# Patient Record
Sex: MALE | Race: WHITE | NOT HISPANIC OR LATINO | Employment: OTHER | URBAN - METROPOLITAN AREA
[De-identification: names, ages, dates, MRNs, and addresses within clinical notes are randomized per-mention and may not be internally consistent; named-entity substitution may affect disease eponyms.]

---

## 2017-02-27 ENCOUNTER — LAB CONVERSION - ENCOUNTER (OUTPATIENT)
Dept: OTHER | Facility: OTHER | Age: 77
End: 2017-02-27

## 2017-03-14 LAB
A/G RATIO (HISTORICAL): 1.3 (CALC) (ref 1–2.5)
ALBUMIN SERPL BCP-MCNC: 3.9 G/DL (ref 3.6–5.1)
ALP SERPL-CCNC: 71 U/L (ref 40–115)
ALT SERPL W P-5'-P-CCNC: 9 U/L (ref 9–46)
AST SERPL W P-5'-P-CCNC: 16 U/L (ref 10–35)
BILIRUB SERPL-MCNC: 0.6 MG/DL (ref 0.2–1.2)
BUN SERPL-MCNC: 22 MG/DL (ref 7–25)
BUN/CREA RATIO (HISTORICAL): NORMAL (CALC) (ref 6–22)
CALCIUM SERPL-MCNC: 9.1 MG/DL (ref 8.6–10.3)
CHLORIDE SERPL-SCNC: 104 MMOL/L (ref 98–110)
CHOLEST SERPL-MCNC: 189 MG/DL (ref 125–200)
CHOLEST/HDLC SERPL: 3.7 (CALC)
CO2 SERPL-SCNC: 29 MMOL/L (ref 20–31)
CREAT SERPL-MCNC: 1.15 MG/DL (ref 0.7–1.18)
EGFR AFRICAN AMERICAN (HISTORICAL): 71 ML/MIN/1.73M2
EGFR-AMERICAN CALC (HISTORICAL): 61 ML/MIN/1.73M2
GAMMA GLOBULIN (HISTORICAL): 2.9 G/DL (CALC) (ref 1.9–3.7)
GLUCOSE (HISTORICAL): 94 MG/DL (ref 65–99)
HDLC SERPL-MCNC: 51 MG/DL
LDL CHOLESTEROL DIRECT (HISTORICAL): 137 MG/DL
NON-HDL-CHOL (CHOL-HDL) (HISTORICAL): 138 MG/DL (CALC)
POTASSIUM SERPL-SCNC: 4.4 MMOL/L (ref 3.5–5.3)
SODIUM SERPL-SCNC: 141 MMOL/L (ref 135–146)
TOTAL PROTEIN (HISTORICAL): 6.8 G/DL (ref 6.1–8.1)
TRIGL SERPL-MCNC: 94 MG/DL

## 2017-03-15 ENCOUNTER — GENERIC CONVERSION - ENCOUNTER (OUTPATIENT)
Dept: OTHER | Facility: OTHER | Age: 77
End: 2017-03-15

## 2017-03-28 ENCOUNTER — GENERIC CONVERSION - ENCOUNTER (OUTPATIENT)
Dept: OTHER | Facility: OTHER | Age: 77
End: 2017-03-28

## 2017-03-29 ENCOUNTER — ALLSCRIPTS OFFICE VISIT (OUTPATIENT)
Dept: OTHER | Facility: OTHER | Age: 77
End: 2017-03-29

## 2017-04-19 ENCOUNTER — ALLSCRIPTS OFFICE VISIT (OUTPATIENT)
Dept: OTHER | Facility: OTHER | Age: 77
End: 2017-04-19

## 2017-08-15 ENCOUNTER — APPOINTMENT (EMERGENCY)
Dept: CT IMAGING | Facility: HOSPITAL | Age: 77
DRG: 189 | End: 2017-08-15
Payer: MEDICARE

## 2017-08-15 ENCOUNTER — HOSPITAL ENCOUNTER (INPATIENT)
Facility: HOSPITAL | Age: 77
LOS: 4 days | Discharge: HOME HEALTH | DRG: 189 | End: 2017-08-19
Attending: EMERGENCY MEDICINE | Admitting: INTERNAL MEDICINE
Payer: MEDICARE

## 2017-08-15 DIAGNOSIS — J18.9 PNEUMONIA: Primary | ICD-10-CM

## 2017-08-15 DIAGNOSIS — R93.89 ABNORMAL CT SCAN: ICD-10-CM

## 2017-08-15 DIAGNOSIS — N39.0 URINARY TRACT INFECTION: ICD-10-CM

## 2017-08-15 DIAGNOSIS — J44.9 COPD (CHRONIC OBSTRUCTIVE PULMONARY DISEASE) (HCC): ICD-10-CM

## 2017-08-15 PROBLEM — J96.02 ACUTE RESPIRATORY FAILURE WITH HYPOXIA AND HYPERCAPNIA (HCC): Status: ACTIVE | Noted: 2017-08-15

## 2017-08-15 PROBLEM — F17.200 TOBACCO USE DISORDER: Status: ACTIVE | Noted: 2017-08-15
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
## 2017-08-15 PROBLEM — J96.01 ACUTE RESPIRATORY FAILURE WITH HYPOXIA AND HYPERCAPNIA (HCC): Status: ACTIVE | Noted: 2017-08-15

## 2017-08-15 PROBLEM — R07.81 RIB PAIN: Status: ACTIVE | Noted: 2017-08-15

## 2017-08-15 LAB
ALBUMIN SERPL BCP-MCNC: 2.6 G/DL (ref 3.5–5)
ALP SERPL-CCNC: 74 U/L (ref 46–116)
ALT SERPL W P-5'-P-CCNC: 20 U/L (ref 12–78)
AMORPH PHOS CRY URNS QL MICRO: ABNORMAL /HPF
ANION GAP SERPL CALCULATED.3IONS-SCNC: 5 MMOL/L (ref 4–13)
AST SERPL W P-5'-P-CCNC: 17 U/L (ref 5–45)
BACTERIA UR QL AUTO: ABNORMAL /HPF
BASE EXCESS BLDA CALC-SCNC: 6 MMOL/L (ref -2–3)
BASOPHILS # BLD AUTO: 0.02 THOUSANDS/ΜL (ref 0–0.1)
BASOPHILS NFR BLD AUTO: 0 % (ref 0–1)
BILIRUB SERPL-MCNC: 0.4 MG/DL (ref 0.2–1)
BILIRUB UR QL STRIP: NEGATIVE
BUN SERPL-MCNC: 17 MG/DL (ref 5–25)
CALCIUM SERPL-MCNC: 8.8 MG/DL (ref 8.3–10.1)
CHLORIDE SERPL-SCNC: 103 MMOL/L (ref 100–108)
CLARITY UR: ABNORMAL
CLARITY, POC: CLEAR
CO2 SERPL-SCNC: 34 MMOL/L (ref 21–32)
COLOR UR: YELLOW
COLOR, POC: NORMAL
CREAT SERPL-MCNC: 1.14 MG/DL (ref 0.6–1.3)
EOSINOPHIL # BLD AUTO: 0.35 THOUSAND/ΜL (ref 0–0.61)
EOSINOPHIL NFR BLD AUTO: 3 % (ref 0–6)
ERYTHROCYTE [DISTWIDTH] IN BLOOD BY AUTOMATED COUNT: 13.5 % (ref 11.6–15.1)
EXT BILIRUBIN, UA: NORMAL
EXT BLOOD URINE: NORMAL
EXT GLUCOSE, UA: NORMAL
EXT KETONES: NORMAL
EXT NITRITE, UA: NORMAL
EXT PH, UA: 5
EXT PROTEIN, UA: NORMAL
EXT SPECIFIC GRAVITY, UA: 1.01
EXT UROBILINOGEN: 0.2
GFR SERPL CREATININE-BSD FRML MDRD: 62 ML/MIN/1.73SQ M
GLUCOSE SERPL-MCNC: 112 MG/DL (ref 65–140)
GLUCOSE UR STRIP-MCNC: NEGATIVE MG/DL
HCO3 BLDA-SCNC: 31.9 MMOL/L (ref 24–30)
HCT VFR BLD AUTO: 38.8 % (ref 36.5–49.3)
HGB BLD-MCNC: 12.8 G/DL (ref 12–17)
HGB UR QL STRIP.AUTO: ABNORMAL
KETONES UR STRIP-MCNC: NEGATIVE MG/DL
LEUKOCYTE ESTERASE UR QL STRIP: ABNORMAL
LYMPHOCYTES # BLD AUTO: 1.15 THOUSANDS/ΜL (ref 0.6–4.47)
LYMPHOCYTES NFR BLD AUTO: 11 % (ref 14–44)
MCH RBC QN AUTO: 32.9 PG (ref 26.8–34.3)
MCHC RBC AUTO-ENTMCNC: 33 G/DL (ref 31.4–37.4)
MCV RBC AUTO: 100 FL (ref 82–98)
MONOCYTES # BLD AUTO: 0.63 THOUSAND/ΜL (ref 0.17–1.22)
MONOCYTES NFR BLD AUTO: 6 % (ref 4–12)
NEUTROPHILS # BLD AUTO: 8.36 THOUSANDS/ΜL (ref 1.85–7.62)
NEUTS SEG NFR BLD AUTO: 80 % (ref 43–75)
NITRITE UR QL STRIP: NEGATIVE
NON-SQ EPI CELLS URNS QL MICRO: ABNORMAL /HPF
PCO2 BLD: 34 MMOL/L (ref 21–32)
PCO2 BLD: 52.4 MM HG (ref 42–50)
PH BLD: 7.39 [PH] (ref 7.3–7.4)
PH UR STRIP.AUTO: 5.5 [PH] (ref 4.5–8)
PLATELET # BLD AUTO: 216 THOUSANDS/UL (ref 149–390)
PMV BLD AUTO: 9.7 FL (ref 8.9–12.7)
PO2 BLD: 27 MM HG (ref 35–45)
POTASSIUM SERPL-SCNC: 3.6 MMOL/L (ref 3.5–5.3)
PROT SERPL-MCNC: 7.1 G/DL (ref 6.4–8.2)
PROT UR STRIP-MCNC: NEGATIVE MG/DL
RBC # BLD AUTO: 3.89 MILLION/UL (ref 3.88–5.62)
RBC #/AREA URNS AUTO: ABNORMAL /HPF
SAO2 % BLD FROM PO2: 48 % (ref 95–98)
SODIUM SERPL-SCNC: 142 MMOL/L (ref 136–145)
SP GR UR STRIP.AUTO: 1.01 (ref 1–1.03)
SPECIMEN SOURCE: ABNORMAL
TROPONIN I SERPL-MCNC: <0.02 NG/ML
TSH SERPL DL<=0.05 MIU/L-ACNC: 1.21 UIU/ML (ref 0.36–3.74)
UROBILINOGEN UR QL STRIP.AUTO: 1 E.U./DL
WBC # BLD AUTO: 10.51 THOUSAND/UL (ref 4.31–10.16)
WBC # BLD EST: NORMAL 10*3/UL
WBC #/AREA URNS AUTO: ABNORMAL /HPF

## 2017-08-15 PROCEDURE — 71260 CT THORAX DX C+: CPT

## 2017-08-15 PROCEDURE — 87086 URINE CULTURE/COLONY COUNT: CPT | Performed by: EMERGENCY MEDICINE

## 2017-08-15 PROCEDURE — 84443 ASSAY THYROID STIM HORMONE: CPT | Performed by: EMERGENCY MEDICINE

## 2017-08-15 PROCEDURE — 93005 ELECTROCARDIOGRAM TRACING: CPT | Performed by: EMERGENCY MEDICINE

## 2017-08-15 PROCEDURE — 96360 HYDRATION IV INFUSION INIT: CPT

## 2017-08-15 PROCEDURE — 36415 COLL VENOUS BLD VENIPUNCTURE: CPT | Performed by: EMERGENCY MEDICINE

## 2017-08-15 PROCEDURE — 99285 EMERGENCY DEPT VISIT HI MDM: CPT

## 2017-08-15 PROCEDURE — 82803 BLOOD GASES ANY COMBINATION: CPT

## 2017-08-15 PROCEDURE — 70450 CT HEAD/BRAIN W/O DYE: CPT

## 2017-08-15 PROCEDURE — 81001 URINALYSIS AUTO W/SCOPE: CPT | Performed by: EMERGENCY MEDICINE

## 2017-08-15 PROCEDURE — 74177 CT ABD & PELVIS W/CONTRAST: CPT

## 2017-08-15 PROCEDURE — 72125 CT NECK SPINE W/O DYE: CPT

## 2017-08-15 PROCEDURE — 87040 BLOOD CULTURE FOR BACTERIA: CPT | Performed by: EMERGENCY MEDICINE

## 2017-08-15 PROCEDURE — 81002 URINALYSIS NONAUTO W/O SCOPE: CPT | Performed by: EMERGENCY MEDICINE

## 2017-08-15 PROCEDURE — 87077 CULTURE AEROBIC IDENTIFY: CPT | Performed by: EMERGENCY MEDICINE

## 2017-08-15 PROCEDURE — 80053 COMPREHEN METABOLIC PANEL: CPT | Performed by: EMERGENCY MEDICINE

## 2017-08-15 PROCEDURE — 84484 ASSAY OF TROPONIN QUANT: CPT | Performed by: EMERGENCY MEDICINE

## 2017-08-15 PROCEDURE — 85025 COMPLETE CBC W/AUTO DIFF WBC: CPT | Performed by: EMERGENCY MEDICINE

## 2017-08-15 RX ORDER — SODIUM CHLORIDE 9 MG/ML
75 INJECTION, SOLUTION INTRAVENOUS CONTINUOUS
Status: DISCONTINUED | OUTPATIENT
Start: 2017-08-16 | End: 2017-08-17

## 2017-08-15 RX ORDER — BUDESONIDE AND FORMOTEROL FUMARATE DIHYDRATE 160; 4.5 UG/1; UG/1
2 AEROSOL RESPIRATORY (INHALATION)
Status: DISCONTINUED | OUTPATIENT
Start: 2017-08-16 | End: 2017-08-19 | Stop reason: HOSPADM

## 2017-08-15 RX ORDER — ACETAMINOPHEN 325 MG/1
650 TABLET ORAL EVERY 6 HOURS PRN
Status: DISCONTINUED | OUTPATIENT
Start: 2017-08-15 | End: 2017-08-19 | Stop reason: HOSPADM

## 2017-08-15 RX ORDER — ESCITALOPRAM OXALATE 10 MG/1
10 TABLET ORAL DAILY
Status: DISCONTINUED | OUTPATIENT
Start: 2017-08-16 | End: 2017-08-19 | Stop reason: HOSPADM

## 2017-08-15 RX ORDER — TRAMADOL HYDROCHLORIDE 50 MG/1
TABLET ORAL
COMMUNITY
Start: 2017-08-10 | End: 2017-08-19 | Stop reason: HOSPADM

## 2017-08-15 RX ORDER — NICOTINE 21 MG/24HR
14 PATCH, TRANSDERMAL 24 HOURS TRANSDERMAL ONCE
Status: COMPLETED | OUTPATIENT
Start: 2017-08-15 | End: 2017-08-17

## 2017-08-15 RX ORDER — FAMOTIDINE 40 MG/1
TABLET, FILM COATED ORAL
COMMUNITY
Start: 2017-03-29 | End: 2018-02-21 | Stop reason: SDUPTHER

## 2017-08-15 RX ORDER — FAMOTIDINE 20 MG/1
40 TABLET, FILM COATED ORAL DAILY
Status: DISCONTINUED | OUTPATIENT
Start: 2017-08-16 | End: 2017-08-19 | Stop reason: HOSPADM

## 2017-08-15 RX ORDER — TAMSULOSIN HYDROCHLORIDE 0.4 MG/1
0.4 CAPSULE ORAL DAILY
Status: DISCONTINUED | OUTPATIENT
Start: 2017-08-16 | End: 2017-08-19 | Stop reason: HOSPADM

## 2017-08-15 RX ORDER — BUDESONIDE AND FORMOTEROL FUMARATE DIHYDRATE 160; 4.5 UG/1; UG/1
AEROSOL RESPIRATORY (INHALATION)
COMMUNITY
End: 2018-06-05 | Stop reason: CLARIF

## 2017-08-15 RX ADMIN — SODIUM CHLORIDE 500 ML: 0.9 INJECTION, SOLUTION INTRAVENOUS at 21:29

## 2017-08-15 RX ADMIN — AZITHROMYCIN MONOHYDRATE 500 MG: 500 INJECTION, POWDER, LYOPHILIZED, FOR SOLUTION INTRAVENOUS at 22:37

## 2017-08-15 RX ADMIN — IOHEXOL 100 ML: 350 INJECTION, SOLUTION INTRAVENOUS at 21:21

## 2017-08-16 PROBLEM — R93.89 ABNORMAL CT SCAN: Status: ACTIVE | Noted: 2017-08-16

## 2017-08-16 PROBLEM — J96.02 ACUTE RESPIRATORY FAILURE WITH HYPOXIA AND HYPERCAPNIA (HCC): Status: RESOLVED | Noted: 2017-08-15 | Resolved: 2017-08-16

## 2017-08-16 PROBLEM — R53.1 WEAKNESS GENERALIZED: Status: ACTIVE | Noted: 2017-08-16

## 2017-08-16 PROBLEM — J96.01 ACUTE RESPIRATORY FAILURE WITH HYPOXIA AND HYPERCAPNIA (HCC): Status: RESOLVED | Noted: 2017-08-15 | Resolved: 2017-08-16

## 2017-08-16 LAB
ANION GAP SERPL CALCULATED.3IONS-SCNC: 8 MMOL/L (ref 4–13)
ATRIAL RATE: 77 BPM
BASE EXCESS BLDA CALC-SCNC: 1 MMOL/L (ref -2–3)
BUN SERPL-MCNC: 14 MG/DL (ref 5–25)
CALCIUM SERPL-MCNC: 8.7 MG/DL (ref 8.3–10.1)
CHLORIDE SERPL-SCNC: 104 MMOL/L (ref 100–108)
CO2 SERPL-SCNC: 28 MMOL/L (ref 21–32)
CREAT SERPL-MCNC: 1.15 MG/DL (ref 0.6–1.3)
ERYTHROCYTE [DISTWIDTH] IN BLOOD BY AUTOMATED COUNT: 13.8 % (ref 11.6–15.1)
GFR SERPL CREATININE-BSD FRML MDRD: 61 ML/MIN/1.73SQ M
GLUCOSE SERPL-MCNC: 95 MG/DL (ref 65–140)
HCO3 BLDA-SCNC: 25.9 MMOL/L (ref 24–30)
HCT VFR BLD AUTO: 37.3 % (ref 36.5–49.3)
HGB BLD-MCNC: 12.3 G/DL (ref 12–17)
L PNEUMO1 AG UR QL IA.RAPID: NEGATIVE
MCH RBC QN AUTO: 32.5 PG (ref 26.8–34.3)
MCHC RBC AUTO-ENTMCNC: 33 G/DL (ref 31.4–37.4)
MCV RBC AUTO: 99 FL (ref 82–98)
P AXIS: 74 DEGREES
PCO2 BLD: 27 MMOL/L (ref 21–32)
PCO2 BLD: 40.9 MM HG (ref 42–50)
PH BLD: 7.41 [PH] (ref 7.3–7.4)
PLATELET # BLD AUTO: 213 THOUSANDS/UL (ref 149–390)
PMV BLD AUTO: 9.6 FL (ref 8.9–12.7)
PO2 BLD: 57 MM HG (ref 35–45)
POTASSIUM SERPL-SCNC: 3.5 MMOL/L (ref 3.5–5.3)
PR INTERVAL: 154 MS
QRS AXIS: 80 DEGREES
QRSD INTERVAL: 110 MS
QT INTERVAL: 400 MS
QTC INTERVAL: 452 MS
RBC # BLD AUTO: 3.78 MILLION/UL (ref 3.88–5.62)
S PNEUM AG UR QL: NEGATIVE
SAO2 % BLD FROM PO2: 89 % (ref 95–98)
SODIUM SERPL-SCNC: 140 MMOL/L (ref 136–145)
SPECIMEN SOURCE: ABNORMAL
T WAVE AXIS: 64 DEGREES
VENTRICULAR RATE: 77 BPM
WBC # BLD AUTO: 16.31 THOUSAND/UL (ref 4.31–10.16)

## 2017-08-16 PROCEDURE — G8979 MOBILITY GOAL STATUS: HCPCS

## 2017-08-16 PROCEDURE — 36600 WITHDRAWAL OF ARTERIAL BLOOD: CPT

## 2017-08-16 PROCEDURE — 94760 N-INVAS EAR/PLS OXIMETRY 1: CPT

## 2017-08-16 PROCEDURE — 94668 MNPJ CHEST WALL SBSQ: CPT

## 2017-08-16 PROCEDURE — 87449 NOS EACH ORGANISM AG IA: CPT | Performed by: INTERNAL MEDICINE

## 2017-08-16 PROCEDURE — 82803 BLOOD GASES ANY COMBINATION: CPT

## 2017-08-16 PROCEDURE — 80048 BASIC METABOLIC PNL TOTAL CA: CPT | Performed by: NURSE PRACTITIONER

## 2017-08-16 PROCEDURE — 85027 COMPLETE CBC AUTOMATED: CPT | Performed by: NURSE PRACTITIONER

## 2017-08-16 PROCEDURE — G8978 MOBILITY CURRENT STATUS: HCPCS

## 2017-08-16 PROCEDURE — 94640 AIRWAY INHALATION TREATMENT: CPT

## 2017-08-16 PROCEDURE — 97163 PT EVAL HIGH COMPLEX 45 MIN: CPT

## 2017-08-16 RX ORDER — LORAZEPAM 0.5 MG/1
0.5 TABLET ORAL ONCE
Status: COMPLETED | OUTPATIENT
Start: 2017-08-16 | End: 2017-08-16

## 2017-08-16 RX ORDER — ESCITALOPRAM OXALATE 10 MG/1
TABLET ORAL
Status: DISPENSED
Start: 2017-08-16 | End: 2017-08-16

## 2017-08-16 RX ORDER — GUAIFENESIN/DEXTROMETHORPHAN 100-10MG/5
10 SYRUP ORAL EVERY 4 HOURS PRN
Status: DISCONTINUED | OUTPATIENT
Start: 2017-08-16 | End: 2017-08-19 | Stop reason: HOSPADM

## 2017-08-16 RX ORDER — LORAZEPAM 0.5 MG/1
TABLET ORAL
Status: COMPLETED
Start: 2017-08-16 | End: 2017-08-16

## 2017-08-16 RX ORDER — IPRATROPIUM BROMIDE AND ALBUTEROL SULFATE 2.5; .5 MG/3ML; MG/3ML
3 SOLUTION RESPIRATORY (INHALATION)
Status: DISCONTINUED | OUTPATIENT
Start: 2017-08-16 | End: 2017-08-19 | Stop reason: HOSPADM

## 2017-08-16 RX ORDER — METHYLPREDNISOLONE SODIUM SUCCINATE 40 MG/ML
40 INJECTION, POWDER, LYOPHILIZED, FOR SOLUTION INTRAMUSCULAR; INTRAVENOUS EVERY 12 HOURS SCHEDULED
Status: DISCONTINUED | OUTPATIENT
Start: 2017-08-16 | End: 2017-08-17

## 2017-08-16 RX ORDER — ACETAMINOPHEN 325 MG/1
TABLET ORAL
Status: COMPLETED
Start: 2017-08-16 | End: 2017-08-16

## 2017-08-16 RX ORDER — MAGNESIUM HYDROXIDE/ALUMINUM HYDROXICE/SIMETHICONE 120; 1200; 1200 MG/30ML; MG/30ML; MG/30ML
30 SUSPENSION ORAL EVERY 4 HOURS PRN
Status: DISCONTINUED | OUTPATIENT
Start: 2017-08-16 | End: 2017-08-19 | Stop reason: HOSPADM

## 2017-08-16 RX ORDER — TAMSULOSIN HYDROCHLORIDE 0.4 MG/1
CAPSULE ORAL
Status: COMPLETED
Start: 2017-08-16 | End: 2017-08-16

## 2017-08-16 RX ORDER — ALBUTEROL SULFATE 90 UG/1
2 AEROSOL, METERED RESPIRATORY (INHALATION) EVERY 4 HOURS PRN
Status: DISCONTINUED | OUTPATIENT
Start: 2017-08-16 | End: 2017-08-19 | Stop reason: HOSPADM

## 2017-08-16 RX ADMIN — IPRATROPIUM BROMIDE AND ALBUTEROL SULFATE 3 ML: 2.5; .5 SOLUTION RESPIRATORY (INHALATION) at 20:14

## 2017-08-16 RX ADMIN — TAMSULOSIN HYDROCHLORIDE 0.4 MG: 0.4 CAPSULE ORAL at 00:57

## 2017-08-16 RX ADMIN — ESCITALOPRAM OXALATE 10 MG: 10 TABLET ORAL at 22:58

## 2017-08-16 RX ADMIN — CEFTRIAXONE SODIUM 1000 MG: 1 INJECTION, POWDER, FOR SOLUTION INTRAMUSCULAR; INTRAVENOUS at 00:58

## 2017-08-16 RX ADMIN — BUDESONIDE AND FORMOTEROL FUMARATE DIHYDRATE 2 PUFF: 160; 4.5 AEROSOL RESPIRATORY (INHALATION) at 20:17

## 2017-08-16 RX ADMIN — ESCITALOPRAM OXALATE 10 MG: 10 TABLET ORAL at 01:00

## 2017-08-16 RX ADMIN — FAMOTIDINE 40 MG: 20 TABLET ORAL at 08:19

## 2017-08-16 RX ADMIN — ALUMINUM HYDROXIDE, MAGNESIUM HYDROXIDE, AND SIMETHICONE 30 ML: 200; 200; 20 SUSPENSION ORAL at 16:26

## 2017-08-16 RX ADMIN — METHYLPREDNISOLONE SODIUM SUCCINATE 40 MG: 40 INJECTION, POWDER, FOR SOLUTION INTRAMUSCULAR; INTRAVENOUS at 12:40

## 2017-08-16 RX ADMIN — ACETAMINOPHEN 650 MG: 325 TABLET ORAL at 02:00

## 2017-08-16 RX ADMIN — LORAZEPAM 0.5 MG: 0.5 TABLET ORAL at 01:30

## 2017-08-16 RX ADMIN — METHYLPREDNISOLONE SODIUM SUCCINATE 40 MG: 40 INJECTION, POWDER, FOR SOLUTION INTRAMUSCULAR; INTRAVENOUS at 22:58

## 2017-08-16 RX ADMIN — SODIUM CHLORIDE 75 ML/HR: 0.9 INJECTION, SOLUTION INTRAVENOUS at 15:22

## 2017-08-16 RX ADMIN — NICOTINE 14 MG: 14 PATCH, EXTENDED RELEASE TRANSDERMAL at 00:20

## 2017-08-16 RX ADMIN — ENOXAPARIN SODIUM 40 MG: 40 INJECTION SUBCUTANEOUS at 08:19

## 2017-08-16 RX ADMIN — SODIUM CHLORIDE 75 ML/HR: 0.9 INJECTION, SOLUTION INTRAVENOUS at 00:21

## 2017-08-16 RX ADMIN — BUDESONIDE AND FORMOTEROL FUMARATE DIHYDRATE 2 PUFF: 160; 4.5 AEROSOL RESPIRATORY (INHALATION) at 09:52

## 2017-08-16 RX ADMIN — CEFTRIAXONE SODIUM 1000 MG: 1 INJECTION, POWDER, FOR SOLUTION INTRAMUSCULAR; INTRAVENOUS at 23:02

## 2017-08-16 RX ADMIN — TAMSULOSIN HYDROCHLORIDE 0.4 MG: 0.4 CAPSULE ORAL at 17:26

## 2017-08-16 RX ADMIN — IPRATROPIUM BROMIDE AND ALBUTEROL SULFATE 3 ML: 2.5; .5 SOLUTION RESPIRATORY (INHALATION) at 14:31

## 2017-08-16 RX ADMIN — IPRATROPIUM BROMIDE AND ALBUTEROL SULFATE 3 ML: 2.5; .5 SOLUTION RESPIRATORY (INHALATION) at 09:53

## 2017-08-17 PROCEDURE — 94640 AIRWAY INHALATION TREATMENT: CPT

## 2017-08-17 PROCEDURE — 94668 MNPJ CHEST WALL SBSQ: CPT

## 2017-08-17 PROCEDURE — 94760 N-INVAS EAR/PLS OXIMETRY 1: CPT

## 2017-08-17 RX ORDER — NICOTINE 21 MG/24HR
14 PATCH, TRANSDERMAL 24 HOURS TRANSDERMAL DAILY
Status: DISCONTINUED | OUTPATIENT
Start: 2017-08-17 | End: 2017-08-17

## 2017-08-17 RX ORDER — METHYLPREDNISOLONE SODIUM SUCCINATE 40 MG/ML
40 INJECTION, POWDER, LYOPHILIZED, FOR SOLUTION INTRAMUSCULAR; INTRAVENOUS ONCE
Status: COMPLETED | OUTPATIENT
Start: 2017-08-17 | End: 2017-08-17

## 2017-08-17 RX ORDER — PREDNISONE 20 MG/1
40 TABLET ORAL
Status: DISCONTINUED | OUTPATIENT
Start: 2017-08-18 | End: 2017-08-17

## 2017-08-17 RX ORDER — PREDNISONE 20 MG/1
40 TABLET ORAL
Status: DISCONTINUED | OUTPATIENT
Start: 2017-08-17 | End: 2017-08-18

## 2017-08-17 RX ORDER — NICOTINE 21 MG/24HR
14 PATCH, TRANSDERMAL 24 HOURS TRANSDERMAL DAILY
Status: DISCONTINUED | OUTPATIENT
Start: 2017-08-17 | End: 2017-08-19 | Stop reason: HOSPADM

## 2017-08-17 RX ADMIN — ESCITALOPRAM OXALATE 10 MG: 10 TABLET ORAL at 21:44

## 2017-08-17 RX ADMIN — AZITHROMYCIN MONOHYDRATE 500 MG: 500 INJECTION, POWDER, LYOPHILIZED, FOR SOLUTION INTRAVENOUS at 01:42

## 2017-08-17 RX ADMIN — IPRATROPIUM BROMIDE AND ALBUTEROL SULFATE 3 ML: 2.5; .5 SOLUTION RESPIRATORY (INHALATION) at 01:59

## 2017-08-17 RX ADMIN — ENOXAPARIN SODIUM 40 MG: 40 INJECTION SUBCUTANEOUS at 10:51

## 2017-08-17 RX ADMIN — IPRATROPIUM BROMIDE AND ALBUTEROL SULFATE 3 ML: 2.5; .5 SOLUTION RESPIRATORY (INHALATION) at 10:00

## 2017-08-17 RX ADMIN — CEFTRIAXONE SODIUM 1000 MG: 1 INJECTION, POWDER, FOR SOLUTION INTRAMUSCULAR; INTRAVENOUS at 23:52

## 2017-08-17 RX ADMIN — METHYLPREDNISOLONE SODIUM SUCCINATE 40 MG: 40 INJECTION, POWDER, FOR SOLUTION INTRAMUSCULAR; INTRAVENOUS at 12:46

## 2017-08-17 RX ADMIN — BUDESONIDE AND FORMOTEROL FUMARATE DIHYDRATE 2 PUFF: 160; 4.5 AEROSOL RESPIRATORY (INHALATION) at 20:29

## 2017-08-17 RX ADMIN — IPRATROPIUM BROMIDE AND ALBUTEROL SULFATE 3 ML: 2.5; .5 SOLUTION RESPIRATORY (INHALATION) at 20:30

## 2017-08-17 RX ADMIN — IPRATROPIUM BROMIDE AND ALBUTEROL SULFATE 3 ML: 2.5; .5 SOLUTION RESPIRATORY (INHALATION) at 14:23

## 2017-08-17 RX ADMIN — BUDESONIDE AND FORMOTEROL FUMARATE DIHYDRATE 2 PUFF: 160; 4.5 AEROSOL RESPIRATORY (INHALATION) at 10:00

## 2017-08-17 RX ADMIN — TAMSULOSIN HYDROCHLORIDE 0.4 MG: 0.4 CAPSULE ORAL at 18:31

## 2017-08-17 RX ADMIN — NICOTINE 14 MG: 14 PATCH, EXTENDED RELEASE TRANSDERMAL at 03:16

## 2017-08-17 RX ADMIN — FAMOTIDINE 40 MG: 20 TABLET ORAL at 10:51

## 2017-08-18 ENCOUNTER — APPOINTMENT (INPATIENT)
Dept: PHYSICAL THERAPY | Facility: HOSPITAL | Age: 77
DRG: 189 | End: 2017-08-18
Payer: MEDICARE

## 2017-08-18 PROBLEM — R65.10 SIRS (SYSTEMIC INFLAMMATORY RESPONSE SYNDROME) (HCC): Status: ACTIVE | Noted: 2017-08-18

## 2017-08-18 PROBLEM — R65.10 SIRS (SYSTEMIC INFLAMMATORY RESPONSE SYNDROME) (HCC): Status: RESOLVED | Noted: 2017-08-18 | Resolved: 2017-08-18

## 2017-08-18 LAB
ALBUMIN SERPL BCP-MCNC: 2.5 G/DL (ref 3.5–5)
ALP SERPL-CCNC: 71 U/L (ref 46–116)
ALT SERPL W P-5'-P-CCNC: 35 U/L (ref 12–78)
ANION GAP SERPL CALCULATED.3IONS-SCNC: 10 MMOL/L (ref 4–13)
AST SERPL W P-5'-P-CCNC: 34 U/L (ref 5–45)
BASE EXCESS BLDA CALC-SCNC: 0 MMOL/L (ref -2–3)
BILIRUB SERPL-MCNC: 0.2 MG/DL (ref 0.2–1)
BUN SERPL-MCNC: 20 MG/DL (ref 5–25)
CALCIUM SERPL-MCNC: 8.6 MG/DL (ref 8.3–10.1)
CHLORIDE SERPL-SCNC: 107 MMOL/L (ref 100–108)
CO2 SERPL-SCNC: 28 MMOL/L (ref 21–32)
CREAT SERPL-MCNC: 1.07 MG/DL (ref 0.6–1.3)
ERYTHROCYTE [DISTWIDTH] IN BLOOD BY AUTOMATED COUNT: 14 % (ref 11.6–15.1)
GFR SERPL CREATININE-BSD FRML MDRD: 67 ML/MIN/1.73SQ M
GLUCOSE SERPL-MCNC: 135 MG/DL (ref 65–140)
HCO3 BLDA-SCNC: 23.9 MMOL/L (ref 24–30)
HCT VFR BLD AUTO: 34 % (ref 36.5–49.3)
HGB BLD-MCNC: 11 G/DL (ref 12–17)
MCH RBC QN AUTO: 32.4 PG (ref 26.8–34.3)
MCHC RBC AUTO-ENTMCNC: 32.4 G/DL (ref 31.4–37.4)
MCV RBC AUTO: 100 FL (ref 82–98)
PCO2 BLD: 25 MMOL/L (ref 21–32)
PCO2 BLD: 36 MM HG (ref 42–50)
PH BLD: 7.43 [PH] (ref 7.3–7.4)
PLATELET # BLD AUTO: 233 THOUSANDS/UL (ref 149–390)
PMV BLD AUTO: 9.7 FL (ref 8.9–12.7)
PO2 BLD: 70 MM HG (ref 35–45)
POTASSIUM SERPL-SCNC: 4.1 MMOL/L (ref 3.5–5.3)
PROT SERPL-MCNC: 6.7 G/DL (ref 6.4–8.2)
RBC # BLD AUTO: 3.39 MILLION/UL (ref 3.88–5.62)
SAO2 % BLD FROM PO2: 94 % (ref 95–98)
SODIUM SERPL-SCNC: 145 MMOL/L (ref 136–145)
SPECIMEN SOURCE: ABNORMAL
WBC # BLD AUTO: 17.21 THOUSAND/UL (ref 4.31–10.16)

## 2017-08-18 PROCEDURE — 82803 BLOOD GASES ANY COMBINATION: CPT

## 2017-08-18 PROCEDURE — 94760 N-INVAS EAR/PLS OXIMETRY 1: CPT

## 2017-08-18 PROCEDURE — 85027 COMPLETE CBC AUTOMATED: CPT | Performed by: INTERNAL MEDICINE

## 2017-08-18 PROCEDURE — 97116 GAIT TRAINING THERAPY: CPT

## 2017-08-18 PROCEDURE — 94668 MNPJ CHEST WALL SBSQ: CPT

## 2017-08-18 PROCEDURE — G8980 MOBILITY D/C STATUS: HCPCS

## 2017-08-18 PROCEDURE — G8979 MOBILITY GOAL STATUS: HCPCS

## 2017-08-18 PROCEDURE — 36600 WITHDRAWAL OF ARTERIAL BLOOD: CPT

## 2017-08-18 PROCEDURE — 80053 COMPREHEN METABOLIC PANEL: CPT | Performed by: INTERNAL MEDICINE

## 2017-08-18 PROCEDURE — 94640 AIRWAY INHALATION TREATMENT: CPT

## 2017-08-18 RX ORDER — PREDNISONE 20 MG/1
20 TABLET ORAL
Status: DISCONTINUED | OUTPATIENT
Start: 2017-08-19 | End: 2017-08-19 | Stop reason: HOSPADM

## 2017-08-18 RX ADMIN — BUDESONIDE AND FORMOTEROL FUMARATE DIHYDRATE 2 PUFF: 160; 4.5 AEROSOL RESPIRATORY (INHALATION) at 19:36

## 2017-08-18 RX ADMIN — IPRATROPIUM BROMIDE AND ALBUTEROL SULFATE 3 ML: 2.5; .5 SOLUTION RESPIRATORY (INHALATION) at 07:29

## 2017-08-18 RX ADMIN — PREDNISONE 40 MG: 20 TABLET ORAL at 08:09

## 2017-08-18 RX ADMIN — IPRATROPIUM BROMIDE AND ALBUTEROL SULFATE 3 ML: 2.5; .5 SOLUTION RESPIRATORY (INHALATION) at 02:17

## 2017-08-18 RX ADMIN — ENOXAPARIN SODIUM 40 MG: 40 INJECTION SUBCUTANEOUS at 08:09

## 2017-08-18 RX ADMIN — BUDESONIDE AND FORMOTEROL FUMARATE DIHYDRATE 2 PUFF: 160; 4.5 AEROSOL RESPIRATORY (INHALATION) at 07:28

## 2017-08-18 RX ADMIN — FAMOTIDINE 40 MG: 20 TABLET ORAL at 08:10

## 2017-08-18 RX ADMIN — IPRATROPIUM BROMIDE AND ALBUTEROL SULFATE 3 ML: 2.5; .5 SOLUTION RESPIRATORY (INHALATION) at 13:21

## 2017-08-18 RX ADMIN — NICOTINE 14 MG: 14 PATCH, EXTENDED RELEASE TRANSDERMAL at 08:09

## 2017-08-18 RX ADMIN — AZITHROMYCIN MONOHYDRATE 500 MG: 500 INJECTION, POWDER, LYOPHILIZED, FOR SOLUTION INTRAVENOUS at 00:32

## 2017-08-18 RX ADMIN — IPRATROPIUM BROMIDE AND ALBUTEROL SULFATE 3 ML: 2.5; .5 SOLUTION RESPIRATORY (INHALATION) at 19:36

## 2017-08-18 RX ADMIN — TAMSULOSIN HYDROCHLORIDE 0.4 MG: 0.4 CAPSULE ORAL at 18:12

## 2017-08-18 RX ADMIN — ESCITALOPRAM OXALATE 10 MG: 10 TABLET ORAL at 21:53

## 2017-08-19 VITALS
HEART RATE: 71 BPM | HEIGHT: 69 IN | OXYGEN SATURATION: 92 % | BODY MASS INDEX: 25.47 KG/M2 | WEIGHT: 171.96 LBS | TEMPERATURE: 96.9 F | RESPIRATION RATE: 21 BRPM | SYSTOLIC BLOOD PRESSURE: 119 MMHG | DIASTOLIC BLOOD PRESSURE: 72 MMHG

## 2017-08-19 LAB — BACTERIA UR CULT: NORMAL

## 2017-08-19 PROCEDURE — 94760 N-INVAS EAR/PLS OXIMETRY 1: CPT

## 2017-08-19 PROCEDURE — 94640 AIRWAY INHALATION TREATMENT: CPT

## 2017-08-19 RX ORDER — CIPROFLOXACIN 250 MG/1
500 TABLET, FILM COATED ORAL EVERY 12 HOURS SCHEDULED
Qty: 28 TABLET | Refills: 0 | Status: SHIPPED | OUTPATIENT
Start: 2017-08-19 | End: 2017-08-26

## 2017-08-19 RX ORDER — IPRATROPIUM BROMIDE AND ALBUTEROL SULFATE 2.5; .5 MG/3ML; MG/3ML
SOLUTION RESPIRATORY (INHALATION)
Qty: 1 ML | Refills: 0 | Status: SHIPPED | OUTPATIENT
Start: 2017-08-19 | End: 2018-08-24 | Stop reason: SDUPTHER

## 2017-08-19 RX ORDER — NICOTINE 21 MG/24HR
1 PATCH, TRANSDERMAL 24 HOURS TRANSDERMAL DAILY
Qty: 28 PATCH | Refills: 0 | Status: SHIPPED | OUTPATIENT
Start: 2017-08-19 | End: 2018-06-05 | Stop reason: ALTCHOICE

## 2017-08-19 RX ORDER — CEFUROXIME AXETIL 250 MG/1
250 TABLET ORAL EVERY 12 HOURS SCHEDULED
Qty: 10 TABLET | Refills: 0 | Status: SHIPPED | OUTPATIENT
Start: 2017-08-19 | End: 2017-08-24

## 2017-08-19 RX ORDER — PREDNISONE 10 MG/1
TABLET ORAL
Qty: 11 TABLET | Refills: 0 | Status: SHIPPED | OUTPATIENT
Start: 2017-08-19 | End: 2018-06-05 | Stop reason: CLARIF

## 2017-08-19 RX ORDER — ALBUTEROL SULFATE 90 UG/1
2 AEROSOL, METERED RESPIRATORY (INHALATION) EVERY 4 HOURS PRN
Qty: 1 INHALER | Refills: 0 | Status: SHIPPED | OUTPATIENT
Start: 2017-08-19 | End: 2018-08-10 | Stop reason: ALTCHOICE

## 2017-08-19 RX ADMIN — BUDESONIDE AND FORMOTEROL FUMARATE DIHYDRATE 2 PUFF: 160; 4.5 AEROSOL RESPIRATORY (INHALATION) at 09:48

## 2017-08-19 RX ADMIN — ENOXAPARIN SODIUM 40 MG: 40 INJECTION SUBCUTANEOUS at 08:42

## 2017-08-19 RX ADMIN — AZITHROMYCIN MONOHYDRATE 500 MG: 500 INJECTION, POWDER, LYOPHILIZED, FOR SOLUTION INTRAVENOUS at 00:48

## 2017-08-19 RX ADMIN — PREDNISONE 20 MG: 20 TABLET ORAL at 08:42

## 2017-08-19 RX ADMIN — IPRATROPIUM BROMIDE AND ALBUTEROL SULFATE 3 ML: 2.5; .5 SOLUTION RESPIRATORY (INHALATION) at 09:48

## 2017-08-19 RX ADMIN — CEFTRIAXONE SODIUM 1000 MG: 1 INJECTION, POWDER, FOR SOLUTION INTRAMUSCULAR; INTRAVENOUS at 00:08

## 2017-08-19 RX ADMIN — NICOTINE 14 MG: 14 PATCH, EXTENDED RELEASE TRANSDERMAL at 08:42

## 2017-08-19 RX ADMIN — FAMOTIDINE 40 MG: 20 TABLET ORAL at 08:42

## 2017-08-21 LAB
BACTERIA BLD CULT: NORMAL
BACTERIA BLD CULT: NORMAL

## 2017-08-28 ENCOUNTER — ALLSCRIPTS OFFICE VISIT (OUTPATIENT)
Dept: OTHER | Facility: OTHER | Age: 77
End: 2017-08-28

## 2017-10-03 ENCOUNTER — TRANSCRIBE ORDERS (OUTPATIENT)
Dept: ADMINISTRATIVE | Facility: HOSPITAL | Age: 77
End: 2017-10-03

## 2017-10-03 ENCOUNTER — ALLSCRIPTS OFFICE VISIT (OUTPATIENT)
Dept: OTHER | Facility: OTHER | Age: 77
End: 2017-10-03

## 2017-10-03 DIAGNOSIS — R93.89 ABNORMAL FINDINGS ON DIAGNOSTIC IMAGING OF OTHER SPECIFIED BODY STRUCTURES: ICD-10-CM

## 2017-10-03 DIAGNOSIS — R93.89 ABNORMAL RADIOLOGICAL FINDINGS IN SKIN AND SUBCUTANEOUS TISSUE: Primary | ICD-10-CM

## 2017-10-04 NOTE — PROGRESS NOTES
Assessment  1  Abnormal chest CT (793 2) (R93 8)   2  Chronic obstructive pulmonary disease (COPD) (496) (J44 9)    Plan  Abnormal chest CT    · * CT CHEST WO CONTRAST; Status:Hold For - Scheduling; Requested POLO:[id number];    Perform:[facility] Radiology; HGN:[id number]; Ordered; For:Abnormal chest CT; Ordered By:Kyler Soria;    Results/Data  Results Free Text Form [facility] Mor:   Results   CT Scan Chest CT from August 2017 is reviewed on the [facility] system  There is mild to moderate centrilobular and paraseptal emphysema  There is biapical parenchymal pleural scarring  There is an 8 mm left upper lobe nodule  There is a micronodular infiltrate in the right lower lobe  There is mediastinal and hilar adenopathy measuring up to 18 mm in the right hilar space  PFT Results v2:     Spirometry:   Post Bronchodilator Spirometry:   Lung Volumes:   DLCO:    PFT Interpretation:   Pulmonary function testing from March 2017 shows an FEV1/ FVC ratio of 50% with an FEV1 of 59% of predicted  FVC is 84% of predicted  This shows moderate obstruction  Discussion/Summary  Discussion Summary:   Patient is recovering nicely from recent hospitalization  I congratulated him on ongoing smoking cessation  He will continue with Symbicort twice daily and DuoNeb 4 times daily  He will undergo repeat chest CT in November to document resolution of pneumonia and evaluate left upper lobe nodule and adenopathy which was most likely reactive in nature  He is certainly at risk for bronchogenic carcinoma though given his longstanding smoking history  I will call him with results of the CT and he can follow up with me in 6 months or sooner if needed  Counseling Documentation With Imm: The patient was counseled regarding instructions for management  Goals and Barriers: The patient has the current Goals: Maintain pulmonary status  The patent has the current Barriers: Moderate underlying lung disease     Patient's Capacity to Self-Care: Patient is able to Self-Care  Active Problems  1  Acid reflux disease (530 81) (K21 9)   2  Acute UTI (599 0) (N39 0)   3  Anxiety (300 00) (F41 9)   4  Chronic obstructive pulmonary disease (COPD) (496) (J44 9)   5  Cigarette nicotine dependence with nicotine-induced disorder (292 9) (F17 219)   6  Depressive disorder (311) (F32 9)   7  Enlarged prostate with lower urinary tract symptoms (LUTS) (600 01) (N40 1)   8  Need for Zostavax administration (V04 89) (Z23)   9  Rib pain (786 50) (R07 81)   10  Screening for cardiovascular condition (V81 2) (Z13 6)    History of Present Illness  HPI: Patient is here today for hospital follow-up  He was originally evaluated by Dr Mallorie Riggins in August of this year with increasing shortness of breath and cough  He was found have pneumonia and completed a course of antibiotics  He has recovered nicely from that hospitalization  He denies cough, wheeze or sputum production  He has some exertional dyspnea but it has improved  He is compliant with Symbicort twice daily and uses DuoNeb 4 times daily  Patient smoked half pack per day for 50 years and quit after this recent hospitalization  Review of Systems  Complete-Male Pulm:   Constitutional: No fever or chills, feels well, no tiredness, no recent weight gain or weight loss  Eyes: no complaints of vision problems  ENT: no rhinitis, no PND, no epistaxis  Cardiovascular: no palpitations, no chest pain  Respiratory: as noted in HPI  Gastrointestinal: no complaints of esophageal reflux, no abdominal pain  Musculoskeletal: no arthralgias, no joint swelling, no myalgias  Past Medical History  1  History of Acute UTI (599 0) (N39 0)   2  Community acquired pneumonia (5) (J18 9)   3  History of strain (V13 59) (Z87 39)   4  History of Need for influenza vaccination (V04 81) (Z23)   5  History of Need for pneumococcal vaccination (V03 82) (Z23)    Family History  Mother    1   Family history of malignant neoplasm (V16 9) (Z80 9)  Father    2  Family history of alcoholism (V17 0) (Z81 1)   3  Family history of malignant neoplasm (V16 9) (Z80 9)  Family History    4  No family history of mental disorder  Family History Reviewed: The family history was reviewed and updated today  Social History   · Former smoker (T33 61) (B17 408)   · Smoked half pack per day for 50 years, quit in August 2017   · Smokes < 1 pack of cigarettes per day (305 1) (F17 210)  Social History Reviewed: The social history was reviewed and is unchanged  Current Meds   1  Escitalopram Oxalate 10 MG Oral Tablet; Take 1 tablet daily  Requested for: 77YMI3012;   Last Rx:29Mar2017 Ordered   2  Famotidine 40 MG Oral Tablet; TAKE 1 TABLET DAILY; Therapy: 35ADT7130 to (Evaluate:30Jan2018)  Requested for: 25Hwf3198; Last   Rx:88Wby8649 Ordered   3  Ipratropium-Albuterol 0 5-2 5 (3) MG/3ML Inhalation Solution; INHALE 1 VIAL 4 times   daily; Therapy: 19Mrg1551 to (Last Rx:19Sep2017)  Requested for: 30Vox7921 Ordered   4  LORazepam 1 MG Oral Tablet; 1 tabs every 8-12 hours as needed for anxiety; Therapy: 09KXT9301 to (Last Rx:29Mar2017)  Requested for: 29Mar2017 Ordered   5  ProAir  (90 Base) MCG/ACT Inhalation Aerosol Solution; INHALE 2 PUFFS   EVERY 4-6 HOURS AS NEEDED  Requested for: 09QLT2760; Last Rx:57Mtf0683 Ordered   6  Symbicort 160-4 5 MCG/ACT Inhalation Aerosol; INHALE 2 PUFFS TWICE DAILY  RINSE   MOUTH AFTER USE  Requested for: 90Twz2502; Last Rx:99Itf3166 Ordered   7  Tamsulosin HCl - 0 4 MG Oral Capsule; TAKE 2 CAPSULE Daily; Therapy: 16TJV8356 to (RBXJSKJI:48QVX1299)  Requested for: 65CWR2278; Last   Rx:77Owk7006 Ordered  Medication List Reviewed: The medication list was reviewed and updated today  Allergies  1   Aspirin TABS    Vitals  Vital Signs    Recorded: 00TNU1394 11:25AM   Temperature 97 8 F   Heart Rate 84   Respiration 16   Systolic 742   Diastolic 64   Height 5 ft 8 5 in   Weight 169 lb    BMI Calculated 25 32   BSA Calculated 1 91   O2 Saturation 95     Physical Exam    Constitutional   General appearance: No acute distress, well appearing and well nourished  Ears, Nose, Mouth, and Throat   Oropharynx: Normal with no erythema, edema, exudate or lesions  Neck   Neck: Supple, symmetric, trachea midline, no masses  Pulmonary   Respiratory effort: No increased work of breathing or signs of respiratory distress  Auscultation of lungs: Clear to auscultation, no rales, no crackles, no wheezing  Cardiovascular   Auscultation of heart: Normal rate and rhythm, normal S1 and S2, no murmurs  Examination of extremities for edema and/or varicosities: Normal     Musculoskeletal   Gait and station: Normal     Neurologic   Mental Status: Normal  Not confused, no evidence of dementia, good comprehension, good concentration  Skin   Skin and subcutaneous tissue: Limited exam shows no rash  Psychiatric   Mood and affect: Normal        Thank you very much for allowing me to participate in the care of this patient  If you have any questions, please do not hesitate to contact me        Signatures   Electronically signed by : Ana Cadet DO; Oct  3 2017 11:45AM EST                       (Author)

## 2017-11-15 ENCOUNTER — HOSPITAL ENCOUNTER (OUTPATIENT)
Dept: CT IMAGING | Facility: HOSPITAL | Age: 77
Discharge: HOME | End: 2017-11-15
Attending: INTERNAL MEDICINE
Payer: MEDICARE

## 2017-11-15 DIAGNOSIS — R93.89 ABNORMAL FINDINGS ON DIAGNOSTIC IMAGING OF OTHER SPECIFIED BODY STRUCTURES: ICD-10-CM

## 2017-11-15 PROCEDURE — 71250 CT THORAX DX C-: CPT

## 2018-01-09 NOTE — RESULT NOTES
Verified Results  (1) LIPID PANEL, FASTING 85Mfi7449 07:29AM [facility]     Test Name Result Flag Reference   CHOLESTEROL, TOTAL 196 mg/dL  125-200   HDL CHOLESTEROL 46 mg/dL  > OR = 40   TRIGLICERIDES 92 mg/dL  <308   LDL-CHOLESTEROL 132 mg/dL (calc) H <130   Desirable range <100 mg/dL for patients with CHD or  diabetes and <70 mg/dL for diabetic patients with  known heart disease  CHOL/HDLC RATIO 4 3 (calc)  < OR = 5 0   NON HDL CHOLESTEROL 150 mg/dL (calc)     Target for non-HDL cholesterol is 30 mg/dL higher than   LDL cholesterol target  (1) COMPREHENSIVE METABOLIC PANEL 76VFD6613 43:24EU [facility]   REPORT COMMENT:  FASTING:YES     Test Name Result Flag Reference   GLUCOSE 88 mg/dL  65-99   Fasting reference interval   UREA NITROGEN (BUN) 17 mg/dL  7-25   CREATININE 1 09 mg/dL  0 70-1 18   For patients >52years of age, the reference limit  for Creatinine is approximately 13% higher for people  identified as -American  eGFR NON-AFR   AMERICAN 66 mL/min/1 73m2  > OR = 60   eGFR AFRICAN AMERICAN 76 mL/min/1 73m2  > OR = 60   BUN/CREATININE RATIO   2-02   NOT APPLICABLE (calc)   SODIUM 141 mmol/L  135-146   POTASSIUM 4 7 mmol/L  3 5-5 3   CHLORIDE 106 mmol/L     CARBON DIOXIDE 28 mmol/L  19-30   CALCIUM 9 1 mg/dL  8 6-10 3   PROTEIN, TOTAL 7 2 g/dL  6 1-8 1   ALBUMIN 3 9 g/dL  3 6-5 1   GLOBULIN 3 3 g/dL (calc)  1 9-3 7   ALBUMIN/GLOBULIN RATIO 1 2 (calc)  1 0-2 5   BILIRUBIN, TOTAL 0 4 mg/dL  0 2-1 2   ALKALINE PHOSPHATASE 63 U/L     AST 15 U/L  10-35   ALT 10 U/L  9-46

## 2018-01-10 NOTE — RESULT NOTES
Verified Results  (1) URINE CULTURE [id number] 12:00AM [facility]     Test Name Result Flag Reference   CULTURE, URINE, ROUTINE  A    CULTURE, URINE, ROUTINE         MICRO NUMBER:      [id number]    TEST STATUS:       FINAL    SPECIMEN SOURCE:   URINE    SPECIMEN QUALITY:  ADEQUATE    RESULT:            Greater than 100,000 CFU/mL of Escherichia coli                            E coli                            ----------------                            INT   RAVINDRA     AMOX/CLAVULANATE       S     4 0     AMPICILLIN             S     8 0     AMP/SULBACTAM          S     4 0     CEFAZOLIN              NR    <=4 0 **1     CEFEPIME               S     <=1 0     CEFTRIAXONE            S     <=1 0     CIPROFLOXACIN          R     >=4 0     ERTAPENEM              S     <=0 5     GENTAMICIN             S     <=1 0     IMIPENEM               S     <=0 25     LEVOFLOXACIN           R     >=8 0     NITROFURANTOIN         S     <=16 0     PIP/TAZOBACTAM         S     <=4 0     TOBRAMYCIN             S     <=1 0     TRIMETHOPRIM/SULFA     S     <=20 0  S=Susceptible  I=Intermediate  R=Resistant  * = Not Tested  NR = Not Reported  **NN = See Therapy Comments  THERAPY COMMENTS      Note 1:      ORAL therapy: A cefazolin RAVINDRA of < 32 predicts      susceptibility to the oral agents cefaclor,      cefdinir, cefpodoxime, cefprozil, cefuroxime,      cephalexin, and loracarbef when used for therapy      of uncomplicated UTIs due to E  coli,      K  pneumoniae, and P  mirabilis  PARENTERAL therapy: A cefazolin RAVINDRA of > 8      indicates resistance to parenteral cefazolin  An alternate test method must be performed to      to confirm susceptibility to parenteral cefazolin

## 2018-01-10 NOTE — MISCELLANEOUS
Assessment   1  Community acquired pneumonia (5) (J18 9)  2  Acute UTI (599 0) (N39 0)  3  Chronic obstructive pulmonary disease, unspecified (496) (J44 9)  4  Enlarged prostate with lower urinary tract symptoms (LUTS) (600 01) (N40 1)    Plan  Chronic obstructive pulmonary disease, unspecified    · Renew: Symbicort 160-4 5 MCG/ACT Inhalation Aerosol; INHALE 2 PUFFS TWICE DAILY  RINSE MOUTH AFTER USE  Rx By: Mark Carvajal; Dispense: 0 Days ; #:1 GM; Refill: 2;For: Chronic obstructive   pulmonary disease, unspecified; KATERIN = N; Verified Transmission to [address] [place] [address]; Last Updated By: System, [employer]; 8/28/2017 2:54:56 PM    Discussion/Summary  Discussion Summary:   Continue current meds- return to Flomax 2 tabs daily  return 7-8 weeks - for flu and Pneumovax  Medication SE Review and Pt Understands Tx: Possible side effects of new medications were reviewed with the patient/guardian today  The treatment plan was reviewed with the patient/guardian  The patient/guardian understands and agrees with the treatment plan      Chief Complaint  Chief Complaint Free Text Note Form: BEVERLEY    Chief Complaint Chronic Condition [facility]: Patient is here today for follow up of chronic conditions described in HPI  History of Present Illness  TCM Communication [facility]: The patient is being contacted for follow-up after hospitalization and PNEUMONIA AND UTI  The date of admission: 08/15/2017, date of discharge: 08/19/2017  He was discharged to home  Medications reviewed and updated today  He scheduled a follow up appointment  The patient is currently asymptomatic  Communication performed and completed by   HPI: Admitted for pneumonia and UTI  Takes Flomax 1 tab daily- still up 8-10 X/night      Review of Systems  Complete-Male:   Constitutional: no fever, not feeling poorly, no chills and not feeling tired  Cardiovascular: the heart rate was not slow, no chest pain and no palpitations     Respiratory: cough, but no shortness of breath, no wheezing and no shortness of breath during exertion  Gastrointestinal: no abdominal pain, no nausea and no diarrhea  Genitourinary: urinary hesitancy and nocturia, but no dysuria  Active Problems   1  Acid reflux disease (530 81) (K21 9)  2  Anxiety (300 00) (F41 9)  3  Chronic obstructive pulmonary disease, unspecified (496) (J44 9)  4  Cigarette nicotine dependence with nicotine-induced disorder (292 9) (F17 219)  5  Depressive disorder (311) (F32 9)  6  Enlarged prostate with lower urinary tract symptoms (LUTS) (600 01) (N40 1)  7  Need for Zostavax administration (V04 89) (Z23)  8  Rib pain (786 50) (R07 81)  9  Screening for cardiovascular condition (V81 2) (Z13 6)    Past Medical History   1  History of Acute UTI (599 0) (N39 0)  2  History of strain (V13 59) (Z87 39)  3  History of Need for influenza vaccination (V04 81) (Z23)  4  History of Need for pneumococcal vaccination (V03 82) (Z23)    Family History  Mother   1  Family history of malignant neoplasm (V16 9) (Z80 9)  Father   2  Family history of malignant neoplasm (V16 9) (Z80 9)  Family History Reviewed: The family history was reviewed and updated today  Social History    · Current every day smoker (305 1) (F17 200)   · Smokes < 1 pack of cigarettes per day (305 1) (F17 210)  Social History Reviewed: The social history was reviewed and updated today  Current Meds  1  Escitalopram Oxalate 10 MG Oral Tablet; Take 1 tablet daily  Requested for: 60XCA3868;   Last Rx:29Mar2017 Ordered  2  Famotidine 40 MG Oral Tablet; TAKE 1 TABLET DAILY; Therapy: 39KES1155 to (Evaluate:30Jan2018)  Requested for: 82Fzw5151; Last   Rx:34Xmx6652 Ordered  3  Ipratropium-Albuterol 0 5-2 5 (3) MG/3ML Inhalation Solution; USE 1 UNIT DOSE IN   NEBULIZER EVERY 4 HOURS AS NEEDED; Therapy: 76Nss0405 to Recorded  4  LORazepam 1 MG Oral Tablet; 1 tabs every 8-12 hours as needed for anxiety;    Therapy: 79CQY6891 to (Last Rx:59Cxp1280)  Requested for: 99FRK1023 Ordered  5  ProAir  (90 Base) MCG/ACT Inhalation Aerosol Solution; INHALE 2 PUFFS   EVERY 4-6 HOURS AS NEEDED  Requested for: 35AME7663; Last Rx:42Cjh6039 Ordered  6  Symbicort 160-4 5 MCG/ACT Inhalation Aerosol; INHALE 2 PUFFS TWICE DAILY  RINSE   MOUTH AFTER USE; Last Rx:20Wyu5045 Ordered  7  Tamsulosin HCl - 0 4 MG Oral Capsule; TAKE 2 CAPSULE Daily; Therapy: 83PGP4744 to (Evaluate:18Jun2017)  Requested for: 19Apr2017; Last   Rx:19Apr2017 Ordered  Medication List Reviewed: The medication list was reviewed and updated today  Allergies   1  No Known Drug Allergies    Vitals  Signs   Recorded: 28Aug2017 02:27PM   Heart Rate: 78  Respiration: 18  Systolic: 526  Diastolic: 60  Height: 5 ft 8 5 in  Weight: 164 lb   BMI Calculated: 24 57  BSA Calculated: 1 88  O2 Saturation: 94    Physical Exam    Constitutional   General appearance: No acute distress, well appearing and well nourished  Eyes   Conjunctiva and lids: No swelling, erythema, or discharge  Pupils and irises: Equal, round and reactive to light  Ears, Nose, Mouth, and Throat   Nasal mucosa, septum, and turbinates: Normal without edema or erythema  Oropharynx: Normal with no erythema, edema, exudate or lesions  Pulmonary   Respiratory effort: No increased work of breathing or signs of respiratory distress  Auscultation of lungs: Abnormal   Auscultation of the lungs revealed decreased breath sounds diffusely  no rales or crackles were heard bilaterally  no rhonchi  no wheezing  Cardiovascular   Auscultation of heart: Normal rate and rhythm, normal S1 and S2, without murmurs      Examination of extremities for edema and/or varicosities: Normal     Carotid pulses: Normal     Psychiatric   Orientation to person, place and time: Normal     Mood and affect: Normal          Results/Data  PHQ-9 Adult Depression Screening 01Qjy1707 02:31PM User, Ahs     Test Name Result Flag Reference   PHQ-9 Adult Depression Score 1     Over the last two weeks, how often have you been bothered by any of the following problems? Little interest or pleasure in doing things: Not at all - 0  Feeling down, depressed, or hopeless: Not at all - 0  Trouble falling or staying asleep, or sleeping too much: Several days - 1  Feeling tired or having little energy: Not at all - 0  Poor appetite or over eating: Not at all - 0  Feeling bad about yourself - or that you are a failure or have let yourself or your family down: Not at all - 0  Trouble concentrating on things, such as reading the newspaper or watching television: Not at all - 0  Moving or speaking so slowly that other people could have noticed   Or the opposite -  being so fidgety or restless that you have been moving around a lot more than usual: Not at all - 0  Thoughts that you would be better off dead, or of hurting yourself in some way: Not at all - 0   PHQ-9 Adult Depression Screening Negative     PHQ-9 Difficulty Level Not difficult at all     PHQ-9 Severity Minimal Depression         Future Appointments    Date/Time Provider Specialty Site   10/03/2017 11:20 AM Carlos Soria, DO Pulmonary Medicine [address] [facility]   Electronically signed by : Bobo Robin MD; Aug 28 2017  4:46PM EST                       (Author)    Electronically signed by : Bobo Robin MD; Aug 28 2017  4:47PM EST                       (Author)

## 2018-01-13 VITALS
WEIGHT: 169 LBS | HEIGHT: 69 IN | BODY MASS INDEX: 25.03 KG/M2 | TEMPERATURE: 97.8 F | SYSTOLIC BLOOD PRESSURE: 108 MMHG | OXYGEN SATURATION: 95 % | RESPIRATION RATE: 16 BRPM | HEART RATE: 84 BPM | DIASTOLIC BLOOD PRESSURE: 64 MMHG

## 2018-01-13 VITALS
RESPIRATION RATE: 18 BRPM | BODY MASS INDEX: 24.29 KG/M2 | OXYGEN SATURATION: 94 % | WEIGHT: 164 LBS | HEIGHT: 69 IN | SYSTOLIC BLOOD PRESSURE: 100 MMHG | HEART RATE: 78 BPM | DIASTOLIC BLOOD PRESSURE: 60 MMHG

## 2018-01-13 VITALS
RESPIRATION RATE: 20 BRPM | OXYGEN SATURATION: 94 % | BODY MASS INDEX: 24.73 KG/M2 | DIASTOLIC BLOOD PRESSURE: 70 MMHG | HEIGHT: 69 IN | WEIGHT: 167 LBS | HEART RATE: 90 BPM | SYSTOLIC BLOOD PRESSURE: 120 MMHG

## 2018-01-13 NOTE — RESULT NOTES
Verified Results  (1) COMPREHENSIVE METABOLIC PANEL 03ICP9775 56:57ZT Katheryn Butter   REPORT COMMENT:  FASTING:YES     Test Name Result Flag Reference   GLUCOSE 94 mg/dL  65-99   Fasting reference interval   UREA NITROGEN (BUN) 22 mg/dL  7-25   CREATININE 1 15 mg/dL  0 70-1 18   For patients >52years of age, the reference limit  for Creatinine is approximately 13% higher for people  identified as -American  eGFR NON-AFR  AMERICAN 61 mL/min/1 73m2  > OR = 60   eGFR AFRICAN AMERICAN 71 mL/min/1 73m2  > OR = 60   BUN/CREATININE RATIO   5-38   NOT APPLICABLE (calc)   ALT 9 U/L  9-46   ALBUMIN 3 9 g/dL  3 6-5 1   GLOBULIN 2 9 g/dL (calc)  1 9-3 7   ALBUMIN/GLOBULIN RATIO 1 3 (calc)  1 0-2 5   BILIRUBIN, TOTAL 0 6 mg/dL  0 2-1 2   ALKALINE PHOSPHATASE 71 U/L     AST 16 U/L  10-35   SODIUM 141 mmol/L  135-146   POTASSIUM 4 4 mmol/L  3 5-5 3   CHLORIDE 104 mmol/L     CARBON DIOXIDE 29 mmol/L  20-31   CALCIUM 9 1 mg/dL  8 6-10 3   PROTEIN, TOTAL 6 8 g/dL  6 1-8 1     (Q) LIPID PANEL WITH DIRECT LDL 95DCV8061 07:16AM Katheryn Butter     Test Name Result Flag Reference   CHOLESTEROL, TOTAL 189 mg/dL  125-200   HDL CHOLESTEROL 51 mg/dL  > OR = 40   TRIGLICERIDES 94 mg/dL  <872   DIRECT  mg/dL H <130   Desirable range <100 mg/dL for patients with CHD or  diabetes and <70 mg/dL for diabetic patients with  known heart disease  CHOL/HDLC RATIO 3 7 (calc)  < OR = 5 0   NON HDL CHOLESTEROL 138 mg/dL (calc)     Target for non-HDL cholesterol is 30 mg/dL higher than   LDL cholesterol target

## 2018-01-14 VITALS
DIASTOLIC BLOOD PRESSURE: 60 MMHG | HEIGHT: 69 IN | BODY MASS INDEX: 24.73 KG/M2 | WEIGHT: 167 LBS | RESPIRATION RATE: 16 BRPM | OXYGEN SATURATION: 96 % | HEART RATE: 86 BPM | SYSTOLIC BLOOD PRESSURE: 110 MMHG

## 2018-01-30 DIAGNOSIS — N41.9 PROSTATITIS, UNSPECIFIED PROSTATITIS TYPE: Primary | ICD-10-CM

## 2018-01-30 RX ORDER — TAMSULOSIN HYDROCHLORIDE 0.4 MG/1
0.4 CAPSULE ORAL DAILY
Qty: 90 CAPSULE | Refills: 1 | Status: SHIPPED | OUTPATIENT
Start: 2018-01-30 | End: 2018-04-30 | Stop reason: SDUPTHER

## 2018-02-05 DIAGNOSIS — N30.00 ACUTE CYSTITIS WITHOUT HEMATURIA: Primary | ICD-10-CM

## 2018-02-05 RX ORDER — CIPROFLOXACIN 500 MG/1
500 TABLET, FILM COATED ORAL EVERY 12 HOURS SCHEDULED
Qty: 10 TABLET | Refills: 0 | Status: SHIPPED | OUTPATIENT
Start: 2018-02-05 | End: 2018-02-10

## 2018-02-21 ENCOUNTER — HOSPITAL ENCOUNTER (OUTPATIENT)
Dept: CT IMAGING | Facility: HOSPITAL | Age: 78
Discharge: HOME | End: 2018-02-21
Attending: INTERNAL MEDICINE
Payer: MEDICARE

## 2018-02-21 DIAGNOSIS — R93.89 ABNORMAL FINDINGS ON DIAGNOSTIC IMAGING OF OTHER SPECIFIED BODY STRUCTURES: ICD-10-CM

## 2018-02-21 DIAGNOSIS — K21.9 GASTROESOPHAGEAL REFLUX DISEASE, ESOPHAGITIS PRESENCE NOT SPECIFIED: Primary | ICD-10-CM

## 2018-02-21 PROCEDURE — 71250 CT THORAX DX C-: CPT

## 2018-02-22 RX ORDER — FAMOTIDINE 40 MG/1
40 TABLET, FILM COATED ORAL DAILY
Qty: 30 TABLET | Refills: 0 | Status: SHIPPED | OUTPATIENT
Start: 2018-02-22 | End: 2018-03-23 | Stop reason: SDUPTHER

## 2018-02-22 NOTE — TELEPHONE ENCOUNTER
Please sign and send to pharmacy    Pt due labs:kathleen, lip and radha 03/2018 - pt asked to be called in march to set up

## 2018-03-09 ENCOUNTER — TELEPHONE (OUTPATIENT)
Dept: FAMILY MEDICINE CLINIC | Facility: CLINIC | Age: 78
End: 2018-03-09

## 2018-03-09 DIAGNOSIS — N30.00 ACUTE CYSTITIS WITHOUT HEMATURIA: Primary | ICD-10-CM

## 2018-03-09 RX ORDER — SULFAMETHOXAZOLE AND TRIMETHOPRIM 800; 160 MG/1; MG/1
1 TABLET ORAL EVERY 12 HOURS SCHEDULED
Qty: 20 TABLET | Refills: 0 | Status: SHIPPED | OUTPATIENT
Start: 2018-03-09 | End: 2018-03-19

## 2018-03-23 DIAGNOSIS — K21.9 GASTROESOPHAGEAL REFLUX DISEASE, ESOPHAGITIS PRESENCE NOT SPECIFIED: ICD-10-CM

## 2018-03-23 RX ORDER — FAMOTIDINE 40 MG/1
40 TABLET, FILM COATED ORAL DAILY
Qty: 30 TABLET | Refills: 0 | Status: SHIPPED | OUTPATIENT
Start: 2018-03-23 | End: 2018-04-30 | Stop reason: SDUPTHER

## 2018-04-30 DIAGNOSIS — K21.9 GASTROESOPHAGEAL REFLUX DISEASE, ESOPHAGITIS PRESENCE NOT SPECIFIED: ICD-10-CM

## 2018-04-30 DIAGNOSIS — N41.9 PROSTATITIS, UNSPECIFIED PROSTATITIS TYPE: ICD-10-CM

## 2018-04-30 RX ORDER — FAMOTIDINE 40 MG/1
40 TABLET, FILM COATED ORAL DAILY
Qty: 90 TABLET | Refills: 0 | Status: SHIPPED | OUTPATIENT
Start: 2018-04-30 | End: 2018-08-02 | Stop reason: SDUPTHER

## 2018-04-30 RX ORDER — TAMSULOSIN HYDROCHLORIDE 0.4 MG/1
0.4 CAPSULE ORAL EVERY 12 HOURS
Qty: 180 CAPSULE | Refills: 0 | Status: SHIPPED | OUTPATIENT
Start: 2018-04-30 | End: 2018-08-24 | Stop reason: SDUPTHER

## 2018-04-30 NOTE — TELEPHONE ENCOUNTER
PT CALLED AND STATED THAT SHE GOT TAMULOSIN ONCE A DAY NEEDS 2 TIMES A DAY  ALSO NEEDS REFILLS ON FAMITODINE  WOULD LIKE 90 DAY SUPPLY   Gilda Walsh

## 2018-05-10 DIAGNOSIS — F41.9 ANXIETY: Primary | ICD-10-CM

## 2018-05-10 RX ORDER — ESCITALOPRAM OXALATE 10 MG/1
10 TABLET ORAL DAILY
Qty: 30 TABLET | Refills: 2 | Status: SHIPPED | OUTPATIENT
Start: 2018-05-10 | End: 2018-08-10 | Stop reason: SDUPTHER

## 2018-06-04 RX ORDER — LORAZEPAM 1 MG/1
TABLET ORAL
COMMUNITY
Start: 2016-06-24 | End: 2018-08-10 | Stop reason: SDUPTHER

## 2018-06-05 ENCOUNTER — OFFICE VISIT (OUTPATIENT)
Dept: PULMONOLOGY | Facility: HOSPITAL | Age: 78
End: 2018-06-05
Payer: MEDICARE

## 2018-06-05 VITALS
DIASTOLIC BLOOD PRESSURE: 60 MMHG | TEMPERATURE: 97.9 F | OXYGEN SATURATION: 95 % | BODY MASS INDEX: 26.51 KG/M2 | WEIGHT: 179 LBS | RESPIRATION RATE: 14 BRPM | SYSTOLIC BLOOD PRESSURE: 118 MMHG | HEIGHT: 69 IN | HEART RATE: 81 BPM

## 2018-06-05 DIAGNOSIS — J44.9 COPD, MODERATE (HCC): Primary | ICD-10-CM

## 2018-06-05 DIAGNOSIS — R91.1 PULMONARY NODULE: ICD-10-CM

## 2018-06-05 PROBLEM — F17.200 TOBACCO USE DISORDER: Status: RESOLVED | Noted: 2017-08-15 | Resolved: 2018-06-05

## 2018-06-05 PROBLEM — N39.0 URINARY TRACT INFECTION: Status: RESOLVED | Noted: 2017-08-15 | Resolved: 2018-06-05

## 2018-06-05 PROBLEM — J18.9 PNEUMONIA: Status: RESOLVED | Noted: 2017-08-15 | Resolved: 2018-06-05
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
## 2018-06-05 PROCEDURE — 99214 OFFICE O/P EST MOD 30 MIN: CPT | Performed by: INTERNAL MEDICINE

## 2018-06-05 RX ORDER — FLUTICASONE FUROATE AND VILANTEROL 100; 25 UG/1; UG/1
1 POWDER RESPIRATORY (INHALATION) DAILY
Qty: 1 INHALER | Refills: 11 | Status: SHIPPED | OUTPATIENT
Start: 2018-06-05 | End: 2018-06-08 | Stop reason: SDUPTHER

## 2018-06-05 NOTE — ASSESSMENT & PLAN NOTE
He has been using Symbicort inconsistently due to cost issues  It seems that Ozella Homans is on his formulary with a low co-pay  I provided him samples of Breo Ellipta 100 mcg use once daily  I instructed him on proper use  Hopefully this will decrease his rescue inhaler needs

## 2018-06-05 NOTE — PATIENT INSTRUCTIONS
COPD (Chronic Obstructive Pulmonary Disease)   WHAT YOU NEED TO KNOW:   Chronic obstructive pulmonary disease (COPD) is a lung disease that makes it hard for you to breathe  It is usually a result of lung damage caused by years of irritation and inflammation in your lungs  DISCHARGE INSTRUCTIONS:   Call 911 if:   · You feel lightheaded, short of breath, and have chest pain  Return to the emergency department if:   · You are confused, dizzy, or feel faint  · Your arm or leg feels warm, tender, and painful  It may look swollen and red  · You cough up blood  Contact your healthcare provider if:   · You have more shortness of breath than usual      · You need more medicine than usual to control your symptoms  · You are coughing or wheezing more than usual      · You are coughing up more mucus, or it is a different color or has a different odor  · You gain more than 3 pounds in a week  · You have a fever, a runny or stuffy nose, and a sore throat, or other cold or flu symptoms  · Your skin, lips, or nails start to turn blue  · You have swelling in your legs or ankles  · You are very tired or weak for more than a day  · You notice changes in your mood, or changes in your ability to think or concentrate  · You have questions or concerns about your condition or care  Medicines:   · Medicines  may be used to open your airways, decrease swelling and inflammation in your lungs, or treat an infection  You may need 2 or more medicines  A short-acting medicine relieves symptoms quickly  Long-acting medicines will control or prevent symptoms  Ask for more information about the medicines you are given and how to use them safely  · Take your medicine as directed  Contact your healthcare provider if you think your medicine is not helping or if you have side effects  Tell him or her if you are allergic to any medicine  Keep a list of the medicines, vitamins, and herbs you take  Include the amounts, and when and why you take them  Bring the list or the pill bottles to follow-up visits  Carry your medicine list with you in case of an emergency  Help make breathing easier:   · Use pursed-lip breathing any time you feel short of breath  Take a deep breath in through your nose  Slowly breathe out through your mouth with your lips pursed for twice as long as you inhaled  You can also practice this breathing pattern while you bend, lift, climb stairs, or exercise  It slows down your breathing and helps move more air in and out of your lungs  · Do not smoke, and avoid others who smoke  Nicotine and other substances can cause lung irritation or damage and make it harder for you to breathe  Do not use e-cigarettes or smokeless tobacco  They still contain nicotine  Ask your healthcare provider for information if you currently smoke and need help to quit  For support and more information:  ¨ [facility]  Phone: 5- 772 - 872-0433  Web Address: [employer]      · Be aware of and avoid anything that makes your symptoms worse  Stay out of high altitudes and places with high humidity  Stay inside, or cover your mouth and nose with a scarf when you are outside during cold weather  Stay inside on days when air pollution or pollen counts are high  Do not use aerosol sprays such as deodorant, bug spray, and hair spray  Manage COPD and help prevent exacerbations:  COPD is a serious condition that gets worse over time  A COPD exacerbation means your symptoms suddenly get worse  It is important to prevent exacerbations  An exacerbation can cause more lung damage  COPD cannot be cured, but you can take action to feel better and prevent COPD exacerbations:  · Protect yourself from germs  Germs can get into your lungs and cause an infection  An infection in your lungs can create more mucus and make it harder to breathe   An infection can also create swelling in your airways and prevent air from getting in  You can decrease your risk for infection by doing the following:     [facility] your hands often with soap and water  Carry germ-killing gel with you  You can use the gel to clean your hands when soap and water are not available  ¨ Do not touch your eyes, nose, or mouth unless you have washed your hands first      ¨ Always cover your mouth when you cough  Cough into a tissue or your shirtsleeve so you do not spread germs from your hands  ¨ Try to avoid people who have a cold or the flu  If you are sick, stay away from others as much as possible  · Drink more liquids  This will help to keep your air passages moist and help you cough up mucus  Ask how much liquid to drink each day and which liquids are best for you  · Exercise daily  Exercise for at least 20 minutes each day to help increase your energy and decrease shortness of breath  Walking or riding a bike are good ways to exercise  Talk to your healthcare provider about the best exercise plan for you  · Ask about vaccines  Your healthcare provider may recommend that you get regular flu and pneumonia vaccines  Pneumonia can become life-threatening for a person who has COPD  Ask about other vaccines you may need  Ask your healthcare provider about the flu and pneumonia vaccines  All adults should get the flu (influenza) vaccine every year as soon as it becomes available  The pneumonia vaccine is given to adults aged 72 or older to prevent pneumococcal disease, such as pneumonia  Adults aged 23 to 59 years who are at high risk for pneumococcal disease also should get the pneumococcal vaccine  It may need to be repeated 1 or 5 years later  Pulmonary rehabilitation:  Your healthcare provider may recommend a program to help you manage your symptoms and improve your quality of life  It may include nutritional counseling and exercise to strengthen your lungs     Make decisions about your choices for future treatment:  Ask for information about advanced medical directives and living guzmán  These documents help you decide and write down your choices for treatment and end-of-life care  It is best to complete them when you feel well and can think clearly about your wishes  The information can then be kept for future use if you are in the hospital or become very ill  Follow up with your healthcare provider as directed: You may need more tests  Your healthcare provider may refer you to a pulmonary (lung) specialist  Write down your questions so you remember to ask them during your visits  © 2017 [facility] Information is for End User's use only and may not be sold, redistributed or otherwise used for commercial purposes  All illustrations and images included in CareNotes® are the copyrighted property of A D A M [employer] Inc  or Edilson Rincon  The above information is an  only  It is not intended as medical advice for individual conditions or treatments  Talk to your doctor, nurse or pharmacist before following any medical regimen to see if it is safe and effective for you

## 2018-06-05 NOTE — PROGRESS NOTES
Pulmonary Follow Up Note   Juany Hilliard 65 y o  male MRN: [id number]  6/5/2018      Assessment/Plan:    COPD, moderate ([address] )  He has been using Symbicort inconsistently due to cost issues  It seems that Alvera Alpers is on his formulary with a low co-pay  I provided him samples of Breo Ellipta 100 mcg use once daily  I instructed him on proper use  Hopefully this will decrease his rescue inhaler needs  Pulmonary nodule  Will plan to repeat chest CT in February  We can schedule it at our next visit  Visit orders:    Diagnoses and all orders for this visit:    COPD, moderate ([address] )  -     fluticasone-vilanterol (BREO ELLIPTA) 100-25 mcg/inh inhaler; Inhale 1 puff daily Rinse mouth after use  Pulmonary nodule    Other orders  -     LORazepam (ATIVAN) 1 mg tablet; Take by mouth        Return in about 6 months (around 12/5/2018)  History of Present Illness   HPI:  Juany Hilliard is a 65 y o  male who is here today for follow-up regarding moderate  D  His pulmonary status has been fairly stable  He does have cough with scant sputum production  He has episodic wheezing and uses his rescue inhaler once or twice per day  He also uses DuoNeb 3 times daily  He has been using Symbicort sporadically due to cost issues  He has dyspnea on exertion and dyspnea with humid weather  This has been stable  Review of Systems   Constitutional: Negative for chills, fever and unexpected weight change  HENT: Negative for postnasal drip and sore throat  Respiratory:        As noted in HPI   Cardiovascular: Negative for chest pain  Gastrointestinal: Positive for vomiting  Negative for abdominal pain and diarrhea  Genitourinary: Negative for difficulty urinating  Skin: Negative for rash  Neurological: Negative for headaches  Hematological: Negative for adenopathy  Psychiatric/Behavioral: Negative  All other systems reviewed and are negative          Historical Information   Past Medical History: Diagnosis Date    Bladder infection     current tx with cipro 5/29/16    COPD (chronic obstructive pulmonary disease) (HCC)     Enlarged prostate     GERD (gastroesophageal reflux disease)     Hx of bladder infections     Psychiatric disorder     depression     Past Surgical History:   Procedure Laterality Date    COLON SURGERY      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    COLOSTOMY       Family History   Problem Relation Age of Onset    Lung cancer Mother     Cancer Father        History   Smoking Status    Former Smoker    Packs/day: 0 50    Years: 50 00    Types: Cigarettes    Quit date: 03/2018   Smokeless Tobacco    Never Used         Meds/Allergies     Current Outpatient Prescriptions:     albuterol (PROVENTIL HFA,VENTOLIN HFA) 90 mcg/act inhaler, Inhale 2 puffs every 4 (four) hours as needed for wheezing, Disp: 1 Inhaler, Rfl: 0    escitalopram (LEXAPRO) 10 mg tablet, Take 1 tablet (10 mg total) by mouth daily, Disp: 30 tablet, Rfl: 2    famotidine (PEPCID) 40 MG tablet, Take 1 tablet (40 mg total) by mouth daily, Disp: 90 tablet, Rfl: 0    ipratropium-albuterol (DUO-NEB) 0 5-2 5 mg/mL, 1 qid- unit dose dispense 120 4 refills, Disp: 1 mL, Rfl: 0    LORazepam (ATIVAN) 1 mg tablet, Take by mouth, Disp: , Rfl:     tamsulosin (FLOMAX) 0 4 mg, Take 1 capsule (0 4 mg total) by mouth every 12 (twelve) hours, Disp: 180 capsule, Rfl: 0    fluticasone-vilanterol (BREO ELLIPTA) 100-25 mcg/inh inhaler, Inhale 1 puff daily Rinse mouth after use , Disp: 1 Inhaler, Rfl: 11  Allergies   Allergen Reactions    Aspirin Other (See Comments)     Hx stomach ulcer       Vitals: Blood pressure 118/60, pulse 81, temperature 97 9 °F (36 6 °C), resp  rate 14, height 5' 9" (1 753 m), weight 81 2 kg (179 lb), SpO2 95 %  Body mass index is 26 43 kg/m²  Oxygen Therapy  SpO2: 95 %    Physical Exam   Physical Exam   Constitutional: He is oriented to person, place, and time  No distress     HENT:   Head: Normocephalic  Mouth/Throat: No oropharyngeal exudate  Eyes: No scleral icterus  Neck: Neck supple  No JVD present  Cardiovascular: Normal rate and regular rhythm  No murmur heard  Pulmonary/Chest: He has no wheezes  He has no rales  Abdominal: Soft  There is no tenderness  Musculoskeletal: He exhibits no edema  Lymphadenopathy:     He has no cervical adenopathy  Neurological: He is alert and oriented to person, place, and time  Skin: Skin is warm and dry  Psychiatric: He has a normal mood and affect  Labs: I have personally reviewed pertinent lab results  Lab Results   Component Value Date    WBC 17 21 (H) 08/18/2017    HGB 11 0 (L) 08/18/2017    HCT 34 0 (L) 08/18/2017     (H) 08/18/2017     08/18/2017     Lab Results   Component Value Date    GLUCOSE 135 08/18/2017    CALCIUM 8 6 08/18/2017     08/18/2017    K 4 1 08/18/2017    CO2 28 08/18/2017     08/18/2017    BUN 20 08/18/2017    CREATININE 1 07 08/18/2017     No results found for: IGE  Lab Results   Component Value Date    ALT 35 08/18/2017    AST 34 08/18/2017    ALKPHOS 71 08/18/2017    BILITOT 0 20 08/18/2017       Imaging and other studies: I have personally reviewed pertinent films in PACS chest CT from 02/26/2018 shows stable emphysema  There is a 4 x 8 mm nodule in the left upper lobe, stable from August 2017      Pulmonary function testing:  Performed March 2017   FEV1/FVC ratio 50%   FEV1 59% predicted  FVC 84% predicted

## 2018-06-08 DIAGNOSIS — J44.9 COPD, MODERATE (HCC): ICD-10-CM

## 2018-06-11 ENCOUNTER — TELEPHONE (OUTPATIENT)
Dept: PULMONOLOGY | Facility: CLINIC | Age: 78
End: 2018-06-11

## 2018-06-11 RX ORDER — FLUTICASONE FUROATE AND VILANTEROL TRIFENATATE 100; 25 UG/1; UG/1
1 POWDER RESPIRATORY (INHALATION) DAILY
Qty: 1 INHALER | Refills: 11 | Status: SHIPPED | OUTPATIENT
Start: 2018-06-11 | End: 2018-08-10 | Stop reason: ALTCHOICE

## 2018-06-11 NOTE — TELEPHONE ENCOUNTER
Patient's wife Calixto Lopez called requesting an alternative for Breo, since it would cost them over $200  Would like a call back please

## 2018-06-18 NOTE — TELEPHONE ENCOUNTER
Second attempt to contact pt to set up a  for samples but no answer and no voicemail to leave message

## 2018-07-10 DIAGNOSIS — R35.0 FREQUENCY OF URINATION: Primary | ICD-10-CM

## 2018-07-10 RX ORDER — CIPROFLOXACIN 500 MG/1
500 TABLET, FILM COATED ORAL EVERY 12 HOURS SCHEDULED
Qty: 20 TABLET | Refills: 0 | Status: SHIPPED | OUTPATIENT
Start: 2018-07-10 | End: 2018-07-20

## 2018-07-10 NOTE — TELEPHONE ENCOUNTER
Called and spoke to [facility], pt is having pain and frequency with urination x 2 days, no other sxs, nkda

## 2018-08-02 DIAGNOSIS — K21.9 GASTROESOPHAGEAL REFLUX DISEASE, ESOPHAGITIS PRESENCE NOT SPECIFIED: ICD-10-CM

## 2018-08-02 DIAGNOSIS — Z13.6 SCREENING FOR CARDIOVASCULAR CONDITION: Primary | ICD-10-CM

## 2018-08-02 RX ORDER — FAMOTIDINE 40 MG/1
40 TABLET, FILM COATED ORAL DAILY
Qty: 90 TABLET | Refills: 0 | Status: SHIPPED | OUTPATIENT
Start: 2018-08-02 | End: 2018-11-02 | Stop reason: SDUPTHER

## 2018-08-06 LAB
ALBUMIN SERPL-MCNC: 4 G/DL (ref 3.6–5.1)
ALBUMIN/GLOB SERPL: 1.3 (CALC) (ref 1–2.5)
ALP SERPL-CCNC: 70 U/L (ref 40–115)
ALT SERPL-CCNC: 11 U/L (ref 9–46)
AST SERPL-CCNC: 17 U/L (ref 10–35)
BILIRUB SERPL-MCNC: 0.6 MG/DL (ref 0.2–1.2)
BUN SERPL-MCNC: 20 MG/DL (ref 7–25)
BUN/CREAT SERPL: ABNORMAL (CALC) (ref 6–22)
CALCIUM SERPL-MCNC: 9.1 MG/DL (ref 8.6–10.3)
CHLORIDE SERPL-SCNC: 104 MMOL/L (ref 98–110)
CHOLEST SERPL-MCNC: 195 MG/DL
CHOLEST/HDLC SERPL: 4.1 (CALC)
CO2 SERPL-SCNC: 28 MMOL/L (ref 20–32)
CREAT SERPL-MCNC: 1.17 MG/DL (ref 0.7–1.18)
GLOBULIN SER CALC-MCNC: 3 G/DL (CALC) (ref 1.9–3.7)
GLUCOSE SERPL-MCNC: 80 MG/DL (ref 65–99)
HDLC SERPL-MCNC: 47 MG/DL
LDLC SERPL CALC-MCNC: 128 MG/DL (CALC)
NONHDLC SERPL-MCNC: 148 MG/DL (CALC)
POTASSIUM SERPL-SCNC: 4.4 MMOL/L (ref 3.5–5.3)
PROT SERPL-MCNC: 7 G/DL (ref 6.1–8.1)
SL AMB EGFR AFRICAN AMERICAN: 69 ML/MIN/1.73M2
SL AMB EGFR NON AFRICAN AMERICAN: 59 ML/MIN/1.73M2
SODIUM SERPL-SCNC: 140 MMOL/L (ref 135–146)
TRIGL SERPL-MCNC: 101 MG/DL

## 2018-08-07 ENCOUNTER — TELEPHONE (OUTPATIENT)
Dept: FAMILY MEDICINE CLINIC | Facility: CLINIC | Age: 78
End: 2018-08-07

## 2018-08-07 NOTE — TELEPHONE ENCOUNTER
HAS OV 8/10/18 WITH PM TO DISCUSS      ----- Message from Shanna Roche MD sent at 8/7/2018  9:37 AM EDT -----  Results reviewed-will discuss at scheduled appointment-no changes needed now

## 2018-08-10 ENCOUNTER — OFFICE VISIT (OUTPATIENT)
Dept: FAMILY MEDICINE CLINIC | Facility: CLINIC | Age: 78
End: 2018-08-10
Payer: MEDICARE

## 2018-08-10 VITALS
SYSTOLIC BLOOD PRESSURE: 124 MMHG | HEIGHT: 69 IN | WEIGHT: 177 LBS | DIASTOLIC BLOOD PRESSURE: 82 MMHG | BODY MASS INDEX: 26.22 KG/M2

## 2018-08-10 DIAGNOSIS — N40.1 BENIGN PROSTATIC HYPERPLASIA WITH URINARY FREQUENCY: ICD-10-CM

## 2018-08-10 DIAGNOSIS — K21.9 GASTROESOPHAGEAL REFLUX DISEASE WITHOUT ESOPHAGITIS: ICD-10-CM

## 2018-08-10 DIAGNOSIS — K40.90 UNILATERAL INGUINAL HERNIA WITHOUT OBSTRUCTION OR GANGRENE, RECURRENCE NOT SPECIFIED: ICD-10-CM

## 2018-08-10 DIAGNOSIS — F32.A DEPRESSIVE DISORDER: ICD-10-CM

## 2018-08-10 DIAGNOSIS — R35.0 BENIGN PROSTATIC HYPERPLASIA WITH URINARY FREQUENCY: ICD-10-CM

## 2018-08-10 DIAGNOSIS — J44.9 COPD, MODERATE (HCC): Primary | ICD-10-CM

## 2018-08-10 DIAGNOSIS — Z00.00 MEDICARE ANNUAL WELLNESS VISIT, SUBSEQUENT: ICD-10-CM

## 2018-08-10 DIAGNOSIS — F41.9 ANXIETY: ICD-10-CM

## 2018-08-10 PROCEDURE — 99214 OFFICE O/P EST MOD 30 MIN: CPT | Performed by: FAMILY MEDICINE

## 2018-08-10 PROCEDURE — G0439 PPPS, SUBSEQ VISIT: HCPCS | Performed by: FAMILY MEDICINE

## 2018-08-10 RX ORDER — ALBUTEROL SULFATE 90 UG/1
2 AEROSOL, METERED RESPIRATORY (INHALATION) EVERY 6 HOURS PRN
Qty: 8.5 G | Refills: 2 | Status: SHIPPED | OUTPATIENT
Start: 2018-08-10 | End: 2018-08-15 | Stop reason: SDUPTHER

## 2018-08-10 RX ORDER — LORAZEPAM 1 MG/1
1 TABLET ORAL EVERY 8 HOURS PRN
Qty: 30 TABLET | Refills: 1 | Status: SHIPPED | OUTPATIENT
Start: 2018-08-10 | End: 2020-06-23 | Stop reason: SDUPTHER

## 2018-08-10 RX ORDER — BUDESONIDE AND FORMOTEROL FUMARATE DIHYDRATE 160; 4.5 UG/1; UG/1
2 AEROSOL RESPIRATORY (INHALATION) 2 TIMES DAILY
Qty: 1 INHALER | Refills: 0 | COMMUNITY
Start: 2018-08-10 | End: 2018-12-03 | Stop reason: ALTCHOICE

## 2018-08-10 RX ORDER — ESCITALOPRAM OXALATE 10 MG/1
10 TABLET ORAL DAILY
Qty: 90 TABLET | Refills: 3 | Status: SHIPPED | OUTPATIENT
Start: 2018-08-10 | End: 2019-09-05 | Stop reason: SDUPTHER

## 2018-08-10 NOTE — PROGRESS NOTES
Assessment/Plan:    No problem-specific Assessment & Plan notes found for this encounter  Diagnoses and all orders for this visit:    COPD, moderate ([address] )  -     budesonide-formoterol (SYMBICORT) 160-4 5 mcg/act inhaler; Inhale 2 puffs 2 (two) times a day Rinse mouth after use  -     albuterol (PROAIR HFA) 90 mcg/act inhaler; Inhale 2 puffs every 6 (six) hours as needed for wheezing    Medicare annual wellness visit, subsequent    Gastroesophageal reflux disease without esophagitis    Benign prostatic hyperplasia with urinary frequency    Anxiety  -     LORazepam (ATIVAN) 1 mg tablet; Take 1 tablet (1 mg total) by mouth every 8 (eight) hours as needed for anxiety  -     escitalopram (LEXAPRO) 10 mg tablet; Take 1 tablet (10 mg total) by mouth daily    Depressive disorder  -     escitalopram (LEXAPRO) 10 mg tablet; Take 1 tablet (10 mg total) by mouth daily    Unilateral inguinal hernia without obstruction or gangrene, recurrence not specified        Continue same medications  Written prescription given for ProAir so you can price this out  Use your nebulizer more frequently if you find increased symptoms because you had to stop Symbicort due to cost   Are flu shot should be available here the next 3-4 weeks  Office visits  every 6 months as needed  You could try some heat to the hernia area  Use Tylenol for any discomfort  This would help treat any recent abdominal wall strain in the area  If this continues to be an issue or get more severe pain you may wish to have surgical consult with Dr Brown      Subjective:   Chief Complaint   Patient presents with    Medicare Wellness Visit     mm/labs    Groin Pain          Patient ID: Azul Haro is a 65 y o  male  Here for medical management multiple issues-  1) COPD-generally good control using Symbicort twice daily and Ventolin as needed  He will be unable to form his Symbicort, he therefore will uses nebulizer DuoNeb more frequently    2) GERD-stable with current medication  No solid-food dysphagia  3) BPH-goes frequently including nocturia  Partial effect with Flomax  4) anxiety with mild depression-good control  currently  5) acute problem-patient notes over the last several months has had some pain in the right groin area  No acute injuries reported  There is some bulging there  There is no pain down into the testicle  The following portions of the patient's history were reviewed and updated as appropriate: allergies, current medications, past family history, past medical history, past social history, past surgical history and problem list     Review of Systems   Constitutional: Negative for activity change, appetite change, diaphoresis, fatigue and fever  Respiratory: Negative for cough, chest tightness, shortness of breath and wheezing  Cardiovascular: Negative for chest pain, palpitations and leg swelling  Fast or slow heart rate   Gastrointestinal: Positive for abdominal pain  Negative for blood in stool, constipation, diarrhea, nausea and vomiting  Genitourinary: Positive for frequency  Negative for difficulty urinating, dysuria, scrotal swelling and testicular pain  Neurological: Negative for dizziness, weakness, light-headedness, numbness and headaches  Psychiatric/Behavioral: Negative for agitation, confusion, dysphoric mood and sleep disturbance  The patient is nervous/anxious  Objective:      /82   Ht 5' 9" (1 753 m)   Wt 80 3 kg (177 lb)   BMI 26 14 kg/m²          Physical Exam   Constitutional: He is oriented to person, place, and time  He appears well-developed and well-nourished  No distress  HENT:   Head: Normocephalic and atraumatic  Right Ear: Tympanic membrane, external ear and ear canal normal    Left Ear: Tympanic membrane, external ear and ear canal normal    Nose: No mucosal edema or rhinorrhea  Right sinus exhibits no maxillary sinus tenderness   Left sinus exhibits no maxillary sinus tenderness  Mouth/Throat: Uvula is midline  Mucous membranes are not pale and not dry  Normal dentition  No oropharyngeal exudate  Eyes: EOM are normal  Pupils are equal, round, and reactive to light  Right eye exhibits no discharge  Left eye exhibits no discharge  Neck: Normal range of motion  Neck supple  No thyromegaly present  Cardiovascular: Normal rate, regular rhythm, normal heart sounds and intact distal pulses  No murmur heard  Pulmonary/Chest: Effort normal  No respiratory distress  He has decreased breath sounds in the right middle field, the right lower field, the left middle field and the left lower field  He has no wheezes  He has no rhonchi  He has no rales  Abdominal: A hernia is present  Hernia confirmed positive in the right inguinal area  Hernia in the inguinal canal and directives the abdominal wall  Easily reducible  There is mild tenderness here  Musculoskeletal: Normal range of motion  He exhibits no edema or tenderness  Lymphadenopathy:     He has no cervical adenopathy  Neurological: He is alert and oriented to person, place, and time  No cranial nerve deficit  Skin: He is not diaphoretic  Psychiatric: He has a normal mood and affect   His behavior is normal

## 2018-08-10 NOTE — PROGRESS NOTES
Assessment and Plan:  Problem List Items Addressed This Visit     COPD, moderate ([address] ) - Primary    Relevant Medications    budesonide-formoterol (SYMBICORT) 160-4 5 mcg/act inhaler    albuterol (PROAIR HFA) 90 mcg/act inhaler        Health Maintenance Due   Topic Date Due    DTaP,Tdap,and Td Vaccines (1 - Tdap) 11/07/1960    Fall Risk  11/07/2004    GLAUCOMA SCREENING 65 + YR  11/07/2006         HPI:  Blaire Blue is a 65 y o  male here for his Subsequent Wellness Visit  Patient Active Problem List   Diagnosis    COPD, moderate ([address] )    Rib pain    Weakness generalized    Pulmonary nodule    Acid reflux disease    Depressive disorder    Enlarged prostate with lower urinary tract symptoms (LUTS)     Past Medical History:   Diagnosis Date    Bladder infection     current tx with cipro 5/29/16    Community acquired pneumonia     last assessed 8/28/17, resolved 9/25/17    COPD (chronic obstructive pulmonary disease) ([address] )     Enlarged prostate     GERD (gastroesophageal reflux disease)     Hx of bladder infections     Psychiatric disorder     depression     Past Surgical History:   Procedure Laterality Date    COLON SURGERY      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    COLOSTOMY       Family History   Problem Relation Age of Onset    Lung cancer Mother     Cancer Mother     Cancer Father     Alcohol abuse Father      History   Smoking Status    Former Smoker    Packs/day: 0 50    Years: 50 00    Types: Cigarettes    Quit date: 03/2018   Smokeless Tobacco    Never Used     Comment: quit 8/2017 per [employer]      History   Alcohol Use No      History   Drug Use No         Current Outpatient Prescriptions   Medication Sig Dispense Refill    albuterol (PROAIR HFA) 90 mcg/act inhaler Inhale 2 puffs every 6 (six) hours as needed for wheezing 8 5 g 2    budesonide-formoterol (SYMBICORT) 160-4 5 mcg/act inhaler Inhale 2 puffs 2 (two) times a day Rinse mouth after use   1 Inhaler 0    escitalopram (LEXAPRO) 10 mg tablet Take 1 tablet (10 mg total) by mouth daily 30 tablet 2    famotidine (PEPCID) 40 MG tablet Take 1 tablet (40 mg total) by mouth daily 90 tablet 0    ipratropium-albuterol (DUO-NEB) 0 5-2 5 mg/mL 1 qid- unit dose  dispense 120 4 refills 1 mL 0    LORazepam (ATIVAN) 1 mg tablet Take by mouth      tamsulosin (FLOMAX) 0 4 mg Take 1 capsule (0 4 mg total) by mouth every 12 (twelve) hours 180 capsule 0     No current facility-administered medications for this visit        Allergies   Allergen Reactions    Aspirin Other (See Comments)     Hx stomach ulcer     Immunization History   Administered Date(s) Administered    Influenza 11/18/2013, 10/26/2014, 09/17/2016, 11/21/2017    Influenza Split High Dose Preservative Free IM 11/17/2015, 09/17/2016, 11/21/2017    Influenza TIV (IM) 11/16/2012, 11/18/2013    Pneumococcal Conjugate 13-Valent 11/17/2015, 07/15/2016    Pneumococcal Polysaccharide PPV23 11/12/2012       Patient Care Team:  Louann Levi MD as PCP - General  Ana Cadet, DO    Medicare Screening Tests and Risk Assessments:  AWV Clinical     ISAR:       Once in a Lifetime Medicare Screening:       Medicare Screening Tests and Risk Assessment:   AAA Risk Assessment    Osteoporosis Risk Assessment    HIV Risk Assessment        Drug and Alcohol Use:   Tobacco use    Tobacco use duration    Tobacco Cessation Readiness    Alcohol use    Alcohol Treatment Readiness   Illicit Drug Use        Diet & Exercise:   Diet   How many servings a day of the following:   Exercise        Cognitive Impairment Screening:   Cognitive Impairment Screening        Functional Ability/Level of Safety:   Hearing    Hearing Impairment Assessment    Current Activities    Help needed with the folllowing:    ADL    Fall Risk   Injury History       Home Safety:   Home Safety Risk Factors       Advanced Directives:   Advanced Directives    Patient's End of Life Decisions        Urinary Incontinence:       Glaucoma:            Provider Screening    No data filed        No exam data present    Physical Exam see medical note-not required in this note  Physical Exam      Patient Instructions   Medicare wellness exam-colonoscopy is up-to-date  PSA not indicated  Hepatitis-C screen not indicated  Depression screen is negative  Metabolic screens are current  Vaccines are current  Advanced directives were discussed, forms provided

## 2018-08-10 NOTE — PATIENT INSTRUCTIONS
Medicare wellness exam-colonoscopy is up-to-date  PSA not indicated  Hepatitis-C screen not indicated  Depression screen is negative  Metabolic screens are current  Vaccines are current  Advanced directives were discussed, forms provided  Continue same medications  Written prescription given for ProAir so you can price this out  Use your nebulizer more frequently if you find increased symptoms because you had to stop Symbicort due to cost   Are flu shot should be available here the next 3-4 weeks  Office visits  every 6 months as needed  You could try some heat to the hernia area  Use Tylenol for any discomfort  This would help treat any recent abdominal wall strain in the area    If this continues to be an issue or get more severe pain you may wish to have surgical consult with Dr Brown

## 2018-08-15 DIAGNOSIS — J44.9 COPD, MODERATE (HCC): ICD-10-CM

## 2018-08-24 DIAGNOSIS — J44.9 CHRONIC OBSTRUCTIVE PULMONARY DISEASE, UNSPECIFIED COPD TYPE (HCC): Primary | ICD-10-CM

## 2018-08-24 DIAGNOSIS — N41.9 PROSTATITIS, UNSPECIFIED PROSTATITIS TYPE: ICD-10-CM

## 2018-08-24 RX ORDER — IPRATROPIUM BROMIDE AND ALBUTEROL SULFATE 2.5; .5 MG/3ML; MG/3ML
SOLUTION RESPIRATORY (INHALATION)
Qty: 1 ML | Refills: 0 | Status: SHIPPED | OUTPATIENT
Start: 2018-08-24 | End: 2018-08-27 | Stop reason: SDUPTHER

## 2018-08-24 RX ORDER — TAMSULOSIN HYDROCHLORIDE 0.4 MG/1
0.4 CAPSULE ORAL EVERY 12 HOURS
Qty: 180 CAPSULE | Refills: 0 | Status: SHIPPED | OUTPATIENT
Start: 2018-08-24 | End: 2018-11-27 | Stop reason: SDUPTHER

## 2018-08-27 DIAGNOSIS — J44.9 CHRONIC OBSTRUCTIVE PULMONARY DISEASE, UNSPECIFIED COPD TYPE (HCC): ICD-10-CM

## 2018-08-27 RX ORDER — IPRATROPIUM BROMIDE AND ALBUTEROL SULFATE 2.5; .5 MG/3ML; MG/3ML
SOLUTION RESPIRATORY (INHALATION)
Qty: 1 ML | Refills: 0 | Status: SHIPPED | OUTPATIENT
Start: 2018-08-27 | End: 2018-12-06 | Stop reason: SDUPTHER

## 2018-10-01 ENCOUNTER — TELEPHONE (OUTPATIENT)
Dept: FAMILY MEDICINE CLINIC | Facility: CLINIC | Age: 78
End: 2018-10-01

## 2018-10-01 DIAGNOSIS — N30.00 ACUTE CYSTITIS WITHOUT HEMATURIA: Primary | ICD-10-CM

## 2018-10-01 RX ORDER — SULFAMETHOXAZOLE AND TRIMETHOPRIM 800; 160 MG/1; MG/1
1 TABLET ORAL EVERY 12 HOURS SCHEDULED
Qty: 14 TABLET | Refills: 0 | Status: SHIPPED | OUTPATIENT
Start: 2018-10-01 | End: 2018-10-08

## 2018-10-01 NOTE — TELEPHONE ENCOUNTER
Requesting abx sent to ra / [facility]ics     Only need to call back if there is a problem and not able to send

## 2018-11-02 DIAGNOSIS — K21.9 GASTROESOPHAGEAL REFLUX DISEASE, ESOPHAGITIS PRESENCE NOT SPECIFIED: ICD-10-CM

## 2018-11-02 RX ORDER — FAMOTIDINE 40 MG/1
40 TABLET, FILM COATED ORAL DAILY
Qty: 90 TABLET | Refills: 0 | Status: SHIPPED | OUTPATIENT
Start: 2018-11-02 | End: 2019-02-22 | Stop reason: SDUPTHER

## 2018-11-13 ENCOUNTER — OFFICE VISIT (OUTPATIENT)
Dept: FAMILY MEDICINE CLINIC | Facility: CLINIC | Age: 78
End: 2018-11-13
Payer: MEDICARE

## 2018-11-13 VITALS
SYSTOLIC BLOOD PRESSURE: 124 MMHG | BODY MASS INDEX: 26.66 KG/M2 | WEIGHT: 180 LBS | DIASTOLIC BLOOD PRESSURE: 84 MMHG | OXYGEN SATURATION: 98 % | HEART RATE: 89 BPM | HEIGHT: 69 IN

## 2018-11-13 DIAGNOSIS — J44.1 CHRONIC OBSTRUCTIVE PULMONARY DISEASE WITH ACUTE EXACERBATION (HCC): Primary | ICD-10-CM

## 2018-11-13 PROCEDURE — 99214 OFFICE O/P EST MOD 30 MIN: CPT | Performed by: FAMILY MEDICINE

## 2018-11-13 RX ORDER — PREDNISONE 20 MG/1
TABLET ORAL
Qty: 15 TABLET | Refills: 0 | Status: SHIPPED | OUTPATIENT
Start: 2018-11-13 | End: 2018-11-27 | Stop reason: ALTCHOICE

## 2018-11-13 NOTE — PROGRESS NOTES
[id number]        NAME: Kumar Valentin is a 77 y o  male  : 1940    MRN: [id number]  DATE: 2018  TIME: 11:20 AM    Assessment and Plan   Chronic obstructive pulmonary disease with acute exacerbation ([facility] [address] ) [J44 1]  1  Chronic obstructive pulmonary disease with acute exacerbation (HCC)  predniSONE 20 mg tablet       No problem-specific Assessment & Plan notes found for this encounter  Patient Instructions     Patient Instructions   Continue ProAir 2 puffs every 4-6 hours as needed  Stiolto 2 inhalations once daily  Also will give short course of prednisone for 5 days  Chief Complaint     Chief Complaint   Patient presents with    Shortness of Breath     sickness         History of Present Illness       HG is a 78 y/o male with Sig PMH for COPD and GERD presents with difficulty breathing for 3 weeks  Dyspnea is worsened with exertion but is also happening at rest   -Positive for slight mucous cough, sore throat, diarrhea, Chills  -Negative for Nausea, Vomiting, HA, Fever,   -No longer on Symbacort  Cannot afford  Replaced with albuterol only  One inhaler had no doses of medication left and was still using  Probably not getting any medication for his COPD for a while now  -Quit smoking 4 months previous  -Peak flows before administration of medication      -160, 190, 190      -94% 80HR  -Peak flows after Albuterol HFA and Stiolto Respimat (Tiotropium bromide and oladaterol)     -210, 210        Review of Systems   Review of Systems   Constitutional: Positive for chills and diaphoresis  Negative for appetite change and fever  Fever and Chills last week   HENT: Positive for rhinorrhea and sore throat  Negative for ear pain and sinus pressure  Eyes: Negative for discharge, redness and itching  Respiratory: Positive for cough, shortness of breath and wheezing  Cardiovascular: Positive for palpitations  Negative for chest pain  Rapid or slow heart rate   Gastrointestinal: Negative for abdominal pain, diarrhea, nausea and vomiting           Current Medications       Current Outpatient Prescriptions:     budesonide-formoterol (SYMBICORT) 160-4 5 mcg/act inhaler, Inhale 2 puffs 2 (two) times a day Rinse mouth after use , Disp: 1 Inhaler, Rfl: 0    escitalopram (LEXAPRO) 10 mg tablet, Take 1 tablet (10 mg total) by mouth daily, Disp: 90 tablet, Rfl: 3    famotidine (PEPCID) 40 MG tablet, Take 1 tablet (40 mg total) by mouth daily, Disp: 90 tablet, Rfl: 0    ipratropium-albuterol (DUO-NEB) 0 5-2 5 mg/3 mL nebulizer solution, 1 qid- unit dose dispense 120 4 refills, Disp: 1 mL, Rfl: 0    LORazepam (ATIVAN) 1 mg tablet, Take 1 tablet (1 mg total) by mouth every 8 (eight) hours as needed for anxiety, Disp: 30 tablet, Rfl: 1    predniSONE 20 mg tablet, 2 tablets daily for 5 days then 1 tablet daily, Disp: 15 tablet, Rfl: 0    PROAIR  (90 Base) MCG/ACT inhaler, INHALE 2 PUFFS EVERY 4-6 HOURS AS NEEDED, Disp: 8 5 g, Rfl: 0    tamsulosin (FLOMAX) 0 4 mg, Take 1 capsule (0 4 mg total) by mouth every 12 (twelve) hours, Disp: 180 capsule, Rfl: 0    Current Allergies     Allergies as of 11/13/2018 - Reviewed 11/13/2018   Allergen Reaction Noted    Aspirin Other (See Comments) 05/29/2016            The following portions of the patient's history were reviewed and updated as appropriate: allergies, current medications, past family history, past medical history, past social history, past surgical history and problem list      Past Medical History:   Diagnosis Date    Bladder infection     current tx with cipro 5/29/16    Community acquired pneumonia     last assessed 8/28/17, resolved 9/25/17    COPD (chronic obstructive pulmonary disease) (HCC)     Enlarged prostate     GERD (gastroesophageal reflux disease)     Hx of bladder infections     Psychiatric disorder     depression       Past Surgical History:   Procedure Laterality Date    COLON SURGERY      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    COLOSTOMY         Family History   Problem Relation Age of Onset    Lung cancer Mother     Cancer Mother     Cancer Father     Alcohol abuse Father          Medications have been verified  Objective   /84   Pulse 89   Ht 5' 9" (1 753 m)   Wt 81 6 kg (180 lb)   SpO2 98%   BMI 26 58 kg/m²        Physical Exam     Physical Exam   Constitutional: He appears well-developed and well-nourished  He does not appear ill  Eyes: Conjunctivae and EOM are normal    Neck: Normal range of motion  Neck supple  No thyromegaly present  Cardiovascular: Normal rate, regular rhythm and normal heart sounds  No murmur heard  Pulmonary/Chest: He is in respiratory distress  He has wheezes  Slight expiratory grunt while breathing  Increased effort to breath  Abdominal: Soft  He exhibits no distension  There is no splenomegaly  There is no tenderness  There is no guarding and no CVA tenderness  Lymphadenopathy:     He has no cervical adenopathy  Skin: He is not diaphoretic

## 2018-11-13 NOTE — PATIENT INSTRUCTIONS
Continue ProAir 2 puffs every 4-6 hours as needed  Stiolto 2 inhalations once daily  Also will give short course of prednisone for 5 days

## 2018-11-27 ENCOUNTER — OFFICE VISIT (OUTPATIENT)
Dept: FAMILY MEDICINE CLINIC | Facility: CLINIC | Age: 78
End: 2018-11-27
Payer: MEDICARE

## 2018-11-27 VITALS
DIASTOLIC BLOOD PRESSURE: 82 MMHG | SYSTOLIC BLOOD PRESSURE: 120 MMHG | WEIGHT: 180 LBS | HEART RATE: 90 BPM | HEIGHT: 69 IN | BODY MASS INDEX: 26.66 KG/M2 | OXYGEN SATURATION: 95 % | RESPIRATION RATE: 18 BRPM

## 2018-11-27 DIAGNOSIS — N41.9 PROSTATITIS, UNSPECIFIED PROSTATITIS TYPE: ICD-10-CM

## 2018-11-27 DIAGNOSIS — J44.9 COPD, MODERATE (HCC): ICD-10-CM

## 2018-11-27 DIAGNOSIS — Z23 NEED FOR VACCINATION: Primary | ICD-10-CM

## 2018-11-27 PROCEDURE — G0008 ADMIN INFLUENZA VIRUS VAC: HCPCS | Performed by: FAMILY MEDICINE

## 2018-11-27 PROCEDURE — 99213 OFFICE O/P EST LOW 20 MIN: CPT | Performed by: FAMILY MEDICINE

## 2018-11-27 PROCEDURE — 90662 IIV NO PRSV INCREASED AG IM: CPT | Performed by: FAMILY MEDICINE

## 2018-11-27 RX ORDER — TAMSULOSIN HYDROCHLORIDE 0.4 MG/1
0.4 CAPSULE ORAL EVERY 12 HOURS
Qty: 180 CAPSULE | Refills: 1 | Status: SHIPPED | OUTPATIENT
Start: 2018-11-27 | End: 2019-07-01 | Stop reason: SDUPTHER

## 2018-11-27 NOTE — PATIENT INSTRUCTIONS
Patient will return in 2-3 weeks to do a pulmonary function study  Continue the current inhalers  Prednisone has already been finished  Flu shot given today

## 2018-11-27 NOTE — PROGRESS NOTES
[id number]        NAME: Will Ramachandran is a 77 y o  male  : 1940    MRN: [id number]  DATE: 2018  TIME: 11:08 AM    Assessment and Plan   Need for vaccination [Z23]  1  Need for vaccination  influenza vaccine, [id number], high-dose, PF 0 5 mL, for patients 65 yr+ (FLUZONE HIGH-DOSE)   2  COPD, moderate ([address] )         No problem-specific Assessment & Plan notes found for this encounter  Patient Instructions     Patient Instructions   Patient will return in 2-3 weeks to do a pulmonary function study  Continue the current inhalers  Prednisone has already been finished  Flu shot given today  Chief Complaint     Chief Complaint   Patient presents with    Follow-up     2weeks f/u - sob         History of Present Illness       Patient here for follow-up on COPD flare  Generally is feeling better since finishing the prednisone burst   Also continues to use  Stiloto on a daily basis  Last pulmonary function study was 2017  Review of Systems   Review of Systems   Constitutional: Negative for appetite change, chills, diaphoresis and fever  HENT: Negative for ear pain, rhinorrhea, sinus pressure and sore throat  Eyes: Negative for discharge, redness and itching  Respiratory: Negative for cough, shortness of breath and wheezing  Cardiovascular: Negative for chest pain and palpitations          Rapid or slow heart rate         Current Medications       Current Outpatient Prescriptions:     budesonide-formoterol (SYMBICORT) 160-4 5 mcg/act inhaler, Inhale 2 puffs 2 (two) times a day Rinse mouth after use , Disp: 1 Inhaler, Rfl: 0    escitalopram (LEXAPRO) 10 mg tablet, Take 1 tablet (10 mg total) by mouth daily, Disp: 90 tablet, Rfl: 3    famotidine (PEPCID) 40 MG tablet, Take 1 tablet (40 mg total) by mouth daily, Disp: 90 tablet, Rfl: 0    ipratropium-albuterol (DUO-NEB) 0 5-2 5 mg/3 mL nebulizer solution, 1 qid- unit dose dispense 120 4 refills, Disp: 1 mL, Rfl: 0    LORazepam (ATIVAN) 1 mg tablet, Take 1 tablet (1 mg total) by mouth every 8 (eight) hours as needed for anxiety, Disp: 30 tablet, Rfl: 1    PROAIR  (90 Base) MCG/ACT inhaler, INHALE 2 PUFFS EVERY 4-6 HOURS AS NEEDED, Disp: 8 5 g, Rfl: 0    tamsulosin (FLOMAX) 0 4 mg, Take 1 capsule (0 4 mg total) by mouth every 12 (twelve) hours, Disp: 180 capsule, Rfl: 0    Current Allergies     Allergies as of 11/27/2018 - Reviewed 11/27/2018   Allergen Reaction Noted    Aspirin Other (See Comments) 05/29/2016            The following portions of the patient's history were reviewed and updated as appropriate: allergies, current medications, past family history, past medical history, past social history, past surgical history and problem list      Past Medical History:   Diagnosis Date    Bladder infection     current tx with cipro 5/29/16    Community acquired pneumonia     last assessed 8/28/17, resolved 9/25/17    COPD [facility]chronic obstructive pulmonary disease) ([facility] [address] )     Enlarged prostate     GERD (gastroesophageal reflux disease)     Hx of bladder infections     Psychiatric disorder     depression       Past Surgical History:   Procedure Laterality Date    COLON SURGERY      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    COLOSTOMY         Family History   Problem Relation Age of Onset    Lung cancer Mother     Cancer Mother     Cancer Father     Alcohol abuse Father          Medications have been verified  Objective   /82 (BP Location: Left arm, Patient Position: Sitting, Cuff Size: Standard)   Pulse 90   Resp 18   Ht 5' 9" (1 753 m)   Wt 81 6 kg (180 lb)   SpO2 95%   BMI 26 58 kg/m²        Physical Exam     Physical Exam   Constitutional: He appears well-developed and well-nourished  No distress  HENT:   Head: Normocephalic and atraumatic  Right Ear: Tympanic membrane and external ear normal  No drainage     Left Ear: Tympanic membrane normal  No drainage  Mouth/Throat: No oropharyngeal exudate  Eyes: Conjunctivae and EOM are normal  Right eye exhibits no discharge  Left eye exhibits no discharge  Neck: Normal range of motion  Neck supple  No thyromegaly present  Cardiovascular: Normal rate, regular rhythm and normal heart sounds  Pulmonary/Chest: Effort normal  No respiratory distress  He has no wheezes  He has no rales  Lymphadenopathy:     He has no cervical adenopathy

## 2018-11-30 ENCOUNTER — TELEPHONE (OUTPATIENT)
Dept: PULMONOLOGY | Facility: HOSPITAL | Age: 78
End: 2018-11-30

## 2018-11-30 DIAGNOSIS — N30.00 ACUTE CYSTITIS WITHOUT HEMATURIA: Primary | ICD-10-CM

## 2018-11-30 RX ORDER — SULFAMETHOXAZOLE AND TRIMETHOPRIM 800; 160 MG/1; MG/1
1 TABLET ORAL EVERY 12 HOURS SCHEDULED
Qty: 14 TABLET | Refills: 0 | Status: SHIPPED | OUTPATIENT
Start: 2018-11-30 | End: 2018-12-07

## 2018-12-03 ENCOUNTER — OFFICE VISIT (OUTPATIENT)
Dept: PULMONOLOGY | Facility: HOSPITAL | Age: 78
End: 2018-12-03
Payer: MEDICARE

## 2018-12-03 ENCOUNTER — TRANSCRIBE ORDERS (OUTPATIENT)
Dept: ADMINISTRATIVE | Facility: HOSPITAL | Age: 78
End: 2018-12-03

## 2018-12-03 ENCOUNTER — HOSPITAL ENCOUNTER (OUTPATIENT)
Dept: RADIOLOGY | Facility: HOSPITAL | Age: 78
Discharge: HOME | End: 2018-12-03
Attending: INTERNAL MEDICINE
Payer: MEDICARE

## 2018-12-03 VITALS
OXYGEN SATURATION: 93 % | WEIGHT: 181 LBS | DIASTOLIC BLOOD PRESSURE: 80 MMHG | SYSTOLIC BLOOD PRESSURE: 124 MMHG | TEMPERATURE: 97.3 F | HEART RATE: 96 BPM | HEIGHT: 69 IN | BODY MASS INDEX: 26.81 KG/M2

## 2018-12-03 DIAGNOSIS — R91.1 PULMONARY NODULE: ICD-10-CM

## 2018-12-03 DIAGNOSIS — R05.9 COUGH: ICD-10-CM

## 2018-12-03 DIAGNOSIS — J44.9 COPD, MODERATE (HCC): Primary | ICD-10-CM

## 2018-12-03 PROBLEM — J44.1 COPD EXACERBATION (HCC): Status: ACTIVE | Noted: 2018-12-03

## 2018-12-03 PROCEDURE — 99214 OFFICE O/P EST MOD 30 MIN: CPT | Performed by: INTERNAL MEDICINE

## 2018-12-03 PROCEDURE — 71046 X-RAY EXAM CHEST 2 VIEWS: CPT

## 2018-12-03 RX ORDER — PREDNISONE 10 MG/1
TABLET ORAL
Qty: 30 TABLET | Refills: 0 | Status: SHIPPED | OUTPATIENT
Start: 2018-12-03 | End: 2019-01-08 | Stop reason: ALTCHOICE

## 2018-12-03 NOTE — PROGRESS NOTES
Pulmonary Follow Up Note   Katiana Reynoso 77 y o  male MRN: [id number]  12/3/2018      Assessment/Plan:    COPD exacerbation ([facility] [address] )  I will treat him with another course of prednisone  We will check a chest x-ray to exclude infiltrate  He will continue with stiolto once daily  I have sent a prescription to his pharmacy  If the co-pay is too high, we can explore patient assistance programs to help with cost     Pulmonary nodule  We will update chest CT in February 2019  Visit orders:    Diagnoses and all orders for this visit:    COPD, moderate ([address] )  -     tiotropium-olodaterol (STIOLTO RESPIMAT) 2 5-2 5 MCG/ACT inhaler; Inhale 2 puffs daily  -     predniSONE 10 mg tablet; 4 tabs daily x 3 D then 3 tabs daily x 3 D then 2 tabs daily x 3 D then 1 tab daily x 3 D    Pulmonary nodule    Cough  -     XR chest pa & lateral; Future    Other orders  -     CT chest without contrast; Future        Return in about 3 months (around 3/3/2019)  History of Present Illness   HPI:  Katiana Reynoso is a 77 y o  male who is here today for follow-up regarding COPD  He was seen by his primary care physician recently and was treated with a course of prednisone  The prednisone helped with his symptoms of cough, wheeze and shortness of breath, but within days of coming off the steroid pack, his symptoms returned  He has no significant sputum production  He denies fevers, chills or sweats  He complains of shortness of breath most notable with exertion  At our last visit, I gave him [facility], but it was too expensive  More recently, he was given Stiolto samples and finds it beneficial     Review of Systems   Constitutional: Negative for chills, fever and unexpected weight change  HENT: Negative for postnasal drip and sore throat  Eyes: Negative for visual disturbance  Respiratory:        As noted in HPI   Cardiovascular: Negative for chest pain  Gastrointestinal: Negative for abdominal pain, diarrhea and vomiting  Genitourinary: Negative for difficulty urinating  Skin: Negative for rash  Neurological: Negative for headaches  Hematological: Negative for adenopathy  Psychiatric/Behavioral: Negative  All other systems reviewed and are negative          Historical Information   Past Medical History:   Diagnosis Date    Bladder infection     current tx with cipro 5/29/16    Community acquired pneumonia     last assessed 8/28/17, resolved 9/25/17    COPD [facility]chronic obstructive pulmonary disease) ([facility] [address] )     Enlarged prostate     GERD (gastroesophageal reflux disease)     Hx of bladder infections     Psychiatric disorder     depression     Past Surgical History:   Procedure Laterality Date    COLON SURGERY      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    COLOSTOMY       Family History   Problem Relation Age of Onset    Lung cancer Mother     Cancer Mother     Cancer Father     Alcohol abuse Father      History   Smoking Status    Former Smoker    Packs/day: 0 50    Years: 50 00    Types: Cigarettes    Start date: 1959    Quit date: 03/2018   Smokeless Tobacco    Never Used     Comment: quit 8/2017 per [employer]      Meds/Allergies     Current Outpatient Prescriptions:     escitalopram (LEXAPRO) 10 mg tablet, Take 1 tablet (10 mg total) by mouth daily, Disp: 90 tablet, Rfl: 3    famotidine (PEPCID) 40 MG tablet, Take 1 tablet (40 mg total) by mouth daily, Disp: 90 tablet, Rfl: 0    ipratropium-albuterol (DUO-NEB) 0 5-2 5 mg/3 mL nebulizer solution, 1 qid- unit dose dispense 120 4 refills, Disp: 1 mL, Rfl: 0    LORazepam (ATIVAN) 1 mg tablet, Take 1 tablet (1 mg total) by mouth every 8 (eight) hours as needed for anxiety, Disp: 30 tablet, Rfl: 1    PROAIR  (90 Base) MCG/ACT inhaler, INHALE 2 PUFFS EVERY 4-6 HOURS AS NEEDED, Disp: 8 5 g, Rfl: 0    tamsulosin (FLOMAX) 0 4 mg, Take 1 capsule (0 4 mg total) by mouth every 12 (twelve) hours, Disp: 180 capsule, Rfl: 1    predniSONE 10 mg tablet, 4 tabs daily x 3 D then 3 tabs daily x 3 D then 2 tabs daily x 3 D then 1 tab daily x 3 D, Disp: 30 tablet, Rfl: 0    sulfamethoxazole-trimethoprim (BACTRIM DS) 800-160 mg per tablet, Take 1 tablet by mouth every 12 (twelve) hours for 7 days, Disp: 14 tablet, Rfl: 0    tiotropium-olodaterol (STIOLTO RESPIMAT) 2 5-2 5 MCG/ACT inhaler, Inhale 2 puffs daily, Disp: 3 Inhaler, Rfl: 3  Allergies   Allergen Reactions    Aspirin Other (See Comments)     Hx stomach ulcer     Vitals: Blood pressure 124/80, pulse 96, temperature (!) 97 3 °F (36 3 °C), temperature source Tympanic, height 5' 9" (1 753 m), weight 82 1 kg (181 lb), SpO2 93 %  Body mass index is 26 73 kg/m²  Oxygen Therapy  SpO2: 93 %    Physical Exam   Physical Exam   Constitutional: He is oriented to person, place, and time  No distress  HENT:   Head: Normocephalic  Mouth/Throat: No oropharyngeal exudate  Eyes: Pupils are equal, round, and reactive to light  No scleral icterus  Neck: Neck supple  No JVD present  Cardiovascular: Normal rate and regular rhythm  Pulmonary/Chest: Effort normal  He has wheezes  Rhonchi, right greater than left with scattered expiratory wheezes   Abdominal: Soft  There is no tenderness  Musculoskeletal: He exhibits no edema  Lymphadenopathy:     He has no cervical adenopathy  Neurological: He is alert and oriented to person, place, and time  Skin: Skin is warm and dry  Psychiatric: He has a normal mood and affect  Labs: I have personally reviewed pertinent lab results    Lab Results   Component Value Date    WBC 17 21 (H) 08/18/2017    HGB 11 0 (L) 08/18/2017    HCT 34 0 (L) 08/18/2017     (H) 08/18/2017     08/18/2017     Lab Results   Component Value Date    CALCIUM 9 1 08/06/2018     03/13/2017    K 4 4 08/06/2018    CO2 28 08/06/2018     08/06/2018    BUN 20 08/06/2018    CREATININE 1 07 08/18/2017     No results found for: IGE  Lab Results   Component Value Date ALT 35 08/18/2017    AST 34 08/18/2017    ALKPHOS 70 08/06/2018    BILITOT 0 6 03/13/2017     Imaging and other studies: I have personally reviewed pertinent films in PACS chest CT from 02/26/2018 shows stable emphysema    There is a 4 x 8 mm nodule in the left upper lobe, stable from August 2017      Pulmonary function testing:  Performed March 2017   FEV1/FVC ratio 50%   FEV1 59% predicted  FVC 84% predicted

## 2018-12-03 NOTE — ASSESSMENT & PLAN NOTE
I will treat him with another course of prednisone  We will check a chest x-ray to exclude infiltrate  He will continue with stiolto once daily  I have sent a prescription to his pharmacy    If the co-pay is too high, we can explore patient assistance programs to help with cost

## 2018-12-06 DIAGNOSIS — J44.9 CHRONIC OBSTRUCTIVE PULMONARY DISEASE, UNSPECIFIED COPD TYPE (HCC): ICD-10-CM

## 2018-12-06 RX ORDER — IPRATROPIUM BROMIDE AND ALBUTEROL SULFATE 2.5; .5 MG/3ML; MG/3ML
SOLUTION RESPIRATORY (INHALATION)
Qty: 360 ML | Refills: 4 | Status: ON HOLD | OUTPATIENT
Start: 2018-12-06 | End: 2019-01-10 | Stop reason: CLARIF

## 2018-12-06 NOTE — TELEPHONE ENCOUNTER
Called insurance company in regards to patient's ipratropium  Pt's insurance is no longer associated with [employer]  Pt has to use [facility] and medication will only cost $6   I called pt and notified him   I have updated his chart with the correct pharmacy

## 2018-12-12 ENCOUNTER — CLINICAL SUPPORT (OUTPATIENT)
Dept: FAMILY MEDICINE CLINIC | Facility: CLINIC | Age: 78
End: 2018-12-12
Payer: MEDICARE

## 2018-12-12 VITALS — WEIGHT: 181 LBS | HEIGHT: 69 IN | BODY MASS INDEX: 26.81 KG/M2

## 2018-12-12 DIAGNOSIS — J44.9 CHRONIC OBSTRUCTIVE PULMONARY DISEASE, UNSPECIFIED COPD TYPE (HCC): Primary | ICD-10-CM

## 2018-12-12 PROCEDURE — 94060 EVALUATION OF WHEEZING: CPT

## 2019-01-02 ENCOUNTER — TELEPHONE (OUTPATIENT)
Dept: FAMILY MEDICINE CLINIC | Facility: CLINIC | Age: 79
End: 2019-01-02

## 2019-01-02 DIAGNOSIS — R30.0 BURNING WITH URINATION: Primary | ICD-10-CM

## 2019-01-02 DIAGNOSIS — M62.838 MUSCLE SPASM: ICD-10-CM

## 2019-01-02 NOTE — TELEPHONE ENCOUNTER
Pt aware needs culture---[facility]      You did not sign for the medication th--still pending --pt called told pt you will send tomm morning--pt will get urine done tomm

## 2019-01-02 NOTE — TELEPHONE ENCOUNTER
C/O UTI SXS(BURNING URINATION/PAINFUL URINATION X 1 DAY)- WANTS MEDICATION SENT TO [facility]    ALSO WENT TO [facility] ON SERAFIN FOR A STRAINED NECK AND WAS GIVEN A MUSCLE RELAXER- CYCLOBENZAPRINE 10MG    ASKING FOR MORE- HIS NECK STILL HAS SOME PAIN        CB # [phone]  NO ALLERGIES

## 2019-01-03 RX ORDER — CYCLOBENZAPRINE HCL 10 MG
10 TABLET ORAL 3 TIMES DAILY PRN
Qty: 30 TABLET | Refills: 0 | Status: SHIPPED | OUTPATIENT
Start: 2019-01-03 | End: 2019-09-12 | Stop reason: SDUPTHER

## 2019-01-07 DIAGNOSIS — M62.838 MUSCLE SPASM: ICD-10-CM

## 2019-01-07 RX ORDER — CYCLOBENZAPRINE HCL 10 MG
10 TABLET ORAL 3 TIMES DAILY PRN
Qty: 30 TABLET | Refills: 0 | Status: CANCELLED | OUTPATIENT
Start: 2019-01-07

## 2019-01-08 ENCOUNTER — APPOINTMENT (EMERGENCY)
Dept: RADIOLOGY | Facility: HOSPITAL | Age: 79
DRG: 871 | End: 2019-01-08
Attending: EMERGENCY MEDICINE
Payer: MEDICARE

## 2019-01-08 ENCOUNTER — HOSPITAL ENCOUNTER (INPATIENT)
Facility: HOSPITAL | Age: 79
LOS: 2 days | Discharge: HOME | DRG: 871 | End: 2019-01-10
Attending: EMERGENCY MEDICINE | Admitting: INTERNAL MEDICINE
Payer: MEDICARE

## 2019-01-08 DIAGNOSIS — R65.20 SEVERE SEPSIS (HCC): ICD-10-CM

## 2019-01-08 DIAGNOSIS — J18.9 LEFT LOWER LOBE PNEUMONIA: Primary | ICD-10-CM

## 2019-01-08 DIAGNOSIS — A41.9 SEVERE SEPSIS (HCC): ICD-10-CM

## 2019-01-08 PROBLEM — N17.9 AKI (ACUTE KIDNEY INJURY) (HCC): Status: ACTIVE | Noted: 2019-01-08

## 2019-01-08 LAB
ALBUMIN SERPL BCP-MCNC: 2.9 G/DL (ref 3.5–5)
ALP SERPL-CCNC: 64 U/L (ref 46–116)
ALT SERPL W P-5'-P-CCNC: 22 U/L (ref 12–78)
ANION GAP SERPL CALCULATED.3IONS-SCNC: 11 MMOL/L (ref 4–13)
AST SERPL W P-5'-P-CCNC: 17 U/L (ref 5–45)
ATRIAL RATE: 117 BPM
BASOPHILS # BLD AUTO: 0.04 THOUSANDS/ΜL (ref 0–0.1)
BASOPHILS NFR BLD AUTO: 0 % (ref 0–1)
BILIRUB SERPL-MCNC: 0.4 MG/DL (ref 0.2–1)
BUN SERPL-MCNC: 21 MG/DL (ref 5–25)
CALCIUM SERPL-MCNC: 8.8 MG/DL (ref 8.3–10.1)
CHLORIDE SERPL-SCNC: 100 MMOL/L (ref 100–108)
CLARITY, POC: CLEAR
CO2 SERPL-SCNC: 26 MMOL/L (ref 21–32)
COLOR, POC: YELLOW
CREAT SERPL-MCNC: 1.49 MG/DL (ref 0.6–1.3)
EOSINOPHIL # BLD AUTO: 0.02 THOUSAND/ΜL (ref 0–0.61)
EOSINOPHIL NFR BLD AUTO: 0 % (ref 0–6)
ERYTHROCYTE [DISTWIDTH] IN BLOOD BY AUTOMATED COUNT: 13.2 % (ref 11.6–15.1)
EXT BILIRUBIN, UA: NORMAL
EXT BLOOD URINE: NORMAL
EXT GLUCOSE, UA: NORMAL
EXT KETONES: NORMAL
EXT NITRITE, UA: NORMAL
EXT PH, UA: 6
EXT PROTEIN, UA: NORMAL
EXT SPECIFIC GRAVITY, UA: 1.01
EXT UROBILINOGEN: 0.2
FLUAV AG SPEC QL: NORMAL
FLUBV AG SPEC QL: NORMAL
GFR SERPL CREATININE-BSD FRML MDRD: 44 ML/MIN/1.73SQ M
GLUCOSE SERPL-MCNC: 121 MG/DL (ref 65–140)
HCT VFR BLD AUTO: 43.6 % (ref 36.5–49.3)
HGB BLD-MCNC: 14.8 G/DL (ref 12–17)
IMM GRANULOCYTES # BLD AUTO: 0.15 THOUSAND/UL (ref 0–0.2)
IMM GRANULOCYTES NFR BLD AUTO: 1 % (ref 0–2)
LACTATE SERPL-SCNC: 1.2 MMOL/L (ref 0.5–2)
LYMPHOCYTES # BLD AUTO: 1.3 THOUSANDS/ΜL (ref 0.6–4.47)
LYMPHOCYTES NFR BLD AUTO: 6 % (ref 14–44)
MCH RBC QN AUTO: 33.6 PG (ref 26.8–34.3)
MCHC RBC AUTO-ENTMCNC: 33.9 G/DL (ref 31.4–37.4)
MCV RBC AUTO: 99 FL (ref 82–98)
MONOCYTES # BLD AUTO: 1.24 THOUSAND/ΜL (ref 0.17–1.22)
MONOCYTES NFR BLD AUTO: 6 % (ref 4–12)
NEUTROPHILS # BLD AUTO: 19.14 THOUSANDS/ΜL (ref 1.85–7.62)
NEUTS SEG NFR BLD AUTO: 87 % (ref 43–75)
NRBC BLD AUTO-RTO: 0 /100 WBCS
P AXIS: 69 DEGREES
PLATELET # BLD AUTO: 295 THOUSANDS/UL (ref 149–390)
PMV BLD AUTO: 9 FL (ref 8.9–12.7)
POTASSIUM SERPL-SCNC: 4.3 MMOL/L (ref 3.5–5.3)
PR INTERVAL: 148 MS
PROCALCITONIN SERPL-MCNC: <0.05 NG/ML
PROT SERPL-MCNC: 8.2 G/DL (ref 6.4–8.2)
QRS AXIS: 78 DEGREES
QRSD INTERVAL: 96 MS
QT INTERVAL: 320 MS
QTC INTERVAL: 446 MS
RBC # BLD AUTO: 4.41 MILLION/UL (ref 3.88–5.62)
RSV B RNA SPEC QL NAA+PROBE: NORMAL
SODIUM SERPL-SCNC: 137 MMOL/L (ref 136–145)
T WAVE AXIS: 76 DEGREES
TROPONIN I SERPL-MCNC: <0.02 NG/ML
VENTRICULAR RATE: 117 BPM
WBC # BLD AUTO: 21.89 THOUSAND/UL (ref 4.31–10.16)
WBC # BLD EST: NORMAL 10*3/UL

## 2019-01-08 PROCEDURE — 96367 TX/PROPH/DG ADDL SEQ IV INF: CPT

## 2019-01-08 PROCEDURE — 93010 ELECTROCARDIOGRAM REPORT: CPT | Performed by: INTERNAL MEDICINE

## 2019-01-08 PROCEDURE — 36415 COLL VENOUS BLD VENIPUNCTURE: CPT | Performed by: EMERGENCY MEDICINE

## 2019-01-08 PROCEDURE — 85025 COMPLETE CBC W/AUTO DIFF WBC: CPT | Performed by: EMERGENCY MEDICINE

## 2019-01-08 PROCEDURE — 93005 ELECTROCARDIOGRAM TRACING: CPT

## 2019-01-08 PROCEDURE — 99223 1ST HOSP IP/OBS HIGH 75: CPT | Performed by: NURSE PRACTITIONER

## 2019-01-08 PROCEDURE — 94760 N-INVAS EAR/PLS OXIMETRY 1: CPT

## 2019-01-08 PROCEDURE — 83605 ASSAY OF LACTIC ACID: CPT | Performed by: EMERGENCY MEDICINE

## 2019-01-08 PROCEDURE — 84484 ASSAY OF TROPONIN QUANT: CPT | Performed by: EMERGENCY MEDICINE

## 2019-01-08 PROCEDURE — 99291 CRITICAL CARE FIRST HOUR: CPT

## 2019-01-08 PROCEDURE — 87040 BLOOD CULTURE FOR BACTERIA: CPT | Performed by: EMERGENCY MEDICINE

## 2019-01-08 PROCEDURE — 80053 COMPREHEN METABOLIC PANEL: CPT | Performed by: EMERGENCY MEDICINE

## 2019-01-08 PROCEDURE — 81002 URINALYSIS NONAUTO W/O SCOPE: CPT | Performed by: EMERGENCY MEDICINE

## 2019-01-08 PROCEDURE — 71045 X-RAY EXAM CHEST 1 VIEW: CPT

## 2019-01-08 PROCEDURE — 87631 RESP VIRUS 3-5 TARGETS: CPT | Performed by: INTERNAL MEDICINE

## 2019-01-08 PROCEDURE — 94644 CONT INHLJ TX 1ST HOUR: CPT

## 2019-01-08 PROCEDURE — 84145 PROCALCITONIN (PCT): CPT | Performed by: EMERGENCY MEDICINE

## 2019-01-08 PROCEDURE — 96365 THER/PROPH/DIAG IV INF INIT: CPT

## 2019-01-08 PROCEDURE — 1124F ACP DISCUSS-NO DSCNMKR DOCD: CPT | Performed by: NURSE PRACTITIONER

## 2019-01-08 PROCEDURE — 94640 AIRWAY INHALATION TREATMENT: CPT

## 2019-01-08 RX ORDER — FAMOTIDINE 20 MG/1
40 TABLET, FILM COATED ORAL DAILY
Status: DISCONTINUED | OUTPATIENT
Start: 2019-01-08 | End: 2019-01-10 | Stop reason: HOSPADM

## 2019-01-08 RX ORDER — AZITHROMYCIN 250 MG/1
500 TABLET, FILM COATED ORAL EVERY 24 HOURS
Status: DISCONTINUED | OUTPATIENT
Start: 2019-01-09 | End: 2019-01-09

## 2019-01-08 RX ORDER — SACCHAROMYCES BOULARDII 250 MG
250 CAPSULE ORAL 2 TIMES DAILY
Status: DISCONTINUED | OUTPATIENT
Start: 2019-01-08 | End: 2019-01-10 | Stop reason: HOSPADM

## 2019-01-08 RX ORDER — IPRATROPIUM BROMIDE AND ALBUTEROL SULFATE 2.5; .5 MG/3ML; MG/3ML
3 SOLUTION RESPIRATORY (INHALATION)
Status: DISCONTINUED | OUTPATIENT
Start: 2019-01-08 | End: 2019-01-10 | Stop reason: HOSPADM

## 2019-01-08 RX ORDER — LORAZEPAM 1 MG/1
1 TABLET ORAL EVERY 8 HOURS PRN
Status: DISCONTINUED | OUTPATIENT
Start: 2019-01-08 | End: 2019-01-10 | Stop reason: HOSPADM

## 2019-01-08 RX ORDER — TAMSULOSIN HYDROCHLORIDE 0.4 MG/1
0.4 CAPSULE ORAL
Status: DISCONTINUED | OUTPATIENT
Start: 2019-01-08 | End: 2019-01-10 | Stop reason: HOSPADM

## 2019-01-08 RX ORDER — ESCITALOPRAM OXALATE 10 MG/1
10 TABLET ORAL DAILY
Status: DISCONTINUED | OUTPATIENT
Start: 2019-01-08 | End: 2019-01-10 | Stop reason: HOSPADM

## 2019-01-08 RX ORDER — SODIUM CHLORIDE FOR INHALATION 0.9 %
3 VIAL, NEBULIZER (ML) INHALATION ONCE
Status: COMPLETED | OUTPATIENT
Start: 2019-01-08 | End: 2019-01-08

## 2019-01-08 RX ORDER — CEFTRIAXONE 1 G/50ML
1000 INJECTION, SOLUTION INTRAVENOUS ONCE
Status: COMPLETED | OUTPATIENT
Start: 2019-01-08 | End: 2019-01-08

## 2019-01-08 RX ORDER — CYCLOBENZAPRINE HCL 10 MG
10 TABLET ORAL 3 TIMES DAILY PRN
Status: DISCONTINUED | OUTPATIENT
Start: 2019-01-08 | End: 2019-01-10 | Stop reason: HOSPADM

## 2019-01-08 RX ORDER — CEFTRIAXONE 1 G/50ML
1000 INJECTION, SOLUTION INTRAVENOUS EVERY 24 HOURS
Status: DISCONTINUED | OUTPATIENT
Start: 2019-01-09 | End: 2019-01-09

## 2019-01-08 RX ORDER — ACETAMINOPHEN 325 MG/1
650 TABLET ORAL EVERY 6 HOURS PRN
Status: DISCONTINUED | OUTPATIENT
Start: 2019-01-08 | End: 2019-01-10 | Stop reason: HOSPADM

## 2019-01-08 RX ADMIN — CEFTRIAXONE 1000 MG: 1 INJECTION, SOLUTION INTRAVENOUS at 08:38

## 2019-01-08 RX ADMIN — IPRATROPIUM BROMIDE AND ALBUTEROL SULFATE 3 ML: 2.5; .5 SOLUTION RESPIRATORY (INHALATION) at 13:49

## 2019-01-08 RX ADMIN — SODIUM CHLORIDE 1000 ML: 0.9 INJECTION, SOLUTION INTRAVENOUS at 10:49

## 2019-01-08 RX ADMIN — METRONIDAZOLE 500 MG: 500 INJECTION, SOLUTION INTRAVENOUS at 09:04

## 2019-01-08 RX ADMIN — ENOXAPARIN SODIUM 40 MG: 40 INJECTION SUBCUTANEOUS at 09:58

## 2019-01-08 RX ADMIN — SODIUM CHLORIDE 1000 ML: 0.9 INJECTION, SOLUTION INTRAVENOUS at 09:15

## 2019-01-08 RX ADMIN — FAMOTIDINE 40 MG: 20 TABLET ORAL at 09:58

## 2019-01-08 RX ADMIN — ESCITALOPRAM OXALATE 10 MG: 10 TABLET ORAL at 10:49

## 2019-01-08 RX ADMIN — Medication 250 MG: at 20:33

## 2019-01-08 RX ADMIN — IPRATROPIUM BROMIDE 1 MG: 0.5 SOLUTION RESPIRATORY (INHALATION) at 08:15

## 2019-01-08 RX ADMIN — TAMSULOSIN HYDROCHLORIDE 0.4 MG: 0.4 CAPSULE ORAL at 20:33

## 2019-01-08 RX ADMIN — AZITHROMYCIN MONOHYDRATE 500 MG: 500 INJECTION, POWDER, LYOPHILIZED, FOR SOLUTION INTRAVENOUS at 08:40

## 2019-01-08 RX ADMIN — IPRATROPIUM BROMIDE AND ALBUTEROL SULFATE 3 ML: 2.5; .5 SOLUTION RESPIRATORY (INHALATION) at 09:58

## 2019-01-08 RX ADMIN — SODIUM CHLORIDE 1000 ML: 0.9 INJECTION, SOLUTION INTRAVENOUS at 08:30

## 2019-01-08 RX ADMIN — IPRATROPIUM BROMIDE AND ALBUTEROL SULFATE 3 ML: 2.5; .5 SOLUTION RESPIRATORY (INHALATION) at 20:52

## 2019-01-08 RX ADMIN — ALBUTEROL SULFATE 10 MG: 2.5 SOLUTION RESPIRATORY (INHALATION) at 08:16

## 2019-01-08 RX ADMIN — ISODIUM CHLORIDE 3 ML: 0.03 SOLUTION RESPIRATORY (INHALATION) at 08:16

## 2019-01-08 NOTE — ASSESSMENT & PLAN NOTE
-likely in the setting of sepsis, baseline creatinine 1 07-1 17 however today 1 4  -received 3 L of normal saline boluses in a day  -continue to monitor urinary output, kidney functions

## 2019-01-08 NOTE — ASSESSMENT & PLAN NOTE
-CXR with left lower opacity, atelectasis versus pneumonia, suspected source  -lactic 1 2, WBC 21 89; of note, patient reports being on steroids finished 2 courses of treatment for COPD exacerbation in November and December  -UA negative for leukocytes and nitrates  -rapid influenza pending  -blood cultures x2 pending  -continue to monitor temperature, Tylenol p r n   For fevers  -trend WBCs, consider obtaining procalcitonin level  -patient received total of 3 L normal saline in the ED, Rocephin 1 g IV, azithromycin 500 mg IV

## 2019-01-08 NOTE — ASSESSMENT & PLAN NOTE
-high suspicion given reports of chills and lethargy in addition to left lobe opacity on chest x-ray  -monitor oxygen saturations, oxygen supplementation as needed to keep O2 sats above 88%; currently on 3 L nasal cannula with O2 sats 93-95%  -received atrovent, albuterol, and DuoNeb in ED  -received 1 g of IV Rocephin and 500 mg IV and azithromycin in the ED, will continue Rocephin 1 g IV daily and azithromycin 500 mg p o   Daily  -monitor temp and trend WBC, consider obtaining PCT  -obtain Legionella and strep

## 2019-01-08 NOTE — ED PROVIDER NOTES
History  Chief Complaint   Patient presents with    Shortness of Breath     Patient comes from c/o shortness of breath for approximately two weeks along with weakness  41-year-old male with history of moderate COPD following with pulmonology who does not smoke anymore presents for cough weakness and dyspnea  Onset was up to few weeks ago  Worsening since then  Week not drinking much  Does drink a lot of coffee however  Reports productive cough  Was recently seen by pulmonology early in December and placed on steroids for a COPD exacerbation  Does not feel that this is just a COPD exacerbation  Denies chest pain or fevers  No abdominal pain  No other associated complaints  The patient also status post colostomy for rectal mass seven years ago  No other pertinent past medical history  Assessment plan:  Dyspnea cough likely pneumonia sepsis IV fluids patient already got steroids due to likely component of COPD exacerbation  Breathing treatment  Shortness of Breath   Associated symptoms: no chest pain, no cough, no fever, no headaches, no neck pain, no rash and no vomiting        Prior to Admission Medications   Prescriptions Last Dose Informant Patient Reported? Taking?    LORazepam (ATIVAN) 1 mg tablet  Self No Yes   Sig: Take 1 tablet (1 mg total) by mouth every 8 (eight) hours as needed for anxiety   PROAIR  (90 Base) MCG/ACT inhaler  Self No Yes   Sig: INHALE 2 PUFFS EVERY 4-6 HOURS AS NEEDED   cyclobenzaprine (FLEXERIL) 10 mg tablet   No Yes   Sig: Take 1 tablet (10 mg total) by mouth 3 (three) times a day as needed for muscle spasms   escitalopram (LEXAPRO) 10 mg tablet  Self No Yes   Sig: Take 1 tablet (10 mg total) by mouth daily   famotidine (PEPCID) 40 MG tablet  Self No Yes   Sig: Take 1 tablet (40 mg total) by mouth daily   ipratropium-albuterol (DUO-NEB) 0 5-2 5 mg/3 mL nebulizer solution   No Yes   Si qid- unit dose dispense 120 4 refills   tamsulosin (FLOMAX) 0 4 mg Self No Yes   Sig: Take 1 capsule (0 4 mg total) by mouth every 12 (twelve) hours   tiotropium-olodaterol (STIOLTO RESPIMAT) 2 5-2 5 MCG/ACT inhaler   No Yes   Sig: Inhale 2 puffs daily      Facility-Administered Medications: None       Past Medical History:   Diagnosis Date    Bladder infection     current tx with cipro 5/29/16    Community acquired pneumonia     last assessed 8/28/17, resolved 9/25/17    COPD [facility]chronic obstructive pulmonary disease) ([facility] [address] )     Enlarged prostate     GERD (gastroesophageal reflux disease)     Hx of bladder infections     Psychiatric disorder     depression       Past Surgical History:   Procedure Laterality Date    COLON SURGERY      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    COLOSTOMY         Family History   Problem Relation Age of Onset    Lung cancer Mother     Cancer Mother     Cancer Father     Alcohol abuse Father      I have reviewed and agree with the history as documented  Social History   Substance Use Topics    Smoking status: Former Smoker     Packs/day: 0 50     Years: 50 00     Types: Cigarettes     Start date: 1959     Quit date: 03/2018    Smokeless tobacco: Former User      Comment: quit 8/2017 per [employer]     Alcohol use No        Review of Systems   Constitutional: Negative for chills, fatigue and fever  Eyes: Negative for photophobia and visual disturbance  Respiratory: Positive for shortness of breath  Negative for cough  Cardiovascular: Negative for chest pain, palpitations and leg swelling  Gastrointestinal: Negative for diarrhea, nausea and vomiting  Endocrine: Negative for polydipsia and polyuria  Genitourinary: Negative for decreased urine volume, difficulty urinating, dysuria and frequency  Musculoskeletal: Negative for back pain, neck pain and neck stiffness  Skin: Negative for color change and rash  Allergic/Immunologic: Negative for environmental allergies and immunocompromised state     Neurological: Negative for dizziness and headaches  Hematological: Negative for adenopathy  Does not bruise/bleed easily  Psychiatric/Behavioral: Negative for dysphoric mood  The patient is not nervous/anxious  Physical Exam  Physical Exam   Constitutional: He is oriented to person, place, and time  He appears well-developed  HENT:   Head: Normocephalic and atraumatic  Right Ear: External ear normal    Left Ear: External ear normal    Mouth/Throat: Oropharynx is clear and moist    Eyes: Pupils are equal, round, and reactive to light  Conjunctivae and EOM are normal    Neck: Normal range of motion  Neck supple  No JVD present  No thyromegaly present  Cardiovascular: Normal rate, regular rhythm and normal heart sounds  Exam reveals no gallop and no friction rub  No murmur heard  Pulmonary/Chest: He is in respiratory distress  He has wheezes  He has no rales  Abdominal: Soft  Bowel sounds are normal  He exhibits no distension  There is no rebound and no guarding  Musculoskeletal: Normal range of motion  He exhibits no edema  Lymphadenopathy:     He has no cervical adenopathy  Neurological: He is alert and oriented to person, place, and time  No cranial nerve deficit  Skin: Skin is warm  Psychiatric: He has a normal mood and affect  His behavior is normal    Nursing note and vitals reviewed        Vital Signs  ED Triage Vitals   Temperature Pulse Respirations Blood Pressure SpO2   01/08/19 0800 01/08/19 0800 01/08/19 0800 01/08/19 0800 01/08/19 0800   99 8 °F (37 7 °C) (!) 122 (!) 29 106/63 91 %      Temp Source Heart Rate Source Patient Position - Orthostatic VS BP Location FiO2 (%)   01/08/19 0800 01/08/19 0800 01/08/19 0800 01/08/19 0800 --   Tympanic Monitor Lying Right arm       Pain Score       01/08/19 1938       No Pain           Vitals:    01/08/19 2228 01/09/19 0152 01/09/19 0734 01/09/19 0745   BP: 110/60  106/55    Pulse: 100 88 76 76   Patient Position - Orthostatic VS: Lying  Lying Visual Acuity  Visual Acuity      Most Recent Value   L Pupil Size (mm)  3   R Pupil Size (mm)  3          ED Medications  Medications   LORazepam (ATIVAN) tablet 1 mg (not administered)   escitalopram (LEXAPRO) tablet 10 mg (10 mg Oral Given 1/9/19 0857)   famotidine (PEPCID) tablet 40 mg (40 mg Oral Given 1/9/19 0857)   tamsulosin (FLOMAX) capsule 0 4 mg (0 4 mg Oral Given 1/8/19 2033)   ipratropium-albuterol (DUO-NEB) 0 5-2 5 mg/3 mL inhalation solution 3 mL (3 mL Nebulization Given 1/9/19 0743)   cyclobenzaprine (FLEXERIL) tablet 10 mg (not administered)   enoxaparin (LOVENOX) subcutaneous injection 40 mg (40 mg Subcutaneous Given 1/9/19 0856)   cefTRIAXone (ROCEPHIN) IVPB (premix) 1,000 mg (1,000 mg Intravenous New Bag 1/9/19 0857)     And   azithromycin (ZITHROMAX) tablet 500 mg (500 mg Oral Given 1/9/19 0856)   acetaminophen (TYLENOL) tablet 650 mg (not administered)   saccharomyces boulardii (FLORASTOR) capsule 250 mg (250 mg Oral Given 1/9/19 0857)   albuterol inhalation solution 10 mg (10 mg Nebulization Given 1/8/19 0816)     And   ipratropium (ATROVENT) 0 02 % inhalation solution 1 mg (1 mg Nebulization Given 1/8/19 0815)     And   sodium chloride 0 9 % inhalation solution 3 mL (3 mL Nebulization Given 1/8/19 0816)    EMS REPLENISHMENT MED ( Does not apply Given to EMS 1/8/19 0816)   sodium chloride 0 9 % bolus 1,000 mL (0 mL Intravenous Stopped 1/8/19 0915)   cefTRIAXone (ROCEPHIN) IVPB (premix) 1,000 mg (0 mg Intravenous Stopped 1/8/19 0901)   azithromycin (ZITHROMAX) 500 mg in sodium chloride 0 9% 250mL IVPB 500 mg (0 mg Intravenous Stopped 1/8/19 0939)   metroNIDAZOLE (FLAGYL) IVPB (premix) 500 mg (0 mg Intravenous Stopped 1/8/19 0939)   sodium chloride 0 9 % bolus 1,000 mL (0 mL Intravenous Stopped 1/8/19 1048)   sodium chloride 0 9 % bolus 1,000 mL (0 mL Intravenous Stopped 1/8/19 1247)       Diagnostic Studies  Results Reviewed     Procedure Component Value Units Date/Time    Blood culture #1 [[id number]] Collected:  01/08/19 0811    Lab Status:  Preliminary result Specimen:  Blood from Arm, Left Updated:  01/09/19 1101     Blood Culture No Growth at 24 hrs  Blood culture #2 [[id number]] Collected:  01/08/19 0811    Lab Status:  Preliminary result Specimen:  Blood from Arm, Right Updated:  01/09/19 1101     Blood Culture No Growth at 24 hrs  Comprehensive metabolic panel [[id number]]  (Abnormal) Collected:  01/09/19 0507    Lab Status:  Final result Specimen:  Blood from Arm, Left Updated:  01/09/19 4990     Sodium 141 mmol/L      Potassium 4 2 mmol/L      Chloride 108 mmol/L      CO2 25 mmol/L      ANION GAP 8 mmol/L      BUN 21 mg/dL      Creatinine 1 20 mg/dL      Glucose 126 mg/dL      Calcium 8 7 mg/dL      AST 14 U/L      ALT 18 U/L      Alkaline Phosphatase 56 U/L      Total Protein 6 7 g/dL      Albumin 2 4 (L) g/dL      Total Bilirubin 0 20 mg/dL      eGFR 57 ml/min/1 73sq m     Narrative:         National Kidney Disease Education Program recommendations are as follows:  GFR calculation is accurate only with a steady state creatinine  Chronic Kidney disease less than 60 ml/min/1 73 sq  meters  Kidney failure less than 15 ml/min/1 73 sq  meters      Magnesium [[id number]]  (Normal) Collected:  01/09/19 0507    Lab Status:  Final result Specimen:  Blood from Arm, Left Updated:  01/09/19 0629     Magnesium 2 0 mg/dL     Phosphorus [[id number]]  (Normal) Collected:  01/09/19 0507    Lab Status:  Final result Specimen:  Blood from Arm, Left Updated:  01/09/19 0629     Phosphorus 3 1 mg/dL     CBC (With Platelets) [[id number]]  (Abnormal) Collected:  01/09/19 0507    Lab Status:  Final result Specimen:  Blood from Arm, Left Updated:  01/09/19 0558     WBC 19 64 (H) Thousand/uL      RBC 3 72 (L) Million/uL      Hemoglobin 12 5 g/dL      Hematocrit 37 4 %       (H) fL      MCH 33 6 pg      MCHC 33 4 g/dL      RDW 13 4 %      Platelets 294 Thousands/uL      MPV 9 1 fL     Influenza A/B and RSV by PCR [[id number]]  (Normal) Collected:  01/08/19 0954    Lab Status:  Final result Specimen:  Nasopharyngeal from Nasopharyngeal Swab Updated:  01/08/19 1505     INFLU A PCR None Detected     INFLU B PCR None Detected     RSV PCR None Detected    Procalcitonin [[id number]]  (Normal) Collected:  01/08/19 0829    Lab Status:  Final result Specimen:  Blood from Arm, Right Updated:  01/08/19 1141     Procalcitonin <0 05 ng/ml     Lactic acid, plasma [[id number]]  (Normal) Collected:  01/08/19 0829    Lab Status:  Final result Specimen:  Blood from Arm, Right Updated:  01/08/19 0909     LACTIC ACID 1 2 mmol/L     Narrative:         Result may be elevated if tourniquet was used during collection      POCT urinalysis dipstick [[id number]]  (Normal) Resulted:  01/08/19 0900    Lab Status:  Final result Specimen:  Urine Updated:  01/08/19 0901     Color, UA yellow     Clarity, UA clear     Glucose, UA (Ref: Negative) neg     Bilirubin, UA (Ref: Negative) neg     Ketones, UA (Ref: Negative) neg     Spec Grav, UA (Ref:1 003-1 030) 1 015     Blood, UA (Ref: Negative) small +     pH, UA (Ref: 4 5-8 0) 6 0     Protein, UA (Ref: Negative) neg     Urobilinogen, UA (Ref: 0 2- 1 0) 0 2      Leukocytes, UA (Ref: Negative) neg     Nitrite, UA (Ref: Negative) neg    Troponin I [[id number]]  (Normal) Collected:  01/08/19 0811    Lab Status:  Final result Specimen:  Blood from Arm, Right Updated:  01/08/19 0848     Troponin I <0 02 ng/mL     Comprehensive metabolic panel [[id number]]  (Abnormal) Collected:  01/08/19 0811    Lab Status:  Final result Specimen:  Blood from Arm, Right Updated:  01/08/19 0846     Sodium 137 mmol/L      Potassium 4 3 mmol/L      Chloride 100 mmol/L      CO2 26 mmol/L      ANION GAP 11 mmol/L      BUN 21 mg/dL      Creatinine 1 49 (H) mg/dL      Glucose 121 mg/dL      Calcium 8 8 mg/dL      AST 17 U/L      ALT 22 U/L      Alkaline Phosphatase 64 U/L      Total Protein 8 2 g/dL      Albumin 2 9 (L) g/dL      Total Bilirubin 0 40 mg/dL      eGFR 44 ml/min/1 73sq m     Narrative:         National Kidney Disease Education Program recommendations are as follows:  GFR calculation is accurate only with a steady state creatinine  Chronic Kidney disease less than 60 ml/min/1 73 sq  meters  Kidney failure less than 15 ml/min/1 73 sq  meters  CBC and differential [[id number]]  (Abnormal) Collected:  01/08/19 0811    Lab Status:  Final result Specimen:  Blood from Arm, Right Updated:  01/08/19 0829     WBC 21 89 (H) Thousand/uL      RBC 4 41 Million/uL      Hemoglobin 14 8 g/dL      Hematocrit 43 6 %      MCV 99 (H) fL      MCH 33 6 pg      MCHC 33 9 g/dL      RDW 13 2 %      MPV 9 0 fL      Platelets 474 Thousands/uL      nRBC 0 /100 WBCs      Neutrophils Relative 87 (H) %      Immat GRANS % 1 %      Lymphocytes Relative 6 (L) %      Monocytes Relative 6 %      Eosinophils Relative 0 %      Basophils Relative 0 %      Neutrophils Absolute 19 14 (H) Thousands/µL      Immature Grans Absolute 0 15 Thousand/uL      Lymphocytes Absolute 1 30 Thousands/µL      Monocytes Absolute 1 24 (H) Thousand/µL      Eosinophils Absolute 0 02 Thousand/µL      Basophils Absolute 0 04 Thousands/µL                  XR chest 1 view portable   ED Interpretation by Gina Valdez DO (01/08 0830)   LLL pna      Final Result by April Pinto MD (01/08 1021)      Prominent lung markings, specifically in left base  Atelectasis versus pneumonia  Suggest follow-up lateral projection if patient can tolerate  The study was marked in [facility] for immediate notification              Workstation performed: [id number]         CT chest wo contrast    (Results Pending)              Procedures  CriticalCare Time  Performed by: Dominga Sandoval  Authorized by: Dominga Sandoval     Critical care provider statement:     Critical care time (minutes):  40    Critical care time was exclusive of:  Separately billable procedures and treating other patients and teaching time Critical care was necessary to treat or prevent imminent or life-threatening deterioration of the following conditions:  Respiratory failure, shock and sepsis    Critical care was time spent personally by me on the following activities:  Blood draw for specimens, obtaining history from patient or surrogate, re-evaluation of patient's condition, review of old charts, evaluation of patient's response to treatment, examination of patient, ordering and review of laboratory studies, ordering and performing treatments and interventions, development of treatment plan with patient or surrogate and ordering and review of radiographic studies           Phone Contacts  ED Phone Contact    ED Course  ED Course as of Jan 09 1206   Tue Jan 08, 2019   0829 EKG: sinus tachycardia, PAC's noted      0914 SLIM paged for admit                          Initial Sepsis Screening     Row Name 01/09/19 1205 01/08/19 0849             Is the patient's history suggestive of a new or worsening infection?  -- (!)  Yes (Proceed)  -BK       Suspected source of infection  -- pneumonia  -BK       Are two or more of the following signs & symptoms of infection both present and new to the patient?  -- (!)  Yes (Proceed)  -BK       Indicate SIRS criteria  -- Tachycardia > 90 bpm;Tachypnea > 20 resp per min;Leukocytosis (WBC > 17928 IJL)  -BK       If the answer is yes to both questions, suspicion of sepsis is present  --  --       If severe sepsis is present AND tissue hypoperfusion perists in the hour after fluid resuscitation or lactate > 4, the patient meets criteria for SEPTIC SHOCK  --  --       Are any of the following organ dysfunction criteria present within 6 hours of suspected infection and SIRS criteria that are NOT considered to be chronic conditions?  -- (!)  Yes  -BK       Organ dysfunction Creatinine > 0 5 mg/dl ABOVE BASELINE;SBP decrease > 40 mmHg from baseline  -BK  --       Date of presentation of severe sepsis 01/08/19  -BK  -- Time of presentation of severe sepsis 1205  -BK  --       Tissue hypoperfusion persists in the hour after crystalloid fluid administration, evidenced, by either:  --  --       Was hypotension present within one hour of the conclusion of crystalloid fluid administration? No  -BK  --       Date of presentation of septic shock  --  --       Time of presentation of septic shock  --  --         User Key  (r) = Recorded By, (t) = Taken By, (c) = Cosigned By    [address] Name Provider Type    [address] Mariajose Velasquez DO Physician                  MDM  Number of Diagnoses or Management Options  Left lower lobe pneumonia [facility]): new and requires workup  Severe sepsis [facility]): new and requires workup  Diagnosis management comments: Patient showing severe sepsis from pneumonia  Copd component as well  Admitted to [address] [place] [address]  Much improved since IVF/breathing treatments  Amount and/or Complexity of Data Reviewed  Clinical lab tests: reviewed and ordered  Tests in the radiology section of CPT®: reviewed and ordered  Tests in the medicine section of CPT®: ordered and reviewed  Obtain history from someone other than the patient: yes (wife)  Independent visualization of images, tracings, or specimens: yes    Patient Progress  Patient progress: stable    CritCare Time    Disposition  Final diagnoses:   Left lower lobe pneumonia ([address] )   Severe sepsis ([address] )     Time reflects when diagnosis was documented in both MDM as applicable and the Disposition within this note     Time User Action Codes Description Comment    1/8/2019  8:57 AM Ronen BASSETT Add [J18 1] Left lower lobe pneumonia ([address] )     1/9/2019 12:03 PM Morris Dye Add [A41 9,  R60 20] Severe sepsis [facility])       ED Disposition     ED Disposition Condition Comment    Admit  Case was discussed with Moncho Tony and the patient's admission status was agreed to be Admission Status: inpatient status to the service of Dr Moses Hidden           Follow-up Information    None         Current Discharge Medication List      CONTINUE these medications which have NOT CHANGED    Details   cyclobenzaprine (FLEXERIL) 10 mg tablet Take 1 tablet (10 mg total) by mouth 3 (three) times a day as needed for muscle spasms  Qty: 30 tablet, Refills: 0    Associated Diagnoses: Muscle spasm      escitalopram (LEXAPRO) 10 mg tablet Take 1 tablet (10 mg total) by mouth daily  Qty: 90 tablet, Refills: 3    Associated Diagnoses: Anxiety; Depressive disorder      famotidine (PEPCID) 40 MG tablet Take 1 tablet (40 mg total) by mouth daily  Qty: 90 tablet, Refills: 0    Associated Diagnoses: Gastroesophageal reflux disease, esophagitis presence not specified      ipratropium-albuterol (DUO-NEB) 0 5-2 5 mg/3 mL nebulizer solution 1 qid- unit dose dispense 120 4 refills  Qty: 360 mL, Refills: 4    Associated Diagnoses: Chronic obstructive pulmonary disease, unspecified COPD type (HCC)      LORazepam (ATIVAN) 1 mg tablet Take 1 tablet (1 mg total) by mouth every 8 (eight) hours as needed for anxiety  Qty: 30 tablet, Refills: 1    Associated Diagnoses: Anxiety      PROAIR  (90 Base) MCG/ACT inhaler INHALE 2 PUFFS EVERY 4-6 HOURS AS NEEDED  Qty: 8 5 g, Refills: 0    Associated Diagnoses: COPD, moderate (HCC)      tamsulosin (FLOMAX) 0 4 mg Take 1 capsule (0 4 mg total) by mouth every 12 (twelve) hours  Qty: 180 capsule, Refills: 1    Associated Diagnoses: Prostatitis, unspecified prostatitis type      tiotropium-olodaterol (STIOLTO RESPIMAT) 2 5-2 5 MCG/ACT inhaler Inhale 2 puffs daily  Qty: 3 Inhaler, Refills: 3    Associated Diagnoses: COPD, moderate (HCC)           No discharge procedures on file      ED Provider  Electronically Signed by           Lauri Santana DO  01/09/19 7462

## 2019-01-08 NOTE — ASSESSMENT & PLAN NOTE
-patient follows up with Pulmonary team over at [facility], recently finished steroid taper for COPD exacerbation  -at home takes Symbicort, DuoNeb 4 times a day, albuterol 2 puff 4-6 hours as needed, ; will continue  -monitor oxygen saturation, oxygen supplementation as needed; at baseline, patient does not wear oxygen, currently on 3 L nasal cannula

## 2019-01-08 NOTE — H&P
H&P- Jann Hameed 1940, 77 y o  male MRN: [id number]    Unit/Bed#: ED 06 Encounter: [id number]    Primary Care Provider: Navjot Card MD   Date and time admitted to hospital: 1/8/2019  7:58 AM        * CAP (community acquired pneumonia)   Assessment & Plan    -high suspicion given reports of chills and lethargy in addition to left lobe opacity on chest x-ray  -monitor oxygen saturations, oxygen supplementation as needed to keep O2 sats above 88%; currently on 3 L nasal cannula with O2 sats 93-95%  -received atrovent, albuterol, and DuoNeb in ED  -received 1 g of IV Rocephin and 500 mg IV and azithromycin in the ED, will continue Rocephin 1 g IV daily and azithromycin 500 mg p o  Daily  -monitor temp and trend WBC, consider obtaining PCT  -obtain Legionella and strep     [facility]   Assessment & Plan    -CXR with left lower opacity, atelectasis versus pneumonia, suspected source  -lactic 1 2, WBC 21 89; of note, patient reports being on steroids finished 2 courses of treatment for COPD exacerbation in November and December  -UA negative for leukocytes and nitrates  -rapid influenza pending  -blood cultures x2 pending  -continue to monitor temperature, Tylenol p r n   For fevers  -trend WBCs, consider obtaining procalcitonin level  -patient received total of 3 L normal saline in the ED, Rocephin 1 g IV, azithromycin 500 mg IV       COPD, moderate (HCC)   Assessment & Plan    -patient follows up with Pulmonary team [facility] at [facility], recently finished steroid taper for COPD exacerbation  -at home takes Symbicort, DuoNeb 4 times a day, albuterol 2 puff 4-6 hours as needed, ; will continue  -monitor oxygen saturation, oxygen supplementation as needed; at baseline, patient does not wear oxygen, currently on 3 L nasal cannula     BECKIE (acute kidney injury) ([facility] )   Assessment & Plan    -likely in the setting of sepsis, baseline creatinine 1 07-1 17 however today 1 4  -received 3 L of normal saline boluses in a day  -continue to monitor urinary output, kidney functions       Acid reflux disease   Assessment & Plan    -patient takes Pepcid 40 mg daily p o , will resume     Anxiety   Assessment & Plan    Patient takes Lexapro 10 mg PO daily and Ativan 1 mg p o  Q 8 hours as needed for anxiety, will resume     Enlarged prostate with lower urinary tract symptoms (LUTS)   Assessment & Plan    Patient takes Flomax 0 4 mg p o  Every 12 hr, will resume       VTE Prophylaxis: Enoxaparin (Lovenox)  / sequential compression device   Code Status: Full code - level 1  POLST: POLST form is not discussed and not completed at this time  Anticipated Length of Stay:  Patient will be admitted on an Inpatient basis with an anticipated length of stay of  2 midnights  Justification for Hospital Stay:  Treatment of sepsis    Total Time for Visit, including Counseling / Coordination of Care: 45 minutes  Greater than 50% of this total time spent on direct patient counseling and coordination of care  Chief Complaint:   Generalized weakness    History of Present Illness:    Delia Owens is a 77 y o  male who past medical history of COPD not on home O2, colon mass status post colostomy, acid reflux, arthritis presents with 1 day history of generalized weakness  Patient was in his usual state of health until last evening when he started with lethargy  This morning, patient unable to stand up despite using walker for assistance  He reported feeling foggy and lightheaded  He reported shortness of breath so wife called EMS  At baseline, patient does not use any devices/equipment to get around  Patient has shortness of breath at baseline but noticed it was more than usual   Patient also reports chills, congestion, an increase in productive cough  Unable to determine characteristics of sputum  No treatments were attempted at home    Patient denies fevers, vision changes, sore throat, chest pain, palpitations, edema, nausea/vomiting/diarrhea/constipation, dysuria, hematuria, recent sick contacts  In the ED, vital signs were 99 8° F, 122, 29, 106/63, 91% on room air  Chest x-ray show left base opacity concerning for atelectasis versus pneumonia  Patient received 3 L total normal saline bolus  Patient received 500 mg IV azithromycin and 1 g Rocephin IV  EKG showed sinus tachycardia, no ST elevation or depression  Of note, patient recently treated for COPD exacerbation as an outpatient  Treatment included course of prednisone taper  Code status discussed with patient  Per patient, would like chest compression and intubation if needed  Per patient, in the event that he deteriorates, he would like us to notify his wife Crystal Fernandez at [phone]  History obtained from patient and his wife Crystal Fernandez who is at bedside  Also per chart review  Review of Systems:    Review of Systems   Constitutional: Positive for chills and fatigue  Negative for activity change, appetite change, diaphoresis and fever  HENT: Positive for congestion  Negative for sore throat  Eyes: Negative for visual disturbance  Patient reports he wears glasses   Respiratory: Positive for cough and shortness of breath  Negative for chest tightness and wheezing  Cardiovascular: Negative for chest pain, palpitations and leg swelling  Gastrointestinal: Negative for abdominal distention, abdominal pain, blood in stool, constipation, diarrhea, nausea and vomiting  Genitourinary: Negative for dysuria and hematuria  Musculoskeletal:        Patient denies using any devices or equipment to move around at baseline, required the use of a walker today   Neurological: Positive for dizziness, weakness and light-headedness  Negative for headaches  Psychiatric/Behavioral: Positive for confusion  Negative for hallucinations  The patient is nervous/anxious          Past Medical and Surgical History:     Past Medical History:   Diagnosis Date    Bladder infection     current tx with cipro 5/29/16    Community acquired pneumonia     last assessed 8/28/17, resolved 9/25/17    COPD (chronic obstructive pulmonary disease) (HCC)     Enlarged prostate     GERD (gastroesophageal reflux disease)     Hx of bladder infections     Psychiatric disorder     depression       Past Surgical History:   Procedure Laterality Date    COLON SURGERY      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 Authorizing Provider   cyclobenzaprine (FLEXERIL) 10 mg tablet Take 1 tablet (10 mg total) by mouth 3 (three) times a day as needed for muscle spasms 1/3/19  Yes Violeta Bradley MD   escitalopram (LEXAPRO) 10 mg tablet Take 1 tablet (10 mg total) by mouth daily 8/10/18  Yes Violeta Bradley MD   famotidine (PEPCID) 40 MG tablet Take 1 tablet (40 mg total) by mouth daily 11/2/18  Yes Violeta Bradley MD   ipratropium-albuterol (DUO-NEB) 0 5-2 5 mg/3 mL nebulizer solution 1 qid- unit dose dispense 120 4 refills 12/6/18  Yes Jorge A Inman DO   LORazepam (ATIVAN) 1 mg tablet Take 1 tablet (1 mg total) by mouth every 8 (eight) hours as needed for anxiety 8/10/18  Yes Violeta Bradley MD   PROAIR  (82 Base) MCG/ACT inhaler INHALE 2 PUFFS EVERY 4-6 HOURS AS NEEDED 8/15/18  Yes Violeta Bradley MD   tamsulosin [facility]) 0 4 mg Take 1 capsule (0 4 mg total) by mouth every 12 (twelve) hours 11/27/18  Yes Violeta Bradley MD   tiotropium-olodaterol (STIOLTO RESPIMAT) 2 5-2 5 MCG/ACT inhaler Inhale 2 puffs daily 12/3/18  Yes Jorge A Inman DO   predniSONE 10 mg tablet 4 tabs daily x 3 D then 3 tabs daily x 3 D then 2 tabs daily x 3 D then 1 tab daily x 3 D 12/3/18 1/8/19  Jorge A Inman DO     I have reviewed home medications with patient family member  Allergies:    Allergies   Allergen Reactions    Aspirin Other (See Comments)     Hx stomach ulcer       Social History:     Marital Status: /Civil Union   Occupation:  Retired  Patient Pre-hospital Living Situation:  Lives with wife at home  Patient Pre-hospital Level of Mobility:  Independent  Patient Pre-hospital Diet Restrictions:  Regular diet  Substance Use History:   History   Alcohol Use No     History   Smoking Status    Former Smoker    Packs/day: 0 50    Years: 50 00    Types: Cigarettes    Start date: 64    Quit date: 03/2018   Smokeless Tobacco    Former User     Comment: quit 8/2017 per Allscripts      History   Drug Use No       Family History:    non-contributory    Physical Exam:     Vitals:   Blood Pressure: 104/51 (01/08/19 1100)  Pulse: (!) 114 (01/08/19 1115)  Temperature: 99 8 °F (37 7 °C) (01/08/19 0800)  Temp Source: Oral (01/08/19 0801)  Respirations: (!) 54 (01/08/19 1115)  Height: 5' 9" (175 3 cm) (01/08/19 0801)  Weight - Scale: 82 1 kg (181 lb) (01/08/19 0801)  SpO2: 91 % (01/08/19 1115)    Physical Exam   Constitutional: He is oriented to person, place, and time  He appears distressed  HENT:   Head: Normocephalic and atraumatic  Eyes: Pupils are equal, round, and reactive to light  Conjunctivae are normal    Neck: Normal range of motion  Neck supple  No JVD present  No tracheal deviation present  Cardiovascular: Normal rate, regular rhythm, normal heart sounds and intact distal pulses  No murmur heard  Pulmonary/Chest: He is in respiratory distress  He exhibits no tenderness  Breath sounds diminished throughout   Abdominal: Soft  Bowel sounds are normal  He exhibits no distension  There is no tenderness  There is no rebound  Colostomy present to left quadrant   Musculoskeletal: Normal range of motion  He exhibits edema  Trace bilateral ankle edema   Neurological: He is alert and oriented to person, place, and time  He has normal reflexes  Skin: Skin is warm and dry  He is not diaphoretic  Psychiatric: He has a normal mood and affect         Additional Data: Lab Results:   I have personally reviewed pertinent lab findings  Results from last 7 days  Lab Units 01/08/19  0811   WBC Thousand/uL 21 89*   HEMOGLOBIN g/dL 14 8   HEMATOCRIT % 43 6   PLATELETS Thousands/uL 295   NEUTROS PCT % 87*   LYMPHS PCT % 6*   MONOS PCT % 6   EOS PCT % 0       Results from last 7 days  Lab Units 01/08/19  0811   SODIUM mmol/L 137   POTASSIUM mmol/L 4 3   CHLORIDE mmol/L 100   CO2 mmol/L 26   BUN mg/dL 21   CREATININE mg/dL 1 49*   ANION GAP mmol/L 11   CALCIUM mg/dL 8 8   ALBUMIN g/dL 2 9*   TOTAL BILIRUBIN mg/dL 0 40   ALK PHOS U/L 64   ALT U/L 22   AST U/L 17   GLUCOSE RANDOM mg/dL 121                   Results from last 7 days  Lab Units 01/08/19  0829   LACTIC ACID mmol/L 1 2   PROCALCITONIN ng/ml <0 05       Imaging: I have personally reviewed pertinent films in PACS    XR chest 1 view portable   ED Interpretation by Codie Headley DO (01/08 0830)   LLL pna      Final Result by Roshan Mckeon MD (01/08 1021)      Prominent lung markings, specifically in left base  Atelectasis versus pneumonia  Suggest follow-up lateral projection if patient can tolerate  The study was marked in [facility] for immediate notification  Workstation performed: [id number]             EKG, Pathology, and Other Studies Reviewed on Admission:   · EKG:  Sinus rhythm, heart rate 117, no ST elevation or depression-as read by me    Allscripts / Epic Records Reviewed: Yes     ** Please Note: This note has been constructed using a voice recognition system   **      COURTNEY Moore

## 2019-01-08 NOTE — ASSESSMENT & PLAN NOTE
Patient takes Lexapro 10 mg PO daily and Ativan 1 mg p o  Q 8 hours as needed for anxiety, will resume

## 2019-01-09 ENCOUNTER — APPOINTMENT (OUTPATIENT)
Dept: CT IMAGING | Facility: HOSPITAL | Age: 79
DRG: 871 | End: 2019-01-09
Payer: MEDICARE

## 2019-01-09 PROBLEM — M54.2 NECK PAIN: Status: ACTIVE | Noted: 2019-01-09

## 2019-01-09 PROBLEM — R65.20 SEVERE SEPSIS (HCC): Status: ACTIVE | Noted: 2017-08-18

## 2019-01-09 LAB
ALBUMIN SERPL BCP-MCNC: 2.4 G/DL (ref 3.5–5)
ALP SERPL-CCNC: 56 U/L (ref 46–116)
ALT SERPL W P-5'-P-CCNC: 18 U/L (ref 12–78)
ANION GAP SERPL CALCULATED.3IONS-SCNC: 8 MMOL/L (ref 4–13)
AST SERPL W P-5'-P-CCNC: 14 U/L (ref 5–45)
BILIRUB SERPL-MCNC: 0.2 MG/DL (ref 0.2–1)
BUN SERPL-MCNC: 21 MG/DL (ref 5–25)
CALCIUM SERPL-MCNC: 8.7 MG/DL (ref 8.3–10.1)
CHLORIDE SERPL-SCNC: 108 MMOL/L (ref 100–108)
CO2 SERPL-SCNC: 25 MMOL/L (ref 21–32)
CREAT SERPL-MCNC: 1.2 MG/DL (ref 0.6–1.3)
ERYTHROCYTE [DISTWIDTH] IN BLOOD BY AUTOMATED COUNT: 13.4 % (ref 11.6–15.1)
GFR SERPL CREATININE-BSD FRML MDRD: 57 ML/MIN/1.73SQ M
GLUCOSE SERPL-MCNC: 126 MG/DL (ref 65–140)
HCT VFR BLD AUTO: 37.4 % (ref 36.5–49.3)
HGB BLD-MCNC: 12.5 G/DL (ref 12–17)
L PNEUMO1 AG UR QL IA.RAPID: NEGATIVE
MAGNESIUM SERPL-MCNC: 2 MG/DL (ref 1.6–2.6)
MCH RBC QN AUTO: 33.6 PG (ref 26.8–34.3)
MCHC RBC AUTO-ENTMCNC: 33.4 G/DL (ref 31.4–37.4)
MCV RBC AUTO: 101 FL (ref 82–98)
PHOSPHATE SERPL-MCNC: 3.1 MG/DL (ref 2.3–4.1)
PLATELET # BLD AUTO: 223 THOUSANDS/UL (ref 149–390)
PMV BLD AUTO: 9.1 FL (ref 8.9–12.7)
POTASSIUM SERPL-SCNC: 4.2 MMOL/L (ref 3.5–5.3)
PROT SERPL-MCNC: 6.7 G/DL (ref 6.4–8.2)
RBC # BLD AUTO: 3.72 MILLION/UL (ref 3.88–5.62)
S PNEUM AG UR QL: NEGATIVE
SODIUM SERPL-SCNC: 141 MMOL/L (ref 136–145)
WBC # BLD AUTO: 19.64 THOUSAND/UL (ref 4.31–10.16)

## 2019-01-09 PROCEDURE — 94640 AIRWAY INHALATION TREATMENT: CPT

## 2019-01-09 PROCEDURE — 87449 NOS EACH ORGANISM AG IA: CPT | Performed by: PHYSICIAN ASSISTANT

## 2019-01-09 PROCEDURE — 85027 COMPLETE CBC AUTOMATED: CPT | Performed by: INTERNAL MEDICINE

## 2019-01-09 PROCEDURE — 71250 CT THORAX DX C-: CPT

## 2019-01-09 PROCEDURE — 84100 ASSAY OF PHOSPHORUS: CPT | Performed by: INTERNAL MEDICINE

## 2019-01-09 PROCEDURE — 99232 SBSQ HOSP IP/OBS MODERATE 35: CPT | Performed by: HOSPITALIST

## 2019-01-09 PROCEDURE — 94760 N-INVAS EAR/PLS OXIMETRY 1: CPT

## 2019-01-09 PROCEDURE — 80053 COMPREHEN METABOLIC PANEL: CPT | Performed by: INTERNAL MEDICINE

## 2019-01-09 PROCEDURE — 83735 ASSAY OF MAGNESIUM: CPT | Performed by: INTERNAL MEDICINE

## 2019-01-09 RX ORDER — GUAIFENESIN 600 MG
600 TABLET, EXTENDED RELEASE 12 HR ORAL EVERY 12 HOURS SCHEDULED
Status: DISCONTINUED | OUTPATIENT
Start: 2019-01-09 | End: 2019-01-10 | Stop reason: HOSPADM

## 2019-01-09 RX ADMIN — Medication 250 MG: at 17:18

## 2019-01-09 RX ADMIN — Medication 250 MG: at 08:57

## 2019-01-09 RX ADMIN — IPRATROPIUM BROMIDE AND ALBUTEROL SULFATE 3 ML: 2.5; .5 SOLUTION RESPIRATORY (INHALATION) at 07:43

## 2019-01-09 RX ADMIN — IPRATROPIUM BROMIDE AND ALBUTEROL SULFATE 3 ML: 2.5; .5 SOLUTION RESPIRATORY (INHALATION) at 20:01

## 2019-01-09 RX ADMIN — FAMOTIDINE 40 MG: 20 TABLET ORAL at 08:57

## 2019-01-09 RX ADMIN — ESCITALOPRAM OXALATE 10 MG: 10 TABLET ORAL at 08:57

## 2019-01-09 RX ADMIN — GUAIFENESIN 600 MG: 600 TABLET, EXTENDED RELEASE ORAL at 20:25

## 2019-01-09 RX ADMIN — TAMSULOSIN HYDROCHLORIDE 0.4 MG: 0.4 CAPSULE ORAL at 17:18

## 2019-01-09 RX ADMIN — GUAIFENESIN 600 MG: 600 TABLET, EXTENDED RELEASE ORAL at 15:30

## 2019-01-09 RX ADMIN — CEFTRIAXONE 1000 MG: 1 INJECTION, SOLUTION INTRAVENOUS at 08:57

## 2019-01-09 RX ADMIN — ENOXAPARIN SODIUM 40 MG: 40 INJECTION SUBCUTANEOUS at 08:56

## 2019-01-09 RX ADMIN — AZITHROMYCIN 500 MG: 250 TABLET, FILM COATED ORAL at 08:56

## 2019-01-09 RX ADMIN — IPRATROPIUM BROMIDE AND ALBUTEROL SULFATE 3 ML: 2.5; .5 SOLUTION RESPIRATORY (INHALATION) at 01:50

## 2019-01-09 RX ADMIN — LORAZEPAM 1 MG: 1 TABLET ORAL at 21:41

## 2019-01-09 NOTE — ASSESSMENT & PLAN NOTE
Lab Results   Component Value Date    CREATININE 1 20 01/09/2019    CREATININE 1 49 (H) 01/08/2019    CREATININE 1 07 08/18/2017    CREATININE 1 15 08/16/2017     · Creatinine 1 49 on admission and now 1 20    · Likely from viral PNA and poor intake at home  · S/p 3L NSS in ER  · Avoid nephrotoxins

## 2019-01-09 NOTE — ASSESSMENT & PLAN NOTE
· CXR: Prominent lung markings, specifically in left base  Atelectasis versus pneumonia  · Patient undergoing CT for better evaluation  · Procalcitonin negative- will discontinue ABX  · Legionella, strep pneumo, sputum pending  · Negative for influenza/RSV  May be alternative viral process  · Currently requiring 3L NC and not on O2 at home   Wean oxygen as able and obtain formal home O2 evaluation  · Continue duoneb as per home dosing  · Also on stioloto respimat  · No evidence of COPD exac

## 2019-01-09 NOTE — SEPSIS NOTE
Sepsis Note   Isaias Baird 77 y o  male MRN: [id number]  Unit/Bed#: [address] [place] [address] Encounter: [id number]            Initial Sepsis Screening     Row Name 01/09/19 1205 01/08/19 0849             Is the patient's history suggestive of a new or worsening infection?  -- (!)  Yes (Proceed)  -BK       Suspected source of infection  -- pneumonia  -BK       Are two or more of the following signs & symptoms of infection both present and new to the patient?  -- (!)  Yes (Proceed)  -BK       Indicate SIRS criteria  -- Tachycardia > 90 bpm;Tachypnea > 20 resp per min;Leukocytosis (WBC > 32774 IJL)  -BK       If the answer is yes to both questions, suspicion of sepsis is present  --  --       If severe sepsis is present AND tissue hypoperfusion perists in the hour after fluid resuscitation or lactate > 4, the patient meets criteria for SEPTIC SHOCK  --  --       Are any of the following organ dysfunction criteria present within 6 hours of suspected infection and SIRS criteria that are NOT considered to be chronic conditions?  -- (!)  Yes  -BK       Organ dysfunction Creatinine > 0 5 mg/dl ABOVE BASELINE;SBP decrease > 40 mmHg from baseline  -BK  --       Date of presentation of severe sepsis 01/08/19  -BK  --       Time of presentation of severe sepsis 1205  -BK  --       Tissue hypoperfusion persists in the hour after crystalloid fluid administration, evidenced, by either:  --  --       Was hypotension present within one hour of the conclusion of crystalloid fluid administration?  No  -BK  --       Date of presentation of septic shock  --  --       Time of presentation of septic shock  --  --         User Key  (r) = Recorded By, (t) = Taken By, (c) = Cosigned By    [address] Name Provider Type    BK Judy Needles, DO Physician               Default Flowsheet Data (last 720 hours)      Sepsis Reassess     [address] Name 01/09/19 1343                   Repeat Volume Status and Tissue Perfusion Assessment Performed    Repeat Volume Status and Tissue Perfusion Assessment Performed Yes  -BARRINGTON           Volume Status and Tissue Perfusion Post Fluid Resuscitation * Must Document All *    Vital Signs Reviewed (HR, RR, BP, T) Yes  -BARRINGTON        Shock Index Reviewed Yes  -BARRINGTON        Arterial Oxygen Saturation Reviewed (POx, SaO2 or SpO2)  --        Cardio Normal S1/S2; Regular rate and rhythm; No murmor; No rub or gallop  -BARRINGTON        Pulmonary (!)  Normal effort;Rhonchi  -ABRRINGTON        Capillary Refill Brisk  -BARRINGTON        Peripheral Pulses Radial  -BARRINGTON        Peripheral Pulse +2  -BARRINGTON        Skin Warm;Cool;Normal  -BARRINGTON        Urine output assessed  --           *OR*   Intensive Monitoring- Must Document One of the Following Four *:    Vital Signs Reviewed  --        * Central Venous Pressure (CVP or RAP)  --        * Central Venous Oxygen (SVO2, ScvO2 or Oxygen saturation via central catheter)  --        * Bedside Cardiovascular US in IVC diameter and % collapse  --        * Passive Leg Raise OR Crystalloid Challenge  --          User Key  (r) = Recorded By, (t) = Taken By, (c) = Cosigned By    Initials Name Provider Type    Blanca Pérez PA-C Physician Assistant

## 2019-01-09 NOTE — ASSESSMENT & PLAN NOTE
· Follows with Dr Cagle Mail  · On duoneb QID and adelfo as outpatient  · Recently finished steroid taper for COPD exacerbation  · Check amb O2 sats

## 2019-01-09 NOTE — SEPSIS NOTE
Sepsis Note   Chirag Quesada 77 y o  male MRN: [id number]  Unit/Bed#: [address] [place] [address] Encounter: [id number]            Initial Sepsis Screening     Row Name 01/09/19 1205 01/08/19 0849             Is the patient's history suggestive of a new or worsening infection?  -- (!)  Yes (Proceed)  -BK       Suspected source of infection  -- pneumonia  -BK       Are two or more of the following signs & symptoms of infection both present and new to the patient?  -- (!)  Yes (Proceed)  -BK       Indicate SIRS criteria  -- Tachycardia > 90 bpm;Tachypnea > 20 resp per min;Leukocytosis (WBC > 60028 IJL)  -BK       If the answer is yes to both questions, suspicion of sepsis is present  --  --       If severe sepsis is present AND tissue hypoperfusion perists in the hour after fluid resuscitation or lactate > 4, the patient meets criteria for SEPTIC SHOCK  --  --       Are any of the following organ dysfunction criteria present within 6 hours of suspected infection and SIRS criteria that are NOT considered to be chronic conditions?  -- (!)  Yes  -BK       Organ dysfunction Creatinine > 0 5 mg/dl ABOVE BASELINE;SBP decrease > 40 mmHg from baseline  -BK  --       Date of presentation of severe sepsis 01/08/19  -BK  --       Time of presentation of severe sepsis 1205  -BK  --       Tissue hypoperfusion persists in the hour after crystalloid fluid administration, evidenced, by either:  --  --       Was hypotension present within one hour of the conclusion of crystalloid fluid administration?  No  -BK  --       Date of presentation of septic shock  --  --       Time of presentation of septic shock  --  --         User Key  (r) = Recorded By, (t) = Taken By, (c) = Cosigned By    [address] Name Provider Type    OMAR Hugo DO Physician

## 2019-01-09 NOTE — UTILIZATION REVIEW
Initial Clinical Review    Admission: Date/Time/Statement: 1/8/19 @ 0937   Orders Placed This Encounter   Procedures    Inpatient Admission (expected length of stay for this patient is greater than two midnights)     Standing Status:   Standing     Number of Occurrences:   1     Order Specific Question:   Admitting Physician     Answer:   Darren Bah [[id number]]     Order Specific Question:   Level of Care     Answer:   Med Surg [[id number]]     Order Specific Question:   Estimated length of stay     Answer:   More than 2 Midnights     Order Specific Question:   Certification     Answer:   I certify that inpatient services are medically necessary for this patient for a duration of greater than two midnights  See H&P and MD Progress Notes for additional information about the patient's course of treatment  ED: Date/Time/Mode of Arrival:   ED Arrival Information     Expected Arrival Acuity Means of Arrival Escorted By Service Admission Type    - 1/8/2019 07:58 Urgent Ambulance [facility]) General Medicine Urgent    Arrival Complaint    weakness        Chief Complaint:   Chief Complaint   Patient presents with    Shortness of Breath     Patient comes from c/o shortness of breath for approximately two weeks along with weakness  History of Illness:    77YEAR OLD MALE PRESENTS TO ED  FOR EVALUATION OF GENERALIZED WEAKNESS, LETHARGY ,LIGHTHEADEDNESS,  INCREASED SHORTNESS OF BREATH, CHILLS  AND COUGH  HISTORY OF COPD  WIFE CALLED EMS  NOT ON  OXYGEN  In the ED, vital signs were 99 8° F, 122, 29, 106/63, 91% on room air  Chest x-ray show left base opacity concerning for atelectasis versus pneumonia  Patient received 3 L total normal saline bolus  Patient received 500 mg IV azithromycin and 1 g Rocephin IV  EKG showed sinus tachycardia, no ST elevation or depression       ED Vital Signs:   01/08/19 0800 01/08/19 0800 01/08/19 0800 01/08/19 0800 01/08/19 0800   99 8 °F (37 7 °C) (!) 122 (!) 29 106/63 91 % No Pain       01/09/19 87 1 kg (192 lb 0 3 oz)     Vital Signs (abnormal):     01/08/19 1115   114   54  --  91 %  --   01/08/19 1100   106   46  104/51  93 %  --   01/08/19 1045   112   30  109/56  91 %  --   01/08/19 1020  --  --  --  --  Nasal cannula   01/08/19 1000   107  22  107/55  95 %  Nasal cannula   01/08/19 0930   110   39  112/54  91 %  --   01/08/19 0915   112   38  95/52  95 %  Nasal cannula   01/08/19 0900   113   40  109/58  96 %  --   01/08/19 0845   123  --  154/81  --  --   01/08/19 0830   109   53  137/60  95 %  --   01/08/19 0801   116   29  106/63  94        PLACED ON  3 LITERS OXYGEN NASAL CANULA       Pertinent Labs/Diagnostic Test Results:     BLOOD CULTURE X 2 __  WBC   21 89      XR chest 1 view portable   LLL pna   Prominent lung markings, specifically in left base  Atelectasis versus pneumonia  Suggest follow-up lateral projection if patient can tolerate       · EKG:  Sinus rhythm, heart rate 117      ED Treatment:   Medication Administration from 01/08/2019 0758 to 01/08/2019 1937       Date/Time Order Dose Route     01/08/2019 0816 albuterol inhalation solution 10 mg 10 mg Nebulization     01/08/2019 0815 ipratropium (ATROVENT) 0 02 % inhalation solution 1 mg 1 mg Nebulization     01/08/2019 0816 sodium chloride 0 9 % inhalation solution 3 mL 3 mL Nebulization     01/08/2019 0830 sodium chloride 0 9 % bolus 1,000 mL 1,000 mL Intravenous     01/08/2019 0838 cefTRIAXone (ROCEPHIN) IVPB (premix) 1,000 mg 1,000 mg Intravenous     01/08/2019 0840 azithromycin (ZITHROMAX) 500 mg in sodium chloride 0 9% 250mL IVPB 500 mg 500 mg Intravenous     01/08/2019 0904 metroNIDAZOLE (FLAGYL) IVPB (premix) 500 mg 500 mg Intravenous     01/08/2019 0915 sodium chloride 0 9 % bolus 1,000 mL 1,000 mL Intravenous     01/08/2019 1049 escitalopram (LEXAPRO) tablet 10 mg 10 mg Oral     01/08/2019 0958 famotidine (PEPCID) tablet 40 mg 40 mg Oral     01/08/2019 1349 ipratropium-albuterol (DUO-NEB) 0 5-2 5 mg/3 mL inhalation solution 3 mL 3 mL Nebulization     01/08/2019 0958 ipratropium-albuterol (DUO-NEB) 0 5-2 5 mg/3 mL inhalation solution 3 mL 3 mL Nebulization     01/08/2019 0958 enoxaparin (LOVENOX) subcutaneous injection 40 mg 40 mg Subcutaneous     01/08/2019 1049 sodium chloride 0 9 % bolus 1,000 mL 1,000 mL Intravenous        Past Medical/Surgical History:     Diagnosis    [facility] )    Rib pain    CAP (community acquired pneumonia)    Weakness generalized    Pulmonary nodule    Sepsis ([facility] )    Acid reflux disease    Depressive disorder    Enlarged prostate with lower urinary tract symptoms (LUTS)    Anxiety    COPD exacerbation (HCC)         Admitting Diagnosis: SOB (shortness of breath) [R06 02]  Left lower lobe pneumonia ([place] [address] ) [J18 1]      Age/Sex: 77 y o  male     DROPLET ISOLATION  PT and OT EVAL AND TREAT  ASPIRATION PRECAUTIONS  TELEMETRY   FOOT PUMPS   CONSISTENT CARB DIET   CT CHEST     Assessment/Plan:    CAP (community acquired pneumonia)  HISTORY OF COPD    Assessment & Plan     -high suspicion given reports of chills and lethargy in addition to left lobe opacity on chest x-ray  -monitor oxygen saturations, oxygen supplementation as needed to keep O2 sats above 88%; currently on 3 L nasal cannula with O2 sats 93-95%  -received atrovent, albuterol, and DuoNeb in ED  -received 1 g of IV Rocephin and 500 mg IV and azithromycin in the ED, will continue Rocephin 1 g IV daily and azithromycin 500 mg p o   Daily  -monitor temp and trend WBC, consider obtaining PCT  -obtain Legionella and strep      Sepsis ([facility] )   Assessment & Plan     -CXR with left lower opacity, atelectasis versus pneumonia, suspected source  -lactic 1 2, WBC 21 89; of note, patient reports being on steroids finished 2 courses of treatment for COPD exacerbation in November and December  -UA negative for leukocytes and nitrates  -rapid influenza pending  -blood cultures x2 pending  -continue to monitor temperature, Tylenol p r n   For fevers  -trend WBCs, consider obtaining procalcitonin level  -patient received total of 3 L normal saline in the ED, Rocephin 1 g IV, azithromycin 500 mg IV           Admission Orders:    acetaminophen 650 mg Oral Q6H PRN   cefTRIAXone 1,000 mg Intravenous Q24H   And      azithromycin 500 mg Oral Q24H   cyclobenzaprine 10 mg Oral TID PRN   enoxaparin 40 mg Subcutaneous Daily   escitalopram 10 mg Oral Daily   famotidine 40 mg Oral Daily   ipratropium-albuterol 3 mL Nebulization Q6H   LORazepam 1 mg Oral Q8H PRN   saccharomyces boulardii 250 mg Oral BID   tamsulosin 0 4 mg Oral Daily With HistoryFile

## 2019-01-09 NOTE — RESPIRATORY THERAPY NOTE
RT Protocol Note  Tito Mishra 77 y o  male MRN: [id number]  Unit/Bed#: [address] [place] [address] Encounter: [id number]    Assessment    Principal Problem:    CAP (community acquired pneumonia)  Active Problems:    COPD, moderate ([facility] )    Sepsis ([facility] )    Acid reflux disease    Enlarged prostate with lower urinary tract symptoms (LUTS)    Anxiety    BECKIE (acute kidney injury) ([facility] )      Home Pulmonary Medications:  Pt on home respiratory regimen; inhaler/neb        Past Medical History:   Diagnosis Date    Bladder infection     current tx with cipro 5/29/16    Community acquired pneumonia     last assessed 8/28/17, resolved 9/25/17    COPD (chronic obstructive pulmonary disease) ([place] [address] )     Enlarged prostate     GERD (gastroesophageal reflux disease)     Hx of bladder infections     Psychiatric disorder     depression     Social History     Social History    Marital status: /Civil Union     Spouse name: N/A    Number of children: N/A    Years of education: N/A     Social History Main Topics    Smoking status: Former Smoker     Packs/day: 0 50     Years: 50 00     Types: Cigarettes     Start date: 1959     Quit date: 03/2018    Smokeless tobacco: Former User      Comment: quit 8/2017 per [employer]     Alcohol use No    Drug use: No    Sexual activity: Not Asked     Other Topics Concern    None     Social History Narrative    None       Subjective         Objective    Physical Exam:   Assessment Type: Pre-treatment  General Appearance: Awake, Alert  Respiratory Pattern: Accessory muscle use  Bilateral Breath Sounds: Expiratory wheezes, Rhonchi  Cough: Strong    Vitals:  Blood pressure 110/60, pulse 100, temperature 98 6 °F (37 °C), temperature source Oral, resp  rate 18, height 5' 9" (1 753 m), weight 82 1 kg (181 lb), SpO2 (!) 88 %  Imaging and other studies: I have personally reviewed pertinent reports              Plan    Respiratory Plan: Mild Distress pathway, Home Bronchodilator Patient pathway        Resp Comments: takes nebs at home

## 2019-01-09 NOTE — PROGRESS NOTES
[address] Internal Medicine  Progress Note - Chirag Quesada 1940, 77 y o  male MRN: [id number]  Unit/Bed#: [address] [place] [address] Encounter: [id number]  Primary Care Provider: Flakita Leonardo MD   Date and time admitted to hospital: 1/8/2019  7:58 AM    * CAP (community acquired pneumonia)   Assessment & Plan    · CXR: Prominent lung markings, specifically in left base  Atelectasis versus pneumonia  · Patient undergoing CT for better evaluation  · Procalcitonin negative- will discontinue ABX  · Legionella, strep pneumo, sputum pending  · Negative for influenza/RSV  May be alternative viral process  · Currently requiring 3L NC and not on O2 at home  Wean oxygen as able and obtain formal home O2 evaluation  · Continue duoneb as per home dosing  · Also on stioloto respimat  · No evidence of COPD exac     COPD, moderate (HCC)   Assessment & Plan    · Follows with Dr Veto Schilder  · On duoneb QID and stioloto as outpatient  · Recently finished steroid taper for COPD exacerbation  · Check amb O2 sats     Severe sepsis [facility])   Assessment & Plan    · Patient with leukocytosis, tachycardia and tachypnea and BECKIE on admission  · Recently on steroids- could be cause of leukocytosis  · May be related to sepsis from viral PNA  · Neg procalcitonin- stop ABX  · CXR with left lower opacity, atelectasis versus pneumonia, suspected source  · BECKIE improving from 1 49 to 1 20     Weakness generalized   Assessment & Plan    · Check ambulating pulse ox  · PT and OT pending     Acid reflux disease   Assessment & Plan    · pepcid 40 mg daily     Pulmonary nodule   Assessment & Plan    · Await CT eval  Being followed as outpatient     BECKIE (acute kidney injury) [facility])   Assessment & Plan    Lab Results   Component Value Date    CREATININE 1 20 01/09/2019    CREATININE 1 49 (H) 01/08/2019    CREATININE 1 07 08/18/2017    CREATININE 1 15 08/16/2017     · Creatinine 1 49 on admission and now 1 20    · Likely from viral PNA and poor intake at home  · S/p 3L NSS in ER  · Avoid nephrotoxins       Anxiety   Assessment & Plan    · Patient takes Lexapro 10 mg PO daily and Ativan 1 mg p o  Q 8 hours as needed for anxiety     Enlarged prostate with lower urinary tract symptoms (LUTS)   Assessment & Plan    · Patient takes Flomax 0 4 mg BID     Neck pain   Assessment & Plan    · PRN flexeril  · Occurred as outpatient and improving with steroids  · No focal neuro deficits       VTE Pharmacologic Prophylaxis:   Pharmacologic: Enoxaparin (Lovenox)  Mechanical VTE Prophylaxis in Place: Yes    Patient Centered Rounds: I have performed bedside rounds with nursing staff today  RN    Discussions with Specialists or Other Care Team Provider: nursing    Education and Discussions with Family / Patient: patient    Time Spent for Care: 30 minutes  More than 50% of total time spent on counseling and coordination of care as described above  Current Length of Stay: 1 day(s)    Current Patient Status: Inpatient   Certification Statement: The patient will continue to require additional inpatient hospital stay due to viral pna, resp failure    Discharge Plan: awaiting CT    Code Status: Level 1 - Full Code      Subjective:   No nausea or vomiting  Notes occasional nonproductive cough  No fever or chills  Neck pain better    Objective:     Vitals:   Temp (24hrs), Av 9 °F (36 6 °C), Min:97 3 °F (36 3 °C), Max:98 6 °F (37 °C)    Temp:  [97 3 °F (36 3 °C)-98 6 °F (37 °C)] 97 9 °F (36 6 °C)  HR:  [] 83  Resp:  [18-21] 18  BP: ()/(52-61) 111/57  SpO2:  [88 %-94 %] 94 %  Body mass index is 28 36 kg/m²  Input and Output Summary (last 24 hours): Intake/Output Summary (Last 24 hours) at 19 1450  Last data filed at 19 0735   Gross per 24 hour   Intake                0 ml   Output              775 ml   Net             -775 ml       Physical Exam:     Physical Exam   Constitutional: He is oriented to person, place, and time  No distress     HENT: Head: Normocephalic and atraumatic  Eyes: Conjunctivae are normal    Cardiovascular: Normal rate, regular rhythm, normal heart sounds and intact distal pulses  No murmur heard  Pulmonary/Chest: No accessory muscle usage  No respiratory distress  He has no wheezes  He has no rales  Decreased but no wheezes  Faint rhonchi right side   Abdominal: Soft  Bowel sounds are normal  He exhibits no distension  There is no tenderness  There is no rigidity, no rebound and no guarding  Musculoskeletal: He exhibits edema ( trace)  Neurological: He is alert and oriented to person, place, and time  Skin: Skin is warm and dry  No rash noted  He is not diaphoretic  No erythema  Psychiatric: He has a normal mood and affect  His behavior is normal    Nursing note and vitals reviewed  Additional Data:     Labs:      Results from last 7 days  Lab Units 01/09/19  0507 01/08/19  0811   WBC Thousand/uL 19 64* 21 89*   HEMOGLOBIN g/dL 12 5 14 8   HEMATOCRIT % 37 4 43 6   PLATELETS Thousands/uL 223 295   NEUTROS PCT %  --  87*   LYMPHS PCT %  --  6*   MONOS PCT %  --  6   EOS PCT %  --  0       Results from last 7 days  Lab Units 01/09/19  0507   SODIUM mmol/L 141   POTASSIUM mmol/L 4 2   CHLORIDE mmol/L 108   CO2 mmol/L 25   BUN mg/dL 21   CREATININE mg/dL 1 20   ANION GAP mmol/L 8   CALCIUM mg/dL 8 7   ALBUMIN g/dL 2 4*   TOTAL BILIRUBIN mg/dL 0 20   ALK PHOS U/L 56   ALT U/L 18   AST U/L 14   GLUCOSE RANDOM mg/dL 126                   Results from last 7 days  Lab Units 01/08/19  0829   LACTIC ACID mmol/L 1 2   PROCALCITONIN ng/ml <0 05           * I Have Reviewed All Lab Data Listed Above  * Additional Pertinent Lab Tests Reviewed:  [facility]    Imaging:    Imaging Reports Reviewed Today Include: CXR  Imaging Personally Reviewed by Myself Includes:  none    Recent Cultures (last 7 days):       Results from last 7 days  Lab Units 01/08/19  0954 01/08/19  0811   BLOOD CULTURE   -- No Growth at 24 hrs  No Growth at 24 hrs  INFLUENZA B PCR  None Detected  --    RSV PCR  None Detected  --        Last 24 Hours Medication List:     Current Facility-Administered Medications:  acetaminophen 650 mg Oral Q6H PRN [facility]   cyclobenzaprine 10 mg Oral TID PRN [facility]   enoxaparin 40 mg Subcutaneous Daily [facility]   escitalopram 10 mg Oral Daily [facility]   famotidine 40 mg Oral Daily [facility]   guaiFENesin 600 mg Oral Q12H [facility] Yelitza Reeves PA-C   ipratropium-albuterol 3 mL Nebulization Q6H [facility]   LORazepam 1 mg Oral Q8H PRN [facility]   saccharomyces boulardii 250 mg Oral BID [facility]   tamsulosin 0 4 mg Oral Daily With [facility]        Today, Patient Was Seen By: Austin Grant PA-C    ** Please Note: Dictation voice to text software may have been used in the creation of this document   **

## 2019-01-09 NOTE — ASSESSMENT & PLAN NOTE
· Patient with leukocytosis, tachycardia and tachypnea and BECKIE on admission  · Recently on steroids- could be cause of leukocytosis  · May be related to sepsis from viral PNA     · Neg procalcitonin- stop ABX  · CXR with left lower opacity, atelectasis versus pneumonia, suspected source  · BECKIE improving from 1 49 to 1 20

## 2019-01-10 ENCOUNTER — TRANSITIONAL CARE MANAGEMENT (OUTPATIENT)
Dept: FAMILY MEDICINE CLINIC | Facility: CLINIC | Age: 79
End: 2019-01-10

## 2019-01-10 VITALS
BODY MASS INDEX: 27.89 KG/M2 | SYSTOLIC BLOOD PRESSURE: 138 MMHG | OXYGEN SATURATION: 90 % | HEART RATE: 88 BPM | WEIGHT: 188.27 LBS | HEIGHT: 69 IN | DIASTOLIC BLOOD PRESSURE: 84 MMHG | TEMPERATURE: 98.2 F | RESPIRATION RATE: 18 BRPM

## 2019-01-10 PROBLEM — A41.9 SEVERE SEPSIS (HCC): Status: RESOLVED | Noted: 2017-08-18 | Resolved: 2019-01-10

## 2019-01-10 PROBLEM — N17.9 AKI (ACUTE KIDNEY INJURY) (HCC): Status: RESOLVED | Noted: 2019-01-08 | Resolved: 2019-01-10

## 2019-01-10 PROBLEM — R53.1 WEAKNESS GENERALIZED: Status: RESOLVED | Noted: 2017-08-16 | Resolved: 2019-01-10

## 2019-01-10 PROBLEM — J96.01 ACUTE RESPIRATORY FAILURE WITH HYPOXIA (HCC): Status: ACTIVE | Noted: 2019-01-10

## 2019-01-10 PROBLEM — J96.01 ACUTE RESPIRATORY FAILURE WITH HYPOXIA (HCC): Status: RESOLVED | Noted: 2019-01-10 | Resolved: 2019-01-10

## 2019-01-10 PROBLEM — R65.20 SEVERE SEPSIS (HCC): Status: RESOLVED | Noted: 2017-08-18 | Resolved: 2019-01-10

## 2019-01-10 PROCEDURE — 99239 HOSP IP/OBS DSCHRG MGMT >30: CPT | Performed by: PHYSICIAN ASSISTANT

## 2019-01-10 PROCEDURE — 94640 AIRWAY INHALATION TREATMENT: CPT

## 2019-01-10 PROCEDURE — G8978 MOBILITY CURRENT STATUS: HCPCS

## 2019-01-10 PROCEDURE — G8980 MOBILITY D/C STATUS: HCPCS

## 2019-01-10 PROCEDURE — 94761 N-INVAS EAR/PLS OXIMETRY MLT: CPT

## 2019-01-10 PROCEDURE — 97161 PT EVAL LOW COMPLEX 20 MIN: CPT

## 2019-01-10 PROCEDURE — G8979 MOBILITY GOAL STATUS: HCPCS

## 2019-01-10 PROCEDURE — 94760 N-INVAS EAR/PLS OXIMETRY 1: CPT

## 2019-01-10 RX ORDER — CEFDINIR 300 MG/1
300 CAPSULE ORAL EVERY 12 HOURS SCHEDULED
Qty: 10 CAPSULE | Refills: 0 | Status: SHIPPED | OUTPATIENT
Start: 2019-01-10 | End: 2019-01-10

## 2019-01-10 RX ORDER — CEFDINIR 300 MG/1
300 CAPSULE ORAL EVERY 12 HOURS SCHEDULED
Qty: 10 CAPSULE | Refills: 0 | Status: SHIPPED | OUTPATIENT
Start: 2019-01-10 | End: 2019-01-15

## 2019-01-10 RX ORDER — ALBUTEROL SULFATE 2.5 MG/3ML
2.5 SOLUTION RESPIRATORY (INHALATION) 4 TIMES DAILY
Status: ON HOLD | COMMUNITY
End: 2019-01-10 | Stop reason: CLARIF

## 2019-01-10 RX ORDER — IPRATROPIUM BROMIDE AND ALBUTEROL SULFATE 2.5; .5 MG/3ML; MG/3ML
3 SOLUTION RESPIRATORY (INHALATION) 4 TIMES DAILY
COMMUNITY
End: 2019-05-28 | Stop reason: SDUPTHER

## 2019-01-10 RX ORDER — GUAIFENESIN 600 MG
600 TABLET, EXTENDED RELEASE 12 HR ORAL EVERY 12 HOURS SCHEDULED
Refills: 0
Start: 2019-01-10 | End: 2019-01-15

## 2019-01-10 RX ADMIN — Medication 250 MG: at 08:30

## 2019-01-10 RX ADMIN — ENOXAPARIN SODIUM 40 MG: 40 INJECTION SUBCUTANEOUS at 08:30

## 2019-01-10 RX ADMIN — IPRATROPIUM BROMIDE AND ALBUTEROL SULFATE 3 ML: 2.5; .5 SOLUTION RESPIRATORY (INHALATION) at 07:29

## 2019-01-10 RX ADMIN — ESCITALOPRAM OXALATE 10 MG: 10 TABLET ORAL at 08:30

## 2019-01-10 RX ADMIN — FAMOTIDINE 40 MG: 20 TABLET ORAL at 08:30

## 2019-01-10 RX ADMIN — GUAIFENESIN 600 MG: 600 TABLET, EXTENDED RELEASE ORAL at 08:30

## 2019-01-10 NOTE — RESPIRATORY THERAPY NOTE
Home Oxygen Qualifying Test       Patient name: Aurea Hein        : 1940   Date of Test:  January 10, 2019  Diagnosis:      Home Oxygen Test:    **Medicare Guidelines require item(s) 1-5 on all ambulatory patients or 1 and 2 on non-ambulatory patients  1   Baseline SPO2 on Room Air at rest 93 %  2   SPO2 during exercise on Room Air 90 %  During exercise monitor SpO2  If SPO2 increases >=89% with ambulation do not add supplemental             oxygen  If <= 88% on room air add O2 via NC and titrate patient  Patient must be ambulated with O2 and titrated to > 88% with exertion  3   SPO2 on Oxygen at rest  %  lpm     4   SPO2 during exercise on Oxygen   a liter flow of  lpm     5   Exercise performed:          distance 400 (feet)          []  Supplemental Home Oxygen is indicated  [x]  Client does not qualify for home oxygen        Respiratory Additional Notes-     Mose Seip, RT

## 2019-01-10 NOTE — DISCHARGE SUMMARY
[address] [facility]  Discharge- Delia Owens 1940, 77 y o  male MRN: [id number]  Unit/Bed#: [address] [place] [address] Encounter: [id number]  Primary Care Provider: Kimberly Centeno MD   Date and time admitted to hospital: 1/8/2019  7:58 AM      * Acute respiratory failure with hypoxia (HCC)-resolved as of 1/10/2019   Assessment & Plan    · Was noted to be tachycardic, tachypneic on admission and requiring oxygen on admission of 3L NC  · Now back to baseline and not requiring any oxygen at baseline or with exertion  · Felt to be related to multifocal pneumonia on imaging and underlying COPD     Multifocal pneumonia   Assessment & Plan    · CXR: Prominent lung markings, specifically in left base  Atelectasis versus pneumonia  · CT: Right middle lobe and bibasilar nodular opacities superimposed on emphysema, suspicious for multifocal infection  · Procalcitonin negative- got 2 doses of ceftriaxone, one azithro and one flagyl and then were held  · Legionella, strep pneumo negative  · Will discharge with 5 additional days of PO omnicef to complete course of 7 days  · procalcitonin negative but given patients clinical picture on admission coupled with CT findings and underlying COPD, will treat with ABX  · Negative for influenza/RSV    · 93% RA rest and 90% RA exertion     COPD, moderate (HCC)   Assessment & Plan    · Follows with Dr Arlana Severance  · On duoneb QID and stioloto as outpatient  · Recently finished steroid taper for COPD exacerbation  · No acute exacerbation     Severe sepsis (HCC)-resolved as of 1/10/2019   Assessment & Plan    · Patient with leukocytosis, tachycardia and tachypnea and BECKIE on admission  · Recently on steroids- could be cause of leukocytosis  · May be related to sepsis from PNA  · CXR with left lower opacity, atelectasis versus pneumonia, suspected source  · CT chest with multifocal PNA  · BECKIE improving from 1 49 to 1 20     Weakness generalized-resolved as of 1/10/2019   Assessment & Plan · PT evaluated and recommended home independently     Acid reflux disease   Assessment & Plan    · pepcid 40 mg daily     Pulmonary nodule   Assessment & Plan    · CT eval shows stability  · Outpatient pulm follow up      BECKIE (acute kidney injury) (HCC)-resolved as of 1/10/2019   Assessment & Plan    Lab Results   Component Value Date    CREATININE 1 20 01/09/2019    CREATININE 1 49 (H) 01/08/2019    CREATININE 1 07 08/18/2017    CREATININE 1 15 08/16/2017     · Creatinine 1 49 on admission and now 1 20  · Likely from PNA and poor intake at home  · S/p 3L NSS in ER  · Avoid nephrotoxins       Anxiety   Assessment & Plan    · Patient takes Lexapro 10 mg PO daily and Ativan 1 mg p o  Q 8 hours as needed for anxiety     Enlarged prostate with lower urinary tract symptoms (LUTS)   Assessment & Plan    · Patient takes Flomax 0 4 mg BID     Neck pain   Assessment & Plan    · PRN flexeril  · Occurred as outpatient and improving with steroids  · No focal neuro deficits       Discharging Physician / Practitioner: Oj Yarbrough PA-C  PCP: Adelia Medeiros MD  Admission Date:   Admission Orders     Ordered        01/08/19 0937  Inpatient Admission (expected length of stay for this patient is greater than two midnights)  Once             Discharge Date: 01/10/19    Resolved Problems  Date Reviewed: 1/10/2019          Resolved    Weakness generalized 1/10/2019     Resolved by  Oj Yarbrough PA-C    Severe sepsis ([address] ) 1/10/2019     Resolved by  Oj Yarbrough PA-C    BECKIE (acute kidney injury) ([address] ) 1/10/2019     Resolved by  Oj Yarbrough PA-C    * (Principal)Acute respiratory failure with hypoxia ([facility] ) 1/10/2019     Resolved by  Oj Yarbrough PA-C          Consultations During Hospital Stay:  · None    Procedures Performed:   · CT: Right middle lobe and bibasilar nodular opacities superimposed on emphysema, suspicious for multifocal infection  Follow-up after clinical treatment recommended to ensure resolution   Stable 6 mm left apical nodule  · CXR: Prominent lung markings, specifically in left base  Atelectasis versus pneumonia  Significant Findings / Test Results:   · Acute hypoxic resp failure  · Severe sepsis  · Multifocal PNA  · Moderate COPD  · Weakness    Incidental Findings:   · Stable 6 mm left apical nodule     Test Results Pending at Discharge (will require follow up): · Final blood culture  · Sputum culture     Outpatient Tests Requested:  · CXR 4-6 weeks  · Follow up with Dr Kiara Mott    Complications:  none    Reason for Admission: SOB    Hospital Course:     Delvin Krueger is a 77 y o  male patient who originally presented to the hospital on 1/8/2019 due to SOB  Patient has past medical history of moderate COPD, pulmonary nodule, GERD, depression, BPH, anxiety, and abnormal movements of the extremities (this has been worked up previously for [employer] and has been negative according to him and his brother-in-law who were present at bedside)  The patient was noted to recently be started on a prednisone burst 1st on 11/13/2018 by his primary care doctor secondary to shortness of breath COPD with acute exacerbation  He was then recent on 11/27/2018 and was doing better was given the influenza vaccine  He was then seen by his pulmonologist Dr Kiara Mott on 12/3/2018 for COPD exacerbation was treated with an additional course of prednisone  Chest x-ray was performed to exclude infiltrate  This was negative  He was continued on stiloto  He was then noted to see his PCP for and neck spasm was prescribed Flexeril  He reports that more recently been prescribed a steroid taper for this as well  He began feeling more short Of breath and felt that he was having chills and therefore came to the emergency department for further evaluation  In the emergency department chest x-ray was performed which showed potential left-sided pneumonia    The patient was started on azithromycin and ceftriaxone and subsequently admitted  The patient underwent CT of the chest which showed multifocal pneumonia in the right middle, bibasilar nodular opacities consistent with multifocal infection  The patient was noted to have a negative procalcitonin, remained afebrile  He did have leukocytosis on admission however had been on steroids recently  This was noted to be 21 and improved to 19  He was noted to have negative Legionella negative strep pneumo urinary antigen  Sputum cultures currently pending at time of discharge  He had negative blood cultures  He was noted to improve rapidly  He was walking throughout the halls with no shortness of breath and minimal cough  He was noted to have hypoxic respiratory failure on admission which was treated with his home regimen  He was not given steroids given he had no evidence to suggest COPD exacerbation  He was noted to improve rapidly and initially antibiotics were withheld given negative procalcitonin  However given multifocal nature on imaging coupled with patient's moderate COPD at baseline he will be treated with an additional 5 days of Omnicef on discharge to complete a 7 day course of cephalosporins  I discussed this with the patient in detail as well as his wife via phone  He will continue his home medications of nebs and inhalers  He will follow up with Dr Hernandez [place]  Please see above list of diagnoses and related plan for additional information  Condition at Discharge: stable     Discharge Day Visit / Exam:     Subjective:  Feeling well  No nausea or vomiting  No chest pain  Breathing much better  Minimal cough and nonproductive     Vitals: Blood Pressure: 138/84 (01/10/19 0801)  Pulse: 88 (01/10/19 0801)  Temperature: 98 2 °F (36 8 °C) (01/10/19 0801)  Temp Source: Oral (01/10/19 0801)  Respirations: 18 (01/10/19 0801)  Height: 5' 9" (175 3 cm) (01/08/19 0801)  Weight - Scale: 85 4 kg (188 lb 4 4 oz) (01/10/19 0801)  SpO2: 90 % (01/10/19 0801)  Exam:   Physical Exam Constitutional: No distress  HENT:   Head: Atraumatic  Eyes: Conjunctivae are normal    Neck: No JVD present  Cardiovascular: Normal rate, regular rhythm, normal heart sounds and intact distal pulses  Exam reveals no gallop  No murmur heard  Pulmonary/Chest: Effort normal and breath sounds normal  No accessory muscle usage  No respiratory distress  He has no wheezes  He has no rales  Decreased at bases  No rhonchi and no rales   Abdominal: Soft  Bowel sounds are normal  He exhibits no distension  There is no tenderness  There is no rigidity, no rebound and no guarding  Musculoskeletal: He exhibits no edema  Neurological: He is alert  Awake alert and oriented  Noted to have abnormal movements of the arms and thorax  Patient reports this is chronic and brother bedside reports that this is unchanged from baseline  Skin: Skin is warm and dry  No rash noted  He is not diaphoretic  No erythema  Psychiatric: He has a normal mood and affect  His behavior is normal    Nursing note and vitals reviewed  Discussion with Family: wife and Brother in law    Discharge instructions/Information to patient and family:   See after visit summary for information provided to patient and family  Provisions for Follow-Up Care:  See after visit summary for information related to follow-up care and any pertinent home health orders  Disposition:     Home    For Discharges to [facility] SNF:   · Not Applicable to this Patient - Not Applicable to this Patient    Planned Readmission: no     Discharge Statement:  I spent 45 minutes discharging the patient  This time was spent on the day of discharge  I had direct contact with the patient on the day of discharge  Greater than 50% of the total time was spent examining patient, answering all patient questions, arranging and discussing plan of care with patient as well as directly providing post-discharge instructions    Additional time then spent on discharge activities  Discharge Medications:  See after visit summary for reconciled discharge medications provided to patient and family        ** Please Note: This note has been constructed using a voice recognition system **

## 2019-01-10 NOTE — ASSESSMENT & PLAN NOTE
· Follows with Dr Kim Gallegos  · On duoneb QID and stioloto as outpatient  · Recently finished steroid taper for COPD exacerbation  · No acute exacerbation

## 2019-01-10 NOTE — ASSESSMENT & PLAN NOTE
· Was noted to be tachycardic, tachypneic on admission and requiring oxygen on admission of 3L NC  · Now back to baseline and not requiring any oxygen at baseline or with exertion  · Felt to be related to multifocal pneumonia on imaging and underlying COPD

## 2019-01-10 NOTE — OCCUPATIONAL THERAPY NOTE
Occupational Therapy Screen        Patient Name: Camille Artis  DEANDRE'MICKI Date: 1/10/2019      Orders received, chart reviewed and spoke to PT who reports patient is independent with ADLs and functional mobility and appears to be functioning at baseline  No skilled OT indicated  DC OT       Rain Khanna, RAFAEL, OTR/L

## 2019-01-10 NOTE — ASSESSMENT & PLAN NOTE
Lab Results   Component Value Date    CREATININE 1 20 01/09/2019    CREATININE 1 49 (H) 01/08/2019    CREATININE 1 07 08/18/2017    CREATININE 1 15 08/16/2017     · Creatinine 1 49 on admission and now 1 20    · Likely from PNA and poor intake at home  · S/p 3L NSS in ER  · Avoid nephrotoxins

## 2019-01-10 NOTE — PHYSICAL THERAPY NOTE
PT eval   01/10/19 1005   Note Type   Note type Eval only   Pain Assessment   Pain Assessment No/denies pain   Pain Score No Pain   Home Living   Type of Home House   Home Layout (bilevel)   Home Equipment Cane;Walker   Additional Comments no AD pta   Prior Function   Level of [place] Independent with ADLs and functional mobility   Lives With Spouse   Receives Help From Family   ADL Assistance Independent   IADLs Independent   Falls in the last 6 months 0   Vocational Retired   Comments colostomy  manages independently   Restrictions/Precautions   Wells Becky Bearing Precautions Per Order No   General   Additional Pertinent History adm with pneumonia   Cognition   Overall Cognitive Status WFL   Arousal/Participation Alert   Orientation Level Oriented X4   Memory Within functional limits   Following Commands Follows all commands and directions without difficulty   RUE Assessment   RUE Assessment WFL   LUE Assessment   LUE Assessment WFL   RLE Assessment   RLE Assessment WFL   LLE Assessment   LLE Assessment WFL   Coordination   Movements are Fluid and Coordinated 0   Coordination and Movement Description some jerking twitching doesn;t affest function   Bed Mobility   Rolling R 7  Independent   Rolling L 7  Independent   Supine to Sit 7  Independent   Sit to Supine 7  Independent   Transfers   Sit to Stand 7  Independent   Stand to Sit 7  Independent   Stand pivot 7  Independent   Ambulation/Elevation   Gait pattern WNL  (increased lateral sway)   Gait Assistance 5  Supervision   Additional items (Rt monitor 02 sat-acceptable slight sob after 250')   Assistive Device None   Distance 250   Balance   Static Sitting Normal   Dynamic Sitting Normal   Static Standing Normal   Dynamic Standing Normal   Ambulatory Normal   Endurance Deficit   Endurance Deficit No   Activity Tolerance   Activity Tolerance Patient tolerated treatment well   Assessment   Prognosis Good   Assessment Pt presetns as a functional ambulator who is apporpriate for home d/c No PT need sat this time   Goals   Patient Goals go home   Plan   PT Frequency (d/c PT)   Recommendation   Recommendation Home independently   Barthel Index   Feeding 10   Bathing 5   Grooming Score 5   Dressing Score 10   Bladder Score 10   Bowels Score 10   Toilet Use Score 10   Transfers (Bed/Chair) Score 15   Mobility (Level Surface) Score 15   Stairs Score 10   Barthel Index Score 100   Alyson Laird, PT

## 2019-01-10 NOTE — ASSESSMENT & PLAN NOTE
· CXR: Prominent lung markings, specifically in left base  Atelectasis versus pneumonia  · CT: Right middle lobe and bibasilar nodular opacities superimposed on emphysema, suspicious for multifocal infection  · Procalcitonin negative- got 2 doses of ceftriaxone, one azithro and one flagyl and then were held  · Legionella, strep pneumo negative  · Will discharge with 5 additional days of PO omnicef to complete course of 7 days  · procalcitonin negative but given patients clinical picture on admission coupled with CT findings and underlying COPD, will treat with ABX  · Negative for influenza/RSV    · 93% RA rest and 90% RA exertion

## 2019-01-13 LAB
BACTERIA BLD CULT: NORMAL
BACTERIA BLD CULT: NORMAL

## 2019-01-14 ENCOUNTER — PATIENT OUTREACH (OUTPATIENT)
Dept: FAMILY MEDICINE CLINIC | Facility: CLINIC | Age: 79
End: 2019-01-14

## 2019-01-14 NOTE — PROGRESS NOTES
Outpatient Care Management Note:    Care manager called and spoke with Hernandez's wife, Whitley Dyer  She states that Kelly Valderrama was sleeping  Whitley Zambranodenice agreed to ongoing care manager phone calls and services  Whitley Dyer tells care manager that the only issue Kelly Valderrama is having is related to his stoma  He has had the stoma for approximately 8 years  She states the stoma is "puffed up"  Care manager asked if it was swollen and Devi said yes  CM spoke with Kelly Valderrama and Whitley Dyer  Kelly Valderrama states that he is not having any issues with his bowels, but the area underneath is tender  CM will contact Dr Camille Rodriguez and see if Kelly Valderrama can be seen sooner than Friday  Whitley Dyer states that Kelly Valderrama is also having issues with neck pain  He injured his neck around [place] after bending over  He is using muscle relaxants and heat with some relief  Kelly Valderrama is concerned about the cost of his inhalers  He asked if there is any way to get discount inhalers  CM recommended they call pulmonary to see if they have samples  I requested they call me if they are continuing to have difficulty with the cost of the inhalers  Care manager gave Whitley Dyer my contact information  She will call with any issues or concerns  Care manager called Dr Britney Urbina office and spoke with Koffi Russell  I informed her of Hernandez's complaints regarding his stoma  She will call Kelly Jannie and see if they can reeschdule him to Wednesday, so he can be evaluated sooner

## 2019-01-15 ENCOUNTER — PATIENT OUTREACH (OUTPATIENT)
Dept: FAMILY MEDICINE CLINIC | Facility: CLINIC | Age: 79
End: 2019-01-15

## 2019-01-15 NOTE — PROGRESS NOTES
Outpatient Care Management Note:    Care manager spoke with Dr Vianney Coyle and updated him on patients' concerns regarding to stoma  Clara Randle is seeing Dr Vianney Coyle tomorrow, and he will evaluate the area

## 2019-01-16 ENCOUNTER — OFFICE VISIT (OUTPATIENT)
Dept: FAMILY MEDICINE CLINIC | Facility: CLINIC | Age: 79
End: 2019-01-16
Payer: MEDICARE

## 2019-01-16 VITALS
OXYGEN SATURATION: 94 % | HEIGHT: 69 IN | SYSTOLIC BLOOD PRESSURE: 110 MMHG | BODY MASS INDEX: 27.7 KG/M2 | HEART RATE: 110 BPM | WEIGHT: 187 LBS | RESPIRATION RATE: 16 BRPM | DIASTOLIC BLOOD PRESSURE: 60 MMHG

## 2019-01-16 DIAGNOSIS — M54.2 NECK PAIN: ICD-10-CM

## 2019-01-16 DIAGNOSIS — J44.9 COPD, MODERATE (HCC): ICD-10-CM

## 2019-01-16 DIAGNOSIS — J18.9 MULTIFOCAL PNEUMONIA: Primary | ICD-10-CM

## 2019-01-16 DIAGNOSIS — R91.1 PULMONARY NODULE: ICD-10-CM

## 2019-01-16 PROCEDURE — 99495 TRANSJ CARE MGMT MOD F2F 14D: CPT | Performed by: FAMILY MEDICINE

## 2019-01-16 NOTE — ASSESSMENT & PLAN NOTE
Patient has had chronic neck issues  This appears to be acute muscle strain a top of chronic degenerative changes  Patient will continue to use his heating pad, Tylenol, if does not resolve may need to physical therapy

## 2019-01-16 NOTE — PATIENT INSTRUCTIONS
Finish her antibiotics  You could try adding probiotics to your diet which may help with the loose stools  Days would be available over-the-counter at the pharmacy  You should follow up with Pulmonary doctors as advised  As far as the neck continue doing what you're doing  If things are not resolving could consider physical therapy

## 2019-01-16 NOTE — PROGRESS NOTES
[id number]         NAME: Jalil Rucker is a 77 y o  male  : 1940    MRN: [id number]  DATE: 2019  TIME: 1:35 PM    Assessment and Plan   Multifocal pneumonia [J18 9]  1  Multifocal pneumonia     2  COPD, moderate ([address] )     3  Pulmonary nodule     4  Neck pain         COPD, moderate (HCC)  Is take inhalers as directed  Generally is doing better since hospital discharge  Does have follow-up with Pulmonary  Continue current meds  COPD exacerbation ([address] )  Improved since hospital discharge  Will follow up the pulmonary  Multifocal pneumonia  Patient has finished antibiotics  Still has mild cough  Some mild shortness of breath  No fevers or night sweats are reported  Neck pain  Patient has had chronic neck issues  This appears to be acute muscle strain a top of chronic degenerative changes  Patient will continue to use his heating pad, Tylenol, if does not resolve may need to physical therapy  Patient Instructions     Patient Instructions   Valiant Se her antibiotics  You could try adding probiotics to your diet which may help with the loose stools  Days would be available over-the-counter at the pharmacy  You should follow up with Pulmonary doctors as advised  As far as the neck continue doing what you're doing  If things are not resolving could consider physical therapy  Chief Complaint     Chief Complaint   Patient presents with    Transition of Care Management     hospital discharge f/u     neck pain         History of Present Illness     TCM Call (since 2018)     Date and time call was made  1/10/2019  2:44 PM    Hospital care reviewed  Records reviewed    Patient was hospitialized at  [facility]    Date of Admission  19    Date of discharge  01/10/19    Diagnosis  PNEMONIA    Disposition  Home    Were the patients medications reviewed and updated  Yes    Current Symptoms  None      TCM Call (since 12/16/2018)     Should patient be enrolled in anticoag monitoring? No    Scheduled for follow up? Yes    Did you obtain your prescribed medications  Yes    Do you need help managing your prescriptions or medications  No    Is transportation to your appointment needed  No    I have advised the patient to call PCP with any new or worsening symptoms  DAVID TRONCOSO MA    Living Arrangements  Family members    Are you recieving any outpatient services  No    Are you recieving home care services  No    Are you using any community resources  No          Patient here for TCM visit following hospitalization for multifocal pneumonia  X-ray showed a left side  There also are some pulmonary nodules  He does have diagnosis it COPD and is followed by Pulmonary  Generally he is feeling well since his discharge from hospital   The wife had reported some irritation around his stoma site  She states this morning this was improved  There had been some looser bowel movements  These are starting to firm up  Patient also having continued posterior neck pain  This is unrelated to his admission  He did have a strain is neck had been seen at an urgent care center x-rays were unrevealing  He is currently controlling symptoms with over-the-counter medications, heat,  Does not wish to have physical therapy yet  Review of Systems   Review of Systems   Constitutional: Positive for fatigue  Negative for appetite change, chills, diaphoresis and fever  HENT: Negative for ear pain, rhinorrhea, sinus pressure and sore throat  Eyes: Negative for discharge, redness and itching  Respiratory: Positive for cough  Negative for shortness of breath and wheezing  Cardiovascular: Negative for chest pain, palpitations and leg swelling  Rapid or slow heart rate   Gastrointestinal: Negative for abdominal pain, diarrhea, nausea and vomiting  Musculoskeletal: Positive for neck pain and neck stiffness   Negative for back pain          Current Medications       Current Outpatient Prescriptions:     cyclobenzaprine (FLEXERIL) 10 mg tablet, Take 1 tablet (10 mg total) by mouth 3 (three) times a day as needed for muscle spasms, Disp: 30 tablet, Rfl: 0    escitalopram (LEXAPRO) 10 mg tablet, Take 1 tablet (10 mg total) by mouth daily, Disp: 90 tablet, Rfl: 3    famotidine (PEPCID) 40 MG tablet, Take 1 tablet (40 mg total) by mouth daily, Disp: 90 tablet, Rfl: 0    ipratropium-albuterol (DUO-NEB) 0 5-2 5 mg/3 mL nebulizer solution, Take 3 mL by nebulization 4 (four) times a day, Disp: , Rfl:     LORazepam (ATIVAN) 1 mg tablet, Take 1 tablet (1 mg total) by mouth every 8 (eight) hours as needed for anxiety, Disp: 30 tablet, Rfl: 1    PROAIR  (90 Base) MCG/ACT inhaler, INHALE 2 PUFFS EVERY 4-6 HOURS AS NEEDED, Disp: 8 5 g, Rfl: 0    tamsulosin (FLOMAX) 0 4 mg, Take 1 capsule (0 4 mg total) by mouth every 12 (twelve) hours, Disp: 180 capsule, Rfl: 1    tiotropium-olodaterol (STIOLTO RESPIMAT) 2 5-2 5 MCG/ACT inhaler, Inhale 2 puffs daily, Disp: 3 Inhaler, Rfl: 3    Current Allergies     Allergies as of 01/16/2019 - Reviewed 01/16/2019   Allergen Reaction Noted    Aspirin Other (See Comments) 05/29/2016            The following portions of the patient's history were reviewed and updated as appropriate: allergies, current medications, past family history, past medical history, past social history, past surgical history and problem list      Past Medical History:   Diagnosis Date    Bladder infection     current tx with cipro 5/29/16    Community acquired pneumonia     last assessed 8/28/17, resolved 9/25/17    COPD (chronic obstructive pulmonary disease) (HCC)     Enlarged prostate     GERD (gastroesophageal reflux disease)     Hx of bladder infections     Psychiatric disorder     depression       Past Surgical History:   Procedure Laterality Date    COLON SURGERY      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    COLOSTOMY         Family History   Problem Relation Age of Onset    Lung cancer Mother     Cancer Mother     Cancer Father     Alcohol abuse Father     Mental illness Neg Hx          Medications have been verified  Objective   /60 (BP Location: Right arm, Patient Position: Sitting, Cuff Size: Standard)   Pulse (!) 110   Resp 16   Ht 5' 9" (1 753 m)   Wt 84 8 kg (187 lb)   SpO2 94%   BMI 27 62 kg/m²        Physical Exam     Physical Exam   Constitutional: He appears well-developed and well-nourished  No distress  HENT:   Head: Normocephalic and atraumatic  Right Ear: Tympanic membrane and external ear normal  No drainage  Left Ear: Tympanic membrane normal  No drainage  Mouth/Throat: No oropharyngeal exudate  Eyes: Conjunctivae and EOM are normal  Right eye exhibits no discharge  Left eye exhibits no discharge  Neck: Normal range of motion  Neck supple  No thyromegaly present  Cardiovascular: Normal rate, regular rhythm and normal heart sounds  Pulmonary/Chest: Effort normal  No respiratory distress  He has decreased breath sounds in the right lower field and the left lower field  He has no wheezes  He has no rhonchi  He has rales in the left lower field  Abdominal: Soft  He exhibits no mass  There is no tenderness  Stoma site has no surrounding induration  There is no tenderness noted  Lymphadenopathy:     He has no cervical adenopathy  Skin: Skin is warm and dry

## 2019-01-16 NOTE — ASSESSMENT & PLAN NOTE
Is take inhalers as directed  Generally is doing better since hospital discharge  Does have follow-up with Pulmonary  Continue current meds

## 2019-01-16 NOTE — ASSESSMENT & PLAN NOTE
Patient has finished antibiotics  Still has mild cough  Some mild shortness of breath  No fevers or night sweats are reported

## 2019-01-22 ENCOUNTER — PATIENT OUTREACH (OUTPATIENT)
Dept: FAMILY MEDICINE CLINIC | Facility: CLINIC | Age: 79
End: 2019-01-22

## 2019-01-22 NOTE — PROGRESS NOTES
Outpatient Care Management Note:    Care manager called and spoke with Riccardo Mcleod  He states that he is doing well  He finished his antibiotics  He still has a cough, but states it is improving  Care manager instructed him to call if his cough worsens, he starts bringing up colored sputum, or he gets more short of breath  We reviewed that we would like to address any symptoms of infection early and prevent a repeat hospitalization  Riccardo Mcleod states he is taking his meds as ordered  He did not call pulmonary yet to determine if he can get samples of his inhalers  He will let care manager know if he is having any difficulty affording his medications  Riccardo Mcleod continues with neck pain  He rates the pain a 4 or 5 on scale of 1-10  We discussed that Dr Rochelle Mayes recommended physical therapy if the pain is not relieved  Riccardo Mcleod would like to wait a couple more weeks, since he feels the pain is improving  Fercho denies any further issues with his stoma  The area is no longer swollen  He is moving his bowels without issue  Care manager gave Riccardo Mcleod my contact information again and encouraged him to call if he has any questions or changes in his symptoms  5 wishes form mailed to patient

## 2019-02-05 ENCOUNTER — PATIENT OUTREACH (OUTPATIENT)
Dept: FAMILY MEDICINE CLINIC | Facility: CLINIC | Age: 79
End: 2019-02-05

## 2019-02-05 NOTE — PROGRESS NOTES
Outpatient Care Management Note:    Care manager called Brian Ho following up to see how he is feeling  He states that his cough is gone, and the antibiotics are finished  He still gets a little short of breath with exertion  His next pulmonary appointment is not until 3/25  I offered to see if I could move his appointment sooner due to his shortness of breath, but he declined stating his breathing is really unchanged  He is using his inhaler and nebulizer treatments as ordered  Brian Ho still has some neck pain, but states it is improved  He declined my recommendation that he try physical therapy  Brian Ho states that he did not receive the 5 wishes form that I mailed  I will mail another copy if he does not receive it by my next phone contact

## 2019-02-11 ENCOUNTER — PATIENT OUTREACH (OUTPATIENT)
Dept: FAMILY MEDICINE CLINIC | Facility: CLINIC | Age: 79
End: 2019-02-11

## 2019-02-11 NOTE — PROGRESS NOTES
Outpatient Care Management Note:    Voice mail message left for care manager requesting assistance to change tomorrow's CT scan due to impending weather  Care manager called [facility] back  They were able to reschedule the CT scan to 2/15

## 2019-02-12 NOTE — ASSESSMENT & PLAN NOTE
Patient : Janina Abreu Age: 33 year old Sex: female   MRN: [id number] Encounter Date: 2/11/2019      History     Chief Complaint   Patient presents with   • Chest Pain Adult     HPI     Janina Abreu is a 33 year old female presenting with planes of chest pain and epigastric pain. States that she had an EDG done 4 days ago for difficulty swallowing. Was told she may have some chest pain afterwards but should go away within a few hours. States today nursing staff called to follow-up with her to see how she was doing and stated that she was still having chest pain. Her doctor apparently has felt sick this week. She is told to come to the emergency department for evaluation. She denies any history of underlying lung disease, does not take any medications for her heart. Does not take any antacids. She denies any nausea or vomiting on my exam. History Tylenol for her symptoms without improvement. She does not take any birth control, no history of blood clots no recent hospitalization or immobilization. Denies smoking. Patient denies any other concerns at this time.    No Known Allergies    Current Discharge Medication List      Prior to Admission Medications    Details   clotrimazole (LOTRIMIN AF) 1 % cream Apply topically 2 times daily.  Qty: 30 g, Refills: 0      Na Sulfate-K Sulfate-Mg Sulf (SUPREP BOWEL PREP KIT) 17.5-3.13-1.6 GM/177ML Solution Take 354 mLs by mouth as directed.  Qty: 354 mL, Refills: 0      ondansetron (ZOFRAN) 4 MG tablet Take 1 tablet by mouth as needed for Nausea.  Qty: 2 tablet, Refills: 0      oxybutynin (DITROPAN) 5 MG tablet TAKE ONE TABLET BY MOUTH TWICE A DAY  Qty: 60 tablet, Refills: 3      ferrous sulfate 325 (65 FE) MG tablet Take 325 mg by mouth daily (with breakfast).      glatiramer (COPAXONE) 40 MG/ML prefilled syringe for injection Inject 1 syringe into the skin 3 times weekly.  Qty: 12 mL, Refills: 11      gabapentin (NEURONTIN) 300 MG capsule TAKE TWO CAPSULES BY MOUTH  · Patient with leukocytosis, tachycardia and tachypnea and BECKIE on admission  · Recently on steroids- could be cause of leukocytosis  · May be related to sepsis from PNA  · CXR with left lower opacity, atelectasis versus pneumonia, suspected source  · CT chest with multifocal PNA  · BECKIE improving from 1 49 to 1 20 EVERY 12 HOURS  Qty: 120 capsule, Refills: 9      trazodone (DESYREL) 150 MG tablet Take 150 mg by mouth nightly.      traMADol (ULTRAM) 50 MG tablet Take 50 mg by mouth every 6 hours as needed for Pain.      ARIPiprazole (ABILIFY) 15 MG tablet Take 15 mg by mouth daily.      ALPRAZolam (XANAX) 0.25 MG tablet Take 0.25 mg by mouth nightly as needed.             Current Discharge Medication List          Past Medical History:   Diagnosis Date   • Anxiety    • Bronchitis    • Depression    • Dysphagia    • MS (multiple sclerosis) (CMS/HCC)    • Urinary tract infection        Past Surgical History:   Procedure Laterality Date   • ABDOMEN SURGERY     • ESOPHAGOGASTRODUODENOSCOPY  2019   • TUBAL LIGATION         Family History   Problem Relation Age of Onset   • Hypertension Maternal Grandmother    • Stroke Maternal Grandmother    • Hypertension Maternal Grandfather    • Stroke Maternal Grandfather    • Diabetes Maternal Grandfather    • Diabetes Paternal Grandfather    • Cancer Paternal Grandfather    • Cancer Paternal Grandmother    • Stroke Mother    • Diabetes Sister        Social History     Tobacco Use   • Smoking status: Former Smoker     Last attempt to quit: 1998     Years since quittin.6   • Smokeless tobacco: Never Used   Substance Use Topics   • Alcohol use: No   • Drug use: No       Review of Systems   Constitutional: Negative for activity change, appetite change, chills, fatigue and fever.   HENT: Negative for congestion, hearing loss, rhinorrhea, sinus pressure, sinus pain and sore throat.    Eyes: Negative for photophobia, pain and visual disturbance.   Respiratory: Negative for cough, chest tightness and shortness of breath.    Cardiovascular: Positive for chest pain. Negative for leg swelling.   Gastrointestinal: Positive for abdominal pain. Negative for constipation, diarrhea, nausea and vomiting.   Genitourinary: Negative for dysuria, frequency, hematuria and urgency.   Neurological:  Negative for dizziness, syncope, light-headedness, numbness and headaches.   All other systems reviewed and are negative.      Physical Exam     ED Triage Vitals [02/11/19 1712]   ED Triage Vitals Group      Temp 98.2 °F (36.8 °C)      Pulse 58      Resp 18      BP (!) 160/96      SpO2 100 %      EtCO2 mmHg       Height 5' 4\" (1.626 m)      Weight 228 lb (103.4 kg)      Weight Scale Used ED Actual     Vitals:    02/11/19 2000   BP: 139/66   Pulse: 50   Resp: 18   Temp:        Physical Exam   Constitutional: She is oriented to person, place, and time. She appears well-developed and well-nourished. She is cooperative. No distress.   HENT:   Head: Normocephalic and atraumatic.   Right Ear: Hearing and external ear normal.   Left Ear: Hearing and external ear normal.   Nose: Nose normal.   Mouth/Throat: Uvula is midline, oropharynx is clear and moist and mucous membranes are normal.   Eyes: Conjunctivae, EOM and lids are normal. Pupils are equal, round, and reactive to light.   Neck: Normal range of motion. Neck supple. No tracheal deviation present.   Cardiovascular: Normal rate and regular rhythm.   Pulses:       Radial pulses are 2+ on the right side, and 2+ on the left side.   Pulmonary/Chest: No accessory muscle usage. No respiratory distress. She has no decreased breath sounds. She has no wheezes. She exhibits no crepitus.   Abdominal: Soft. Normal appearance and bowel sounds are normal. There is no tenderness. There is no rigidity, no rebound and no guarding.   Neurological: She is alert and oriented to person, place, and time. GCS eye subscore is 4. GCS verbal subscore is 5. GCS motor subscore is 6.   Skin: Skin is warm, dry and intact.   Psychiatric: She has a normal mood and affect. Her speech is normal and behavior is normal. Judgment and thought content normal. Cognition and memory are normal.   Nursing note and vitals reviewed.      ED Course     Procedures    Lab Results     Results for orders placed or  performed during the hospital encounter of 02/11/19   CBC & Auto Differential   Result Value Ref Range    WBC 6.1 4.2 - 11.0 K/mcL    RBC 4.32 4.00 - 5.20 mil/mcL    HGB 12.5 12.0 - 15.5 g/dL    HCT 38.4 36.0 - 46.5 %    MCV 88.9 78.0 - 100.0 fl    MCH 28.9 26.0 - 34.0 pg    MCHC 32.6 32.0 - 36.5 g/dL    RDW-CV 12.3 11.0 - 15.0 %     140 - 450 K/mcL    NRBC 0 0 /100 WBC    DIFF TYPE AUTOMATED DIFFERENTIAL     Neutrophil 66 %    LYMPH 24 %    MONO 6 %    EOSIN 3 %    BASO 0 %    Percent Immature Granuloctyes 1 %    Absolute Neutrophil 4.0 1.8 - 7.7 K/mcL    Absolute Lymph 1.5 1.0 - 4.8 K/mcL    Absolute Mono 0.4 0.3 - 0.9 K/mcL    Absolute Eos 0.2 0.1 - 0.5 K/mcL    Absolute Baso 0.0 0.0 - 0.3 K/mcL    Absolute Immature Granulocytes 0.0 0 - 0.2 K/mcl   Comprehensive Metabolic Panel   Result Value Ref Range    Sodium 139 135 - 145 mmol/L    Potassium 3.9 3.4 - 5.1 mmol/L    Chloride 103 98 - 107 mmol/L    Carbon Dioxide 26 21 - 32 mmol/L    Anion Gap 14 10 - 20 mmol/L    Glucose 94 65 - 99 mg/dL    BUN 12 6 - 20 mg/dL    Creatinine 0.71 0.51 - 0.95 mg/dL    GFR Estimate,  >90     GFR Estimate, Non African American >90     BUN/Creatinine Ratio 17 7 - 25    CALCIUM 9.6 8.4 - 10.2 mg/dL    TOTAL BILIRUBIN 0.4 0.2 - 1.0 mg/dL    AST/SGOT 14 <38 Units/L    ALT/SGPT 22 <79 Units/L    ALK PHOSPHATASE 108 45 - 117 Units/L    TOTAL PROTEIN 8.3 (H) 6.4 - 8.2 g/dL    Albumin 3.9 3.6 - 5.1 g/dL    GLOBULIN 4.4 (H) 2.0 - 4.0 g/dL    A/G Ratio, Serum 0.9 (L) 1.0 - 2.4   Lipase Level   Result Value Ref Range    Lipase 155 73 - 393 Units/L   Urinalysis & Reflex Micro   Result Value Ref Range    COLOR YELLOW YELLOW    APPEARANCE CLEAR     GLUCOSE(URINE) NEGATIVE NEGATIVE mg/dL    BILIRUBIN NEGATIVE NEGATIVE    KETONES NEGATIVE NEGATIVE mg/dL    SPECIFIC GRAVITY 1.021 1.005 - 1.030    BLOOD NEGATIVE NEGATIVE    pH 5.0 5.0 - 7.0 Units    PROTEIN(URINE) NEGATIVE NEGATIVE mg/dL    UROBILINOGEN 0.2 0.0 - 1.0 mg/dL     NITRITE NEGATIVE NEGATIVE    LEUKOCYTE ESTERASE NEGATIVE NEGATIVE    SPECIMEN TYPE URINE, CLEAN CATCH/MIDSTREAM    Troponin I Ultra Sensitive   Result Value Ref Range    TROPONIN I <0.02 <0.05 ng/mL   Pregnancy Test Urine   Result Value Ref Range    MONOCLONAL PREGNANCY NEGATIVE NEGATIVE       EKG Results     EKG Interpretation  Rate: 63  Rhythm: normal sinus rhythm   Abnormality: no STEMI  Qtc:    EKG interpreted by Dr. Joel Monroy, ED physician    Radiology Results     Imaging Results          XR Chest PA and Lateral (Final result)  Result time 02/11/19 20:22:12    Final result                 Impression:    IMPRESSION:      No acute process.                      Narrative:    EXAM:  XR CHEST PA AND LATERAL    HISTORY:  pain    COMPARISON:  10/29/14    TECHNIQUE:  Frontal and lateral views of the chest were obtained.    FINDINGS:  Heart and pulmonary vascularity are normal. The lungs are clear.  There are no effusions                                    ED Medication Orders (From admission, onward)    Start Ordered     Status Ordering Provider    02/11/19 1857 02/11/19 1856  aluminum-magnesium hydroxide-simethicone (MAALOX) 200-200-20 MG/5ML suspension 30 mL  ONCE      Ordered MLEZIVA, MARIELOS H    02/11/19 1857 02/11/19 1856  lidocaine viscous (XYLOCAINE) 2 % oral solution 15 mL  ONCE      Ordered MLEZIVA, MARIELOS H          ED Course as of Feb 11 2043   Mon Feb 11, 2019 2026 IMPRESSION:      No acute process. XR Chest PA and Lateral   2042 Discussed case with attending who agrees with plan. Patient updated, requesting d/c        [facility]     HEART Score for Major Cardiac Events  History: Slightly suspicious   EKG Normal   AGE Less than or equal to 45   RISK FACTOR No risk factors known   TROPONIN: Equal or less than normal limit   TOTAL SCORE 0     Additional Information    Low Risk HEART Score Results:   Discussion and Patient Decision:     The patient's HEART Score is considered to be at low risk for a Major Adverse  Cardiac Event (MACE).  The calculated risk is 1.7 % at 6 weeks.    PERC Rule:  Age < 50 years  HR < 100 bpm  O2 sat on room air > 94%  No prior history of DVT/PE  No recent trauma or surgery  No hemoptysis  No exogenous estrogen  No clinical signs suggesting DVT    Based on my history and physical examination I have a very low clinical suspicion that this patient's symptoms are due to a PE. Combined with negative PERC criteria I think this patient has a very low probability for PE    Janina Abreu presented with c/o of CP and epigastric pain. Physical exam findings as documented above. Patient's records were reviewed and workup ordered accordingly. Patient was on the cardiac monitor during her ED stay without any acute events. Cardiac workup was unremarkable, chest x-ray was stable, no findings consistent with free air noted by the radiologist. No crepitus appreciated with palpation of chest wall. She was given a GI cocktail with improvement in symptoms. Patient was updated on all ED findings and recommended outpatient follow-up. She acknowledged understanding and was comfortable this plan. Discussed the case with my attending who was agreeable with work-up and plan. At time of discharge patient is hemodynamically stable and in no acute distress. Final patient recheck:  The patient was feeling better, tolerating po, and was clinically stable and comfortable with being discharged home.  The ED clinical findings, diagnosis, treatment, continued plan of care-discharge instructions and follow up recommendations were discussed with the patient as well as provided in AVS.  Patient understands the diagnosis she was discharged with today is a preliminary diagnosis which can and often do change. Reasons to return to the ED were discussed. Patient was instructed to follow up with PCP in 1 day. The patient was also given pre-cautions related to their diagnosis. The patient verbalizes understanding of all instructions.   Questions regarding these above documented discussions were answered.      Clinical Impression     ED Diagnosis   1. Epigastric pain         Disposition        Discharge 2/11/2019  8:42 PM  Janina Abreu discharge to home/self care.      Patient seen under the supervision of CAROLE Yanez PA-C  02/11/19 [id number]

## 2019-02-15 ENCOUNTER — HOSPITAL ENCOUNTER (OUTPATIENT)
Dept: CT IMAGING | Facility: HOSPITAL | Age: 79
Discharge: HOME | End: 2019-02-15
Attending: INTERNAL MEDICINE
Payer: MEDICARE

## 2019-02-15 PROCEDURE — 71250 CT THORAX DX C-: CPT

## 2019-02-19 ENCOUNTER — PATIENT OUTREACH (OUTPATIENT)
Dept: FAMILY MEDICINE CLINIC | Facility: CLINIC | Age: 79
End: 2019-02-19

## 2019-02-19 NOTE — PROGRESS NOTES
Outpatient Care Management Note:    Care manager called and spoke with Matteo Valentin  She states that Brian Ho is doing well  His shortness of breath symptoms remain unchanged  He is taking his medications  She denies any other issues or concerns  They did receive the 5 wishes form that CM mailed  I explained the form, encouraged them to complete it, and take it to Dr Shashank Myers office to upload into his chart  Matteo Valentin will call with any questions or concerns  She has my contact information

## 2019-02-22 DIAGNOSIS — K21.9 GASTROESOPHAGEAL REFLUX DISEASE, ESOPHAGITIS PRESENCE NOT SPECIFIED: ICD-10-CM

## 2019-02-22 RX ORDER — FAMOTIDINE 40 MG/1
40 TABLET, FILM COATED ORAL DAILY
Qty: 90 TABLET | Refills: 0 | Status: SHIPPED | OUTPATIENT
Start: 2019-02-22 | End: 2019-05-24 | Stop reason: SDUPTHER

## 2019-02-26 DIAGNOSIS — K21.9 GASTROESOPHAGEAL REFLUX DISEASE, ESOPHAGITIS PRESENCE NOT SPECIFIED: ICD-10-CM

## 2019-02-26 RX ORDER — FAMOTIDINE 40 MG/1
TABLET, FILM COATED ORAL
Qty: 90 TABLET | Refills: 0 | OUTPATIENT
Start: 2019-02-26

## 2019-03-06 ENCOUNTER — TELEPHONE (OUTPATIENT)
Dept: FAMILY MEDICINE CLINIC | Facility: CLINIC | Age: 79
End: 2019-03-06

## 2019-03-06 NOTE — TELEPHONE ENCOUNTER
WIFE IS ASKING FOR A NEW NEBULIZER FOR USE FOR HIS ALBUTEROL SOLUTION  THE ONE HE HAS NOW IS FROM 3 YEARS AGO FROM WHEN HE WAS ADMITTED TO THE HOSPITAL MEDICARE INSURANCE

## 2019-03-12 ENCOUNTER — PATIENT OUTREACH (OUTPATIENT)
Dept: FAMILY MEDICINE CLINIC | Facility: CLINIC | Age: 79
End: 2019-03-12

## 2019-03-12 NOTE — PROGRESS NOTES
Outpatient Care Management Note:    Care manager called and spoke with Georgia and Mary Lind states that he is doing well  He still gets short of breath with exertion like walking to the mailbox but feels it is getting better  He is not coughing much or bringing up sputum  He received his new nebulizer and is using it as ordered  Care manager questioned his inhalers, and if they refilled them  Georgia had expressed concern about the cost of the inhalers on a prior call  He states that he has samples of both the stiolto and the proair inhalers  He feels they will last him until he sees pulmonary on 3/25  SCARLETT will contact pulmonary to see if they have more samples available  Pratik states each inhaler costs about $250 which he can not afford  Care manager called [facility] and spoke with Samantha  She will have her medical assistant run Pratik's insurance to see what his actual costs are for the inhalers  They will then either do a prior authorization to help decrease the cost or switch medications  [facility] will get back to Pratik with a solution  Care manager called Pratik and notified him of above  He was very appreciative of the assistance  I requested he call me if he does not hear from them in the next week

## 2019-03-25 ENCOUNTER — OFFICE VISIT (OUTPATIENT)
Dept: PULMONOLOGY | Facility: HOSPITAL | Age: 79
End: 2019-03-25
Payer: MEDICARE

## 2019-03-25 VITALS
SYSTOLIC BLOOD PRESSURE: 140 MMHG | BODY MASS INDEX: 26.81 KG/M2 | HEART RATE: 93 BPM | DIASTOLIC BLOOD PRESSURE: 80 MMHG | HEIGHT: 69 IN | TEMPERATURE: 96.4 F | WEIGHT: 181 LBS | OXYGEN SATURATION: 93 %

## 2019-03-25 DIAGNOSIS — J44.9 COPD, MODERATE (HCC): Primary | ICD-10-CM

## 2019-03-25 DIAGNOSIS — R91.1 PULMONARY NODULE: ICD-10-CM

## 2019-03-25 PROBLEM — J18.9 MULTIFOCAL PNEUMONIA: Status: RESOLVED | Noted: 2017-08-15 | Resolved: 2019-03-25

## 2019-03-25 PROBLEM — J44.1 COPD EXACERBATION (HCC): Status: RESOLVED | Noted: 2018-12-03 | Resolved: 2019-03-25

## 2019-03-25 PROCEDURE — 99214 OFFICE O/P EST MOD 30 MIN: CPT | Performed by: INTERNAL MEDICINE

## 2019-03-25 NOTE — ASSESSMENT & PLAN NOTE
Overall, chest imaging has improved, though there is a dominant right lower lobe pulmonary nodule, not fully visualized due to prior infiltrates  He will have repeat chest CT in May to document stability and/or ongoing improvement

## 2019-03-25 NOTE — ASSESSMENT & PLAN NOTE
We will update pulmonary function testing to see how they compared with 2017 when his FEV1 was 59 % of predicted  For now, he will continue with Stiolto  I provided him with additional samples today  He will continue the nebulizer and albuterol as needed

## 2019-03-25 NOTE — PROGRESS NOTES
Pulmonary Follow Up Note   Jalil Rucker 77 y o  male MRN: [id number]  3/25/2019      Assessment/Plan:     COPD, moderate ([facility] [address] )  We will update pulmonary function testing to see how they compared with 2017 when his FEV1 was 59 % of predicted  For now, he will continue with Stiolto  I provided him with additional samples today  He will continue the nebulizer and albuterol as needed  Pulmonary nodule  Overall, chest imaging has improved, though there is a dominant right lower lobe pulmonary nodule, not fully visualized due to prior infiltrates  He will have repeat chest CT in May to document stability and/or ongoing improvement  Visit orders:    Diagnoses and all orders for this visit:    COPD, moderate ([facility] [address] )  -     Complete pulmonary function test; Future    Pulmonary nodule  -     CT chest wo contrast; Future        No follow-ups on file  History of Present Illness   HPI:  Jalil Rucker is a 77 y o  male who is here today for follow-up regarding COPD  He feels that his shortness of breath has become worse since his last visit  He has no significant cough, wheeze or sputum production  Dyspnea is most notable with exertion  He is compliant with Stiolto once daily and also uses his nebulizer 3-4 times per day  He has a rescue inhaler which she has not needed on any regular basis  Review of Systems   Constitutional: Positive for unexpected weight change (5 pound weight loss)  Negative for chills and fever  HENT: Negative for postnasal drip and sore throat  Eyes: Negative for visual disturbance  Respiratory:        As noted in HPI   Cardiovascular: Negative for chest pain  Gastrointestinal: Negative for abdominal pain, diarrhea and vomiting  Genitourinary: Negative for difficulty urinating  Skin: Negative for rash  Neurological: Negative for headaches  Hematological: Negative for adenopathy  Psychiatric/Behavioral: Negative  All other systems reviewed and are negative  Historical Information   Past Medical History:   Diagnosis Date    Bladder infection     current tx with cipro 16    Community acquired pneumonia     last assessed 17, resolved 17    COPD (chronic obstructive pulmonary disease) ([facility] [address] )     Enlarged prostate     GERD (gastroesophageal reflux disease)     Hx of bladder infections     Psychiatric disorder     depression     Past Surgical History:   Procedure Laterality Date    COLON SURGERY      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    COLOSTOMY       Family History   Problem Relation Age of Onset    Lung cancer Mother     Cancer Mother     Cancer Father     Alcohol abuse Father     Mental illness Neg Hx        Social History     Tobacco Use   Smoking Status Former Smoker    Packs/day: 0 50    Years: 50 00    Pack years: 25 00    Types: Cigarettes    Start date: 64    Last attempt to quit: 2018    Years since quittin 0   Smokeless Tobacco Former User   Tobacco Comment    quit 2017 per [employer]          Meds/Allergies     Current Outpatient Medications:     cyclobenzaprine (FLEXERIL) 10 mg tablet, Take 1 tablet (10 mg total) by mouth 3 (three) times a day as needed for muscle spasms, Disp: 30 tablet, Rfl: 0    escitalopram (LEXAPRO) 10 mg tablet, Take 1 tablet (10 mg total) by mouth daily, Disp: 90 tablet, Rfl: 3    famotidine (PEPCID) 40 MG tablet, Take 1 tablet (40 mg total) by mouth daily, Disp: 90 tablet, Rfl: 0    ipratropium-albuterol (DUO-NEB) 0 5-2 5 mg/3 mL nebulizer solution, Take 3 mL by nebulization 4 (four) times a day, Disp: , Rfl:     LORazepam (ATIVAN) 1 mg tablet, Take 1 tablet (1 mg total) by mouth every 8 (eight) hours as needed for anxiety, Disp: 30 tablet, Rfl: 1    PROAIR  (90 Base) MCG/ACT inhaler, INHALE 2 PUFFS EVERY 4-6 HOURS AS NEEDED, Disp: 8 5 g, Rfl: 0    tamsulosin (FLOMAX) 0 4 mg, Take 1 capsule (0 4 mg total) by mouth every 12 (twelve) hours, Disp: 180 capsule, Rfl: 1    tiotropium-olodaterol (STIOLTO RESPIMAT) 2 5-2 5 MCG/ACT inhaler, Inhale 2 puffs daily, Disp: 3 Inhaler, Rfl: 3  Allergies   Allergen Reactions    Aspirin Other (See Comments)     Hx stomach ulcer       Vitals: Blood pressure 140/80, pulse 93, temperature (!) 96 4 °F (35 8 °C), temperature source Tympanic, height 5' 9" (1 753 m), weight 82 1 kg (181 lb), SpO2 93 %  Body mass index is 26 73 kg/m²  Oxygen Therapy  SpO2: 93 %  Oxygen Therapy: None (Room air)    Physical Exam   Physical Exam   Constitutional: He is oriented to person, place, and time  No distress  HENT:   Head: Normocephalic  Mouth/Throat: No oropharyngeal exudate  Eyes: Pupils are equal, round, and reactive to light  No scleral icterus  Neck: Neck supple  No JVD present  Cardiovascular: Normal rate and regular rhythm  Pulmonary/Chest:   Personally breathing pattern  Decreased breath sounds throughout but no wheeze, rales or rhonchi   Abdominal: Soft  There is no tenderness  Musculoskeletal: He exhibits no edema  Lymphadenopathy:     He has no cervical adenopathy  Neurological: He is alert and oriented to person, place, and time  Skin: Skin is warm and dry  Psychiatric: He has a normal mood and affect  Labs: I have personally reviewed pertinent lab results  Lab Results   Component Value Date    WBC 19 64 (H) 01/09/2019    HGB 12 5 01/09/2019    HCT 37 4 01/09/2019     (H) 01/09/2019     01/09/2019     Lab Results   Component Value Date    CALCIUM 8 7 01/09/2019     03/13/2017    K 4 2 01/09/2019    CO2 25 01/09/2019     01/09/2019    BUN 21 01/09/2019    CREATININE 1 20 01/09/2019     No results found for: IGE  Lab Results   Component Value Date    ALT 18 01/09/2019    AST 14 01/09/2019    ALKPHOS 56 01/09/2019    BILITOT 0 6 03/13/2017       Imaging and other studies: I have personally reviewed pertinent reports     and I have personally reviewed pertinent films in PACS CT of the chest from February 2019 shows stable emphysematous changes  Bibasilar opacities have improved  There is a dominant right lower lobe nodule measuring 11 millimeters  There is a 6 millimeter left upper lobe nodule, stable since 2017

## 2019-04-09 ENCOUNTER — PATIENT OUTREACH (OUTPATIENT)
Dept: FAMILY MEDICINE CLINIC | Facility: CLINIC | Age: 79
End: 2019-04-09

## 2019-04-10 ENCOUNTER — PATIENT OUTREACH (OUTPATIENT)
Dept: FAMILY MEDICINE CLINIC | Facility: CLINIC | Age: 79
End: 2019-04-10

## 2019-04-19 ENCOUNTER — TELEPHONE (OUTPATIENT)
Dept: FAMILY MEDICINE CLINIC | Facility: CLINIC | Age: 79
End: 2019-04-19

## 2019-04-19 ENCOUNTER — TELEPHONE (OUTPATIENT)
Dept: OTHER | Facility: OTHER | Age: 79
End: 2019-04-19

## 2019-04-19 DIAGNOSIS — N30.00 ACUTE CYSTITIS WITHOUT HEMATURIA: Primary | ICD-10-CM

## 2019-04-19 RX ORDER — SULFAMETHOXAZOLE AND TRIMETHOPRIM 800; 160 MG/1; MG/1
1 TABLET ORAL EVERY 12 HOURS SCHEDULED
Qty: 20 TABLET | Refills: 0 | Status: SHIPPED | OUTPATIENT
Start: 2019-04-19 | End: 2019-04-29

## 2019-04-20 ENCOUNTER — TELEPHONE (OUTPATIENT)
Dept: OTHER | Facility: OTHER | Age: 79
End: 2019-04-20

## 2019-04-22 ENCOUNTER — HOSPITAL ENCOUNTER (EMERGENCY)
Facility: HOSPITAL | Age: 79
Discharge: HOME | End: 2019-04-22
Attending: EMERGENCY MEDICINE
Payer: MEDICARE

## 2019-04-22 VITALS
SYSTOLIC BLOOD PRESSURE: 108 MMHG | WEIGHT: 180 LBS | BODY MASS INDEX: 28.25 KG/M2 | HEIGHT: 67 IN | RESPIRATION RATE: 19 BRPM | OXYGEN SATURATION: 90 % | HEART RATE: 87 BPM | TEMPERATURE: 98.6 F | DIASTOLIC BLOOD PRESSURE: 58 MMHG

## 2019-04-22 DIAGNOSIS — R33.8 ACUTE URINARY RETENTION: Primary | ICD-10-CM

## 2019-04-22 LAB
BACTERIA UR QL AUTO: ABNORMAL /HPF
BILIRUB UR QL STRIP: NEGATIVE
CLARITY UR: CLEAR
COLOR UR: YELLOW
GLUCOSE UR STRIP-MCNC: NEGATIVE MG/DL
HGB UR QL STRIP.AUTO: ABNORMAL
KETONES UR STRIP-MCNC: NEGATIVE MG/DL
LEUKOCYTE ESTERASE UR QL STRIP: ABNORMAL
NITRITE UR QL STRIP: NEGATIVE
NON-SQ EPI CELLS URNS QL MICRO: ABNORMAL /HPF
PH UR STRIP.AUTO: 6 [PH]
PROT UR STRIP-MCNC: NEGATIVE MG/DL
RBC #/AREA URNS AUTO: ABNORMAL /HPF
SP GR UR STRIP.AUTO: 1.02 (ref 1–1.03)
UROBILINOGEN UR QL STRIP.AUTO: 0.2 E.U./DL
WBC #/AREA URNS AUTO: ABNORMAL /HPF

## 2019-04-22 PROCEDURE — 81001 URINALYSIS AUTO W/SCOPE: CPT | Performed by: EMERGENCY MEDICINE

## 2019-04-22 PROCEDURE — 99283 EMERGENCY DEPT VISIT LOW MDM: CPT

## 2019-04-22 PROCEDURE — 99282 EMERGENCY DEPT VISIT SF MDM: CPT | Performed by: EMERGENCY MEDICINE

## 2019-04-22 PROCEDURE — 87086 URINE CULTURE/COLONY COUNT: CPT | Performed by: EMERGENCY MEDICINE

## 2019-04-22 RX ORDER — LIDOCAINE HYDROCHLORIDE 20 MG/ML
JELLY TOPICAL ONCE
Status: COMPLETED | OUTPATIENT
Start: 2019-04-22 | End: 2019-04-22

## 2019-04-22 RX ADMIN — LIDOCAINE HYDROCHLORIDE: 20 JELLY TOPICAL at 09:35

## 2019-04-23 ENCOUNTER — PATIENT OUTREACH (OUTPATIENT)
Dept: FAMILY MEDICINE CLINIC | Facility: CLINIC | Age: 79
End: 2019-04-23

## 2019-04-23 LAB — BACTERIA UR CULT: NORMAL

## 2019-04-25 ENCOUNTER — TELEPHONE (OUTPATIENT)
Dept: FAMILY MEDICINE CLINIC | Facility: CLINIC | Age: 79
End: 2019-04-25

## 2019-04-25 DIAGNOSIS — Z01.818 OTHER SPECIFIED PRE-OPERATIVE EXAMINATION: ICD-10-CM

## 2019-04-25 DIAGNOSIS — K40.90 INGUINAL HERNIA WITHOUT OBSTRUCTION OR GANGRENE, RECURRENCE NOT SPECIFIED, UNSPECIFIED LATERALITY: ICD-10-CM

## 2019-04-25 DIAGNOSIS — Z01.812 PRE-OPERATIVE LABORATORY EXAMINATION: Primary | ICD-10-CM

## 2019-04-29 ENCOUNTER — HOSPITAL ENCOUNTER (OUTPATIENT)
Dept: NON INVASIVE DIAGNOSTICS | Facility: HOSPITAL | Age: 79
Discharge: HOME | End: 2019-04-29
Payer: MEDICARE

## 2019-04-29 ENCOUNTER — PATIENT OUTREACH (OUTPATIENT)
Dept: FAMILY MEDICINE CLINIC | Facility: CLINIC | Age: 79
End: 2019-04-29

## 2019-04-29 ENCOUNTER — TELEPHONE (OUTPATIENT)
Dept: FAMILY MEDICINE CLINIC | Facility: CLINIC | Age: 79
End: 2019-04-29

## 2019-04-29 DIAGNOSIS — K40.90 INGUINAL HERNIA WITHOUT OBSTRUCTION OR GANGRENE, RECURRENCE NOT SPECIFIED, UNSPECIFIED LATERALITY: ICD-10-CM

## 2019-04-29 DIAGNOSIS — Z01.818 OTHER SPECIFIED PRE-OPERATIVE EXAMINATION: ICD-10-CM

## 2019-04-29 LAB
ATRIAL RATE: 82 BPM
P AXIS: 50 DEGREES
PR INTERVAL: 150 MS
QRS AXIS: 73 DEGREES
QRSD INTERVAL: 88 MS
QT INTERVAL: 382 MS
QTC INTERVAL: 446 MS
T WAVE AXIS: 56 DEGREES
VENTRICULAR RATE: 82 BPM

## 2019-04-29 PROCEDURE — 93005 ELECTROCARDIOGRAM TRACING: CPT

## 2019-04-29 PROCEDURE — 93010 ELECTROCARDIOGRAM REPORT: CPT | Performed by: INTERNAL MEDICINE

## 2019-05-01 LAB
BASOPHILS # BLD AUTO: 53 CELLS/UL (ref 0–200)
BASOPHILS NFR BLD AUTO: 0.7 %
BUN SERPL-MCNC: 22 MG/DL (ref 7–25)
BUN/CREAT SERPL: 15 (CALC) (ref 6–22)
CALCIUM SERPL-MCNC: 9.2 MG/DL (ref 8.6–10.3)
CHLORIDE SERPL-SCNC: 104 MMOL/L (ref 98–110)
CO2 SERPL-SCNC: 27 MMOL/L (ref 20–32)
CREAT SERPL-MCNC: 1.44 MG/DL (ref 0.7–1.18)
EOSINOPHIL # BLD AUTO: 357 CELLS/UL (ref 15–500)
EOSINOPHIL NFR BLD AUTO: 4.7 %
ERYTHROCYTE [DISTWIDTH] IN BLOOD BY AUTOMATED COUNT: 12 % (ref 11–15)
GLUCOSE SERPL-MCNC: 88 MG/DL (ref 65–99)
HCT VFR BLD AUTO: 42.7 % (ref 38.5–50)
HGB BLD-MCNC: 14.3 G/DL (ref 13.2–17.1)
LYMPHOCYTES # BLD AUTO: 1581 CELLS/UL (ref 850–3900)
LYMPHOCYTES NFR BLD AUTO: 20.8 %
MCH RBC QN AUTO: 32.7 PG (ref 27–33)
MCHC RBC AUTO-ENTMCNC: 33.5 G/DL (ref 32–36)
MCV RBC AUTO: 97.7 FL (ref 80–100)
MONOCYTES # BLD AUTO: 502 CELLS/UL (ref 200–950)
MONOCYTES NFR BLD AUTO: 6.6 %
NEUTROPHILS # BLD AUTO: 5107 CELLS/UL (ref 1500–7800)
NEUTROPHILS NFR BLD AUTO: 67.2 %
PLATELET # BLD AUTO: 267 THOUSAND/UL (ref 140–400)
PMV BLD REES-ECKER: 9.4 FL (ref 7.5–12.5)
POTASSIUM SERPL-SCNC: 4.7 MMOL/L (ref 3.5–5.3)
RBC # BLD AUTO: 4.37 MILLION/UL (ref 4.2–5.8)
SL AMB EGFR AFRICAN AMERICAN: 53 ML/MIN/1.73M2
SL AMB EGFR NON AFRICAN AMERICAN: 46 ML/MIN/1.73M2
SODIUM SERPL-SCNC: 136 MMOL/L (ref 135–146)
WBC # BLD AUTO: 7.6 THOUSAND/UL (ref 3.8–10.8)

## 2019-05-06 ENCOUNTER — HOSPITAL ENCOUNTER (OUTPATIENT)
Dept: CT IMAGING | Facility: HOSPITAL | Age: 79
Discharge: HOME | End: 2019-05-06
Attending: INTERNAL MEDICINE
Payer: MEDICARE

## 2019-05-06 ENCOUNTER — PATIENT OUTREACH (OUTPATIENT)
Dept: FAMILY MEDICINE CLINIC | Facility: CLINIC | Age: 79
End: 2019-05-06

## 2019-05-06 DIAGNOSIS — R91.1 PULMONARY NODULE: ICD-10-CM

## 2019-05-06 PROCEDURE — 71250 CT THORAX DX C-: CPT

## 2019-05-07 ENCOUNTER — PATIENT OUTREACH (OUTPATIENT)
Dept: FAMILY MEDICINE CLINIC | Facility: CLINIC | Age: 79
End: 2019-05-07

## 2019-05-08 ENCOUNTER — HOSPITAL ENCOUNTER (OUTPATIENT)
Dept: PULMONOLOGY | Facility: HOSPITAL | Age: 79
Discharge: HOME | End: 2019-05-08
Attending: INTERNAL MEDICINE
Payer: MEDICARE

## 2019-05-08 DIAGNOSIS — J44.9 COPD, MODERATE (HCC): ICD-10-CM

## 2019-05-08 PROCEDURE — 94729 DIFFUSING CAPACITY: CPT | Performed by: INTERNAL MEDICINE

## 2019-05-08 PROCEDURE — 94060 EVALUATION OF WHEEZING: CPT

## 2019-05-08 PROCEDURE — 94760 N-INVAS EAR/PLS OXIMETRY 1: CPT

## 2019-05-08 PROCEDURE — 94729 DIFFUSING CAPACITY: CPT

## 2019-05-08 PROCEDURE — 94060 EVALUATION OF WHEEZING: CPT | Performed by: INTERNAL MEDICINE

## 2019-05-08 RX ORDER — ALBUTEROL SULFATE 2.5 MG/3ML
2.5 SOLUTION RESPIRATORY (INHALATION) EVERY 6 HOURS PRN
Status: DISCONTINUED | OUTPATIENT
Start: 2019-05-08 | End: 2019-05-12 | Stop reason: HOSPADM

## 2019-05-08 RX ADMIN — ALBUTEROL SULFATE 2.5 MG: 2.5 SOLUTION RESPIRATORY (INHALATION) at 09:53

## 2019-05-14 ENCOUNTER — PATIENT OUTREACH (OUTPATIENT)
Dept: FAMILY MEDICINE CLINIC | Facility: CLINIC | Age: 79
End: 2019-05-14

## 2019-05-21 ENCOUNTER — PATIENT OUTREACH (OUTPATIENT)
Dept: FAMILY MEDICINE CLINIC | Facility: CLINIC | Age: 79
End: 2019-05-21

## 2019-05-22 ENCOUNTER — TELEPHONE (OUTPATIENT)
Dept: FAMILY MEDICINE CLINIC | Facility: CLINIC | Age: 79
End: 2019-05-22

## 2019-05-22 DIAGNOSIS — R30.0 BURNING WITH URINATION: Primary | ICD-10-CM

## 2019-05-22 RX ORDER — CEPHALEXIN 500 MG/1
500 CAPSULE ORAL EVERY 8 HOURS SCHEDULED
Qty: 15 CAPSULE | Refills: 0 | Status: SHIPPED | OUTPATIENT
Start: 2019-05-22 | End: 2019-05-27

## 2019-05-24 DIAGNOSIS — K21.9 GASTROESOPHAGEAL REFLUX DISEASE, ESOPHAGITIS PRESENCE NOT SPECIFIED: ICD-10-CM

## 2019-05-24 LAB
APPEARANCE UR: ABNORMAL
COLOR UR: YELLOW
GLUCOSE UR QL STRIP: NEGATIVE
HGB UR QL STRIP: ABNORMAL
KETONES UR QL STRIP: NEGATIVE
PH UR STRIP: 6 [PH] (ref 5–8)
PROT UR QL STRIP: ABNORMAL
SP GR UR STRIP: 1.02 (ref 1–1.03)

## 2019-05-24 RX ORDER — FAMOTIDINE 40 MG/1
TABLET, FILM COATED ORAL
Qty: 90 TABLET | Refills: 1 | Status: SHIPPED | OUTPATIENT
Start: 2019-05-24 | End: 2019-09-12 | Stop reason: SDUPTHER

## 2019-05-28 ENCOUNTER — TELEPHONE (OUTPATIENT)
Dept: FAMILY MEDICINE CLINIC | Facility: CLINIC | Age: 79
End: 2019-05-28

## 2019-05-28 DIAGNOSIS — J44.9 COPD, MODERATE (HCC): Primary | ICD-10-CM

## 2019-05-28 RX ORDER — IPRATROPIUM BROMIDE AND ALBUTEROL SULFATE 2.5; .5 MG/3ML; MG/3ML
3 SOLUTION RESPIRATORY (INHALATION) 4 TIMES DAILY
Qty: 60 VIAL | Refills: 0 | Status: SHIPPED | OUTPATIENT
Start: 2019-05-28 | End: 2019-09-12 | Stop reason: SDUPTHER

## 2019-05-28 RX ORDER — IPRATROPIUM BROMIDE AND ALBUTEROL SULFATE 2.5; .5 MG/3ML; MG/3ML
3 SOLUTION RESPIRATORY (INHALATION) 4 TIMES DAILY
Qty: 60 VIAL | Refills: 0 | Status: SHIPPED | OUTPATIENT
Start: 2019-05-28 | End: 2019-05-28 | Stop reason: SDUPTHER

## 2019-06-04 ENCOUNTER — PATIENT OUTREACH (OUTPATIENT)
Dept: FAMILY MEDICINE CLINIC | Facility: CLINIC | Age: 79
End: 2019-06-04

## 2019-07-01 DIAGNOSIS — N41.9 PROSTATITIS, UNSPECIFIED PROSTATITIS TYPE: ICD-10-CM

## 2019-07-01 RX ORDER — TAMSULOSIN HYDROCHLORIDE 0.4 MG/1
0.4 CAPSULE ORAL EVERY 12 HOURS
Qty: 180 CAPSULE | Refills: 0 | Status: SHIPPED | OUTPATIENT
Start: 2019-07-01 | End: 2019-09-12 | Stop reason: ALTCHOICE

## 2019-07-12 ENCOUNTER — TELEPHONE (OUTPATIENT)
Dept: PULMONOLOGY | Facility: CLINIC | Age: 79
End: 2019-07-12

## 2019-07-12 NOTE — TELEPHONE ENCOUNTER
Established patient needing pre-op clearance  Surgery: Prostate  Surgery date: 8/15/19  Last seen by us: 3/25/19  On oxygen: No      Would you like to clear patient via a phone call from last visit or would like us to schedule them with you or an AP?

## 2019-07-12 NOTE — TELEPHONE ENCOUNTER
Per insurance, patients have to be seen within 30 days of their surgery  Cece baker scheduled with Carlos

## 2019-07-17 ENCOUNTER — TELEPHONE (OUTPATIENT)
Dept: FAMILY MEDICINE CLINIC | Facility: CLINIC | Age: 79
End: 2019-07-17

## 2019-07-17 ENCOUNTER — TELEPHONE (OUTPATIENT)
Dept: PULMONOLOGY | Facility: HOSPITAL | Age: 79
End: 2019-07-17

## 2019-07-17 NOTE — TELEPHONE ENCOUNTER
I can RX Cephalexin, but they should really call the Urology to be sure they do not suggest alternative treatment, and to be sure the ABX will impact upcoming surgery

## 2019-07-17 NOTE — TELEPHONE ENCOUNTER
WIFE C/O THAT HE IS C/O BLADDER INF SXS- DOES NOT WANT TO COME IN-  WANTS MED TO [facility]    PAINFUL URINATION AND URINATING Q 20 MINUTES    TASK TO PM  ON AUG 15TH HE IS HAVING A UROLIFT SURGERY ON HIS PROSTATE AT [facility]

## 2019-07-22 ENCOUNTER — OFFICE VISIT (OUTPATIENT)
Dept: PULMONOLOGY | Facility: HOSPITAL | Age: 79
End: 2019-07-22
Payer: MEDICARE

## 2019-07-22 VITALS
OXYGEN SATURATION: 94 % | DIASTOLIC BLOOD PRESSURE: 70 MMHG | WEIGHT: 183.4 LBS | TEMPERATURE: 97.6 F | BODY MASS INDEX: 28.79 KG/M2 | HEIGHT: 67 IN | SYSTOLIC BLOOD PRESSURE: 110 MMHG | HEART RATE: 100 BPM

## 2019-07-22 DIAGNOSIS — J44.9 COPD, MODERATE (HCC): Primary | ICD-10-CM

## 2019-07-22 DIAGNOSIS — R35.0 BENIGN PROSTATIC HYPERPLASIA WITH URINARY FREQUENCY: ICD-10-CM

## 2019-07-22 DIAGNOSIS — R91.1 PULMONARY NODULE: ICD-10-CM

## 2019-07-22 DIAGNOSIS — N40.1 BENIGN PROSTATIC HYPERPLASIA WITH URINARY FREQUENCY: ICD-10-CM

## 2019-07-22 PROCEDURE — 99214 OFFICE O/P EST MOD 30 MIN: CPT | Performed by: INTERNAL MEDICINE

## 2019-07-22 NOTE — ASSESSMENT & PLAN NOTE
Mr Albert Valdez is here for preoperative clearance for upcoming prostate surgery  Based on [facility] pulmonary preop scoring he is at a low risk for perioperative complications  He stable from pulmonary standpoint I will maintain on Stiolto 2 puffs once a day  He is also taking his nebulizer 4 times a day and will continue to do so  On this he has had no recent exacerbations, no hospitalizations, and no chronic cough  If any of his symptoms do change prior to his surgery he will be sure to let us know  He will follow up with Dr Chris Escamilla for his regular visit  He is up-to-date on his flu and pneumonia vaccines and I reviewed looked at his most recent labs and radiographic studies

## 2019-07-22 NOTE — PROGRESS NOTES
Assessment/Plan:    COPD, moderate [facility])  Mr Albert Valdez is here for preoperative clearance for upcoming prostate surgery  Based on [facility] pulmonary preop scoring he is at a low risk for perioperative complications  He stable from pulmonary standpoint I will maintain on Stiolto 2 puffs once a day  He is also taking his nebulizer 4 times a day and will continue to do so  On this he has had no recent exacerbations, no hospitalizations, and no chronic cough  If any of his symptoms do change prior to his surgery he will be sure to let us know  He will follow up with Dr Chris Escamilla for his regular visit  He is up-to-date on his flu and pneumonia vaccines and I reviewed looked at his most recent labs and radiographic studies  Diagnoses and all orders for this visit:    COPD, moderate ([address] )    Pulmonary nodule    Benign prostatic hyperplasia with urinary frequency          Subjective:      Patient ID: Shalini Ellison is a 77 y o  male  Mr Albert Valdez is here for preoperative evaluation for upcoming prostate surgery  He has been in his regular state of health doing his Spiriva 2 puffs daily and using his nebulizer 4 times a day  He denies any dyspnea on a flat surface or with 1 flight of stairs  He denies any cough or wheeze  He denies any chest pain or leg swelling  The following portions of the patient's history were reviewed and updated as appropriate: allergies, current medications, past family history, past medical history, past social history, past surgical history and problem list     Review of Systems   Constitutional: Negative  HENT: Negative  Eyes: Negative  Respiratory: Negative for shortness of breath  Cardiovascular: Negative  Gastrointestinal: Negative  Endocrine: Negative  Genitourinary: Negative  Allergic/Immunologic: Negative  Neurological: Negative  Hematological: Negative  Psychiatric/Behavioral: Negative            Objective:      /70 (BP Location: Left arm, Patient Position: Sitting, Cuff Size: Standard)   Pulse 100   Temp 97 6 °F (36 4 °C) (Tympanic)   Ht 5' 7" (1 702 m)   Wt 83 2 kg (183 lb 6 4 oz)   SpO2 94%   BMI 28 72 kg/m²          Physical Exam   Constitutional: He is oriented to person, place, and time  He appears well-developed and well-nourished  HENT:   Head: Normocephalic  Eyes: Pupils are equal, round, and reactive to light  Neck: Neck supple  Cardiovascular: Normal rate  Pulmonary/Chest: Effort normal  No respiratory distress  He has no wheezes  He has no rales  Abdominal: Soft  Musculoskeletal: Normal range of motion  He exhibits no edema  Neurological: He is alert and oriented to person, place, and time  Skin: Skin is warm and dry

## 2019-09-05 DIAGNOSIS — F32.A DEPRESSIVE DISORDER: ICD-10-CM

## 2019-09-05 DIAGNOSIS — F41.9 ANXIETY: ICD-10-CM

## 2019-09-05 RX ORDER — ESCITALOPRAM OXALATE 10 MG/1
10 TABLET ORAL DAILY
Qty: 90 TABLET | Refills: 3 | Status: SHIPPED | OUTPATIENT
Start: 2019-09-05 | End: 2019-09-12 | Stop reason: SDUPTHER

## 2019-09-12 ENCOUNTER — OFFICE VISIT (OUTPATIENT)
Dept: FAMILY MEDICINE CLINIC | Facility: CLINIC | Age: 79
End: 2019-09-12
Payer: MEDICARE

## 2019-09-12 VITALS
HEART RATE: 82 BPM | WEIGHT: 185 LBS | SYSTOLIC BLOOD PRESSURE: 114 MMHG | TEMPERATURE: 97.7 F | OXYGEN SATURATION: 94 % | DIASTOLIC BLOOD PRESSURE: 78 MMHG | HEIGHT: 66 IN | BODY MASS INDEX: 29.73 KG/M2

## 2019-09-12 DIAGNOSIS — N40.1 BENIGN PROSTATIC HYPERPLASIA WITH URINARY FREQUENCY: ICD-10-CM

## 2019-09-12 DIAGNOSIS — K21.9 GASTROESOPHAGEAL REFLUX DISEASE, ESOPHAGITIS PRESENCE NOT SPECIFIED: ICD-10-CM

## 2019-09-12 DIAGNOSIS — R35.0 BENIGN PROSTATIC HYPERPLASIA WITH URINARY FREQUENCY: ICD-10-CM

## 2019-09-12 DIAGNOSIS — J44.9 COPD, MODERATE (HCC): ICD-10-CM

## 2019-09-12 DIAGNOSIS — K21.9 GASTROESOPHAGEAL REFLUX DISEASE WITHOUT ESOPHAGITIS: ICD-10-CM

## 2019-09-12 DIAGNOSIS — M54.2 NECK PAIN: ICD-10-CM

## 2019-09-12 DIAGNOSIS — Z23 NEED FOR VACCINATION: ICD-10-CM

## 2019-09-12 DIAGNOSIS — F41.9 ANXIETY: ICD-10-CM

## 2019-09-12 DIAGNOSIS — F32.A DEPRESSIVE DISORDER: ICD-10-CM

## 2019-09-12 DIAGNOSIS — Z00.00 MEDICARE ANNUAL WELLNESS VISIT, SUBSEQUENT: Primary | ICD-10-CM

## 2019-09-12 PROCEDURE — G0008 ADMIN INFLUENZA VIRUS VAC: HCPCS

## 2019-09-12 PROCEDURE — G0439 PPPS, SUBSEQ VISIT: HCPCS | Performed by: FAMILY MEDICINE

## 2019-09-12 PROCEDURE — 90662 IIV NO PRSV INCREASED AG IM: CPT

## 2019-09-12 PROCEDURE — 99214 OFFICE O/P EST MOD 30 MIN: CPT | Performed by: FAMILY MEDICINE

## 2019-09-12 PROCEDURE — 1124F ACP DISCUSS-NO DSCNMKR DOCD: CPT | Performed by: FAMILY MEDICINE

## 2019-09-12 RX ORDER — FAMOTIDINE 40 MG/1
40 TABLET, FILM COATED ORAL DAILY
Qty: 90 TABLET | Refills: 1 | Status: SHIPPED | OUTPATIENT
Start: 2019-09-12 | End: 2020-05-21 | Stop reason: SDUPTHER

## 2019-09-12 RX ORDER — IPRATROPIUM BROMIDE AND ALBUTEROL SULFATE 2.5; .5 MG/3ML; MG/3ML
3 SOLUTION RESPIRATORY (INHALATION) 4 TIMES DAILY
Qty: 120 VIAL | Refills: 5 | Status: SHIPPED | OUTPATIENT
Start: 2019-09-12 | End: 2020-02-14 | Stop reason: SDUPTHER

## 2019-09-12 RX ORDER — CYCLOBENZAPRINE HCL 10 MG
10 TABLET ORAL 3 TIMES DAILY PRN
Qty: 30 TABLET | Refills: 1 | Status: SHIPPED | OUTPATIENT
Start: 2019-09-12 | End: 2020-05-13 | Stop reason: SDUPTHER

## 2019-09-12 RX ORDER — ALBUTEROL SULFATE 90 UG/1
2 AEROSOL, METERED RESPIRATORY (INHALATION) EVERY 6 HOURS PRN
Qty: 8.5 G | Refills: 3 | Status: SHIPPED | OUTPATIENT
Start: 2019-09-12 | End: 2020-03-03 | Stop reason: SDUPTHER

## 2019-09-12 RX ORDER — ESCITALOPRAM OXALATE 10 MG/1
10 TABLET ORAL DAILY
Qty: 90 TABLET | Refills: 3 | Status: SHIPPED | OUTPATIENT
Start: 2019-09-12 | End: 2020-03-13 | Stop reason: SDUPTHER

## 2019-09-12 NOTE — PROGRESS NOTES
Subjective:   Chief Complaint   Patient presents with    Medicare Wellness Visit        Patient ID: Payam Mays is a 77 y o  male  Medical management-  1) OPD-stable with current medications  2) GERD-patient gets some after meal symptoms  He has been taking famotidine 40 mg at bedtime  3) BPH-patient patient stop Flomax  Continues to see Urology for further evaluations  4) depression with anxiety-stable on current medications  The following portions of the patient's history were reviewed and updated as appropriate: allergies, current medications, past family history, past medical history, past social history, past surgical history and problem list     Review of Systems   Constitutional: Negative for appetite change, chills, diaphoresis and fever  HENT: Negative for ear pain, rhinorrhea, sinus pressure and sore throat  Eyes: Negative for discharge, redness and itching  Respiratory: Negative for cough, shortness of breath and wheezing  Cardiovascular: Negative for chest pain and palpitations  Rapid or slow heart rate   Gastrointestinal: Negative for abdominal pain, diarrhea, nausea and vomiting  Genitourinary: Positive for difficulty urinating  Negative for dysuria, enuresis and hematuria  Neurological: Positive for tremors  Negative for dizziness, light-headedness and headaches  Psychiatric/Behavioral: Positive for sleep disturbance  Negative for behavioral problems and dysphoric mood  The patient is not nervous/anxious  Objective:  Vitals:    09/12/19 1301   BP: 114/78   BP Location: Left arm   Patient Position: Sitting   Cuff Size: Large   Pulse: 82   Temp: 97 7 °F (36 5 °C)   TempSrc: Tympanic   SpO2: 94%   Weight: 83 9 kg (185 lb)   Height: 5' 6" (1 676 m)      Physical Exam   Constitutional: He is oriented to person, place, and time  He appears well-developed and well-nourished  No distress  HENT:   Head: Normocephalic and atraumatic     Right Ear: Tympanic membrane and external ear normal  No drainage  Left Ear: Tympanic membrane normal  No drainage  Mouth/Throat: No oropharyngeal exudate  Eyes: Conjunctivae and EOM are normal  Right eye exhibits no discharge  Left eye exhibits no discharge  Neck: Normal range of motion  Neck supple  No thyromegaly present  Cardiovascular: Normal rate, regular rhythm and normal heart sounds  Pulmonary/Chest: Effort normal  No respiratory distress  He has no wheezes  He has no rales  Lymphadenopathy:     He has no cervical adenopathy  Neurological: He is alert and oriented to person, place, and time  Tremors with pill rolling of the fingers  Skin: Skin is warm and dry  Assessment/Plan:    COPD, moderate ([facility] [address] )  Patient has generally been doing well  Has been using CDL toe however this is not covered by formulary  Will try change to Anoro  Will continue his duo nebs and albuterol as needed  Will give flu shot today  Diagnoses and all orders for this visit:    Medicare annual wellness visit, subsequent    COPD, moderate ([facility] [address] )  -     ipratropium-albuterol (DUO-NEB) 0 5-2 5 mg/3 mL nebulizer solution; Take 1 vial (3 mL total) by nebulization 4 (four) times a day  -     albuterol (PROAIR HFA) 90 mcg/act inhaler; Inhale 2 puffs every 6 (six) hours as needed for wheezing  -     umeclidinium-vilanterol (ANORO ELLIPTA) 62 5-25 MCG/INH inhaler; Inhale 1 puff daily    Gastroesophageal reflux disease without esophagitis  -     famotidine (PEPCID) 40 MG tablet; Take 1 tablet (40 mg total) by mouth daily    Benign prostatic hyperplasia with urinary frequency    Depressive disorder  -     escitalopram (LEXAPRO) 10 mg tablet; Take 1 tablet (10 mg total) by mouth daily    Anxiety  -     escitalopram (LEXAPRO) 10 mg tablet; Take 1 tablet (10 mg total) by mouth daily    Neck pain  -     cyclobenzaprine (FLEXERIL) 10 mg tablet;  Take 1 tablet (10 mg total) by mouth 3 (three) times a day as needed for muscle spasms    Muscle spasm    Gastroesophageal reflux disease, esophagitis presence not specified    Need for vaccination  -     influenza vaccine, 1164-6377, high-dose, PF 0 5 mL (FLUZONE HIGH-DOSE)

## 2019-09-12 NOTE — ASSESSMENT & PLAN NOTE
Patient has generally been doing well  Has been using CDL toe however this is not covered by formulary  Will try change to Anoro  Will continue his duo nebs and albuterol as needed  Will give flu shot today

## 2019-09-12 NOTE — PROGRESS NOTES
Assessment and Plan:     Problem List Items Addressed This Visit     None      Visit Diagnoses     Medicare annual wellness visit, subsequent    -  Primary        BMI Counseling: Body mass index is 29 86 kg/m²  The BMI is above normal  Nutrition recommendations include decreasing portion sizes, encouraging healthy choices of fruits and vegetables and moderation in carbohydrate intake  Exercise recommendations include exercising 3-5 times per week  No pharmacotherapy was ordered  Preventive health issues were discussed with patient, and age appropriate screening tests were ordered as noted in patient's After Visit Summary  Personalized health advice and appropriate referrals for health education or preventive services given if needed, as noted in patient's After Visit Summary       History of Present Illness:     Patient presents for Medicare Annual Wellness visit    Patient Care Team:  Beti Sam MD as PCP - General  Christophe Cottrell DO     Problem List:     Patient Active Problem List   Diagnosis    COPD, moderate ([address] )    Rib pain    Pulmonary nodule    Acid reflux disease    Depressive disorder    Enlarged prostate with lower urinary tract symptoms (LUTS)    Anxiety    Neck pain      Past Medical and Surgical History:     Past Medical History:   Diagnosis Date    Bladder infection     current tx with cipro 5/29/16    BPH (benign prostatic hyperplasia)     Community acquired pneumonia     last assessed 8/28/17, resolved 9/25/17    COPD (chronic obstructive pulmonary disease) ([address] )     Enlarged prostate     GERD (gastroesophageal reflux disease)     Hx of bladder infections     Psychiatric disorder     depression     Past Surgical History:   Procedure Laterality Date    COLON SURGERY      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    COLOSTOMY        Family History:     Family History   Problem Relation Age of Onset    Lung cancer Mother     Cancer Mother     Cancer Father  Alcohol abuse Father     Mental illness Neg Hx       Social History:     Social History     Socioeconomic History    Marital status: /Civil Union     Spouse name: None    Number of children: None    Years of education: None    Highest education level: None   Occupational History    None   Social Needs    Financial resource strain: None    Food insecurity:     Worry: None     Inability: None    Transportation needs:     Medical: None     Non-medical: None   Tobacco Use    Smoking status: Former Smoker     Packs/day: 0 50     Years: 50 00     Pack years: 25 00     Types: Cigarettes     Start date:      Last attempt to quit: 2018     Years since quittin 5    Smokeless tobacco: Former User    Tobacco comment: quit 2017 per [facility]    Substance and Sexual Activity    Alcohol use: No    Drug use: No    Sexual activity: None   Lifestyle    Physical activity:     Days per week: None     Minutes per session: None    Stress: None   Relationships    Social connections:     Talks on phone: None     Gets together: None     Attends [employer] service: None     Active member of club or organization: None     Attends meetings of clubs or organizations: None     Relationship status: None    Intimate partner violence:     Fear of current or ex partner: None     Emotionally abused: None     Physically abused: None     Forced sexual activity: None   Other Topics Concern    None   Social History Narrative    None       Medications and Allergies:     Current Outpatient Medications   Medication Sig Dispense Refill    cyclobenzaprine (FLEXERIL) 10 mg tablet Take 1 tablet (10 mg total) by mouth 3 (three) times a day as needed for muscle spasms 30 tablet 0    escitalopram (LEXAPRO) 10 mg tablet Take 1 tablet (10 mg total) by mouth daily 90 tablet 3    famotidine (PEPCID) 40 MG tablet TAKE 1 TABLET BY MOUTH EVERY DAY 90 tablet 1    ipratropium-albuterol (DUO-NEB) 0 5-2 5 mg/3 mL nebulizer solution Take 1 vial (3 mL total) by nebulization 4 (four) times a day 60 vial 0    LORazepam (ATIVAN) 1 mg tablet Take 1 tablet (1 mg total) by mouth every 8 (eight) hours as needed for anxiety 30 tablet 1    PROAIR  (90 Base) MCG/ACT inhaler INHALE 2 PUFFS EVERY 4-6 HOURS AS NEEDED 8 5 g 0    tiotropium-olodaterol (STIOLTO RESPIMAT) 2 5-2 5 MCG/ACT inhaler Inhale 2 puffs daily 3 Inhaler 3    tamsulosin (FLOMAX) 0 4 mg Take 1 capsule (0 4 mg total) by mouth every 12 (twelve) hours (Patient not taking: Reported on 9/12/2019) 180 capsule 0     No current facility-administered medications for this visit  Allergies   Allergen Reactions    Aspirin Other (See Comments)     Hx stomach ulcer      Immunizations:     Immunization History   Administered Date(s) Administered    INFLUENZA 11/18/2013, 10/26/2014, 09/17/2016, 11/21/2017    Influenza Split High Dose Preservative Free IM 11/17/2015    Influenza TIV (IM) 11/16/2012, 11/18/2013    Influenza, high dose seasonal 0 5 mL 11/27/2018    Pneumococcal Conjugate 13-Valent 11/17/2015, 07/15/2016    Pneumococcal Polysaccharide PPV23 11/12/2012      Health Maintenance:         Topic Date Due    CRC Screening: Colonoscopy  04/24/2021         Topic Date Due    INFLUENZA VACCINE  07/01/2019      Medicare Health Risk Assessment:     /78 (BP Location: Left arm, Patient Position: Sitting, Cuff Size: Large)   Pulse 82   Temp 97 7 °F (36 5 °C) (Tympanic)   Ht 5' 6" (1 676 m)   Wt 83 9 kg (185 lb)   SpO2 94%   BMI 29 86 kg/m²      Marco Antonio Townsend is here for his Subsequent Wellness visit  Last Medicare Wellness visit information reviewed, patient interviewed, no change since last AWV  Health Risk Assessment:   Patient rates overall health as good  Patient feels that their physical health rating is same  Eyesight was rated as slightly worse  Hearing was rated as same  Patient feels that their emotional and mental health rating is same   Pain experienced in the last 7 days has been some  Patient's pain rating has been 4/10  Patient states that he has experienced no weight loss or gain in last 6 months  Depression Screening:   PHQ-2 Score: 0  PHQ-9 Score: 5      Fall Risk Screening: In the past year, patient has experienced: no history of falling in past year      Home Safety:  Patient has trouble with stairs inside or outside of their home  Patient has working smoke alarms and has no working carbon monoxide detector  Home safety hazards include: none  Nutrition:   Current diet is Unhealthy  Eating to many sweets  Medications:   Patient is currently taking over-the-counter supplements  OTC medications include: daily vitamins  Patient is able to manage medications  Activities of Daily Living (ADLs)/Instrumental Activities of Daily Living (IADLs):   Walk and transfer into and out of bed and chair?: Yes  Dress and groom yourself?: Yes    Bathe or shower yourself?: Yes    Feed yourself? Yes  Do your laundry/housekeeping?: Yes  Manage your money, pay your bills and track your expenses?: Yes  Make your own meals?: Yes    Do your own shopping?: Yes    Previous Hospitalizations:   Any hospitalizations or ED visits within the last 12 months?: Yes    How many hospitalizations have you had in the last year?: 1-2    Hospitalization Comments: Hernia and bladder lift operation with in the last year      Advance Care Planning:   Living will: No    Durable POA for healthcare: No    Advanced directive: No    Five wishes given: Yes      Cognitive Screening:   Provider or family/friend/caregiver concerned regarding cognition?: No    PREVENTIVE SCREENINGS      Cardiovascular Screening:    General: Screening Current      Diabetes Screening:     General: Screening Current      Colorectal Cancer Screening:     General: Screening Current      Prostate Cancer Screening:    General: Screening Not Indicated      Osteoporosis Screening:    General: Risks and Benefits Discussed and Screening Not Indicated      Abdominal Aortic Aneurysm (AAA) Screening:    Risk factors include: tobacco use        General: Screening Current      Lung Cancer Screening:     General: Screening Not Indicated      Hepatitis C Screening:    General: Screening Not Indicated      Preventive Screening Comments: Abdominal CTs 2016 in 2017 showed no evidence of AAA  Other Counseling Topics:   Car/seat belt/driving safety and regular weightbearing exercise         Adrian Manuel MD

## 2019-09-12 NOTE — PATIENT INSTRUCTIONS
Medical management-continue  same medications with switching from Stiolto to Anoro 1 inhalation daily  Flu shot today  Continue to work on weight  Medicare Preventive Visit Patient Instructions  Thank you for completing your Welcome to Medicare Visit or Medicare Annual Wellness Visit today  Your next wellness visit will be due in one year (9/12/2020)  The screening/preventive services that you may require over the next 5-10 years are detailed below  Some tests may not apply to you based off risk factors and/or age  Screening tests ordered at today's visit but not completed yet may show as past due  Also, please note that scanned in results may not display below  Preventive Screenings:  Service Recommendations Previous Testing/Comments   Colorectal Cancer Screening  · Colonoscopy    · Fecal Occult Blood Test (FOBT)/Fecal Immunochemical Test (FIT)  · Fecal DNA/Cologuard Test  · Flexible Sigmoidoscopy Age: 54-65 years old   Colonoscopy: every 10 years (May be performed more frequently if at higher risk)  OR  FOBT/FIT: every 1 year  OR  Cologuard: every 3 years  OR  Sigmoidoscopy: every 5 years  Screening may be recommended earlier than age 48 if at higher risk for colorectal cancer  Also, an individualized decision between you and your healthcare provider will decide whether screening between the ages of 74-80 would be appropriate   Colonoscopy: 04/24/2018  FOBT/FIT: Not on file  Cologuard: Not on file  Sigmoidoscopy: Not on file    Screening Current     Prostate Cancer Screening Individualized decision between patient and health care provider in men between ages of 53-78   Medicare will cover every 12 months beginning on the day after your 50th birthday PSA: No results in last 5 years     Screening Not Indicated     Hepatitis C Screening Once for adults born between [facility]  More frequently in patients at high risk for Hepatitis C Hep C Antibody: Not on file       Diabetes Screening 1-2 times per year if you're at risk for diabetes or have pre-diabetes Fasting glucose: No results in last 5 years   A1C: No results in last 5 years    Screening Current   Cholesterol Screening Once every 5 years if you don't have a lipid disorder  May order more often based on risk factors  Lipid panel: 08/06/2018    Screening Current      Other Preventive Screenings Covered by Medicare:  1  Abdominal Aortic Aneurysm (AAA) Screening: covered once if your at risk  You're considered to be at risk if you have a family history of AAA or a male between the age of 73-68 who smoking at least 100 cigarettes in your lifetime  2  Lung Cancer Screening: covers low dose CT scan once per year if you meet all of the following conditions: (1) Age 50-69; (2) No signs or symptoms of lung cancer; (3) Current smoker or have quit smoking within the last 15 years; (4) You have a tobacco smoking history of at least 30 pack years (packs per day x number of years you smoked); (5) You get a written order from a healthcare provider  3  Glaucoma Screening: covered annually if you're considered high risk: (1) You have diabetes OR (2) Family history of glaucoma OR (3)  aged 48 and older OR (3)  American aged 72 and older  3  Osteoporosis Screening: covered every 2 years if you meet one of the following conditions: (1) Have a vertebral abnormality; (2) On glucocorticoid therapy for more than 3 months; (3) Have primary hyperparathyroidism; (4) On osteoporosis medications and need to assess response to drug therapy  5  HIV Screening: covered annually if you're between the age of 12-76  Also covered annually if you are younger than 13 and older than 72 with risk factors for HIV infection  For pregnant patients, it is covered up to 3 times per pregnancy      Immunizations:  Immunization Recommendations   Influenza Vaccine Annual influenza vaccination during flu season is recommended for all persons aged >= 6 months who do not have contraindications   Pneumococcal Vaccine (Prevnar and Pneumovax)  * Prevnar = PCV13  * Pneumovax = PPSV23 Adults 25-60 years old: 1-3 doses may be recommended based on certain risk factors  Adults 72 years old: Prevnar (PCV13) vaccine recommended followed by Pneumovax (PPSV23) vaccine  If already received PPSV23 since turning 65, then PCV13 recommended at least one year after PPSV23 dose  Hepatitis B Vaccine 3 dose series if at intermediate or high risk (ex: diabetes, end stage renal disease, liver disease)   Tetanus (Td) Vaccine - COST NOT COVERED BY MEDICARE PART B Following completion of primary series, a booster dose should be given every 10 years to maintain immunity against tetanus  Td may also be given as tetanus wound prophylaxis  Tdap Vaccine - COST NOT COVERED BY MEDICARE PART B Recommended at least once for all adults  For pregnant patients, recommended with each pregnancy  Shingles Vaccine (Shingrix) - COST NOT COVERED BY MEDICARE PART B  2 shot series recommended in those aged 48 and above     Health Maintenance Due:      Topic Date Due    CRC Screening: Colonoscopy  04/24/2021     Immunizations Due:      Topic Date Due    INFLUENZA VACCINE  07/01/2019     Advance Directives   What are advance directives? Advance directives are legal documents that state your wishes and plans for medical care  These plans are made ahead of time in case you lose your ability to make decisions for yourself  Advance directives can apply to any medical decision, such as the treatments you want, and if you want to donate organs  What are the types of advance directives? There are many types of advance directives, and each state has rules about how to use them  You may choose a combination of any of the following:  · Living will: This is a written record of the treatment you want  You can also choose which treatments you do not want, which to limit, and which to stop at a certain time   This includes surgery, medicine, IV fluid, and tube feedings  · Durable power of  for healthcare [facility]): This is a written record that states who you want to make healthcare choices for you when you are unable to make them for yourself  This person, called a proxy, is usually a family member or a friend  You may choose more than 1 proxy  · Do not resuscitate (DNR) order:  A DNR order is used in case your heart stops beating or you stop breathing  It is a request not to have certain forms of treatment, such as CPR  A DNR order may be included in other types of advance directives  · Medical directive: This covers the care that you want if you are in a coma, near death, or unable to make decisions for yourself  You can list the treatments you want for each condition  Treatment may include pain medicine, surgery, blood transfusions, dialysis, IV or tube feedings, and a ventilator (breathing machine)  · Values history: This document has questions about your views, beliefs, and how you feel and think about life  This information can help others choose the care that you would choose  Why are advance directives important? An advance directive helps you control your care  Although spoken wishes may be used, it is better to have your wishes written down  Spoken wishes can be misunderstood, or not followed  Treatments may be given even if you do not want them  An advance directive may make it easier for your family to make difficult choices about your care  Weight Management   Why it is important to manage your weight:  Being overweight increases your risk of health conditions such as heart disease, high blood pressure, type 2 diabetes, and certain types of cancer  It can also increase your risk for osteoarthritis, sleep apnea, and other respiratory problems  Aim for a slow, steady weight loss  Even a small amount of weight loss can lower your risk of health problems    How to lose weight safely:  A safe and healthy way to lose weight is to eat fewer calories and get regular exercise  You can lose up about 1 pound a week by decreasing the number of calories you eat by 500 calories each day  Healthy meal plan for weight management:  A healthy meal plan includes a variety of foods, contains fewer calories, and helps you stay healthy  A healthy meal plan includes the following:  · Eat whole-grain foods more often  A healthy meal plan should contain fiber  Fiber is the part of grains, fruits, and vegetables that is not broken down by your body  Whole-grain foods are healthy and provide extra fiber in your diet  Some examples of whole-grain foods are whole-wheat breads and pastas, oatmeal, brown rice, and bulgur  · Eat a variety of vegetables every day  Include dark, leafy greens such as spinach, kale, dakotah greens, and mustard greens  Eat yellow and orange vegetables such as carrots, sweet potatoes, and winter squash  · Eat a variety of fruits every day  Choose fresh or canned fruit (canned in its own juice or light syrup) instead of juice  Fruit juice has very little or no fiber  · Eat low-fat dairy foods  Drink fat-free (skim) milk or 1% milk  Eat fat-free yogurt and low-fat cottage cheese  Try low-fat cheeses such as mozzarella and other reduced-fat cheeses  · Choose meat and other protein foods that are low in fat  Choose beans or other legumes such as split peas or lentils  Choose fish, skinless poultry (chicken or turkey), or lean cuts of red meat (beef or pork)  Before you cook meat or poultry, cut off any visible fat  · Use less fat and oil  Try baking foods instead of frying them  Add less fat, such as margarine, sour cream, regular salad dressing and mayonnaise to foods  Eat fewer high-fat foods  Some examples of high-fat foods include french fries, doughnuts, ice cream, and cakes  · Eat fewer sweets  Limit foods and drinks that are high in sugar   This includes candy, cookies, regular soda, and sweetened drinks  Exercise:  Exercise at least 30 minutes per day on most days of the week  Some examples of exercise include walking, biking, dancing, and swimming  You can also fit in more physical activity by taking the stairs instead of the elevator or parking farther away from stores  Ask your healthcare provider about the best exercise plan for you  © Copyright [employer] 2018 Information is for End User's use only and may not be sold, redistributed or otherwise used for commercial purposes   All illustrations and images included in CareNotes® are the copyrighted property of A D A M , [employer]  or [address] [place] [address] [employer]

## 2019-11-27 DIAGNOSIS — K21.9 GASTROESOPHAGEAL REFLUX DISEASE, ESOPHAGITIS PRESENCE NOT SPECIFIED: ICD-10-CM

## 2019-11-27 RX ORDER — FAMOTIDINE 40 MG/1
TABLET, FILM COATED ORAL
Qty: 90 TABLET | Refills: 1 | Status: SHIPPED | OUTPATIENT
Start: 2019-11-27 | End: 2020-05-21

## 2019-12-04 ENCOUNTER — OFFICE VISIT (OUTPATIENT)
Dept: PULMONOLOGY | Facility: HOSPITAL | Age: 79
End: 2019-12-04
Payer: MEDICARE

## 2019-12-04 VITALS
WEIGHT: 191 LBS | SYSTOLIC BLOOD PRESSURE: 116 MMHG | HEIGHT: 66 IN | BODY MASS INDEX: 30.7 KG/M2 | HEART RATE: 92 BPM | TEMPERATURE: 97.3 F | OXYGEN SATURATION: 92 % | DIASTOLIC BLOOD PRESSURE: 78 MMHG

## 2019-12-04 DIAGNOSIS — J44.9 COPD, MODERATE (HCC): Primary | ICD-10-CM

## 2019-12-04 PROBLEM — R91.1 PULMONARY NODULE: Status: RESOLVED | Noted: 2017-08-16 | Resolved: 2019-12-04

## 2019-12-04 PROCEDURE — 99214 OFFICE O/P EST MOD 30 MIN: CPT | Performed by: INTERNAL MEDICINE

## 2019-12-04 RX ORDER — SENNOSIDES 8.6 MG
650 CAPSULE ORAL EVERY 8 HOURS PRN
COMMUNITY
End: 2020-06-13 | Stop reason: HOSPADM

## 2019-12-04 NOTE — ASSESSMENT & PLAN NOTE
He is fairly well controlled on Stiolto, however the inhaler is cost prohibitive  I have no samples to help him with today  Anoro Ellipta is on his formulary and is a comparable inhaler  I provided him with samples and a prescription to use in place of Stiolto  He will let me know if he has issues with this change  He hopefully this will reduce his rescue inhaler needs

## 2019-12-04 NOTE — PROGRESS NOTES
Pulmonary Follow Up Note   John Rodriguez [de-identified] y o  male MRN: [id number]  12/4/2019      Assessment/Plan:     COPD, moderate ([facility] [address] )  He is fairly well controlled on Stiolto, however the inhaler is cost prohibitive  I have no samples to help him with today  Anoro Ellipta is on his formulary and is a comparable inhaler  I provided him with samples and a prescription to use in place of Stiolto  He will let me know if he has issues with this change  He hopefully this will reduce his rescue inhaler needs  Visit orders:    Diagnoses and all orders for this visit:    COPD, moderate ([address] )  -     umeclidinium-vilanterol (ANORO ELLIPTA) 62 5-25 MCG/INH inhaler; Inhale 1 puff daily    Other orders  -     acetaminophen (TYLENOL) 650 mg CR tablet; Take 650 mg by mouth every 8 (eight) hours as needed for mild pain        No follow-ups on file  History of Present Illness   HPI:  John Rodriguez is a [de-identified] y o  male who is here today for follow-up regarding COPD  He is doing well from a pulmonary standpoint  He is using Stiolto 2 puffs once daily and albuterol up to 3 times per day  Unfortunately, his maintenance inhaler therapy is not covered on his insurance  He does not recall having tried Anoro Ellipta in the past   This is on his formulary  He has no significant cough, wheeze or sputum production  He has dyspnea on exertion  He denies chest pain or palpitations  Since his last visit with me, he underwent prostate and hernia surgery  He tolerated both well  Review of Systems   Constitutional: Negative for chills, fever and unexpected weight change  HENT: Negative for postnasal drip and sore throat  Eyes: Negative for visual disturbance  Respiratory:        As noted in HPI   Cardiovascular: Negative for chest pain  Gastrointestinal: Negative for abdominal pain, diarrhea and vomiting  Genitourinary: Negative for difficulty urinating  Skin: Negative for rash  Neurological: Negative for headaches  Hematological: Negative for adenopathy  Psychiatric/Behavioral: Negative  All other systems reviewed and are negative          Historical Information   Past Medical History:   Diagnosis Date    Bladder infection     current tx with cipro 16    BPH (benign prostatic hyperplasia)     Community acquired pneumonia     last assessed 17, resolved 17    COPD (chronic obstructive pulmonary disease) ([facility] [address] )     Enlarged prostate     GERD (gastroesophageal reflux disease)     Hx of bladder infections     Psychiatric disorder     depression     Past Surgical History:   Procedure Laterality Date    COLON SURGERY      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    COLOSTOMY       Family History   Problem Relation Age of Onset    Lung cancer Mother     Cancer Mother     Cancer Father     Alcohol abuse Father     Mental illness Neg Hx        Social History     Tobacco Use   Smoking Status Former Smoker    Packs/day: 0 50    Years: 50 00    Pack years: 25 00    Types: Cigarettes    Start date:     Last attempt to quit: 2018    Years since quittin 7   Smokeless Tobacco Former User   Tobacco Comment    quit 2017 per [employer]        Meds/Allergies     Current Outpatient Medications:     acetaminophen (TYLENOL) 650 mg CR tablet, Take 650 mg by mouth every 8 (eight) hours as needed for mild pain, Disp: , Rfl:     albuterol (PROAIR HFA) 90 mcg/act inhaler, Inhale 2 puffs every 6 (six) hours as needed for wheezing, Disp: 8 5 g, Rfl: 3    cyclobenzaprine (FLEXERIL) 10 mg tablet, Take 1 tablet (10 mg total) by mouth 3 (three) times a day as needed for muscle spasms, Disp: 30 tablet, Rfl: 1    escitalopram (LEXAPRO) 10 mg tablet, Take 1 tablet (10 mg total) by mouth daily, Disp: 90 tablet, Rfl: 3    famotidine (PEPCID) 40 MG tablet, Take 1 tablet (40 mg total) by mouth daily, Disp: 90 tablet, Rfl: 1    ipratropium-albuterol (DUO-NEB) 0 5-2 5 mg/3 mL nebulizer solution, Take 1 vial (3 mL total) by nebulization 4 (four) times a day, Disp: 120 vial, Rfl: 5    LORazepam (ATIVAN) 1 mg tablet, Take 1 tablet (1 mg total) by mouth every 8 (eight) hours as needed for anxiety, Disp: 30 tablet, Rfl: 1    famotidine (PEPCID) 40 MG tablet, TAKE 1 TABLET BY MOUTH EVERY DAY (Patient not taking: Reported on 12/4/2019), Disp: 90 tablet, Rfl: 1    umeclidinium-vilanterol (ANORO ELLIPTA) 62 5-25 MCG/INH inhaler, Inhale 1 puff daily, Disp: 1 Inhaler, Rfl: 5  Allergies   Allergen Reactions    Aspirin Other (See Comments)     Hx stomach ulcer       Vitals: Blood pressure 116/78, pulse 92, temperature (!) 97 3 °F (36 3 °C), temperature source Tympanic, height 5' 6" (1 676 m), weight 86 6 kg (191 lb), SpO2 92 %  Body mass index is 30 83 kg/m²  Oxygen Therapy  SpO2: 92 %  Oxygen Therapy: None (Room air)      Physical Exam   Constitutional: He is oriented to person, place, and time  No distress  HENT:   Head: Normocephalic  Mouth/Throat: No oropharyngeal exudate  Eyes: Pupils are equal, round, and reactive to light  No scleral icterus  Neck: Neck supple  No JVD present  Cardiovascular: Normal rate and regular rhythm  Pulmonary/Chest: He has no wheezes  He has no rales  Abdominal: Soft  There is no tenderness  Musculoskeletal: He exhibits no edema  Lymphadenopathy:     He has no cervical adenopathy  Neurological: He is alert and oriented to person, place, and time  Skin: Skin is warm and dry  Psychiatric: He has a normal mood and affect  Labs: I have personally reviewed pertinent lab results    Lab Results   Component Value Date    WBC 7 6 04/30/2019    HGB 14 3 04/30/2019    HCT 42 7 04/30/2019    MCV 97 7 04/30/2019     04/30/2019     Lab Results   Component Value Date    CALCIUM 9 2 04/30/2019     03/13/2017    K 4 7 04/30/2019    CO2 27 04/30/2019     04/30/2019    BUN 22 04/30/2019    CREATININE 1 44 (H) 04/30/2019     No results found for: IGE  Lab Results Component Value Date    ALT 18 01/09/2019    AST 14 01/09/2019    ALKPHOS 56 01/09/2019    BILITOT 0 6 03/13/2017     Imaging and other studies: I have personally reviewed pertinent reports  and I have personally reviewed pertinent films in PACS chest CT from 5/6/19 shows complete resolution of bilateral lower lobe nodular opacities  There is advanced emphysema which is stable  Pulmonary function testing:  Performed 5/8/19   FEV1/FVC ratio 49%   FEV1 75% predicted  % predicted  DLCO corrected for hemoglobin 64 % predicted

## 2020-01-13 ENCOUNTER — OFFICE VISIT (OUTPATIENT)
Dept: FAMILY MEDICINE CLINIC | Facility: CLINIC | Age: 80
End: 2020-01-13
Payer: MEDICARE

## 2020-01-13 VITALS
SYSTOLIC BLOOD PRESSURE: 134 MMHG | DIASTOLIC BLOOD PRESSURE: 80 MMHG | WEIGHT: 185.4 LBS | HEIGHT: 66 IN | HEART RATE: 85 BPM | TEMPERATURE: 98.2 F | OXYGEN SATURATION: 94 % | BODY MASS INDEX: 29.8 KG/M2

## 2020-01-13 DIAGNOSIS — R10.31 GROIN PAIN, RIGHT: Primary | ICD-10-CM

## 2020-01-13 PROCEDURE — 99213 OFFICE O/P EST LOW 20 MIN: CPT | Performed by: NURSE PRACTITIONER

## 2020-01-13 NOTE — PROGRESS NOTES
[facility]        NAME: Wendy Kemp is a [de-identified] y o  male  : 1940    MRN: [id number]  DATE: 2020  TIME: 11:36 AM    Assessment and Plan   Groin pain, right [R10 31]  1  Groin pain, right  US groin/inguinal area         Patient Instructions     Patient Instructions   US of groin as ordered  No heavy lifting or strenuous activity  Continue Tylenol for pain  Call/return with any problems or concerns      Groin Pain   WHAT YOU NEED TO KNOW:   Groin pain may be felt only in your groin, or it may spread to your buttocks, thigh, or knee  An injury to your hip joint, pelvic area, lower back, or thighs can cause groin pain  DISCHARGE INSTRUCTIONS:   Medicines: You may need any of the following:  · NSAIDs , such as ibuprofen, help decrease swelling, pain, and fever  This medicine is available with or without a doctor's order  NSAIDs can cause stomach bleeding or kidney problems in certain people  If you take blood thinner medicine, always ask if NSAIDs are safe for you  Always read the medicine label and follow directions  Do not give these medicines to children under 10months of age without direction from your child's healthcare provider  · Acetaminophen  decreases pain  It is available without a doctor's order  Ask how much to take and how often to take it  Follow directions  Acetaminophen can cause liver damage if not taken correctly  · Take your medicine as directed  Contact your healthcare provider if you think your medicine is not helping or if you have side effects  Tell him of her if you are allergic to any medicine  Keep a list of the medicines, vitamins, and herbs you take  Include the amounts, and when and why you take them  Bring the list or the pill bottles to follow-up visits  Carry your medicine list with you in case of an emergency  Follow up with your healthcare provider as directed: You may need to return for more tests   Write down your questions so you remember to ask them during your visits  Self-care:   · Rest  as much as possible  Avoid activities that cause or increase your pain  · Apply ice  on your groin for 15 to 20 minutes every hour or as directed  Use an ice pack, or put crushed ice in a plastic bag  Cover it with a towel  Ice helps prevent tissue damage and decreases swelling and pain  · Apply heat  on your groin for 20 to 30 minutes every 2 hours for as many days as directed  Heat helps decrease pain and muscle spasms  Contact your healthcare provider if:   · You have a fever  · You have questions or concerns about your condition or care  Return to the emergency department if:   · You have severe pain even after you take medicine  · You have pain or burning when you urinate  · You have pain on your side that spreads to your groin  © 2017 [employer] Information is for End User's use only and may not be sold, redistributed or otherwise used for commercial purposes  All illustrations and images included in CareNotes® are the copyrighted property of [employer]  or [employer]  The above information is an  only  It is not intended as medical advice for individual conditions or treatments  Talk to your doctor, nurse or pharmacist before following any medical regimen to see if it is safe and effective for you  Chief Complaint     Chief Complaint   Patient presents with    Pain     ingrown possible hernia         History of Present Illness       Right groin pain x 1 month  Comes and goes  Had hernia repair on right groin in august 2019  Patient states he is always doing something so he may have lifted something heavy to pull groin   Takes Tylenol for arthritis which helps a little with the groin pain      Review of Systems   Review of Systems      Current Medications       Current Outpatient Medications:     acetaminophen (TYLENOL) 650 mg CR tablet, Take 650 mg by mouth every 8 (eight) hours as needed for mild pain, Disp: , Rfl:     albuterol (PROAIR HFA) 90 mcg/act inhaler, Inhale 2 puffs every 6 (six) hours as needed for wheezing, Disp: 8 5 g, Rfl: 3    cyclobenzaprine (FLEXERIL) 10 mg tablet, Take 1 tablet (10 mg total) by mouth 3 (three) times a day as needed for muscle spasms, Disp: 30 tablet, Rfl: 1    escitalopram (LEXAPRO) 10 mg tablet, Take 1 tablet (10 mg total) by mouth daily, Disp: 90 tablet, Rfl: 3    famotidine (PEPCID) 40 MG tablet, Take 1 tablet (40 mg total) by mouth daily, Disp: 90 tablet, Rfl: 1    ipratropium-albuterol (DUO-NEB) 0 5-2 5 mg/3 mL nebulizer solution, Take 1 vial (3 mL total) by nebulization 4 (four) times a day, Disp: 120 vial, Rfl: 5    LORazepam (ATIVAN) 1 mg tablet, Take 1 tablet (1 mg total) by mouth every 8 (eight) hours as needed for anxiety, Disp: 30 tablet, Rfl: 1    umeclidinium-vilanterol (ANORO ELLIPTA) 62 5-25 MCG/INH inhaler, Inhale 1 puff daily, Disp: 1 Inhaler, Rfl: 5    famotidine (PEPCID) 40 MG tablet, TAKE 1 TABLET BY MOUTH EVERY DAY (Patient not taking: Reported on 12/4/2019), Disp: 90 tablet, Rfl: 1    Current Allergies     Allergies as of 01/13/2020 - Reviewed 01/13/2020   Allergen Reaction Noted    Aspirin Other (See Comments) 05/29/2016            The following portions of the patient's history were reviewed and updated as appropriate: allergies, current medications, past family history, past medical history, past social history, past surgical history and problem list      Past Medical History:   Diagnosis Date    Bladder infection     current tx with cipro 5/29/16    BPH (benign prostatic hyperplasia)     Community acquired pneumonia     last assessed 8/28/17, resolved 9/25/17    COPD (chronic obstructive pulmonary disease) (HCC)     Enlarged prostate     GERD (gastroesophageal reflux disease)     Hx of bladder infections     Psychiatric disorder     depression       Past Surgical History:   Procedure Laterality Date    COLON SURGERY      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    COLOSTOMY         Family History   Problem Relation Age of Onset    Lung cancer Mother     Cancer Mother     Cancer Father     Alcohol abuse Father     Mental illness Neg Hx          Medications have been verified  Objective   /80 (BP Location: Left arm, Patient Position: Sitting, Cuff Size: Large)   Pulse 85   Temp 98 2 °F (36 8 °C) (Tympanic)   Ht 5' 6" (1 676 m)   Wt 84 1 kg (185 lb 6 4 oz)   SpO2 94%   BMI 29 92 kg/m²        Physical Exam     Physical Exam   Constitutional: He is oriented to person, place, and time  He appears well-developed and well-nourished  No distress  Cardiovascular: Normal rate, regular rhythm and normal heart sounds  No murmur heard  Pulmonary/Chest: Effort normal and breath sounds normal  No respiratory distress  He has no wheezes  Abdominal: Soft  No hernia  Genitourinary:   Genitourinary Comments: No swelling, or bulging present in right groin  Musculoskeletal: Normal range of motion  Neurological: He is alert and oriented to person, place, and time  Skin: Skin is warm and dry  He is not diaphoretic  Psychiatric: He has a normal mood and affect  His behavior is normal  Judgment and thought content normal    Nursing note and vitals reviewed      PHQ-9 Depression Screening    PHQ-9:    Frequency of the following problems over the past two weeks:       Little interest or pleasure in doing things:  0 - not at all  Feeling down, depressed, or hopeless:  0 - not at all  PHQ-2 Score:  0

## 2020-01-13 NOTE — PATIENT INSTRUCTIONS
US of groin as ordered  No heavy lifting or strenuous activity  Continue Tylenol for pain  Call/return with any problems or concerns      Groin Pain   WHAT YOU NEED TO KNOW:   Groin pain may be felt only in your groin, or it may spread to your buttocks, thigh, or knee  An injury to your hip joint, pelvic area, lower back, or thighs can cause groin pain  DISCHARGE INSTRUCTIONS:   Medicines: You may need any of the following:  · NSAIDs , such as ibuprofen, help decrease swelling, pain, and fever  This medicine is available with or without a doctor's order  NSAIDs can cause stomach bleeding or kidney problems in certain people  If you take blood thinner medicine, always ask if NSAIDs are safe for you  Always read the medicine label and follow directions  Do not give these medicines to children under 10months of age without direction from your child's healthcare provider  · Acetaminophen  decreases pain  It is available without a doctor's order  Ask how much to take and how often to take it  Follow directions  Acetaminophen can cause liver damage if not taken correctly  · Take your medicine as directed  Contact your healthcare provider if you think your medicine is not helping or if you have side effects  Tell him of her if you are allergic to any medicine  Keep a list of the medicines, vitamins, and herbs you take  Include the amounts, and when and why you take them  Bring the list or the pill bottles to follow-up visits  Carry your medicine list with you in case of an emergency  Follow up with your healthcare provider as directed: You may need to return for more tests  Write down your questions so you remember to ask them during your visits  Self-care:   · Rest  as much as possible  Avoid activities that cause or increase your pain  · Apply ice  on your groin for 15 to 20 minutes every hour or as directed  Use an ice pack, or put crushed ice in a plastic bag  Cover it with a towel   Ice helps prevent tissue damage and decreases swelling and pain  · Apply heat  on your groin for 20 to 30 minutes every 2 hours for as many days as directed  Heat helps decrease pain and muscle spasms  Contact your healthcare provider if:   · You have a fever  · You have questions or concerns about your condition or care  Return to the emergency department if:   · You have severe pain even after you take medicine  · You have pain or burning when you urinate  · You have pain on your side that spreads to your groin  © 2017 [employer] Information is for End User's use only and may not be sold, redistributed or otherwise used for commercial purposes  All illustrations and images included in CareNotes® are the copyrighted property of [employer]  or [employer]in[employer]  The above information is an  only  It is not intended as medical advice for individual conditions or treatments  Talk to your doctor, nurse or pharmacist before following any medical regimen to see if it is safe and effective for you

## 2020-01-16 ENCOUNTER — HOSPITAL ENCOUNTER (OUTPATIENT)
Dept: ULTRASOUND IMAGING | Facility: HOSPITAL | Age: 80
Discharge: HOME | End: 2020-01-16
Payer: MEDICARE

## 2020-01-16 DIAGNOSIS — R10.31 GROIN PAIN, RIGHT: ICD-10-CM

## 2020-01-16 PROCEDURE — 76705 ECHO EXAM OF ABDOMEN: CPT

## 2020-01-17 ENCOUNTER — TELEPHONE (OUTPATIENT)
Dept: FAMILY MEDICINE CLINIC | Facility: CLINIC | Age: 80
End: 2020-01-17

## 2020-01-17 DIAGNOSIS — R93.89 ABNORMAL ULTRASOUND: Primary | ICD-10-CM

## 2020-01-17 NOTE — TELEPHONE ENCOUNTER
----- Message from [address] [place] [address],[address] sent at 1/17/2020 11:38 AM EST -----  Call with results  US of groin showing enlarged right inguinal lymph node, probable hernia  CT is recommended  Please order CT and refer patient to general surgery

## 2020-01-17 NOTE — TELEPHONE ENCOUNTER
----- Message from [address] [place] [address],[address] sent at 1/17/2020 11:38 AM EST -----  Call with results  US of groin showing enlarged right inguinal lymph node, probable hernia  CT is recommended  Please order CT and refer patient to general surgery

## 2020-01-23 ENCOUNTER — HOSPITAL ENCOUNTER (OUTPATIENT)
Dept: CT IMAGING | Facility: HOSPITAL | Age: 80
Discharge: HOME | End: 2020-01-23
Payer: MEDICARE

## 2020-01-23 DIAGNOSIS — R93.89 ABNORMAL ULTRASOUND: ICD-10-CM

## 2020-01-23 PROCEDURE — 74176 CT ABD & PELVIS W/O CONTRAST: CPT

## 2020-01-28 ENCOUNTER — TELEPHONE (OUTPATIENT)
Dept: FAMILY MEDICINE CLINIC | Facility: CLINIC | Age: 80
End: 2020-01-28

## 2020-01-28 NOTE — TELEPHONE ENCOUNTER
----- Message from [address] [place] [address],[address] sent at 1/28/2020  3:50 PM EST -----  Call patient   He needs to schedule appointment with general surgery to review ct results

## 2020-02-14 DIAGNOSIS — J44.9 COPD, MODERATE (HCC): ICD-10-CM

## 2020-02-14 RX ORDER — IPRATROPIUM BROMIDE AND ALBUTEROL SULFATE 2.5; .5 MG/3ML; MG/3ML
3 SOLUTION RESPIRATORY (INHALATION) 4 TIMES DAILY
Qty: 120 VIAL | Refills: 5 | Status: SHIPPED | OUTPATIENT
Start: 2020-02-14 | End: 2021-03-09

## 2020-02-14 NOTE — TELEPHONE ENCOUNTER
Please review approve     Refill     Nebulizer solution   New pharmacy   [facility]      MM due now  Patient wife will call back to scheduled

## 2020-02-26 DIAGNOSIS — J44.9 COPD, MODERATE (HCC): Primary | ICD-10-CM

## 2020-02-26 NOTE — TELEPHONE ENCOUNTER
Patient came to office requesting Symbicort inhaler, patient has a coupon that he would like to use

## 2020-02-27 RX ORDER — BUDESONIDE AND FORMOTEROL FUMARATE DIHYDRATE 160; 4.5 UG/1; UG/1
AEROSOL RESPIRATORY (INHALATION)
Qty: 1 INHALER | Refills: 2 | Status: SHIPPED | OUTPATIENT
Start: 2020-02-27 | End: 2020-03-03 | Stop reason: SDUPTHER

## 2020-03-03 ENCOUNTER — OFFICE VISIT (OUTPATIENT)
Dept: FAMILY MEDICINE CLINIC | Facility: CLINIC | Age: 80
End: 2020-03-03
Payer: MEDICARE

## 2020-03-03 VITALS
HEIGHT: 67 IN | BODY MASS INDEX: 29.51 KG/M2 | DIASTOLIC BLOOD PRESSURE: 80 MMHG | SYSTOLIC BLOOD PRESSURE: 126 MMHG | TEMPERATURE: 98.2 F | HEART RATE: 84 BPM | WEIGHT: 188 LBS | OXYGEN SATURATION: 94 %

## 2020-03-03 DIAGNOSIS — J44.1 COPD WITH EXACERBATION (HCC): Primary | ICD-10-CM

## 2020-03-03 PROCEDURE — 1160F RVW MEDS BY RX/DR IN RCRD: CPT | Performed by: NURSE PRACTITIONER

## 2020-03-03 PROCEDURE — 99214 OFFICE O/P EST MOD 30 MIN: CPT | Performed by: NURSE PRACTITIONER

## 2020-03-03 PROCEDURE — 4040F PNEUMOC VAC/ADMIN/RCVD: CPT | Performed by: NURSE PRACTITIONER

## 2020-03-03 PROCEDURE — 1036F TOBACCO NON-USER: CPT | Performed by: NURSE PRACTITIONER

## 2020-03-03 PROCEDURE — 3008F BODY MASS INDEX DOCD: CPT | Performed by: NURSE PRACTITIONER

## 2020-03-03 RX ORDER — PREDNISONE 20 MG/1
TABLET ORAL
Qty: 15 TABLET | Refills: 0 | Status: ON HOLD | OUTPATIENT
Start: 2020-03-03 | End: 2020-06-13 | Stop reason: ALTCHOICE

## 2020-03-03 RX ORDER — ALBUTEROL SULFATE 90 UG/1
2 AEROSOL, METERED RESPIRATORY (INHALATION) EVERY 6 HOURS PRN
Qty: 8.5 G | Refills: 3 | Status: SHIPPED | OUTPATIENT
Start: 2020-03-03 | End: 2020-05-29 | Stop reason: SDUPTHER

## 2020-03-03 RX ORDER — BUDESONIDE AND FORMOTEROL FUMARATE DIHYDRATE 160; 4.5 UG/1; UG/1
AEROSOL RESPIRATORY (INHALATION)
Qty: 1 INHALER | Refills: 2 | Status: SHIPPED | OUTPATIENT
Start: 2020-03-03 | End: 2020-05-29 | Stop reason: ALTCHOICE

## 2020-03-03 NOTE — PATIENT INSTRUCTIONS
Prednisone as directed  Continue inhalers as ordered  If SOB increases, you have chest pain or weakness go to the ER  Call/returnwith any problems or concerns

## 2020-03-03 NOTE — PROGRESS NOTES
[facility]        NAME: John Rodriguez is a [de-identified] y o  male  : 1940    MRN: [id number]  DATE: March 3, 2020  TIME: 4:19 PM    Assessment and Plan   COPD with exacerbation ([facility] [address] ) [J44 1]  1  COPD with exacerbation (HCC)  predniSONE 20 mg tablet    albuterol (ProAir HFA) 90 mcg/act inhaler    budesonide-formoterol (SYMBICORT) 160-4 5 mcg/act inhaler         Patient Instructions     Patient Instructions   Prednisone as directed  Continue inhalers as ordered  If SOB increases, you have chest pain or weakness go to the ER  Call/returnwith any problems or concerns          Chief Complaint     Chief Complaint   Patient presents with    Cough     diffucult to breath         History of Present Illness       Cough, congestion,  sob x 1 week  Cough is a dry cough  Denies wheezing, denies chest pain  Using Inhalers as directed  Needs a refill for the symbicort  SOB is increased with activity  Not taking any otc cold medication  Review of Systems   Review of Systems   Constitutional: Negative for activity change, chills, fatigue and fever  HENT: Positive for congestion  Negative for ear pain, postnasal drip, rhinorrhea, sinus pressure, sinus pain and sore throat  Eyes: Negative for pain, discharge and redness  Respiratory: Positive for cough, chest tightness and shortness of breath  Negative for wheezing  Cardiovascular: Negative for chest pain  Gastrointestinal: Negative for constipation, diarrhea, nausea and vomiting  Musculoskeletal: Negative for myalgias  Skin: Negative for rash  Neurological: Negative for dizziness and headaches           Current Medications       Current Outpatient Medications:     acetaminophen (TYLENOL) 650 mg CR tablet, Take 650 mg by mouth every 8 (eight) hours as needed for mild pain, Disp: , Rfl:     albuterol (ProAir HFA) 90 mcg/act inhaler, Inhale 2 puffs every 6 (six) hours as needed for wheezing, Disp: 8 5 g, Rfl: 3    budesonide-formoterol (SYMBICORT) 160-4 5 mcg/act inhaler, Inhale 2 puffs once a day then Rinse mouth after use , Disp: 1 Inhaler, Rfl: 2    cyclobenzaprine (FLEXERIL) 10 mg tablet, Take 1 tablet (10 mg total) by mouth 3 (three) times a day as needed for muscle spasms, Disp: 30 tablet, Rfl: 1    escitalopram (LEXAPRO) 10 mg tablet, Take 1 tablet (10 mg total) by mouth daily, Disp: 90 tablet, Rfl: 3    famotidine (PEPCID) 40 MG tablet, Take 1 tablet (40 mg total) by mouth daily, Disp: 90 tablet, Rfl: 1    ipratropium-albuterol (DUO-NEB) 0 5-2 5 mg/3 mL nebulizer solution, Take 1 vial (3 mL total) by nebulization 4 (four) times a day, Disp: 120 vial, Rfl: 5    LORazepam (ATIVAN) 1 mg tablet, Take 1 tablet (1 mg total) by mouth every 8 (eight) hours as needed for anxiety, Disp: 30 tablet, Rfl: 1    umeclidinium-vilanterol (ANORO ELLIPTA) 62 5-25 MCG/INH inhaler, Inhale 1 puff daily, Disp: 1 Inhaler, Rfl: 5    famotidine (PEPCID) 40 MG tablet, TAKE 1 TABLET BY MOUTH EVERY DAY (Patient not taking: Reported on 12/4/2019), Disp: 90 tablet, Rfl: 1    predniSONE 20 mg tablet, Take 20 mg twice daily x 5 days, then take daily x 5 days, Disp: 15 tablet, Rfl: 0    Current Allergies     Allergies as of 03/03/2020 - Reviewed 03/03/2020   Allergen Reaction Noted    Aspirin Other (See Comments) 05/29/2016            The following portions of the patient's history were reviewed and updated as appropriate: allergies, current medications, past family history, past medical history, past social history, past surgical history and problem list      Past Medical History:   Diagnosis Date    Anxiety     Bladder infection     current tx with cipro 5/29/16    BPH (benign prostatic hyperplasia)     Community acquired pneumonia     last assessed 8/28/17, resolved 9/25/17    COPD (chronic obstructive pulmonary disease) (HCC)     Enlarged prostate     GERD (gastroesophageal reflux disease)     History of colonoscopy 03/19/2015    POLYP IN THE ASCENDING COLON-[facility]    Hx of bladder infections     Inguinal hernia, right 05/10/2019    Neck pain     Psychiatric disorder     depression    Pulmonary nodule 01/08/2019    STABLE 6MM LEFT APICAL NODULE    Respiratory failure with hypoxia ([address] ) 01/08/2019    Urinary retention        Past Surgical History:   Procedure Laterality Date    BLADDER SURGERY  08/15/2019    UROLIFT    COLOSTOMY  02/03/2011    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    INGUINAL HERNIA REPAIR Right 05/10/2019    [facility]    LAPAROSCOPIC COLON RESECTION  02/03/2011    ABDOMIANOPERITONEAL RESECTION FOR RECTAL MASS  DONE BY RADHA MCDUFFIE       Family History   Problem Relation Age of Onset    Lung cancer Mother     Cancer Mother     Cancer Father     Alcohol abuse Father     Mental illness Neg Hx          Medications have been verified  Objective   /80 (BP Location: Left arm, Patient Position: Sitting, Cuff Size: Extra-Large)   Pulse 84   Temp 98 2 °F (36 8 °C) (Tympanic)   Ht 5' 6 61" (1 692 m) Comment: with shoes on  Wt 85 3 kg (188 lb)   SpO2 94%   BMI 29 79 kg/m²        Physical Exam     Physical Exam   Constitutional: He is oriented to person, place, and time  He appears well-developed and well-nourished  HENT:   Head: Normocephalic  Eyes: Pupils are equal, round, and reactive to light  Conjunctivae and EOM are normal    Neck: Normal range of motion  Neck supple  No thyromegaly present  Cardiovascular: Normal rate, regular rhythm and normal heart sounds  Pulmonary/Chest: Effort normal  No respiratory distress  He has decreased breath sounds  He has wheezes  He has no rales  Lymphadenopathy:     He has no cervical adenopathy  Neurological: He is alert and oriented to person, place, and time  Skin: Skin is warm and dry  Psychiatric: He has a normal mood and affect  His behavior is normal    Vitals reviewed  BMI Counseling: Body mass index is 29 79 kg/m²   The BMI is above normal  Nutrition recommendations include reducing portion sizes

## 2020-03-04 ENCOUNTER — TELEPHONE (OUTPATIENT)
Dept: FAMILY MEDICINE CLINIC | Facility: CLINIC | Age: 80
End: 2020-03-04

## 2020-03-04 NOTE — TELEPHONE ENCOUNTER
Pt wife call requesting the most recent EKG to be fax [phone] documentation was Fax and pt wife is aware Thank you

## 2020-03-12 ENCOUNTER — TRANSCRIBE ORDERS (OUTPATIENT)
Dept: ADMINISTRATIVE | Facility: HOSPITAL | Age: 80
End: 2020-03-12

## 2020-03-12 ENCOUNTER — APPOINTMENT (OUTPATIENT)
Dept: LAB | Facility: HOSPITAL | Age: 80
End: 2020-03-12
Payer: MEDICARE

## 2020-03-12 DIAGNOSIS — Z01.818 OTHER SPECIFIED PRE-OPERATIVE EXAMINATION: ICD-10-CM

## 2020-03-12 DIAGNOSIS — Z01.818 OTHER SPECIFIED PRE-OPERATIVE EXAMINATION: Primary | ICD-10-CM

## 2020-03-12 LAB
ANION GAP SERPL CALCULATED.3IONS-SCNC: 7 MMOL/L (ref 4–13)
BUN SERPL-MCNC: 29 MG/DL (ref 5–25)
CALCIUM SERPL-MCNC: 8.8 MG/DL (ref 8.3–10.1)
CHLORIDE SERPL-SCNC: 108 MMOL/L (ref 100–108)
CO2 SERPL-SCNC: 27 MMOL/L (ref 21–32)
CREAT SERPL-MCNC: 1.43 MG/DL (ref 0.6–1.3)
GFR SERPL CREATININE-BSD FRML MDRD: 46 ML/MIN/1.73SQ M
GLUCOSE P FAST SERPL-MCNC: 83 MG/DL (ref 65–99)
POTASSIUM SERPL-SCNC: 4.6 MMOL/L (ref 3.5–5.3)
SODIUM SERPL-SCNC: 142 MMOL/L (ref 136–145)

## 2020-03-12 PROCEDURE — 80048 BASIC METABOLIC PNL TOTAL CA: CPT

## 2020-03-12 PROCEDURE — 36415 COLL VENOUS BLD VENIPUNCTURE: CPT

## 2020-03-13 DIAGNOSIS — F41.9 ANXIETY: ICD-10-CM

## 2020-03-13 DIAGNOSIS — F32.A DEPRESSIVE DISORDER: ICD-10-CM

## 2020-03-13 RX ORDER — ESCITALOPRAM OXALATE 10 MG/1
10 TABLET ORAL DAILY
Qty: 90 TABLET | Refills: 0 | Status: SHIPPED | OUTPATIENT
Start: 2020-03-13 | End: 2020-06-23 | Stop reason: SDUPTHER

## 2020-05-13 DIAGNOSIS — M54.2 NECK PAIN: ICD-10-CM

## 2020-05-13 RX ORDER — CYCLOBENZAPRINE HCL 10 MG
10 TABLET ORAL 3 TIMES DAILY PRN
Qty: 30 TABLET | Refills: 1 | Status: SHIPPED | OUTPATIENT
Start: 2020-05-13 | End: 2022-04-04

## 2020-05-21 DIAGNOSIS — K21.9 GASTROESOPHAGEAL REFLUX DISEASE, ESOPHAGITIS PRESENCE NOT SPECIFIED: ICD-10-CM

## 2020-05-21 RX ORDER — FAMOTIDINE 40 MG/1
TABLET, FILM COATED ORAL
Qty: 90 TABLET | Refills: 1 | Status: SHIPPED | OUTPATIENT
Start: 2020-05-21 | End: 2020-11-23 | Stop reason: SDUPTHER

## 2020-05-27 ENCOUNTER — TELEPHONE (OUTPATIENT)
Dept: FAMILY MEDICINE CLINIC | Facility: CLINIC | Age: 80
End: 2020-05-27

## 2020-05-28 DIAGNOSIS — J44.1 COPD WITH EXACERBATION (HCC): Primary | ICD-10-CM

## 2020-05-29 ENCOUNTER — TELEMEDICINE (OUTPATIENT)
Dept: FAMILY MEDICINE CLINIC | Facility: CLINIC | Age: 80
End: 2020-05-29
Payer: MEDICARE

## 2020-05-29 DIAGNOSIS — J44.1 COPD WITH EXACERBATION (HCC): ICD-10-CM

## 2020-05-29 DIAGNOSIS — J44.9 COPD, MODERATE (HCC): Primary | ICD-10-CM

## 2020-05-29 PROCEDURE — 99213 OFFICE O/P EST LOW 20 MIN: CPT | Performed by: FAMILY MEDICINE

## 2020-05-29 RX ORDER — ALBUTEROL SULFATE 90 UG/1
2 AEROSOL, METERED RESPIRATORY (INHALATION) EVERY 6 HOURS PRN
Qty: 8.5 G | Refills: 3 | Status: SHIPPED | OUTPATIENT
Start: 2020-05-29

## 2020-06-02 ENCOUNTER — TELEPHONE (OUTPATIENT)
Dept: FAMILY MEDICINE CLINIC | Facility: CLINIC | Age: 80
End: 2020-06-02

## 2020-06-02 DIAGNOSIS — R52 SEVERE PAIN: Primary | ICD-10-CM

## 2020-06-02 RX ORDER — OXYCODONE HYDROCHLORIDE 5 MG/1
5 TABLET ORAL EVERY 4 HOURS PRN
Qty: 15 TABLET | Refills: 0 | Status: ON HOLD | OUTPATIENT
Start: 2020-06-02 | End: 2020-06-13 | Stop reason: ALTCHOICE

## 2020-06-03 ENCOUNTER — HOSPITAL ENCOUNTER (EMERGENCY)
Facility: HOSPITAL | Age: 80
End: 2020-06-03
Attending: EMERGENCY MEDICINE
Payer: MEDICARE

## 2020-06-03 ENCOUNTER — APPOINTMENT (EMERGENCY)
Dept: CT IMAGING | Facility: HOSPITAL | Age: 80
End: 2020-06-03
Payer: MEDICARE

## 2020-06-03 ENCOUNTER — APPOINTMENT (EMERGENCY)
Dept: RADIOLOGY | Facility: HOSPITAL | Age: 80
End: 2020-06-03
Payer: MEDICARE

## 2020-06-03 ENCOUNTER — HOSPITAL ENCOUNTER (INPATIENT)
Facility: HOSPITAL | Age: 80
LOS: 10 days | Discharge: HOME HEALTH | DRG: 183 | End: 2020-06-13
Attending: SURGERY | Admitting: SURGERY
Payer: MEDICARE

## 2020-06-03 VITALS
OXYGEN SATURATION: 93 % | DIASTOLIC BLOOD PRESSURE: 64 MMHG | WEIGHT: 188 LBS | TEMPERATURE: 98.2 F | HEART RATE: 93 BPM | SYSTOLIC BLOOD PRESSURE: 135 MMHG | BODY MASS INDEX: 29.79 KG/M2 | RESPIRATION RATE: 22 BRPM

## 2020-06-03 DIAGNOSIS — J44.9 COPD, MODERATE (HCC): ICD-10-CM

## 2020-06-03 DIAGNOSIS — R09.02 HYPOXEMIA: ICD-10-CM

## 2020-06-03 DIAGNOSIS — R33.8 BENIGN PROSTATIC HYPERPLASIA WITH URINARY RETENTION: Primary | ICD-10-CM

## 2020-06-03 DIAGNOSIS — S22.42XA: Primary | ICD-10-CM

## 2020-06-03 DIAGNOSIS — R06.89 ACUTE RESPIRATORY INSUFFICIENCY: ICD-10-CM

## 2020-06-03 DIAGNOSIS — S22.42XA CLOSED FRACTURE OF MULTIPLE RIBS OF LEFT SIDE, INITIAL ENCOUNTER: ICD-10-CM

## 2020-06-03 DIAGNOSIS — J44.9 COPD (CHRONIC OBSTRUCTIVE PULMONARY DISEASE) (HCC): ICD-10-CM

## 2020-06-03 DIAGNOSIS — N40.1 BENIGN PROSTATIC HYPERPLASIA WITH URINARY RETENTION: Primary | ICD-10-CM

## 2020-06-03 PROBLEM — R33.9 URINARY RETENTION: Status: ACTIVE | Noted: 2020-06-03
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
## 2020-06-03 PROBLEM — S22.49XA MULTIPLE RIB FRACTURES: Status: ACTIVE | Noted: 2020-06-03

## 2020-06-03 LAB
ANION GAP SERPL CALCULATED.3IONS-SCNC: 8 MMOL/L (ref 4–13)
APTT PPP: 30 SECONDS (ref 23–37)
ATRIAL RATE: 147 BPM
ATRIAL RATE: 93 BPM
BASOPHILS # BLD AUTO: 0.04 THOUSANDS/ΜL (ref 0–0.1)
BASOPHILS NFR BLD AUTO: 0 % (ref 0–1)
BUN SERPL-MCNC: 18 MG/DL (ref 5–25)
CALCIUM SERPL-MCNC: 8.7 MG/DL (ref 8.3–10.1)
CHLORIDE SERPL-SCNC: 101 MMOL/L (ref 100–108)
CO2 SERPL-SCNC: 27 MMOL/L (ref 21–32)
CREAT SERPL-MCNC: 1.44 MG/DL (ref 0.6–1.3)
EOSINOPHIL # BLD AUTO: 0.18 THOUSAND/ΜL (ref 0–0.61)
EOSINOPHIL NFR BLD AUTO: 1 % (ref 0–6)
ERYTHROCYTE [DISTWIDTH] IN BLOOD BY AUTOMATED COUNT: 12.7 % (ref 11.6–15.1)
GFR SERPL CREATININE-BSD FRML MDRD: 46 ML/MIN/1.73SQ M
GLUCOSE SERPL-MCNC: 113 MG/DL (ref 65–140)
HCT VFR BLD AUTO: 43.9 % (ref 36.5–49.3)
HGB BLD-MCNC: 14.9 G/DL (ref 12–17)
IMM GRANULOCYTES # BLD AUTO: 0.11 THOUSAND/UL (ref 0–0.2)
IMM GRANULOCYTES NFR BLD AUTO: 1 % (ref 0–2)
INR PPP: 1.16 (ref 0.84–1.19)
LYMPHOCYTES # BLD AUTO: 1.4 THOUSANDS/ΜL (ref 0.6–4.47)
LYMPHOCYTES NFR BLD AUTO: 8 % (ref 14–44)
MCH RBC QN AUTO: 33.6 PG (ref 26.8–34.3)
MCHC RBC AUTO-ENTMCNC: 33.9 G/DL (ref 31.4–37.4)
MCV RBC AUTO: 99 FL (ref 82–98)
MONOCYTES # BLD AUTO: 1.46 THOUSAND/ΜL (ref 0.17–1.22)
MONOCYTES NFR BLD AUTO: 8 % (ref 4–12)
NEUTROPHILS # BLD AUTO: 14.85 THOUSANDS/ΜL (ref 1.85–7.62)
NEUTS SEG NFR BLD AUTO: 82 % (ref 43–75)
NRBC BLD AUTO-RTO: 0 /100 WBCS
P AXIS: 64 DEGREES
P AXIS: 71 DEGREES
PLATELET # BLD AUTO: 206 THOUSANDS/UL (ref 149–390)
PMV BLD AUTO: 9.6 FL (ref 8.9–12.7)
POTASSIUM SERPL-SCNC: 3.8 MMOL/L (ref 3.5–5.3)
PR INTERVAL: 154 MS
PR INTERVAL: 176 MS
PROTHROMBIN TIME: 14.5 SECONDS (ref 11.6–14.5)
QRS AXIS: 63 DEGREES
QRS AXIS: 67 DEGREES
QRSD INTERVAL: 92 MS
QRSD INTERVAL: 94 MS
QT INTERVAL: 344 MS
QT INTERVAL: 356 MS
QTC INTERVAL: 426 MS
QTC INTERVAL: 471 MS
RBC # BLD AUTO: 4.43 MILLION/UL (ref 3.88–5.62)
SODIUM SERPL-SCNC: 136 MMOL/L (ref 136–145)
T WAVE AXIS: 31 DEGREES
T WAVE AXIS: 65 DEGREES
TROPONIN I SERPL-MCNC: <0.02 NG/ML
TROPONIN I SERPL-MCNC: <0.02 NG/ML
VENTRICULAR RATE: 113 BPM
VENTRICULAR RATE: 86 BPM
WBC # BLD AUTO: 18.04 THOUSAND/UL (ref 4.31–10.16)

## 2020-06-03 PROCEDURE — 94644 CONT INHLJ TX 1ST HOUR: CPT

## 2020-06-03 PROCEDURE — 93010 ELECTROCARDIOGRAM REPORT: CPT | Performed by: INTERNAL MEDICINE

## 2020-06-03 PROCEDURE — 84484 ASSAY OF TROPONIN QUANT: CPT | Performed by: EMERGENCY MEDICINE

## 2020-06-03 PROCEDURE — 93005 ELECTROCARDIOGRAM TRACING: CPT

## 2020-06-03 PROCEDURE — 99285 EMERGENCY DEPT VISIT HI MDM: CPT

## 2020-06-03 PROCEDURE — 99222 1ST HOSP IP/OBS MODERATE 55: CPT | Performed by: INTERNAL MEDICINE

## 2020-06-03 PROCEDURE — 74177 CT ABD & PELVIS W/CONTRAST: CPT

## 2020-06-03 PROCEDURE — 85730 THROMBOPLASTIN TIME PARTIAL: CPT | Performed by: EMERGENCY MEDICINE

## 2020-06-03 PROCEDURE — 99222 1ST HOSP IP/OBS MODERATE 55: CPT | Performed by: SURGERY

## 2020-06-03 PROCEDURE — 99285 EMERGENCY DEPT VISIT HI MDM: CPT | Performed by: EMERGENCY MEDICINE

## 2020-06-03 PROCEDURE — 80048 BASIC METABOLIC PNL TOTAL CA: CPT | Performed by: EMERGENCY MEDICINE

## 2020-06-03 PROCEDURE — 94760 N-INVAS EAR/PLS OXIMETRY 1: CPT

## 2020-06-03 PROCEDURE — 82805 BLOOD GASES W/O2 SATURATION: CPT | Performed by: EMERGENCY MEDICINE

## 2020-06-03 PROCEDURE — 96361 HYDRATE IV INFUSION ADD-ON: CPT

## 2020-06-03 PROCEDURE — 85025 COMPLETE CBC W/AUTO DIFF WBC: CPT | Performed by: EMERGENCY MEDICINE

## 2020-06-03 PROCEDURE — 96374 THER/PROPH/DIAG INJ IV PUSH: CPT

## 2020-06-03 PROCEDURE — 71260 CT THORAX DX C+: CPT

## 2020-06-03 PROCEDURE — 5A09457 ASSISTANCE WITH RESPIRATORY VENTILATION, 24-96 CONSECUTIVE HOURS, CONTINUOUS POSITIVE AIRWAY PRESSURE: ICD-10-PCS | Performed by: SURGERY

## 2020-06-03 PROCEDURE — 71045 X-RAY EXAM CHEST 1 VIEW: CPT

## 2020-06-03 PROCEDURE — 36415 COLL VENOUS BLD VENIPUNCTURE: CPT | Performed by: EMERGENCY MEDICINE

## 2020-06-03 PROCEDURE — 85610 PROTHROMBIN TIME: CPT | Performed by: EMERGENCY MEDICINE

## 2020-06-03 RX ORDER — FAMOTIDINE 20 MG/1
40 TABLET, FILM COATED ORAL DAILY
Status: DISCONTINUED | OUTPATIENT
Start: 2020-06-04 | End: 2020-06-04

## 2020-06-03 RX ORDER — ALBUTEROL SULFATE 2.5 MG/3ML
2.5 SOLUTION RESPIRATORY (INHALATION) EVERY 4 HOURS PRN
COMMUNITY

## 2020-06-03 RX ORDER — ALBUTEROL SULFATE 2.5 MG/3ML
5 SOLUTION RESPIRATORY (INHALATION) ONCE
Status: COMPLETED | OUTPATIENT
Start: 2020-06-03 | End: 2020-06-03

## 2020-06-03 RX ORDER — IPRATROPIUM BROMIDE AND ALBUTEROL SULFATE 2.5; .5 MG/3ML; MG/3ML
3 SOLUTION RESPIRATORY (INHALATION) 4 TIMES DAILY
Status: DISCONTINUED | OUTPATIENT
Start: 2020-06-03 | End: 2020-06-03

## 2020-06-03 RX ORDER — ESCITALOPRAM OXALATE 10 MG/1
10 TABLET ORAL DAILY
Status: DISCONTINUED | OUTPATIENT
Start: 2020-06-04 | End: 2020-06-13 | Stop reason: HOSPADM

## 2020-06-03 RX ORDER — LIDOCAINE 50 MG/G
2 PATCH TOPICAL DAILY
Status: COMPLETED | OUTPATIENT
Start: 2020-06-03 | End: 2020-06-04

## 2020-06-03 RX ORDER — CYCLOBENZAPRINE HCL 10 MG
5 TABLET ORAL 3 TIMES DAILY
Status: DISCONTINUED | OUTPATIENT
Start: 2020-06-03 | End: 2020-06-05

## 2020-06-03 RX ORDER — OXYCODONE HYDROCHLORIDE 5 MG/1
5 TABLET ORAL EVERY 4 HOURS PRN
Status: DISCONTINUED | OUTPATIENT
Start: 2020-06-03 | End: 2020-06-04

## 2020-06-03 RX ORDER — ALBUTEROL SULFATE 90 UG/1
2 AEROSOL, METERED RESPIRATORY (INHALATION) EVERY 6 HOURS PRN
Status: DISCONTINUED | OUTPATIENT
Start: 2020-06-03 | End: 2020-06-03

## 2020-06-03 RX ORDER — FENTANYL CITRATE 50 UG/ML
25 INJECTION, SOLUTION INTRAMUSCULAR; INTRAVENOUS ONCE
Status: COMPLETED | OUTPATIENT
Start: 2020-06-03 | End: 2020-06-03

## 2020-06-03 RX ORDER — PREDNISONE 20 MG/1
40 TABLET ORAL DAILY
Status: DISCONTINUED | OUTPATIENT
Start: 2020-06-03 | End: 2020-06-07

## 2020-06-03 RX ORDER — OXYCODONE HYDROCHLORIDE AND ACETAMINOPHEN 5; 325 MG/1; MG/1
1 TABLET ORAL ONCE
Status: COMPLETED | OUTPATIENT
Start: 2020-06-03 | End: 2020-06-03

## 2020-06-03 RX ORDER — LIDOCAINE 50 MG/G
2 PATCH TOPICAL DAILY
Status: DISCONTINUED | OUTPATIENT
Start: 2020-06-04 | End: 2020-06-03

## 2020-06-03 RX ORDER — ACETAMINOPHEN 325 MG/1
975 TABLET ORAL EVERY 8 HOURS SCHEDULED
Status: DISCONTINUED | OUTPATIENT
Start: 2020-06-03 | End: 2020-06-13 | Stop reason: HOSPADM

## 2020-06-03 RX ORDER — ALBUTEROL SULFATE 2.5 MG/3ML
SOLUTION RESPIRATORY (INHALATION)
Status: DISPENSED
Start: 2020-06-03 | End: 2020-06-04

## 2020-06-03 RX ORDER — ALBUTEROL SULFATE 2.5 MG/3ML
2.5 SOLUTION RESPIRATORY (INHALATION) ONCE
Status: COMPLETED | OUTPATIENT
Start: 2020-06-03 | End: 2020-06-03

## 2020-06-03 RX ORDER — FLUTICASONE FUROATE AND VILANTEROL 100; 25 UG/1; UG/1
1 POWDER RESPIRATORY (INHALATION) DAILY
Status: DISCONTINUED | OUTPATIENT
Start: 2020-06-04 | End: 2020-06-13 | Stop reason: HOSPADM

## 2020-06-03 RX ORDER — ALBUTEROL SULFATE 90 UG/1
2 AEROSOL, METERED RESPIRATORY (INHALATION) EVERY 4 HOURS PRN
Status: DISCONTINUED | OUTPATIENT
Start: 2020-06-03 | End: 2020-06-13 | Stop reason: HOSPADM

## 2020-06-03 RX ADMIN — PREDNISONE 40 MG: 20 TABLET ORAL at 18:07

## 2020-06-03 RX ADMIN — ENOXAPARIN SODIUM 30 MG: 30 INJECTION SUBCUTANEOUS at 23:32

## 2020-06-03 RX ADMIN — OXYCODONE HYDROCHLORIDE AND ACETAMINOPHEN 1 TABLET: 5; 325 TABLET ORAL at 10:05

## 2020-06-03 RX ADMIN — IPRATROPIUM BROMIDE 0.5 MG: 0.5 SOLUTION RESPIRATORY (INHALATION) at 21:36

## 2020-06-03 RX ADMIN — ACETAMINOPHEN 975 MG: 325 TABLET ORAL at 23:32

## 2020-06-03 RX ADMIN — ALBUTEROL SULFATE 5 MG: 2.5 SOLUTION RESPIRATORY (INHALATION) at 23:46

## 2020-06-03 RX ADMIN — ALBUTEROL SULFATE 5 MG: 2.5 SOLUTION RESPIRATORY (INHALATION) at 23:47

## 2020-06-03 RX ADMIN — ACETAMINOPHEN 975 MG: 325 TABLET ORAL at 18:07

## 2020-06-03 RX ADMIN — SODIUM CHLORIDE 1000 ML: 0.9 INJECTION, SOLUTION INTRAVENOUS at 09:38

## 2020-06-03 RX ADMIN — IOHEXOL 100 ML: 350 INJECTION, SOLUTION INTRAVENOUS at 10:25

## 2020-06-03 RX ADMIN — LIDOCAINE 2 PATCH: 50 PATCH TOPICAL at 18:10

## 2020-06-03 RX ADMIN — FENTANYL CITRATE 25 MCG: 50 INJECTION, SOLUTION INTRAMUSCULAR; INTRAVENOUS at 14:10

## 2020-06-03 RX ADMIN — ALBUTEROL SULFATE 2.5 MG: 2.5 SOLUTION RESPIRATORY (INHALATION) at 21:36

## 2020-06-03 RX ADMIN — CYCLOBENZAPRINE HYDROCHLORIDE 5 MG: 10 TABLET, FILM COATED ORAL at 18:07

## 2020-06-03 RX ADMIN — ALBUTEROL SULFATE 2 PUFF: 90 AEROSOL, METERED RESPIRATORY (INHALATION) at 19:14

## 2020-06-03 RX ADMIN — OXYCODONE HYDROCHLORIDE 5 MG: 5 TABLET ORAL at 23:32

## 2020-06-03 RX ADMIN — OXYCODONE HYDROCHLORIDE 5 MG: 5 TABLET ORAL at 18:06

## 2020-06-04 ENCOUNTER — APPOINTMENT (INPATIENT)
Dept: SURGERY | Facility: HOSPITAL | Age: 80
DRG: 183 | End: 2020-06-04
Payer: MEDICARE

## 2020-06-04 ENCOUNTER — ANESTHESIA (INPATIENT)
Dept: ANESTHESIOLOGY | Facility: HOSPITAL | Age: 80
DRG: 183 | End: 2020-06-04
Payer: MEDICARE

## 2020-06-04 ENCOUNTER — ANESTHESIA EVENT (INPATIENT)
Dept: ANESTHESIOLOGY | Facility: HOSPITAL | Age: 80
DRG: 183 | End: 2020-06-04
Payer: MEDICARE

## 2020-06-04 ENCOUNTER — APPOINTMENT (INPATIENT)
Dept: RADIOLOGY | Facility: HOSPITAL | Age: 80
DRG: 183 | End: 2020-06-04
Payer: MEDICARE

## 2020-06-04 PROBLEM — M54.50 CHRONIC BILATERAL LOW BACK PAIN: Status: ACTIVE | Noted: 2020-06-04

## 2020-06-04 PROBLEM — G89.29 CHRONIC BILATERAL LOW BACK PAIN: Status: ACTIVE | Noted: 2020-06-04

## 2020-06-04 LAB
ANION GAP SERPL CALCULATED.3IONS-SCNC: 6 MMOL/L (ref 4–13)
BASE EX.OXY STD BLDV CALC-SCNC: 96.7 % (ref 60–80)
BASE EXCESS BLDV CALC-SCNC: -1.5 MMOL/L
BUN SERPL-MCNC: 25 MG/DL (ref 5–25)
CALCIUM SERPL-MCNC: 8.5 MG/DL (ref 8.3–10.1)
CHLORIDE SERPL-SCNC: 102 MMOL/L (ref 100–108)
CO2 SERPL-SCNC: 25 MMOL/L (ref 21–32)
CREAT SERPL-MCNC: 1.17 MG/DL (ref 0.6–1.3)
ERYTHROCYTE [DISTWIDTH] IN BLOOD BY AUTOMATED COUNT: 12.6 % (ref 11.6–15.1)
GFR SERPL CREATININE-BSD FRML MDRD: 59 ML/MIN/1.73SQ M
GLUCOSE SERPL-MCNC: 153 MG/DL (ref 65–140)
HCO3 BLDV-SCNC: 22.9 MMOL/L (ref 24–30)
HCT VFR BLD AUTO: 40.1 % (ref 36.5–49.3)
HGB BLD-MCNC: 13.3 G/DL (ref 12–17)
MAGNESIUM SERPL-MCNC: 2.3 MG/DL (ref 1.6–2.6)
MCH RBC QN AUTO: 33.3 PG (ref 26.8–34.3)
MCHC RBC AUTO-ENTMCNC: 33.2 G/DL (ref 31.4–37.4)
MCV RBC AUTO: 100 FL (ref 82–98)
O2 CT BLDV-SCNC: 19.4 ML/DL
PCO2 BLDV: 37.7 MM HG (ref 42–50)
PH BLDV: 7.4 [PH] (ref 7.3–7.4)
PHOSPHATE SERPL-MCNC: 4.6 MG/DL (ref 2.3–4.1)
PLATELET # BLD AUTO: 147 THOUSANDS/UL (ref 149–390)
PMV BLD AUTO: 9.4 FL (ref 8.9–12.7)
PO2 BLDV: 172 MM HG (ref 35–45)
POTASSIUM SERPL-SCNC: 4.3 MMOL/L (ref 3.5–5.3)
RBC # BLD AUTO: 4 MILLION/UL (ref 3.88–5.62)
SODIUM SERPL-SCNC: 133 MMOL/L (ref 136–145)
WBC # BLD AUTO: 10.7 THOUSAND/UL (ref 4.31–10.16)

## 2020-06-04 PROCEDURE — 94640 AIRWAY INHALATION TREATMENT: CPT

## 2020-06-04 PROCEDURE — 94664 DEMO&/EVAL PT USE INHALER: CPT

## 2020-06-04 PROCEDURE — 83735 ASSAY OF MAGNESIUM: CPT | Performed by: EMERGENCY MEDICINE

## 2020-06-04 PROCEDURE — 97166 OT EVAL MOD COMPLEX 45 MIN: CPT

## 2020-06-04 PROCEDURE — NC001 PR NO CHARGE: Performed by: SURGERY

## 2020-06-04 PROCEDURE — 85027 COMPLETE CBC AUTOMATED: CPT | Performed by: EMERGENCY MEDICINE

## 2020-06-04 PROCEDURE — 80048 BASIC METABOLIC PNL TOTAL CA: CPT | Performed by: EMERGENCY MEDICINE

## 2020-06-04 PROCEDURE — 94760 N-INVAS EAR/PLS OXIMETRY 1: CPT

## 2020-06-04 PROCEDURE — 84100 ASSAY OF PHOSPHORUS: CPT | Performed by: EMERGENCY MEDICINE

## 2020-06-04 PROCEDURE — 99232 SBSQ HOSP IP/OBS MODERATE 35: CPT | Performed by: INTERNAL MEDICINE

## 2020-06-04 PROCEDURE — 97163 PT EVAL HIGH COMPLEX 45 MIN: CPT

## 2020-06-04 PROCEDURE — 71046 X-RAY EXAM CHEST 2 VIEWS: CPT

## 2020-06-04 PROCEDURE — 94668 MNPJ CHEST WALL SBSQ: CPT

## 2020-06-04 PROCEDURE — 99232 SBSQ HOSP IP/OBS MODERATE 35: CPT | Performed by: PHYSICIAN ASSISTANT

## 2020-06-04 PROCEDURE — 99222 1ST HOSP IP/OBS MODERATE 55: CPT | Performed by: PHYSICIAN ASSISTANT

## 2020-06-04 RX ORDER — GABAPENTIN 100 MG/1
100 CAPSULE ORAL
Status: DISCONTINUED | OUTPATIENT
Start: 2020-06-04 | End: 2020-06-05

## 2020-06-04 RX ORDER — OXYCODONE HYDROCHLORIDE 5 MG/1
2.5 TABLET ORAL EVERY 4 HOURS PRN
Status: DISCONTINUED | OUTPATIENT
Start: 2020-06-04 | End: 2020-06-05

## 2020-06-04 RX ORDER — TAMSULOSIN HYDROCHLORIDE 0.4 MG/1
0.4 CAPSULE ORAL
Status: DISCONTINUED | OUTPATIENT
Start: 2020-06-04 | End: 2020-06-13 | Stop reason: HOSPADM

## 2020-06-04 RX ORDER — OXYCODONE HYDROCHLORIDE 5 MG/1
5 TABLET ORAL EVERY 4 HOURS PRN
Status: DISCONTINUED | OUTPATIENT
Start: 2020-06-04 | End: 2020-06-05

## 2020-06-04 RX ORDER — LIDOCAINE HYDROCHLORIDE AND EPINEPHRINE 15; 5 MG/ML; UG/ML
INJECTION, SOLUTION EPIDURAL
Status: COMPLETED | OUTPATIENT
Start: 2020-06-04 | End: 2020-06-04

## 2020-06-04 RX ORDER — METOCLOPRAMIDE HYDROCHLORIDE 5 MG/ML
5 INJECTION INTRAMUSCULAR; INTRAVENOUS EVERY 6 HOURS PRN
Status: DISCONTINUED | OUTPATIENT
Start: 2020-06-04 | End: 2020-06-05

## 2020-06-04 RX ORDER — DIPHENHYDRAMINE HYDROCHLORIDE 50 MG/ML
25 INJECTION INTRAMUSCULAR; INTRAVENOUS EVERY 6 HOURS PRN
Status: DISCONTINUED | OUTPATIENT
Start: 2020-06-04 | End: 2020-06-09

## 2020-06-04 RX ORDER — BUPIVACAINE HYDROCHLORIDE 2.5 MG/ML
INJECTION, SOLUTION EPIDURAL; INFILTRATION; INTRACAUDAL AS NEEDED
Status: DISCONTINUED | OUTPATIENT
Start: 2020-06-04 | End: 2020-06-04 | Stop reason: HOSPADM

## 2020-06-04 RX ORDER — ONDANSETRON 2 MG/ML
4 INJECTION INTRAMUSCULAR; INTRAVENOUS EVERY 4 HOURS PRN
Status: DISCONTINUED | OUTPATIENT
Start: 2020-06-04 | End: 2020-06-13 | Stop reason: HOSPADM

## 2020-06-04 RX ORDER — FAMOTIDINE 20 MG/1
20 TABLET, FILM COATED ORAL DAILY
Status: DISCONTINUED | OUTPATIENT
Start: 2020-06-04 | End: 2020-06-13 | Stop reason: HOSPADM

## 2020-06-04 RX ORDER — HEPARIN SODIUM 5000 [USP'U]/ML
5000 INJECTION, SOLUTION INTRAVENOUS; SUBCUTANEOUS EVERY 8 HOURS SCHEDULED
Status: DISCONTINUED | OUTPATIENT
Start: 2020-06-04 | End: 2020-06-11

## 2020-06-04 RX ORDER — LIDOCAINE 50 MG/G
2 PATCH TOPICAL DAILY
Status: DISCONTINUED | OUTPATIENT
Start: 2020-06-04 | End: 2020-06-13 | Stop reason: HOSPADM

## 2020-06-04 RX ADMIN — ONDANSETRON 4 MG: 2 INJECTION INTRAMUSCULAR; INTRAVENOUS at 12:16

## 2020-06-04 RX ADMIN — ROPIVACAINE HYDROCHLORIDE: 5 INJECTION, SOLUTION EPIDURAL; INFILTRATION; PERINEURAL at 13:40

## 2020-06-04 RX ADMIN — CYCLOBENZAPRINE HYDROCHLORIDE 5 MG: 10 TABLET, FILM COATED ORAL at 21:40

## 2020-06-04 RX ADMIN — ACETAMINOPHEN 975 MG: 325 TABLET ORAL at 06:13

## 2020-06-04 RX ADMIN — LIDOCAINE HYDROCHLORIDE AND EPINEPHRINE 5 ML: 15; 5 INJECTION, SOLUTION EPIDURAL at 10:59

## 2020-06-04 RX ADMIN — TAMSULOSIN HYDROCHLORIDE 0.4 MG: 0.4 CAPSULE ORAL at 15:54

## 2020-06-04 RX ADMIN — FAMOTIDINE 20 MG: 20 TABLET ORAL at 08:58

## 2020-06-04 RX ADMIN — GABAPENTIN 100 MG: 100 CAPSULE ORAL at 21:40

## 2020-06-04 RX ADMIN — IPRATROPIUM BROMIDE 0.5 MG: 0.5 SOLUTION RESPIRATORY (INHALATION) at 08:07

## 2020-06-04 RX ADMIN — FLUTICASONE FUROATE AND VILANTEROL TRIFENATATE 1 PUFF: 100; 25 POWDER RESPIRATORY (INHALATION) at 08:59

## 2020-06-04 RX ADMIN — ACETAMINOPHEN 975 MG: 325 TABLET ORAL at 14:44

## 2020-06-04 RX ADMIN — BUPIVACAINE HYDROCHLORIDE 5 ML: 2.5 INJECTION, SOLUTION EPIDURAL; INFILTRATION; INTRACAUDAL at 11:07

## 2020-06-04 RX ADMIN — ACETAMINOPHEN 975 MG: 325 TABLET ORAL at 21:40

## 2020-06-04 RX ADMIN — LIDOCAINE 2 PATCH: 50 PATCH TOPICAL at 09:06

## 2020-06-04 RX ADMIN — ESCITALOPRAM OXALATE 10 MG: 10 TABLET ORAL at 08:59

## 2020-06-04 RX ADMIN — CYCLOBENZAPRINE HYDROCHLORIDE 5 MG: 10 TABLET, FILM COATED ORAL at 08:58

## 2020-06-04 RX ADMIN — IPRATROPIUM BROMIDE 0.5 MG: 0.5 SOLUTION RESPIRATORY (INHALATION) at 20:25

## 2020-06-04 RX ADMIN — CYCLOBENZAPRINE HYDROCHLORIDE 5 MG: 10 TABLET, FILM COATED ORAL at 02:35

## 2020-06-04 RX ADMIN — CYCLOBENZAPRINE HYDROCHLORIDE 5 MG: 10 TABLET, FILM COATED ORAL at 15:53

## 2020-06-04 RX ADMIN — PREDNISONE 40 MG: 20 TABLET ORAL at 09:00

## 2020-06-04 RX ADMIN — HEPARIN SODIUM 5000 UNITS: 5000 INJECTION INTRAVENOUS; SUBCUTANEOUS at 21:42

## 2020-06-04 RX ADMIN — IPRATROPIUM BROMIDE 0.5 MG: 0.5 SOLUTION RESPIRATORY (INHALATION) at 13:24

## 2020-06-05 ENCOUNTER — APPOINTMENT (INPATIENT)
Dept: RADIOLOGY | Facility: HOSPITAL | Age: 80
DRG: 183 | End: 2020-06-05
Payer: MEDICARE

## 2020-06-05 LAB
ANION GAP SERPL CALCULATED.3IONS-SCNC: 5 MMOL/L (ref 4–13)
ARTERIAL PATENCY WRIST A: YES
ARTERIAL PATENCY WRIST A: YES
ATRIAL RATE: 120 BPM
ATRIAL RATE: 120 BPM
BASE EXCESS BLDA CALC-SCNC: -0.9 MMOL/L
BASE EXCESS BLDA CALC-SCNC: -1.7 MMOL/L
BASOPHILS # BLD MANUAL: 0 THOUSAND/UL (ref 0–0.1)
BASOPHILS NFR MAR MANUAL: 0 % (ref 0–1)
BUN SERPL-MCNC: 32 MG/DL (ref 5–25)
CALCIUM SERPL-MCNC: 8.4 MG/DL (ref 8.3–10.1)
CHLORIDE SERPL-SCNC: 101 MMOL/L (ref 100–108)
CO2 SERPL-SCNC: 28 MMOL/L (ref 21–32)
CREAT SERPL-MCNC: 1.27 MG/DL (ref 0.6–1.3)
EOSINOPHIL # BLD MANUAL: 0 THOUSAND/UL (ref 0–0.4)
EOSINOPHIL NFR BLD MANUAL: 0 % (ref 0–6)
ERYTHROCYTE [DISTWIDTH] IN BLOOD BY AUTOMATED COUNT: 12.6 % (ref 11.6–15.1)
GFR SERPL CREATININE-BSD FRML MDRD: 53 ML/MIN/1.73SQ M
GLUCOSE SERPL-MCNC: 108 MG/DL (ref 65–140)
GLUCOSE SERPL-MCNC: 109 MG/DL (ref 65–140)
GLUCOSE SERPL-MCNC: 134 MG/DL (ref 65–140)
HCO3 BLDA-SCNC: 25 MMOL/L (ref 22–28)
HCO3 BLDA-SCNC: 26.2 MMOL/L (ref 22–28)
HCT VFR BLD AUTO: 38.8 % (ref 36.5–49.3)
HGB BLD-MCNC: 12.9 G/DL (ref 12–17)
IPAP: 10
LYMPHOCYTES # BLD AUTO: 0.63 THOUSAND/UL (ref 0.6–4.47)
LYMPHOCYTES # BLD AUTO: 6 % (ref 14–44)
MCH RBC QN AUTO: 32.9 PG (ref 26.8–34.3)
MCHC RBC AUTO-ENTMCNC: 33.2 G/DL (ref 31.4–37.4)
MCV RBC AUTO: 99 FL (ref 82–98)
MONOCYTES # BLD AUTO: 0.21 THOUSAND/UL (ref 0–1.22)
MONOCYTES NFR BLD: 2 % (ref 4–12)
NASAL CANNULA: 10
NEUTROPHILS # BLD MANUAL: 9.61 THOUSAND/UL (ref 1.85–7.62)
NEUTS BAND NFR BLD MANUAL: 14 % (ref 0–8)
NEUTS SEG NFR BLD AUTO: 78 % (ref 43–75)
NON VENT- BIPAP: ABNORMAL
NRBC BLD AUTO-RTO: 0 /100 WBCS
O2 CT BLDA-SCNC: 17.5 ML/DL (ref 16–23)
O2 CT BLDA-SCNC: 18.3 ML/DL (ref 16–23)
OXYHGB MFR BLDA: 90.7 % (ref 94–97)
OXYHGB MFR BLDA: 96.4 % (ref 94–97)
P AXIS: 56 DEGREES
P AXIS: 59 DEGREES
PCO2 BLDA: 46.2 MM HG (ref 36–44)
PCO2 BLDA: 58.3 MM HG (ref 36–44)
PEEP MAX SETTING VENT: 5 CM[H2O]
PH BLDA: 7.27 [PH] (ref 7.35–7.45)
PH BLDA: 7.35 [PH] (ref 7.35–7.45)
PLATELET # BLD AUTO: 175 THOUSANDS/UL (ref 149–390)
PLATELET BLD QL SMEAR: ADEQUATE
PMV BLD AUTO: 9.9 FL (ref 8.9–12.7)
PO2 BLDA: 104.4 MM HG (ref 75–129)
PO2 BLDA: 69.5 MM HG (ref 75–129)
POTASSIUM SERPL-SCNC: 4.3 MMOL/L (ref 3.5–5.3)
PR INTERVAL: 156 MS
PR INTERVAL: 158 MS
PROCALCITONIN SERPL-MCNC: 0.14 NG/ML
QRS AXIS: 46 DEGREES
QRS AXIS: 47 DEGREES
QRSD INTERVAL: 92 MS
QRSD INTERVAL: 92 MS
QT INTERVAL: 290 MS
QT INTERVAL: 318 MS
QTC INTERVAL: 409 MS
QTC INTERVAL: 449 MS
RBC # BLD AUTO: 3.92 MILLION/UL (ref 3.88–5.62)
SODIUM SERPL-SCNC: 134 MMOL/L (ref 136–145)
SPECIMEN SOURCE: ABNORMAL
SPECIMEN SOURCE: ABNORMAL
T WAVE AXIS: 32 DEGREES
T WAVE AXIS: 39 DEGREES
TROPONIN I SERPL-MCNC: <0.02 NG/ML
VENT BIPAP FIO2: 60 %
VENTRICULAR RATE: 120 BPM
VENTRICULAR RATE: 120 BPM
WBC # BLD AUTO: 10.45 THOUSAND/UL (ref 4.31–10.16)

## 2020-06-05 PROCEDURE — 99291 CRITICAL CARE FIRST HOUR: CPT | Performed by: PHYSICIAN ASSISTANT

## 2020-06-05 PROCEDURE — 94668 MNPJ CHEST WALL SBSQ: CPT

## 2020-06-05 PROCEDURE — 71045 X-RAY EXAM CHEST 1 VIEW: CPT

## 2020-06-05 PROCEDURE — 74018 RADEX ABDOMEN 1 VIEW: CPT

## 2020-06-05 PROCEDURE — 82805 BLOOD GASES W/O2 SATURATION: CPT | Performed by: SURGERY

## 2020-06-05 PROCEDURE — 99232 SBSQ HOSP IP/OBS MODERATE 35: CPT | Performed by: INTERNAL MEDICINE

## 2020-06-05 PROCEDURE — 84145 PROCALCITONIN (PCT): CPT | Performed by: EMERGENCY MEDICINE

## 2020-06-05 PROCEDURE — 80048 BASIC METABOLIC PNL TOTAL CA: CPT | Performed by: OBSTETRICS & GYNECOLOGY

## 2020-06-05 PROCEDURE — 93010 ELECTROCARDIOGRAM REPORT: CPT | Performed by: INTERNAL MEDICINE

## 2020-06-05 PROCEDURE — 84484 ASSAY OF TROPONIN QUANT: CPT | Performed by: STUDENT IN AN ORGANIZED HEALTH CARE EDUCATION/TRAINING PROGRAM

## 2020-06-05 PROCEDURE — 85007 BL SMEAR W/DIFF WBC COUNT: CPT | Performed by: OBSTETRICS & GYNECOLOGY

## 2020-06-05 PROCEDURE — 94002 VENT MGMT INPAT INIT DAY: CPT

## 2020-06-05 PROCEDURE — 36600 WITHDRAWAL OF ARTERIAL BLOOD: CPT

## 2020-06-05 PROCEDURE — 93005 ELECTROCARDIOGRAM TRACING: CPT

## 2020-06-05 PROCEDURE — 99233 SBSQ HOSP IP/OBS HIGH 50: CPT | Performed by: ANESTHESIOLOGY

## 2020-06-05 PROCEDURE — 87040 BLOOD CULTURE FOR BACTERIA: CPT | Performed by: PHYSICIAN ASSISTANT

## 2020-06-05 PROCEDURE — 94640 AIRWAY INHALATION TREATMENT: CPT

## 2020-06-05 PROCEDURE — NC001 PR NO CHARGE: Performed by: REGISTERED NURSE

## 2020-06-05 PROCEDURE — 85027 COMPLETE CBC AUTOMATED: CPT | Performed by: OBSTETRICS & GYNECOLOGY

## 2020-06-05 PROCEDURE — 94760 N-INVAS EAR/PLS OXIMETRY 1: CPT

## 2020-06-05 PROCEDURE — 82948 REAGENT STRIP/BLOOD GLUCOSE: CPT

## 2020-06-05 PROCEDURE — 82805 BLOOD GASES W/O2 SATURATION: CPT | Performed by: PHYSICIAN ASSISTANT

## 2020-06-05 PROCEDURE — 94664 DEMO&/EVAL PT USE INHALER: CPT

## 2020-06-05 RX ORDER — AMOXICILLIN 250 MG
1 CAPSULE ORAL
Status: DISCONTINUED | OUTPATIENT
Start: 2020-06-05 | End: 2020-06-13 | Stop reason: HOSPADM

## 2020-06-05 RX ORDER — LEVALBUTEROL 1.25 MG/.5ML
1.25 SOLUTION, CONCENTRATE RESPIRATORY (INHALATION)
Status: DISCONTINUED | OUTPATIENT
Start: 2020-06-05 | End: 2020-06-05

## 2020-06-05 RX ORDER — AZITHROMYCIN 500 MG/1
500 TABLET, FILM COATED ORAL EVERY 24 HOURS
Status: COMPLETED | OUTPATIENT
Start: 2020-06-05 | End: 2020-06-07

## 2020-06-05 RX ORDER — HYDROMORPHONE HCL/PF 1 MG/ML
0.2 SYRINGE (ML) INJECTION EVERY 2 HOUR PRN
Status: DISCONTINUED | OUTPATIENT
Start: 2020-06-05 | End: 2020-06-05

## 2020-06-05 RX ORDER — LEVALBUTEROL 1.25 MG/.5ML
1.25 SOLUTION, CONCENTRATE RESPIRATORY (INHALATION) EVERY 6 HOURS
Status: DISCONTINUED | OUTPATIENT
Start: 2020-06-05 | End: 2020-06-08

## 2020-06-05 RX ORDER — METOCLOPRAMIDE HYDROCHLORIDE 5 MG/ML
10 INJECTION INTRAMUSCULAR; INTRAVENOUS EVERY 6 HOURS PRN
Status: DISCONTINUED | OUTPATIENT
Start: 2020-06-05 | End: 2020-06-13 | Stop reason: HOSPADM

## 2020-06-05 RX ORDER — BUDESONIDE 0.5 MG/2ML
0.5 INHALANT ORAL 2 TIMES DAILY
Status: DISCONTINUED | OUTPATIENT
Start: 2020-06-05 | End: 2020-06-13 | Stop reason: HOSPADM

## 2020-06-05 RX ADMIN — FAMOTIDINE 20 MG: 20 TABLET ORAL at 09:27

## 2020-06-05 RX ADMIN — HEPARIN SODIUM 5000 UNITS: 5000 INJECTION INTRAVENOUS; SUBCUTANEOUS at 13:32

## 2020-06-05 RX ADMIN — LIDOCAINE 2 PATCH: 50 PATCH TOPICAL at 09:27

## 2020-06-05 RX ADMIN — SENNOSIDES AND DOCUSATE SODIUM 1 TABLET: 8.6; 5 TABLET ORAL at 22:59

## 2020-06-05 RX ADMIN — ALBUTEROL SULFATE 2 PUFF: 90 AEROSOL, METERED RESPIRATORY (INHALATION) at 12:01

## 2020-06-05 RX ADMIN — FLUTICASONE FUROATE AND VILANTEROL TRIFENATATE 1 PUFF: 100; 25 POWDER RESPIRATORY (INHALATION) at 09:28

## 2020-06-05 RX ADMIN — METOCLOPRAMIDE 5 MG: 5 INJECTION, SOLUTION INTRAMUSCULAR; INTRAVENOUS at 06:38

## 2020-06-05 RX ADMIN — HEPARIN SODIUM 5000 UNITS: 5000 INJECTION INTRAVENOUS; SUBCUTANEOUS at 22:58

## 2020-06-05 RX ADMIN — PREDNISONE 40 MG: 20 TABLET ORAL at 10:20

## 2020-06-05 RX ADMIN — AZITHROMYCIN 500 MG: 500 TABLET, FILM COATED ORAL at 10:20

## 2020-06-05 RX ADMIN — ALBUTEROL SULFATE 2 PUFF: 90 AEROSOL, METERED RESPIRATORY (INHALATION) at 04:01

## 2020-06-05 RX ADMIN — CEFTRIAXONE SODIUM 1000 MG: 10 INJECTION, POWDER, FOR SOLUTION INTRAVENOUS at 10:20

## 2020-06-05 RX ADMIN — LEVALBUTEROL HYDROCHLORIDE 1.25 MG: 1.25 SOLUTION, CONCENTRATE RESPIRATORY (INHALATION) at 13:26

## 2020-06-05 RX ADMIN — CYCLOBENZAPRINE HYDROCHLORIDE 5 MG: 10 TABLET, FILM COATED ORAL at 09:26

## 2020-06-05 RX ADMIN — LEVALBUTEROL HYDROCHLORIDE 1.25 MG: 1.25 SOLUTION, CONCENTRATE RESPIRATORY (INHALATION) at 08:10

## 2020-06-05 RX ADMIN — ESCITALOPRAM OXALATE 10 MG: 10 TABLET ORAL at 10:20

## 2020-06-05 RX ADMIN — IPRATROPIUM BROMIDE 0.5 MG: 0.5 SOLUTION RESPIRATORY (INHALATION) at 19:12

## 2020-06-05 RX ADMIN — ROPIVACAINE HYDROCHLORIDE: 5 INJECTION, SOLUTION EPIDURAL; INFILTRATION; PERINEURAL at 12:21

## 2020-06-05 RX ADMIN — IPRATROPIUM BROMIDE 0.5 MG: 0.5 SOLUTION RESPIRATORY (INHALATION) at 13:26

## 2020-06-05 RX ADMIN — ACETAMINOPHEN 975 MG: 325 TABLET ORAL at 13:41

## 2020-06-05 RX ADMIN — LEVALBUTEROL HYDROCHLORIDE 1.25 MG: 1.25 SOLUTION, CONCENTRATE RESPIRATORY (INHALATION) at 19:12

## 2020-06-05 RX ADMIN — ONDANSETRON 4 MG: 2 INJECTION INTRAMUSCULAR; INTRAVENOUS at 05:01

## 2020-06-05 RX ADMIN — BUDESONIDE 0.5 MG: 0.5 INHALANT RESPIRATORY (INHALATION) at 19:12

## 2020-06-05 RX ADMIN — HEPARIN SODIUM 5000 UNITS: 5000 INJECTION INTRAVENOUS; SUBCUTANEOUS at 06:09

## 2020-06-05 RX ADMIN — ACETAMINOPHEN 975 MG: 325 TABLET ORAL at 22:59

## 2020-06-05 RX ADMIN — IPRATROPIUM BROMIDE 0.5 MG: 0.5 SOLUTION RESPIRATORY (INHALATION) at 08:11

## 2020-06-05 RX ADMIN — ALBUTEROL SULFATE 2 PUFF: 90 AEROSOL, METERED RESPIRATORY (INHALATION) at 15:44

## 2020-06-05 RX ADMIN — METOCLOPRAMIDE 10 MG: 5 INJECTION, SOLUTION INTRAMUSCULAR; INTRAVENOUS at 12:01

## 2020-06-06 ENCOUNTER — APPOINTMENT (INPATIENT)
Dept: RADIOLOGY | Facility: HOSPITAL | Age: 80
DRG: 183 | End: 2020-06-06
Payer: MEDICARE

## 2020-06-06 LAB
ANION GAP SERPL CALCULATED.3IONS-SCNC: 4 MMOL/L (ref 4–13)
BASOPHILS # BLD AUTO: 0.01 THOUSANDS/ΜL (ref 0–0.1)
BASOPHILS NFR BLD AUTO: 0 % (ref 0–1)
BUN SERPL-MCNC: 29 MG/DL (ref 5–25)
CALCIUM SERPL-MCNC: 8.7 MG/DL (ref 8.3–10.1)
CHLORIDE SERPL-SCNC: 106 MMOL/L (ref 100–108)
CO2 SERPL-SCNC: 28 MMOL/L (ref 21–32)
CREAT SERPL-MCNC: 1.1 MG/DL (ref 0.6–1.3)
EOSINOPHIL # BLD AUTO: 0.01 THOUSAND/ΜL (ref 0–0.61)
EOSINOPHIL NFR BLD AUTO: 0 % (ref 0–6)
ERYTHROCYTE [DISTWIDTH] IN BLOOD BY AUTOMATED COUNT: 13 % (ref 11.6–15.1)
GFR SERPL CREATININE-BSD FRML MDRD: 63 ML/MIN/1.73SQ M
GLUCOSE SERPL-MCNC: 101 MG/DL (ref 65–140)
GLUCOSE SERPL-MCNC: 102 MG/DL (ref 65–140)
GLUCOSE SERPL-MCNC: 116 MG/DL (ref 65–140)
GLUCOSE SERPL-MCNC: 117 MG/DL (ref 65–140)
GLUCOSE SERPL-MCNC: 125 MG/DL (ref 65–140)
HCT VFR BLD AUTO: 35 % (ref 36.5–49.3)
HGB BLD-MCNC: 11.6 G/DL (ref 12–17)
IMM GRANULOCYTES # BLD AUTO: 0.04 THOUSAND/UL (ref 0–0.2)
IMM GRANULOCYTES NFR BLD AUTO: 0 % (ref 0–2)
LYMPHOCYTES # BLD AUTO: 0.64 THOUSANDS/ΜL (ref 0.6–4.47)
LYMPHOCYTES NFR BLD AUTO: 7 % (ref 14–44)
MAGNESIUM SERPL-MCNC: 2.3 MG/DL (ref 1.6–2.6)
MCH RBC QN AUTO: 33.5 PG (ref 26.8–34.3)
MCHC RBC AUTO-ENTMCNC: 33.1 G/DL (ref 31.4–37.4)
MCV RBC AUTO: 101 FL (ref 82–98)
MONOCYTES # BLD AUTO: 0.59 THOUSAND/ΜL (ref 0.17–1.22)
MONOCYTES NFR BLD AUTO: 6 % (ref 4–12)
NEUTROPHILS # BLD AUTO: 8.46 THOUSANDS/ΜL (ref 1.85–7.62)
NEUTS SEG NFR BLD AUTO: 87 % (ref 43–75)
NRBC BLD AUTO-RTO: 0 /100 WBCS
PLATELET # BLD AUTO: 157 THOUSANDS/UL (ref 149–390)
PMV BLD AUTO: 10.3 FL (ref 8.9–12.7)
POTASSIUM SERPL-SCNC: 4.8 MMOL/L (ref 3.5–5.3)
RBC # BLD AUTO: 3.46 MILLION/UL (ref 3.88–5.62)
SODIUM SERPL-SCNC: 138 MMOL/L (ref 136–145)
WBC # BLD AUTO: 9.75 THOUSAND/UL (ref 4.31–10.16)

## 2020-06-06 PROCEDURE — 99233 SBSQ HOSP IP/OBS HIGH 50: CPT | Performed by: EMERGENCY MEDICINE

## 2020-06-06 PROCEDURE — 85025 COMPLETE CBC W/AUTO DIFF WBC: CPT | Performed by: PHYSICIAN ASSISTANT

## 2020-06-06 PROCEDURE — 80048 BASIC METABOLIC PNL TOTAL CA: CPT | Performed by: PHYSICIAN ASSISTANT

## 2020-06-06 PROCEDURE — 82948 REAGENT STRIP/BLOOD GLUCOSE: CPT

## 2020-06-06 PROCEDURE — NC001 PR NO CHARGE: Performed by: SURGERY

## 2020-06-06 PROCEDURE — 94760 N-INVAS EAR/PLS OXIMETRY 1: CPT

## 2020-06-06 PROCEDURE — 94003 VENT MGMT INPAT SUBQ DAY: CPT

## 2020-06-06 PROCEDURE — 92610 EVALUATE SWALLOWING FUNCTION: CPT

## 2020-06-06 PROCEDURE — 94668 MNPJ CHEST WALL SBSQ: CPT

## 2020-06-06 PROCEDURE — 94640 AIRWAY INHALATION TREATMENT: CPT

## 2020-06-06 PROCEDURE — 99232 SBSQ HOSP IP/OBS MODERATE 35: CPT | Performed by: ANESTHESIOLOGY

## 2020-06-06 PROCEDURE — 83735 ASSAY OF MAGNESIUM: CPT | Performed by: PHYSICIAN ASSISTANT

## 2020-06-06 PROCEDURE — 71045 X-RAY EXAM CHEST 1 VIEW: CPT

## 2020-06-06 RX ADMIN — CEFTRIAXONE SODIUM 1000 MG: 10 INJECTION, POWDER, FOR SOLUTION INTRAVENOUS at 09:15

## 2020-06-06 RX ADMIN — HEPARIN SODIUM 5000 UNITS: 5000 INJECTION INTRAVENOUS; SUBCUTANEOUS at 06:06

## 2020-06-06 RX ADMIN — ACETAMINOPHEN 975 MG: 325 TABLET ORAL at 06:06

## 2020-06-06 RX ADMIN — IPRATROPIUM BROMIDE 0.5 MG: 0.5 SOLUTION RESPIRATORY (INHALATION) at 00:05

## 2020-06-06 RX ADMIN — ACETAMINOPHEN 975 MG: 325 TABLET ORAL at 15:06

## 2020-06-06 RX ADMIN — FLUTICASONE FUROATE AND VILANTEROL TRIFENATATE 1 PUFF: 100; 25 POWDER RESPIRATORY (INHALATION) at 08:29

## 2020-06-06 RX ADMIN — ACETAMINOPHEN 975 MG: 325 TABLET ORAL at 21:07

## 2020-06-06 RX ADMIN — ESCITALOPRAM OXALATE 10 MG: 10 TABLET ORAL at 08:28

## 2020-06-06 RX ADMIN — LEVALBUTEROL HYDROCHLORIDE 1.25 MG: 1.25 SOLUTION, CONCENTRATE RESPIRATORY (INHALATION) at 08:39

## 2020-06-06 RX ADMIN — SENNOSIDES AND DOCUSATE SODIUM 1 TABLET: 8.6; 5 TABLET ORAL at 21:07

## 2020-06-06 RX ADMIN — PREDNISONE 40 MG: 20 TABLET ORAL at 08:29

## 2020-06-06 RX ADMIN — LEVALBUTEROL HYDROCHLORIDE 1.25 MG: 1.25 SOLUTION, CONCENTRATE RESPIRATORY (INHALATION) at 14:36

## 2020-06-06 RX ADMIN — BUDESONIDE 0.5 MG: 0.5 INHALANT RESPIRATORY (INHALATION) at 19:10

## 2020-06-06 RX ADMIN — TAMSULOSIN HYDROCHLORIDE 0.4 MG: 0.4 CAPSULE ORAL at 17:00

## 2020-06-06 RX ADMIN — HEPARIN SODIUM 5000 UNITS: 5000 INJECTION INTRAVENOUS; SUBCUTANEOUS at 15:06

## 2020-06-06 RX ADMIN — LIDOCAINE 2 PATCH: 50 PATCH TOPICAL at 08:28

## 2020-06-06 RX ADMIN — ROPIVACAINE HYDROCHLORIDE: 5 INJECTION, SOLUTION EPIDURAL; INFILTRATION; PERINEURAL at 14:00

## 2020-06-06 RX ADMIN — IPRATROPIUM BROMIDE 0.5 MG: 0.5 SOLUTION RESPIRATORY (INHALATION) at 14:36

## 2020-06-06 RX ADMIN — IPRATROPIUM BROMIDE 0.5 MG: 0.5 SOLUTION RESPIRATORY (INHALATION) at 08:39

## 2020-06-06 RX ADMIN — FAMOTIDINE 20 MG: 20 TABLET ORAL at 08:27

## 2020-06-06 RX ADMIN — LEVALBUTEROL HYDROCHLORIDE 1.25 MG: 1.25 SOLUTION, CONCENTRATE RESPIRATORY (INHALATION) at 19:10

## 2020-06-06 RX ADMIN — LEVALBUTEROL HYDROCHLORIDE 1.25 MG: 1.25 SOLUTION, CONCENTRATE RESPIRATORY (INHALATION) at 00:05

## 2020-06-06 RX ADMIN — AZITHROMYCIN 500 MG: 500 TABLET, FILM COATED ORAL at 09:16

## 2020-06-06 RX ADMIN — BUDESONIDE 0.5 MG: 0.5 INHALANT RESPIRATORY (INHALATION) at 09:24

## 2020-06-06 RX ADMIN — IPRATROPIUM BROMIDE 0.5 MG: 0.5 SOLUTION RESPIRATORY (INHALATION) at 19:10

## 2020-06-06 RX ADMIN — HEPARIN SODIUM 5000 UNITS: 5000 INJECTION INTRAVENOUS; SUBCUTANEOUS at 21:07

## 2020-06-07 ENCOUNTER — APPOINTMENT (INPATIENT)
Dept: RADIOLOGY | Facility: HOSPITAL | Age: 80
DRG: 183 | End: 2020-06-07
Payer: MEDICARE

## 2020-06-07 PROBLEM — R06.89 ACUTE RESPIRATORY INSUFFICIENCY: Status: ACTIVE | Noted: 2020-06-07

## 2020-06-07 LAB
ANION GAP SERPL CALCULATED.3IONS-SCNC: 6 MMOL/L (ref 4–13)
BASOPHILS # BLD AUTO: 0 THOUSANDS/ΜL (ref 0–0.1)
BASOPHILS NFR BLD AUTO: 0 % (ref 0–1)
BUN SERPL-MCNC: 33 MG/DL (ref 5–25)
CALCIUM SERPL-MCNC: 8.6 MG/DL (ref 8.3–10.1)
CHLORIDE SERPL-SCNC: 105 MMOL/L (ref 100–108)
CO2 SERPL-SCNC: 27 MMOL/L (ref 21–32)
CREAT SERPL-MCNC: 1.11 MG/DL (ref 0.6–1.3)
EOSINOPHIL # BLD AUTO: 0.01 THOUSAND/ΜL (ref 0–0.61)
EOSINOPHIL NFR BLD AUTO: 0 % (ref 0–6)
ERYTHROCYTE [DISTWIDTH] IN BLOOD BY AUTOMATED COUNT: 13 % (ref 11.6–15.1)
GFR SERPL CREATININE-BSD FRML MDRD: 62 ML/MIN/1.73SQ M
GLUCOSE SERPL-MCNC: 101 MG/DL (ref 65–140)
GLUCOSE SERPL-MCNC: 104 MG/DL (ref 65–140)
GLUCOSE SERPL-MCNC: 108 MG/DL (ref 65–140)
GLUCOSE SERPL-MCNC: 144 MG/DL (ref 65–140)
GLUCOSE SERPL-MCNC: 167 MG/DL (ref 65–140)
HCT VFR BLD AUTO: 35.7 % (ref 36.5–49.3)
HGB BLD-MCNC: 11.7 G/DL (ref 12–17)
IMM GRANULOCYTES # BLD AUTO: 0.07 THOUSAND/UL (ref 0–0.2)
IMM GRANULOCYTES NFR BLD AUTO: 1 % (ref 0–2)
LYMPHOCYTES # BLD AUTO: 0.72 THOUSANDS/ΜL (ref 0.6–4.47)
LYMPHOCYTES NFR BLD AUTO: 6 % (ref 14–44)
MAGNESIUM SERPL-MCNC: 2.6 MG/DL (ref 1.6–2.6)
MCH RBC QN AUTO: 33.1 PG (ref 26.8–34.3)
MCHC RBC AUTO-ENTMCNC: 32.8 G/DL (ref 31.4–37.4)
MCV RBC AUTO: 101 FL (ref 82–98)
MONOCYTES # BLD AUTO: 0.67 THOUSAND/ΜL (ref 0.17–1.22)
MONOCYTES NFR BLD AUTO: 6 % (ref 4–12)
NEUTROPHILS # BLD AUTO: 9.85 THOUSANDS/ΜL (ref 1.85–7.62)
NEUTS SEG NFR BLD AUTO: 87 % (ref 43–75)
NRBC BLD AUTO-RTO: 0 /100 WBCS
PHOSPHATE SERPL-MCNC: 2.2 MG/DL (ref 2.3–4.1)
PLATELET # BLD AUTO: 171 THOUSANDS/UL (ref 149–390)
PMV BLD AUTO: 10.1 FL (ref 8.9–12.7)
POTASSIUM SERPL-SCNC: 4 MMOL/L (ref 3.5–5.3)
RBC # BLD AUTO: 3.53 MILLION/UL (ref 3.88–5.62)
SODIUM SERPL-SCNC: 138 MMOL/L (ref 136–145)
WBC # BLD AUTO: 11.32 THOUSAND/UL (ref 4.31–10.16)

## 2020-06-07 PROCEDURE — 94760 N-INVAS EAR/PLS OXIMETRY 1: CPT

## 2020-06-07 PROCEDURE — 84100 ASSAY OF PHOSPHORUS: CPT | Performed by: SURGERY

## 2020-06-07 PROCEDURE — 71045 X-RAY EXAM CHEST 1 VIEW: CPT

## 2020-06-07 PROCEDURE — 99232 SBSQ HOSP IP/OBS MODERATE 35: CPT | Performed by: SURGERY

## 2020-06-07 PROCEDURE — 94668 MNPJ CHEST WALL SBSQ: CPT

## 2020-06-07 PROCEDURE — 94664 DEMO&/EVAL PT USE INHALER: CPT

## 2020-06-07 PROCEDURE — 83735 ASSAY OF MAGNESIUM: CPT | Performed by: SURGERY

## 2020-06-07 PROCEDURE — 80048 BASIC METABOLIC PNL TOTAL CA: CPT | Performed by: SURGERY

## 2020-06-07 PROCEDURE — 85025 COMPLETE CBC W/AUTO DIFF WBC: CPT | Performed by: SURGERY

## 2020-06-07 PROCEDURE — 82948 REAGENT STRIP/BLOOD GLUCOSE: CPT

## 2020-06-07 PROCEDURE — 94640 AIRWAY INHALATION TREATMENT: CPT

## 2020-06-07 PROCEDURE — 99232 SBSQ HOSP IP/OBS MODERATE 35: CPT | Performed by: ANESTHESIOLOGY

## 2020-06-07 RX ORDER — PREDNISONE 20 MG/1
40 TABLET ORAL DAILY
Status: COMPLETED | OUTPATIENT
Start: 2020-06-07 | End: 2020-06-07

## 2020-06-07 RX ORDER — PREDNISONE 20 MG/1
20 TABLET ORAL DAILY
Status: DISCONTINUED | OUTPATIENT
Start: 2020-06-08 | End: 2020-06-07

## 2020-06-07 RX ADMIN — FAMOTIDINE 20 MG: 20 TABLET ORAL at 09:21

## 2020-06-07 RX ADMIN — AZITHROMYCIN 500 MG: 500 TABLET, FILM COATED ORAL at 11:10

## 2020-06-07 RX ADMIN — IPRATROPIUM BROMIDE 0.5 MG: 0.5 SOLUTION RESPIRATORY (INHALATION) at 07:22

## 2020-06-07 RX ADMIN — TAMSULOSIN HYDROCHLORIDE 0.4 MG: 0.4 CAPSULE ORAL at 17:37

## 2020-06-07 RX ADMIN — LEVALBUTEROL HYDROCHLORIDE 1.25 MG: 1.25 SOLUTION, CONCENTRATE RESPIRATORY (INHALATION) at 20:17

## 2020-06-07 RX ADMIN — PREDNISONE 40 MG: 20 TABLET ORAL at 09:22

## 2020-06-07 RX ADMIN — HEPARIN SODIUM 5000 UNITS: 5000 INJECTION INTRAVENOUS; SUBCUTANEOUS at 14:26

## 2020-06-07 RX ADMIN — LEVALBUTEROL HYDROCHLORIDE 1.25 MG: 1.25 SOLUTION, CONCENTRATE RESPIRATORY (INHALATION) at 07:22

## 2020-06-07 RX ADMIN — BUDESONIDE 0.5 MG: 0.5 INHALANT RESPIRATORY (INHALATION) at 20:16

## 2020-06-07 RX ADMIN — HEPARIN SODIUM 5000 UNITS: 5000 INJECTION INTRAVENOUS; SUBCUTANEOUS at 21:12

## 2020-06-07 RX ADMIN — HEPARIN SODIUM 5000 UNITS: 5000 INJECTION INTRAVENOUS; SUBCUTANEOUS at 05:09

## 2020-06-07 RX ADMIN — ROPIVACAINE HYDROCHLORIDE: 5 INJECTION, SOLUTION EPIDURAL; INFILTRATION; PERINEURAL at 14:05

## 2020-06-07 RX ADMIN — LIDOCAINE 2 PATCH: 50 PATCH TOPICAL at 09:22

## 2020-06-07 RX ADMIN — LEVALBUTEROL HYDROCHLORIDE 1.25 MG: 1.25 SOLUTION, CONCENTRATE RESPIRATORY (INHALATION) at 13:54

## 2020-06-07 RX ADMIN — IPRATROPIUM BROMIDE 0.5 MG: 0.5 SOLUTION RESPIRATORY (INHALATION) at 13:54

## 2020-06-07 RX ADMIN — ACETAMINOPHEN 975 MG: 325 TABLET ORAL at 14:26

## 2020-06-07 RX ADMIN — FLUTICASONE FUROATE AND VILANTEROL TRIFENATATE 1 PUFF: 100; 25 POWDER RESPIRATORY (INHALATION) at 09:22

## 2020-06-07 RX ADMIN — ACETAMINOPHEN 975 MG: 325 TABLET ORAL at 21:12

## 2020-06-07 RX ADMIN — SENNOSIDES AND DOCUSATE SODIUM 1 TABLET: 8.6; 5 TABLET ORAL at 21:12

## 2020-06-07 RX ADMIN — BUDESONIDE 0.5 MG: 0.5 INHALANT RESPIRATORY (INHALATION) at 07:22

## 2020-06-07 RX ADMIN — ACETAMINOPHEN 975 MG: 325 TABLET ORAL at 05:09

## 2020-06-07 RX ADMIN — CEFTRIAXONE SODIUM 1000 MG: 10 INJECTION, POWDER, FOR SOLUTION INTRAVENOUS at 09:27

## 2020-06-07 RX ADMIN — ESCITALOPRAM OXALATE 10 MG: 10 TABLET ORAL at 09:22

## 2020-06-07 RX ADMIN — LEVALBUTEROL HYDROCHLORIDE 1.25 MG: 1.25 SOLUTION, CONCENTRATE RESPIRATORY (INHALATION) at 02:16

## 2020-06-07 RX ADMIN — IPRATROPIUM BROMIDE 0.5 MG: 0.5 SOLUTION RESPIRATORY (INHALATION) at 20:17

## 2020-06-07 RX ADMIN — IPRATROPIUM BROMIDE 0.5 MG: 0.5 SOLUTION RESPIRATORY (INHALATION) at 02:16

## 2020-06-08 PROBLEM — R13.10 DYSPHAGIA: Status: ACTIVE | Noted: 2020-06-08

## 2020-06-08 LAB — GLUCOSE SERPL-MCNC: 104 MG/DL (ref 65–140)

## 2020-06-08 PROCEDURE — 99233 SBSQ HOSP IP/OBS HIGH 50: CPT | Performed by: ANESTHESIOLOGY

## 2020-06-08 PROCEDURE — 94640 AIRWAY INHALATION TREATMENT: CPT

## 2020-06-08 PROCEDURE — 94760 N-INVAS EAR/PLS OXIMETRY 1: CPT

## 2020-06-08 PROCEDURE — 99232 SBSQ HOSP IP/OBS MODERATE 35: CPT | Performed by: SURGERY

## 2020-06-08 PROCEDURE — 94003 VENT MGMT INPAT SUBQ DAY: CPT

## 2020-06-08 PROCEDURE — 92526 ORAL FUNCTION THERAPY: CPT

## 2020-06-08 PROCEDURE — 82948 REAGENT STRIP/BLOOD GLUCOSE: CPT

## 2020-06-08 PROCEDURE — 94668 MNPJ CHEST WALL SBSQ: CPT

## 2020-06-08 PROCEDURE — 99232 SBSQ HOSP IP/OBS MODERATE 35: CPT | Performed by: INTERNAL MEDICINE

## 2020-06-08 PROCEDURE — 94664 DEMO&/EVAL PT USE INHALER: CPT

## 2020-06-08 RX ORDER — AZITHROMYCIN 250 MG/1
250 TABLET, FILM COATED ORAL EVERY 24 HOURS
Status: DISCONTINUED | OUTPATIENT
Start: 2020-06-08 | End: 2020-06-09

## 2020-06-08 RX ORDER — LEVALBUTEROL 1.25 MG/.5ML
1.25 SOLUTION, CONCENTRATE RESPIRATORY (INHALATION)
Status: DISCONTINUED | OUTPATIENT
Start: 2020-06-08 | End: 2020-06-13 | Stop reason: HOSPADM

## 2020-06-08 RX ADMIN — ACETAMINOPHEN 975 MG: 325 TABLET ORAL at 13:14

## 2020-06-08 RX ADMIN — ESCITALOPRAM OXALATE 10 MG: 10 TABLET ORAL at 09:04

## 2020-06-08 RX ADMIN — LEVALBUTEROL HYDROCHLORIDE 1.25 MG: 1.25 SOLUTION, CONCENTRATE RESPIRATORY (INHALATION) at 03:00

## 2020-06-08 RX ADMIN — SENNOSIDES AND DOCUSATE SODIUM 1 TABLET: 8.6; 5 TABLET ORAL at 21:37

## 2020-06-08 RX ADMIN — BUDESONIDE 0.5 MG: 0.5 INHALANT RESPIRATORY (INHALATION) at 19:39

## 2020-06-08 RX ADMIN — ACETAMINOPHEN 975 MG: 325 TABLET ORAL at 21:36

## 2020-06-08 RX ADMIN — ACETAMINOPHEN 975 MG: 325 TABLET ORAL at 05:38

## 2020-06-08 RX ADMIN — LIDOCAINE 2 PATCH: 50 PATCH TOPICAL at 09:03

## 2020-06-08 RX ADMIN — IPRATROPIUM BROMIDE 0.5 MG: 0.5 SOLUTION RESPIRATORY (INHALATION) at 19:39

## 2020-06-08 RX ADMIN — HEPARIN SODIUM 5000 UNITS: 5000 INJECTION INTRAVENOUS; SUBCUTANEOUS at 21:37

## 2020-06-08 RX ADMIN — FLUTICASONE FUROATE AND VILANTEROL TRIFENATATE 1 PUFF: 100; 25 POWDER RESPIRATORY (INHALATION) at 09:04

## 2020-06-08 RX ADMIN — HEPARIN SODIUM 5000 UNITS: 5000 INJECTION INTRAVENOUS; SUBCUTANEOUS at 13:17

## 2020-06-08 RX ADMIN — LEVALBUTEROL HYDROCHLORIDE 1.25 MG: 1.25 SOLUTION, CONCENTRATE RESPIRATORY (INHALATION) at 13:16

## 2020-06-08 RX ADMIN — LEVALBUTEROL HYDROCHLORIDE 1.25 MG: 1.25 SOLUTION, CONCENTRATE RESPIRATORY (INHALATION) at 19:39

## 2020-06-08 RX ADMIN — ROPIVACAINE HYDROCHLORIDE: 5 INJECTION, SOLUTION EPIDURAL; INFILTRATION; PERINEURAL at 11:09

## 2020-06-08 RX ADMIN — LEVALBUTEROL HYDROCHLORIDE 1.25 MG: 1.25 SOLUTION, CONCENTRATE RESPIRATORY (INHALATION) at 07:16

## 2020-06-08 RX ADMIN — IPRATROPIUM BROMIDE 0.5 MG: 0.5 SOLUTION RESPIRATORY (INHALATION) at 13:16

## 2020-06-08 RX ADMIN — HEPARIN SODIUM 5000 UNITS: 5000 INJECTION INTRAVENOUS; SUBCUTANEOUS at 05:38

## 2020-06-08 RX ADMIN — IPRATROPIUM BROMIDE 0.5 MG: 0.5 SOLUTION RESPIRATORY (INHALATION) at 07:16

## 2020-06-08 RX ADMIN — TAMSULOSIN HYDROCHLORIDE 0.4 MG: 0.4 CAPSULE ORAL at 18:39

## 2020-06-08 RX ADMIN — BUDESONIDE 0.5 MG: 0.5 INHALANT RESPIRATORY (INHALATION) at 07:16

## 2020-06-08 RX ADMIN — FAMOTIDINE 20 MG: 20 TABLET ORAL at 09:04

## 2020-06-08 RX ADMIN — CEFTRIAXONE SODIUM 1000 MG: 10 INJECTION, POWDER, FOR SOLUTION INTRAVENOUS at 09:04

## 2020-06-08 RX ADMIN — AZITHROMYCIN 250 MG: 250 TABLET, FILM COATED ORAL at 15:08

## 2020-06-08 RX ADMIN — IPRATROPIUM BROMIDE 0.5 MG: 0.5 SOLUTION RESPIRATORY (INHALATION) at 03:00

## 2020-06-09 ENCOUNTER — APPOINTMENT (INPATIENT)
Dept: RADIOLOGY | Facility: HOSPITAL | Age: 80
DRG: 183 | End: 2020-06-09
Payer: MEDICARE

## 2020-06-09 LAB
ANION GAP SERPL CALCULATED.3IONS-SCNC: 4 MMOL/L (ref 4–13)
BASOPHILS # BLD AUTO: 0.02 THOUSANDS/ΜL (ref 0–0.1)
BASOPHILS NFR BLD AUTO: 0 % (ref 0–1)
BUN SERPL-MCNC: 18 MG/DL (ref 5–25)
CALCIUM SERPL-MCNC: 8.5 MG/DL (ref 8.3–10.1)
CHLORIDE SERPL-SCNC: 105 MMOL/L (ref 100–108)
CO2 SERPL-SCNC: 29 MMOL/L (ref 21–32)
CREAT SERPL-MCNC: 0.93 MG/DL (ref 0.6–1.3)
EOSINOPHIL # BLD AUTO: 0.22 THOUSAND/ΜL (ref 0–0.61)
EOSINOPHIL NFR BLD AUTO: 2 % (ref 0–6)
ERYTHROCYTE [DISTWIDTH] IN BLOOD BY AUTOMATED COUNT: 13.2 % (ref 11.6–15.1)
GFR SERPL CREATININE-BSD FRML MDRD: 77 ML/MIN/1.73SQ M
GLUCOSE SERPL-MCNC: 94 MG/DL (ref 65–140)
HCT VFR BLD AUTO: 38.4 % (ref 36.5–49.3)
HGB BLD-MCNC: 12.4 G/DL (ref 12–17)
IMM GRANULOCYTES # BLD AUTO: 0.09 THOUSAND/UL (ref 0–0.2)
IMM GRANULOCYTES NFR BLD AUTO: 1 % (ref 0–2)
LYMPHOCYTES # BLD AUTO: 1.05 THOUSANDS/ΜL (ref 0.6–4.47)
LYMPHOCYTES NFR BLD AUTO: 9 % (ref 14–44)
MCH RBC QN AUTO: 32.5 PG (ref 26.8–34.3)
MCHC RBC AUTO-ENTMCNC: 32.3 G/DL (ref 31.4–37.4)
MCV RBC AUTO: 101 FL (ref 82–98)
MONOCYTES # BLD AUTO: 0.85 THOUSAND/ΜL (ref 0.17–1.22)
MONOCYTES NFR BLD AUTO: 7 % (ref 4–12)
NEUTROPHILS # BLD AUTO: 9.24 THOUSANDS/ΜL (ref 1.85–7.62)
NEUTS SEG NFR BLD AUTO: 81 % (ref 43–75)
NRBC BLD AUTO-RTO: 0 /100 WBCS
PLATELET # BLD AUTO: 203 THOUSANDS/UL (ref 149–390)
PMV BLD AUTO: 9.5 FL (ref 8.9–12.7)
POTASSIUM SERPL-SCNC: 3.7 MMOL/L (ref 3.5–5.3)
RBC # BLD AUTO: 3.82 MILLION/UL (ref 3.88–5.62)
SODIUM SERPL-SCNC: 138 MMOL/L (ref 136–145)
WBC # BLD AUTO: 11.47 THOUSAND/UL (ref 4.31–10.16)

## 2020-06-09 PROCEDURE — 99232 SBSQ HOSP IP/OBS MODERATE 35: CPT | Performed by: SURGERY

## 2020-06-09 PROCEDURE — 94640 AIRWAY INHALATION TREATMENT: CPT

## 2020-06-09 PROCEDURE — 94760 N-INVAS EAR/PLS OXIMETRY 1: CPT

## 2020-06-09 PROCEDURE — 97530 THERAPEUTIC ACTIVITIES: CPT

## 2020-06-09 PROCEDURE — 97116 GAIT TRAINING THERAPY: CPT

## 2020-06-09 PROCEDURE — 94668 MNPJ CHEST WALL SBSQ: CPT

## 2020-06-09 PROCEDURE — 71046 X-RAY EXAM CHEST 2 VIEWS: CPT

## 2020-06-09 PROCEDURE — 80048 BASIC METABOLIC PNL TOTAL CA: CPT | Performed by: PHYSICIAN ASSISTANT

## 2020-06-09 PROCEDURE — 85025 COMPLETE CBC W/AUTO DIFF WBC: CPT | Performed by: PHYSICIAN ASSISTANT

## 2020-06-09 RX ADMIN — HEPARIN SODIUM 5000 UNITS: 5000 INJECTION INTRAVENOUS; SUBCUTANEOUS at 21:33

## 2020-06-09 RX ADMIN — FLUTICASONE FUROATE AND VILANTEROL TRIFENATATE 1 PUFF: 100; 25 POWDER RESPIRATORY (INHALATION) at 10:21

## 2020-06-09 RX ADMIN — IPRATROPIUM BROMIDE 0.5 MG: 0.5 SOLUTION RESPIRATORY (INHALATION) at 13:05

## 2020-06-09 RX ADMIN — IPRATROPIUM BROMIDE 0.5 MG: 0.5 SOLUTION RESPIRATORY (INHALATION) at 00:23

## 2020-06-09 RX ADMIN — BUDESONIDE 0.5 MG: 0.5 INHALANT RESPIRATORY (INHALATION) at 07:00

## 2020-06-09 RX ADMIN — SENNOSIDES AND DOCUSATE SODIUM 1 TABLET: 8.6; 5 TABLET ORAL at 21:33

## 2020-06-09 RX ADMIN — ESCITALOPRAM OXALATE 10 MG: 10 TABLET ORAL at 10:21

## 2020-06-09 RX ADMIN — LEVALBUTEROL HYDROCHLORIDE 1.25 MG: 1.25 SOLUTION, CONCENTRATE RESPIRATORY (INHALATION) at 00:24

## 2020-06-09 RX ADMIN — ACETAMINOPHEN 975 MG: 325 TABLET ORAL at 21:33

## 2020-06-09 RX ADMIN — CEFTRIAXONE SODIUM 1000 MG: 10 INJECTION, POWDER, FOR SOLUTION INTRAVENOUS at 10:20

## 2020-06-09 RX ADMIN — LEVALBUTEROL HYDROCHLORIDE 1.25 MG: 1.25 SOLUTION, CONCENTRATE RESPIRATORY (INHALATION) at 19:04

## 2020-06-09 RX ADMIN — LIDOCAINE 2 PATCH: 50 PATCH TOPICAL at 10:20

## 2020-06-09 RX ADMIN — ACETAMINOPHEN 975 MG: 325 TABLET ORAL at 14:21

## 2020-06-09 RX ADMIN — FAMOTIDINE 20 MG: 20 TABLET ORAL at 10:21

## 2020-06-09 RX ADMIN — HEPARIN SODIUM 5000 UNITS: 5000 INJECTION INTRAVENOUS; SUBCUTANEOUS at 05:08

## 2020-06-09 RX ADMIN — HEPARIN SODIUM 5000 UNITS: 5000 INJECTION INTRAVENOUS; SUBCUTANEOUS at 14:21

## 2020-06-09 RX ADMIN — ACETAMINOPHEN 975 MG: 325 TABLET ORAL at 05:08

## 2020-06-09 RX ADMIN — TAMSULOSIN HYDROCHLORIDE 0.4 MG: 0.4 CAPSULE ORAL at 17:24

## 2020-06-09 RX ADMIN — LEVALBUTEROL HYDROCHLORIDE 1.25 MG: 1.25 SOLUTION, CONCENTRATE RESPIRATORY (INHALATION) at 07:00

## 2020-06-09 RX ADMIN — IPRATROPIUM BROMIDE 0.5 MG: 0.5 SOLUTION RESPIRATORY (INHALATION) at 19:04

## 2020-06-09 RX ADMIN — LEVALBUTEROL HYDROCHLORIDE 1.25 MG: 1.25 SOLUTION, CONCENTRATE RESPIRATORY (INHALATION) at 13:05

## 2020-06-09 RX ADMIN — IPRATROPIUM BROMIDE 0.5 MG: 0.5 SOLUTION RESPIRATORY (INHALATION) at 07:00

## 2020-06-09 RX ADMIN — BUDESONIDE 0.5 MG: 0.5 INHALANT RESPIRATORY (INHALATION) at 19:04

## 2020-06-10 LAB
BACTERIA BLD CULT: NORMAL
BACTERIA BLD CULT: NORMAL

## 2020-06-10 PROCEDURE — 99232 SBSQ HOSP IP/OBS MODERATE 35: CPT | Performed by: INTERNAL MEDICINE

## 2020-06-10 PROCEDURE — 99232 SBSQ HOSP IP/OBS MODERATE 35: CPT | Performed by: EMERGENCY MEDICINE

## 2020-06-10 PROCEDURE — 94640 AIRWAY INHALATION TREATMENT: CPT

## 2020-06-10 PROCEDURE — 97116 GAIT TRAINING THERAPY: CPT

## 2020-06-10 PROCEDURE — 97530 THERAPEUTIC ACTIVITIES: CPT

## 2020-06-10 PROCEDURE — 94664 DEMO&/EVAL PT USE INHALER: CPT

## 2020-06-10 PROCEDURE — 94760 N-INVAS EAR/PLS OXIMETRY 1: CPT

## 2020-06-10 RX ORDER — POLYETHYLENE GLYCOL 3350 17 G/17G
17 POWDER, FOR SOLUTION ORAL DAILY
Status: DISCONTINUED | OUTPATIENT
Start: 2020-06-10 | End: 2020-06-13 | Stop reason: HOSPADM

## 2020-06-10 RX ADMIN — TAMSULOSIN HYDROCHLORIDE 0.4 MG: 0.4 CAPSULE ORAL at 15:43

## 2020-06-10 RX ADMIN — LIDOCAINE 2 PATCH: 50 PATCH TOPICAL at 09:50

## 2020-06-10 RX ADMIN — HEPARIN SODIUM 5000 UNITS: 5000 INJECTION INTRAVENOUS; SUBCUTANEOUS at 13:43

## 2020-06-10 RX ADMIN — BUDESONIDE 0.5 MG: 0.5 INHALANT RESPIRATORY (INHALATION) at 08:16

## 2020-06-10 RX ADMIN — LEVALBUTEROL HYDROCHLORIDE 1.25 MG: 1.25 SOLUTION, CONCENTRATE RESPIRATORY (INHALATION) at 13:33

## 2020-06-10 RX ADMIN — HEPARIN SODIUM 5000 UNITS: 5000 INJECTION INTRAVENOUS; SUBCUTANEOUS at 06:06

## 2020-06-10 RX ADMIN — ACETAMINOPHEN 975 MG: 325 TABLET ORAL at 21:54

## 2020-06-10 RX ADMIN — ESCITALOPRAM OXALATE 10 MG: 10 TABLET ORAL at 09:50

## 2020-06-10 RX ADMIN — SENNOSIDES AND DOCUSATE SODIUM 1 TABLET: 8.6; 5 TABLET ORAL at 21:54

## 2020-06-10 RX ADMIN — ACETAMINOPHEN 975 MG: 325 TABLET ORAL at 06:06

## 2020-06-10 RX ADMIN — FLUTICASONE FUROATE AND VILANTEROL TRIFENATATE 1 PUFF: 100; 25 POWDER RESPIRATORY (INHALATION) at 09:51

## 2020-06-10 RX ADMIN — HEPARIN SODIUM 5000 UNITS: 5000 INJECTION INTRAVENOUS; SUBCUTANEOUS at 21:54

## 2020-06-10 RX ADMIN — BUDESONIDE 0.5 MG: 0.5 INHALANT RESPIRATORY (INHALATION) at 19:59

## 2020-06-10 RX ADMIN — LEVALBUTEROL HYDROCHLORIDE 1.25 MG: 1.25 SOLUTION, CONCENTRATE RESPIRATORY (INHALATION) at 00:04

## 2020-06-10 RX ADMIN — IPRATROPIUM BROMIDE 0.5 MG: 0.5 SOLUTION RESPIRATORY (INHALATION) at 00:04

## 2020-06-10 RX ADMIN — ROPIVACAINE HYDROCHLORIDE: 5 INJECTION, SOLUTION EPIDURAL; INFILTRATION; PERINEURAL at 11:36

## 2020-06-10 RX ADMIN — CEFTRIAXONE SODIUM 1000 MG: 1 INJECTION, POWDER, FOR SOLUTION INTRAMUSCULAR; INTRAVENOUS at 09:58

## 2020-06-10 RX ADMIN — IPRATROPIUM BROMIDE 0.5 MG: 0.5 SOLUTION RESPIRATORY (INHALATION) at 13:33

## 2020-06-10 RX ADMIN — ACETAMINOPHEN 975 MG: 325 TABLET ORAL at 13:43

## 2020-06-10 RX ADMIN — FAMOTIDINE 20 MG: 20 TABLET ORAL at 09:50

## 2020-06-10 RX ADMIN — LEVALBUTEROL HYDROCHLORIDE 1.25 MG: 1.25 SOLUTION, CONCENTRATE RESPIRATORY (INHALATION) at 19:59

## 2020-06-10 RX ADMIN — IPRATROPIUM BROMIDE 0.5 MG: 0.5 SOLUTION RESPIRATORY (INHALATION) at 19:59

## 2020-06-10 RX ADMIN — LEVALBUTEROL HYDROCHLORIDE 1.25 MG: 1.25 SOLUTION, CONCENTRATE RESPIRATORY (INHALATION) at 08:16

## 2020-06-10 RX ADMIN — IPRATROPIUM BROMIDE 0.5 MG: 0.5 SOLUTION RESPIRATORY (INHALATION) at 08:16

## 2020-06-10 RX ADMIN — POLYETHYLENE GLYCOL 3350 17 G: 17 POWDER, FOR SOLUTION ORAL at 11:50

## 2020-06-11 PROBLEM — R13.10 DYSPHAGIA: Status: RESOLVED | Noted: 2020-06-08 | Resolved: 2020-06-11

## 2020-06-11 LAB
ANION GAP SERPL CALCULATED.3IONS-SCNC: 5 MMOL/L (ref 4–13)
BASOPHILS # BLD AUTO: 0.02 THOUSANDS/ΜL (ref 0–0.1)
BASOPHILS NFR BLD AUTO: 0 % (ref 0–1)
BUN SERPL-MCNC: 15 MG/DL (ref 5–25)
CALCIUM SERPL-MCNC: 8.4 MG/DL (ref 8.3–10.1)
CHLORIDE SERPL-SCNC: 103 MMOL/L (ref 100–108)
CO2 SERPL-SCNC: 28 MMOL/L (ref 21–32)
CREAT SERPL-MCNC: 0.86 MG/DL (ref 0.6–1.3)
EOSINOPHIL # BLD AUTO: 0.19 THOUSAND/ΜL (ref 0–0.61)
EOSINOPHIL NFR BLD AUTO: 2 % (ref 0–6)
ERYTHROCYTE [DISTWIDTH] IN BLOOD BY AUTOMATED COUNT: 13.1 % (ref 11.6–15.1)
GFR SERPL CREATININE-BSD FRML MDRD: 82 ML/MIN/1.73SQ M
GLUCOSE SERPL-MCNC: 109 MG/DL (ref 65–140)
HCT VFR BLD AUTO: 36.9 % (ref 36.5–49.3)
HGB BLD-MCNC: 12.2 G/DL (ref 12–17)
IMM GRANULOCYTES # BLD AUTO: 0.2 THOUSAND/UL (ref 0–0.2)
IMM GRANULOCYTES NFR BLD AUTO: 2 % (ref 0–2)
LYMPHOCYTES # BLD AUTO: 1.06 THOUSANDS/ΜL (ref 0.6–4.47)
LYMPHOCYTES NFR BLD AUTO: 9 % (ref 14–44)
MCH RBC QN AUTO: 33 PG (ref 26.8–34.3)
MCHC RBC AUTO-ENTMCNC: 33.1 G/DL (ref 31.4–37.4)
MCV RBC AUTO: 100 FL (ref 82–98)
MONOCYTES # BLD AUTO: 0.85 THOUSAND/ΜL (ref 0.17–1.22)
MONOCYTES NFR BLD AUTO: 7 % (ref 4–12)
NEUTROPHILS # BLD AUTO: 9.94 THOUSANDS/ΜL (ref 1.85–7.62)
NEUTS SEG NFR BLD AUTO: 80 % (ref 43–75)
NRBC BLD AUTO-RTO: 0 /100 WBCS
PLATELET # BLD AUTO: 230 THOUSANDS/UL (ref 149–390)
PMV BLD AUTO: 9.1 FL (ref 8.9–12.7)
POTASSIUM SERPL-SCNC: 3.8 MMOL/L (ref 3.5–5.3)
RBC # BLD AUTO: 3.7 MILLION/UL (ref 3.88–5.62)
SODIUM SERPL-SCNC: 136 MMOL/L (ref 136–145)
WBC # BLD AUTO: 12.26 THOUSAND/UL (ref 4.31–10.16)

## 2020-06-11 PROCEDURE — 97535 SELF CARE MNGMENT TRAINING: CPT

## 2020-06-11 PROCEDURE — 97530 THERAPEUTIC ACTIVITIES: CPT

## 2020-06-11 PROCEDURE — 97116 GAIT TRAINING THERAPY: CPT

## 2020-06-11 PROCEDURE — 99232 SBSQ HOSP IP/OBS MODERATE 35: CPT | Performed by: ANESTHESIOLOGY

## 2020-06-11 PROCEDURE — 99232 SBSQ HOSP IP/OBS MODERATE 35: CPT | Performed by: EMERGENCY MEDICINE

## 2020-06-11 PROCEDURE — 94640 AIRWAY INHALATION TREATMENT: CPT

## 2020-06-11 PROCEDURE — 80048 BASIC METABOLIC PNL TOTAL CA: CPT | Performed by: NURSE PRACTITIONER

## 2020-06-11 PROCEDURE — 85025 COMPLETE CBC W/AUTO DIFF WBC: CPT | Performed by: NURSE PRACTITIONER

## 2020-06-11 PROCEDURE — 94760 N-INVAS EAR/PLS OXIMETRY 1: CPT

## 2020-06-11 RX ORDER — GABAPENTIN 100 MG/1
100 CAPSULE ORAL
Status: DISCONTINUED | OUTPATIENT
Start: 2020-06-11 | End: 2020-06-13 | Stop reason: HOSPADM

## 2020-06-11 RX ORDER — OXYCODONE HYDROCHLORIDE 5 MG/1
5 TABLET ORAL EVERY 4 HOURS PRN
Status: DISCONTINUED | OUTPATIENT
Start: 2020-06-11 | End: 2020-06-13 | Stop reason: HOSPADM

## 2020-06-11 RX ORDER — OXYCODONE HYDROCHLORIDE 5 MG/1
2.5 TABLET ORAL EVERY 4 HOURS PRN
Status: DISCONTINUED | OUTPATIENT
Start: 2020-06-11 | End: 2020-06-13 | Stop reason: HOSPADM

## 2020-06-11 RX ORDER — POTASSIUM CHLORIDE 20 MEQ/1
20 TABLET, EXTENDED RELEASE ORAL ONCE
Status: COMPLETED | OUTPATIENT
Start: 2020-06-11 | End: 2020-06-11

## 2020-06-11 RX ADMIN — POLYETHYLENE GLYCOL 3350 17 G: 17 POWDER, FOR SOLUTION ORAL at 12:32

## 2020-06-11 RX ADMIN — FLUTICASONE FUROATE AND VILANTEROL TRIFENATATE 1 PUFF: 100; 25 POWDER RESPIRATORY (INHALATION) at 12:33

## 2020-06-11 RX ADMIN — IPRATROPIUM BROMIDE 0.5 MG: 0.5 SOLUTION RESPIRATORY (INHALATION) at 19:30

## 2020-06-11 RX ADMIN — CEFTRIAXONE SODIUM 1000 MG: 1 INJECTION, POWDER, FOR SOLUTION INTRAMUSCULAR; INTRAVENOUS at 12:47

## 2020-06-11 RX ADMIN — LEVALBUTEROL HYDROCHLORIDE 1.25 MG: 1.25 SOLUTION, CONCENTRATE RESPIRATORY (INHALATION) at 19:30

## 2020-06-11 RX ADMIN — SENNOSIDES AND DOCUSATE SODIUM 1 TABLET: 8.6; 5 TABLET ORAL at 21:55

## 2020-06-11 RX ADMIN — BUDESONIDE 0.5 MG: 0.5 INHALANT RESPIRATORY (INHALATION) at 19:30

## 2020-06-11 RX ADMIN — ACETAMINOPHEN 975 MG: 325 TABLET ORAL at 21:55

## 2020-06-11 RX ADMIN — LEVALBUTEROL HYDROCHLORIDE 1.25 MG: 1.25 SOLUTION, CONCENTRATE RESPIRATORY (INHALATION) at 01:17

## 2020-06-11 RX ADMIN — FAMOTIDINE 20 MG: 20 TABLET ORAL at 12:32

## 2020-06-11 RX ADMIN — TAMSULOSIN HYDROCHLORIDE 0.4 MG: 0.4 CAPSULE ORAL at 17:43

## 2020-06-11 RX ADMIN — IPRATROPIUM BROMIDE 0.5 MG: 0.5 SOLUTION RESPIRATORY (INHALATION) at 07:43

## 2020-06-11 RX ADMIN — BUDESONIDE 0.5 MG: 0.5 INHALANT RESPIRATORY (INHALATION) at 07:43

## 2020-06-11 RX ADMIN — LEVALBUTEROL HYDROCHLORIDE 1.25 MG: 1.25 SOLUTION, CONCENTRATE RESPIRATORY (INHALATION) at 13:54

## 2020-06-11 RX ADMIN — IPRATROPIUM BROMIDE 0.5 MG: 0.5 SOLUTION RESPIRATORY (INHALATION) at 13:53

## 2020-06-11 RX ADMIN — LIDOCAINE 2 PATCH: 50 PATCH TOPICAL at 12:32

## 2020-06-11 RX ADMIN — GABAPENTIN 100 MG: 100 CAPSULE ORAL at 21:55

## 2020-06-11 RX ADMIN — IPRATROPIUM BROMIDE 0.5 MG: 0.5 SOLUTION RESPIRATORY (INHALATION) at 01:17

## 2020-06-11 RX ADMIN — OXYCODONE HYDROCHLORIDE 5 MG: 5 TABLET ORAL at 12:31

## 2020-06-11 RX ADMIN — POTASSIUM CHLORIDE 20 MEQ: 1500 TABLET, EXTENDED RELEASE ORAL at 12:32

## 2020-06-11 RX ADMIN — ACETAMINOPHEN 975 MG: 325 TABLET ORAL at 05:18

## 2020-06-11 RX ADMIN — ACETAMINOPHEN 975 MG: 325 TABLET ORAL at 14:36

## 2020-06-11 RX ADMIN — LEVALBUTEROL HYDROCHLORIDE 1.25 MG: 1.25 SOLUTION, CONCENTRATE RESPIRATORY (INHALATION) at 07:43

## 2020-06-11 RX ADMIN — ESCITALOPRAM OXALATE 10 MG: 10 TABLET ORAL at 12:32

## 2020-06-12 PROCEDURE — 94640 AIRWAY INHALATION TREATMENT: CPT

## 2020-06-12 PROCEDURE — 94760 N-INVAS EAR/PLS OXIMETRY 1: CPT

## 2020-06-12 PROCEDURE — 99232 SBSQ HOSP IP/OBS MODERATE 35: CPT | Performed by: EMERGENCY MEDICINE

## 2020-06-12 PROCEDURE — 94664 DEMO&/EVAL PT USE INHALER: CPT

## 2020-06-12 RX ADMIN — LEVALBUTEROL HYDROCHLORIDE 1.25 MG: 1.25 SOLUTION, CONCENTRATE RESPIRATORY (INHALATION) at 02:19

## 2020-06-12 RX ADMIN — LEVALBUTEROL HYDROCHLORIDE 1.25 MG: 1.25 SOLUTION, CONCENTRATE RESPIRATORY (INHALATION) at 19:55

## 2020-06-12 RX ADMIN — ESCITALOPRAM OXALATE 10 MG: 10 TABLET ORAL at 08:35

## 2020-06-12 RX ADMIN — IPRATROPIUM BROMIDE 0.5 MG: 0.5 SOLUTION RESPIRATORY (INHALATION) at 19:56

## 2020-06-12 RX ADMIN — POLYETHYLENE GLYCOL 3350 17 G: 17 POWDER, FOR SOLUTION ORAL at 08:35

## 2020-06-12 RX ADMIN — BUDESONIDE 0.5 MG: 0.5 INHALANT RESPIRATORY (INHALATION) at 07:02

## 2020-06-12 RX ADMIN — ENOXAPARIN SODIUM 30 MG: 30 INJECTION SUBCUTANEOUS at 08:34

## 2020-06-12 RX ADMIN — ACETAMINOPHEN 975 MG: 325 TABLET ORAL at 05:25

## 2020-06-12 RX ADMIN — LIDOCAINE 2 PATCH: 50 PATCH TOPICAL at 08:35

## 2020-06-12 RX ADMIN — ACETAMINOPHEN 975 MG: 325 TABLET ORAL at 13:29

## 2020-06-12 RX ADMIN — BUDESONIDE 0.5 MG: 0.5 INHALANT RESPIRATORY (INHALATION) at 19:55

## 2020-06-12 RX ADMIN — TAMSULOSIN HYDROCHLORIDE 0.4 MG: 0.4 CAPSULE ORAL at 16:12

## 2020-06-12 RX ADMIN — ACETAMINOPHEN 975 MG: 325 TABLET ORAL at 21:21

## 2020-06-12 RX ADMIN — IPRATROPIUM BROMIDE 0.5 MG: 0.5 SOLUTION RESPIRATORY (INHALATION) at 13:11

## 2020-06-12 RX ADMIN — LEVALBUTEROL HYDROCHLORIDE 1.25 MG: 1.25 SOLUTION, CONCENTRATE RESPIRATORY (INHALATION) at 07:02

## 2020-06-12 RX ADMIN — FAMOTIDINE 20 MG: 20 TABLET ORAL at 08:35

## 2020-06-12 RX ADMIN — LEVALBUTEROL HYDROCHLORIDE 1.25 MG: 1.25 SOLUTION, CONCENTRATE RESPIRATORY (INHALATION) at 13:11

## 2020-06-12 RX ADMIN — OXYCODONE HYDROCHLORIDE 2.5 MG: 5 TABLET ORAL at 21:21

## 2020-06-12 RX ADMIN — FLUTICASONE FUROATE AND VILANTEROL TRIFENATATE 1 PUFF: 100; 25 POWDER RESPIRATORY (INHALATION) at 08:36

## 2020-06-12 RX ADMIN — IPRATROPIUM BROMIDE 0.5 MG: 0.5 SOLUTION RESPIRATORY (INHALATION) at 07:02

## 2020-06-12 RX ADMIN — SENNOSIDES AND DOCUSATE SODIUM 1 TABLET: 8.6; 5 TABLET ORAL at 21:21

## 2020-06-12 RX ADMIN — ENOXAPARIN SODIUM 30 MG: 30 INJECTION SUBCUTANEOUS at 21:21

## 2020-06-12 RX ADMIN — IPRATROPIUM BROMIDE 0.5 MG: 0.5 SOLUTION RESPIRATORY (INHALATION) at 02:19

## 2020-06-12 RX ADMIN — GABAPENTIN 100 MG: 100 CAPSULE ORAL at 21:21

## 2020-06-13 VITALS
DIASTOLIC BLOOD PRESSURE: 91 MMHG | RESPIRATION RATE: 18 BRPM | SYSTOLIC BLOOD PRESSURE: 123 MMHG | HEART RATE: 83 BPM | TEMPERATURE: 98 F | OXYGEN SATURATION: 92 % | BODY MASS INDEX: 31.53 KG/M2 | WEIGHT: 196.21 LBS | HEIGHT: 66 IN

## 2020-06-13 PROCEDURE — 94760 N-INVAS EAR/PLS OXIMETRY 1: CPT

## 2020-06-13 PROCEDURE — 99238 HOSP IP/OBS DSCHRG MGMT 30/<: CPT | Performed by: SURGERY

## 2020-06-13 PROCEDURE — 94761 N-INVAS EAR/PLS OXIMETRY MLT: CPT

## 2020-06-13 PROCEDURE — NC001 PR NO CHARGE: Performed by: SURGERY

## 2020-06-13 PROCEDURE — 94640 AIRWAY INHALATION TREATMENT: CPT

## 2020-06-13 RX ORDER — TAMSULOSIN HYDROCHLORIDE 0.4 MG/1
0.4 CAPSULE ORAL
Qty: 30 CAPSULE | Refills: 0 | Status: SHIPPED | OUTPATIENT
Start: 2020-06-13 | End: 2020-09-30

## 2020-06-13 RX ORDER — OXYCODONE HYDROCHLORIDE 5 MG/1
2.5-5 TABLET ORAL EVERY 4 HOURS PRN
Qty: 15 TABLET | Refills: 0 | Status: SHIPPED | OUTPATIENT
Start: 2020-06-13 | End: 2020-06-23 | Stop reason: ALTCHOICE

## 2020-06-13 RX ORDER — ACETAMINOPHEN 325 MG/1
650 TABLET ORAL EVERY 4 HOURS PRN
Qty: 30 TABLET | Refills: 0
Start: 2020-06-13

## 2020-06-13 RX ORDER — GABAPENTIN 100 MG/1
100 CAPSULE ORAL
Qty: 14 CAPSULE | Refills: 0 | Status: SHIPPED | OUTPATIENT
Start: 2020-06-13 | End: 2020-09-30 | Stop reason: SDUPTHER

## 2020-06-13 RX ADMIN — OXYCODONE HYDROCHLORIDE 5 MG: 5 TABLET ORAL at 05:13

## 2020-06-13 RX ADMIN — LEVALBUTEROL HYDROCHLORIDE 1.25 MG: 1.25 SOLUTION, CONCENTRATE RESPIRATORY (INHALATION) at 07:03

## 2020-06-13 RX ADMIN — LIDOCAINE 2 PATCH: 50 PATCH TOPICAL at 08:13

## 2020-06-13 RX ADMIN — FLUTICASONE FUROATE AND VILANTEROL TRIFENATATE 1 PUFF: 100; 25 POWDER RESPIRATORY (INHALATION) at 08:13

## 2020-06-13 RX ADMIN — ACETAMINOPHEN 975 MG: 325 TABLET ORAL at 13:21

## 2020-06-13 RX ADMIN — BUDESONIDE 0.5 MG: 0.5 INHALANT RESPIRATORY (INHALATION) at 07:03

## 2020-06-13 RX ADMIN — ENOXAPARIN SODIUM 30 MG: 30 INJECTION SUBCUTANEOUS at 08:13

## 2020-06-13 RX ADMIN — ACETAMINOPHEN 975 MG: 325 TABLET ORAL at 05:12

## 2020-06-13 RX ADMIN — IPRATROPIUM BROMIDE 0.5 MG: 0.5 SOLUTION RESPIRATORY (INHALATION) at 07:03

## 2020-06-13 RX ADMIN — IPRATROPIUM BROMIDE 0.5 MG: 0.5 SOLUTION RESPIRATORY (INHALATION) at 01:03

## 2020-06-13 RX ADMIN — LEVALBUTEROL HYDROCHLORIDE 1.25 MG: 1.25 SOLUTION, CONCENTRATE RESPIRATORY (INHALATION) at 01:03

## 2020-06-13 RX ADMIN — ESCITALOPRAM OXALATE 10 MG: 10 TABLET ORAL at 08:13

## 2020-06-13 RX ADMIN — FAMOTIDINE 20 MG: 20 TABLET ORAL at 08:13

## 2020-06-15 ENCOUNTER — TRANSITIONAL CARE MANAGEMENT (OUTPATIENT)
Dept: FAMILY MEDICINE CLINIC | Facility: CLINIC | Age: 80
End: 2020-06-15

## 2020-06-17 ENCOUNTER — TELEPHONE (OUTPATIENT)
Dept: FAMILY MEDICINE CLINIC | Facility: CLINIC | Age: 80
End: 2020-06-17

## 2020-06-17 NOTE — TELEPHONE ENCOUNTER
Occupational therapy will visit patient twice a week for 3 weeks then one more week the last time also will like to request and order for home health aid to help shower one time a week for 4 weeks staring 6/22/20 Thank you             Order are entered please review thank you

## 2020-06-18 NOTE — TELEPHONE ENCOUNTER
Okay to order home health aide if needed  I assume this is through the visiting nurses or therefore physical therapy

## 2020-06-23 ENCOUNTER — OFFICE VISIT (OUTPATIENT)
Dept: FAMILY MEDICINE CLINIC | Facility: CLINIC | Age: 80
End: 2020-06-23
Payer: MEDICARE

## 2020-06-23 VITALS
WEIGHT: 190 LBS | BODY MASS INDEX: 30.67 KG/M2 | TEMPERATURE: 97.2 F | DIASTOLIC BLOOD PRESSURE: 78 MMHG | OXYGEN SATURATION: 91 % | HEART RATE: 90 BPM | SYSTOLIC BLOOD PRESSURE: 126 MMHG
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
## 2020-06-23 DIAGNOSIS — J44.9 COPD, MODERATE (HCC): ICD-10-CM

## 2020-06-23 DIAGNOSIS — F32.A DEPRESSIVE DISORDER: ICD-10-CM

## 2020-06-23 DIAGNOSIS — S22.42XA CLOSED FRACTURE OF MULTIPLE RIBS OF LEFT SIDE, INITIAL ENCOUNTER: Primary | ICD-10-CM

## 2020-06-23 DIAGNOSIS — F41.9 ANXIETY: ICD-10-CM

## 2020-06-23 PROCEDURE — 99495 TRANSJ CARE MGMT MOD F2F 14D: CPT | Performed by: FAMILY MEDICINE

## 2020-06-23 RX ORDER — LORAZEPAM 1 MG/1
1 TABLET ORAL EVERY 8 HOURS PRN
Qty: 30 TABLET | Refills: 1 | Status: SHIPPED | OUTPATIENT
Start: 2020-06-23 | End: 2020-09-11 | Stop reason: SDUPTHER

## 2020-06-23 RX ORDER — ESCITALOPRAM OXALATE 10 MG/1
10 TABLET ORAL DAILY
Qty: 90 TABLET | Refills: 1 | Status: ON HOLD | OUTPATIENT
Start: 2020-06-23 | End: 2020-12-12 | Stop reason: SDUPTHER

## 2020-06-29 ENCOUNTER — DOCUMENTATION (OUTPATIENT)
Dept: PULMONOLOGY | Facility: CLINIC | Age: 80
End: 2020-06-29

## 2020-06-30 ENCOUNTER — OFFICE VISIT (OUTPATIENT)
Dept: PULMONOLOGY | Facility: CLINIC | Age: 80
End: 2020-06-30
Payer: MEDICARE

## 2020-06-30 VITALS
HEART RATE: 78 BPM | SYSTOLIC BLOOD PRESSURE: 124 MMHG | OXYGEN SATURATION: 96 % | DIASTOLIC BLOOD PRESSURE: 80 MMHG | HEIGHT: 66 IN | WEIGHT: 185 LBS | TEMPERATURE: 97.9 F | BODY MASS INDEX: 29.73 KG/M2

## 2020-06-30 DIAGNOSIS — Z87.01 HISTORY OF PNEUMONIA: ICD-10-CM

## 2020-06-30 DIAGNOSIS — J44.9 COPD, MODERATE (HCC): ICD-10-CM

## 2020-06-30 DIAGNOSIS — Z87.01 HISTORY OF RECENT PNEUMONIA: ICD-10-CM

## 2020-06-30 DIAGNOSIS — J96.11 CHRONIC RESPIRATORY FAILURE WITH HYPOXIA (HCC): Primary | ICD-10-CM

## 2020-06-30 PROCEDURE — 1036F TOBACCO NON-USER: CPT | Performed by: PHYSICIAN ASSISTANT

## 2020-06-30 PROCEDURE — 4040F PNEUMOC VAC/ADMIN/RCVD: CPT | Performed by: PHYSICIAN ASSISTANT

## 2020-06-30 PROCEDURE — 1111F DSCHRG MED/CURRENT MED MERGE: CPT | Performed by: PHYSICIAN ASSISTANT

## 2020-06-30 PROCEDURE — 3008F BODY MASS INDEX DOCD: CPT | Performed by: PHYSICIAN ASSISTANT

## 2020-06-30 PROCEDURE — 1160F RVW MEDS BY RX/DR IN RCRD: CPT | Performed by: PHYSICIAN ASSISTANT

## 2020-06-30 PROCEDURE — 99214 OFFICE O/P EST MOD 30 MIN: CPT | Performed by: PHYSICIAN ASSISTANT

## 2020-07-08 ENCOUNTER — HOSPITAL ENCOUNTER (OUTPATIENT)
Dept: RADIOLOGY | Facility: HOSPITAL | Age: 80
Discharge: HOME | End: 2020-07-08
Payer: MEDICARE

## 2020-07-08 DIAGNOSIS — Z87.01 HISTORY OF RECENT PNEUMONIA: ICD-10-CM

## 2020-07-08 PROCEDURE — 71046 X-RAY EXAM CHEST 2 VIEWS: CPT

## 2020-07-28 ENCOUNTER — TELEPHONE (OUTPATIENT)
Dept: FAMILY MEDICINE CLINIC | Facility: CLINIC | Age: 80
End: 2020-07-28

## 2020-07-28 DIAGNOSIS — J44.9 COPD, MODERATE (HCC): Primary | ICD-10-CM

## 2020-07-28 NOTE — TELEPHONE ENCOUNTER
Edward Nicholson from [facility] call inform pt is out of Rx anora due to being reinaldo price she call pulmonology also if there is any sample of if can concider changing Rx

## 2020-07-29 NOTE — TELEPHONE ENCOUNTER
Although will would not be exactly the same, patient could switch either to the generic for Advair or the generic for Symbicort which would be less expensive  He would have to see whether that controls him as well  Also verify he is not on the another bronchodilator be sides the Anoro

## 2020-07-30 NOTE — TELEPHONE ENCOUNTER
PC PT- pt Is on no other bronchodialtors- willing to try generic advair- will send RX to be approved

## 2020-08-04 ENCOUNTER — TELEPHONE (OUTPATIENT)
Dept: FAMILY MEDICINE CLINIC | Facility: CLINIC | Age: 80
End: 2020-08-04

## 2020-08-04 NOTE — TELEPHONE ENCOUNTER
Patients wife called stating that Alfred Baldwin has a bladder infection, she states he is peeing every half hour  Please review      Allergies: Aspirin    [facility]  [phone]

## 2020-08-05 NOTE — TELEPHONE ENCOUNTER
I believe patient is also seen urologist   Had a called Urology?    Can they get me a urine sample to check for infection

## 2020-08-05 NOTE — TELEPHONE ENCOUNTER
Spoke to patient  Patient has no current symptoms stated has gone away and [facility] not call urology  But will call if symptoms start

## 2020-08-12 ENCOUNTER — TELEPHONE (OUTPATIENT)
Dept: FAMILY MEDICINE CLINIC | Facility: CLINIC | Age: 80
End: 2020-08-12

## 2020-08-12 NOTE — TELEPHONE ENCOUNTER
[place] [facility] home care nurse calling to informed Rolando Corey was discharge from home care nurse 08/11/2020

## 2020-09-11 DIAGNOSIS — F41.9 ANXIETY: ICD-10-CM

## 2020-09-15 DIAGNOSIS — F41.9 ANXIETY: ICD-10-CM

## 2020-09-15 RX ORDER — LORAZEPAM 1 MG/1
1 TABLET ORAL EVERY 8 HOURS PRN
Qty: 30 TABLET | Refills: 1 | Status: SHIPPED | OUTPATIENT
Start: 2020-09-15 | End: 2020-11-23 | Stop reason: SDUPTHER

## 2020-09-15 RX ORDER — LORAZEPAM 1 MG/1
TABLET ORAL
Qty: 30 TABLET | Refills: 0 | OUTPATIENT
Start: 2020-09-15

## 2020-09-18 ENCOUNTER — TELEPHONE (OUTPATIENT)
Dept: FAMILY MEDICINE CLINIC | Facility: CLINIC | Age: 80
End: 2020-09-18

## 2020-09-18 NOTE — TELEPHONE ENCOUNTER
Patients wife states he has a bladder infection burning, itching and painful when urinating  She was advised he will most likely need a appt, she stated she would like for the doctor to send him a anti biotic  Please review

## 2020-09-19 ENCOUNTER — OFFICE VISIT (OUTPATIENT)
Dept: URGENT CARE | Facility: CLINIC | Age: 80
End: 2020-09-19
Payer: MEDICARE

## 2020-09-19 VITALS
BODY MASS INDEX: 29.63 KG/M2 | HEART RATE: 80 BPM | SYSTOLIC BLOOD PRESSURE: 146 MMHG | OXYGEN SATURATION: 96 % | DIASTOLIC BLOOD PRESSURE: 76 MMHG | WEIGHT: 184.4 LBS | TEMPERATURE: 97.1 F | HEIGHT: 66 IN | RESPIRATION RATE: 22 BRPM

## 2020-09-19 DIAGNOSIS — N39.0 URINARY TRACT INFECTION WITH HEMATURIA, SITE UNSPECIFIED: Primary | ICD-10-CM

## 2020-09-19 DIAGNOSIS — R31.9 URINARY TRACT INFECTION WITH HEMATURIA, SITE UNSPECIFIED: Primary | ICD-10-CM

## 2020-09-19 DIAGNOSIS — N30.01 ACUTE CYSTITIS WITH HEMATURIA: ICD-10-CM

## 2020-09-19 LAB
SL AMB  POCT GLUCOSE, UA: ABNORMAL
SL AMB LEUKOCYTE ESTERASE,UA: ABNORMAL
SL AMB POCT BILIRUBIN,UA: ABNORMAL
SL AMB POCT BLOOD,UA: ABNORMAL
SL AMB POCT CLARITY,UA: ABNORMAL
SL AMB POCT COLOR,UA: YELLOW
SL AMB POCT KETONES,UA: ABNORMAL
SL AMB POCT NITRITE,UA: ABNORMAL
SL AMB POCT PH,UA: 8
SL AMB POCT SPECIFIC GRAVITY,UA: 1.02
SL AMB POCT URINE PROTEIN: ABNORMAL
SL AMB POCT UROBILINOGEN: 0.2

## 2020-09-19 PROCEDURE — 87086 URINE CULTURE/COLONY COUNT: CPT | Performed by: PHYSICIAN ASSISTANT

## 2020-09-19 PROCEDURE — 99213 OFFICE O/P EST LOW 20 MIN: CPT | Performed by: PHYSICIAN ASSISTANT

## 2020-09-19 PROCEDURE — 87186 SC STD MICRODIL/AGAR DIL: CPT | Performed by: PHYSICIAN ASSISTANT

## 2020-09-19 PROCEDURE — 81002 URINALYSIS NONAUTO W/O SCOPE: CPT | Performed by: PHYSICIAN ASSISTANT

## 2020-09-19 PROCEDURE — G0463 HOSPITAL OUTPT CLINIC VISIT: HCPCS | Performed by: PHYSICIAN ASSISTANT

## 2020-09-19 PROCEDURE — 87077 CULTURE AEROBIC IDENTIFY: CPT | Performed by: PHYSICIAN ASSISTANT

## 2020-09-19 RX ORDER — SULFAMETHOXAZOLE AND TRIMETHOPRIM 800; 160 MG/1; MG/1
1 TABLET ORAL EVERY 12 HOURS SCHEDULED
Qty: 14 TABLET | Refills: 0 | Status: SHIPPED | OUTPATIENT
Start: 2020-09-19 | End: 2020-09-26

## 2020-09-19 NOTE — PROGRESS NOTES
NAME: Andrea Moser is a [de-identified] y o  male  : 1940    MRN: [id number]      Assessment and Plan   Urinary tract infection with hematuria, site unspecified [N39 0, R31 9]  1  Urinary tract infection with hematuria, site unspecified  sulfamethoxazole-trimethoprim (BACTRIM DS) 800-160 mg per tablet   2  Acute cystitis with hematuria  POCT urine dip    Urine culture       Urine dip positive for leukocytes, nitrates, protein and large blood  Will treat with Bactrim for 7 days  Discussed red flag symptoms such as fevers, chills, abdominal pain, back pain or any perianal pain he should go to the ER  He acknowledges    Patient Instructions   Patient Instructions   Dysuria   WHAT YOU NEED TO KNOW:   Dysuria is difficulty urinating, or pain, burning, or discomfort with urination  Dysuria is usually a symptom of another problem  DISCHARGE INSTRUCTIONS:   Return to the emergency department if:   · You have severe back, side, or abdominal pain  · You have fever and shaking chills  · You vomit several times in a row  Contact your healthcare provider if:   · Your symptoms do not go away, even after treatment  · You have questions or concerns about your condition or care  Medicines:   · Medicines  may be given to help treat a bacterial infection or help decrease bladder spasms  · Take your medicine as directed  Contact your healthcare provider if you think your medicine is not helping or if you have side effects  Tell him of her if you are allergic to any medicine  Keep a list of the medicines, vitamins, and herbs you take  Include the amounts, and when and why you take them  Bring the list or the pill bottles to follow-up visits  Carry your medicine list with you in case of an emergency  Follow up with your healthcare provider as directed: Your healthcare provider may also refer you to a urologist or nephrologist to have additional testing   Write down your questions so you remember to ask them during your visits  Manage your dysuria:   · Drink more liquids  Liquids help flush out bacteria that may be causing an infection  Ask your healthcare provider how much liquid to drink each day and which liquids are best for you  · Take sitz baths as directed  Fill a bathtub with 4 to 6 inches of warm water  You may also use a sitz bath pan that fits over a toilet  Sit in the sitz bath for 20 minutes  Do this 2 to 3 times a day, or as directed  The warm water can help decrease pain and swelling  © 2017 [facility] [facility] Information is for End User's use only and may not be sold, redistributed or otherwise used for commercial purposes  All illustrations and images included in CareNotes® are the copyrighted property of A D A M , Inc  or Edilson Rincon  The above information is an  only  It is not intended as medical advice for individual conditions or treatments  Talk to your doctor, nurse or pharmacist before following any medical regimen to see if it is safe and effective for you  Proceed to ER if symptoms worsen  Chief Complaint     Chief Complaint   Patient presents with    Possible UTI     Pt reports burning with urination, frequency, urgency, malodorous  Abdominal pain resolved  Pt denies n/v/d fever, chills  History of Present Illness   Patient presents complaining of UTI symptoms times 1-2 days  States either yesterday earlier Thursday night started to have burning, frequency and urgency  Reports he has a history of UTIs and gets them a few times a year  He denies fever, chills, back pain, abdominal pain, nausea or vomiting  Reports he was having some pressure in the lower abdominal area but that has resolved  States he has a history of chronic back pain but denies any changes or any flank pain  Denies any hematuria or penile discharge  Denies any pain to the perineum or anus        Review of Systems   Review of Systems   Constitutional: Negative for chills and fever  Gastrointestinal: Negative for abdominal pain, blood in stool, nausea and vomiting  Genitourinary: Positive for dysuria, frequency and urgency  Negative for decreased urine volume, difficulty urinating, discharge, flank pain, hematuria, penile pain, penile swelling, scrotal swelling and testicular pain           Current Medications       Current Outpatient Medications:     acetaminophen (TYLENOL) 325 mg tablet, Take 2 tablets (650 mg total) by mouth every 4 (four) hours as needed for mild pain, Disp: 30 tablet, Rfl: 0    albuterol (2 5 mg/3 mL) 0 083 % nebulizer solution, Take 2 5 mg by nebulization every 4 (four) hours as needed for wheezing or shortness of breath, Disp: , Rfl:     albuterol (ProAir HFA) 90 mcg/act inhaler, Inhale 2 puffs every 6 (six) hours as needed for wheezing, Disp: 8 5 g, Rfl: 3    cyclobenzaprine (FLEXERIL) 10 mg tablet, Take 1 tablet (10 mg total) by mouth 3 (three) times a day as needed for muscle spasms, Disp: 30 tablet, Rfl: 1    escitalopram (LEXAPRO) 10 mg tablet, Take 1 tablet (10 mg total) by mouth daily, Disp: 90 tablet, Rfl: 1    famotidine (PEPCID) 40 MG tablet, TAKE 1 TABLET BY MOUTH EVERY DAY, Disp: 90 tablet, Rfl: 1    fluticasone-salmeterol (ADVAIR, WIXELA) 250-50 mcg/dose inhaler, Inhale 1 puff 2 (two) times a day Rinse mouth after use , Disp: 1 Inhaler, Rfl: 3    gabapentin (NEURONTIN) 100 mg capsule, Take 1 capsule (100 mg total) by mouth daily at bedtime, Disp: 14 capsule, Rfl: 0    ipratropium (ATROVENT) 0 02 % nebulizer solution, Take 0 5 mg by nebulization 4 (four) times a day, Disp: , Rfl:     ipratropium-albuterol (DUO-NEB) 0 5-2 5 mg/3 mL nebulizer solution, Take 1 vial (3 mL total) by nebulization 4 (four) times a day, Disp: 120 vial, Rfl: 5    LORazepam (ATIVAN) 1 mg tablet, Take 1 tablet (1 mg total) by mouth every 8 (eight) hours as needed for anxiety, Disp: 30 tablet, Rfl: 1    NON FORMULARY, Take 1,000 mg by mouth 2 (two) times a day Med is MSM Pure, pt brought in from home, Disp: , Rfl:     sulfamethoxazole-trimethoprim (BACTRIM DS) 800-160 mg per tablet, Take 1 tablet by mouth every 12 (twelve) hours for 7 days, Disp: 14 tablet, Rfl: 0    tamsulosin (FLOMAX) 0 4 mg, Take 1 capsule (0 4 mg total) by mouth daily with dinner (Patient not taking: Reported on 9/19/2020), Disp: 30 capsule, Rfl: 0    Current Allergies     Allergies as of 09/19/2020 - Reviewed 09/19/2020   Allergen Reaction Noted    Aspirin Other (See Comments) 05/29/2016              Past Medical History:   Diagnosis Date    Anxiety     Bladder infection     current tx with cipro 5/29/16    BPH (benign prostatic hyperplasia)     Community acquired pneumonia     last assessed 8/28/17, resolved 9/25/17    COPD (chronic obstructive pulmonary disease) ([facility] [address] )     Dysphagia 6/8/2020    Enlarged prostate     GERD (gastroesophageal reflux disease)     History of colonoscopy 03/19/2015    POLYP IN THE ASCENDING COLON-[facility]    Hx of bladder infections     Inguinal hernia, right 05/10/2019    Neck pain     Psychiatric disorder     depression    Pulmonary nodule 01/08/2019    STABLE 6MM LEFT APICAL NODULE    Respiratory failure with hypoxia ([facility] [address] ) 01/08/2019    Urinary retention        Past Surgical History:   Procedure Laterality Date    BLADDER SURGERY  08/15/2019    UROLIFT    COLOSTOMY  02/03/2011    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    INGUINAL HERNIA REPAIR Right 05/10/2019    [facility]ONE [facility]    LAPAROSCOPIC COLON RESECTION  02/03/2011    ABDOMIANOPERITONEAL RESECTION FOR RECTAL MASS  DONE BY RADHA MCDUFFIE       Family History   Problem Relation Age of Onset    Lung cancer Mother     Cancer Mother     Cancer Father     Alcohol abuse Father     Mental illness Neg Hx          Medications have been verified      The following portions of the patient's history were reviewed and updated as appropriate: allergies, current medications, past family history, past medical history, past social history, past surgical history and problem list     Objective   /76   Pulse 80   Temp (!) 97 1 °F (36 2 °C)   Resp 22   Ht 5' 6" (1 676 m)   Wt 83 6 kg (184 lb 6 4 oz)   SpO2 96%   BMI 29 76 kg/m²      Physical Exam     Physical Exam  Vitals signs and nursing note reviewed  Constitutional:       General: He is not in acute distress  Appearance: Normal appearance  He is not ill-appearing, toxic-appearing or diaphoretic  Abdominal:      General: Abdomen is flat  There is no distension  Palpations: There is no mass  Tenderness: There is no abdominal tenderness  There is no right CVA tenderness, left CVA tenderness, guarding or rebound  Neurological:      Mental Status: He is alert and oriented to person, place, and time

## 2020-09-19 NOTE — PATIENT INSTRUCTIONS

## 2020-09-21 LAB — BACTERIA UR CULT: ABNORMAL

## 2020-09-21 NOTE — TELEPHONE ENCOUNTER
Called pt today to check on the need for appointment and antibiotic  Wife was upset that she never received a call back from our office and that they ended up going to an urgent care center on Saturday for care

## 2020-09-30 ENCOUNTER — OFFICE VISIT (OUTPATIENT)
Dept: FAMILY MEDICINE CLINIC | Facility: CLINIC | Age: 80
End: 2020-09-30
Payer: MEDICARE

## 2020-09-30 VITALS
OXYGEN SATURATION: 91 % | TEMPERATURE: 98.2 F | BODY MASS INDEX: 28.28 KG/M2 | WEIGHT: 176 LBS | HEIGHT: 66 IN | DIASTOLIC BLOOD PRESSURE: 68 MMHG | HEART RATE: 90 BPM | SYSTOLIC BLOOD PRESSURE: 108 MMHG

## 2020-09-30 DIAGNOSIS — Z00.00 MEDICARE ANNUAL WELLNESS VISIT, SUBSEQUENT: Primary | ICD-10-CM

## 2020-09-30 DIAGNOSIS — F41.9 ANXIETY: ICD-10-CM

## 2020-09-30 DIAGNOSIS — R33.8 BENIGN PROSTATIC HYPERPLASIA WITH URINARY RETENTION: ICD-10-CM

## 2020-09-30 DIAGNOSIS — N40.1 BENIGN PROSTATIC HYPERPLASIA WITH URINARY RETENTION: ICD-10-CM

## 2020-09-30 DIAGNOSIS — M54.50 CHRONIC BILATERAL LOW BACK PAIN WITHOUT SCIATICA: ICD-10-CM

## 2020-09-30 DIAGNOSIS — S22.42XA CLOSED FRACTURE OF MULTIPLE RIBS OF LEFT SIDE, INITIAL ENCOUNTER: ICD-10-CM

## 2020-09-30 DIAGNOSIS — J44.9 COPD, MODERATE (HCC): ICD-10-CM

## 2020-09-30 DIAGNOSIS — J96.11 CHRONIC RESPIRATORY FAILURE WITH HYPOXIA (HCC): ICD-10-CM

## 2020-09-30 DIAGNOSIS — Z23 NEED FOR INFLUENZA VACCINATION: ICD-10-CM

## 2020-09-30 DIAGNOSIS — G89.29 CHRONIC BILATERAL LOW BACK PAIN WITHOUT SCIATICA: ICD-10-CM

## 2020-09-30 PROBLEM — R06.89 ACUTE RESPIRATORY INSUFFICIENCY: Status: RESOLVED | Noted: 2020-06-07 | Resolved: 2020-09-30

## 2020-09-30 PROCEDURE — 96372 THER/PROPH/DIAG INJ SC/IM: CPT | Performed by: FAMILY MEDICINE

## 2020-09-30 PROCEDURE — 99214 OFFICE O/P EST MOD 30 MIN: CPT | Performed by: FAMILY MEDICINE

## 2020-09-30 PROCEDURE — 90662 IIV NO PRSV INCREASED AG IM: CPT

## 2020-09-30 PROCEDURE — G0008 ADMIN INFLUENZA VIRUS VAC: HCPCS

## 2020-09-30 PROCEDURE — G0439 PPPS, SUBSEQ VISIT: HCPCS | Performed by: FAMILY MEDICINE

## 2020-09-30 RX ORDER — GABAPENTIN 100 MG/1
100 CAPSULE ORAL 2 TIMES DAILY
Qty: 60 CAPSULE | Refills: 1 | Status: SHIPPED | OUTPATIENT
Start: 2020-09-30 | End: 2020-09-30 | Stop reason: SDUPTHER

## 2020-09-30 RX ORDER — GABAPENTIN 100 MG/1
100 CAPSULE ORAL 2 TIMES DAILY
Qty: 180 CAPSULE | Refills: 1 | Status: SHIPPED | OUTPATIENT
Start: 2020-09-30 | End: 2021-08-03 | Stop reason: SDUPTHER

## 2020-09-30 NOTE — PATIENT INSTRUCTIONS
Will management-continue current medications  Try increasing gabapentin to 100 mg twice daily for the next 2-3 weeks and see if this helps with the back pain  Other options are not best for this patient  Follow-up with pulmonologist as advised  Recheck here 3-6 months  Continue to work on diet, exercise as able, and weight loss  Medicare Preventive Visit Patient Instructions  Thank you for completing your Welcome to Medicare Visit or Medicare Annual Wellness Visit today  Your next wellness visit will be due in one year (9/30/2021)  The screening/preventive services that you may require over the next 5-10 years are detailed below  Some tests may not apply to you based off risk factors and/or age  Screening tests ordered at today's visit but not completed yet may show as past due  Also, please note that scanned in results may not display below  Preventive Screenings:  Service Recommendations Previous Testing/Comments   Colorectal Cancer Screening  · Colonoscopy    · Fecal Occult Blood Test (FOBT)/Fecal Immunochemical Test (FIT)  · Fecal DNA/Cologuard Test  · Flexible Sigmoidoscopy Age: 54-65 years old   Colonoscopy: every 10 years (May be performed more frequently if at higher risk)  OR  FOBT/FIT: every 1 year  OR  Cologuard: every 3 years  OR  Sigmoidoscopy: every 5 years  Screening may be recommended earlier than age 48 if at higher risk for colorectal cancer  Also, an individualized decision between you and your healthcare provider will decide whether screening between the ages of 74-80 would be appropriate   Colonoscopy: 04/24/2018  FOBT/FIT: Not on file  Cologuard: Not on file  Sigmoidoscopy: Not on file         Prostate Cancer Screening Individualized decision between patient and health care provider in men between ages of 53-78   Medicare will cover every 12 months beginning on the day after your 50th birthday PSA: No results in last 5 years     Screening Not Indicated     Hepatitis C Screening Once for adults born between 1945 and 1965  More frequently in patients at high risk for Hepatitis C Hep C Antibody: Not on file       Diabetes Screening 1-2 times per year if you're at risk for diabetes or have pre-diabetes Fasting glucose: 83 mg/dL   A1C: No results in last 5 years    Screening Current   Cholesterol Screening Once every 5 years if you don't have a lipid disorder  May order more often based on risk factors  Lipid panel: 08/06/2018    Screening Current      Other Preventive Screenings Covered by Medicare:  1  Abdominal Aortic Aneurysm (AAA) Screening: covered once if your at risk  You're considered to be at risk if you have a family history of AAA or a male between the age of 73-68 who smoking at least 100 cigarettes in your lifetime  2  Lung Cancer Screening: covers low dose CT scan once per year if you meet all of the following conditions: (1) Age 50-69; (2) No signs or symptoms of lung cancer; (3) Current smoker or have quit smoking within the last 15 years; (4) You have a tobacco smoking history of at least 30 pack years (packs per day x number of years you smoked); (5) You get a written order from a healthcare provider  3  Glaucoma Screening: covered annually if you're considered high risk: (1) You have diabetes OR (2) Family history of glaucoma OR (3)  aged 48 and older OR (3)  American aged 72 and older  3  Osteoporosis Screening: covered every 2 years if you meet one of the following conditions: (1) Have a vertebral abnormality; (2) On glucocorticoid therapy for more than 3 months; (3) Have primary hyperparathyroidism; (4) On osteoporosis medications and need to assess response to drug therapy  5  HIV Screening: covered annually if you're between the age of 12-76  Also covered annually if you are younger than 13 and older than 72 with risk factors for HIV infection  For pregnant patients, it is covered up to 3 times per pregnancy      Immunizations:  Immunization Recommendations   Influenza Vaccine Annual influenza vaccination during flu season is recommended for all persons aged >= 6 months who do not have contraindications   Pneumococcal Vaccine (Prevnar and Pneumovax)  * Prevnar = PCV13  * Pneumovax = PPSV23 Adults 25-60 years old: 1-3 doses may be recommended based on certain risk factors  Adults 72 years old: Prevnar (PCV13) vaccine recommended followed by Pneumovax (PPSV23) vaccine  If already received PPSV23 since turning 65, then PCV13 recommended at least one year after PPSV23 dose  Hepatitis B Vaccine 3 dose series if at intermediate or high risk (ex: diabetes, end stage renal disease, liver disease)   Tetanus (Td) Vaccine - COST NOT COVERED BY MEDICARE PART B Following completion of primary series, a booster dose should be given every 10 years to maintain immunity against tetanus  Td may also be given as tetanus wound prophylaxis  Tdap Vaccine - COST NOT COVERED BY MEDICARE PART B Recommended at least once for all adults  For pregnant patients, recommended with each pregnancy  Shingles Vaccine (Shingrix) - COST NOT COVERED BY MEDICARE PART B  2 shot series recommended in those aged 48 and above     Health Maintenance Due:  There are no preventive care reminders to display for this patient  Immunizations Due:      Topic Date Due    DTaP,Tdap,and Td Vaccines (1 - Tdap) 11/07/1961    Influenza Vaccine  07/01/2020     Advance Directives   What are advance directives? Advance directives are legal documents that state your wishes and plans for medical care  These plans are made ahead of time in case you lose your ability to make decisions for yourself  Advance directives can apply to any medical decision, such as the treatments you want, and if you want to donate organs  What are the types of advance directives? There are many types of advance directives, and each state has rules about how to use them   You may choose a combination of any of the following:  · Living will: This is a written record of the treatment you want  You can also choose which treatments you do not want, which to limit, and which to stop at a certain time  This includes surgery, medicine, IV fluid, and tube feedings  · Durable power of  for healthcare [facility]): This is a written record that states who you want to make healthcare choices for you when you are unable to make them for yourself  This person, called a proxy, is usually a family member or a friend  You may choose more than 1 proxy  · Do not resuscitate (DNR) order:  A DNR order is used in case your heart stops beating or you stop breathing  It is a request not to have certain forms of treatment, such as CPR  A DNR order may be included in other types of advance directives  · Medical directive: This covers the care that you want if you are in a coma, near death, or unable to make decisions for yourself  You can list the treatments you want for each condition  Treatment may include pain medicine, surgery, blood transfusions, dialysis, IV or tube feedings, and a ventilator (breathing machine)  · Values history: This document has questions about your views, beliefs, and how you feel and think about life  This information can help others choose the care that you would choose  Why are advance directives important? An advance directive helps you control your care  Although spoken wishes may be used, it is better to have your wishes written down  Spoken wishes can be misunderstood, or not followed  Treatments may be given even if you do not want them  An advance directive may make it easier for your family to make difficult choices about your care  Fall Prevention    Fall prevention  includes ways to make your home and other areas safer  It also includes ways you can move more carefully to prevent a fall   Health conditions that cause changes in your blood pressure, vision, or muscle strength and coordination may increase your risk for falls  Medicines may also increase your risk for falls if they make you dizzy, weak, or sleepy  Fall prevention tips:   · Stand or sit up slowly  · Use assistive devices as directed  · Wear shoes that fit well and have soles that   · Wear a personal alarm  · Stay active  · Manage your medical conditions  Home Safety Tips:  · Add items to prevent falls in the bathroom  · Keep paths clear  · Install bright lights in your home  · Keep items you use often on shelves within reach  · Paint or place reflective tape on the edges of your stairs  Weight Management   Why it is important to manage your weight:  Being overweight increases your risk of health conditions such as heart disease, high blood pressure, type 2 diabetes, and certain types of cancer  It can also increase your risk for osteoarthritis, sleep apnea, and other respiratory problems  Aim for a slow, steady weight loss  Even a small amount of weight loss can lower your risk of health problems  How to lose weight safely:  A safe and healthy way to lose weight is to eat fewer calories and get regular exercise  You can lose up about 1 pound a week by decreasing the number of calories you eat by 500 calories each day  Healthy meal plan for weight management:  A healthy meal plan includes a variety of foods, contains fewer calories, and helps you stay healthy  A healthy meal plan includes the following:  · Eat whole-grain foods more often  A healthy meal plan should contain fiber  Fiber is the part of grains, fruits, and vegetables that is not broken down by your body  Whole-grain foods are healthy and provide extra fiber in your diet  Some examples of whole-grain foods are whole-wheat breads and pastas, oatmeal, brown rice, and bulgur  · Eat a variety of vegetables every day  Include dark, leafy greens such as spinach, kale, dakotah greens, and mustard greens   Eat yellow and orange vegetables such as carrots, sweet potatoes, and winter squash  · Eat a variety of fruits every day  Choose fresh or canned fruit (canned in its own juice or light syrup) instead of juice  Fruit juice has very little or no fiber  · Eat low-fat dairy foods  Drink fat-free (skim) milk or 1% milk  Eat fat-free yogurt and low-fat cottage cheese  Try low-fat cheeses such as mozzarella and other reduced-fat cheeses  · Choose meat and other protein foods that are low in fat  Choose beans or other legumes such as split peas or lentils  Choose fish, skinless poultry (chicken or turkey), or lean cuts of red meat (beef or pork)  Before you cook meat or poultry, cut off any visible fat  · Use less fat and oil  Try baking foods instead of frying them  Add less fat, such as margarine, sour cream, regular salad dressing and mayonnaise to foods  Eat fewer high-fat foods  Some examples of high-fat foods include french fries, doughnuts, ice cream, and cakes  · Eat fewer sweets  Limit foods and drinks that are high in sugar  This includes candy, cookies, regular soda, and sweetened drinks  Exercise:  Exercise at least 30 minutes per day on most days of the week  Some examples of exercise include walking, biking, dancing, and swimming  You can also fit in more physical activity by taking the stairs instead of the elevator or parking farther away from stores  Ask your healthcare provider about the best exercise plan for you  © Copyright [employer] 2018 Information is for End User's use only and may not be sold, redistributed or otherwise used for commercial purposes   All illustrations and images included in CareNotes® are the copyrighted property of A D A M , Inc  or [address] [place] [address] Cell[employer]

## 2020-09-30 NOTE — PROGRESS NOTES
Subjective:   No chief complaint on file  Patient ID: Garrison Tomlinson is a [de-identified] y o  male  Medical management-  1) COPD-patient relatively stable  Does still get some shortness of breath with longer walks  Is using inhalers with results  2) history of chronic respiratory failure-as above relatively stable  Does follow with Pulmonary  3) chronic back pain-CTs in the past have shown significant degenerative disease there  Patient feels his pain is only partially controlled with Tylenol arthritis strength 4-5 tablets a day  Is not a good candidate for narcotics or tramadol  4) BPH-stable symptoms with current meds  5) rib fractures-patient states these have healed nicely and is having minimal discomfort related to multiple rib fractures following a fall  6) anxiety-patient stable with current meds  The following portions of the patient's history were reviewed and updated as appropriate: allergies, current medications, past family history, past medical history, past social history, past surgical history and problem list     Review of Systems   Constitutional: Negative for activity change, appetite change, diaphoresis and fatigue  Respiratory: Positive for shortness of breath  Negative for cough, chest tightness and wheezing  Cardiovascular: Negative for chest pain, palpitations and leg swelling  Fast or slow heart rate   Gastrointestinal: Negative for abdominal pain, blood in stool, constipation, diarrhea, nausea and vomiting  Genitourinary: Positive for frequency  Negative for difficulty urinating, dysuria and hematuria  Musculoskeletal: Positive for back pain  Negative for arthralgias, gait problem, joint swelling and myalgias  Neurological: Negative for dizziness, light-headedness and headaches  Psychiatric/Behavioral: Negative for agitation, confusion, dysphoric mood and sleep disturbance  The patient is not nervous/anxious                Objective:  Vitals:    09/30/20 1045   BP: 108/68   BP Location: Right arm   Patient Position: Sitting   Cuff Size: Extra-Large   Pulse: 90   Temp: 98 2 °F (36 8 °C)   TempSrc: Tympanic   SpO2: 91%   Weight: 79 8 kg (176 lb)   Height: 5' 6" (1 676 m)      Physical Exam  Vitals signs reviewed  Constitutional:       General: He is not in acute distress  Appearance: He is well-developed  HENT:      Head: Normocephalic and atraumatic  Right Ear: Tympanic membrane and external ear normal  No drainage  Left Ear: Tympanic membrane normal  No drainage  Mouth/Throat:      Pharynx: No oropharyngeal exudate  Eyes:      General:         Right eye: No discharge  Left eye: No discharge  Conjunctiva/sclera: Conjunctivae normal    Neck:      Musculoskeletal: Normal range of motion and neck supple  Thyroid: No thyromegaly  Cardiovascular:      Rate and Rhythm: Normal rate and regular rhythm  Heart sounds: Normal heart sounds  Pulmonary:      Effort: Pulmonary effort is normal  No respiratory distress  Breath sounds: Examination of the right-lower field reveals decreased breath sounds  Examination of the left-lower field reveals decreased breath sounds  Decreased breath sounds present  No wheezing or rales  Musculoskeletal:      Comments: Left-sided rib cage-ribs are nontender without palpable deformity   Lymphadenopathy:      Cervical: No cervical adenopathy  Skin:     General: Skin is warm and dry  Assessment/Plan:    No problem-specific Assessment & Plan notes found for this encounter  Diagnoses and all orders for this visit:    Need for influenza vaccination  -     influenza vaccine, high-dose, PF 0 7 mL (FLUZONE HIGH-DOSE)    Medicare annual wellness visit, subsequent    COPD, moderate ([address] )    Chronic respiratory failure with hypoxia (HCC)    Chronic bilateral low back pain without sciatica  -     gabapentin (NEURONTIN) 100 mg capsule;  Take 1 capsule (100 mg total) by mouth 2 (two) times a day    Benign prostatic hyperplasia with urinary retention    Closed fracture of multiple ribs of left side, initial encounter  -     Discontinue: gabapentin (NEURONTIN) 100 mg capsule; Take 1 capsule (100 mg total) by mouth 2 (two) times a day    Anxiety  -     gabapentin (NEURONTIN) 100 mg capsule; Take 1 capsule (100 mg total) by mouth 2 (two) times a day        Will management-continue current medications  Try increasing gabapentin to 100 mg twice daily for the next 2-3 weeks and see if this helps with the back pain  Other options are not best for this patient  Follow-up with pulmonologist as advised  Recheck here 3-6 months  Continue to work on diet, exercise as able, and weight loss

## 2020-09-30 NOTE — PROGRESS NOTES
Assessment and Plan:     Problem List Items Addressed This Visit     None          Falls Plan of Care: balance, strength, and gait training instructions were provided  Recommended assistive device to help with gait and balance  Assessed feet and footwear  Home safety education provided  Preventive health issues were discussed with patient, and age appropriate screening tests were ordered as noted in patient's After Visit Summary  Personalized health advice and appropriate referrals for health education or preventive services given if needed, as noted in patient's After Visit Summary       History of Present Illness:     Patient presents for Medicare Annual Wellness visit    Patient Care Team:  Shyann Calle MD as PCP - General  Maylene Hodgkin, DO     Problem List:     Patient Active Problem List   Diagnosis    COPD, moderate ([facility] [address] )    Rib pain    Fall    Acid reflux disease    Depressive disorder    Enlarged prostate with lower urinary tract symptoms (LUTS)    Anxiety    Neck pain    Multiple rib fractures    Accident due to mechanical fall without injury    Urinary retention    Chronic bilateral low back pain    Acute respiratory insufficiency    Chronic respiratory failure with hypoxia ([facility] [address] )    History of pneumonia      Past Medical and Surgical History:     Past Medical History:   Diagnosis Date    Anxiety     Bladder infection     current tx with cipro 5/29/16    BPH (benign prostatic hyperplasia)     Community acquired pneumonia     last assessed 8/28/17, resolved 9/25/17    COPD (chronic obstructive pulmonary disease) ([facility] [address] [facility]     Dysphagia 6/8/2020    Enlarged prostate     GERD (gastroesophageal reflux disease)     History of colonoscopy 03/19/2015    POLYP IN THE ASCENDING COLON-BMYASIR-BRITTNEY MCKEON    Hx of bladder infections     Inguinal hernia, right 05/10/2019    Neck pain     Psychiatric disorder     depression    Pulmonary nodule 01/08/2019    STABLE 6MM LEFT APICAL NODULE    Respiratory failure with hypoxia ([facility] [address] ) 2019    Urinary retention      Past Surgical History:   Procedure Laterality Date    BLADDER SURGERY  08/15/2019    UROLIFT    COLOSTOMY  2011    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    INGUINAL HERNIA REPAIR Right 05/10/2019    [facility]ONE AT [facility]    LAPAROSCOPIC COLON RESECTION  2011    ABDOMIANOPERITONEAL RESECTION FOR RECTAL MASS   DONE BY RADHA MCDUFFIE      Family History:     Family History   Problem Relation Age of Onset    Lung cancer Mother     Cancer Mother     Cancer Father     Alcohol abuse Father     Mental illness Neg Hx       Social History:     E-Cigarette/Vaping    E-Cigarette Use Never User      E-Cigarette/Vaping Substances    Nicotine No     Flavoring No      Social History     Socioeconomic History    Marital status: /Civil Union     Spouse name: None    Number of children: None    Years of education: None    Highest education level: None   Occupational History    None   Social Needs    Financial resource strain: None    Food insecurity     Worry: None     Inability: None    Transportation needs     Medical: No     Non-medical: No   Tobacco Use    Smoking status: Former Smoker     Packs/day: 0 50     Years: 50 00     Pack years: 25 00     Types: Cigarettes     Start date:      Last attempt to quit: 2018     Years since quittin 5    Smokeless tobacco: Former User    Tobacco comment: quit 2017 per [employer]    Substance and Sexual Activity    Alcohol use: Not Currently    Drug use: No    Sexual activity: None   Lifestyle    Physical activity     Days per week: None     Minutes per session: None    Stress: None   Relationships    Social connections     Talks on phone: None     Gets together: None     Attends Orthodox service: None     Active member of club or organization: None     Attends meetings of clubs or organizations: None     Relationship status:     Intimate partner violence     Fear of current or ex partner: None     Emotionally abused: None     Physically abused: None     Forced sexual activity: None   Other Topics Concern    None   Social History Narrative    None      Medications and Allergies:     Current Outpatient Medications   Medication Sig Dispense Refill    acetaminophen (TYLENOL) 325 mg tablet Take 2 tablets (650 mg total) by mouth every 4 (four) hours as needed for mild pain 30 tablet 0    albuterol (2 5 mg/3 mL) 0 083 % nebulizer solution Take 2 5 mg by nebulization every 4 (four) hours as needed for wheezing or shortness of breath      albuterol (ProAir HFA) 90 mcg/act inhaler Inhale 2 puffs every 6 (six) hours as needed for wheezing 8 5 g 3    cyclobenzaprine (FLEXERIL) 10 mg tablet Take 1 tablet (10 mg total) by mouth 3 (three) times a day as needed for muscle spasms 30 tablet 1    escitalopram (LEXAPRO) 10 mg tablet Take 1 tablet (10 mg total) by mouth daily 90 tablet 1    famotidine (PEPCID) 40 MG tablet TAKE 1 TABLET BY MOUTH EVERY DAY 90 tablet 1    fluticasone-salmeterol (ADVAIR, WIXELA) 250-50 mcg/dose inhaler Inhale 1 puff 2 (two) times a day Rinse mouth after use   1 Inhaler 3    gabapentin (NEURONTIN) 100 mg capsule Take 1 capsule (100 mg total) by mouth daily at bedtime 14 capsule 0    ipratropium (ATROVENT) 0 02 % nebulizer solution Take 0 5 mg by nebulization 4 (four) times a day      ipratropium-albuterol (DUO-NEB) 0 5-2 5 mg/3 mL nebulizer solution Take 1 vial (3 mL total) by nebulization 4 (four) times a day 120 vial 5    LORazepam (ATIVAN) 1 mg tablet Take 1 tablet (1 mg total) by mouth every 8 (eight) hours as needed for anxiety 30 tablet 1    NON FORMULARY Take 1,000 mg by mouth 2 (two) times a day Med is MSM Pure, pt brought in from home      tamsulosin (FLOMAX) 0 4 mg Take 1 capsule (0 4 mg total) by mouth daily with dinner (Patient not taking: Reported on 9/19/2020) 30 capsule 0     No current facility-administered medications for this visit  Allergies   Allergen Reactions    Aspirin Other (See Comments)     Hx stomach ulcer      Immunizations:     Immunization History   Administered Date(s) Administered    INFLUENZA 11/18/2013, 10/26/2014, 09/17/2016, 11/21/2017    Influenza Split High Dose Preservative Free IM 11/17/2015    Influenza TIV (IM) 11/16/2012, 11/18/2013    Influenza, high dose seasonal 0 7 mL 11/27/2018, 09/12/2019    Pneumococcal Conjugate 13-Valent 11/17/2015, 07/15/2016    Pneumococcal Polysaccharide PPV23 11/12/2012      Health Maintenance: There are no preventive care reminders to display for this patient  Topic Date Due    DTaP,Tdap,and Td Vaccines (1 - Tdap) 11/07/1961    Influenza Vaccine  07/01/2020      Medicare Health Risk Assessment:     /68 (BP Location: Right arm, Patient Position: Sitting, Cuff Size: Extra-Large)   Pulse 90   Temp 98 2 °F (36 8 °C) (Tympanic)   Ht 5' 6" (1 676 m)   Wt 79 8 kg (176 lb)   SpO2 91%   BMI 28 41 kg/m²      Payton Wing is here for his Subsequent Wellness visit  Last Medicare Wellness visit information reviewed, patient interviewed, no change since last AWV  Health Risk Assessment:   Patient rates overall health as good  Patient feels that their physical health rating is slightly better  Eyesight was rated as same  Hearing was rated as same  Patient feels that their emotional and mental health rating is same  Pain experienced in the last 7 days has been some  Patient's pain rating has been 8/10  Patient states that he has experienced no weight loss or gain in last 6 months  Depression Screening:   PHQ-2 Score: 0      Fall Risk Screening: In the past year, patient has experienced: history of falling in past year    Number of falls: 1  Injured during fall?: Yes      Home Safety:  Patient does not have trouble with stairs inside or outside of their home   Patient has working smoke alarms and has no working carbon monoxide detector  Home safety hazards include: none  Nutrition:   Current diet is Regular  Medications:   Patient is currently taking over-the-counter supplements  OTC medications include: see medication list  Patient is able to manage medications  Activities of Daily Living (ADLs)/Instrumental Activities of Daily Living (IADLs):   Walk and transfer into and out of bed and chair?: Yes  Dress and groom yourself?: Yes    Bathe or shower yourself?: Yes    Feed yourself? Yes  Do your laundry/housekeeping?: Yes  Manage your money, pay your bills and track your expenses?: Yes  Make your own meals?: Yes    Do your own shopping?: Yes    Previous Hospitalizations:   Any hospitalizations or ED visits within the last 12 months?: Yes    How many hospitalizations have you had in the last year?: 1-2    Advance Care Planning:   Living will: Yes    Durable POA for healthcare:  Yes    Advanced directive: Yes    Provider agrees with end of life decisions: Yes      Cognitive Screening:   Provider or family/friend/caregiver concerned regarding cognition?: No    PREVENTIVE SCREENINGS      Cardiovascular Screening:    General: Screening Current      Diabetes Screening:     General: Screening Current      Prostate Cancer Screening:    General: Screening Not Indicated      Osteoporosis Screening:    General: Screening Not Indicated      Abdominal Aortic Aneurysm (AAA) Screening:    Risk factors include: tobacco use        General: Screening Current      Lung Cancer Screening:     General: Screening Current      Hepatitis C Screening:    General: Screening Not Indicated      Preventive Screening Comments: Chest CT  June 2020- No AAA, No lung nodules      Edward Marcano MD

## 2020-10-05 ENCOUNTER — OFFICE VISIT (OUTPATIENT)
Dept: PULMONOLOGY | Facility: HOSPITAL | Age: 80
End: 2020-10-05
Payer: MEDICARE

## 2020-10-05 VITALS
HEART RATE: 76 BPM | HEIGHT: 66 IN | TEMPERATURE: 97.8 F | DIASTOLIC BLOOD PRESSURE: 80 MMHG | BODY MASS INDEX: 28.28 KG/M2 | RESPIRATION RATE: 18 BRPM | SYSTOLIC BLOOD PRESSURE: 126 MMHG | OXYGEN SATURATION: 94 % | WEIGHT: 176 LBS

## 2020-10-05 DIAGNOSIS — J96.11 CHRONIC RESPIRATORY FAILURE WITH HYPOXIA (HCC): ICD-10-CM

## 2020-10-05 DIAGNOSIS — J44.9 COPD, MODERATE (HCC): Primary | ICD-10-CM

## 2020-10-05 PROCEDURE — 99213 OFFICE O/P EST LOW 20 MIN: CPT | Performed by: INTERNAL MEDICINE

## 2020-11-04 ENCOUNTER — TELEPHONE (OUTPATIENT)
Dept: PULMONOLOGY | Facility: CLINIC | Age: 80
End: 2020-11-04

## 2020-11-16 ENCOUNTER — APPOINTMENT (OUTPATIENT)
Dept: PREADMISSION TESTING | Facility: HOSPITAL | Age: 80
DRG: 480 | End: 2020-11-16
Attending: SURGERY
Payer: MEDICARE

## 2020-11-16 ENCOUNTER — ANESTHESIA EVENT (OUTPATIENT)
Dept: OPERATING ROOM | Facility: HOSPITAL | Age: 80
Setting detail: HOSPITAL OUTPATIENT SURGERY
DRG: 480 | End: 2020-11-16
Payer: MEDICARE

## 2020-11-16 ENCOUNTER — TRANSCRIBE ORDERS (OUTPATIENT)
Dept: REGISTRATION | Facility: HOSPITAL | Age: 80
End: 2020-11-16

## 2020-11-16 ENCOUNTER — APPOINTMENT (OUTPATIENT)
Dept: LAB | Facility: HOSPITAL | Age: 80
DRG: 480 | End: 2020-11-16
Attending: SURGERY
Payer: MEDICARE

## 2020-11-16 DIAGNOSIS — K40.90 UNILATERAL INGUINAL HERNIA, WITHOUT OBSTRUCTION OR GANGRENE, NOT SPECIFIED AS RECURRENT: ICD-10-CM

## 2020-11-16 DIAGNOSIS — Z11.59 ENCOUNTER FOR SCREENING FOR OTHER VIRAL DISEASES: ICD-10-CM

## 2020-11-16 DIAGNOSIS — K40.90 UNILATERAL INGUINAL HERNIA, WITHOUT OBSTRUCTION OR GANGRENE, NOT SPECIFIED AS RECURRENT: Primary | ICD-10-CM

## 2020-11-16 DIAGNOSIS — Z11.59 ENCOUNTER FOR SCREENING FOR OTHER VIRAL DISEASES: Primary | ICD-10-CM

## 2020-11-16 LAB
ALBUMIN SERPL-MCNC: 4.1 G/DL (ref 3.4–5)
ALP SERPL-CCNC: 65 IU/L (ref 35–126)
ALT SERPL-CCNC: 13 IU/L (ref 16–63)
ANION GAP SERPL CALC-SCNC: 9 MEQ/L (ref 3–15)
AST SERPL-CCNC: 20 IU/L (ref 15–41)
BILIRUB SERPL-MCNC: 0.7 MG/DL (ref 0.3–1.2)
BUN SERPL-MCNC: 17 MG/DL (ref 8–20)
CALCIUM SERPL-MCNC: 9.4 MG/DL (ref 8.9–10.3)
CHLORIDE SERPL-SCNC: 107 MEQ/L (ref 98–109)
CO2 SERPL-SCNC: 26 MEQ/L (ref 22–32)
CREAT SERPL-MCNC: 1.2 MG/DL (ref 0.8–1.3)
ERYTHROCYTE [DISTWIDTH] IN BLOOD BY AUTOMATED COUNT: 13.1 % (ref 11.6–14.4)
GFR SERPL CREATININE-BSD FRML MDRD: 58.1 ML/MIN/1.73M*2
GLUCOSE SERPL-MCNC: 84 MG/DL (ref 70–99)
HCT VFR BLDCO AUTO: 42.2 % (ref 40.1–51)
HGB BLD-MCNC: 13.9 G/DL (ref 13.7–17.5)
MCH RBC QN AUTO: 33.4 PG (ref 28–33.2)
MCHC RBC AUTO-ENTMCNC: 32.9 G/DL (ref 32.2–36.5)
MCV RBC AUTO: 101.4 FL (ref 83–98)
PDW BLD AUTO: 10.2 FL (ref 9.4–12.4)
PLATELET # BLD AUTO: 184 K/UL (ref 150–350)
POTASSIUM SERPL-SCNC: 4.7 MEQ/L (ref 3.6–5.1)
PROT SERPL-MCNC: 7.3 G/DL (ref 6–8.2)
RBC # BLD AUTO: 4.16 M/UL (ref 4.5–5.8)
SODIUM SERPL-SCNC: 142 MEQ/L (ref 136–144)
WBC # BLD AUTO: 9.24 K/UL (ref 3.8–10.5)

## 2020-11-16 PROCEDURE — 36415 COLL VENOUS BLD VENIPUNCTURE: CPT

## 2020-11-16 PROCEDURE — 80053 COMPREHEN METABOLIC PANEL: CPT

## 2020-11-16 PROCEDURE — U0003 INFECTIOUS AGENT DETECTION BY NUCLEIC ACID (DNA OR RNA); SEVERE ACUTE RESPIRATORY SYNDROME CORONAVIRUS 2 (SARS-COV-2) (CORONAVIRUS DISEASE [COVID-19]), AMPLIFIED PROBE TECHNIQUE, MAKING USE OF HIGH THROUGHPUT TECHNOLOGIES AS DESCRIBED BY CMS-2020-01-R: HCPCS

## 2020-11-16 PROCEDURE — 85027 COMPLETE CBC AUTOMATED: CPT

## 2020-11-17 LAB — SARS-COV-2 RNA RESP QL NAA+PROBE: NOT DETECTED

## 2020-11-19 ENCOUNTER — HOSPITAL ENCOUNTER (INPATIENT)
Facility: HOSPITAL | Age: 80
LOS: 6 days | Discharge: HOME HEALTH | DRG: 480 | End: 2020-11-25
Attending: SURGERY | Admitting: SURGERY
Payer: MEDICARE

## 2020-11-19 ENCOUNTER — APPOINTMENT (OUTPATIENT)
Dept: RADIOLOGY | Facility: HOSPITAL | Age: 80
Setting detail: HOSPITAL OUTPATIENT SURGERY
DRG: 480 | End: 2020-11-19
Payer: MEDICARE

## 2020-11-19 ENCOUNTER — APPOINTMENT (INPATIENT)
Dept: RADIOLOGY | Facility: HOSPITAL | Age: 80
DRG: 480 | End: 2020-11-19
Payer: MEDICARE

## 2020-11-19 ENCOUNTER — ANESTHESIA (OUTPATIENT)
Dept: OPERATING ROOM | Facility: HOSPITAL | Age: 80
Setting detail: HOSPITAL OUTPATIENT SURGERY
DRG: 480 | End: 2020-11-19
Payer: MEDICARE

## 2020-11-19 DIAGNOSIS — D62 ACUTE POSTOPERATIVE ANEMIA DUE TO EXPECTED BLOOD LOSS: ICD-10-CM

## 2020-11-19 DIAGNOSIS — I48.0 PAROXYSMAL ATRIAL FIBRILLATION (CMS/HCC): ICD-10-CM

## 2020-11-19 DIAGNOSIS — S72.001A CLOSED FRACTURE OF RIGHT HIP, INITIAL ENCOUNTER (CMS/HCC): Primary | ICD-10-CM

## 2020-11-19 PROCEDURE — 63600000 HC DRUGS/DETAIL CODE: Performed by: ANESTHESIOLOGY

## 2020-11-19 PROCEDURE — 25000000 HC PHARMACY GENERAL: Performed by: ANESTHESIOLOGY

## 2020-11-19 PROCEDURE — 12000000 HC ROOM AND CARE MED/SURG

## 2020-11-19 PROCEDURE — 73502 X-RAY EXAM HIP UNI 2-3 VIEWS: CPT | Mod: RT

## 2020-11-19 PROCEDURE — 25000000 HC PHARMACY GENERAL: Performed by: SURGERY

## 2020-11-19 PROCEDURE — 0YU50JZ SUPPLEMENT RIGHT INGUINAL REGION WITH SYNTHETIC SUBSTITUTE, OPEN APPROACH: ICD-10-PCS | Performed by: SURGERY

## 2020-11-19 PROCEDURE — 63600000 HC DRUGS/DETAIL CODE: Performed by: SURGERY

## 2020-11-19 PROCEDURE — 37000010 HC ANESTHESIA SPINAL: Performed by: SURGERY

## 2020-11-19 PROCEDURE — 63600000 HC DRUGS/DETAIL CODE: Performed by: NURSE ANESTHETIST, CERTIFIED REGISTERED

## 2020-11-19 PROCEDURE — C1781 MESH (IMPLANTABLE): HCPCS | Performed by: SURGERY

## 2020-11-19 PROCEDURE — 73560 X-RAY EXAM OF KNEE 1 OR 2: CPT | Mod: RT

## 2020-11-19 PROCEDURE — 71000011 HC PACU PHASE 1 EA ADDL MIN: Performed by: SURGERY

## 2020-11-19 PROCEDURE — 25000000 HC PHARMACY GENERAL

## 2020-11-19 PROCEDURE — 25800000 HC PHARMACY IV SOLUTIONS: Performed by: SURGERY

## 2020-11-19 PROCEDURE — 25000000 HC PHARMACY GENERAL: Performed by: NURSE ANESTHETIST, CERTIFIED REGISTERED

## 2020-11-19 PROCEDURE — 36000013 HC OR LEVEL 3 EA ADDL MIN: Performed by: SURGERY

## 2020-11-19 PROCEDURE — 63700000 HC SELF-ADMINISTRABLE DRUG: Performed by: SURGERY

## 2020-11-19 PROCEDURE — 63600000 HC DRUGS/DETAIL CODE

## 2020-11-19 PROCEDURE — 36000003 HC OR LEVEL 3 INITIAL 30MIN: Performed by: SURGERY

## 2020-11-19 PROCEDURE — 73552 X-RAY EXAM OF FEMUR 2/>: CPT | Mod: RT

## 2020-11-19 PROCEDURE — 27200000 HC STERILE SUPPLY: Performed by: SURGERY

## 2020-11-19 PROCEDURE — 71000001 HC PACU PHASE 1 INITIAL 30MIN: Performed by: SURGERY

## 2020-11-19 DEVICE — MACROPOROUS MESH, MONOFILAMENT POLYPROPYLENE
Type: IMPLANTABLE DEVICE | Site: GROIN | Status: FUNCTIONAL
Brand: PARIETENE

## 2020-11-19 RX ORDER — OXYCODONE AND ACETAMINOPHEN 5; 325 MG/1; MG/1
1 TABLET ORAL EVERY 6 HOURS PRN
Status: DISCONTINUED | OUTPATIENT
Start: 2020-11-19 | End: 2020-11-24

## 2020-11-19 RX ORDER — LIDOCAINE HYDROCHLORIDE 20 MG/ML
INJECTION, SOLUTION INFILTRATION; PERINEURAL AS NEEDED
Status: DISCONTINUED | OUTPATIENT
Start: 2020-11-19 | End: 2020-11-19 | Stop reason: HOSPADM

## 2020-11-19 RX ORDER — IPRATROPIUM BROMIDE AND ALBUTEROL SULFATE 2.5; .5 MG/3ML; MG/3ML
3 SOLUTION RESPIRATORY (INHALATION) EVERY 6 HOURS
Status: DISCONTINUED | OUTPATIENT
Start: 2020-11-19 | End: 2020-11-19

## 2020-11-19 RX ORDER — ACETAMINOPHEN 650 MG/20.3ML
500 LIQUID ORAL EVERY 6 HOURS PRN
Status: DISCONTINUED | OUTPATIENT
Start: 2020-11-19 | End: 2020-11-24

## 2020-11-19 RX ORDER — OXYCODONE AND ACETAMINOPHEN 5; 325 MG/1; MG/1
2 TABLET ORAL EVERY 6 HOURS PRN
Status: DISCONTINUED | OUTPATIENT
Start: 2020-11-19 | End: 2020-11-24

## 2020-11-19 RX ORDER — MIDAZOLAM HYDROCHLORIDE 2 MG/2ML
INJECTION, SOLUTION INTRAMUSCULAR; INTRAVENOUS AS NEEDED
Status: DISCONTINUED | OUTPATIENT
Start: 2020-11-19 | End: 2020-11-19 | Stop reason: SURG

## 2020-11-19 RX ORDER — OXYCODONE HYDROCHLORIDE 5 MG/1
5 TABLET ORAL EVERY 6 HOURS PRN
Status: DISCONTINUED | OUTPATIENT
Start: 2020-11-19 | End: 2020-11-19 | Stop reason: HOSPADM

## 2020-11-19 RX ORDER — BUPIVACAINE HYDROCHLORIDE 2.5 MG/ML
INJECTION, SOLUTION EPIDURAL; INFILTRATION; INTRACAUDAL AS NEEDED
Status: DISCONTINUED | OUTPATIENT
Start: 2020-11-19 | End: 2020-11-19 | Stop reason: HOSPADM

## 2020-11-19 RX ORDER — ALBUTEROL SULFATE 0.83 MG/ML
2.5 SOLUTION RESPIRATORY (INHALATION) EVERY 6 HOURS PRN
Status: DISCONTINUED | OUTPATIENT
Start: 2020-11-19 | End: 2020-11-25 | Stop reason: HOSPADM

## 2020-11-19 RX ORDER — FAMOTIDINE 20 MG/1
40 TABLET, FILM COATED ORAL DAILY
Status: DISCONTINUED | OUTPATIENT
Start: 2020-11-19 | End: 2020-11-23

## 2020-11-19 RX ORDER — BUPIVACAINE HYDROCHLORIDE 7.5 MG/ML
INJECTION, SOLUTION INTRASPINAL AS NEEDED
Status: DISCONTINUED | OUTPATIENT
Start: 2020-11-19 | End: 2020-11-19 | Stop reason: SURG

## 2020-11-19 RX ORDER — IBUPROFEN 200 MG
16-32 TABLET ORAL AS NEEDED
Status: DISCONTINUED | OUTPATIENT
Start: 2020-11-19 | End: 2020-11-19 | Stop reason: HOSPADM

## 2020-11-19 RX ORDER — ALBUTEROL SULFATE 0.83 MG/ML
SOLUTION RESPIRATORY (INHALATION)
Status: DISCONTINUED
Start: 2020-11-19 | End: 2020-11-19 | Stop reason: WASHOUT

## 2020-11-19 RX ORDER — SODIUM CHLORIDE 9 MG/ML
INJECTION, SOLUTION INTRAVENOUS CONTINUOUS
Status: DISCONTINUED | OUTPATIENT
Start: 2020-11-19 | End: 2020-11-20

## 2020-11-19 RX ORDER — ONDANSETRON HYDROCHLORIDE 2 MG/ML
4 INJECTION, SOLUTION INTRAVENOUS EVERY 6 HOURS PRN
Status: DISCONTINUED | OUTPATIENT
Start: 2020-11-19 | End: 2020-11-25 | Stop reason: HOSPADM

## 2020-11-19 RX ORDER — DEXTROSE 50 % IN WATER (D50W) INTRAVENOUS SYRINGE
25 AS NEEDED
Status: DISCONTINUED | OUTPATIENT
Start: 2020-11-19 | End: 2020-11-19 | Stop reason: HOSPADM

## 2020-11-19 RX ORDER — ALBUTEROL SULFATE 90 UG/1
2 INHALANT RESPIRATORY (INHALATION) EVERY 6 HOURS PRN
Status: DISCONTINUED | OUTPATIENT
Start: 2020-11-19 | End: 2020-11-19

## 2020-11-19 RX ORDER — ONDANSETRON HYDROCHLORIDE 2 MG/ML
INJECTION, SOLUTION INTRAVENOUS AS NEEDED
Status: DISCONTINUED | OUTPATIENT
Start: 2020-11-19 | End: 2020-11-19 | Stop reason: SURG

## 2020-11-19 RX ORDER — LORAZEPAM 1 MG/1
1 TABLET ORAL EVERY 8 HOURS PRN
Status: DISCONTINUED | OUTPATIENT
Start: 2020-11-19 | End: 2020-11-25 | Stop reason: HOSPADM

## 2020-11-19 RX ORDER — ESCITALOPRAM OXALATE 5 MG/1
10 TABLET ORAL DAILY
Status: DISCONTINUED | OUTPATIENT
Start: 2020-11-19 | End: 2020-11-20

## 2020-11-19 RX ORDER — FENTANYL CITRATE 50 UG/ML
25 INJECTION, SOLUTION INTRAMUSCULAR; INTRAVENOUS
Status: DISCONTINUED | OUTPATIENT
Start: 2020-11-19 | End: 2020-11-19 | Stop reason: HOSPADM

## 2020-11-19 RX ORDER — DEXTROSE 40 %
15-30 GEL (GRAM) ORAL AS NEEDED
Status: DISCONTINUED | OUTPATIENT
Start: 2020-11-19 | End: 2020-11-19 | Stop reason: HOSPADM

## 2020-11-19 RX ORDER — IPRATROPIUM BROMIDE AND ALBUTEROL SULFATE 2.5; .5 MG/3ML; MG/3ML
3 SOLUTION RESPIRATORY (INHALATION) 4 TIMES DAILY
Status: DISCONTINUED | OUTPATIENT
Start: 2020-11-19 | End: 2020-11-21

## 2020-11-19 RX ORDER — GABAPENTIN 100 MG/1
100 CAPSULE ORAL 2 TIMES DAILY
Status: DISCONTINUED | OUTPATIENT
Start: 2020-11-19 | End: 2020-11-25 | Stop reason: HOSPADM

## 2020-11-19 RX ORDER — ALBUTEROL SULFATE 2.5 MG/.5ML
SOLUTION RESPIRATORY (INHALATION)
Status: COMPLETED
Start: 2020-11-19 | End: 2020-11-19

## 2020-11-19 RX ORDER — IPRATROPIUM BROMIDE 0.5 MG/2.5ML
500 SOLUTION RESPIRATORY (INHALATION)
Status: DISCONTINUED | OUTPATIENT
Start: 2020-11-19 | End: 2020-11-19

## 2020-11-19 RX ORDER — OXYCODONE AND ACETAMINOPHEN 5; 325 MG/1; MG/1
1 TABLET ORAL EVERY 4 HOURS PRN
Qty: 15 TABLET | Refills: 0 | Status: SHIPPED | OUTPATIENT
Start: 2020-11-19 | End: 2020-11-24

## 2020-11-19 RX ORDER — CEFAZOLIN SODIUM/WATER 2 G/20 ML
2 SYRINGE (ML) INTRAVENOUS
Status: DISCONTINUED | OUTPATIENT
Start: 2020-11-20 | End: 2020-11-21 | Stop reason: SDUPTHER

## 2020-11-19 RX ORDER — ONDANSETRON HYDROCHLORIDE 2 MG/ML
INJECTION, SOLUTION INTRAVENOUS
Status: COMPLETED
Start: 2020-11-19 | End: 2020-11-19

## 2020-11-19 RX ORDER — SODIUM CHLORIDE, SODIUM LACTATE, POTASSIUM CHLORIDE, CALCIUM CHLORIDE 600; 310; 30; 20 MG/100ML; MG/100ML; MG/100ML; MG/100ML
INJECTION, SOLUTION INTRAVENOUS CONTINUOUS
Status: DISCONTINUED | OUTPATIENT
Start: 2020-11-19 | End: 2020-11-21

## 2020-11-19 RX ORDER — FENTANYL CITRATE 50 UG/ML
50 INJECTION, SOLUTION INTRAMUSCULAR; INTRAVENOUS
Status: DISCONTINUED | OUTPATIENT
Start: 2020-11-19 | End: 2020-11-20 | Stop reason: HOSPADM

## 2020-11-19 RX ORDER — ONDANSETRON HYDROCHLORIDE 2 MG/ML
4 INJECTION, SOLUTION INTRAVENOUS
Status: DISCONTINUED | OUTPATIENT
Start: 2020-11-19 | End: 2020-11-19 | Stop reason: HOSPADM

## 2020-11-19 RX ORDER — HYDROMORPHONE HYDROCHLORIDE 1 MG/ML
0.5 INJECTION, SOLUTION INTRAMUSCULAR; INTRAVENOUS; SUBCUTANEOUS EVERY 4 HOURS PRN
Status: DISCONTINUED | OUTPATIENT
Start: 2020-11-19 | End: 2020-11-24

## 2020-11-19 RX ORDER — PHENYLEPHRINE HYDROCHLORIDE 10 MG/ML
INJECTION INTRAVENOUS AS NEEDED
Status: DISCONTINUED | OUTPATIENT
Start: 2020-11-19 | End: 2020-11-19 | Stop reason: SURG

## 2020-11-19 RX ORDER — ONDANSETRON HYDROCHLORIDE 2 MG/ML
4 INJECTION, SOLUTION INTRAVENOUS ONCE
Status: COMPLETED | OUTPATIENT
Start: 2020-11-19 | End: 2020-11-19

## 2020-11-19 RX ORDER — FENTANYL CITRATE 50 UG/ML
INJECTION, SOLUTION INTRAMUSCULAR; INTRAVENOUS AS NEEDED
Status: DISCONTINUED | OUTPATIENT
Start: 2020-11-19 | End: 2020-11-19 | Stop reason: SURG

## 2020-11-19 RX ORDER — PROPOFOL 10 MG/ML
INJECTION, EMULSION INTRAVENOUS CONTINUOUS PRN
Status: DISCONTINUED | OUTPATIENT
Start: 2020-11-19 | End: 2020-11-19 | Stop reason: SURG

## 2020-11-19 RX ADMIN — FENTANYL CITRATE 50 MCG: 50 INJECTION, SOLUTION INTRAMUSCULAR; INTRAVENOUS at 16:10

## 2020-11-19 RX ADMIN — PHENYLEPHRINE HYDROCHLORIDE 100 MCG: 10 INJECTION INTRAVENOUS at 10:23

## 2020-11-19 RX ADMIN — SODIUM CHLORIDE: 9 INJECTION, SOLUTION INTRAVENOUS at 08:47

## 2020-11-19 RX ADMIN — MIDAZOLAM HYDROCHLORIDE 0.5 MG: 1 INJECTION, SOLUTION INTRAMUSCULAR; INTRAVENOUS at 09:31

## 2020-11-19 RX ADMIN — GABAPENTIN 100 MG: 100 CAPSULE ORAL at 20:30

## 2020-11-19 RX ADMIN — ONDANSETRON 4 MG: 2 INJECTION INTRAMUSCULAR; INTRAVENOUS at 16:31

## 2020-11-19 RX ADMIN — ONDANSETRON 4 MG: 2 INJECTION INTRAMUSCULAR; INTRAVENOUS at 10:28

## 2020-11-19 RX ADMIN — ONDANSETRON 4 MG: 2 INJECTION INTRAMUSCULAR; INTRAVENOUS at 16:30

## 2020-11-19 RX ADMIN — IPRATROPIUM BROMIDE AND ALBUTEROL SULFATE 3 ML: .5; 3 SOLUTION RESPIRATORY (INHALATION) at 16:02

## 2020-11-19 RX ADMIN — PHENYLEPHRINE HYDROCHLORIDE 100 MCG: 10 INJECTION INTRAVENOUS at 10:10

## 2020-11-19 RX ADMIN — ESCITALOPRAM 10 MG: 5 TABLET, FILM COATED ORAL at 18:38

## 2020-11-19 RX ADMIN — MIDAZOLAM HYDROCHLORIDE 0.5 MG: 1 INJECTION, SOLUTION INTRAMUSCULAR; INTRAVENOUS at 09:35

## 2020-11-19 RX ADMIN — HYDROMORPHONE HYDROCHLORIDE 0.5 MG: 1 INJECTION, SOLUTION INTRAMUSCULAR; INTRAVENOUS; SUBCUTANEOUS at 18:39

## 2020-11-19 RX ADMIN — IPRATROPIUM BROMIDE AND ALBUTEROL SULFATE 3 ML: 2.5; .5 SOLUTION RESPIRATORY (INHALATION) at 23:19

## 2020-11-19 RX ADMIN — PHENYLEPHRINE HYDROCHLORIDE 100 MCG: 10 INJECTION INTRAVENOUS at 10:16

## 2020-11-19 RX ADMIN — MOMETASONE FUROATE AND FORMOTEROL FUMARATE DIHYDRATE 2 PUFF: 200; 5 AEROSOL RESPIRATORY (INHALATION) at 20:30

## 2020-11-19 RX ADMIN — PHENYLEPHRINE HYDROCHLORIDE 100 MCG: 10 INJECTION INTRAVENOUS at 10:39

## 2020-11-19 RX ADMIN — FAMOTIDINE 40 MG: 20 TABLET ORAL at 18:38

## 2020-11-19 RX ADMIN — PHENYLEPHRINE HYDROCHLORIDE 100 MCG: 10 INJECTION INTRAVENOUS at 10:32

## 2020-11-19 RX ADMIN — FENTANYL CITRATE 25 MCG: 50 INJECTION INTRAMUSCULAR; INTRAVENOUS at 09:40

## 2020-11-19 RX ADMIN — BUPIVACAINE HYDROCHLORIDE IN DEXTROSE 2 ML: 7.5 INJECTION, SOLUTION SUBARACHNOID at 09:45

## 2020-11-19 RX ADMIN — SODIUM CHLORIDE, POTASSIUM CHLORIDE, SODIUM LACTATE AND CALCIUM CHLORIDE: 600; 310; 30; 20 INJECTION, SOLUTION INTRAVENOUS at 18:39

## 2020-11-19 RX ADMIN — FENTANYL CITRATE 50 MCG: 50 INJECTION, SOLUTION INTRAMUSCULAR; INTRAVENOUS at 15:30

## 2020-11-19 RX ADMIN — PROPOFOL 20 MCG/KG/MIN: 10 INJECTION, EMULSION INTRAVENOUS at 09:50

## 2020-11-19 RX ADMIN — PHENYLEPHRINE HYDROCHLORIDE 100 MCG: 10 INJECTION INTRAVENOUS at 10:04

## 2020-11-19 RX ADMIN — FENTANYL CITRATE 50 MCG: 50 INJECTION, SOLUTION INTRAMUSCULAR; INTRAVENOUS at 15:50

## 2020-11-19 ASSESSMENT — PAIN - FUNCTIONAL ASSESSMENT
PAIN_FUNCTIONAL_ASSESSMENT: NO/DENIES PAIN
PAIN_FUNCTIONAL_ASSESSMENT: 0-10
PAIN_FUNCTIONAL_ASSESSMENT: NO/DENIES PAIN

## 2020-11-19 ASSESSMENT — LIFESTYLE VARIABLES: TOBACCO_USE: 1

## 2020-11-19 NOTE — ANESTHESIA POSTPROCEDURE EVALUATION
Patient: Hernesto Varghese    Procedure Summary     Date: 11/19/20 Room / Location:  OR 3 / PH OR    Anesthesia Start: 0931 Anesthesia Stop: 1048    Procedure: Right Inguinal Hernia Repair w/Mesh (Right Groin) Diagnosis:       Inguinal hernia without obstruction or gangrene, recurrence not specified, unspecified laterality      (Inguinal Hernia K40.90)    Surgeon: Cordell Navarrete DO Responsible Provider: Nathen Paiz MD    Anesthesia Type: spinal ASA Status: 3          Anesthesia Type: spinal  PACU Vitals  11/19/2020 1041 - 11/19/2020 1130      11/19/2020  1045 11/19/2020  1100 11/19/2020  1115       BP:  (!) 116/59  (!) 122/56  (!) 144/69     Temp:  36.1 °C (97 °F)  --  --     Pulse:  70  68  68     Resp:  15  20  20     SpO2:  98 %  98 %  98 %             Anesthesia Post Evaluation    Pain management: adequate  Patient location during evaluation: PACU  Patient participation: complete - patient participated  Level of consciousness: awake and alert  Cardiovascular status: acceptable  Airway Patency: adequate  Respiratory status: acceptable  Hydration status: acceptable  Anesthetic complications: no

## 2020-11-19 NOTE — ANESTHESIA PROCEDURE NOTES
Spinal Block    Patient location during procedure: OR  Start time: 11/19/2020 9:35 AM  End time: 11/19/2020 9:45 AM  Staffing  Anesthesiologist: Nathen Paiz MD  Performed: anesthesiologist   Reason for block: at surgeon's request  Preanesthetic Checklist  Completed: patient identified, surgical consent, pre-op evaluation, timeout performed, IV checked, risks and benefits discussed, monitors and equipment checked and sterile field maintained during procedure  Spinal Block  Patient position: sitting  Prep: ChloraPrep and site prepped and draped  Patient monitoring: heart rate, cardiac monitor, continuous pulse ox and blood pressure  Approach: midline  Location: L3-4  Injection technique: single-shot  Needle  Needle gauge: 22 G  Needle length: 3.5 in  Assessment  Events: cerebrospinal fluid  Additional Notes  Procedure well tolerated. Vital signs stable.

## 2020-11-19 NOTE — PERIOPERATIVE NURSING NOTE
Patient requested to void and given urinal. Pt insisted to dangle feet at bedside and use urinal. Pt had full sensation to feet but not yet ready to ambulate. Pt slid off bed to floor with his legs buckling under neath him. Pt was moments later found on bottom over his legs. Pt denies any pain or hitting his head. Pt can flex his feet and move all his extremities, no changes in his mentation and safely place back to bed.   Dr. Hume (anesth) seen pt at bedside and Dr Navarrete informed in OR. Nurse Melissa everett notified and nsg supervisor.   Report given to FRANCHESCA Fox. Will further evaluate patient.

## 2020-11-19 NOTE — PERIOPERATIVE NURSING NOTE
Received report from Dominique in [facility]. Patient starting to complain of dizziness and anxiety. BP checked 84/49. New bag of IVF to start and when flushed IV in LFA, it infiltrated. New IV placed in LW and IVF given. Dr. Navarrete made aware of low BP with dizziness. Pt to be transferred to room 413. Report given.

## 2020-11-19 NOTE — OP NOTE
Date: 19 November 2020    Surgeon: Cordell Navarrete DO    Procedure: Right inguinal hernia repair with mesh    Preoperative diagnosis: Recurrent right inguinal hernia    Postoperative diagnosis: Recurrent direct right inguinal hernia    Indication: Patient is an 80-year-old white male who is 1/2 years status post right inguinal hernia repair but developed a recurrent bulge in his groin.  Demonstrable hernia on physical exam.    Anesthesia: Spinal with MAC plus local    Estimated blood loss: 20 cc    Complications: None    Specimen: Hernia sac    Findings: Small recurrent right direct inguinal hernia.  Previous mesh not encountered.    Details of procedure: After obtaining informed consent and correctly identify the patient they were brought into the operative suite and placed in supine position.  After induction of spinal anesthesia his lower abdomen and groins were clipper prepped.  His right lower quadrant was prepped and draped in standard surgical fashion.  Patient's left lower quadrant ostomy was excluded with a 1010 drape prior to draping.  An oblique incision was made in the right groin and carried down to the external abdominal oblique fascia with electric cautery.  The external inguinal ring was identified.  The external abdominal oblique fascia was incised overlying the spermatic cord and this incision was finished with Metzenbaum scissors.  Hemostats were placed on each flap of the external abdominal oblique fascia.  The spermatic cord was bluntly dissected from the confines of the inguinal canal there was some scar that had to be dissected from prior surgery.  A Penrose drain was placed around it.  The cremasteric fascia was dissected off of the spermatic cord.  Direct hernia sac was identified and was dissected from the spermatic cord contents.  It was reduced.  The inguinal floor was then recreated from the pubis to the internal ring by suturing the the ilioinguinal ligament to the conjoined tendon and  internal abdominal bleak fascia and muscle.  A 3 x 6 Prolene mesh was fashioned and the onlay mesh was then placed on the inguinal floor and sutured to the pubis, conjoined tendon, ilioinguinal ligament, and internal abdominal oblique fascia and muscle.  It was split to create tails and the tails were sutured laterally.  The excess was laid laterally.  Incision was irrigated and dried.  Local anesthetic was injected at the inguinal floor.  The Penrose drain was removed.  The external abdominal oblique fascia was then reapproximated with 2-0 Vicryl in a running fashion.  Kristie's fascia was reapproximated with 2-0 Vicryl in interrupted fashion.  The dermis was reapproximated with 3-0 Vicryl in interrupted fashion.  The skin was reapproximated with 4 Monocryl in a running some particular fashion.  The wound was washed and dried and Mastisol, Steri-Strips, gauze, and Tegaderm were applied.  Patient tolerated procedure well left the operating room for the recovery room in stable condition after being extubated.

## 2020-11-19 NOTE — PERIOPERATIVE NURSING NOTE
Pt reports that he uses inhaler 4x day, last dose 6am.  Verbal order recevied for one time neb treatment for SPo2 <95.

## 2020-11-19 NOTE — PERIOPERATIVE NURSING NOTE
"Pt reports feeling \"faint.\"  HOB lowered, BP 75/43 on initial measurement, anesthesia called, PIV inserted, IVF infusion initiated.  SPo2 >96%, AAox3, pale, but not diaphoretic.  Repeat 90/50.  Pt reports pain \"in my femur.\"  Call made to Dr. aNvarrete, will await Dr. Navarrete or orthopedics at bedside.    "

## 2020-11-19 NOTE — PERIOPERATIVE NURSING NOTE
Received patient via wheelchair grunting respirations and short of breath. Respirations 24-26. Placed on lpm nasal o2. O2 sat 90% room air improved to 95%on lpm nasal cannula. Twicthy jerky typr extremity movements.Assisted to bathroom to void. Greatly SOB on exertion.  C/o back pain only 4/10. Back to bed resting quietly. But SOB. Seen by Dr Paiz from Anesthesia.

## 2020-11-20 ENCOUNTER — APPOINTMENT (INPATIENT)
Dept: RADIOLOGY | Facility: HOSPITAL | Age: 80
DRG: 480 | End: 2020-11-20
Attending: ORTHOPAEDIC SURGERY
Payer: MEDICARE

## 2020-11-20 ENCOUNTER — ANESTHESIA (INPATIENT)
Dept: OPERATING ROOM | Facility: HOSPITAL | Age: 80
DRG: 480 | End: 2020-11-20
Payer: MEDICARE

## 2020-11-20 ENCOUNTER — APPOINTMENT (INPATIENT)
Dept: RADIOLOGY | Facility: HOSPITAL | Age: 80
DRG: 480 | End: 2020-11-20
Payer: MEDICARE

## 2020-11-20 ENCOUNTER — ANESTHESIA EVENT (INPATIENT)
Dept: OPERATING ROOM | Facility: HOSPITAL | Age: 80
DRG: 480 | End: 2020-11-20
Payer: MEDICARE

## 2020-11-20 ENCOUNTER — APPOINTMENT (INPATIENT)
Dept: RADIOLOGY | Facility: HOSPITAL | Age: 80
DRG: 480 | End: 2020-11-20
Attending: STUDENT IN AN ORGANIZED HEALTH CARE EDUCATION/TRAINING PROGRAM
Payer: MEDICARE

## 2020-11-20 PROBLEM — I48.0 PAROXYSMAL ATRIAL FIBRILLATION (CMS/HCC): Status: ACTIVE | Noted: 2020-11-20

## 2020-11-20 PROBLEM — J44.9 COPD (CHRONIC OBSTRUCTIVE PULMONARY DISEASE) (CMS/HCC): Status: ACTIVE | Noted: 2020-11-20

## 2020-11-20 PROBLEM — F32.A DEPRESSION: Status: ACTIVE | Noted: 2020-11-20

## 2020-11-20 PROBLEM — Z01.810 PREOPERATIVE CARDIOVASCULAR EXAMINATION: Status: ACTIVE | Noted: 2020-11-20

## 2020-11-20 PROBLEM — S72.001A CLOSED FRACTURE OF RIGHT HIP (CMS/HCC): Status: ACTIVE | Noted: 2020-11-09

## 2020-11-20 PROBLEM — I95.0 IDIOPATHIC HYPOTENSION: Status: ACTIVE | Noted: 2020-11-20

## 2020-11-20 PROBLEM — Z01.818 PRE-OP EXAMINATION: Status: ACTIVE | Noted: 2020-11-20

## 2020-11-20 LAB
ABO + RH BLD: NORMAL
ABO + RH BLD: NORMAL
ANION GAP SERPL CALC-SCNC: 7 MEQ/L (ref 3–15)
BLD GP AB SCN SERPL QL: NEGATIVE
BNP SERPL-MCNC: 48 PG/ML
BUN SERPL-MCNC: 18 MG/DL (ref 8–20)
CALCIUM SERPL-MCNC: 8.2 MG/DL (ref 8.9–10.3)
CHLORIDE SERPL-SCNC: 107 MEQ/L (ref 98–109)
CO2 SERPL-SCNC: 23 MEQ/L (ref 22–32)
CREAT SERPL-MCNC: 1.1 MG/DL (ref 0.8–1.3)
D AG BLD QL: POSITIVE
D AG BLD QL: POSITIVE
ERYTHROCYTE [DISTWIDTH] IN BLOOD BY AUTOMATED COUNT: 13.3 % (ref 11.6–14.4)
GFR SERPL CREATININE-BSD FRML MDRD: >60 ML/MIN/1.73M*2
GLUCOSE SERPL-MCNC: 130 MG/DL (ref 70–99)
HCT VFR BLDCO AUTO: 31.4 % (ref 40.1–51)
HGB BLD-MCNC: 10 G/DL (ref 13.7–17.5)
LABORATORY COMMENT REPORT: NORMAL
LABORATORY COMMENT REPORT: NORMAL
MCH RBC QN AUTO: 33.1 PG (ref 28–33.2)
MCHC RBC AUTO-ENTMCNC: 31.8 G/DL (ref 32.2–36.5)
MCV RBC AUTO: 104 FL (ref 83–98)
PDW BLD AUTO: 9.7 FL (ref 9.4–12.4)
PLATELET # BLD AUTO: 154 K/UL (ref 150–350)
POTASSIUM SERPL-SCNC: 4.2 MEQ/L (ref 3.6–5.1)
RBC # BLD AUTO: 3.02 M/UL (ref 4.5–5.8)
SODIUM SERPL-SCNC: 137 MEQ/L (ref 136–144)
WBC # BLD AUTO: 11.41 K/UL (ref 3.8–10.5)

## 2020-11-20 PROCEDURE — 63600000 HC DRUGS/DETAIL CODE: Performed by: ANESTHESIOLOGY

## 2020-11-20 PROCEDURE — 99222 1ST HOSP IP/OBS MODERATE 55: CPT | Performed by: INTERNAL MEDICINE

## 2020-11-20 PROCEDURE — 86920 COMPATIBILITY TEST SPIN: CPT

## 2020-11-20 PROCEDURE — 25000000 HC PHARMACY GENERAL: Performed by: ANESTHESIOLOGY

## 2020-11-20 PROCEDURE — 93005 ELECTROCARDIOGRAM TRACING: CPT | Performed by: STUDENT IN AN ORGANIZED HEALTH CARE EDUCATION/TRAINING PROGRAM

## 2020-11-20 PROCEDURE — C1713 ANCHOR/SCREW BN/BN,TIS/BN: HCPCS | Performed by: ORTHOPAEDIC SURGERY

## 2020-11-20 PROCEDURE — 63700000 HC SELF-ADMINISTRABLE DRUG: Performed by: STUDENT IN AN ORGANIZED HEALTH CARE EDUCATION/TRAINING PROGRAM

## 2020-11-20 PROCEDURE — 36000014 HC OR LEVEL 4 EA ADDL MIN: Performed by: ORTHOPAEDIC SURGERY

## 2020-11-20 PROCEDURE — 25800000 HC PHARMACY IV SOLUTIONS: Performed by: NURSE ANESTHETIST, CERTIFIED REGISTERED

## 2020-11-20 PROCEDURE — 25000000 HC PHARMACY GENERAL: Performed by: STUDENT IN AN ORGANIZED HEALTH CARE EDUCATION/TRAINING PROGRAM

## 2020-11-20 PROCEDURE — 80048 BASIC METABOLIC PNL TOTAL CA: CPT | Performed by: SURGERY

## 2020-11-20 PROCEDURE — 63600000 HC DRUGS/DETAIL CODE: Performed by: STUDENT IN AN ORGANIZED HEALTH CARE EDUCATION/TRAINING PROGRAM

## 2020-11-20 PROCEDURE — 71045 X-RAY EXAM CHEST 1 VIEW: CPT

## 2020-11-20 PROCEDURE — 93005 ELECTROCARDIOGRAM TRACING: CPT | Performed by: INTERNAL MEDICINE

## 2020-11-20 PROCEDURE — 25000000 HC PHARMACY GENERAL: Performed by: NURSE ANESTHETIST, CERTIFIED REGISTERED

## 2020-11-20 PROCEDURE — 37000010 HC ANESTHESIA SPINAL: Performed by: ORTHOPAEDIC SURGERY

## 2020-11-20 PROCEDURE — 25000000 HC PHARMACY GENERAL: Performed by: SURGERY

## 2020-11-20 PROCEDURE — 0QS636Z REPOSITION RIGHT UPPER FEMUR WITH INTRAMEDULLARY INTERNAL FIXATION DEVICE, PERCUTANEOUS APPROACH: ICD-10-PCS | Performed by: ORTHOPAEDIC SURGERY

## 2020-11-20 PROCEDURE — 27200000 HC STERILE SUPPLY: Performed by: ORTHOPAEDIC SURGERY

## 2020-11-20 PROCEDURE — 85027 COMPLETE CBC AUTOMATED: CPT | Performed by: SURGERY

## 2020-11-20 PROCEDURE — 63600000 HC DRUGS/DETAIL CODE: Performed by: NURSE ANESTHETIST, CERTIFIED REGISTERED

## 2020-11-20 PROCEDURE — 36415 COLL VENOUS BLD VENIPUNCTURE: CPT | Performed by: SURGERY

## 2020-11-20 PROCEDURE — 72170 X-RAY EXAM OF PELVIS: CPT

## 2020-11-20 PROCEDURE — 71000001 HC PACU PHASE 1 INITIAL 30MIN: Performed by: ORTHOPAEDIC SURGERY

## 2020-11-20 PROCEDURE — 86900 BLOOD TYPING SEROLOGIC ABO: CPT

## 2020-11-20 PROCEDURE — 63700000 HC SELF-ADMINISTRABLE DRUG: Performed by: NURSE PRACTITIONER

## 2020-11-20 PROCEDURE — 83880 ASSAY OF NATRIURETIC PEPTIDE: CPT | Performed by: INTERNAL MEDICINE

## 2020-11-20 PROCEDURE — 73502 X-RAY EXAM HIP UNI 2-3 VIEWS: CPT | Mod: RT

## 2020-11-20 PROCEDURE — 71000011 HC PACU PHASE 1 EA ADDL MIN: Performed by: ORTHOPAEDIC SURGERY

## 2020-11-20 PROCEDURE — 63600000 HC DRUGS/DETAIL CODE: Performed by: SURGERY

## 2020-11-20 PROCEDURE — 21400000 HC ROOM AND CARE CCU/INTERMEDIATE

## 2020-11-20 PROCEDURE — 36000004 HC OR LEVEL 4 INITIAL 30MIN: Performed by: ORTHOPAEDIC SURGERY

## 2020-11-20 DEVICE — NAIL TFNA 11MM/130 DEG TI CANN 170MM - STERILE: Type: IMPLANTABLE DEVICE | Site: LEG | Status: FUNCTIONAL

## 2020-11-20 DEVICE — SCREW 5.0MM TI LOCKING  W/T25 STARDRIVE 38MM F/IM NAIL STER: Type: IMPLANTABLE DEVICE | Site: LEG | Status: FUNCTIONAL

## 2020-11-20 DEVICE — CEMENT INJECTABLE BONE TRAUMACEM: Type: IMPLANTABLE DEVICE | Site: LEG | Status: FUNCTIONAL

## 2020-11-20 DEVICE — BLADE TFNA HELICAL 105MM STERILE: Type: IMPLANTABLE DEVICE | Site: LEG | Status: FUNCTIONAL

## 2020-11-20 RX ORDER — CEFAZOLIN SODIUM 1 G/50ML
SOLUTION INTRAVENOUS AS NEEDED
Status: DISCONTINUED | OUTPATIENT
Start: 2020-11-20 | End: 2020-11-20 | Stop reason: SURG

## 2020-11-20 RX ORDER — BUPIVACAINE HYDROCHLORIDE 7.5 MG/ML
INJECTION, SOLUTION INTRASPINAL AS NEEDED
Status: DISCONTINUED | OUTPATIENT
Start: 2020-11-20 | End: 2020-11-20 | Stop reason: SURG

## 2020-11-20 RX ORDER — PROPOFOL 10 MG/ML
INJECTION, EMULSION INTRAVENOUS CONTINUOUS PRN
Status: DISCONTINUED | OUTPATIENT
Start: 2020-11-20 | End: 2020-11-20 | Stop reason: SURG

## 2020-11-20 RX ORDER — ENOXAPARIN SODIUM 100 MG/ML
40 INJECTION SUBCUTANEOUS
Status: DISCONTINUED | OUTPATIENT
Start: 2020-11-21 | End: 2020-11-23

## 2020-11-20 RX ORDER — HYDROMORPHONE HYDROCHLORIDE 1 MG/ML
0.5 INJECTION, SOLUTION INTRAMUSCULAR; INTRAVENOUS; SUBCUTANEOUS ONCE
Status: COMPLETED | OUTPATIENT
Start: 2020-11-20 | End: 2020-11-20

## 2020-11-20 RX ORDER — EPHEDRINE SULFATE 50 MG/ML
INJECTION, SOLUTION INTRAVENOUS AS NEEDED
Status: DISCONTINUED | OUTPATIENT
Start: 2020-11-20 | End: 2020-11-20 | Stop reason: SURG

## 2020-11-20 RX ORDER — PROPOFOL 10 MG/ML
INJECTION, EMULSION INTRAVENOUS AS NEEDED
Status: DISCONTINUED | OUTPATIENT
Start: 2020-11-20 | End: 2020-11-20 | Stop reason: SURG

## 2020-11-20 RX ORDER — PHENYLEPHRINE HYDROCHLORIDE 10 MG/ML
INJECTION INTRAVENOUS AS NEEDED
Status: DISCONTINUED | OUTPATIENT
Start: 2020-11-20 | End: 2020-11-20 | Stop reason: SURG

## 2020-11-20 RX ORDER — MIDAZOLAM HYDROCHLORIDE 2 MG/2ML
INJECTION, SOLUTION INTRAMUSCULAR; INTRAVENOUS AS NEEDED
Status: DISCONTINUED | OUTPATIENT
Start: 2020-11-20 | End: 2020-11-20 | Stop reason: SURG

## 2020-11-20 RX ORDER — ESCITALOPRAM OXALATE 10 MG/1
10 TABLET ORAL NIGHTLY
Status: DISCONTINUED | OUTPATIENT
Start: 2020-11-20 | End: 2020-11-25 | Stop reason: HOSPADM

## 2020-11-20 RX ORDER — SODIUM CHLORIDE, SODIUM GLUCONATE, SODIUM ACETATE, POTASSIUM CHLORIDE AND MAGNESIUM CHLORIDE 30; 37; 368; 526; 502 MG/100ML; MG/100ML; MG/100ML; MG/100ML; MG/100ML
INJECTION, SOLUTION INTRAVENOUS CONTINUOUS PRN
Status: DISCONTINUED | OUTPATIENT
Start: 2020-11-20 | End: 2020-11-20 | Stop reason: SURG

## 2020-11-20 RX ADMIN — PHENYLEPHRINE HYDROCHLORIDE 100 MCG: 10 INJECTION INTRAVENOUS at 17:01

## 2020-11-20 RX ADMIN — PHENYLEPHRINE HYDROCHLORIDE 200 MCG: 10 INJECTION INTRAVENOUS at 16:54

## 2020-11-20 RX ADMIN — PHENYLEPHRINE HYDROCHLORIDE 200 MCG: 10 INJECTION INTRAVENOUS at 16:46

## 2020-11-20 RX ADMIN — PHENYLEPHRINE HYDROCHLORIDE 200 MCG: 10 INJECTION INTRAVENOUS at 17:08

## 2020-11-20 RX ADMIN — PROPOFOL 10 MG: 10 INJECTION, EMULSION INTRAVENOUS at 16:41

## 2020-11-20 RX ADMIN — PHENYLEPHRINE HYDROCHLORIDE 100 MCG: 10 INJECTION INTRAVENOUS at 16:57

## 2020-11-20 RX ADMIN — PHENYLEPHRINE HYDROCHLORIDE 100 MCG: 10 INJECTION INTRAVENOUS at 17:56

## 2020-11-20 RX ADMIN — PHENYLEPHRINE HYDROCHLORIDE 100 MCG: 10 INJECTION INTRAVENOUS at 16:44

## 2020-11-20 RX ADMIN — HYDROMORPHONE HYDROCHLORIDE 0.5 MG: 1 INJECTION, SOLUTION INTRAMUSCULAR; INTRAVENOUS; SUBCUTANEOUS at 19:45

## 2020-11-20 RX ADMIN — PROPOFOL 20 MCG/KG/MIN: 10 INJECTION, EMULSION INTRAVENOUS at 16:54

## 2020-11-20 RX ADMIN — EPHEDRINE SULFATE 10 MG: 50 INJECTION INTRAVENOUS at 16:48

## 2020-11-20 RX ADMIN — IPRATROPIUM BROMIDE AND ALBUTEROL SULFATE 3 ML: 2.5; .5 SOLUTION RESPIRATORY (INHALATION) at 14:38

## 2020-11-20 RX ADMIN — PROPOFOL 10 MG: 10 INJECTION, EMULSION INTRAVENOUS at 16:37

## 2020-11-20 RX ADMIN — EPHEDRINE SULFATE 10 MG: 50 INJECTION INTRAVENOUS at 17:02

## 2020-11-20 RX ADMIN — IPRATROPIUM BROMIDE AND ALBUTEROL SULFATE 3 ML: 2.5; .5 SOLUTION RESPIRATORY (INHALATION) at 09:50

## 2020-11-20 RX ADMIN — MIDAZOLAM HYDROCHLORIDE 1 MG: 1 INJECTION, SOLUTION INTRAMUSCULAR; INTRAVENOUS at 16:19

## 2020-11-20 RX ADMIN — PROPOFOL 10 MG: 10 INJECTION, EMULSION INTRAVENOUS at 16:24

## 2020-11-20 RX ADMIN — MOMETASONE FUROATE AND FORMOTEROL FUMARATE DIHYDRATE 2 PUFF: 200; 5 AEROSOL RESPIRATORY (INHALATION) at 09:50

## 2020-11-20 RX ADMIN — ESCITALOPRAM 10 MG: 5 TABLET, FILM COATED ORAL at 21:04

## 2020-11-20 RX ADMIN — SODIUM CHLORIDE, SODIUM GLUCONATE, SODIUM ACETATE, POTASSIUM CHLORIDE AND MAGNESIUM CHLORIDE: 526; 502; 368; 37; 30 INJECTION, SOLUTION INTRAVENOUS at 18:17

## 2020-11-20 RX ADMIN — PROPOFOL 10 MG: 10 INJECTION, EMULSION INTRAVENOUS at 16:21

## 2020-11-20 RX ADMIN — PROPOFOL 10 MG: 10 INJECTION, EMULSION INTRAVENOUS at 16:50

## 2020-11-20 RX ADMIN — HYDROMORPHONE HYDROCHLORIDE 0.5 MG: 1 INJECTION, SOLUTION INTRAMUSCULAR; INTRAVENOUS; SUBCUTANEOUS at 01:09

## 2020-11-20 RX ADMIN — PHENYLEPHRINE HYDROCHLORIDE 100 MCG: 10 INJECTION INTRAVENOUS at 16:50

## 2020-11-20 RX ADMIN — EPHEDRINE SULFATE 10 MG: 50 INJECTION INTRAVENOUS at 17:10

## 2020-11-20 RX ADMIN — HYDROMORPHONE HYDROCHLORIDE 0.5 MG: 1 INJECTION, SOLUTION INTRAMUSCULAR; INTRAVENOUS; SUBCUTANEOUS at 09:42

## 2020-11-20 RX ADMIN — IPRATROPIUM BROMIDE AND ALBUTEROL SULFATE 3 ML: 2.5; .5 SOLUTION RESPIRATORY (INHALATION) at 22:22

## 2020-11-20 RX ADMIN — BUPIVACAINE HYDROCHLORIDE IN DEXTROSE 1.6 ML: 7.5 INJECTION, SOLUTION SUBARACHNOID at 16:38

## 2020-11-20 RX ADMIN — PHENYLEPHRINE HYDROCHLORIDE 200 MCG: 10 INJECTION INTRAVENOUS at 17:19

## 2020-11-20 RX ADMIN — PROPOFOL 10 MG: 10 INJECTION, EMULSION INTRAVENOUS at 16:28

## 2020-11-20 RX ADMIN — GABAPENTIN 100 MG: 100 CAPSULE ORAL at 21:04

## 2020-11-20 RX ADMIN — EPHEDRINE SULFATE 10 MG: 50 INJECTION INTRAVENOUS at 18:10

## 2020-11-20 RX ADMIN — EPHEDRINE SULFATE 10 MG: 50 INJECTION INTRAVENOUS at 17:44

## 2020-11-20 RX ADMIN — PHENYLEPHRINE HYDROCHLORIDE 100 MCG: 10 INJECTION INTRAVENOUS at 17:29

## 2020-11-20 RX ADMIN — CEFAZOLIN SODIUM 2 G: 1 SOLUTION INTRAVENOUS at 16:36

## 2020-11-20 RX ADMIN — SODIUM CHLORIDE, POTASSIUM CHLORIDE, SODIUM LACTATE AND CALCIUM CHLORIDE: 600; 310; 30; 20 INJECTION, SOLUTION INTRAVENOUS at 06:36

## 2020-11-20 RX ADMIN — PROPOFOL 10 MG: 10 INJECTION, EMULSION INTRAVENOUS at 16:33

## 2020-11-20 RX ADMIN — PHENYLEPHRINE HYDROCHLORIDE 100 MCG: 10 INJECTION INTRAVENOUS at 17:50

## 2020-11-20 RX ADMIN — SODIUM CHLORIDE, POTASSIUM CHLORIDE, SODIUM LACTATE AND CALCIUM CHLORIDE: 600; 310; 30; 20 INJECTION, SOLUTION INTRAVENOUS at 21:03

## 2020-11-20 RX ADMIN — MOMETASONE FUROATE AND FORMOTEROL FUMARATE DIHYDRATE 2 PUFF: 200; 5 AEROSOL RESPIRATORY (INHALATION) at 21:05

## 2020-11-20 RX ADMIN — PROPOFOL 10 MG: 10 INJECTION, EMULSION INTRAVENOUS at 16:45

## 2020-11-20 RX ADMIN — PHENYLEPHRINE HYDROCHLORIDE 100 MCG: 10 INJECTION INTRAVENOUS at 18:16

## 2020-11-20 RX ADMIN — PHENYLEPHRINE HYDROCHLORIDE 200 MCG: 10 INJECTION INTRAVENOUS at 17:15

## 2020-11-20 RX ADMIN — OXYCODONE HYDROCHLORIDE AND ACETAMINOPHEN 1 TABLET: 5; 325 TABLET ORAL at 22:44

## 2020-11-20 ASSESSMENT — ENCOUNTER SYMPTOMS
SLEEP DISTURBANCES DUE TO BREATHING: 0
NIGHT SWEATS: 0
WEAKNESS: 0
FALLS: 0
DIZZINESS: 0
BRUISES/BLEEDS EASILY: 0
JOINT SWELLING: 0
HEMATEMESIS: 0
WEIGHT LOSS: 0
LOSS OF BALANCE: 0
ORTHOPNEA: 0
DYSPNEA ON EXERTION: 0
SYNCOPE: 0
LIGHT-HEADEDNESS: 0
SNORING: 0
FOCAL WEAKNESS: 0
FEVER: 0
MUSCLE CRAMPS: 0
PALPITATIONS: 0
DEPRESSION: 0
HEMATOCHEZIA: 0
SHORTNESS OF BREATH: 0
NERVOUS/ANXIOUS: 0
HEADACHES: 0
FREQUENCY: 0
SPUTUM PRODUCTION: 0
CLAUDICATION: 0
HEMOPTYSIS: 0
MYALGIAS: 0
PND: 0
HEMATURIA: 0
NEAR-SYNCOPE: 0
ODYNOPHAGIA: 0
IRREGULAR HEARTBEAT: 0
BRIEF PARALYSIS: 0
NAUSEA: 0
DIARRHEA: 0
WEIGHT GAIN: 0
VOMITING: 0
ALTERED MENTAL STATUS: 0
COUGH: 0
DIAPHORESIS: 0
WHEEZING: 0
DOUBLE VISION: 0
BLURRED VISION: 0
ABDOMINAL PAIN: 0

## 2020-11-20 ASSESSMENT — LIFESTYLE VARIABLES: TOBACCO_USE: 1

## 2020-11-20 NOTE — PATIENT CARE CONFERENCE
Care Progression Rounds Note  Date: 11/20/2020  Time: 11:36 AM     Patient Name: Hernesto Varghese     Medical Record Number: [id number]   YOB: 1940  Sex: Male      Room/Bed: 0413    Admitting Diagnosis: Inguinal hernia without obstruction or gangrene, recurrence not specified, unspecified laterality [K40.90]  Closed right hip fracture (CMS/HCC) [S72.001A]   Admit Date/Time: 11/19/2020  7:42 AM    Primary Diagnosis: Closed fracture of right hip (CMS/HCC)  Principal Problem: Closed fracture of right hip (CMS/HCC)    GMLOS: pending  Anticipated Discharge Date: 11/23/2020    AM-PAC  Mobility Score:      Discharge Planning:  Anticipated Discharge Disposition: skilled nursing facility, home with home health services    Barriers to Discharge:  Barriers to Discharge: Medical issues not resolved  Comment: preop clearance    Participants:  , nursing, physical therapy, occupational therapy

## 2020-11-20 NOTE — PLAN OF CARE
Problem: Adult Inpatient Plan of Care  Goal: Readiness for Transition of Care  Intervention: Mutually Develop Transition Plan  Flowsheets (Taken 11/20/2020 1126)  Anticipated Discharge Disposition:   skilled nursing facility   home with home health services  Assistive Device/Animal Currently Used at Home: (DARSHAN OXYGEN)   cane, straight   walker, front-wheeled   nebulizer   oxygen  Anticipated Changes Related to Illness: inability to care for self  Current Discharge Risk: dependent with mobility/activities of daily living  Readmission Within the Last 30 Days: no previous admission in last 30 days  Patient/Family Anticipated Services at Transition:   skilled nursing   home health care  Patient/Family Anticipates Transition to: home with help/services  Transportation Anticipated: (BLS) --  Concerns to be Addressed:   home safety   coping/stress   adjustment to diagnosis/illness   discharge planning  Offered/Gave Vendor List: yes   CM performed a chart review prior to meeting the patient to verify all information and discuss discharge planning.  The patient had right inguinal hernia repair yesterday and while seated at the edge of the bed using a urinal, the patient fell and suffered a right hip fracture.  He is getting preop clearance for surgery.  The patient had episodes of Vtach.  He lives in a 2 story home with his wife, Maribel BUOCHER [phone] who has her own health issues.  The patient is the primary caregiver.  They have been  for over 60 years.  The patient uses [facility].   The patient has no  service, drives and retired 10 years ago.   PLAN:  SNF rehab after right hip fracture repair.  Keyon Lopez RN

## 2020-11-20 NOTE — ASSESSMENT & PLAN NOTE
A. fib risk factors including probably severe COPD.  13 beat run of A. fib on the monitor.  Also had a ventricular triplet.  This does not  or clearance.  Recommend overnight on the monitor following surgery.

## 2020-11-20 NOTE — ANESTHESIA PREPROCEDURE EVALUATION
Relevant Problems   CARDIOVASCULAR   (+) Paroxysmal atrial fibrillation (CMS/[facility])      NEUROLOGY   (+) Depression      RESPIRATORY SYSTEM   (+) COPD (chronic obstructive pulmonary disease) (CMS/[facility])       Anesthesia ROS/MED HX      Pulmonary    history of tobacco use and ex-smoker  Endo/Other  Body Habitus: Normal  ROS/MED HX Comments:    Musculoskeletal: Right hip fracture   ROS/Hx: Right Inguinal Hernia Repair w/Mesh (Right )    Seasonal allergies   COPD (chronic obstructive pulmonary disease) (CMS/[facility])   Chronic respiratory failure with hypoxia (CMS/[facility])   Snores   Oxygen dependent on 4 liters/ mostly just at night  Pneumonia June 2020/ aspiration pneumonia  GERD (gastroesophageal reflux disease)   History of stomach ulcers yrs ago  Colostomy in place (CMS/[facility])   BPH (benign prostatic hyperplasia)   Inguinal hernia right  Depression   Ambulates with cane   Uses roller walker   Chronic back pain   SOB (shortness of breath)   History of rib fracture   Hx of fall   Neck pain   Urinary retention            Past Surgical History:   Procedure Laterality Date   • COLECTOMY      w/ colostomy   • HERNIA REPAIR     • OTHER SURGICAL HISTORY      urolift       Physical Exam    Airway   Mallampati: II   TM distance: >3 FB   Neck ROM: full  Cardiovascular    Rate: normalPulmonary    Decreased breath sounds        Anesthesia Plan    Plan: spinal    Technique: spinal   ASA 3  Anesthetic plan and risks discussed with: patient  Postop Plan:   Pain Management: IV analgesics and spinal

## 2020-11-20 NOTE — ASSESSMENT & PLAN NOTE
80-year-old prior male smoker with COPD denying history of cardiovascular disease.  ECG pending.  Chest x-ray demonstrates diffusion restriction changes I do not appear to be heart failure however there is a little fluid in the right fissure.  We will check a BNP which has operative cardiac risk predictability.  Geriatric sensitive perioperative cardiac risk index = 0.9%.  No further preoperative cardiovascular testing is recommended.  Stable from a cardiovascular perspective and cleared for surgery.

## 2020-11-20 NOTE — NURSING NOTE
Monitor room stated Hernesto Varghese had 3 beats of V tach. Notified trauma PA covering for surgery.

## 2020-11-20 NOTE — CONSULTS
Orthopedic Consult Note    Subjective     Hernesto Varghese is a 80 y.o. male who was s/p right inguinal herinia mesh repair when he sat on side of bed to urinate in the PACU. Patient slipped off th eedge of the bed and fell on his right side. He endorses right hip pain. He denies numbness or tingling. Patient denies any other associated injuries at this time. He does ambulate at baseline with a cane. He denies use of blood thinners.     Outside records reviewed..    Medical History:   Past Medical History:   Diagnosis Date   • Ambulates with cane    • BPH (benign prostatic hyperplasia)    • Chronic back pain    • Chronic respiratory failure with hypoxia (CMS/[facility])    • Colostomy in place (CMS/[facility])    • COPD (chronic obstructive pulmonary disease) (CMS/[facility])    • Depression    • GERD (gastroesophageal reflux disease)    • History of rib fracture    • History of stomach ulcers     yrs ago   • Hx of fall    • Inguinal hernia     right   • Neck pain    • Oxygen dependent     on 4 liters/ mostly just at night   • Pneumonia     June 2020/ aspiration pneumonia   • Seasonal allergies    • Snores    • SOB (shortness of breath)    • Urinary retention    • Uses roller walker        Surgical History:   Past Surgical History:   Procedure Laterality Date   • COLECTOMY      w/ colostomy   • HERNIA REPAIR     • OTHER SURGICAL HISTORY      urolift       Social History:   Social History     Social History Narrative   • Not on file       Family History: No family history on file.    Allergies: Aspirin    Current Inpatient Medications   Medication Dose Route Frequency Provider Last Rate Last Dose   • acetaminophen (TYLENOL) tablet 500 mg  500 mg oral q6h PRN Cordell Navarrete, DO       • albuterol nebulizer solution 2.5 mg  2.5 mg nebulization q6h PRN Cordell Navarrete DO       • escitalopram (LEXAPRO) tablet 10 mg  10 mg oral Daily Cordell Navarrete DO   10 mg at 11/19/20 1838   • famotidine (PEPCID) tablet 40 mg  40 mg oral Daily Cordell Navarrete DO    40 mg at 11/19/20 1838   • fentaNYL (PF) (SUBLIMAZE) injection 50 mcg  50 mcg intravenous q15 min PRN Lizzy Cedeño MD   50 mcg at 11/19/20 1610   • gabapentin (NEURONTIN) capsule 100 mg  100 mg oral BID Cordell Navarrete, DO   100 mg at 11/19/20 2030   • HYDROmorphone (DILAUDID) injection 0.5 mg  0.5 mg intravenous q4h PRN Cordell Navarrete, DO   0.5 mg at 11/19/20 1839   • [MAR Hold] ipratropium-albuteroL (DUO-NEB) 0.5-2.5 mg/3 mL nebulizer solution 3 mL  3 mL nebulization q6h Wendie Palmer MD   3 mL at 11/19/20 1602   • ipratropium-albuteroL (DUO-NEB) 0.5-2.5 mg/3 mL nebulizer solution 3 mL  3 mL nebulization QID Cordell Navarrete, DO       • lactated ringer's infusion   intravenous Continuous Cordell Navarrete DO 80 mL/hr at 11/19/20 1839     • LORazepam (ATIVAN) tablet 1 mg  1 mg oral q8h PRN Cordell Navarrete DO       • mometasone-formoterol (DULERA 200) 200-5 mcg/actuation inhaler 2 puff  2 puff inhalation BID Cordell Navarrete DO   2 puff at 11/19/20 2030   • ondansetron (ZOFRAN) injection 4 mg  4 mg intravenous q6h PRN Cordell Navarrete, DO       • oxyCODONE-acetaminophen (PERCOCET) 5-325 mg per tablet 1 tablet  1 tablet oral q6h PRN Cordell Navarrete DO       • oxyCODONE-acetaminophen (PERCOCET) 5-325 mg per tablet 2 tablet  2 tablet oral q6h PRN Cordell Navarrete DO       • sodium chloride 0.9 % infusion   intravenous Continuous Cordell Navarrete DO 40 mL/hr at 11/19/20 0847          Medication List      START taking these medications    oxyCODONE-acetaminophen 5-325 mg per tablet  Commonly known as: PERCOCET  Take 1 tablet by mouth every 4 (four) hours as needed for moderate pain or severe pain for up to 5 days.  Dose: 1 tablet        CONTINUE taking these medications    acetaminophen 500 mg tablet  Commonly known as: TYLENOL  Take 500 mg by mouth every 6 (six) hours as needed for mild pain.  Dose: 500 mg     * albuterol 2.5 mg /3 mL (0.083 %) nebulizer solution  Take 2.5 mg by nebulization every 6 (six) hours as needed for  wheezing.  Dose: 2.5 mg     * albuterol sulfate 90 mcg/actuation inhaler  Inhale 2 puffs every 6 (six) hours as needed for wheezing.  Dose: 2 puff     escitalopram 10 mg tablet  Commonly known as: LEXAPRO  Take 10 mg by mouth daily.  Dose: 10 mg     fluticasone propion-salmeteroL 250-50 mcg/dose diskus inhaler  Commonly known as: ADVAIR DISKUS  Inhale 1 puff 2 (two) times a day.   Rinse mouth with water after use to reduce aftertaste and incidence of candidiasis.   Do not swallow.  For patients not on a ventilator, a spacer is recommended to be used with this medication/inhaler.  Dose: 1 puff     gabapentin 100 mg capsule  Commonly known as: NEURONTIN  Take 100 mg by mouth 2 (two) times a day.  Dose: 100 mg     ipratropium 0.02 % nebulizer solution  Commonly known as: ATROVENT  Take 500 mcg by nebulization 4 (four) times a day.  Dose: 500 mcg     ipratropium-albuteroL 0.5-2.5 mg/3 mL nebulizer solution  Commonly known as: DUO-NEB  Take 3 mL by nebulization 4 (four) times a day.  Dose: 3 mL     LORazepam 1 mg tablet  Commonly known as: ATIVAN  Take 1 mg by mouth every 8 (eight) hours as needed for anxiety.  Dose: 1 mg     PEPCID 40 mg tablet  Take 40 mg by mouth every morning.  Dose: 40 mg  Generic drug: famotidine         * This list has 2 medication(s) that are the same as other medications prescribed for you. Read the directions carefully, and ask your doctor or other care provider to review them with you.              Review of Systems  Pertinent items are noted in HPI.    Objective   Labs  CBC Results       11/16/20                          0859           WBC 9.24           RBC 4.16           HGB 13.9           HCT 42.2           .4           MCH 33.4           MCHC 32.9                                    BMP Results       11/16/20                          0859                      K 4.7           Cl 107           CO2 26           Glucose 84           BUN 17           Creatinine 1.2            Calcium 9.4           Anion Gap 9           EGFR 58.1                           Imaging  XR R hip demonstrates a stable IT fracture  XR R femur and knee demonstrate no additional fracture or dislocation      Physical Exam  Gen  Awake and alert, NAD    RLE  Leg shortened and externally rotated  ttp about the hip  + heel strike  No pain with ROM ankle  Mild pain with palpation of distal femur, no ttp of tibia, fibula, ankle  Motor intact ehl/fhl/df/pf/peroneals  SILT sural/saphenous/spn/dpn/tibial n  Compartments soft and compressible  Foot WWP, palpable radial pulse    LLE  No obvious deformity.  No TTP bony prominences. No palpable crepitus.  Able to SLR. Negative log roll/heel strike.  No pain with ROM hip/knee/ankle  Motor intact quads/hamstrings/ehl/fhl/df/pf/peroneals  SILT sural/saphenous/spn/dpn/tibial n  Compartments soft and compressible  Foot WWP, palpable radial pulse        Assessment   80 y.o. male with right stable IT fracture. Will plan on OR 11/20.    Plan   NPO at midnight  Pain control  Bed rest  Am labs  Pt/ptt/inr  Type and screen  Chest xr/ekg  Leal if needed for comfort  Medical clearance requested  Consent obtianed and on chart  Withhold dvt ppx on day of surgery  Ancef octor

## 2020-11-20 NOTE — ANESTHESIA PROCEDURE NOTES
Spinal Block    Patient location during procedure: OR  Start time: 11/20/2020 4:30 PM  End time: 11/20/2020 4:38 PM  Staffing  Anesthesiologist: Wendie Christie MD  Performed: anesthesiologist   Reason for block: at surgeon's request  Preanesthetic Checklist  Completed: patient identified, site marked, surgical consent, pre-op evaluation, timeout performed, IV checked, risks and benefits discussed, monitors and equipment checked and sterile field maintained during procedure  Spinal Block  Patient position: right lateral decubitus  Prep: ChloraPrep and site prepped and draped  Patient monitoring: heart rate, cardiac monitor, continuous pulse ox and blood pressure  Approach: midline  Location: L3-4  Injection technique: single-shot  Needle  Needle type: Sprotte   Needle gauge: 24 G  Needle length: 3.5 in  Assessment  Events: cerebrospinal fluid  Additional Notes  Procedure well tolerated. Vital signs stable.

## 2020-11-20 NOTE — CONSULTS
Hospital Medicine Service -  Inpatient Consultation         Requesting Physician: DR. Navarrete    Reason for Consultation: medical management      HISTORY OF PRESENT ILLNESS        This is a 80 y.o. male with pmh significant for COPD on home oxygen, depression, BPH who was scheduled for OR for right hip fracture. Pt had right inguinal herinia mesh repair yesterday. He had a mechanical fall in the PACU with sustained pain in right hip pain. Further imaging revealed right hip fracture and OR scheduled for today, pt c/o right hip pain currently, no other complains, denies chest pain, dyspnea, cough, dizziness, abd pain/n/v, no fever or chills, tele revealed runs of Vtach overnight, pt denies h/o CAD, CHF, EMANUEL, DM, CKD, CVA, PE/DVT.     PAST MEDICAL AND SURGICAL HISTORY        Past Medical History:   Diagnosis Date   • Ambulates with cane    • BPH (benign prostatic hyperplasia)    • Chronic back pain    • Chronic respiratory failure with hypoxia (CMS/HCC)    • Colostomy in place (CMS/HCC)    • COPD (chronic obstructive pulmonary disease) (CMS/HCC)    • Depression    • GERD (gastroesophageal reflux disease)    • History of rib fracture    • History of stomach ulcers     yrs ago   • Hx of fall    • Inguinal hernia     right   • Neck pain    • Oxygen dependent     on 4 liters/ mostly just at night   • Pneumonia     June 2020/ aspiration pneumonia   • Seasonal allergies    • Snores    • SOB (shortness of breath)    • Urinary retention    • Uses roller walker        Past Surgical History:   Procedure Laterality Date   • COLECTOMY      w/ colostomy   • HERNIA REPAIR     • OTHER SURGICAL HISTORY      urolift       David Alfred MD    MEDICATIONS        Home Medications:  Medications Prior to Admission   Medication Sig Dispense Refill Last Dose   • acetaminophen (TYLENOL) 500 mg tablet Take 500 mg by mouth every 6 (six) hours as needed for mild pain.   11/18/2020 at 2230   • albuterol 2.5 mg /3 mL (0.083 %) nebulizer solution  Take 2.5 mg by nebulization every 6 (six) hours as needed for wheezing.   11/19/2020 at 0600   • albuterol sulfate 90 mcg/actuation inhaler Inhale 2 puffs every 6 (six) hours as needed for wheezing.   11/19/2020 at 0600   • escitalopram (LEXAPRO) 10 mg tablet Take 10 mg by mouth daily.   11/18/2020 at 2200   • famotidine (PEPCID) 40 mg tablet Take 40 mg by mouth every morning.   11/18/2020 at 1900   • fluticasone propion-salmeteroL (ADVAIR DISKUS) 250-50 mcg/dose diskus inhaler Inhale 1 puff 2 (two) times a day.   Rinse mouth with water after use to reduce aftertaste and incidence of candidiasis.   Do not swallow.  For patients not on a ventilator, a spacer is recommended to be used with this medication/inhaler.   11/19/2020 at 0600   • gabapentin (NEURONTIN) 100 mg capsule Take 100 mg by mouth 2 (two) times a day.   11/18/2020 at 1800   • ipratropium (ATROVENT) 0.02 % nebulizer solution Take 500 mcg by nebulization 4 (four) times a day.   11/19/2020 at 0600   • ipratropium-albuteroL (DUO-NEB) 0.5-2.5 mg/3 mL nebulizer solution Take 3 mL by nebulization 4 (four) times a day.   11/19/2020 at 0600   • LORazepam (ATIVAN) 1 mg tablet Take 1 mg by mouth every 8 (eight) hours as needed for anxiety.   11/18/2020 at 1900       Current inpatient medications were personally reviewed.    ALLERGIES        Aspirin    FAMILY HISTORY        No family history on file.    SOCIAL HISTORY        Social History     Socioeconomic History   • Marital status:      Spouse name: Not on file   • Number of children: Not on file   • Years of education: Not on file   • Highest education level: Not on file   Occupational History   • Not on file   Social Needs   • Financial resource strain: Not on file   • Food insecurity     Worry: Not on file     Inability: Not on file   • Transportation needs     Medical: Not on file     Non-medical: Not on file   Tobacco Use   • Smoking status: Former Smoker   • Smokeless tobacco: Never Used  "  Substance and Sexual Activity   • Alcohol use: Never     Frequency: Never   • Drug use: Never   • Sexual activity: Not on file   Lifestyle   • Physical activity     Days per week: Not on file     Minutes per session: Not on file   • Stress: Not on file   Relationships   • Social connections     Talks on phone: Not on file     Gets together: Not on file     Attends Evangelical service: Not on file     Active member of club or organization: Not on file     Attends meetings of clubs or organizations: Not on file     Relationship status: Not on file   • Intimate partner violence     Fear of current or ex partner: Not on file     Emotionally abused: Not on file     Physically abused: Not on file     Forced sexual activity: Not on file   Other Topics Concern   • Not on file   Social History Narrative   • Not on file       REVIEW OF SYSTEMS        All other systems reviewed and negative except as noted in HPI    PHYSICAL EXAMINATION        Visit Vitals  BP (!) 101/56 (BP Location: Right upper arm, Patient Position: Lying)   Pulse 73   Temp 37 °C (98.6 °F) (Oral)   Resp 18   Ht 1.753 m (5' 9\")   Wt 84 kg (185 lb 3.2 oz)   SpO2 94%   BMI 27.35 kg/m²     Body mass index is 27.35 kg/m².    Intake/Output Summary (Last 24 hours) at 11/20/2020 0937  Last data filed at 11/20/2020 0636  Gross per 24 hour   Intake 1981 ml   Output 505 ml   Net 1476 ml       Physical Exam   Constitutional: NAD  HENT: Head: Normocephalic and atraumatic.   Eyes: Conjunctivae and EOM are normal. PERRL  Neck: Supple. No JVD.    Cardiovascular: Normal rate, regular rhythm and normal heart sounds.    Pulmonary/Chest: Effort normal and breath sounds normal. No wheezing or crackles.  Abdominal: Soft. Bowel sounds are normal. There is no tenderness, no rebound and no guarding.   Musculoskeletal: No edema   Neurological: Alert and oriented to person, place, and time. No cranial nerve deficit. Coordination normal.   Skin: Skin is warm. No rash " noted.  Psychiatric: Normal mood and affect.    LABS / EKG          Labs  Lab Results   Component Value Date    WBC 11.41 (H) 11/20/2020    HGB 10.0 (L) 11/20/2020    HCT 31.4 (L) 11/20/2020     11/20/2020    ALT 13 (L) 11/16/2020    AST 20 11/16/2020     11/20/2020    K 4.2 11/20/2020     11/20/2020    CREATININE 1.1 11/20/2020    BUN 18 11/20/2020    CO2 23 11/20/2020       Imaging  X-ray Knee Right 1 Or 2 Views    Result Date: 11/19/2020  CLINICAL HISTORY: HIP FRACTURE, PREOP     IMPRESSION: Displaced comminuted right-sided intertrochanteric fracture. No definite more distal femoral fracture or fracture about the knee noting limited assessment. COMMENT: Comparison: None. Three views of the right femur with two views of the right knee.  Likely limits assessment of the knee. There is an impacted comminuted displaced intratrochanteric fracture of the right femur.  The remainder of the right femur is intact.  Limited assessment at the right knee reveals no definite fracture.  There are degenerative changes at the right knee.  Questionable small joint effusion.  Patellar enthesophyte formation.  Atherosclerotic calcifications.    X-ray Femur Right 2+ View    Result Date: 11/19/2020  CLINICAL HISTORY: HIP FRACTURE, PREOP     IMPRESSION: Displaced comminuted right-sided intertrochanteric fracture. No definite more distal femoral fracture or fracture about the knee noting limited assessment. COMMENT: Comparison: None. Three views of the right femur with two views of the right knee.  Likely limits assessment of the knee. There is an impacted comminuted displaced intratrochanteric fracture of the right femur.  The remainder of the right femur is intact.  Limited assessment at the right knee reveals no definite fracture.  There are degenerative changes at the right knee.  Questionable small joint effusion.  Patellar enthesophyte formation.  Atherosclerotic calcifications.    X-ray Hip With Or Without  Pelvis 2-3 Vw Right    Result Date: 11/19/2020  CLINICAL HISTORY: Slipping, tripping, stumbling and falls c/o pain right hip pain     IMPRESSION: Acute comminuted displaced right intratrochanteric fracture COMMENT: Comparison: None. AP view of the pelvis and crosstable lateral view of the right hip.  At the right hip there is an acute  displaced intertrochanteric fracture with avulsion of the greater trochanter and lesser trochanter.  Old fracture suspected of the left inferior pubic ramus.  No definite acute displaced bony pelvic ring fractures.  No gross and about the left hip.  Osteopenia limits assessment. Surgical clips project over the pelvis and inguinal regions.      ASSESSMENT AND RECOMMENDATIONS           Pre-op examination  Assessment & Plan  Patient has no history of significant cardiovascular disease and denies chest pain or dyspnea currently.  RCRI index 0 with a risk of 0.4 % for major cardiovascular event  Runs of V tach overnight   The patient will be evaluated by cardiology and can proceed with surgery if classified as an acceptable risk candidate from their perspective.      Depression  Assessment & Plan  Continue home meds    COPD (chronic obstructive pulmonary disease) (CMS/[facility])  Assessment & Plan  Stable  On baseline oxygen support  Continue nebs inhalers  IS, pulm hygiene post op     Closed right hip fracture (CMS/[facility])  Assessment & Plan  OR scheduled for today  Pain control, DVT ppx, PT/OT per ortho         Thanks for allowing us to participate in this patient's care and call [facility] if any questions.      Gen Jesika MD  11/20/2020

## 2020-11-20 NOTE — ASSESSMENT & PLAN NOTE
Known hx of mod severe COPD  Per records   C/w nebs/ steroid taper  No pna on CXR - dc abx and monitor   ABG mild hypoxia  BNP mininmally elevated, no significant effusions  ECHO - reviewed unremarkable   Code status - full code   DC in AM, pt declined SNF placement , prefers home w. Home care

## 2020-11-20 NOTE — CONSULTS
Cardiology Consult Note  Subjective     Hernesto Varghese is a 80 y.o. male who was admitted for Inguinal hernia without obstruction or gangrene, recurrence not specified, unspecified laterality [K40.90]  Closed right hip fracture (CMS/[facility]) [S72.001A]. @Hernesto Varghese was referred by Dr. Jesika Finn patient co-management. Hernesto Varghese is an 80-year-old gentleman who fell yesterday after undergoing hernia surgery.  He tells me he try to get out of bed by himself to use the urinal and slipped off the bed onto the floor fracturing his right hip.  He has moderate right hip pain.  He has never had a cardiovascular disease history denying cardiac work-up, catheterization, surgery etc.  He has never had any arrhythmia or palpitations.  He does have COPD and quit smoking several years ago.  He has no angina of any kind.  He tells me he can climb a flight of stairs but only 6 steps in a split-level but usually uses a walker and/or cane.  He tells me he vacuums for 20 minutes when he needs to.  He does yard work but does not specify what he does.    Pertinent radiology results reviewed. Pertinent lab results reviewed.  Also reviewed the outside ECG from June 2020.    Medical History:   Past Medical History:   Diagnosis Date   • Ambulates with cane    • BPH (benign prostatic hyperplasia)    • Chronic back pain    • Chronic respiratory failure with hypoxia (CMS/[facility])    • Colostomy in place (CMS/[facility])    • COPD (chronic obstructive pulmonary disease) (CMS/[facility])    • Depression    • GERD (gastroesophageal reflux disease)    • History of rib fracture    • History of stomach ulcers     yrs ago   • Hx of fall    • Inguinal hernia     right   • Neck pain    • Oxygen dependent     on 4 liters/ mostly just at night   • Pneumonia     June 2020/ aspiration pneumonia   • Seasonal allergies    • Snores    • SOB (shortness of breath)    • Urinary retention    • Uses roller walker        Surgical History:   Past Surgical History:   Procedure Laterality Date    • COLECTOMY      w/ colostomy   • HERNIA REPAIR     • OTHER SURGICAL HISTORY      urolift       Social History:   Social History     Social History Narrative   • Not on file       Family History: No family history on file.    Allergies: Aspirin    Current Facility-Administered Medications   Medication Dose Route Frequency Provider Last Rate Last Dose   • acetaminophen (TYLENOL) 650 mg/20.3 mL solution 500 mg  500 mg oral q6h PRN Cordell Navarrete, DO       • albuterol nebulizer solution 2.5 mg  2.5 mg nebulization q6h PRN Cordell Navarrete DO       • ceFAZolin in sterile water (ANCEF) injection 2 g  2 g intravenous On call to OR Aris Brock DO       • escitalopram (LEXAPRO) tablet 10 mg  10 mg oral Daily Cordell Navarrete DO   10 mg at 11/19/20 1838   • famotidine (PEPCID) tablet 40 mg  40 mg oral Daily Cordell Navarrete DO   40 mg at 11/19/20 1838   • fentaNYL (PF) (SUBLIMAZE) injection 50 mcg  50 mcg intravenous q15 min PRN Lizzy Cedeño MD   50 mcg at 11/19/20 1610   • gabapentin (NEURONTIN) capsule 100 mg  100 mg oral BID Cordell Navarrete, DO   100 mg at 11/19/20 2030   • HYDROmorphone (DILAUDID) injection 0.5 mg  0.5 mg intravenous q4h PRN Cordell Navarrete DO   0.5 mg at 11/20/20 0942   • ipratropium-albuteroL (DUO-NEB) 0.5-2.5 mg/3 mL nebulizer solution 3 mL  3 mL nebulization QID Cordell Navarrete DO   3 mL at 11/20/20 0950   • lactated ringer's infusion   intravenous Continuous Cordell Navarrete DO 80 mL/hr at 11/20/20 0636     • LORazepam (ATIVAN) tablet 1 mg  1 mg oral q8h PRN Cordell Navarrete DO       • mometasone-formoterol (DULERA 200) 200-5 mcg/actuation inhaler 2 puff  2 puff inhalation BID Cordell Navarrete DO   2 puff at 11/20/20 0950   • ondansetron (ZOFRAN) injection 4 mg  4 mg intravenous q6h PRN Reel, Cordell G, DO       • oxyCODONE-acetaminophen (PERCOCET) 5-325 mg per tablet 1 tablet  1 tablet oral q6h PRN Cordell Navarrete,        • oxyCODONE-acetaminophen (PERCOCET) 5-325 mg per tablet 2 tablet  2 tablet oral q6h  PRN Cordell Navarrete DO       • sodium chloride 0.9 % infusion   intravenous Continuous Cordell Navarrete DO 40 mL/hr at 11/19/20 0847         Review of Systems  Review of Systems   Constitution: Negative for diaphoresis, fever, malaise/fatigue, night sweats, weight gain and weight loss.   HENT: Negative for odynophagia.    Eyes: Negative for blurred vision, double vision and visual disturbance.   Cardiovascular: Negative for chest pain, claudication, dyspnea on exertion, irregular heartbeat, leg swelling, near-syncope, orthopnea, palpitations, paroxysmal nocturnal dyspnea and syncope.   Respiratory: Negative for cough, hemoptysis, shortness of breath, sleep disturbances due to breathing, snoring, sputum production and wheezing.    Endocrine: Negative for polyuria.   Hematologic/Lymphatic: Negative for bleeding problem. Does not bruise/bleed easily.   Skin: Negative for rash.   Musculoskeletal: Positive for joint pain. Negative for falls, joint swelling, muscle cramps, muscle weakness and myalgias.   Gastrointestinal: Negative for abdominal pain, diarrhea, hematemesis, hematochezia, melena, nausea and vomiting.   Genitourinary: Negative for frequency, hematuria and nocturia.   Neurological: Negative for brief paralysis, dizziness, focal weakness, headaches, light-headedness, loss of balance and weakness.   Psychiatric/Behavioral: Negative for altered mental status and depression. The patient is not nervous/anxious.        Objective     Vitals:    11/20/20 0948   BP: (!) 86/54   Pulse:    Resp:    Temp:    SpO2:        Physical Exam   Constitutional: He is oriented to person, place, and time. He appears well-developed. No distress.   HENT:   OD exotropia   Eyes: Pupils are equal, round, and reactive to light. Conjunctivae are normal.   Neck: Neck supple. No JVD present. No tracheal deviation present. No thyromegaly present.   Cardiovascular: Normal rate, regular rhythm and normal heart sounds. Exam reveals no gallop and  no friction rub.   No murmur heard.  Pulmonary/Chest: Breath sounds normal. No stridor.   Barely audible lungs, very poor air movement, coarse wheezing throughout.  No rales or rhonchi.   Abdominal: Bowel sounds are normal. He exhibits no distension and no mass. There is no abdominal tenderness. There is no rebound and no guarding.   Musculoskeletal:         General: Tenderness and deformity present. No edema.      Comments: Shortened externally rotated right leg   Neurological: He is alert and oriented to person, place, and time. No cranial nerve deficit.   Skin: Skin is warm and dry. No rash noted. He is not diaphoretic. No erythema. There is pallor.   Psychiatric: He has a normal mood and affect. His behavior is normal. Judgment normal.        Labs   Lab Results   Component Value Date    WBC 11.41 (H) 11/20/2020    HGB 10.0 (L) 11/20/2020    HCT 31.4 (L) 11/20/2020     11/20/2020    ALT 13 (L) 11/16/2020    AST 20 11/16/2020     11/20/2020    K 4.2 11/20/2020     11/20/2020    CREATININE 1.1 11/20/2020    BUN 18 11/20/2020    CO2 23 11/20/2020       Imaging  X-ray Knee Right 1 Or 2 Views    Result Date: 11/19/2020  CLINICAL HISTORY: HIP FRACTURE, PREOP     IMPRESSION: Displaced comminuted right-sided intertrochanteric fracture. No definite more distal femoral fracture or fracture about the knee noting limited assessment. COMMENT: Comparison: None. Three views of the right femur with two views of the right knee.  Likely limits assessment of the knee. There is an impacted comminuted displaced intratrochanteric fracture of the right femur.  The remainder of the right femur is intact.  Limited assessment at the right knee reveals no definite fracture.  There are degenerative changes at the right knee.  Questionable small joint effusion.  Patellar enthesophyte formation.  Atherosclerotic calcifications.    X-ray Femur Right 2+ View    Result Date: 11/19/2020  CLINICAL HISTORY: HIP FRACTURE, PREOP      IMPRESSION: Displaced comminuted right-sided intertrochanteric fracture. No definite more distal femoral fracture or fracture about the knee noting limited assessment. COMMENT: Comparison: None. Three views of the right femur with two views of the right knee.  Likely limits assessment of the knee. There is an impacted comminuted displaced intratrochanteric fracture of the right femur.  The remainder of the right femur is intact.  Limited assessment at the right knee reveals no definite fracture.  There are degenerative changes at the right knee.  Questionable small joint effusion.  Patellar enthesophyte formation.  Atherosclerotic calcifications.    X-ray Hip With Or Without Pelvis 2-3 Vw Right    Result Date: 11/19/2020  CLINICAL HISTORY: Slipping, tripping, stumbling and falls c/o pain right hip pain     IMPRESSION: Acute comminuted displaced right intratrochanteric fracture COMMENT: Comparison: None. AP view of the pelvis and crosstable lateral view of the right hip.  At the right hip there is an acute  displaced intertrochanteric fracture with avulsion of the greater trochanter and lesser trochanter.  Old fracture suspected of the left inferior pubic ramus.  No definite acute displaced bony pelvic ring fractures.  No gross and about the left hip.  Osteopenia limits assessment. Surgical clips project over the pelvis and inguinal regions.    Chest x-ray yesterday reviewed by me personally-normal heart size, diffuse pulmonary filtrates without cephalization however there is fluid in the right major fissure.    ECG   Stat ECG pending      June 2020 ECG from outside office        Telemetry reviewed by me personally  sinus rhythm  13 beat run of ALFONSO fib, ventricular triplet noted      80 y.o. male being consulted for preoperative cardiovascular risk assessment in a 80-year-old male smoker who is disabled and uses a walker and cannot climb a full flight of stairs without assistance.  States he can do yard work and  also vacuum somehow.  Not sure this meets the 4 METS workload.    Assessment and Plan:  Preoperative cardiovascular examination  Assessment & Plan  80-year-old prior male smoker with COPD denying history of cardiovascular disease.  ECG pending.  Chest x-ray demonstrates diffusion restriction changes I do not appear to be heart failure however there is a little fluid in the right fissure.  We will check a BNP which has operative cardiac risk predictability.  Geriatric sensitive perioperative cardiac risk index = 0.9%.  No further preoperative cardiovascular testing is recommended.  Stable from a cardiovascular perspective and cleared for surgery.    Paroxysmal atrial fibrillation (CMS/HCC)  Assessment & Plan  A. fib risk factors including probably severe COPD.  13 beat run of A. fib on the monitor.  Also had a ventricular triplet.  This does not  or clearance.  Recommend overnight on the monitor following surgery.    Regarding hypotension.  Doubt sepsis, PE or other serious cause of hypotension since he is clinically stable and asymptomatic.  IV fluids acceptable.    Speech recognition software was used to create this note. Please disregard any inadvertent translation errors. Please call the office for any clarifications.    Dom Martinez DO FACC, FACOI  11/20/2020  10:31 AM

## 2020-11-20 NOTE — OP NOTE
Preop diagnosis: Right intertrochanteric hip fracture  Postop diagnosis: Same  Procedure: IM nailing right intertrochanteric hip fracture with cement augmentation  Surgeon: Giovany Coronado MD  Assistant: Abrahan Mary PGY 3  Estimate blood loss: 150 cc  Injection: 20 cc of quarter percent Marcaine with epinephrine  Antibiotics: 2 g of IV Ancef  Anesthesia: General    Procedure:  The patient was brought to the operating room placed in the supine position.  A general anesthetic was administered by the anesthesia team.  The patient was positioned onto the fracture table with care taken to pad all bony prominences.  The right lower extremity was placed in the traction apparatus with the left lower extremity was in a flexed, abductor, externally rotated position.  A provisional reduction was obtained using C arm in multiple planes.  Next, the right lower extremity was prepped and draped in sterile manner using kamla wipes and ChloraPrep.  After proper timeout the procedure was begun.  A 6 cm incision was made at the level of the greater trochanter with dissection through skin, subcutaneous tissues, through the IT band.  Blunt dissection was taken down to the greater trochanter.  A guidepin was manually placed essentially through the fracture at the correct position verified by a C arm.  The guidepin was overreamed.  Prior to prepping and draping the C arm was used to the template the correct size nail and neck shaft angle.  A 11 mm nail with an 130 degrees neck shaft angle was chosen. Next the TFN nail was placed across the fracture and into the proximal femur.  Excellent position was obtained easily.  Next the second incision of about 6 cm was made distal to the first also through skin, subcutaneous tissues, and through the IT band.  A guidepin was placed into the femoral neck and head and verified to be in good position using C arm.  The 95 mm blade screw was then chosen and inserted after over reaming the guidepin.  The  cement augmentation was injected under flouro visualization. Using the TFN apparatus compression was placed to better reduce the fracture.  Next to the same distal incision a 38 mm screw was placed across the distal aspect of the nail.  C-arm verified maintenance of the excellent reduction and excellent overall hardware position.  Thorough irrigation was used followed by closing of the IT band, subcutaneous tissues, and skin.  Sterile dressings were placed and the patient was returned to the recovery room in excellent condition.

## 2020-11-20 NOTE — NURSING NOTE
Pt heading off floor at shift change to go to the OR for procedure. Nurse notified daughter. Unable to assess patient prior to leaving unit.

## 2020-11-20 NOTE — ANESTHESIOLOGIST PRE-PROCEDURE ATTESTATION
Pre-Procedure Patient Identification:  I am the Primary Anesthesiologist and have identified the patient on 11/20/20 at 4:07 PM.   I have confirmed the following procedure(s) OPEN REDUCTION INTERNAL FIXATION HIP/FEMUR FRACTURE TFN--TROCHANTERIC FIXATION NAIL/GAMMA/T2 NAIL (R) will be performed by the following surgeon/proceduralist Giovany Coronado MD.

## 2020-11-20 NOTE — PROGRESS NOTES
"General Surgery Daily Progress Note    Subjective: Pt seen and examined.  No new complaints.  Continues to have right hip pain and right groin pain.  Had several runs of V. tach overnight    Vital signs in last 24 hours:  Temp:  [36.1 °C (97 °F)-36.8 °C (98.2 °F)] 36.7 °C (98 °F)  Heart Rate:  [58-86] 83  Resp:  [12-26] 18  BP: ()/(43-84) 88/48    Intake/Output this shift:    Intake/Output Summary (Last 24 hours) at 11/20/2020 0742  Last data filed at 11/20/2020 0008  Gross per 24 hour   Intake 1025 ml   Output 305 ml   Net 720 ml       Physical Exam    General appearance: alert, appears stated age and cooperative    Abdomen: soft, minimal tenderness and swelling right groin with dressing that is clean dry and intact; bowel sounds normal; no masses, no organomegaly\"      Labs  Results from last 7 days   Lab Units 11/20/20  0619   SODIUM mEQ/L 137   POTASSIUM mEQ/L 4.2   CHLORIDE mEQ/L 107   CO2 mEQ/L 23   BUN mg/dL 18   CREATININE mg/dL 1.1   GLUCOSE mg/dL 130*   CALCIUM mg/dL 8.2*     Results from last 7 days   Lab Units 11/20/20  0619   WBC K/uL 11.41*   HEMOGLOBIN g/dL 10.0*   HEMATOCRIT % 31.4*   PLATELETS K/uL 154         Results from last 7 days   Lab Units 11/20/20  0619 11/16/20  0859   SODIUM mEQ/L 137 142   POTASSIUM mEQ/L 4.2 4.7   CHLORIDE mEQ/L 107 107   CO2 mEQ/L 23 26   BUN mg/dL 18 17   CREATININE mg/dL 1.1 1.2   CALCIUM mg/dL 8.2* 9.4   ALBUMIN g/dL  --  4.1   BILIRUBIN TOTAL mg/dL  --  0.7   ALK PHOS IU/L  --  65   ALT IU/L  --  13*   AST IU/L  --  20   GLUCOSE mg/dL 130* 84     Results from last 7 days   Lab Units 11/20/20  0619   HEMOGLOBIN g/dL 10.0*   HEMATOCRIT % 31.4*     No results found for: LIPASE  Pathology Results     ** No results found for the last 720 hours. **              Imaging  X-ray Knee Right 1 Or 2 Views    Result Date: 11/19/2020  IMPRESSION: Displaced comminuted right-sided intertrochanteric fracture. No definite more distal femoral fracture or fracture about the knee " noting limited assessment. COMMENT: Comparison: None. Three views of the right femur with two views of the right knee.  Likely limits assessment of the knee. There is an impacted comminuted displaced intratrochanteric fracture of the right femur.  The remainder of the right femur is intact.  Limited assessment at the right knee reveals no definite fracture.  There are degenerative changes at the right knee.  Questionable small joint effusion.  Patellar enthesophyte formation.  Atherosclerotic calcifications.    X-ray Femur Right 2+ View    Result Date: 11/19/2020  IMPRESSION: Displaced comminuted right-sided intertrochanteric fracture. No definite more distal femoral fracture or fracture about the knee noting limited assessment. COMMENT: Comparison: None. Three views of the right femur with two views of the right knee.  Likely limits assessment of the knee. There is an impacted comminuted displaced intratrochanteric fracture of the right femur.  The remainder of the right femur is intact.  Limited assessment at the right knee reveals no definite fracture.  There are degenerative changes at the right knee.  Questionable small joint effusion.  Patellar enthesophyte formation.  Atherosclerotic calcifications.    X-ray Hip With Or Without Pelvis 2-3 Vw Right    Result Date: 11/19/2020  IMPRESSION: Acute comminuted displaced right intratrochanteric fracture COMMENT: Comparison: None. AP view of the pelvis and crosstable lateral view of the right hip.  At the right hip there is an acute  displaced intertrochanteric fracture with avulsion of the greater trochanter and lesser trochanter.  Old fracture suspected of the left inferior pubic ramus.  No definite acute displaced bony pelvic ring fractures.  No gross and about the left hip.  Osteopenia limits assessment. Surgical clips project over the pelvis and inguinal regions.        Assessment   Postop day 1 status post right inguinal hernia repair  Status post fall and right  hip fracture  COPD  Frailty    Plan   Patient will require operative repair of his right hip.  Hospitalist consult for clearance pending.        Cordell Navarrete, DO

## 2020-11-21 ENCOUNTER — APPOINTMENT (INPATIENT)
Dept: RADIOLOGY | Facility: HOSPITAL | Age: 80
DRG: 480 | End: 2020-11-21
Attending: INTERNAL MEDICINE
Payer: MEDICARE

## 2020-11-21 PROBLEM — D72.829 LEUCOCYTOSIS: Status: ACTIVE | Noted: 2020-11-21

## 2020-11-21 PROBLEM — J44.1 CHRONIC OBSTRUCTIVE PULMONARY DISEASE WITH ACUTE EXACERBATION (CMS/HCC): Status: ACTIVE | Noted: 2020-11-20

## 2020-11-21 PROBLEM — J96.20 ACUTE ON CHRONIC RESPIRATORY FAILURE (CMS/HCC): Status: ACTIVE | Noted: 2020-11-21

## 2020-11-21 LAB
ANION GAP SERPL CALC-SCNC: 6 MEQ/L (ref 3–15)
ATRIAL RATE: 85
BACTERIA URNS QL MICRO: ABNORMAL /HPF
BASE EXCESS BLDA CALC-SCNC: 3.1 MEQ/L
BILIRUB UR QL STRIP.AUTO: NEGATIVE MG/DL
BNP SERPL-MCNC: 113 PG/ML
BUN SERPL-MCNC: 17 MG/DL (ref 8–20)
CA-I BLD-SCNC: 1.18 MMOL/L (ref 1.15–1.27)
CALCIUM SERPL-MCNC: 8 MG/DL (ref 8.9–10.3)
CHLORIDE SERPL-SCNC: 104 MEQ/L (ref 98–109)
CLARITY UR REFRACT.AUTO: CLEAR
CO2 BLDA-SCNC: 29.9 MEQ/L (ref 22–32)
CO2 SERPL-SCNC: 26 MEQ/L (ref 22–32)
COHGB MFR BLD: 2.2 % (ref 0–1.5)
COLOR UR AUTO: ABNORMAL
CORTIS SERPL-MCNC: 21.1 UG/DL
CREAT SERPL-MCNC: 1.2 MG/DL (ref 0.8–1.3)
ERYTHROCYTE [DISTWIDTH] IN BLOOD BY AUTOMATED COUNT: 13.2 % (ref 11.6–14.4)
FIO2 ON VENT: ABNORMAL %
GFR SERPL CREATININE-BSD FRML MDRD: 58.1 ML/MIN/1.73M*2
GLUCOSE BLDA-MCNC: 135 MG/DL (ref 70–99)
GLUCOSE SERPL-MCNC: 135 MG/DL (ref 70–99)
GLUCOSE UR STRIP.AUTO-MCNC: NEGATIVE MG/DL
HCO3 BLDA-SCNC: 28.5 MEQ/L (ref 21–28)
HCT VFR BLDA CALC: 22 % (ref 42–52)
HCT VFR BLDCO AUTO: 25.6 % (ref 40.1–51)
HGB BLD-MCNC: 8.3 G/DL (ref 13.7–17.5)
HGB BLDA OXIMETRY-MCNC: 8.9 G/DL (ref 14–17.5)
HGB UR QL STRIP.AUTO: NEGATIVE
HYALINE CASTS #/AREA URNS LPF: ABNORMAL /LPF
INHALED O2 CONCENTRATION: ABNORMAL %
KETONES UR STRIP.AUTO-MCNC: NEGATIVE MG/DL
LACTATE BLDA-SCNC: 0.9 MMOL/L (ref 0.4–1.6)
LEUKOCYTE ESTERASE UR QL STRIP.AUTO: NEGATIVE
MAGNESIUM SERPL-MCNC: 1.9 MG/DL (ref 1.8–2.5)
MCH RBC QN AUTO: 33.9 PG (ref 28–33.2)
MCHC RBC AUTO-ENTMCNC: 32.4 G/DL (ref 32.2–36.5)
MCV RBC AUTO: 104.5 FL (ref 83–98)
METHGB BLD-SCNC: 1.9 % (ref 0.4–1.5)
NITRITE UR QL STRIP.AUTO: NEGATIVE
P AXIS: 49
PCO2 BLDA: 47 MM HG (ref 35–48)
PDW BLD AUTO: 9.6 FL (ref 9.4–12.4)
PH BLDA: 7.39 [PH] (ref 7.35–7.45)
PH UR STRIP.AUTO: 5.5 [PH]
PLATELET # BLD AUTO: 130 K/UL (ref 150–350)
PO2 BLDA: 79 MM HG (ref 83–100)
POTASSIUM BLDA-SCNC: 4.3 MEQ/L (ref 3.4–4.5)
POTASSIUM SERPL-SCNC: 4.4 MEQ/L (ref 3.6–5.1)
PR INTERVAL: 158
PROT UR QL STRIP.AUTO: 1
QRS DURATION: 94
QT INTERVAL: 376
QTC CALCULATION(BAZETT): 447
R AXIS: 52
RBC # BLD AUTO: 2.45 M/UL (ref 4.5–5.8)
RBC #/AREA URNS HPF: ABNORMAL /HPF
SAO2 % BLDA: 94 % (ref 93–98)
SODIUM BLDA-SCNC: 135 MEQ/L (ref 136–145)
SODIUM SERPL-SCNC: 136 MEQ/L (ref 136–144)
SP GR UR REFRACT.AUTO: 1.03
SQUAMOUS URNS QL MICRO: 1 /HPF
T WAVE AXIS: 33
TROPONIN I SERPL-MCNC: 0.03 NG/ML
UROBILINOGEN UR STRIP-ACNC: 0.2 EU/DL
VENTRICULAR RATE: 85
WBC # BLD AUTO: 18.06 K/UL (ref 3.8–10.5)
WBC #/AREA URNS HPF: ABNORMAL /HPF

## 2020-11-21 PROCEDURE — 82533 TOTAL CORTISOL: CPT | Performed by: INTERNAL MEDICINE

## 2020-11-21 PROCEDURE — 63600000 HC DRUGS/DETAIL CODE: Performed by: INTERNAL MEDICINE

## 2020-11-21 PROCEDURE — 63600000 HC DRUGS/DETAIL CODE: Performed by: STUDENT IN AN ORGANIZED HEALTH CARE EDUCATION/TRAINING PROGRAM

## 2020-11-21 PROCEDURE — 84484 ASSAY OF TROPONIN QUANT: CPT | Performed by: INTERNAL MEDICINE

## 2020-11-21 PROCEDURE — 25000000 HC PHARMACY GENERAL: Performed by: INTERNAL MEDICINE

## 2020-11-21 PROCEDURE — 25000000 HC PHARMACY GENERAL: Performed by: STUDENT IN AN ORGANIZED HEALTH CARE EDUCATION/TRAINING PROGRAM

## 2020-11-21 PROCEDURE — 99233 SBSQ HOSP IP/OBS HIGH 50: CPT | Mod: 25 | Performed by: INTERNAL MEDICINE

## 2020-11-21 PROCEDURE — 83880 ASSAY OF NATRIURETIC PEPTIDE: CPT | Performed by: INTERNAL MEDICINE

## 2020-11-21 PROCEDURE — 63700000 HC SELF-ADMINISTRABLE DRUG: Performed by: STUDENT IN AN ORGANIZED HEALTH CARE EDUCATION/TRAINING PROGRAM

## 2020-11-21 PROCEDURE — 94667 MNPJ CHEST WALL 1ST: CPT

## 2020-11-21 PROCEDURE — 27200127 HC BAG COLOSTOMY

## 2020-11-21 PROCEDURE — 80048 BASIC METABOLIC PNL TOTAL CA: CPT | Performed by: INTERNAL MEDICINE

## 2020-11-21 PROCEDURE — 94640 AIRWAY INHALATION TREATMENT: CPT

## 2020-11-21 PROCEDURE — 36415 COLL VENOUS BLD VENIPUNCTURE: CPT | Performed by: INTERNAL MEDICINE

## 2020-11-21 PROCEDURE — 36600 WITHDRAWAL OF ARTERIAL BLOOD: CPT

## 2020-11-21 PROCEDURE — 83735 ASSAY OF MAGNESIUM: CPT | Performed by: INTERNAL MEDICINE

## 2020-11-21 PROCEDURE — 20600000 HC ROOM AND CARE INTERMEDIATE/TELEMETRY

## 2020-11-21 PROCEDURE — 85018 HEMOGLOBIN: CPT | Performed by: INTERNAL MEDICINE

## 2020-11-21 PROCEDURE — 63700000 HC SELF-ADMINISTRABLE DRUG: Performed by: NURSE PRACTITIONER

## 2020-11-21 PROCEDURE — 85027 COMPLETE CBC AUTOMATED: CPT | Performed by: STUDENT IN AN ORGANIZED HEALTH CARE EDUCATION/TRAINING PROGRAM

## 2020-11-21 PROCEDURE — 71045 X-RAY EXAM CHEST 1 VIEW: CPT

## 2020-11-21 PROCEDURE — 99291 CRITICAL CARE FIRST HOUR: CPT | Performed by: INTERNAL MEDICINE

## 2020-11-21 PROCEDURE — 81001 URINALYSIS AUTO W/SCOPE: CPT | Performed by: INTERNAL MEDICINE

## 2020-11-21 PROCEDURE — 84295 ASSAY OF SERUM SODIUM: CPT | Performed by: INTERNAL MEDICINE

## 2020-11-21 PROCEDURE — 25800000 HC PHARMACY IV SOLUTIONS: Performed by: STUDENT IN AN ORGANIZED HEALTH CARE EDUCATION/TRAINING PROGRAM

## 2020-11-21 RX ORDER — IPRATROPIUM BROMIDE AND ALBUTEROL SULFATE 2.5; .5 MG/3ML; MG/3ML
3 SOLUTION RESPIRATORY (INHALATION) EVERY 8 HOURS PRN
Status: DISCONTINUED | OUTPATIENT
Start: 2020-11-21 | End: 2020-11-21

## 2020-11-21 RX ORDER — FORMOTEROL FUMARATE DIHYDRATE 20 UG/2ML
20 SOLUTION RESPIRATORY (INHALATION)
Status: DISCONTINUED | OUTPATIENT
Start: 2020-11-21 | End: 2020-11-25 | Stop reason: HOSPADM

## 2020-11-21 RX ORDER — BUDESONIDE 0.5 MG/2ML
0.5 INHALANT ORAL
Status: DISCONTINUED | OUTPATIENT
Start: 2020-11-21 | End: 2020-11-25 | Stop reason: HOSPADM

## 2020-11-21 RX ADMIN — OXYCODONE HYDROCHLORIDE AND ACETAMINOPHEN 2 TABLET: 5; 325 TABLET ORAL at 10:08

## 2020-11-21 RX ADMIN — FAMOTIDINE 40 MG: 20 TABLET ORAL at 08:17

## 2020-11-21 RX ADMIN — FORMOTEROL FUMARATE DIHYDRATE 20 MCG: 20 SOLUTION RESPIRATORY (INHALATION) at 20:05

## 2020-11-21 RX ADMIN — IPRATROPIUM BROMIDE AND ALBUTEROL SULFATE 3 ML: 2.5; .5 SOLUTION RESPIRATORY (INHALATION) at 07:06

## 2020-11-21 RX ADMIN — GABAPENTIN 100 MG: 100 CAPSULE ORAL at 20:54

## 2020-11-21 RX ADMIN — ENOXAPARIN SODIUM 40 MG: 40 INJECTION SUBCUTANEOUS at 08:24

## 2020-11-21 RX ADMIN — OXYCODONE HYDROCHLORIDE AND ACETAMINOPHEN 2 TABLET: 5; 325 TABLET ORAL at 04:13

## 2020-11-21 RX ADMIN — HYDROMORPHONE HYDROCHLORIDE 0.5 MG: 1 INJECTION, SOLUTION INTRAMUSCULAR; INTRAVENOUS; SUBCUTANEOUS at 00:41

## 2020-11-21 RX ADMIN — WATER 2 G: 1 INJECTION INTRAMUSCULAR; INTRAVENOUS; SUBCUTANEOUS at 08:17

## 2020-11-21 RX ADMIN — METHYLPREDNISOLONE SODIUM SUCCINATE 40 MG: 40 INJECTION, POWDER, FOR SOLUTION INTRAMUSCULAR; INTRAVENOUS at 17:19

## 2020-11-21 RX ADMIN — BUDESONIDE 0.5 MG: 0.5 INHALANT ORAL at 20:05

## 2020-11-21 RX ADMIN — OXYCODONE HYDROCHLORIDE AND ACETAMINOPHEN 2 TABLET: 5; 325 TABLET ORAL at 22:19

## 2020-11-21 RX ADMIN — METHYLPREDNISOLONE SODIUM SUCCINATE 40 MG: 40 INJECTION, POWDER, FOR SOLUTION INTRAMUSCULAR; INTRAVENOUS at 10:09

## 2020-11-21 RX ADMIN — SODIUM CHLORIDE, POTASSIUM CHLORIDE, SODIUM LACTATE AND CALCIUM CHLORIDE 250 ML: 600; 310; 30; 20 INJECTION, SOLUTION INTRAVENOUS at 09:11

## 2020-11-21 RX ADMIN — MAGNESIUM SULFATE 2 G: 2 INJECTION INTRAVENOUS at 11:51

## 2020-11-21 RX ADMIN — ESCITALOPRAM 10 MG: 5 TABLET, FILM COATED ORAL at 21:00

## 2020-11-21 RX ADMIN — OXYCODONE HYDROCHLORIDE AND ACETAMINOPHEN 2 TABLET: 5; 325 TABLET ORAL at 17:19

## 2020-11-21 RX ADMIN — WATER 2 G: 1 INJECTION INTRAMUSCULAR; INTRAVENOUS; SUBCUTANEOUS at 00:41

## 2020-11-21 RX ADMIN — HYDROMORPHONE HYDROCHLORIDE 0.5 MG: 1 INJECTION, SOLUTION INTRAMUSCULAR; INTRAVENOUS; SUBCUTANEOUS at 20:53

## 2020-11-21 RX ADMIN — MOMETASONE FUROATE AND FORMOTEROL FUMARATE DIHYDRATE 2 PUFF: 200; 5 AEROSOL RESPIRATORY (INHALATION) at 08:17

## 2020-11-21 RX ADMIN — GABAPENTIN 100 MG: 100 CAPSULE ORAL at 08:17

## 2020-11-21 NOTE — PROGRESS NOTES
Patient: Hernesto Varghese  Location: [facility]30  MRN: [id number]  Today's date: 11/21/2020    Attempted to see patient for therapy. Unable due to medical hold.   Pt transferred to higher level of care (4W to PCU). Will NNO to initiate PT.

## 2020-11-21 NOTE — PROGRESS NOTES
General Surgery Daily Progress Note    Subjective     Right hip pain s/p IMN right hip last night. Denies significant right groin discomfort. Transferred to PCU for respiratory issues. Denies complaint presently.       Objective     Vital signs in last 24 hours:  Temp:  [35.3 °C (95.5 °F)-38.2 °C (100.8 °F)] 36.3 °C (97.4 °F)  Heart Rate:  [] 90  Resp:  [20-29] 20  BP: ()/(51-80) 110/80  FiO2 (%) (Set):  [40 %-50 %] 50 %      Intake/Output Summary (Last 24 hours) at 11/21/2020 1505  Last data filed at 11/21/2020 1400  Gross per 24 hour   Intake 2190 ml   Output 720 ml   Net 1470 ml     Intake/Output this shift:  I/O this shift:  In: 540 [P.O.:440; IV Piggyback:100]  Out: 200 [Urine:200]    Physical Exam    General appearance: Awake, alert, in no acute distress or obvious discomfort  Neurologic: Alert, cooperative. Coherent speech.  Lungs: Respirations nonlabored, no tachypnea.   Abdomen: Soft, nontender. Right groin incision clean, dry, intact.   Extremities: No cyanosis, edema.       Labs  CBC Results       11/21/20 11/20/20 11/16/20                    0410 0619 0859         WBC 18.06 11.41 9.24         RBC 2.45 3.02 4.16         HGB 8.3 10.0 13.9         HCT 25.6 31.4 42.2         .5 104.0 101.4         MCH 33.9 33.1 33.4         MCHC 32.4 31.8 32.9          154 184         Comment for HGB at 0619 on 11/20/20: ALL RESULTS HAVE BEEN CHECKED        BMP Results       11/21/20 11/20/20 11/16/20                    0708 0619 0859          137 142         K 4.4 4.2 4.7         Cl 104 107 107         CO2 26 23 26         Glucose 135 130 84         BUN 17 18 17         Creatinine 1.2 1.1 1.2         Calcium 8.0 8.2 9.4         Anion Gap 6 7 9         EGFR 58.1 >60.0 58.1                           Assessment/Plan   S/p right inguinal hernia repair with mesh c/b fall with right hip fx, s/p right IMN.  -- COPD exacerbation. Pulm and med following.       Kim Andersen MD

## 2020-11-21 NOTE — PERIOPERATIVE NURSING NOTE
Patient sitting up eating a snack, knees locked flexion. Pillow between legs for hip precautions.  xrays done

## 2020-11-21 NOTE — PROGRESS NOTES
Ortho Daily Progress Note    Subjective     Pt seen and examined at bedside. No overnight events. Pain well controlled.      Objective     Vital signs in last 24 hours:  Temp:  [35.3 °C (95.5 °F)-38.2 °C (100.8 °F)] 37.2 °C (99 °F)  Heart Rate:  [] 104  Resp:  [18-29] 20  BP: ()/(51-77) 96/64  FiO2 (%) (Set):  [40 %-50 %] 50 %      Intake/Output Summary (Last 24 hours) at 11/21/2020 0802  Last data filed at 11/21/2020 0626  Gross per 24 hour   Intake 1650 ml   Output 820 ml   Net 830 ml     Intake/Output this shift:  No intake/output data recorded.    Labs  CBC Results       11/21/20 11/20/20 11/16/20                    0410 0619 0859         WBC 18.06 11.41 9.24         RBC 2.45 3.02 4.16         HGB 8.3 10.0 13.9         HCT 25.6 31.4 42.2         .5 104.0 101.4         MCH 33.9 33.1 33.4         MCHC 32.4 31.8 32.9          154 184         Comment for HGB at 0619 on 11/20/20: ALL RESULTS HAVE BEEN CHECKED          Imaging  Postop XR pelvis demonstrates right hip cephalomedullary nail in appropriate position    VTE Assessment: TBD    Physical Exam:  Gen: Alert and oriented, resting comfortably in bed in no apparent distress     MSK: RLE  - Dressing with bloody staining, changed at bedside  - Incision C/D/I  - Appropriate TTP over incision site  - PF/DF/EHL 5/5 motor  - SILT in DP/SP/Tenzin/Saph/Tib n distribution  - DP pulse 2+ with brisk cap refill  - Compartments soft and compressible  - No calf tenderness    A/P:  80M s/p R hip cephalomedullary nail with Dr. Coronado on 11/20    - Ancef x24 hr  - Lovenox for DVT ppx  - WBAT RLE  - AM hgb stable  - PT/OT  - Pain control  - Dispo pend PT eval  - Remainder of plan per primary team

## 2020-11-21 NOTE — CONSULTS
Pulmonary Consult Note     Hernesto Varghese is a 80 y.o. male ex-smoker with COPD on home O2, depression, BPH.  He was hospitalized and underwent right inguinal hernia mesh repair on 11/19/2020.  While in PACU, had a mechanical fall and sustained right hip fracture-went to the OR yesterday for ORIF.  He was seen by cardiology for preop cardiovascular assessment-assessed to be stable for surgery.  Now day 1 postop with increasing oxygen requirements and cough.  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)    • COPD (chronic obstructive pulmonary disease) (CMS/HCC)    • Depression    • GERD (gastroesophageal reflux disease)    • History of rib fracture    • History of stomach ulcers     yrs ago   • Hx of fall    • Inguinal hernia     right   • Neck pain    • Oxygen dependent     on 4 liters/ mostly just at night   • Pneumonia     June 2020/ aspiration pneumonia   • Seasonal allergies    • Snores    • SOB (shortness of breath)    • Urinary retention    • Uses roller walker      Past Surgical History:   Procedure Laterality Date   • COLECTOMY      w/ colostomy   • HERNIA REPAIR     • OTHER SURGICAL HISTORY      urolift     Social History     Socioeconomic History   • Marital status:      Spouse name: None   • Number of children: None   • Years of education: None   • Highest education level: None   Occupational History   • None   Social Needs   • Financial resource strain: None   • Food insecurity     Worry: None     Inability: None   • Transportation needs     Medical: None     Non-medical: None   Tobacco Use   • Smoking status: Former  Smoker   • Smokeless tobacco: Never Used   Substance and Sexual Activity   • Alcohol use: Never     Frequency: Never   • Drug use: Never   • Sexual activity: None   Lifestyle   • Physical activity     Days per week: None     Minutes per session: None   • Stress: None   Relationships   • Social connections     Talks on phone: None     Gets together: None     Attends [employer] service: None     Active member of club or organization: None     Attends meetings of clubs or organizations: None     Relationship status: None   • Intimate partner violence     Fear of current or ex partner: None     Emotionally abused: None     Physically abused: None     Forced sexual activity: None   Other Topics Concern   • None   Social History Narrative   • None     History reviewed. No pertinent family history.    Allergies: Aspirin    HOME MEDS  •  acetaminophen, Take 500 mg by mouth every 6 (six) hours as needed for mild pain.  •  albuterol, Take 2.5 mg by nebulization every 6 (six) hours as needed for wheezing.  •  albuterol sulfate, Inhale 2 puffs every 6 (six) hours as needed for wheezing.  •  escitalopram, Take 10 mg by mouth daily.  •  famotidine, Take 40 mg by mouth every morning.  •  fluticasone propion-salmeteroL, Inhale 1 puff 2 (two) times a day.  Rinse mouth with water after use to reduce aftertaste and incidence of candidiasis.  Do not swallow. For patients not on a ventilator, a spacer is recommended to be used with this medication/inhaler.  •  gabapentin, Take 100 mg by mouth 2 (two) times a day.  •  ipratropium, Take 500 mcg by nebulization 4 (four) times a day.  •  ipratropium-albuteroL, Take 3 mL by nebulization 4 (four) times a day.  •  LORazepam, Take 1 mg by mouth every 8 (eight) hours as needed for anxiety.    CURRENT MEDS  •  acetaminophen, 500 mg, oral, q6h PRN  •  albuterol, 2.5 mg, nebulization, q6h PRN  •  [START ON 11/22/2020] cefTRIAXone, 1 g, intravenous, q24h INT  •  enoxaparin, 40 mg, subcutaneous, Daily  (6a)  •  escitalopram, 10 mg, oral, Nightly  •  famotidine, 40 mg, oral, Daily  •  gabapentin, 100 mg, oral, BID  •  HYDROmorphone, 0.5 mg, intravenous, q4h PRN  •  ipratropium-albuteroL, 3 mL, nebulization, QID  •  LORazepam, 1 mg, oral, q8h PRN  •  magnesium sulfate, 2 g, intravenous, PRN  •  methylPREDNISolone sodium succinate, 40 mg, intravenous, q8h INT  •  mometasone-formoterol, 2 puff, inhalation, BID  •  ondansetron, 4 mg, intravenous, q6h PRN  •  oxyCODONE-acetaminophen, 1 tablet, oral, q6h PRN  •  oxyCODONE-acetaminophen, 2 tablet, oral, q6h PRN  •  oxyCODONE-acetaminophen    REVIEW OF SYSTEMS  Review of Systems   All other systems reviewed and are negative.      VITAL SIGNS  Vitals:    11/21/20 0833 11/21/20 0923 11/21/20 0954 11/21/20 1100   BP: (!) 89/53  (!) 97/52 133/63   BP Location:   Left upper arm Left upper arm   Patient Position:   Sitting Lying   Pulse:  92 96 93   Resp:   20 20   Temp:   36.3 °C (97.4 °F)    TempSrc:   Axillary    SpO2: 96%  96% 95%   Weight:       Height:           Oxygen:  Oxygen Therapy: Supplemental oxygen  O2 Delivery Method: Nasal cannula  FiO2 (%) (Set): 50 %  O2 Flow Rate (L/min): 4 L/min     INTAKE/OUTPUT    Intake/Output Summary (Last 24 hours) at 11/21/2020 1200  Last data filed at 11/21/2020 1000  Gross per 24 hour   Intake 1940 ml   Output 820 ml   Net 1120 ml         PHYSICAL EXAM  Physical Exam  Constitutional:       General: He is not in acute distress.     Appearance: He is not toxic-appearing.      Comments: Mild tachypnea but talks in sentences   HENT:      Head: Normocephalic.   Eyes:      Pupils: Pupils are equal, round, and reactive to light.   Neck:      Musculoskeletal: Neck supple.   Cardiovascular:      Rate and Rhythm: Normal rate and regular rhythm.   Pulmonary:      Comments: Diminished throughout, scattered rhonchi , no wheeze  Abdominal:      Palpations: Abdomen is soft.      Tenderness: There is no abdominal tenderness.   Skin:     General: Skin  is warm and dry.   Neurological:      General: No focal deficit present.      Mental Status: He is alert and oriented to person, place, and time.         LAB RESULTS  ABG  Results from last 7 days   Lab Units 11/21/20  0913   PH ART  7.39   PCO2 ART mm Hg 47   PO2 ART mm Hg 79*   HCO3 ART mEQ/L 28.5*   O2 SAT ART % 94   BASE EXC ART mEQ/L 3.1   SOURCE OF OXYGEN  NC     CBC  Results from last 7 days   Lab Units 11/21/20  0410 11/20/20  0619 11/16/20  0859   WBC K/uL 18.06* 11.41* 9.24   RBC M/uL 2.45* 3.02* 4.16*   HEMOGLOBIN g/dL 8.3* 10.0* 13.9   HEMATOCRIT % 25.6* 31.4* 42.2   MCV fL 104.5* 104.0* 101.4*   MCH pg 33.9* 33.1 33.4*   MCHC g/dL 32.4 31.8* 32.9   PLATELETS K/uL 130* 154 184   RDW % 13.2 13.3 13.1   MPV fL 9.6 9.7 10.2     BMP  Results from last 7 days   Lab Units 11/21/20  0708 11/20/20  0619 11/16/20  0859   SODIUM mEQ/L 136 137 142   POTASSIUM mEQ/L 4.4 4.2 4.7   CHLORIDE mEQ/L 104 107 107   CO2 mEQ/L 26 23 26   BUN mg/dL 17 18 17   CREATININE mg/dL 1.2 1.1 1.2   CALCIUM mg/dL 8.0* 8.2* 9.4   ALBUMIN g/dL  --   --  4.1   BILIRUBIN TOTAL mg/dL  --   --  0.7   ALK PHOS IU/L  --   --  65   ALT IU/L  --   --  13*   AST IU/L  --   --  20   GLUCOSE mg/dL 135* 130* 84     Coag    Micro  Microbiology Results     ** No results found for the last 720 hours. **          IMAGING  X-ray Knee Right 1 Or 2 Views    Result Date: 11/19/2020  IMPRESSION: Displaced comminuted right-sided intertrochanteric fracture. No definite more distal femoral fracture or fracture about the knee noting limited assessment. COMMENT: Comparison: None. Three views of the right femur with two views of the right knee.  Likely limits assessment of the knee. There is an impacted comminuted displaced intratrochanteric fracture of the right femur.  The remainder of the right femur is intact.  Limited assessment at the right knee reveals no definite fracture.  There are degenerative changes at the right knee.  Questionable small joint  effusion.  Patellar enthesophyte formation.  Atherosclerotic calcifications.    X-ray Chest 1 View    Result Date: 11/21/2020  IMPRESSION: No significant interval change.    X-ray Chest 1 View    Result Date: 11/20/2020  IMPRESSION: Diffuse interstitial prominence of uncertain chronicity.  Otherwise, no active disease.    X-ray Pelvis 1 Or 2 Views    Result Date: 11/20/2020  IMPRESSION: Postoperative study    X-ray Femur Right 2+ View    Result Date: 11/19/2020  IMPRESSION: Displaced comminuted right-sided intertrochanteric fracture. No definite more distal femoral fracture or fracture about the knee noting limited assessment. COMMENT: Comparison: None. Three views of the right femur with two views of the right knee.  Likely limits assessment of the knee. There is an impacted comminuted displaced intratrochanteric fracture of the right femur.  The remainder of the right femur is intact.  Limited assessment at the right knee reveals no definite fracture.  There are degenerative changes at the right knee.  Questionable small joint effusion.  Patellar enthesophyte formation.  Atherosclerotic calcifications.    X-ray Hip With Or Without Pelvis 2-3 Vw Right    Result Date: 11/20/2020  IMPRESSION: Intraoperative fluoroscopy    X-ray Hip With Or Without Pelvis 2-3 Vw Right    Result Date: 11/19/2020  IMPRESSION: Acute comminuted displaced right intratrochanteric fracture COMMENT: Comparison: None. AP view of the pelvis and crosstable lateral view of the right hip.  At the right hip there is an acute  displaced intertrochanteric fracture with avulsion of the greater trochanter and lesser trochanter.  Old fracture suspected of the left inferior pubic ramus.  No definite acute displaced bony pelvic ring fractures.  No gross and about the left hip.  Osteopenia limits assessment. Surgical clips project over the pelvis and inguinal regions.    Fl Fluoroscopy Technical Assistance    Result Date: 11/20/2020  IMPRESSION:  Intraoperative fluoroscopy  All imaging has been reviewed by me    ECG/Telemetry  I have independently reviewed the telemetry. No events for the last 24 hours.    ASSESSMENT & PLAN     80-year-old male, past medical history of home O2 dependent COPD, recent hernia mesh repair, with postoperative course complicated by a fall resulting in right hip fracture needing ORIF yesterday.  Mildly increased oxygen requirements, likely difficulty clearing secretions.  Unclear whether his COPD is truly exacerbated.  Does not appear to have pneumonia    Recommendations:  Continue with bronchodilators-while here could change to budesonide nebs plus formoterol nebs and Spiriva with albuterol as needed  Titrate oxygen to keep sats in the 89 to 93% range  We will add flutter valve to help with sputum expectoration, Mucinex and incentive spirometry as well  Sputum culture if able  Steroids with quick taper        CODE STATUS  Full Code      Carol Jeffery MD  11/21/2020

## 2020-11-21 NOTE — PROGRESS NOTES
Patient: Hernesto Varghese  Location: [facility]  MRN: [id number]  Today's date: 11/21/2020    Attempted to see patient for therapy. Unable due to medical hold( impaired  respiration transfer to PCU ).

## 2020-11-21 NOTE — NURSING NOTE
Notified Ortho resident that hgb decreased to 8.3 from 10.0. MD aware of moderate amount of blood on dressing. No additional drainage observed from previous assessment. Ok to give AM Lovenox.

## 2020-11-21 NOTE — PROGRESS NOTES
Hospital Medicine Service -  Daily Progress Note       SUBJECTIVE   - d/w charge RN- O2 requirements increased from 3> 4L + increased cough. BP low (has been running on lower side)   - tele PVCs  - pt denies any CP/worsening subjective SOB /dizziness    OBJECTIVE      Vital signs in last 24 hours:  Temp:  [35.3 °C (95.5 °F)-38.2 °C (100.8 °F)] 37.2 °C (99 °F)  Heart Rate:  [] 104  Resp:  [18-29] 20  BP: ()/(51-77) 89/53  FiO2 (%) (Set):  [40 %-50 %] 50 %    Intake/Output Summary (Last 24 hours) at 11/21/2020 0913  Last data filed at 11/21/2020 0910  Gross per 24 hour   Intake 1700 ml   Output 820 ml   Net 880 ml       PHYSICAL EXAMINATION      Physical Exam  Vitals signs and nursing note reviewed.   Constitutional:       Appearance: He is well-developed.   HENT:      Head: Normocephalic and atraumatic.   Eyes:      Pupils: Pupils are equal, round, and reactive to light.   Neck:      Musculoskeletal: Normal range of motion.   Cardiovascular:      Rate and Rhythm: Normal rate.   Pulmonary:      Comments: Decreased breath sounds   Abdominal:      General: Abdomen is flat.      Comments: Old ostomy   R groin dressing    Musculoskeletal:      Comments: R hip dressiiing CDI,    Skin:     General: Skin is warm and dry.   Neurological:      Mental Status: He is alert and oriented to person, place, and time.   Psychiatric:         Mood and Affect: Mood normal.        LABS / IMAGING / TELE      Labs  Lab Results   Component Value Date    GLUCOSE 135 (H) 11/21/2020    CALCIUM 8.0 (L) 11/21/2020     11/21/2020    K 4.4 11/21/2020    CO2 26 11/21/2020     11/21/2020    BUN 17 11/21/2020    CREATININE 1.2 11/21/2020     Lab Results   Component Value Date    WBC 18.06 (H) 11/21/2020    HGB 8.3 (L) 11/21/2020    HCT 25.6 (L) 11/21/2020    .5 (H) 11/21/2020     (L) 11/21/2020     Microbiology Results     ** No results found for the last 720 hours. **        Imaging  X-ray Knee Right 1 Or 2  Views    Result Date: 11/19/2020  IMPRESSION: Displaced comminuted right-sided intertrochanteric fracture. No definite more distal femoral fracture or fracture about the knee noting limited assessment. COMMENT: Comparison: None. Three views of the right femur with two views of the right knee.  Likely limits assessment of the knee. There is an impacted comminuted displaced intratrochanteric fracture of the right femur.  The remainder of the right femur is intact.  Limited assessment at the right knee reveals no definite fracture.  There are degenerative changes at the right knee.  Questionable small joint effusion.  Patellar enthesophyte formation.  Atherosclerotic calcifications.    X-ray Chest 1 View    Result Date: 11/21/2020  IMPRESSION: No significant interval change.    X-ray Chest 1 View    Result Date: 11/20/2020  IMPRESSION: Diffuse interstitial prominence of uncertain chronicity.  Otherwise, no active disease.    X-ray Pelvis 1 Or 2 Views    Result Date: 11/20/2020  IMPRESSION: Postoperative study    X-ray Femur Right 2+ View    Result Date: 11/19/2020  IMPRESSION: Displaced comminuted right-sided intertrochanteric fracture. No definite more distal femoral fracture or fracture about the knee noting limited assessment. COMMENT: Comparison: None. Three views of the right femur with two views of the right knee.  Likely limits assessment of the knee. There is an impacted comminuted displaced intratrochanteric fracture of the right femur.  The remainder of the right femur is intact.  Limited assessment at the right knee reveals no definite fracture.  There are degenerative changes at the right knee.  Questionable small joint effusion.  Patellar enthesophyte formation.  Atherosclerotic calcifications.    X-ray Hip With Or Without Pelvis 2-3 Vw Right    Result Date: 11/20/2020  IMPRESSION: Intraoperative fluoroscopy    X-ray Hip With Or Without Pelvis 2-3 Vw Right    Result Date: 11/19/2020  IMPRESSION: Acute  comminuted displaced right intratrochanteric fracture COMMENT: Comparison: None. AP view of the pelvis and crosstable lateral view of the right hip.  At the right hip there is an acute  displaced intertrochanteric fracture with avulsion of the greater trochanter and lesser trochanter.  Old fracture suspected of the left inferior pubic ramus.  No definite acute displaced bony pelvic ring fractures.  No gross and about the left hip.  Osteopenia limits assessment. Surgical clips project over the pelvis and inguinal regions.    Fl Fluoroscopy Technical Assistance    Result Date: 11/20/2020  IMPRESSION: Intraoperative fluoroscopy      • [START ON 11/22/2020] cefTRIAXone  1 g intravenous q24h INT   • enoxaparin  40 mg subcutaneous Daily (6a)   • escitalopram  10 mg oral Nightly   • famotidine  40 mg oral Daily   • gabapentin  100 mg oral BID   • ipratropium-albuteroL  3 mL nebulization QID   • lactated ringer's  250 mL intravenous Once   • methylPREDNISolone sodium succinate  40 mg intravenous q8h INT   • mometasone-formoterol  2 puff inhalation BID     ECG/Telemetry  I have independently reviewed the telemetry. Significant findings include PVCs.    ASSESSMENT AND PLAN      Chronic obstructive pulmonary disease with acute exacerbation (CMS/HCC)  Assessment & Plan  Known hx of mod severe COPD  Per records   C/w nebs  ABG pending    BNP mininmally elevated, no significant effusions  Placed on steroids  pulm toilet   pulm to eval pt   Code status - full code   Ccm-32m    Acute on chronic respiratory failure (CMS/HCC)  Assessment & Plan  As above     Leucocytosis  Assessment & Plan  -? Reactive ( pt POD #1 hip surgery not uncommon to see), albeit high risk for aspiration  - Pt already on periop antibiotics   - c/w abx pending final culture results   - pt has been hypotensive even prior to surgery       Acute postoperative anemia due to expected blood loss  Assessment & Plan  Pt underwent 2 surgeries this admission  Monitor  counts  preop hgb-13.9     Hypomagnesemia  Assessment & Plan  - monitor/ replete    Chronic respiratory failure with hypoxia (CMS/HCC)  Assessment & Plan  Remains on home O2    Idiopathic hypotension  Assessment & Plan  Check cortisol   Reviewed out pt meds- pt not on chronic steroids   Fluid bolus and reeval   Pt already on periop antibiotics     Paroxysmal atrial fibrillation (CMS/[facility])  Assessment & Plan  Further as per cards     Pre-op examination  Assessment & Plan  Cleared by cards for surgery       Depression  Assessment & Plan  Continue home meds    Closed right hip fracture (CMS/[facility])  Assessment & Plan  S/p surgery on 11/20   Pain control, DVT ppx, PT/OT per ortho             VTE Assessment: Padua    Code Status: Full Code  Estimated discharge date: 11/23/2020     Kuhs King MD  11/21/2020  9:13 AM

## 2020-11-21 NOTE — ANESTHESIA POSTPROCEDURE EVALUATION
Patient: Hernesto Varghese    Procedure Summary     Date: 11/20/20 Room / Location:  OR 9 / PH OR    Anesthesia Start: 1617 Anesthesia Stop: 1842    Procedure: OPEN REDUCTION INTERNAL FIXATION HIP/FEMUR FRACTURE TFN--TROCHANTERIC FIXATION NAIL/GAMMA/T2 NAIL (Right Hip) Diagnosis:       Closed fracture of right hip, initial encounter (CMS/[facility])      (Closed fracture of right hip, initial encounter (CMS/[facility]) [S72.001A])    Surgeon: Giovany Coronado MD Responsible Provider: Wendie Christie MD    Anesthesia Type: spinal ASA Status: 3          Anesthesia Type: spinal  PACU Vitals  11/20/2020 1829 - 11/20/2020 1929      11/20/2020  1836 11/20/2020  1845 11/20/2020  1900 11/20/2020  1915    BP:  (!) 95/55  (!) 105/55  (!) 109/55  (!) 103/59    Temp:  36.8 °C (98.2 °F)  --  --  --    Pulse:  97  98  94  94    Resp:  20  20  (!) 27  (!) 29    SpO2:  --  95 %  92 %  99 %            Anesthesia Post Evaluation    Pain management: adequate  Patient location during evaluation: PACU  Patient participation: complete - patient participated  Level of consciousness: awake and alert  Cardiovascular status: acceptable  Airway Patency: adequate  Respiratory status: acceptable  Hydration status: acceptable  Anesthetic complications: no

## 2020-11-21 NOTE — ASSESSMENT & PLAN NOTE
Pt underwent 2 surgeries this admission  Monitor counts  preop hgb-13.9   S/p transfusion on 11/23 for hgb of 7.1 , Hgb this AM 8.1

## 2020-11-21 NOTE — NURSING NOTE
Entered patient's room to obtain VS and manual BP=90/62. Lung sounds auscultated and rhonchi and crackles heard. Oxygen sat on 4L =93%. [facility] paged and awaiting call back. Pt denies dyspnea at this time. Manual BP rechecked and 100/64. Patient alert and oriented x 4. Will continue to monitor.

## 2020-11-21 NOTE — PLAN OF CARE
Plan of Care Review  Plan of Care Reviewed With: patient  Progress: no change  Outcome Summary: ST on the monitor with frequent PVCs, patient asymptomatic. No additional drainage observed to R hip proximal dressing. Pain level decreased from 6/10 to 2/10 to R hip after prn pain medication. Fall precautions maintained and call bell within reach.

## 2020-11-21 NOTE — NURSING NOTE
Spoke with Dr. Cheema and orders entered for blood work and CXR. Scheduled duoneb administered early for dyspnea.

## 2020-11-21 NOTE — NURSING NOTE
Patient with acute change of respiratory status since the early morning, higher O2 requirements, new cough, change in respiratory pattern, coarse crackles in lung fields. CXR, ABGs, BNP. Pulmonology consulted. 250cc LR bolus given for hypotension. Patient transferred to PCU. Pt daughter Hyacinth notified, updated.

## 2020-11-22 LAB
ANION GAP SERPL CALC-SCNC: 6 MEQ/L (ref 3–15)
BUN SERPL-MCNC: 27 MG/DL (ref 8–20)
CALCIUM SERPL-MCNC: 8.2 MG/DL (ref 8.9–10.3)
CHLORIDE SERPL-SCNC: 103 MEQ/L (ref 98–109)
CO2 SERPL-SCNC: 25 MEQ/L (ref 22–32)
CREAT SERPL-MCNC: 1.1 MG/DL (ref 0.8–1.3)
CROSSMATCH: NORMAL
CROSSMATCH: NORMAL
ERYTHROCYTE [DISTWIDTH] IN BLOOD BY AUTOMATED COUNT: 13 % (ref 11.6–14.4)
GFR SERPL CREATININE-BSD FRML MDRD: >60 ML/MIN/1.73M*2
GLUCOSE SERPL-MCNC: 143 MG/DL (ref 70–99)
HCT VFR BLDCO AUTO: 23.3 % (ref 40.1–51)
HGB BLD-MCNC: 7.6 G/DL (ref 13.7–17.5)
ISBT CODE: 5100
ISBT CODE: 5100
MAGNESIUM SERPL-MCNC: 2.1 MG/DL (ref 1.8–2.5)
MCH RBC QN AUTO: 33.3 PG (ref 28–33.2)
MCHC RBC AUTO-ENTMCNC: 32.6 G/DL (ref 32.2–36.5)
MCV RBC AUTO: 102.2 FL (ref 83–98)
PDW BLD AUTO: 10.3 FL (ref 9.4–12.4)
PLATELET # BLD AUTO: 106 K/UL (ref 150–350)
POTASSIUM SERPL-SCNC: 4.7 MEQ/L (ref 3.6–5.1)
PRODUCT CODE: NORMAL
PRODUCT CODE: NORMAL
PRODUCT STATUS: NORMAL
PRODUCT STATUS: NORMAL
RBC # BLD AUTO: 2.28 M/UL (ref 4.5–5.8)
SODIUM SERPL-SCNC: 134 MEQ/L (ref 136–144)
SPECIMEN EXP DATE BLD: NORMAL
SPECIMEN EXP DATE BLD: NORMAL
UNIT ABO: NORMAL
UNIT ABO: NORMAL
UNIT ID: NORMAL
UNIT ID: NORMAL
UNIT RH: POSITIVE
UNIT RH: POSITIVE
UNIT VOLUME: 325 ML
UNIT VOLUME: 325 ML
WBC # BLD AUTO: 13.73 K/UL (ref 3.8–10.5)

## 2020-11-22 PROCEDURE — 25000000 HC PHARMACY GENERAL: Performed by: INTERNAL MEDICINE

## 2020-11-22 PROCEDURE — 83735 ASSAY OF MAGNESIUM: CPT | Performed by: INTERNAL MEDICINE

## 2020-11-22 PROCEDURE — 63700000 HC SELF-ADMINISTRABLE DRUG: Performed by: NURSE PRACTITIONER

## 2020-11-22 PROCEDURE — 85027 COMPLETE CBC AUTOMATED: CPT | Performed by: INTERNAL MEDICINE

## 2020-11-22 PROCEDURE — 63600000 HC DRUGS/DETAIL CODE: Performed by: INTERNAL MEDICINE

## 2020-11-22 PROCEDURE — 63600000 HC DRUGS/DETAIL CODE: Performed by: STUDENT IN AN ORGANIZED HEALTH CARE EDUCATION/TRAINING PROGRAM

## 2020-11-22 PROCEDURE — 63700000 HC SELF-ADMINISTRABLE DRUG: Performed by: STUDENT IN AN ORGANIZED HEALTH CARE EDUCATION/TRAINING PROGRAM

## 2020-11-22 PROCEDURE — 20600000 HC ROOM AND CARE INTERMEDIATE/TELEMETRY

## 2020-11-22 PROCEDURE — 36415 COLL VENOUS BLD VENIPUNCTURE: CPT | Performed by: INTERNAL MEDICINE

## 2020-11-22 PROCEDURE — 97162 PT EVAL MOD COMPLEX 30 MIN: CPT | Mod: GP

## 2020-11-22 PROCEDURE — 80048 BASIC METABOLIC PNL TOTAL CA: CPT | Performed by: INTERNAL MEDICINE

## 2020-11-22 PROCEDURE — 25800000 HC PHARMACY IV SOLUTIONS: Performed by: INTERNAL MEDICINE

## 2020-11-22 PROCEDURE — 99233 SBSQ HOSP IP/OBS HIGH 50: CPT | Performed by: INTERNAL MEDICINE

## 2020-11-22 PROCEDURE — 94640 AIRWAY INHALATION TREATMENT: CPT

## 2020-11-22 PROCEDURE — 97166 OT EVAL MOD COMPLEX 45 MIN: CPT | Mod: GO

## 2020-11-22 PROCEDURE — 25000000 HC PHARMACY GENERAL: Performed by: STUDENT IN AN ORGANIZED HEALTH CARE EDUCATION/TRAINING PROGRAM

## 2020-11-22 RX ADMIN — OXYCODONE HYDROCHLORIDE AND ACETAMINOPHEN 2 TABLET: 5; 325 TABLET ORAL at 11:15

## 2020-11-22 RX ADMIN — OXYCODONE HYDROCHLORIDE AND ACETAMINOPHEN 2 TABLET: 5; 325 TABLET ORAL at 04:25

## 2020-11-22 RX ADMIN — BUDESONIDE 0.5 MG: 0.5 INHALANT ORAL at 20:17

## 2020-11-22 RX ADMIN — ESCITALOPRAM 10 MG: 5 TABLET, FILM COATED ORAL at 21:00

## 2020-11-22 RX ADMIN — FORMOTEROL FUMARATE DIHYDRATE 20 MCG: 20 SOLUTION RESPIRATORY (INHALATION) at 09:07

## 2020-11-22 RX ADMIN — HYDROMORPHONE HYDROCHLORIDE 0.5 MG: 1 INJECTION, SOLUTION INTRAMUSCULAR; INTRAVENOUS; SUBCUTANEOUS at 13:51

## 2020-11-22 RX ADMIN — FAMOTIDINE 40 MG: 20 TABLET ORAL at 08:59

## 2020-11-22 RX ADMIN — METHYLPREDNISOLONE SODIUM SUCCINATE 40 MG: 40 INJECTION, POWDER, FOR SOLUTION INTRAMUSCULAR; INTRAVENOUS at 17:25

## 2020-11-22 RX ADMIN — OXYCODONE HYDROCHLORIDE AND ACETAMINOPHEN 2 TABLET: 5; 325 TABLET ORAL at 20:45

## 2020-11-22 RX ADMIN — FORMOTEROL FUMARATE DIHYDRATE 20 MCG: 20 SOLUTION RESPIRATORY (INHALATION) at 20:16

## 2020-11-22 RX ADMIN — GABAPENTIN 100 MG: 100 CAPSULE ORAL at 08:59

## 2020-11-22 RX ADMIN — LORAZEPAM 1 MG: 1 TABLET ORAL at 23:08

## 2020-11-22 RX ADMIN — BUDESONIDE 0.5 MG: 0.5 INHALANT ORAL at 09:08

## 2020-11-22 RX ADMIN — GABAPENTIN 100 MG: 100 CAPSULE ORAL at 20:45

## 2020-11-22 RX ADMIN — METHYLPREDNISOLONE SODIUM SUCCINATE 40 MG: 40 INJECTION, POWDER, FOR SOLUTION INTRAMUSCULAR; INTRAVENOUS at 09:00

## 2020-11-22 RX ADMIN — ALBUTEROL SULFATE 2.5 MG: 2.5 SOLUTION RESPIRATORY (INHALATION) at 22:10

## 2020-11-22 RX ADMIN — CEFTRIAXONE SODIUM 1 G: 1 INJECTION, POWDER, FOR SOLUTION INTRAMUSCULAR; INTRAVENOUS at 08:59

## 2020-11-22 RX ADMIN — METHYLPREDNISOLONE SODIUM SUCCINATE 40 MG: 40 INJECTION, POWDER, FOR SOLUTION INTRAMUSCULAR; INTRAVENOUS at 01:36

## 2020-11-22 RX ADMIN — ENOXAPARIN SODIUM 40 MG: 40 INJECTION SUBCUTANEOUS at 05:00

## 2020-11-22 ASSESSMENT — COGNITIVE AND FUNCTIONAL STATUS - GENERAL
MOVING TO AND FROM BED TO CHAIR: 2 - A LOT
CLIMB 3 TO 5 STEPS WITH RAILING: 1 - TOTAL
DRESSING REGULAR UPPER BODY CLOTHING: 3 - A LITTLE
HELP NEEDED FOR BATHING: 2 - A LOT
WALKING IN HOSPITAL ROOM: 2 - A LOT
AFFECT: WFL
DRESSING REGULAR LOWER BODY CLOTHING: 1 - TOTAL
TOILETING: 2 - A LOT
AFFECT: WFL
HELP NEEDED FOR PERSONAL GROOMING: 4 - NONE
EATING MEALS: 4 - NONE
STANDING UP FROM CHAIR USING ARMS: 2 - A LOT

## 2020-11-22 NOTE — HOSPITAL COURSE
Hernesto is a 80 y.o. male admitted on 11/19/2020 with Closed right hip fracture (CMS/[facility]). Principal problem is Closed fracture of right hip (CMS/[facility]).    Past Medical History  Hernesto has a past medical history of Ambulates with cane, BPH (benign prostatic hyperplasia), Chronic back pain, Chronic respiratory failure with hypoxia (CMS/[facility]), Colostomy in place (CMS/[facility]), COPD (chronic obstructive pulmonary disease) (CMS/[facility]), Depression, GERD (gastroesophageal reflux disease), History of rib fracture, History of stomach ulcers, fall, Inguinal hernia, Neck pain, Oxygen dependent, Pneumonia, Seasonal allergies, Snores, SOB (shortness of breath), Urinary retention, and Uses roller walker.    History of Present Illness   80M s/p R hip cephalomedullary nail with Dr. Coronado on 11/20 New OT/PT orders: 11/22/2020

## 2020-11-22 NOTE — PROGRESS NOTES
Ortho Daily Progress Note    Subjective     Pt seen and examined at bedside. No overnight events. Pain well controlled. Denies fevers, chills, chest pain, shortness of breath, numbness or tingling.       Objective     Vital signs in last 24 hours:  Temp:  [36.3 °C (97.4 °F)-36.8 °C (98.3 °F)] 36.8 °C (98.2 °F)  Heart Rate:  [77-96] 89  Resp:  [20] 20  BP: ()/(52-80) 103/68      Intake/Output Summary (Last 24 hours) at 11/22/2020 0832  Last data filed at 11/21/2020 2000  Gross per 24 hour   Intake 1300 ml   Output 450 ml   Net 850 ml     Intake/Output this shift:  No intake/output data recorded.    Labs  CBC Results       11/21/20 11/20/20 11/16/20                    0410 0619 0859         WBC 18.06 11.41 9.24         RBC 2.45 3.02 4.16         HGB 8.3 10.0 13.9         HCT 25.6 31.4 42.2         .5 104.0 101.4         MCH 33.9 33.1 33.4         MCHC 32.4 31.8 32.9          154 184         Comment for HGB at 0619 on 11/20/20: ALL RESULTS HAVE BEEN CHECKED          Imaging  Postop XR pelvis demonstrates right hip cephalomedullary nail in appropriate position    VTE Assessment: TBD    Physical Exam:  Gen: Alert and oriented, resting comfortably in bed in no apparent distress     MSK: RLE  - Dressing c/d/i w/o drainage  - Appropriate TTP over incision site  - PF/DF/EHL 5/5 motor  - SILT in DP/SP/Tenzin/Saph/Tib n distribution  - DP pulse 2+ with brisk cap refill  - Compartments soft and compressible  - No calf tenderness    A/P:  80M s/p R hip cephalomedullary nail with Dr. Coronado on 11/20    - Ancef x24 hr  - Lovenox for DVT ppx  - WBAT RLE  - PT/OT  - Pain control  - Dispo pend PT eval  - Orthopedically stable for DC  - Remainder of plan per primary team

## 2020-11-22 NOTE — PROGRESS NOTES
Hospital Medicine Service -  Daily Progress Note       SUBJECTIVE   - denies new clinical complaints , sating well on 3L, BP in 120s,   - fair po intake    OBJECTIVE      Vital signs in last 24 hours:  Temp:  [36.7 °C (98.1 °F)-36.8 °C (98.3 °F)] 36.7 °C (98.1 °F)  Heart Rate:  [72-95] 72  Resp:  [20] 20  BP: (103-136)/(53-79) 115/53    Intake/Output Summary (Last 24 hours) at 11/22/2020 1804  Last data filed at 11/22/2020 1400  Gross per 24 hour   Intake 480 ml   Output 500 ml   Net -20 ml       PHYSICAL EXAMINATION      Physical Exam  Vitals signs and nursing note reviewed.   Constitutional:       Comments: Appears tachypneic - but pt comfortable    HENT:      Head: Normocephalic.      Mouth/Throat:      Mouth: Mucous membranes are moist.   Eyes:      Pupils: Pupils are equal, round, and reactive to light.   Pulmonary:      Effort: Pulmonary effort is normal.   Abdominal:      General: Abdomen is flat.   Genitourinary:     Comments: Groin swelling / scrotal erythema  Musculoskeletal:      Comments: aqualcel dressing    Neurological:      Mental Status: He is alert and oriented to person, place, and time.        LABS / IMAGING / TELE      Labs  Lab Results   Component Value Date    GLUCOSE 143 (H) 11/22/2020    CALCIUM 8.2 (L) 11/22/2020     (L) 11/22/2020    K 4.7 11/22/2020    CO2 25 11/22/2020     11/22/2020    BUN 27 (H) 11/22/2020    CREATININE 1.1 11/22/2020     Lab Results   Component Value Date    WBC 13.73 (H) 11/22/2020    HGB 7.6 (L) 11/22/2020    HCT 23.3 (L) 11/22/2020    .2 (H) 11/22/2020     (L) 11/22/2020     Microbiology Results     ** No results found for the last 720 hours. **        Imaging  X-ray Knee Right 1 Or 2 Views    Result Date: 11/19/2020  IMPRESSION: Displaced comminuted right-sided intertrochanteric fracture. No definite more distal femoral fracture or fracture about the knee noting limited assessment. COMMENT: Comparison: None. Three views of the right  femur with two views of the right knee.  Likely limits assessment of the knee. There is an impacted comminuted displaced intratrochanteric fracture of the right femur.  The remainder of the right femur is intact.  Limited assessment at the right knee reveals no definite fracture.  There are degenerative changes at the right knee.  Questionable small joint effusion.  Patellar enthesophyte formation.  Atherosclerotic calcifications.    X-ray Chest 1 View    Result Date: 11/21/2020  IMPRESSION: No significant interval change.    X-ray Chest 1 View    Result Date: 11/20/2020  IMPRESSION: Diffuse interstitial prominence of uncertain chronicity.  Otherwise, no active disease.    X-ray Pelvis 1 Or 2 Views    Result Date: 11/20/2020  IMPRESSION: Postoperative study    X-ray Femur Right 2+ View    Result Date: 11/19/2020  IMPRESSION: Displaced comminuted right-sided intertrochanteric fracture. No definite more distal femoral fracture or fracture about the knee noting limited assessment. COMMENT: Comparison: None. Three views of the right femur with two views of the right knee.  Likely limits assessment of the knee. There is an impacted comminuted displaced intratrochanteric fracture of the right femur.  The remainder of the right femur is intact.  Limited assessment at the right knee reveals no definite fracture.  There are degenerative changes at the right knee.  Questionable small joint effusion.  Patellar enthesophyte formation.  Atherosclerotic calcifications.    X-ray Hip With Or Without Pelvis 2-3 Vw Right    Result Date: 11/20/2020  IMPRESSION: Intraoperative fluoroscopy    X-ray Hip With Or Without Pelvis 2-3 Vw Right    Result Date: 11/19/2020  IMPRESSION: Acute comminuted displaced right intratrochanteric fracture COMMENT: Comparison: None. AP view of the pelvis and crosstable lateral view of the right hip.  At the right hip there is an acute  displaced intertrochanteric fracture with avulsion of the greater  trochanter and lesser trochanter.  Old fracture suspected of the left inferior pubic ramus.  No definite acute displaced bony pelvic ring fractures.  No gross and about the left hip.  Osteopenia limits assessment. Surgical clips project over the pelvis and inguinal regions.    Fl Fluoroscopy Technical Assistance    Result Date: 11/20/2020  IMPRESSION: Intraoperative fluoroscopy      • budesonide  0.5 mg nebulization BID (6a, 6p)   • cefTRIAXone  1 g intravenous q24h INT   • enoxaparin  40 mg subcutaneous Daily (6a)   • escitalopram  10 mg oral Nightly   • famotidine  40 mg oral Daily   • formoterol  20 mcg nebulization BID (6a, 6p)   • gabapentin  100 mg oral BID   • methylPREDNISolone sodium succinate  40 mg intravenous q8h INT     ECG/Telemetry  I have independently reviewed the telemetry. No events for the last 24 hours.    ASSESSMENT AND PLAN      Chronic obstructive pulmonary disease with acute exacerbation (CMS/HCC)  Assessment & Plan  Known hx of mod severe COPD  Per records   C/w nebs  ABG mild hypoxia  BNP mininmally elevated, no significant effusions  Taper steroids   pulm toilet   pulm eval appreciate- nebs adjusted   Code status - full code       Acute on chronic respiratory failure (CMS/HCC)  Assessment & Plan  As above     Leucocytosis  Assessment & Plan  -? Reactive ( pt POD #1 hip surgery not uncommon to see), albeit high risk for aspiration  - Pt already on periop antibiotics   - c/w abx pending final culture results   - pt has been hypotensive even prior to surgery       Acute postoperative anemia due to expected blood loss  Assessment & Plan  Pt underwent 2 surgeries this admission  Monitor counts  preop hgb-13.9     Hypomagnesemia  Assessment & Plan  - monitor/ replete    Chronic respiratory failure with hypoxia (CMS/HCC)  Assessment & Plan  Remains on home O2- on 3L ch    Idiopathic hypotension  Assessment & Plan  Nml cortisol   Resolved w/ small bolus     Paroxysmal atrial fibrillation  (CMS/[facility])  Assessment & Plan  Further as per cards     Pre-op examination  Assessment & Plan  Cleared by [facility] for [facility]       Depression  Assessment & Plan  Continue home meds    Closed right hip fracture (CMS/[facility])  Assessment & Plan  S/p surgery on 11/20   Pain control, DVT ppx, PT/OT per ortho           VTE Assessment: [place]    Code Status: Full Code  Estimated discharge date: 11/23/2020     Kush King MD  11/22/2020  6:04 PM

## 2020-11-22 NOTE — PROGRESS NOTES
Patient: Hernesto Varghese  Location: [facility] Progressive Care Unit 3230  MRN: [id number]  Today's date: 11/22/2020    Attempted to see patient for therapy. Unable due to medical hold - Per EMR pt transferred to higher level of care (4W to PCU). Will NNO to proceed w/ OT eval.

## 2020-11-22 NOTE — PROGRESS NOTES
Patient: Hernesto Varghese  Location:  Progressive Care Unit 3230  MRN: [id number]  Today's date: 11/22/2020    Pt resting in chair at end of session with chair alarm on, incontinence pad in place and call bell in reach. RN notified,    Hernseto is a 80 y.o. male admitted on 11/19/2020 with Closed right hip fracture (CMS/HCC). Principal problem is Closed fracture of right hip (CMS/[facility]).    Past Medical History  Hernesto has a past medical history of Ambulates with cane, BPH (benign prostatic hyperplasia), Chronic back pain, Chronic respiratory failure with hypoxia (CMS/[facility]), Colostomy in place (CMS/[facility]), COPD (chronic obstructive pulmonary disease) (CMS/[facility]), Depression, GERD (gastroesophageal reflux disease), History of rib fracture, History of stomach ulcers, fall, Inguinal hernia, Neck pain, Oxygen dependent, Pneumonia, Seasonal allergies, Snores, SOB (shortness of breath), Urinary retention, and Uses roller walker.    History of Present Illness   80M s/p R hip cephalomedullary nail with Dr. Coronado on 11/20 New OT/PT orders: 11/22/2020    OT Vitals    Date/Time Pulse SpO2 O2 Therapy O2 Del Method O2 Flow Rate BP BP Location BP Method Pt Position Observations [facility]   11/22/20 0955 89 91 % Supplemental oxygen Nasal cannula 3 L/min 124/79 Left upper arm Automatic Lying -- [facility]   11/22/20 0958 -- -- -- -- -- 121/76 -- -- Sitting -- [facility]   11/22/20 1005 -- 92 % -- -- -- 136/58 -- -- Sitting after OT/PT ECM      OT Pain    Date/Time Pain Type Pref Pain Scale Side Location Rating: Rest Rating: Activity Interventions [facility]   11/22/20 0955 Pain Assessment number (Numeric Rating Pain Scale) Right hip 7 7 premedicated for activity;care clustered ECM          Prior Living Environment      Most Recent Value   Living Arrangements  house   Living Environment Comment  lives with his wife in a split level home, 6+6 steps with railings B HR tub shower          Prior Level of Function      Most Recent Value   Dominant Hand  right    Ambulation  assistive equipment   Transferring  assistive equipment   Toileting  independent   Bathing  independent   Dressing  independent   Prior Level of Function Comment  Pt reports IND w/ ADL and IADL at baseline using rw in house for mobility and cane in community. (+) . Retired cook. Reports sitting down in bath tub to bathe   Assistive Device/Animal Currently Used at Home  cane, straight, walker, front-wheeled          Occupational Profile      Most Recent Value   Reason for Services/Referral  ADL deficit s/p hernia repair now s/p CMN    Successful Occupations  , retired, father   Occupational History/Life Experiences  IND at baseline ADL and IADL    Performance Patterns  lives w/ wife, uses rw and cane    Environmental Supports and Barriers  supportive wife    Patient Goals  go home at d/c           OT Evaluation and Treatment - 11/22/20 0949        Time Calculation    Start Time  0949     Stop Time  1010     Time Calculation (min)  21 min        Session Details    Document Type  initial evaluation     Mode of Treatment  occupational therapy        General Information    Patient Profile Reviewed?  yes     Onset of Illness/Injury or Date of Surgery  11/20/20     Referring Physician  Christine     Patient/Family/Caregiver Comments/Observations  RN cleared for OT/PT      General Observations of Patient  Pt resting in bed agreeable to OT      Existing Precautions/Restrictions  aspiration;fall;weight bearing        Weight-Bearing Status    Right LE Weight-Bearing Status  weight-bearing as tolerated (WBAT)        Cognition/Psychosocial    Affect/Mental Status (Cognitive)  WFL     Orientation Status (Cognition)  oriented x 4     Follows Commands (Cognition)  follows one step commands;75-90% accuracy;physical/tactile prompts required;repetition of directions required;verbal cues/prompting required     Cognitive Function (Cognitive)  attention deficit;safety deficit     Attention Deficit (Cognitive)   minimal deficit;focused/sustained attention     Comment, Attention  repetition of VC, limited by pain      Safety Deficit (Cognitive)  minimal deficit;insight into deficits/self awareness     Comment, Cognition  receptive to OT, VC for walker handling and sequence        Hearing Assessment    Hearing Status  WFL        Vision Assessment/Intervention    Visual Impairment/Limitations  WFL;corrective lenses full time        Sensory Assessment (Somatosensory)    Sensory Assessment (Somatosensory)  UE sensation intact        Range of Motion (ROM)    Range of Motion  ROM is WFL;bilateral upper extremities        Strength (Manual Muscle Testing)    Strength (Manual Muscle Testing)  strength is WFL;bilateral upper extremities        Bed Mobility    [place], Supine to Sit  maximum assist (25-49% patient effort);2 person assist;verbal cues     Verbal Cues (Supine to Sit)  hand placement;technique     Assistive Device (Bed Mobility)  bed rails;draw sheet;head of bed elevated     Comment (Bed Mobility)  to bring B LE over EOB and trunk sup>Sit        Transfers    Maintains Weight-Bearing Status (Transfers)  able to maintain weight-bearing status        Bed to Chair Transfer    [place], Bed to Chair  minimum assist (75% or more patient effort);2 person assist;increased time to complete;nonverbal cues (demo/gesture);verbal cues     Verbal Cues  hand placement;technique;proper use of assistive device     Assistive Device  walker, front-wheeled     Comment  MIN A x2 w/ inc time to complete and repetition of VC + A to manage walker        Sit to Stand Transfer    [place], Sit to Stand Transfer  moderate assist (50-74% patient effort);1 person assist;verbal cues     Verbal Cues  hand placement;technique;proper use of assistive device     Assistive Device  walker, front-wheeled     Comment  from EOB        Stand to Sit Transfer    [place], Stand to Sit Transfer  verbal cues;minimum assist (75% or more patient  effort);2 person assist     Verbal Cues  hand placement;technique;proper use of assistive device     Assistive Device  walker, front-wheeled     Comment  to chair, slow descent        Safety Issues, Functional Mobility    Safety Issues Affecting Function (Mobility)  awareness of need for assistance;insight into deficits/self awareness;positioning of assistive device;sequencing abilities     Impairments Affecting Function (Mobility)  balance;pain;range of motion;strength;endurance/activity tolerance     Comment, Safety Issues/Impairments (Mobility)  fatigues following transfer, O2 maintains 92%        Balance    Balance Assessment  sitting static balance;sitting dynamic balance;sit to stand dynamic balance;standing static balance;standing dynamic balance     Static Sitting Balance  mild impairment;sitting, edge of bed     Dynamic Sitting Balance  mild impairment;sitting, edge of bed     Sit to Stand Dynamic Balance  moderate impairment;supported;standing     Static Standing Balance  mild impairment;supported;standing     Dynamic Standing Balance  moderate impairment;supported;standing     Comment, Balance  MIN A x2 for transfer w/ rw        Motor Skills    Motor Skills  functional endurance     Functional Endurance  fair (-) fatigues following transfer         Toileting    [place]  dependent (less than 25% patient effort)     Comment  (+) catheter         Coping    Observed Emotional State  calm;cooperative     Verbalized Emotional State  acceptance        AM-PAC (TM) - ADL (Current Function)    Putting on and taking off regular lower body clothing?  1 - Total     Bathing?  2 - A Lot     Toileting?  2 - A Lot     Putting on/taking off regular upper body clothing?  3 - A Little     How much help for taking care of personal grooming?  4 - None     Eating meals?  4 - None     AM-PAC (TM) ADL Score  16        Therapy Assessment/Plan (OT)    Rehab Potential (OT)  good, to achieve stated therapy goals     Therapy  Frequency (OT)  3-5 times/wk        Progress Summary (OT)    Daily Outcome Statement (OT)  OT eval complete. Pt s/p R hernia repair w/ fall now s/p CMN. Pt reporting IND at baseline performing ADL/IADL and taking care of his wife. Pt presenting below reported baseline w/ MOD A sit>stand and MIN A x2 for functional transfer. Pt limited by pain, balance and endurance. Pt would benefit from cont. OT at d/c to ensure safety and IND w/ ADLs and transfers/mobility     Symptoms Noted During/After Treatment  fatigue        Therapy Plan Review/Discharge Plan (OT)    OT Recommended Discharge Disposition  skilled nursing facility;other (see comments)    pending progress    Anticipated Equipment Needs At Discharge (OT)  other (see comments)    TBD                  Education provided this session. See the Patient Education summary report for full details.         OT Goals      Most Recent Value   Bed Mobility Goal 1   Activity/Assistive Device  bed mobility activities, all at 11/22/2020 0949   [place]  minimum assist (75% or more patient effort) at 11/22/2020 0949   Time Frame  by discharge at 11/22/2020 0949   Progress/Outcome  goal ongoing at 11/22/2020 0949   Transfer Goal 1   Activity/Assistive Device  all transfers at 11/22/2020 0949   [place]  supervision required at 11/22/2020 0949   Time Frame  by discharge at 11/22/2020 0949   Progress/Outcome  goal ongoing at 11/22/2020 0949   Dressing Goal 1   Activity/Adaptive Equipment  lower body dressing, long handled shoe horn, dressing stick, sock-aid, reacher at 11/22/2020 0949   [place]  modified independence at 11/22/2020 0949   Time Frame  by discharge at 11/22/2020 0949   Progress/Outcome  goal ongoing at 11/22/2020 0949   Toileting Goal 1   Activity/Assistive Device  toileting skills, all at 11/22/2020 0949   [place]  modified independence at 11/22/2020 0949   Time Frame  by discharge at 11/22/2020 0949   Progress/Outcome  goal ongoing at 11/22/2020  0905

## 2020-11-22 NOTE — PLAN OF CARE
Plan of Care Review  Plan of Care Reviewed With: patient  Outcome Summary: Patient is back on baseline O2 3L NC maintaing goal sats 88-93%. Pain management. No other changes.

## 2020-11-22 NOTE — PROGRESS NOTES
Pulmonary Progress Note     SUBJECTIVE  Interval History:     Feels better  No complaints    Allergies: Aspirin    CURRENT MEDS  •  acetaminophen, 500 mg, oral, q6h PRN  •  albuterol, 2.5 mg, nebulization, q6h PRN  •  budesonide, 0.5 mg, nebulization, BID (6a, 6p)  •  cefTRIAXone, 1 g, intravenous, q24h INT  •  enoxaparin, 40 mg, subcutaneous, Daily (6a)  •  escitalopram, 10 mg, oral, Nightly  •  famotidine, 40 mg, oral, Daily  •  formoterol, 20 mcg, nebulization, BID (6a, 6p)  •  gabapentin, 100 mg, oral, BID  •  HYDROmorphone, 0.5 mg, intravenous, q4h PRN  •  LORazepam, 1 mg, oral, q8h PRN  •  magnesium sulfate, 2 g, intravenous, PRN  •  methylPREDNISolone sodium succinate, 40 mg, intravenous, q8h INT  •  ondansetron, 4 mg, intravenous, q6h PRN  •  oxyCODONE-acetaminophen, 1 tablet, oral, q6h PRN  •  oxyCODONE-acetaminophen, 2 tablet, oral, q6h PRN  •  oxyCODONE-acetaminophen    VITAL SIGNS  Vitals:    11/22/20 0857 11/22/20 0955 11/22/20 0958 11/22/20 1005   BP: (!) 117/58 124/79 121/76 (!) 136/58   BP Location: Left upper arm Left upper arm     Patient Position: Sitting Lying Sitting Sitting   Pulse: 87 89     Resp: 20      Temp:       TempSrc:       SpO2: (!) 91% (!) 91%  92%   Weight:       Height:           Oxygen:  Oxygen Therapy: Supplemental oxygen  O2 Delivery Method: Nasal cannula  FiO2 (%) (Set): 50 %  O2 Flow Rate (L/min): 3 L/min     INTAKE/OUTPUT    Intake/Output Summary (Last 24 hours) at 11/22/2020 1342  Last data filed at 11/22/2020 1000  Gross per 24 hour   Intake 760 ml   Output 650 ml   Net 110 ml     PHYSICAL EXAM  Physical Exam  Constitutional:       General: He is not in acute distress.  HENT:      Head: Normocephalic.      Nose: Nose normal.   Cardiovascular:      Rate and Rhythm: Normal rate and regular rhythm.   Pulmonary:      Comments: Diminished throughout.  No wheeze or rales  Abdominal:      Palpations: Abdomen is soft.      Tenderness: There is no abdominal tenderness.    Musculoskeletal:      Comments: No edema or clubbing   Skin:     General: Skin is warm and dry.   Neurological:      Mental Status: He is alert and oriented to person, place, and time.         LAB RESULTS  ABG  Results from last 7 days   Lab Units 11/21/20  0913   PH ART  7.39   PCO2 ART mm Hg 47   PO2 ART mm Hg 79*   HCO3 ART mEQ/L 28.5*   O2 SAT ART % 94   BASE EXC ART mEQ/L 3.1   SOURCE OF OXYGEN  NC     CBC  Results from last 7 days   Lab Units 11/22/20 0411 11/21/20  0410 11/20/20  0619   WBC K/uL 13.73* 18.06* 11.41*   RBC M/uL 2.28* 2.45* 3.02*   HEMOGLOBIN g/dL 7.6* 8.3* 10.0*   HEMATOCRIT % 23.3* 25.6* 31.4*   MCV fL 102.2* 104.5* 104.0*   MCH pg 33.3* 33.9* 33.1   MCHC g/dL 32.6 32.4 31.8*   PLATELETS K/uL 106* 130* 154   RDW % 13.0 13.2 13.3   MPV fL 10.3 9.6 9.7     BMP  Results from last 7 days   Lab Units 11/22/20  0411 11/21/20  0708 11/20/20  0619 11/16/20  0859   SODIUM mEQ/L 134* 136 137 142   POTASSIUM mEQ/L 4.7 4.4 4.2 4.7   CHLORIDE mEQ/L 103 104 107 107   CO2 mEQ/L 25 26 23 26   BUN mg/dL 27* 17 18 17   CREATININE mg/dL 1.1 1.2 1.1 1.2   CALCIUM mg/dL 8.2* 8.0* 8.2* 9.4   ALBUMIN g/dL  --   --   --  4.1   BILIRUBIN TOTAL mg/dL  --   --   --  0.7   ALK PHOS IU/L  --   --   --  65   ALT IU/L  --   --   --  13*   AST IU/L  --   --   --  20   GLUCOSE mg/dL 143* 135* 130* 84     Coag      Micro  Microbiology Results     ** No results found for the last 720 hours. **            IMAGING  X-ray Knee Right 1 Or 2 Views    Result Date: 11/19/2020  IMPRESSION: Displaced comminuted right-sided intertrochanteric fracture. No definite more distal femoral fracture or fracture about the knee noting limited assessment. COMMENT: Comparison: None. Three views of the right femur with two views of the right knee.  Likely limits assessment of the knee. There is an impacted comminuted displaced intratrochanteric fracture of the right femur.  The remainder of the right femur is intact.  Limited assessment at the  right knee reveals no definite fracture.  There are degenerative changes at the right knee.  Questionable small joint effusion.  Patellar enthesophyte formation.  Atherosclerotic calcifications.    X-ray Chest 1 View    Result Date: 11/21/2020  IMPRESSION: No significant interval change.    X-ray Chest 1 View    Result Date: 11/20/2020  IMPRESSION: Diffuse interstitial prominence of uncertain chronicity.  Otherwise, no active disease.    X-ray Pelvis 1 Or 2 Views    Result Date: 11/20/2020  IMPRESSION: Postoperative study    X-ray Femur Right 2+ View    Result Date: 11/19/2020  IMPRESSION: Displaced comminuted right-sided intertrochanteric fracture. No definite more distal femoral fracture or fracture about the knee noting limited assessment. COMMENT: Comparison: None. Three views of the right femur with two views of the right knee.  Likely limits assessment of the knee. There is an impacted comminuted displaced intratrochanteric fracture of the right femur.  The remainder of the right femur is intact.  Limited assessment at the right knee reveals no definite fracture.  There are degenerative changes at the right knee.  Questionable small joint effusion.  Patellar enthesophyte formation.  Atherosclerotic calcifications.    X-ray Hip With Or Without Pelvis 2-3 Vw Right    Result Date: 11/20/2020  IMPRESSION: Intraoperative fluoroscopy    X-ray Hip With Or Without Pelvis 2-3 Vw Right    Result Date: 11/19/2020  IMPRESSION: Acute comminuted displaced right intratrochanteric fracture COMMENT: Comparison: None. AP view of the pelvis and crosstable lateral view of the right hip.  At the right hip there is an acute  displaced intertrochanteric fracture with avulsion of the greater trochanter and lesser trochanter.  Old fracture suspected of the left inferior pubic ramus.  No definite acute displaced bony pelvic ring fractures.  No gross and about the left hip.  Osteopenia limits assessment. Surgical clips project over the  pelvis and inguinal regions.    Fl Fluoroscopy Technical Assistance    Result Date: 11/20/2020  IMPRESSION: Intraoperative fluoroscopy      ASSESSMENT & PLAN     80-year-old male, past medical history of home O2 dependent COPD, recent hernia mesh repair, with postoperative course complicated by a fall resulting in right hip fracture needing ORIF 11/20  Yesterday had Mildly increased oxygen requirements, likely difficulty clearing secretions- better today  Unclear whether his COPD is truly exacerbated.  Does not appear to have pneumonia     Recommendations:  Continue with bronchodilators-while here could change to budesonide nebs plus formoterol nebs and Spiriva with albuterol as needed  Titrate oxygen to keep sats in the 89 to 93% range  We will add flutter valve to help with sputum expectoration, Mucinex and incentive spirometry as well  Sputum culture if able  Steroids with quick taper           Carol Jeffery MD

## 2020-11-22 NOTE — PLAN OF CARE
Problem: Adult Inpatient Plan of Care  Goal: Plan of Care Review  Outcome: Progressing  Flowsheets (Taken 11/22/2020 1026)  Progress: improving  Plan of Care Reviewed With: patient  Outcome Summary:   PT evaluation complete   assist of 2 functional transfer   acute PT to follow

## 2020-11-22 NOTE — PLAN OF CARE
Problem: Adult Inpatient Plan of Care  Goal: Plan of Care Review  Outcome: Progressing  Flowsheets (Taken 11/22/2020 1038)  Progress: improving  Plan of Care Reviewed With: patient  Outcome Summary: OT ozzy complete.

## 2020-11-22 NOTE — PROGRESS NOTES
Patient: Hernesto Varghese  Location: [facility]  MRN: [id number]  Today's date: 11/22/2020    Hernesto is a 80 y.o. male admitted on 11/19/2020 with Closed right hip fracture (CMS/[facility]). Principal problem is Closed fracture of right hip (CMS/HCC).    Past Medical History  Hernesto has a past medical history of Ambulates with cane, BPH (benign prostatic hyperplasia), Chronic back pain, Chronic respiratory failure with hypoxia (CMS/[facility]), Colostomy in place (CMS/[facility]), COPD (chronic obstructive pulmonary disease) (CMS/[facility]), Depression, GERD (gastroesophageal reflux disease), History of rib fracture, History of stomach ulcers, fall, Inguinal hernia, Neck pain, Oxygen dependent, Pneumonia, Seasonal allergies, Snores, SOB (shortness of breath), Urinary retention, and Uses roller walker.    History of Present Illness   80M s/p R hip cephalomedullary nail with Dr. Coronado on 11/20 New OT/PT orders: 11/22/2020     RN cleared pt for PT evaluation; upon completion of task, pt seated in chair, chair alarm activated, call bell and personal items in reach; RN notified of functional performance    PT Vitals    Date/Time Pulse SpO2 O2 Therapy O2 Del Method O2 Flow Rate BP BP Location BP Method Pt Position Observations [facility]   11/22/20 0955 89 91 % Supplemental oxygen Nasal cannula 3 L/min 124/79 Left upper arm Automatic Lying -- [facility]   11/22/20 0958 -- -- -- -- -- 121/76 -- -- Sitting -- [facility]   11/22/20 1005 -- 92 % -- -- -- 136/58 -- -- Sitting after OT/PT ECM      PT Pain    Date/Time Pain Type Pref Pain Scale Side Location Rating: Rest Rating: Activity Interventions [facility]   11/22/20 0955 Pain Assessment number (Numeric Rating Pain Scale) Right hip 7 7 premedicated for activity;care clustered ECM          Prior Living Environment      Most Recent Value   Living Arrangements  house   Living Environment Comment  lives with his wife in a split level home, 6+6 steps with railings          Prior Level of Function       Most Recent Value   Dominant Hand  right   Ambulation  assistive equipment   Transferring  assistive equipment   Toileting  independent   Bathing  independent   Dressing  independent   Prior Level of Function Comment  per pt, uses RW in home, cane when in community   Assistive Device/Animal Currently Used at Home  cane, straight, walker, front-wheeled          PT Evaluation and Treatment - 11/22/20 0953        Time Calculation    Start Time  0953     Stop Time  1010     Time Calculation (min)  17 min        Session Details    Document Type  initial evaluation     Mode of Treatment  physical therapy        General Information    Patient Profile Reviewed?  yes     General Observations of Patient  pt received resting in bed, agreeable to PT     Existing Precautions/Restrictions  aspiration;fall;weight bearing        Weight-Bearing Status    Right LE Weight-Bearing Status  weight-bearing as tolerated (WBAT)        Cognition/Psychosocial    Affect/Mental Status (Cognitive)  WFL     Orientation Status (Cognition)  oriented x 4     Follows Commands (Cognition)  WFL        Sensory Assessment (Somatosensory)    Sensory Assessment (Somatosensory)  sensation intact;bilateral LE     Bilateral LE Sensory Assessment  light touch awareness     Sensory Subjective Reports  other (see comments)    denies sensory changes       Range of Motion (ROM)    Range of Motion  ROM is WFL;left lower extremity        Range of Motion Comprehensive    Comment: Range of Motion  RLE guarded to PROM due to pain        Strength (Manual Muscle Testing)    Strength (Manual Muscle Testing)  strength is WFL;left lower extremity        Strength Comprehensive (MMT)    Comment  RLE deferred proximally due to surgery; ankle WFL        Bed Mobility    [place], Supine to Sit  maximum assist (25-49% patient effort);2 person assist     Assistive Device (Bed Mobility)  bed rails;draw sheet;head of bed elevated     Comment (Bed Mobility)  assist at [facility] and  trunk        Bed to Chair Transfer    [place], Bed to Chair  minimum assist (75% or more patient effort);2 person assist;verbal cues     Verbal Cues  hand placement;safety;technique     Assistive Device  walker, front-wheeled     Comment  via SPT with RW, cues for safe management of RW        Sit to Stand Transfer    [place], Sit to Stand Transfer  moderate assist (50-74% patient effort);1 person assist;verbal cues     Verbal Cues  hand placement;preparatory posture;proper use of assistive device;safety;technique     Assistive Device  walker, front-wheeled     Comment  from EOB        Stand to Sit Transfer    [place], Stand to Sit Transfer  minimum assist (75% or more patient effort);2 person assist;verbal cues;increased time to complete     Verbal Cues  hand placement;preparatory posture;proper use of assistive device;safety;technique     Assistive Device  walker, front-wheeled     Comment  to recliner, increased time due to slow descent        Gait Training    Comment  deferred, pt's elevated pain level limiting activity tolerance        Safety Issues, Functional Mobility    Impairments Affecting Function (Mobility)  balance;pain;postural/trunk control        Balance    Static Standing Balance  mild impairment;supported;standing     Dynamic Standing Balance  moderate impairment;supported;standing     Comment, Balance  BUE on RW        Motor Skills    Motor Skills  functional endurance     Functional Endurance  decreased, easily fatigued        AM-PAC (TM) - Mobility (Current Function)    Turning from your back to your side while in a flat bed without using bedrails?  3 - A Little     Moving from lying on your back to sitting on the side of a flat bed without using bedrails?  2 - A Lot     Moving to and from a bed to a chair?  2 - A Lot     Standing up from a chair using your arms?  2 - A Lot     To walk in a hospital room?  2 - A Lot     Climbing 3-5 steps with a railing?  1 - Total     AM-PAC (TM)  Mobility Score  12        Therapy Assessment/Plan (PT)    Rehab Potential (PT)  good, to achieve stated therapy goals     Therapy Frequency (PT)  5 times/wk        Progress Summary (PT)    Daily Outcome Statement (PT)  Mr. Varghese seen for PT evaluation. Pt is limited by elevated pain levels, impaired balance, decreased endurance and activity tolerance. Required assist of 2 for safety during functional tranfers using RW. Will benefit from ongoing rehabilitation to maximize functional independence, pt is not at functional baseline.     Symptoms Noted During/After Treatment  fatigue;increased pain        Therapy Plan Review/Discharge Plan (PT)    PT Recommended Discharge Disposition  skilled nursing facility;other (see comments)    pending progress    Anticipated Equipment Needs at Discharge (PT Eval)  other (see comments)    TBD at next level of care                      Education provided this session. See the Patient Education summary report for full details.    PT Goals      Most Recent Value   Bed Mobility Goal 1   Activity/Assistive Device  sit to supine/supine to sit at 11/22/2020 0953   [place]  modified independence at 11/22/2020 0953   Time Frame  by discharge at 11/22/2020 0953   Progress/Outcome  goal ongoing at 11/22/2020 0953   Transfer Goal 1   Activity/Assistive Device  sit-to-stand/stand-to-sit, bed-to-chair/chair-to-bed, walker, front-wheeled at 11/22/2020 0953   [place]  supervision required at 11/22/2020 0953   Time Frame  by discharge at 11/22/2020 0953   Progress/Outcome  goal ongoing at 11/22/2020 0953   Gait Training Goal 1   Activity/Assistive Device  gait (walking locomotion), assistive device use, walker, front-wheeled at 11/22/2020 0953   [place]  supervision required at 11/22/2020 0953   Distance  50 at 11/22/2020 0953   Time Frame  by discharge at 11/22/2020 0953   Progress/Outcome  goal ongoing at 11/22/2020 0953

## 2020-11-23 ENCOUNTER — APPOINTMENT (INPATIENT)
Dept: CARDIOLOGY | Facility: HOSPITAL | Age: 80
DRG: 480 | End: 2020-11-23
Attending: INTERNAL MEDICINE
Payer: MEDICARE

## 2020-11-23 DIAGNOSIS — F41.9 ANXIETY: ICD-10-CM

## 2020-11-23 DIAGNOSIS — K21.9 GASTROESOPHAGEAL REFLUX DISEASE: ICD-10-CM

## 2020-11-23 LAB
ANION GAP SERPL CALC-SCNC: 9 MEQ/L (ref 3–15)
AORTIC ROOT ANNULUS: 3.4 CM
AORTIC VALVE MEAN VELOCITY: 1.11 M/S
AORTIC VALVE VELOCITY TIME INTEGRAL: 33.4 CM
ASCENDING AORTA: 3.5 CM
ATRIAL RATE: 84
AV MEAN GRADIENT: 5 MMHG
AV PEAK GRADIENT: 9 MMHG
AV PEAK VELOCITY-S: 1.54 M/S
AV VALVE AREA: 2.02 CM2
AV VALVE AREA: 2.29 CM2
BSA FOR ECHO PROCEDURE: 2.1 M2
BUN SERPL-MCNC: 38 MG/DL (ref 8–20)
CALCIUM SERPL-MCNC: 8.4 MG/DL (ref 8.9–10.3)
CHLORIDE SERPL-SCNC: 100 MEQ/L (ref 98–109)
CO2 SERPL-SCNC: 24 MEQ/L (ref 22–32)
CREAT SERPL-MCNC: 1.3 MG/DL (ref 0.8–1.3)
DOP CALC LVOT STROKE VOLUME: 67.45 ML
E WAVE DECELERATION TIME: 180 MS
E/A RATIO: 1.2
E/E' RATIO: 8.8
E/LAT E' RATIO: 6
ERYTHROCYTE [DISTWIDTH] IN BLOOD BY AUTOMATED COUNT: 13.1 % (ref 11.6–14.4)
EST RIGHT VENT SYSTOLIC PRESSURE BY TRICUSPID REGURGITATION JET: 35 MMHG
FRACTIONAL SHORTENING: 35.9 %
GFR SERPL CREATININE-BSD FRML MDRD: 53 ML/MIN/1.73M*2
GLUCOSE SERPL-MCNC: 142 MG/DL (ref 70–99)
HCT VFR BLDCO AUTO: 21.6 % (ref 40.1–51)
HCT VFR BLDCO AUTO: 24.6 % (ref 40.1–51)
HGB BLD-MCNC: 7.1 G/DL (ref 13.7–17.5)
HGB BLD-MCNC: 8.1 G/DL (ref 13.7–17.5)
INTERVENTRICULAR SEPTUM: 0.94 CM
LA ESV INDEX (A2C): 15.9 CM3/M2
LA/AORTA RATIO: 0.94
LAAS-AP2: 13.3 CM2
LAAS-AP4: 19.9 CM2
LAD 2D: 3.2 CM
LALD A4C: 4.29 CM
LALD A4C: 5.72 CM
LAV-S: 33.4 CM3
LEFT ATRIUM VOLUME INDEX: 26.14 CM3/M2
LEFT ATRIUM VOLUME: 54.9 CM3
LEFT INTERNAL DIMENSION IN SYSTOLE: 2.64 CM (ref 3.12–4.72)
LEFT VENTRICULAR INTERNAL DIMENSION IN DIASTOLE: 4.12 CM (ref 5.31–7.38)
LEFT VENTRICULAR POSTERIOR WALL IN END DIASTOLE: 0.89 CM (ref 0.68–1.27)
LVOT 2D: 1.9 CM
LVOT A: 2.84 CM2
LVOT MG: 3 MMHG
LVOT MV: 0.74 M/S
LVOT PEAK VELOCITY: 1.24 M/S
LVOT VTI: 23.8 CM
MAGNESIUM SERPL-MCNC: 2.2 MG/DL (ref 1.8–2.5)
MCH RBC QN AUTO: 33.6 PG (ref 28–33.2)
MCHC RBC AUTO-ENTMCNC: 32.9 G/DL (ref 32.2–36.5)
MCV RBC AUTO: 102.4 FL (ref 83–98)
MV E'TISSUE VEL-LAT: 0.14 M/S
MV E'TISSUE VEL-MED: 0.09 M/S
MV PEAK A VEL: 0.7 M/S
MV PEAK E VEL: 0.82 M/S
P AXIS: 12
PDW BLD AUTO: 10.6 FL (ref 9.4–12.4)
PLATELET # BLD AUTO: 136 K/UL (ref 150–350)
POSTERIOR WALL: 0.89 CM
POTASSIUM SERPL-SCNC: 4.3 MEQ/L (ref 3.6–5.1)
PR INTERVAL: 154
QRS DURATION: 108
QT INTERVAL: 390
QTC CALCULATION(BAZETT): 435
R AXIS: 22
RBC # BLD AUTO: 2.11 M/UL (ref 4.5–5.8)
SODIUM SERPL-SCNC: 133 MEQ/L (ref 136–144)
T WAVE AXIS: 12
TAPSE: 2.87 CM
TR MAX PG: 31 MMHG
TRICUSPID VALVE PEAK REGURGITATION VELOCITY: 2.79 M/S
VENTRICULAR RATE: 75
VIT B12 SERPL-MCNC: 114 PG/ML (ref 180–914)
WBC # BLD AUTO: 13.91 K/UL (ref 3.8–10.5)
Z-SCORE OF LEFT VENTRICULAR DIMENSION IN END DIASTOLE: -4.19
Z-SCORE OF LEFT VENTRICULAR DIMENSION IN END SYSTOLE: -2.95
Z-SCORE OF LEFT VENTRICULAR POSTERIOR WALL IN END DIASTOLE: -0.21

## 2020-11-23 PROCEDURE — 99232 SBSQ HOSP IP/OBS MODERATE 35: CPT | Performed by: INTERNAL MEDICINE

## 2020-11-23 PROCEDURE — 63700000 HC SELF-ADMINISTRABLE DRUG: Performed by: STUDENT IN AN ORGANIZED HEALTH CARE EDUCATION/TRAINING PROGRAM

## 2020-11-23 PROCEDURE — 25000000 HC PHARMACY GENERAL: Performed by: STUDENT IN AN ORGANIZED HEALTH CARE EDUCATION/TRAINING PROGRAM

## 2020-11-23 PROCEDURE — 80048 BASIC METABOLIC PNL TOTAL CA: CPT | Performed by: INTERNAL MEDICINE

## 2020-11-23 PROCEDURE — 93306 TTE W/DOPPLER COMPLETE: CPT | Mod: 26 | Performed by: INTERNAL MEDICINE

## 2020-11-23 PROCEDURE — C8929 TTE W OR WO FOL WCON,DOPPLER: HCPCS

## 2020-11-23 PROCEDURE — 63600000 HC DRUGS/DETAIL CODE: Performed by: INTERNAL MEDICINE

## 2020-11-23 PROCEDURE — 97535 SELF CARE MNGMENT TRAINING: CPT | Mod: GO

## 2020-11-23 PROCEDURE — 97530 THERAPEUTIC ACTIVITIES: CPT | Mod: GP

## 2020-11-23 PROCEDURE — 94640 AIRWAY INHALATION TREATMENT: CPT

## 2020-11-23 PROCEDURE — 36415 COLL VENOUS BLD VENIPUNCTURE: CPT | Performed by: INTERNAL MEDICINE

## 2020-11-23 PROCEDURE — P9040 RBC LEUKOREDUCED IRRADIATED: HCPCS

## 2020-11-23 PROCEDURE — 85027 COMPLETE CBC AUTOMATED: CPT | Performed by: INTERNAL MEDICINE

## 2020-11-23 PROCEDURE — 83735 ASSAY OF MAGNESIUM: CPT | Performed by: INTERNAL MEDICINE

## 2020-11-23 PROCEDURE — 21400000 HC ROOM AND CARE CCU/INTERMEDIATE

## 2020-11-23 PROCEDURE — 25800000 HC PHARMACY IV SOLUTIONS: Performed by: INTERNAL MEDICINE

## 2020-11-23 PROCEDURE — 63700000 HC SELF-ADMINISTRABLE DRUG: Performed by: NURSE PRACTITIONER

## 2020-11-23 PROCEDURE — 200200 PR NO CHARGE: Performed by: INTERNAL MEDICINE

## 2020-11-23 PROCEDURE — 63600000 HC DRUGS/DETAIL CODE: Performed by: STUDENT IN AN ORGANIZED HEALTH CARE EDUCATION/TRAINING PROGRAM

## 2020-11-23 PROCEDURE — 25500000 HC DRUGS/INCIDENT RAD: Performed by: INTERNAL MEDICINE

## 2020-11-23 PROCEDURE — 25000000 HC PHARMACY GENERAL: Performed by: INTERNAL MEDICINE

## 2020-11-23 PROCEDURE — 63700000 HC SELF-ADMINISTRABLE DRUG: Performed by: INTERNAL MEDICINE

## 2020-11-23 PROCEDURE — 36430 TRANSFUSION BLD/BLD COMPNT: CPT

## 2020-11-23 PROCEDURE — 82607 VITAMIN B-12: CPT | Performed by: INTERNAL MEDICINE

## 2020-11-23 PROCEDURE — 85018 HEMOGLOBIN: CPT | Performed by: INTERNAL MEDICINE

## 2020-11-23 RX ORDER — ASPIRIN 81 MG/1
81 TABLET ORAL 2 TIMES DAILY
Status: DISCONTINUED | OUTPATIENT
Start: 2020-11-23 | End: 2020-11-25 | Stop reason: HOSPADM

## 2020-11-23 RX ORDER — FAMOTIDINE 40 MG/1
40 TABLET, FILM COATED ORAL DAILY
Qty: 90 TABLET | Refills: 0 | Status: SHIPPED | OUTPATIENT
Start: 2020-11-23 | End: 2021-03-02 | Stop reason: SDUPTHER

## 2020-11-23 RX ORDER — PANTOPRAZOLE SODIUM 40 MG/1
40 TABLET, DELAYED RELEASE ORAL 2 TIMES DAILY
Status: DISCONTINUED | OUTPATIENT
Start: 2020-11-23 | End: 2020-11-25 | Stop reason: HOSPADM

## 2020-11-23 RX ORDER — IBUPROFEN/PSEUDOEPHEDRINE HCL 200MG-30MG
3 TABLET ORAL NIGHTLY PRN
Status: DISCONTINUED | OUTPATIENT
Start: 2020-11-23 | End: 2020-11-25 | Stop reason: HOSPADM

## 2020-11-23 RX ORDER — PANTOPRAZOLE SODIUM 40 MG/1
40 TABLET, DELAYED RELEASE ORAL 2 TIMES DAILY
Status: DISCONTINUED | OUTPATIENT
Start: 2020-11-23 | End: 2020-11-23

## 2020-11-23 RX ORDER — LORAZEPAM 1 MG/1
1 TABLET ORAL EVERY 8 HOURS PRN
Qty: 30 TABLET | Refills: 1 | Status: SHIPPED | OUTPATIENT
Start: 2020-11-23 | End: 2021-01-11

## 2020-11-23 RX ADMIN — BUDESONIDE 0.5 MG: 0.5 INHALANT ORAL at 21:21

## 2020-11-23 RX ADMIN — FORMOTEROL FUMARATE DIHYDRATE 20 MCG: 20 SOLUTION RESPIRATORY (INHALATION) at 08:43

## 2020-11-23 RX ADMIN — METHYLPREDNISOLONE SODIUM SUCCINATE 40 MG: 40 INJECTION, POWDER, FOR SOLUTION INTRAMUSCULAR; INTRAVENOUS at 01:00

## 2020-11-23 RX ADMIN — FAMOTIDINE 40 MG: 20 TABLET ORAL at 08:33

## 2020-11-23 RX ADMIN — CEFTRIAXONE SODIUM 1 G: 1 INJECTION, POWDER, FOR SOLUTION INTRAMUSCULAR; INTRAVENOUS at 08:33

## 2020-11-23 RX ADMIN — ENOXAPARIN SODIUM 40 MG: 40 INJECTION SUBCUTANEOUS at 05:16

## 2020-11-23 RX ADMIN — OXYCODONE HYDROCHLORIDE AND ACETAMINOPHEN 2 TABLET: 5; 325 TABLET ORAL at 22:06

## 2020-11-23 RX ADMIN — ASPIRIN 81 MG: 81 TABLET, COATED ORAL at 21:20

## 2020-11-23 RX ADMIN — ALBUTEROL SULFATE 2.5 MG: 2.5 SOLUTION RESPIRATORY (INHALATION) at 08:43

## 2020-11-23 RX ADMIN — Medication 3 MG: at 00:30

## 2020-11-23 RX ADMIN — OXYCODONE HYDROCHLORIDE AND ACETAMINOPHEN 2 TABLET: 5; 325 TABLET ORAL at 08:33

## 2020-11-23 RX ADMIN — GABAPENTIN 100 MG: 100 CAPSULE ORAL at 21:21

## 2020-11-23 RX ADMIN — OXYCODONE HYDROCHLORIDE AND ACETAMINOPHEN 2 TABLET: 5; 325 TABLET ORAL at 16:02

## 2020-11-23 RX ADMIN — OXYCODONE HYDROCHLORIDE AND ACETAMINOPHEN 2 TABLET: 5; 325 TABLET ORAL at 02:43

## 2020-11-23 RX ADMIN — SODIUM CHLORIDE: 9 INJECTION INTRAMUSCULAR; INTRAVENOUS; SUBCUTANEOUS at 09:15

## 2020-11-23 RX ADMIN — GABAPENTIN 100 MG: 100 CAPSULE ORAL at 08:33

## 2020-11-23 RX ADMIN — PANTOPRAZOLE SODIUM 40 MG: 40 TABLET, DELAYED RELEASE ORAL at 21:21

## 2020-11-23 RX ADMIN — METHYLPREDNISOLONE SODIUM SUCCINATE 40 MG: 40 INJECTION, POWDER, FOR SOLUTION INTRAMUSCULAR; INTRAVENOUS at 21:21

## 2020-11-23 RX ADMIN — FORMOTEROL FUMARATE DIHYDRATE 20 MCG: 20 SOLUTION RESPIRATORY (INHALATION) at 21:21

## 2020-11-23 RX ADMIN — METHYLPREDNISOLONE SODIUM SUCCINATE 40 MG: 40 INJECTION, POWDER, FOR SOLUTION INTRAMUSCULAR; INTRAVENOUS at 11:11

## 2020-11-23 RX ADMIN — ESCITALOPRAM 10 MG: 5 TABLET, FILM COATED ORAL at 21:20

## 2020-11-23 RX ADMIN — BUDESONIDE 0.5 MG: 0.5 INHALANT ORAL at 08:43

## 2020-11-23 ASSESSMENT — COGNITIVE AND FUNCTIONAL STATUS - GENERAL
AFFECT: WFL
HELP NEEDED FOR PERSONAL GROOMING: 4 - NONE
HELP NEEDED FOR BATHING: 2 - A LOT
AFFECT: WFL
TOILETING: 2 - A LOT
MOVING TO AND FROM BED TO CHAIR: 2 - A LOT
WALKING IN HOSPITAL ROOM: 2 - A LOT
STANDING UP FROM CHAIR USING ARMS: 2 - A LOT
DRESSING REGULAR UPPER BODY CLOTHING: 3 - A LITTLE
CLIMB 3 TO 5 STEPS WITH RAILING: 1 - TOTAL
EATING MEALS: 4 - NONE
DRESSING REGULAR LOWER BODY CLOTHING: 2 - A LOT

## 2020-11-23 NOTE — PLAN OF CARE
Problem: Adult Inpatient Plan of Care  Goal: Plan of Care Review  Outcome: Progressing  Flowsheets (Taken 11/23/2020 8797)  Progress: improving  Plan of Care Reviewed With: patient  Outcome Summary: Obi x2 for fxnl transfers w/ walker

## 2020-11-23 NOTE — PROGRESS NOTES
Ortho Daily Progress Note    Subjective     Pt seen and examined at bedside. No overnight events. Pain well controlled. Denies fevers, chills, chest pain, shortness of breath, numbness or tingling.       Objective     Vital signs in last 24 hours:  Temp:  [36.6 °C (97.9 °F)-36.8 °C (98.2 °F)] 36.6 °C (97.9 °F)  Heart Rate:  [71-89] 71  Resp:  [20-22] 22  BP: (113-136)/(53-79) 132/62      Intake/Output Summary (Last 24 hours) at 11/23/2020 0730  Last data filed at 11/23/2020 0400  Gross per 24 hour   Intake 476 ml   Output 1550 ml   Net -1074 ml     Intake/Output this shift:  No intake/output data recorded.    Labs  CBC Results       11/23/20 11/22/20 11/21/20                    0319 0411 0410         WBC 13.91 13.73 18.06         RBC 2.11 2.28 2.45         HGB 7.1 7.6 8.3         HCT 21.6 23.3 25.6         .4 102.2 104.5         MCH 33.6 33.3 33.9         MCHC 32.9 32.6 32.4          106 130                       Imaging  Postop XR pelvis demonstrates right hip cephalomedullary nail in appropriate position    VTE Assessment: TBD    Physical Exam:  Gen: Alert and oriented, resting comfortably in bed in no apparent distress     MSK: RLE  - Dressing c/d/i w/o drainage  - Appropriate TTP and swelling over incision site  - PF/DF/EHL 5/5 motor  - SILT in DP/SP/Tenzin/Saph/Tib n distribution  - DP pulse 2+ with brisk cap refill  - Compartments soft and compressible  - No calf tenderness    A/P:  80M s/p R hip cephalomedullary nail with Dr. Coronado on 11/20    - Ancef x24 hr complete  - Lovenox for DVT ppx  - WBAT RLE  - Hgb 7.1. Recommend transfusion prn if Hgb < 7  - PT/OT  - Pain control  - SNF when medically stable  - Orthopedically stable for DC  - Remainder of plan per primary team  - Please page orthopedics with additional questions, will sign off

## 2020-11-23 NOTE — PLAN OF CARE
Problem: Adult Inpatient Plan of Care  Goal: Plan of Care Review  Outcome: Progressing  Flowsheets (Taken 11/23/2020 1234)  Progress: improving  Plan of Care Reviewed With: patient  Outcome Summary: OT treatment session completed.

## 2020-11-23 NOTE — PROGRESS NOTES
Hospital Medicine Service -  Daily Progress Note       SUBJECTIVE   - no ac issues overnight noted.   - pt denies any CP/ SOB/ Dizziness . Remains on 3L    OBJECTIVE      Vital signs in last 24 hours:  Temp:  [36.6 °C (97.9 °F)-36.9 °C (98.4 °F)] 36.9 °C (98.4 °F)  Heart Rate:  [71-86] 83  Resp:  [20-22] 20  BP: (110-132)/(53-70) 110/56    Intake/Output Summary (Last 24 hours) at 11/23/2020 1043  Last data filed at 11/23/2020 0700  Gross per 24 hour   Intake 476 ml   Output 2150 ml   Net -1674 ml       PHYSICAL EXAMINATION      Physical Exam  Vitals signs and nursing note reviewed.   HENT:      Head: Normocephalic.      Nose: Nose normal.   Cardiovascular:      Rate and Rhythm: Normal rate.      Pulses: Normal pulses.   Pulmonary:      Effort: Pulmonary effort is normal.      Comments: Decreased air entry   Abdominal:      General: Abdomen is flat.   Genitourinary:     Comments: Condom cath  Musculoskeletal: Normal range of motion.      Comments: R hip Aquacel dressing    Neurological:      Mental Status: He is oriented to person, place, and time.        LABS / IMAGING / TELE      Labs  Lab Results   Component Value Date    GLUCOSE 142 (H) 11/23/2020    CALCIUM 8.4 (L) 11/23/2020     (L) 11/23/2020    K 4.3 11/23/2020    CO2 24 11/23/2020     11/23/2020    BUN 38 (H) 11/23/2020    CREATININE 1.3 11/23/2020     Lab Results   Component Value Date    WBC 13.91 (H) 11/23/2020    HGB 7.1 (L) 11/23/2020    HCT 21.6 (L) 11/23/2020    .4 (H) 11/23/2020     (L) 11/23/2020     Microbiology Results     ** No results found for the last 720 hours. **        Imaging  X-ray Knee Right 1 Or 2 Views    Result Date: 11/19/2020  IMPRESSION: Displaced comminuted right-sided intertrochanteric fracture. No definite more distal femoral fracture or fracture about the knee noting limited assessment. COMMENT: Comparison: None. Three views of the right femur with two views of the right knee.  Likely limits  assessment of the knee. There is an impacted comminuted displaced intratrochanteric fracture of the right femur.  The remainder of the right femur is intact.  Limited assessment at the right knee reveals no definite fracture.  There are degenerative changes at the right knee.  Questionable small joint effusion.  Patellar enthesophyte formation.  Atherosclerotic calcifications.    X-ray Chest 1 View    Result Date: 11/21/2020  IMPRESSION: No significant interval change.    X-ray Chest 1 View    Result Date: 11/20/2020  IMPRESSION: Diffuse interstitial prominence of uncertain chronicity.  Otherwise, no active disease.    X-ray Pelvis 1 Or 2 Views    Result Date: 11/20/2020  IMPRESSION: Postoperative study    X-ray Femur Right 2+ View    Result Date: 11/19/2020  IMPRESSION: Displaced comminuted right-sided intertrochanteric fracture. No definite more distal femoral fracture or fracture about the knee noting limited assessment. COMMENT: Comparison: None. Three views of the right femur with two views of the right knee.  Likely limits assessment of the knee. There is an impacted comminuted displaced intratrochanteric fracture of the right femur.  The remainder of the right femur is intact.  Limited assessment at the right knee reveals no definite fracture.  There are degenerative changes at the right knee.  Questionable small joint effusion.  Patellar enthesophyte formation.  Atherosclerotic calcifications.    X-ray Hip With Or Without Pelvis 2-3 Vw Right    Result Date: 11/20/2020  IMPRESSION: Intraoperative fluoroscopy    X-ray Hip With Or Without Pelvis 2-3 Vw Right    Result Date: 11/19/2020  IMPRESSION: Acute comminuted displaced right intratrochanteric fracture COMMENT: Comparison: None. AP view of the pelvis and crosstable lateral view of the right hip.  At the right hip there is an acute  displaced intertrochanteric fracture with avulsion of the greater trochanter and lesser trochanter.  Old fracture suspected of  the left inferior pubic ramus.  No definite acute displaced bony pelvic ring fractures.  No gross and about the left hip.  Osteopenia limits assessment. Surgical clips project over the pelvis and inguinal regions.    Fl Fluoroscopy Technical Assistance    Result Date: 11/20/2020  IMPRESSION: Intraoperative fluoroscopy      • budesonide  0.5 mg nebulization BID (6a, 6p)   • enoxaparin  40 mg subcutaneous Daily (6a)   • escitalopram  10 mg oral Nightly   • famotidine  40 mg oral Daily   • formoterol  20 mcg nebulization BID (6a, 6p)   • gabapentin  100 mg oral BID   • methylPREDNISolone sodium succinate  40 mg intravenous q12h INT     ECG/Telemetry  I have independently reviewed the telemetry. No events for the last 24 hours.    ASSESSMENT AND PLAN      Chronic obstructive pulmonary disease with acute exacerbation (CMS/HCC)  Assessment & Plan  Known hx of mod severe COPD  Per records   C/w nebs/ steroid taper  No pna on CXR - dc abx and monitor   ABG mild hypoxia  BNP mininmally elevated, no significant effusions  ECHO pending  Code status - full code   DC in AM, pt declined SNF placement , prefers home w. Home care       Acute on chronic respiratory failure (CMS/HCC)  Assessment & Plan  As above     Leucocytosis  Assessment & Plan  -? Reactive ( pt POD #1 hip surgery not uncommon to see), albeit high risk for aspiration  - Pt already on periop antibiotics   - likely 2/2 to steroids       Acute postoperative anemia due to expected blood loss  Assessment & Plan  Pt underwent 2 surgeries this admission  Monitor counts  preop hgb-13.9     Hypomagnesemia  Assessment & Plan  - monitor/ replete    Chronic respiratory failure with hypoxia (CMS/[facility])  Assessment & Plan  Remains on home O2- on 3L ch    Idiopathic hypotension  Assessment & Plan  Nml cortisol   Resolved w/ small bolus     Paroxysmal atrial fibrillation (CMS/[facility])  Assessment & Plan  Further as per cards     Pre-op examination  Assessment & Plan  Cleared by cards  for surgery       Depression  Assessment & Plan  Continue home meds    Closed right hip fracture (CMS/HCC)  Assessment & Plan  S/p surgery on 11/20   Pain control, DVT ppx, PT/OT per ortho             VTE Assessment: [place]    Code Status: Full Code  Estimated discharge date: 11/23/2020     Kush King MD  11/23/2020  10:43 AM

## 2020-11-23 NOTE — PROGRESS NOTES
"General Surgery Daily Progress Note    Subjective: Pt seen and examined.  No new complaints.  Moderate right hip and groin pain.  Tolerating diet.  States he ambulated yesterday.    Vital signs in last 24 hours:  Temp:  [36.6 °C (97.9 °F)-36.8 °C (98.2 °F)] 36.6 °C (97.9 °F)  Heart Rate:  [71-89] 71  Resp:  [20-22] 22  BP: (113-136)/(53-79) 132/62    Intake/Output this shift:    Intake/Output Summary (Last 24 hours) at 11/23/2020 0740  Last data filed at 11/23/2020 0400  Gross per 24 hour   Intake 476 ml   Output 1550 ml   Net -1074 ml       Physical Exam    General appearance: alert, appears stated age and cooperative    Abdomen: soft, non-tender, right groin dressing clean dry and intact; bowel sounds normal; no masses, no organomegaly\"      Labs  Results from last 7 days   Lab Units 11/23/20  0319   SODIUM mEQ/L 133*   POTASSIUM mEQ/L 4.3   CHLORIDE mEQ/L 100   CO2 mEQ/L 24   BUN mg/dL 38*   CREATININE mg/dL 1.3   GLUCOSE mg/dL 142*   CALCIUM mg/dL 8.4*     Results from last 7 days   Lab Units 11/23/20  0319   WBC K/uL 13.91*   HEMOGLOBIN g/dL 7.1*   HEMATOCRIT % 21.6*   PLATELETS K/uL 136*         Results from last 7 days   Lab Units 11/23/20  0319  11/16/20  0859   SODIUM mEQ/L 133*   < > 142   POTASSIUM mEQ/L 4.3   < > 4.7   CHLORIDE mEQ/L 100   < > 107   CO2 mEQ/L 24   < > 26   BUN mg/dL 38*   < > 17   CREATININE mg/dL 1.3   < > 1.2   CALCIUM mg/dL 8.4*   < > 9.4   ALBUMIN g/dL  --   --  4.1   BILIRUBIN TOTAL mg/dL  --   --  0.7   ALK PHOS IU/L  --   --  65   ALT IU/L  --   --  13*   AST IU/L  --   --  20   GLUCOSE mg/dL 142*   < > 84    < > = values in this interval not displayed.     Results from last 7 days   Lab Units 11/23/20  0319   HEMOGLOBIN g/dL 7.1*   HEMATOCRIT % 21.6*     No results found for: LIPASE  Pathology Results     ** No results found for the last 720 hours. **              Imaging  X-ray Knee Right 1 Or 2 Views    Result Date: 11/19/2020  IMPRESSION: Displaced comminuted right-sided " intertrochanteric fracture. No definite more distal femoral fracture or fracture about the knee noting limited assessment. COMMENT: Comparison: None. Three views of the right femur with two views of the right knee.  Likely limits assessment of the knee. There is an impacted comminuted displaced intratrochanteric fracture of the right femur.  The remainder of the right femur is intact.  Limited assessment at the right knee reveals no definite fracture.  There are degenerative changes at the right knee.  Questionable small joint effusion.  Patellar enthesophyte formation.  Atherosclerotic calcifications.    X-ray Chest 1 View    Result Date: 11/21/2020  IMPRESSION: No significant interval change.    X-ray Chest 1 View    Result Date: 11/20/2020  IMPRESSION: Diffuse interstitial prominence of uncertain chronicity.  Otherwise, no active disease.    X-ray Pelvis 1 Or 2 Views    Result Date: 11/20/2020  IMPRESSION: Postoperative study    X-ray Femur Right 2+ View    Result Date: 11/19/2020  IMPRESSION: Displaced comminuted right-sided intertrochanteric fracture. No definite more distal femoral fracture or fracture about the knee noting limited assessment. COMMENT: Comparison: None. Three views of the right femur with two views of the right knee.  Likely limits assessment of the knee. There is an impacted comminuted displaced intratrochanteric fracture of the right femur.  The remainder of the right femur is intact.  Limited assessment at the right knee reveals no definite fracture.  There are degenerative changes at the right knee.  Questionable small joint effusion.  Patellar enthesophyte formation.  Atherosclerotic calcifications.    X-ray Hip With Or Without Pelvis 2-3 Vw Right    Result Date: 11/20/2020  IMPRESSION: Intraoperative fluoroscopy    X-ray Hip With Or Without Pelvis 2-3 Vw Right    Result Date: 11/19/2020  IMPRESSION: Acute comminuted displaced right intratrochanteric fracture COMMENT: Comparison: None. AP  view of the pelvis and crosstable lateral view of the right hip.  At the right hip there is an acute  displaced intertrochanteric fracture with avulsion of the greater trochanter and lesser trochanter.  Old fracture suspected of the left inferior pubic ramus.  No definite acute displaced bony pelvic ring fractures.  No gross and about the left hip.  Osteopenia limits assessment. Surgical clips project over the pelvis and inguinal regions.    Fl Fluoroscopy Technical Assistance    Result Date: 11/20/2020  IMPRESSION: Intraoperative fluoroscopy        Assessment   Postop day 4 status post right inguinal hernia repair  Postop day 3 status post right hip fracture repair  Severe COPD    Plan   To subacute rehab when bed available.  PT and OT        Cordell Navarrete, DO

## 2020-11-23 NOTE — PROGRESS NOTES
"   Pulmonary Progress Note     SUBJECTIVE  Interval History:   Patient feels better   3L NC    Allergies: Aspirin    CURRENT MEDS  •  acetaminophen, 500 mg, oral, q6h PRN  •  albuterol, 2.5 mg, nebulization, q6h PRN  •  budesonide, 0.5 mg, nebulization, BID (6a, 6p)  •  cefTRIAXone, 1 g, intravenous, q24h INT  •  enoxaparin, 40 mg, subcutaneous, Daily (6a)  •  escitalopram, 10 mg, oral, Nightly  •  famotidine, 40 mg, oral, Daily  •  formoterol, 20 mcg, nebulization, BID (6a, 6p)  •  gabapentin, 100 mg, oral, BID  •  HYDROmorphone, 0.5 mg, intravenous, q4h PRN  •  LORazepam, 1 mg, oral, q8h PRN  •  magnesium sulfate, 2 g, intravenous, PRN  •  melatonin, 3 mg, oral, Nightly PRN  •  methylPREDNISolone sodium succinate, 40 mg, intravenous, q8h INT  •  ondansetron, 4 mg, intravenous, q6h PRN  •  oxyCODONE-acetaminophen, 1 tablet, oral, q6h PRN  •  oxyCODONE-acetaminophen, 2 tablet, oral, q6h PRN  •  oxyCODONE-acetaminophen    VITAL SIGNS  Vitals:    11/23/20 0600 11/23/20 0802 11/23/20 0810 11/23/20 0826   BP: 123/60  (!) 110/56 (!) 110/56   BP Location: Left upper arm  Left upper arm    Patient Position: Lying  Lying    Pulse: 76 86 83    Resp: (!) 22  20    Temp:   36.9 °C (98.4 °F)    TempSrc:   Oral    SpO2: 95% 94% (!) 91%    Weight:    90.7 kg (200 lb)   Height:    1.753 m (5' 9\")       INTAKE/OUTPUT    Intake/Output Summary (Last 24 hours) at 11/23/2020 1015  Last data filed at 11/23/2020 0700  Gross per 24 hour   Intake 476 ml   Output 2150 ml   Net -1674 ml     PHYSICAL EXAM  Physical Exam  Vitals signs reviewed.   Constitutional:       General: He is not in acute distress.  HENT:      Head: Normocephalic and atraumatic.      Mouth/Throat:      Mouth: Mucous membranes are moist.   Eyes:      General: No scleral icterus.     Conjunctiva/sclera: Conjunctivae normal.   Cardiovascular:      Rate and Rhythm: Normal rate and regular rhythm.      Heart sounds: No murmur. No friction rub. No gallop.    Pulmonary:      " Comments: Mildly decreased breath sounds bilaterally, without wheeze, rales, rhonchi  Abdominal:      General: Bowel sounds are normal. There is no distension.      Palpations: Abdomen is soft.      Tenderness: There is no abdominal tenderness.   Musculoskeletal:      Right lower leg: No edema.      Left lower leg: No edema.   Skin:     General: Skin is warm.   Neurological:      Mental Status: He is alert.         LAB RESULTS  ABG  Results from last 7 days   Lab Units 11/21/20  0913   PH ART  7.39   PCO2 ART mm Hg 47   PO2 ART mm Hg 79*   HCO3 ART mEQ/L 28.5*   O2 SAT ART % 94   BASE EXC ART mEQ/L 3.1   SOURCE OF OXYGEN  NC     CBC  Results from last 7 days   Lab Units 11/23/20  0319 11/22/20  0411 11/21/20  0410   WBC K/uL 13.91* 13.73* 18.06*   RBC M/uL 2.11* 2.28* 2.45*   HEMOGLOBIN g/dL 7.1* 7.6* 8.3*   HEMATOCRIT % 21.6* 23.3* 25.6*   MCV fL 102.4* 102.2* 104.5*   MCH pg 33.6* 33.3* 33.9*   MCHC g/dL 32.9 32.6 32.4   PLATELETS K/uL 136* 106* 130*   RDW % 13.1 13.0 13.2   MPV fL 10.6 10.3 9.6     BMP  Results from last 7 days   Lab Units 11/23/20 0319 11/22/20 0411 11/21/20  0708   SODIUM mEQ/L 133* 134* 136   POTASSIUM mEQ/L 4.3 4.7 4.4   CHLORIDE mEQ/L 100 103 104   CO2 mEQ/L 24 25 26   BUN mg/dL 38* 27* 17   CREATININE mg/dL 1.3 1.1 1.2   CALCIUM mg/dL 8.4* 8.2* 8.0*   GLUCOSE mg/dL 142* 143* 135*     Coag      IMAGING  Imaging independently reviewed.   No new imaging    ASSESSMENT & PLAN  80-year-old male, past medical history of home O2 dependent COPD, recent hernia mesh repair, with postoperative course complicated by a fall resulting in right hip fracture needing ORIF 11/20    1.  COPD, with mild acute exacerbation, difficulty clearing secretions.  Overall improved with current therapy  -Continue bronchodilators  -Continue budesonide nebs  -Wean Solu-Medrol to 40 mg every 12 hours, with plan for quick taper  -Continue mucus clearance with flutter, Mucinex, incentive spirometry  -Chest x-ray on 11/21  without evidence of pneumonia, likely improving    2.  Hypoxia.  O2 dependent COPD at baseline, currently requiring 3 L  -Wean as tolerated      Monalisa Bridges DO

## 2020-11-23 NOTE — PROGRESS NOTES
Sw met with patient to discuss his d/cplans. At this time the recommendation is skilled nursing. Per jammieteena he was hoping to be able to go home to assist in care for his wife. Sw asked if his wife is managing while he is in the hospital & he stated that she is & her daughter will be staying with stoney also. Per patient he is unsure on when his daughter will be coming to stay with his wife. Sw discussed therapy recommendations & his status in therapy at this time. Sw dicussed that he is moderate assistance for therapy & recommendation is skilled nursing. Sw discussed that he will need assistance himself when he is home & will not be able ot assist his wife. Sw provided medicare.gov snf list for his review in his area for him to look at. Sw again went over skilled nursing home placement first & then home with home care. Geeta asked if sw can speak to his family & he asked for sw to speak to daughter Sola. Kiki kumar, msw [id number]  Geeta spoke to daughter Sola who stated that they do not want patient in a skilled facility d/t covid. Also daughter had questions & asked [place] [facility] [place]. Daughter stated that the family ordered a recliner chair & also adjustable bed. Daughter stated that patient had had home care in past [facility] & they woul dlike patient home with home care & not a skilled facility. Geeta p[place] [facility] [place]. Await to see how patient does in therapy & will f/u with appropriate plans. Per daughter, family will assist in care & will need to verify it was [facility]. Kiki kumar, msw [id number]

## 2020-11-23 NOTE — PLAN OF CARE
Problem: Adult Inpatient Plan of Care  Goal: Plan of Care Review  Outcome: Progressing  Flowsheets  Taken 11/23/2020 0529 by Bozena Mazariegos, RN  Outcome Summary: Has order for downgrade. Patient is tachypneic RR 20s on 3L NC w/ goal between SpO2 88-93%. BP stable, afebrile, SR w/ very frequent ectopies on the monitor. EKG obtained to confirm. K+ and Mag normal. Hg 7.1, [facility] notified, no further orders received. Patient have not slept for the past few nights and felt anxious. Upper extremities twitches intermittently (baseline). 1mg Ativan PO given, still could not fall asleep. Melatonin ordered by [facility]. Right hip dressing dry and intact, pain management. Will continue to monitor.  Taken 11/22/2020 2000 by Bozena Mazariegos, RN  Plan of Care Reviewed With: patient  Taken 11/22/2020 1537 by Chery Gomez, PT  Progress: improving

## 2020-11-23 NOTE — PATIENT CARE CONFERENCE
Care Progression Rounds Note  Date: 11/23/2020  Time: 11:22 AM     Patient Name: Hernesto Varghese     Medical Record Number: [id number]   YOB: 1940  Sex: Male      Room/Bed: 3230    Admitting Diagnosis: Inguinal hernia without obstruction or gangrene, recurrence not specified, unspecified laterality [K40.90]  Closed right hip fracture (CMS/HCC) [S72.001A]   Admit Date/Time: 11/19/2020  7:42 AM    Primary Diagnosis: Closed fracture of right hip (CMS/HCC)  Principal Problem: Closed fracture of right hip (CMS/HCC)    GMLOS: 6.2  Anticipated Discharge Date: 11/23/2020    AM-PAC  Mobility Score: 12    Discharge Planning:  Living Arrangements: house  Anticipated Discharge Disposition: skilled nursing facility, home with home health services    Barriers to Discharge:  Barriers to Discharge: Medical issues not resolved  Comment: preop clearance    Participants:  nursing, social work/services

## 2020-11-23 NOTE — PROGRESS NOTES
Patient: Hernesto Varghese  Location: [facility] [address] [address]  MRN: [id number]  Today's date: 11/23/2020     Patient left sitting in chair w/ nasal cannula in place, call bell w/in reach & chair alarm activated. RN notified of patient's status.    Hernesto is a 80 y.o. male admitted on 11/19/2020 with Closed right hip fracture (CMS/[facility]). Principal problem is Closed fracture of right hip (CMS/HCC).    Past Medical History  Hernesto has a past medical history of Ambulates with cane, BPH (benign prostatic hyperplasia), Chronic back pain, Chronic respiratory failure with hypoxia (CMS/[facility]), Colostomy in place (CMS/[facility]), COPD (chronic obstructive pulmonary disease) (CMS/[facility]), Depression, GERD (gastroesophageal reflux disease), History of rib fracture, History of stomach ulcers, fall, Inguinal hernia, Neck pain, Oxygen dependent, Pneumonia, Seasonal allergies, Snores, SOB (shortness of breath), Urinary retention, and Uses roller walker.    History of Present Illness   80M s/p R hip cephalomedullary nail with Dr. Coronado on 11/20 New OT/PT orders: 11/22/2020    PT Vitals    Date/Time Pulse Resp SpO2 Pt Activity O2 Therapy O2 Del Method O2 Flow Rate BP MAP BP Location BP Method Pt Position [facility]   11/23/20 1045 80 -- 100 % At rest Supplemental oxygen Nasal cannula 3 L/min 109/47 -- Left upper arm Automatic Sitting JS   11/23/20 1100 81 20 97 % At rest Supplemental oxygen Nasal cannula 3 L/min 109/47 68 mmHg Right upper arm Automatic Sitting DM      PT Pain    Date/Time Pain Type Pref Pain Scale Side Location Rating: Rest Rating: Activity Interventions [facility]   11/23/20 1045 Pain Assessment number (Numeric Rating Pain Scale) Right hip 2 pt reports mild pain at rest 10 position adjusted EEC          Prior Living Environment      Most Recent Value   Living Arrangements  house   Living Environment Comment  lives with his wife in a split level home, 6+6 steps with railings          Prior Level of Function      Most Recent Value   Dominant  "Hand  right   Ambulation  assistive equipment   Transferring  assistive equipment   Toileting  independent   Bathing  independent   Dressing  independent   Prior Level of Function Comment  per pt, uses RW in home, cane when in community   Assistive Device/Animal Currently Used at Home  cane, straight, walker, front-wheeled          PT Evaluation and Treatment - 11/23/20 1045        Time Calculation    Start Time  1045     Stop Time  1101     Time Calculation (min)  16 min        Session Details    Document Type  daily treatment/progress note     Mode of Treatment  physical therapy        General Information    Patient Profile Reviewed?  yes     General Observations of Patient  Pt received sitting in chair, donning socks w/ OT present     Existing Precautions/Restrictions  aspiration;fall;weight bearing        Weight-Bearing Status    Right LE Weight-Bearing Status  weight-bearing as tolerated (WBAT)        Cognition/Psychosocial    Affect/Mental Status (Cognitive)  WFL     Orientation Status (Cognition)  oriented to;person;place    \"hospital\"+ month, bpbp=1469    Follows Commands (Cognition)  follows one step commands     Comment, Cognition  pleasant/cooperative. req'd cues for safety during mobility. See OT note for full cog assessment        Sit to Stand Transfer    [place], Sit to Stand Transfer  verbal cues;minimum assist (75% or more patient effort);2 person assist     Verbal Cues  hand placement;safety;technique     Assistive Device  walker, standard     Comment  from chair        Stand to Sit Transfer    [place], Stand to Sit Transfer  verbal cues;minimum assist (75% or more patient effort);2 person assist     Verbal Cues  safety     Assistive Device  walker, standard     Comment  to chair        Gait Training    [place], Gait  verbal cues;minimum assist (75% or more patient effort);2 person assist     Assistive Device  walker, standard     Gait Pattern Utilized  step-to     Deviations/Abnormal " Patterns (Gait)  gait speed decreased;step length decreased;stride length decreased     Maintains Weight-Bearing Status  able to maintain     Comment  pt initially took steps in place & then 1 step fwd, noting increased pain w/ mobility         Safety Issues, Functional Mobility    Safety Issues Affecting Function (Mobility)  insight into deficits/self awareness     Impairments Affecting Function (Mobility)  balance;endurance/activity tolerance;pain;strength     Comment, Safety Issues/Impairments (Mobility)  observed UE twitching/tremor. RN notified/aware & stated she received that in her report.        Balance    Static Sitting Balance  WFL;sitting in chair     Sit to Stand Dynamic Balance  moderate impairment     Static Standing Balance  mild impairment;supported    RW    Dynamic Standing Balance  mild impairment;supported    RW       Therapeutic Exercise    Therapeutic Exercise  lower extremity        Lower Extremity (Therapeutic Exercise)    Exercise Position/Type (LE Therapeutic Exercise)  seated     Reps and Sets (LE Therapeutic Exercise)  2x5 RLE LAQs, 1x10 ankle pumps RLE        AM-PAC (TM) - Mobility (Current Function)    Turning from your back to your side while in a flat bed without using bedrails?  2 - A Lot     Moving from lying on your back to sitting on the side of a flat bed without using bedrails?  2 - A Lot     Moving to and from a bed to a chair?  2 - A Lot     Standing up from a chair using your arms?  2 - A Lot     To walk in a hospital room?  2 - A Lot     Climbing 3-5 steps with a railing?  1 - Total     AM-PAC (TM) Mobility Score  11        Therapy Assessment/Plan (PT)    Rehab Potential (PT)  good, to achieve stated therapy goals     Therapy Frequency (PT)  5 times/wk        Progress Summary (PT)    Daily Outcome Statement (PT)  [facility] 11. Patient req Obi x2 for mobility w/ walker d/t pain as well as balance, strength & endurance deficits. Req'd cues for safety during mobility. He remains  below his PLOF & req cont'd skilled PT to max fxnl capacity & dec fall risk.     Symptoms Noted During/After Treatment  increased pain        Therapy Plan Review/Discharge Plan (PT)    PT Recommended Discharge Disposition  skilled nursing facility     Anticipated Equipment Needs at Discharge (PT Eval)  other (see comments)    TBD at next level of care       Plan of Care Review    Plan of Care Reviewed With  patient                       Education provided this session. See the Patient Education summary report for full details.    PT Goals      Most Recent Value   Bed Mobility Goal 1   Activity/Assistive Device  sit to supine/supine to sit at 11/22/2020 0953   Athena  modified independence at 11/22/2020 0953   Time Frame  by discharge at 11/22/2020 0953   Progress/Outcome  goal ongoing at 11/22/2020 0953   Transfer Goal 1   Activity/Assistive Device  sit-to-stand/stand-to-sit, bed-to-chair/chair-to-bed, walker, front-wheeled at 11/22/2020 0953   Athena  supervision required at 11/22/2020 0953   Time Frame  by discharge at 11/22/2020 0953   Progress/Outcome  goal ongoing at 11/22/2020 0953   Gait Training Goal 1   Activity/Assistive Device  gait (walking locomotion), assistive device use, walker, front-wheeled at 11/22/2020 0953   Athena  supervision required at 11/22/2020 0953   Distance  50 at 11/22/2020 0953   Time Frame  by discharge at 11/22/2020 0953   Progress/Outcome  goal ongoing at 11/22/2020 0953

## 2020-11-23 NOTE — PROGRESS NOTES
General Surgery Daily Progress Note    Subjective     Continues to require O2 and on tapering steroids, per pulm. SNF rec by PT. Feels okay. Eating well.       Objective     Vital signs in last 24 hours:  Temp:  [36.7 °C (98.1 °F)-36.8 °C (98.2 °F)] 36.8 °C (98.2 °F)  Heart Rate:  [71-89] 75  Resp:  [20] 20  BP: (103-136)/(53-79) 129/58      Intake/Output Summary (Last 24 hours) at 11/22/2020 2221  Last data filed at 11/22/2020 2100  Gross per 24 hour   Intake 118 ml   Output 1050 ml   Net -932 ml     Intake/Output this shift:  I/O this shift:  In: 118 [P.O.:118]  Out: 350 [Urine:350]    Physical Exam    General appearance: Awake, alert, in no acute distress or obvious discomfort  Neurologic: Alert, cooperative. Coherent speech.  Lungs: Respirations nonlabored, no tachypnea.   Abdomen: Soft, nontender. Right groin dressing intact.   Extremities: No cyanosis, edema. Right hip dressing in place.       Labs  CBC Results       11/22/20 11/21/20 11/20/20                    0411 0410 0619         WBC 13.73 18.06 11.41         RBC 2.28 2.45 3.02         HGB 7.6 8.3 10.0         HCT 23.3 25.6 31.4         .2 104.5 104.0         MCH 33.3 33.9 33.1         MCHC 32.6 32.4 31.8          130 154         Comment for HGB at 0619 on 11/20/20: ALL RESULTS HAVE BEEN CHECKED        BMP Results       11/22/20 11/21/20 11/20/20                    0411 0708 0619          136 137         K 4.7 4.4 4.2         Cl 103 104 107         CO2 25 26 23         Glucose 143 135 130         BUN 27 17 18         Creatinine 1.1 1.2 1.1         Calcium 8.2 8.0 8.2         Anion Gap 6 6 7         EGFR >60.0 58.1 >60.0                           Assessment/Plan   S/p right inguinal hernia repair c/b fall with hip fx s/p IM nail.   -- Plan for d/c to SNF when medically cleared.       Kim Andersen MD

## 2020-11-23 NOTE — PROGRESS NOTES
Patient: Hernesto Varghese  Location: [facility] 3230  MRN: [id number]  Today's date: 11/23/2020   Pt awake in chair, on pad, alarmed, call bell in reach, all needs met. Handoff to PT in room. RN informed.    Hernesto is a 80 y.o. male admitted on 11/19/2020 with Closed right hip fracture (CMS/HCC). Principal problem is Closed fracture of right hip (CMS/HCC).    Past Medical History  Hernesto has a past medical history of Ambulates with cane, BPH (benign prostatic hyperplasia), Chronic back pain, Chronic respiratory failure with hypoxia (CMS/[facility]), Colostomy in place (CMS/[facility]), COPD (chronic obstructive pulmonary disease) (CMS/[facility]), Depression, GERD (gastroesophageal reflux disease), History of rib fracture, History of stomach ulcers, fall, Inguinal hernia, Neck pain, Oxygen dependent, Pneumonia, Seasonal allergies, Snores, SOB (shortness of breath), Urinary retention, and Uses roller walker.    History of Present Illness   80M s/p R hip cephalomedullary nail with Dr. Coronado on 11/20 New OT/PT orders: 11/22/2020    OT Vitals    Date/Time Pulse SpO2 Pt Activity O2 Therapy O2 Del Method O2 Flow Rate BP BP Location BP Method Pt Position [facility]   11/23/20 1035 75 94 % At rest Supplemental oxygen Nasal cannula 3 L/min 75/49 Left upper arm Automatic Sitting    11/23/20 1040 81 100 % At rest Supplemental oxygen Nasal cannula 3 L/min 74/54 Left upper arm Automatic Sitting    11/23/20 1045 80 100 % At rest Supplemental oxygen Nasal cannula 3 L/min 109/47 Left upper arm Automatic Sitting       OT Pain    Date/Time Pain Type Pref Pain Scale Side Location Rating: Rest Rating: Activity Interventions [facility]   11/23/20 1035 Pain Assessment number (Numeric Rating Pain Scale) Right hip 0 10 position adjusted;quiet environment facilitated JS          Prior Living Environment      Most Recent Value   Living Arrangements  house   Living Environment Comment  lives with his wife in a split level home, 6+6 steps with  railings          Prior Level of Function      Most Recent Value   Dominant Hand  right   Ambulation  assistive equipment   Transferring  assistive equipment   Toileting  independent   Bathing  independent   Dressing  independent   Prior Level of Function Comment  per pt, uses RW in home, cane when in community   Assistive Device/Animal Currently Used at Home  cane, straight, walker, front-wheeled          Occupational Profile      Most Recent Value   Reason for Services/Referral  ADL deficit s/p hernia repair now s/p CMN    Successful Occupations  , retired, father   Occupational History/Life Experiences  IND at baseline ADL and IADL    Performance Patterns  lives w/ wife, uses rw and cane    Environmental Supports and Barriers  supportive wife    Patient Goals  go home at d/c           OT Evaluation and Treatment - 11/23/20 1055        Time Calculation    Start Time  1031     Stop Time  1055     Time Calculation (min)  24 min        Session Details    Document Type  daily treatment/progress note     Mode of Treatment  occupational therapy        General Information    Patient Profile Reviewed?  yes     General Observations of Patient  Pt in chair, agreeable to OT.     Existing Precautions/Restrictions  aspiration;fall;weight bearing        Weight-Bearing Status    Right LE Weight-Bearing Status  weight-bearing as tolerated (WBAT)        Cognition/Psychosocial    Affect/Mental Status (Cognitive)  WFL     Orientation Status (Cognition)  oriented x 4     Follows Commands (Cognition)  follows one step commands;over 90% accuracy;repetition of directions required;verbal cues/prompting required     Cognitive Function (Cognitive)  attention deficit;safety deficit     Attention Deficit (Cognitive)  minimal deficit;concentration;distractible in quiet environment;focused/sustained attention     Comment, Attention  distracted by pain with movement     Safety Deficit (Cognitive)  minimal deficit;awareness of need for  assistance;insight into deficits/self awareness;problem solving     Comment, Cognition  decreased awareness of need for assistance     Cognitive Interventions/Strategies  occupation/activity based interventions        Hearing Assessment    Hearing Status  WFL        Vision Assessment/Intervention    Visual Impairment/Limitations  WFL;corrective lenses full time     Impact of Vision Impairment on Function  accurately read clock on wall from chair        Sensory Assessment (Somatosensory)    Sensory Assessment (Somatosensory)  UE sensation intact        Range of Motion (ROM)    Range of Motion  ROM is WFL;bilateral upper extremities        Range of Motion Comprehensive    Comment: Range of Motion  assessed functionally        Strength (Manual Muscle Testing)    Strength (Manual Muscle Testing)  strength is WFL;bilateral upper extremities        Strength Comprehensive (MMT)    Comment  assessed functionally        Bed Mobility    Comment (Bed Mobility)  NT, pt received in chair        Transfers    Transfers  other (see comments)    sit-stand, stand-sit    Maintains Weight-Bearing Status (Transfers)  able to maintain weight-bearing status        Sit to Stand Transfer    [place], Sit to Stand Transfer  2 person assist;minimum assist (75% or more patient effort);verbal cues;nonverbal cues (demo/gesture)     Verbal Cues  hand placement;safety;technique;proper use of assistive device     Assistive Device  walker, front-wheeled     Comment  from chair, repeated verbal and tactile cues for hand placement        Stand to Sit Transfer    [place], Stand to Sit Transfer  2 person assist;minimum assist (75% or more patient effort);verbal cues     Verbal Cues  hand placement;safety;technique;proper use of assistive device     Assistive Device  walker, front-wheeled     Comment  to chair        Safety Issues, Functional Mobility    Safety Issues Affecting Function (Mobility)  awareness of need for assistance;insight into  deficits/self awareness;positioning of assistive device;problem solving;safety precaution awareness     Impairments Affecting Function (Mobility)  balance;cognition;endurance/activity tolerance;pain     Cognitive Impairments, Mobility Safety/Performance  attention;awareness, need for assistance;insight into deficits/self awareness;problem solving/reasoning        Balance    Balance Assessment  sitting static balance;sitting dynamic balance;sit to stand dynamic balance;standing static balance     Static Sitting Balance  WFL;sitting in chair     Dynamic Sitting Balance  mild impairment;sitting, edge of bed    don/doff socks    Sit to Stand Dynamic Balance  moderate impairment;supported    RW    Static Standing Balance  mild impairment;supported    RW    Balance Interventions  occupation based/functional task        Motor Skills    Motor Skills  coordination;functional endurance     Coordination  WFL;bilateral;upper extremity     Functional Endurance  poor+     Motor Control/Coordination Interventions  occupation/activity based treatment        Lower Body Dressing    Self-Performance  --    doff/don socks    [place]  minimum assist (75% or more patient effort);verbal cues;nonverbal cues (demo/gesture);increased time to complete     Position  supported sitting     Adaptive Equipment  dressing stick;sock aid     Comment  Repeated multimodal cueing for use of LBD AE. Pt reported LBD AE helped with pain        Coping    Observed Emotional State  accepting;cooperative        AM-PAC (TM) - ADL (Current Function)    Putting on and taking off regular lower body clothing?  2 - A Lot     Bathing?  2 - A Lot     Toileting?  2 - A Lot     Putting on/taking off regular upper body clothing?  3 - A Little     How much help for taking care of personal grooming?  4 - None     Eating meals?  4 - None     AM-PAC (TM) ADL Score  17        Therapy Assessment/Plan (OT)    Rehab Potential (OT)  good, to achieve stated therapy goals      Therapy Frequency (OT)  3-5 times/wk        Progress Summary (OT)    Daily Outcome Statement (OT)  OT treatment session completed. Min A x2 sit-stand and stand-sit transfers. Min A doff/don socks with multimodal cueing using dressing stick to doff and sock aide to don. Limited by decreased balance and activity tolerance. Will continue to benefit from skilled OT to maximize independence with ADLs.     Symptoms Noted During/After Treatment  fatigue        Therapy Plan Review/Discharge Plan (OT)    OT Recommended Discharge Disposition  skilled nursing facility     Anticipated Equipment Needs At Discharge (OT)  dressing equipment                   Education provided this session. See the Patient Education summary report for full details.         OT Goals      Most Recent Value   Bed Mobility Goal 1   Activity/Assistive Device  bed mobility activities, all at 11/22/2020 0949   [place]  minimum assist (75% or more patient effort) at 11/22/2020 0949   Time Frame  by discharge at 11/22/2020 0949   Progress/Outcome  goal ongoing at 11/23/2020 1055   Transfer Goal 1   Activity/Assistive Device  all transfers at 11/22/2020 0949   [place]  supervision required at 11/22/2020 0949   Time Frame  by discharge at 11/22/2020 0949   Progress/Outcome  goal ongoing at 11/23/2020 1055   Dressing Goal 1   Activity/Adaptive Equipment  lower body dressing, long handled shoe horn, dressing stick, sock-aid, reacher at 11/22/2020 0949   [place]  modified independence at 11/22/2020 0949   Time Frame  by discharge at 11/22/2020 0949   Progress/Outcome  goal ongoing at 11/23/2020 1055   Toileting Goal 1   Activity/Assistive Device  toileting skills, all at 11/22/2020 0949   [place]  modified independence at 11/22/2020 0949   Time Frame  by discharge at 11/22/2020 0949   Progress/Outcome  goal ongoing at 11/23/2020 1055

## 2020-11-23 NOTE — PROGRESS NOTES
Discussed/ updated patient's daughter   Pt reportedly has PUD remotely, she is ok with pt being on ASA BID for DVT px and aware of increased CVA given hx of Parox AFib.   She prefers pt to be dc'ed on home only bc of COVID pandemic   Prefers to be dc'ed on 11/25.   D/w surgery/ ortho- no concerns for bleeding at this time., likely post ope expected blood loss anemia  + dilutional.

## 2020-11-24 ENCOUNTER — BMR PREADMISSION ASSESSMENT (OUTPATIENT)
Dept: ADMISSIONS | Facility: REHABILITATION | Age: 80
End: 2020-11-24

## 2020-11-24 LAB
CROSSMATCH: NORMAL
ISBT CODE: 9500
PRODUCT CODE: NORMAL
PRODUCT STATUS: NORMAL
SPECIMEN EXP DATE BLD: NORMAL
UNIT ABO: NORMAL
UNIT ID: NORMAL
UNIT RH: NEGATIVE
UNIT VOLUME: 325 ML

## 2020-11-24 PROCEDURE — 97116 GAIT TRAINING THERAPY: CPT | Mod: GP,CQ

## 2020-11-24 PROCEDURE — 97530 THERAPEUTIC ACTIVITIES: CPT | Mod: GP,CQ

## 2020-11-24 PROCEDURE — 63700000 HC SELF-ADMINISTRABLE DRUG: Performed by: STUDENT IN AN ORGANIZED HEALTH CARE EDUCATION/TRAINING PROGRAM

## 2020-11-24 PROCEDURE — 99232 SBSQ HOSP IP/OBS MODERATE 35: CPT | Performed by: INTERNAL MEDICINE

## 2020-11-24 PROCEDURE — 63700000 HC SELF-ADMINISTRABLE DRUG: Performed by: INTERNAL MEDICINE

## 2020-11-24 PROCEDURE — 63600000 HC DRUGS/DETAIL CODE: Performed by: INTERNAL MEDICINE

## 2020-11-24 PROCEDURE — 25000000 HC PHARMACY GENERAL: Performed by: INTERNAL MEDICINE

## 2020-11-24 PROCEDURE — 21400000 HC ROOM AND CARE CCU/INTERMEDIATE

## 2020-11-24 PROCEDURE — 63700000 HC SELF-ADMINISTRABLE DRUG: Performed by: NURSE PRACTITIONER

## 2020-11-24 RX ORDER — OXYCODONE HYDROCHLORIDE 5 MG/1
10 TABLET ORAL EVERY 4 HOURS PRN
Status: DISCONTINUED | OUTPATIENT
Start: 2020-11-24 | End: 2020-11-25 | Stop reason: HOSPADM

## 2020-11-24 RX ORDER — ACETAMINOPHEN 325 MG/1
975 TABLET ORAL 3 TIMES DAILY
Status: DISCONTINUED | OUTPATIENT
Start: 2020-11-24 | End: 2020-11-25 | Stop reason: HOSPADM

## 2020-11-24 RX ORDER — OXYCODONE HYDROCHLORIDE 5 MG/1
5 TABLET ORAL EVERY 4 HOURS PRN
Status: DISCONTINUED | OUTPATIENT
Start: 2020-11-24 | End: 2020-11-25 | Stop reason: HOSPADM

## 2020-11-24 RX ADMIN — FORMOTEROL FUMARATE DIHYDRATE 20 MCG: 20 SOLUTION RESPIRATORY (INHALATION) at 08:34

## 2020-11-24 RX ADMIN — OXYCODONE HYDROCHLORIDE 10 MG: 5 TABLET ORAL at 10:44

## 2020-11-24 RX ADMIN — ACETAMINOPHEN 975 MG: 325 TABLET, FILM COATED ORAL at 13:30

## 2020-11-24 RX ADMIN — ASPIRIN 81 MG: 81 TABLET, COATED ORAL at 20:22

## 2020-11-24 RX ADMIN — METHYLPREDNISOLONE SODIUM SUCCINATE 40 MG: 40 INJECTION, POWDER, FOR SOLUTION INTRAMUSCULAR; INTRAVENOUS at 09:02

## 2020-11-24 RX ADMIN — GABAPENTIN 100 MG: 100 CAPSULE ORAL at 08:34

## 2020-11-24 RX ADMIN — PANTOPRAZOLE SODIUM 40 MG: 40 TABLET, DELAYED RELEASE ORAL at 08:34

## 2020-11-24 RX ADMIN — BUDESONIDE 0.5 MG: 0.5 INHALANT ORAL at 20:23

## 2020-11-24 RX ADMIN — GABAPENTIN 100 MG: 100 CAPSULE ORAL at 20:22

## 2020-11-24 RX ADMIN — OXYCODONE HYDROCHLORIDE 10 MG: 5 TABLET ORAL at 15:45

## 2020-11-24 RX ADMIN — BUDESONIDE 0.5 MG: 0.5 INHALANT ORAL at 08:34

## 2020-11-24 RX ADMIN — OXYCODONE HYDROCHLORIDE AND ACETAMINOPHEN 2 TABLET: 5; 325 TABLET ORAL at 05:47

## 2020-11-24 RX ADMIN — ASPIRIN 81 MG: 81 TABLET, COATED ORAL at 10:46

## 2020-11-24 RX ADMIN — FORMOTEROL FUMARATE DIHYDRATE 20 MCG: 20 SOLUTION RESPIRATORY (INHALATION) at 20:23

## 2020-11-24 RX ADMIN — ESCITALOPRAM 10 MG: 5 TABLET, FILM COATED ORAL at 20:22

## 2020-11-24 RX ADMIN — PANTOPRAZOLE SODIUM 40 MG: 40 TABLET, DELAYED RELEASE ORAL at 20:22

## 2020-11-24 RX ADMIN — ACETAMINOPHEN 975 MG: 325 TABLET, FILM COATED ORAL at 20:21

## 2020-11-24 ASSESSMENT — COGNITIVE AND FUNCTIONAL STATUS - GENERAL
MOVING TO AND FROM BED TO CHAIR: 3 - A LITTLE
WALKING IN HOSPITAL ROOM: 3 - A LITTLE
STANDING UP FROM CHAIR USING ARMS: 3 - A LITTLE
CLIMB 3 TO 5 STEPS WITH RAILING: 1 - TOTAL
AFFECT: WFL

## 2020-11-24 NOTE — PROGRESS NOTES
Patient: Hernesto Varghese  Location: [facility] 3 Main 0307W  MRN: [id number]  Today's date: 11/24/2020     Pt returned to bed, HOB elevated; alarm set, personal items and call cord in reach; RN notified for hand off; pt may have ice for R hip pain.    Hernesto is a 80 y.o. male admitted on 11/19/2020 with Closed right hip fracture (CMS/[facility]). Principal problem is Closed fracture of right hip (CMS/[facility]).    Past Medical History  Hernesto has a past medical history of Ambulates with cane, BPH (benign prostatic hyperplasia), Chronic back pain, Chronic respiratory failure with hypoxia (CMS/[facility]), Colostomy in place (CMS/[facility]), COPD (chronic obstructive pulmonary disease) (CMS/[facility]), Depression, GERD (gastroesophageal reflux disease), History of rib fracture, History of stomach ulcers, fall, Inguinal hernia, Neck pain, Oxygen dependent, Pneumonia, Seasonal allergies, Snores, SOB (shortness of breath), Urinary retention, and Uses roller walker.    History of Present Illness   80M s/p R hip cephalomedullary nail with Dr. Coronado on 11/20 New OT/PT orders: 11/22/2020    PT Vitals    Date/Time Pulse HR Source SpO2 Pt Activity O2 Therapy O2 Del Method O2 Flow Rate BP BP Location BP Method Pt Position Observations [facility]   11/24/20 1328 87 Left Brachial -- At rest -- -- -- 131/62 Left upper arm Automatic Sitting PT MM   11/24/20 1352 79 Monitor 95 % Other (Comment) s/p PT activity Supplemental oxygen Nasal cannula 3 L/min -- -- -- -- PT MM      PT Pain    Date/Time Pain Type Pref Pain Scale Side Location Rating: Rest Rating: Activity Interventions [facility]   11/24/20 1328 Pain Assessment number (Numeric Rating Pain Scale) Right hip 7 8 position adjusted MM   11/24/20 1330 Pain Assessment -- -- -- -- -- -- MM   11/24/20 1330 -- number (Numeric Rating Pain Scale) -- hip 7 7 --    11/24/20 1352 Pain Reassessment;Post Activity number (Numeric Rating Pain Scale) Right hip 6 7 position adjusted;other (see comments) may have ice MM          Prior  Living Environment      Most Recent Value   Living Arrangements  house   Living Environment Comment  lives with his wife in a split level home, 6+6 steps with railings          Prior Level of Function      Most Recent Value   Dominant Hand  right   Ambulation  assistive equipment   Transferring  assistive equipment   Toileting  independent   Bathing  independent   Dressing  independent   Prior Level of Function Comment  per pt, uses RW in home, cane when in community   Assistive Device/Animal Currently Used at Home  cane, straight, walker, front-wheeled          PT Evaluation and Treatment - 11/24/20 1328        Time Calculation    Start Time  1328     Stop Time  1352     Time Calculation (min)  24 min        Session Details    Document Type  daily treatment/progress note     Mode of Treatment  physical therapy        General Information    Patient Profile Reviewed?  yes     General Observations of Patient  rec'd OOB in chair from RN; O2 via nc, [place] cath; distal LE edema     Existing Precautions/Restrictions  aspiration;fall;oxygen therapy device and L/min;weight bearing        Weight-Bearing Status    Right LE Weight-Bearing Status  weight-bearing as tolerated (WBAT)        Cognition/Psychosocial    Affect/Mental Status (Cognitive)  WFL     Orientation Status (Cognition)  oriented x 4     Follows Commands (Cognition)  follows two step commands;75-90% accuracy;verbal cues/prompting required     Comment, Cognition  mild inattn, decr'd safety; pleasant, cooperative, receptive; requires Min cueing        Bed Mobility    Bed Mobility  scooting/bridging     Hand, Scoot/Bridge  minimum assist (75% or more patient effort);verbal cues     Verbal Cues (Scoot/Bridge)  preparatory posture     Hand, Sit to Supine  minimum assist (75% or more patient effort);verbal cues     Verbal Cues (Sit to Supine)  preparatory posture;technique     Assistive Device (Bed Mobility)  other (see comments)    mattress firmed     Comment (Bed Mobility)  HOB 10deg; trxfs on (R)        Transfers    Transfers  stand pivot transfer     Maintains Weight-Bearing Status (Transfers)  able to maintain weight-bearing status     Comment  antalgic        Sit to Stand Transfer    [place], Sit to Stand Transfer  minimum assist (75% or more patient effort);1 person assist;verbal cues     Verbal Cues  hand placement     Assistive Device  walker, front-wheeled     Comment  chair, arm rests        Stand to Sit Transfer    [place], Stand to Sit Transfer  minimum assist (75% or more patient effort);1 person assist;verbal cues     Verbal Cues  hand placement;preparatory posture     Assistive Device  walker, front-wheeled     Comment  bed        Stand Pivot Transfer    [place], Stand Pivot/Stand Step Transfer  minimum assist (75% or more patient effort);1 person assist;verbal cues     Verbal Cues  maintaining center of gravity over base of support;proper use of assistive device;technique     Assistive Device  walker, front-wheeled     Comment  L and R; caution O2 tubing; cue posture and sequencing feet and walker        Gait Training    [place], Gait  minimum assist (75% or more patient effort)     Assistive Device  walker, front-wheeled     Distance in Feet  32 feet     Gait Pattern Utilized  step-to     Deviations/Abnormal Patterns (Gait)  base of support, wide;minerva decreased;gait speed decreased;other (see comments);antalgic    fwd rounded posture; decr'd foot clearance; mis-sequencing    Maintains Weight-Bearing Status  able to maintain     Comment  cue poosture, gait mechs, appropriate use/positioning of RW        Safety Issues, Functional Mobility    Safety Issues Affecting Function (Mobility)  insight into deficits/self awareness;awareness of need for assistance     Impairments Affecting Function (Mobility)  pain;strength;postural/trunk control;balance;range of motion     Cognitive Impairments, Mobility Safety/Performance   attention;safety precaution awareness     Comment, Safety Issues/Impairments (Mobility)  pleasant, cooperative, receptive; works well w/ PT        Balance    Static Sitting Balance  WFL;unsupported;sitting, edge of bed     Dynamic Sitting Balance  mild impairment;supported;sitting, edge of bed    fwd reqach outside [place], floor p/u of object    Sit to Stand Dynamic Balance  mild impairment;supported     Static Standing Balance  mild impairment;supported     Dynamic Standing Balance  mild impairment;supported     Comment, Balance  decr'd posture, balance, safety; receptive to cueing and instr; maintain falls risk        AM-PAC (TM) - Mobility (Current Function)    Turning from your back to your side while in a flat bed without using bedrails?  3 - A Little     Moving from lying on your back to sitting on the side of a flat bed without using bedrails?  3 - A Little     Moving to and from a bed to a chair?  3 - A Little     Standing up from a chair using your arms?  3 - A Little     To walk in a hospital room?  3 - A Little     Climbing 3-5 steps with a railing?  1 - Total     AM-PAC (TM) Mobility Score  16        Therapy Assessment/Plan (PT)    Rehab Potential (PT)  good, to achieve stated therapy goals     Therapy Frequency (PT)  5 times/wk     Problem List  problems related to;balance;pain;postural control;strength;range of motion (ROM)        Progress Summary (PT)    Daily Outcome Statement (PT)  PT session completed.  Pt presents to PT w/ impaired mob and gait.  R hip fx, WBAT, incr'd pain.  Deficits in posture, balance, strength.  Incr'd falls risk.  Min A all mobility including transitional postures and dynamic sitting balance outside [place].  He will benefit from continued PT care to maximize funct mob and safety.     Symptoms Noted During/After Treatment  other (see comments)    [place], VSS    Progress Toward Functional Goals (PT)  progressing toward functional goals as expected        Therapy Plan Review/Discharge  Plan (PT)    PT Recommended Discharge Disposition  skilled nursing facility    will require 24/7 asst if home; pt is a primary caregiver    Anticipated Equipment Needs at Discharge (PT Eval)  --    TBD pending dispo       Plan of Care Review    Plan of Care Reviewed With  patient                       Education provided this session. See the Patient Education summary report for full details.    PT Goals      Most Recent Value   Bed Mobility Goal 1   Activity/Assistive Device  sit to supine/supine to sit at 11/22/2020 0953   [place]  modified independence at 11/22/2020 0953   Time Frame  by discharge at 11/22/2020 0953   Progress/Outcome  goal ongoing at 11/22/2020 0953   Transfer Goal 1   Activity/Assistive Device  sit-to-stand/stand-to-sit, bed-to-chair/chair-to-bed, walker, front-wheeled at 11/22/2020 0953   [place]  supervision required at 11/22/2020 0953   Time Frame  by discharge at 11/22/2020 0953   Progress/Outcome  goal ongoing at 11/22/2020 0953   Gait Training Goal 1   Activity/Assistive Device  gait (walking locomotion), assistive device use, walker, front-wheeled at 11/22/2020 0953   [place]  supervision required at 11/22/2020 0953   Distance  50 at 11/22/2020 0953   Time Frame  by discharge at 11/22/2020 0953   Progress/Outcome  goal ongoing at 11/22/2020 0953

## 2020-11-24 NOTE — PLAN OF CARE
Plan of Care Review  Plan of Care Reviewed With: patient  Progress: improving  Outcome Summary: PRN Oxycodone given for pain with good effect. No events overnight. On 3 liters 02 NC. Will monitor.

## 2020-11-24 NOTE — PATIENT CARE CONFERENCE
Care Progression Rounds Note  Date: 11/24/2020  Time: 10:03 AM     Patient Name: Hernesto Varghese     Medical Record Number: [id number]   YOB: 1940  Sex: Male      Room/Bed: 0307W    Admitting Diagnosis: Inguinal hernia without obstruction or gangrene, recurrence not specified, unspecified laterality [K40.90]  Closed right hip fracture (CMS/HCC) [S72.001A]   Admit Date/Time: 11/19/2020  7:42 AM    Primary Diagnosis: Closed fracture of right hip (CMS/HCC)  Principal Problem: Closed fracture of right hip (CMS/HCC)    GMLOS: 6.2  Anticipated Discharge Date: 11/25/2020    AM-PAC  Mobility Score: 11    Discharge Planning:  Living Arrangements: house  Anticipated Discharge Disposition: skilled nursing facility, home with home health services    Barriers to Discharge:  Barriers to Discharge: Medical issues not resolved  Comment: preop clearance    Participants:  advanced practice provider, , nursing, social work/services

## 2020-11-24 NOTE — PROGRESS NOTES
11/24/20208:51 AM PT/OT recommending snf however both pt and family do not want pt going to snf due to COVID. Pt admitted for hernia repair and then fell in pacu and fractured hip, pt went to OR for hip surgery during this admission. antonio wondering if pt may be a [facility] candidate and wondering if family would be on board with this since this is not a snf. Spoke to [facility] liaison discussed case and suggested for RAML consult. sw will not outreach to family about this until we know if this Is even a possibility. Sent message to [facility] For RAML consult. ecin referral made to [facility]. Patricia [facility] 6138  ADDENDUM 12:22 PM spoke to Dr. Kaye/GAURAV who approved pt for acute rehab, per Dr. Kaye pt is refusing. sw called pts daughter, discussed that sw looked into acute rehab for pt as sw thought this was a good idea, pts daughter at this time still doesn't want pt going to any type of rehab even if it is an acute rehab and they want the pt to come home. Family lives an hour away, pts daughter stated that the family plans to provide 24 hour care for at least a week and that some family members have nursing background. sw discussed homecare and pts daughter confirmed family would like referral to [facility] for RN, PT and OT. pts daughter will pick pt up tomorrow and bring pt home. pts daughter will bring portable oxygen. ecin referral made to [facility], [facility] replied saying they cannot accept referral due to staffing issues. ecin referrals made to [facility] and [facility]. Patricia Andrade [facility] 6138  ADDENDUM 12:59 PM received confirmation from [facility] that they can accept pt and service pts home area. Called pts daughter and left a message with this update and that [facility] cannot accept but that homecare has been set up with [facility]. Patricia Andrade [facility] 6138

## 2020-11-24 NOTE — PROGRESS NOTES
"General Surgery Daily Progress Note    Subjective: Pt seen and examined.  Complains of moderate hip pain.  Tolerating diet.  Eager for discharge home    Vital signs in last 24 hours:  Temp:  [36.3 °C (97.4 °F)-37.1 °C (98.7 °F)] 36.4 °C (97.5 °F)  Heart Rate:  [63-95] 70  Resp:  [18-20] 18  BP: ()/(47-89) 145/67    Intake/Output this shift:    Intake/Output Summary (Last 24 hours) at 11/24/2020 0812  Last data filed at 11/24/2020 0600  Gross per 24 hour   Intake 1025 ml   Output 2400 ml   Net -1375 ml       Physical Exam    General appearance: alert, appears stated age and cooperative    Abdomen: soft, non-tender, right groin dressing clean dry and intact, moderate ecchymosis in scrotum; bowel sounds normal; no masses, no organomegaly\"    Extremities: Moderate right thigh edema      Labs  Results from last 7 days   Lab Units 11/23/20  0319   SODIUM mEQ/L 133*   POTASSIUM mEQ/L 4.3   CHLORIDE mEQ/L 100   CO2 mEQ/L 24   BUN mg/dL 38*   CREATININE mg/dL 1.3   GLUCOSE mg/dL 142*   CALCIUM mg/dL 8.4*     Results from last 7 days   Lab Units 11/23/20  1714 11/23/20  0319   WBC K/uL  --  13.91*   HEMOGLOBIN g/dL 8.1* 7.1*   HEMATOCRIT % 24.6* 21.6*   PLATELETS K/uL  --  136*         Results from last 7 days   Lab Units 11/23/20  0319   SODIUM mEQ/L 133*   POTASSIUM mEQ/L 4.3   CHLORIDE mEQ/L 100   CO2 mEQ/L 24   BUN mg/dL 38*   CREATININE mg/dL 1.3   CALCIUM mg/dL 8.4*   GLUCOSE mg/dL 142*     Results from last 7 days   Lab Units 11/23/20  1714   HEMOGLOBIN g/dL 8.1*   HEMATOCRIT % 24.6*     No results found for: LIPASE  Pathology Results     ** No results found for the last 720 hours. **              Imaging  X-ray Knee Right 1 Or 2 Views    Result Date: 11/19/2020  IMPRESSION: Displaced comminuted right-sided intertrochanteric fracture. No definite more distal femoral fracture or fracture about the knee noting limited assessment. COMMENT: Comparison: None. Three views of the right femur with two views of the right " knee.  Likely limits assessment of the knee. There is an impacted comminuted displaced intratrochanteric fracture of the right femur.  The remainder of the right femur is intact.  Limited assessment at the right knee reveals no definite fracture.  There are degenerative changes at the right knee.  Questionable small joint effusion.  Patellar enthesophyte formation.  Atherosclerotic calcifications.    X-ray Chest 1 View    Result Date: 11/21/2020  IMPRESSION: No significant interval change.    X-ray Chest 1 View    Result Date: 11/20/2020  IMPRESSION: Diffuse interstitial prominence of uncertain chronicity.  Otherwise, no active disease.    X-ray Pelvis 1 Or 2 Views    Result Date: 11/20/2020  IMPRESSION: Postoperative study    X-ray Femur Right 2+ View    Result Date: 11/19/2020  IMPRESSION: Displaced comminuted right-sided intertrochanteric fracture. No definite more distal femoral fracture or fracture about the knee noting limited assessment. COMMENT: Comparison: None. Three views of the right femur with two views of the right knee.  Likely limits assessment of the knee. There is an impacted comminuted displaced intratrochanteric fracture of the right femur.  The remainder of the right femur is intact.  Limited assessment at the right knee reveals no definite fracture.  There are degenerative changes at the right knee.  Questionable small joint effusion.  Patellar enthesophyte formation.  Atherosclerotic calcifications.    X-ray Hip With Or Without Pelvis 2-3 Vw Right    Result Date: 11/20/2020  IMPRESSION: Intraoperative fluoroscopy    X-ray Hip With Or Without Pelvis 2-3 Vw Right    Result Date: 11/19/2020  IMPRESSION: Acute comminuted displaced right intratrochanteric fracture COMMENT: Comparison: None. AP view of the pelvis and crosstable lateral view of the right hip.  At the right hip there is an acute  displaced intertrochanteric fracture with avulsion of the greater trochanter and lesser trochanter.  Old  fracture suspected of the left inferior pubic ramus.  No definite acute displaced bony pelvic ring fractures.  No gross and about the left hip.  Osteopenia limits assessment. Surgical clips project over the pelvis and inguinal regions.    Fl Fluoroscopy Technical Assistance    Result Date: 11/20/2020  IMPRESSION: Intraoperative fluoroscopy        Assessment   Postop day 5 status post right inguinal hernia repair  Postop day 4 status post right hip fracture repair  COPD    Plan   Patient wants to go home with home physical therapy.  His daughter will be there to help both he and his wife also needs assistance.  Discharge planning.        Cordell Navarrete, DO

## 2020-11-24 NOTE — PROGRESS NOTES
Hospital Medicine Service -  Daily Progress Note       SUBJECTIVE   - no ac issues overnight noted.      OBJECTIVE      Vital signs in last 24 hours:  Temp:  [36.3 °C (97.4 °F)-37.1 °C (98.7 °F)] 36.4 °C (97.5 °F)  Heart Rate:  [63-95] 70  Resp:  [18-20] 18  BP: ()/(47-89) 145/67    Intake/Output Summary (Last 24 hours) at 11/24/2020 1029  Last data filed at 11/24/2020 0600  Gross per 24 hour   Intake 565 ml   Output 2200 ml   Net -1635 ml       PHYSICAL EXAMINATION      Physical Exam  Vitals signs and nursing note reviewed.   HENT:      Head: Normocephalic.      Nose: Nose normal.   Eyes:      Pupils: Pupils are equal, round, and reactive to light.   Neck:      Musculoskeletal: Normal range of motion.   Cardiovascular:      Rate and Rhythm: Normal rate.   Pulmonary:      Effort: Pulmonary effort is normal.      Comments: Decreased breath sounds   Abdominal:      General: Abdomen is flat.   Genitourinary:     Comments: Bruising over the R groin/ scotum, condom cath  Musculoskeletal:      Comments: Decreased ROM in R leg, cannot raise RLE again gravity.    Neurological:      Mental Status: He is oriented to person, place, and time.        LABS / IMAGING / TELE      Labs  Lab Results   Component Value Date    GLUCOSE 142 (H) 11/23/2020    CALCIUM 8.4 (L) 11/23/2020     (L) 11/23/2020    K 4.3 11/23/2020    CO2 24 11/23/2020     11/23/2020    BUN 38 (H) 11/23/2020    CREATININE 1.3 11/23/2020     Lab Results   Component Value Date    WBC 13.91 (H) 11/23/2020    HGB 8.1 (L) 11/23/2020    HCT 24.6 (L) 11/23/2020    .4 (H) 11/23/2020     (L) 11/23/2020     Microbiology Results     ** No results found for the last 720 hours. **        Imaging  X-ray Knee Right 1 Or 2 Views    Result Date: 11/19/2020  IMPRESSION: Displaced comminuted right-sided intertrochanteric fracture. No definite more distal femoral fracture or fracture about the knee noting limited assessment. COMMENT: Comparison:  None. Three views of the right femur with two views of the right knee.  Likely limits assessment of the knee. There is an impacted comminuted displaced intratrochanteric fracture of the right femur.  The remainder of the right femur is intact.  Limited assessment at the right knee reveals no definite fracture.  There are degenerative changes at the right knee.  Questionable small joint effusion.  Patellar enthesophyte formation.  Atherosclerotic calcifications.    X-ray Chest 1 View    Result Date: 11/21/2020  IMPRESSION: No significant interval change.    X-ray Chest 1 View    Result Date: 11/20/2020  IMPRESSION: Diffuse interstitial prominence of uncertain chronicity.  Otherwise, no active disease.    X-ray Pelvis 1 Or 2 Views    Result Date: 11/20/2020  IMPRESSION: Postoperative study    X-ray Femur Right 2+ View    Result Date: 11/19/2020  IMPRESSION: Displaced comminuted right-sided intertrochanteric fracture. No definite more distal femoral fracture or fracture about the knee noting limited assessment. COMMENT: Comparison: None. Three views of the right femur with two views of the right knee.  Likely limits assessment of the knee. There is an impacted comminuted displaced intratrochanteric fracture of the right femur.  The remainder of the right femur is intact.  Limited assessment at the right knee reveals no definite fracture.  There are degenerative changes at the right knee.  Questionable small joint effusion.  Patellar enthesophyte formation.  Atherosclerotic calcifications.    X-ray Hip With Or Without Pelvis 2-3 Vw Right    Result Date: 11/20/2020  IMPRESSION: Intraoperative fluoroscopy    X-ray Hip With Or Without Pelvis 2-3 Vw Right    Result Date: 11/19/2020  IMPRESSION: Acute comminuted displaced right intratrochanteric fracture COMMENT: Comparison: None. AP view of the pelvis and crosstable lateral view of the right hip.  At the right hip there is an acute  displaced intertrochanteric fracture with  avulsion of the greater trochanter and lesser trochanter.  Old fracture suspected of the left inferior pubic ramus.  No definite acute displaced bony pelvic ring fractures.  No gross and about the left hip.  Osteopenia limits assessment. Surgical clips project over the pelvis and inguinal regions.    Fl Fluoroscopy Technical Assistance    Result Date: 11/20/2020  IMPRESSION: Intraoperative fluoroscopy      • acetaminophen  975 mg oral TID   • aspirin  81 mg oral BID   • budesonide  0.5 mg nebulization BID (6a, 6p)   • escitalopram  10 mg oral Nightly   • formoterol  20 mcg nebulization BID (6a, 6p)   • gabapentin  100 mg oral BID   • methylPREDNISolone sodium succinate  40 mg intravenous Daily   • pantoprazole  40 mg oral BID     ECG/Telemetry  I have independently reviewed the telemetry. No events for the last 24 hours.    ASSESSMENT AND PLAN      Chronic obstructive pulmonary disease with acute exacerbation (CMS/HCC)  Assessment & Plan  Known hx of mod severe COPD  Per records   C/w nebs/ steroid taper  No pna on CXR - dc abx and monitor   ABG mild hypoxia  BNP mininmally elevated, no significant effusions  ECHO - reviewed unremarkable   Code status - full code   DC in AM, pt declined SNF placement , prefers home w. Home care       Acute on chronic respiratory failure (CMS/HCC)  Assessment & Plan  As above     Leucocytosis  Assessment & Plan  Improving       Acute postoperative anemia due to expected blood loss  Assessment & Plan  Pt underwent 2 surgeries this admission  Monitor counts  preop hgb-13.9   S/p transfusion on 11/23 for hgb of 7.1 , Hgb this AM 8.1     Hypomagnesemia  Assessment & Plan  - monitor/ replete    Chronic respiratory failure with hypoxia (CMS/HCC)  Assessment & Plan  Remains on home O2- on 3L ch    Idiopathic hypotension  Assessment & Plan  Nml cortisol   Resolved w/ small bolus     Paroxysmal atrial fibrillation (CMS/HCC)  Assessment & Plan  Further as per cards     Pre-op  examination  Assessment & Plan  Cleared by cards for surgery       Depression  Assessment & Plan  Continue home meds    Closed right hip fracture (CMS/HCC)  Assessment & Plan  S/p surgery on 11/20   Pain control, DVT ppx, PT/OT per ortho           VTE Assessment: Padua    Code Status: Full Code  Estimated discharge date: 11/25/2020     Kush King MD  11/24/2020  10:29 AM

## 2020-11-24 NOTE — PLAN OF CARE
Problem: Adult Inpatient Plan of Care  Goal: Plan of Care Review  Outcome: Progressing  Flowsheets (Taken 11/24/2020 9820)  Progress: improving  Plan of Care Reviewed With: patient  Outcome Summary: Patient was OOB to chair and ambulated to door and back with min assist and walker. Patient continues to endorse left incisional hip pain and left knee pain medicated now with ATC tylenol and PRN oxycodone. On 3L nasal cannula with good sats. Still outstanding for sputum sample but cough is non-productive. Daughter updated with plan of care and is requesting nursing contact her tomorrow when there is a discharge order. Will continue to monitor.

## 2020-11-24 NOTE — CONSULTS
Physical Medicine and Rehabilitation Consult Note    Subjective     Hernesto Varghese is a 80 y.o. male who was admitted for Inguinal hernia without obstruction or gangrene, recurrence not specified, unspecified laterality [K40.90]  Closed right hip fracture (CMS/HCC) [S72.001A]. Patient was referred by Dr King for evaluate rehab needs. Patient is an 80-year-old male with a history of COPD, BPH who initially presented to undergo an elective right inguinal hernia repair.  Postoperatively in the PACU he suffered a fall resulting in a right intratrochanteric hip fracture.  The next day he was taken to the OR and underwent IM nailing by Dr. Coronado.  His postoperative course was complicated by hypoxemia for which she was seen by pulmonology.  He was placed on bronchodilators and nebulizer treatments.  This is improved.  He currently complains of pain in the right hip and groin in addition he has right knee pain.  He denies any other complaint of headache or dizziness, chest pain or shortness of breath.  He does get dyspneic with exertion.  He has been seen by physical and occupational therapy.  He is weightbearing as tolerated on the right lower extremity.  He needed minimal assistance of 2 people to transfer sit to stand.  He needed minimal assistance of 2 people to ambulate short distance using a rolling walker.    Pertinent radiology results reviewed. Pertinent lab results reviewed..    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)    • COPD (chronic obstructive pulmonary disease) (CMS/HCC)    • Depression    • GERD (gastroesophageal reflux disease)    • History of rib fracture    • History of stomach ulcers     yrs ago   • Hx of fall    • Inguinal hernia     right   • Neck pain    • Oxygen dependent     on 4 liters/ mostly just at  night   • Pneumonia     June 2020/ aspiration pneumonia   • Seasonal allergies    • Snores    • SOB (shortness of breath)    • Urinary retention    • Uses roller walker        Surgical History:   Past Surgical History:   Procedure Laterality Date   • COLECTOMY      w/ colostomy   • HERNIA REPAIR     • OTHER SURGICAL HISTORY      urolift       Social History:   Social History   He lives in [place] with his wife in a house, 2 stories.  In the home he is ambulatory with a walker, uses a cane in the community.  He was still able to drive.  He reports being independent with his ADLs.  He was able to manage his own colostomy.  Social History Narrative   • Not on file     Lives with:    Prior Function Level: Prior Level of Function  Dominant Hand: right  Ambulation: assistive equipment  Transferring: assistive equipment  Toileting: independent  Bathing: independent  Dressing: independent  Prior Level of Function Comment: per pt, uses RW in home, cane when in community  Family History: History reviewed. No pertinent family history.  History also provided by:   Allergies: Aspirin    Current Inpatient Medications   Medication Dose Route Frequency Provider Last Rate Last Dose   • acetaminophen (TYLENOL) tablet 975 mg  975 mg oral TID Kush King MD       • albuterol nebulizer solution 2.5 mg  2.5 mg nebulization q6h PRN John Mary DO   2.5 mg at 11/23/20 0843   • aspirin enteric coated tablet 81 mg  81 mg oral BID Kush King MD   81 mg at 11/24/20 1046   • budesonide (PULMICORT) 0.5 mg/2 mL nebulizer solution 0.5 mg  0.5 mg nebulization BID (6a, 6p) Carol Jeffery MD   0.5 mg at 11/24/20 0834   • escitalopram (LEXAPRO) tablet 10 mg  10 mg oral Nightly Nelsy Giron CRNP   10 mg at 11/23/20 2120   • formoterol (PERFOROMIST) 20 mcg/2 mL nebulizer solution 20 mcg  20 mcg nebulization BID (6a, 6p) Carol Jeffery MD   20 mcg at 11/24/20 0834   • gabapentin (NEURONTIN) capsule 100 mg  100 mg oral BID Usman  DO John   100 mg at 11/24/20 0834   • LORazepam (ATIVAN) tablet 1 mg  1 mg oral q8h PRN John Mary DO   1 mg at 11/22/20 2308   • magnesium sulfate IVPB 2g in 50 mL NSS/D5W/SWFI  2 g intravenous PRN Kush King MD   Stopped at 11/21/20 1457   • melatonin ODT 3 mg  3 mg oral Nightly PRN Bryan Cheema MD   3 mg at 11/23/20 0030   • methylPREDNISolone sod suc(PF) (Solu-MEDROL) injection 40 mg  40 mg intravenous Daily Kush King MD   40 mg at 11/24/20 0902   • ondansetron (ZOFRAN) injection 4 mg  4 mg intravenous q6h PRN John Mary DO       • oxyCODONE (ROXICODONE) immediate release tablet 10 mg  10 mg oral q4h PRN Kush King MD   10 mg at 11/24/20 1044   • oxyCODONE (ROXICODONE) immediate release tablet 5 mg  5 mg oral q4h PRN Kush King MD       • pantoprazole (PROTONIX) tablet,delayed release (DR/EC) 40 mg  40 mg oral BID Kush King MD   40 mg at 11/24/20 0834        Medication List      START taking these medications    oxyCODONE-acetaminophen 5-325 mg per tablet  Commonly known as: PERCOCET  Take 1 tablet by mouth every 4 (four) hours as needed for moderate pain or severe pain for up to 5 days.  Dose: 1 tablet        CONTINUE taking these medications    acetaminophen 500 mg tablet  Commonly known as: TYLENOL  Take 500 mg by mouth every 6 (six) hours as needed for mild pain.  Dose: 500 mg     * albuterol 2.5 mg /3 mL (0.083 %) nebulizer solution  Take 2.5 mg by nebulization every 6 (six) hours as needed for wheezing.  Dose: 2.5 mg     * albuterol sulfate 90 mcg/actuation inhaler  Inhale 2 puffs every 6 (six) hours as needed for wheezing.  Dose: 2 puff     escitalopram 10 mg tablet  Commonly known as: LEXAPRO  Take 10 mg by mouth daily.  Dose: 10 mg     fluticasone propion-salmeteroL 250-50 mcg/dose diskus inhaler  Commonly known as: ADVAIR DISKUS  Inhale 1 puff 2 (two) times a day.   Rinse mouth with water after use to reduce aftertaste and incidence of candidiasis.   Do  not swallow.  For patients not on a ventilator, a spacer is recommended to be used with this medication/inhaler.  Dose: 1 puff     gabapentin 100 mg capsule  Commonly known as: NEURONTIN  Take 100 mg by mouth 2 (two) times a day.  Dose: 100 mg     ipratropium 0.02 % nebulizer solution  Commonly known as: ATROVENT  Take 500 mcg by nebulization 4 (four) times a day.  Dose: 500 mcg     ipratropium-albuteroL 0.5-2.5 mg/3 mL nebulizer solution  Commonly known as: DUO-NEB  Take 3 mL by nebulization 4 (four) times a day.  Dose: 3 mL     LORazepam 1 mg tablet  Commonly known as: ATIVAN  Take 1 mg by mouth every 8 (eight) hours as needed for anxiety.  Dose: 1 mg     PEPCID 40 mg tablet  Take 40 mg by mouth every morning.  Dose: 40 mg  Generic drug: famotidine         * This list has 2 medication(s) that are the same as other medications prescribed for you. Read the directions carefully, and ask your doctor or other care provider to review them with you.              Review of Systems  Pertinent items are noted in HPI.    Objective   Labs  I have reviewed the patient's labs.  Significant abnormals are BUN is 38, white count is 13.91, hemoglobin is 8.1.    Imaging  I have reviewed the Imaging from the last 24 hrs.    Telemetry:   I have independently reviewed the patient's telemtry. All telemetry are within normal limits.    Physical Exam  General appearance: well-developed, well-nourished and cooperative  Neck: supple, no adenopathy and Right carotid bruit  Lungs: diminished breath sounds diffuse  Heart: regular rate and rhythm, S1, S2 normal, no murmur, click, rub or gallop  Abdomen: soft, non-tender, bowel sounds normal and no masses/no organomegaly  Extremities: Moderate lower extremity edema right greater than left.  No calf tenderness noted.  The right hip incision was dressed there was no significant drainage noted.  The right groin incision had Steri-Strips without drainage.  Neurologic: alert, oriented with intact  speech and cognition  sensation intact to touch  motor:no focal weakness        Assessment   80 y.o. male being consulted for evaluate rehab needs  Plan of care was discussed with patient and            Plan     Mr. Varghese is an 80-year-old male with an ADL and ambulatory dysfunction status post fall resulting in a right hip intratrochanteric fracture.  He is also status post right inguinal hernia repair.  He has multiple medical comorbidities including COPD O2 dependent at night.  He was independent prior to his surgeries.  I discussed the plan of care with the patient recommendation will be referral to acute level inpatient rehab for comprehensive rehab program to work on improvement in his functional mobility, overall strengthening and improvement in endurance.  The plan was discussed with the patient as well as with his daughter they are not agreeable and preferred to discharge home when he is medically stable.  I also discussed my findings with .

## 2020-11-25 VITALS
WEIGHT: 197.9 LBS | SYSTOLIC BLOOD PRESSURE: 117 MMHG | RESPIRATION RATE: 18 BRPM | HEIGHT: 69 IN | OXYGEN SATURATION: 97 % | TEMPERATURE: 98.9 F | HEART RATE: 70 BPM | BODY MASS INDEX: 29.31 KG/M2 | DIASTOLIC BLOOD PRESSURE: 57 MMHG

## 2020-11-25 PROCEDURE — 99239 HOSP IP/OBS DSCHRG MGMT >30: CPT | Performed by: INTERNAL MEDICINE

## 2020-11-25 PROCEDURE — 63600000 HC DRUGS/DETAIL CODE: Performed by: INTERNAL MEDICINE

## 2020-11-25 PROCEDURE — 93005 ELECTROCARDIOGRAM TRACING: CPT | Performed by: INTERNAL MEDICINE

## 2020-11-25 PROCEDURE — 25000000 HC PHARMACY GENERAL: Performed by: INTERNAL MEDICINE

## 2020-11-25 PROCEDURE — 63700000 HC SELF-ADMINISTRABLE DRUG: Performed by: STUDENT IN AN ORGANIZED HEALTH CARE EDUCATION/TRAINING PROGRAM

## 2020-11-25 PROCEDURE — 63700000 HC SELF-ADMINISTRABLE DRUG: Performed by: INTERNAL MEDICINE

## 2020-11-25 RX ORDER — ASPIRIN 81 MG/1
81 TABLET ORAL 2 TIMES DAILY
Qty: 60 TABLET | Refills: 0
Start: 2020-11-25 | End: 2020-12-25

## 2020-11-25 RX ORDER — OXYCODONE HYDROCHLORIDE 5 MG/1
5 TABLET ORAL EVERY 4 HOURS PRN
Qty: 30 TABLET | Refills: 0 | Status: SHIPPED | OUTPATIENT
Start: 2020-11-25 | End: 2020-11-30

## 2020-11-25 RX ORDER — PANTOPRAZOLE SODIUM 40 MG/1
40 TABLET, DELAYED RELEASE ORAL 2 TIMES DAILY
Qty: 60 TABLET | Refills: 0 | Status: SHIPPED | OUTPATIENT
Start: 2020-11-25 | End: 2020-12-25

## 2020-11-25 RX ORDER — PREDNISONE 20 MG/1
40 TABLET ORAL DAILY
Status: DISCONTINUED | OUTPATIENT
Start: 2020-11-26 | End: 2020-11-25 | Stop reason: HOSPADM

## 2020-11-25 RX ORDER — FERROUS SULFATE 325(65) MG
325 TABLET, DELAYED RELEASE (ENTERIC COATED) ORAL
Qty: 90 TABLET | Refills: 0 | Status: SHIPPED | OUTPATIENT
Start: 2020-11-25 | End: 2020-12-25

## 2020-11-25 RX ORDER — ACETAMINOPHEN 325 MG/1
975 TABLET ORAL 3 TIMES DAILY
Qty: 801 TABLET | Refills: 0 | Status: SHIPPED | OUTPATIENT
Start: 2020-11-25 | End: 2021-02-22

## 2020-11-25 RX ORDER — PREDNISONE 20 MG/1
TABLET ORAL
Qty: 11 TABLET | Refills: 0 | Status: SHIPPED | OUTPATIENT
Start: 2020-11-25 | End: 2020-12-04

## 2020-11-25 RX ORDER — DOCUSATE SODIUM 100 MG/1
100 CAPSULE, LIQUID FILLED ORAL 2 TIMES DAILY
Qty: 60 CAPSULE | Refills: 0 | Status: SHIPPED | OUTPATIENT
Start: 2020-11-25 | End: 2020-12-25

## 2020-11-25 RX ADMIN — FORMOTEROL FUMARATE DIHYDRATE 20 MCG: 20 SOLUTION RESPIRATORY (INHALATION) at 08:38

## 2020-11-25 RX ADMIN — BUDESONIDE 0.5 MG: 0.5 INHALANT ORAL at 08:49

## 2020-11-25 RX ADMIN — METHYLPREDNISOLONE SODIUM SUCCINATE 40 MG: 40 INJECTION, POWDER, FOR SOLUTION INTRAMUSCULAR; INTRAVENOUS at 08:36

## 2020-11-25 RX ADMIN — GABAPENTIN 100 MG: 100 CAPSULE ORAL at 08:35

## 2020-11-25 RX ADMIN — ACETAMINOPHEN 975 MG: 325 TABLET, FILM COATED ORAL at 08:34

## 2020-11-25 RX ADMIN — ASPIRIN 81 MG: 81 TABLET, COATED ORAL at 08:35

## 2020-11-25 RX ADMIN — PANTOPRAZOLE SODIUM 40 MG: 40 TABLET, DELAYED RELEASE ORAL at 08:35

## 2020-11-25 NOTE — DISCHARGE SUMMARY
Hospital Medicine Service -  Inpatient Discharge Summary        BRIEF OVERVIEW   Admitting Provider: Cordell Navarrete DO  Attending Provider: Kush King MD Attending phys phone: [phone]    PCP: David Alfred [phone]    Admission Date: 11/19/2020  Discharge Date: 11/25/2020     DISCHARGE DIAGNOSES      Primary Discharge Diagnosis  Closed fracture of right hip (CMS/HCC)    Secondary Discharge Diagnoses  Active Hospital Problems    Diagnosis Date Noted   • Chronic obstructive pulmonary disease with acute exacerbation (CMS/HCC) 11/20/2020     Priority: High   • Acute on chronic respiratory failure (CMS/HCC) 11/21/2020     Priority: Medium   • Leucocytosis 11/21/2020   • Chronic respiratory failure with hypoxia (CMS/HCC)    • Hypomagnesemia    • Acute postoperative anemia due to expected blood loss    • Depression 11/20/2020   • Pre-op examination 11/20/2020   • Paroxysmal atrial fibrillation (CMS/HCC) 11/20/2020   • Preoperative cardiovascular examination 11/20/2020   • Idiopathic hypotension 11/20/2020   • Closed right hip fracture (CMS/HCC) 11/19/2020   • Closed fracture of right hip (CMS/HCC) 11/09/2020      Resolved Hospital Problems   No resolved problems to display.     SUMMARY OF HOSPITALIZATION      Presenting Problem/History of Present Illness/ Hospital course   80-year-old male, retired .  With past medical history of chronic respiratory failure COPD on 3 L home oxygen, prior ostomy .  Patient was initially admitted for right hernia repair.  While patient was in the PACU he attempted to get out and fell down and had a right hip fracture she needed to be surgically fixed.  Postop was complicated by increased O2 requirement.  Patient was seen by pulmonary service was on nebs/antibiotics plus steroids.  Chest x-ray was unremarkable-showed chronic changes.  Postop also needed 1 unit PRBC.  Patient was doing poorly with PT-SNF/acute rehab recommended.  Patient in fact was [facility] re[facility].  However with the Covid pandemic ongoing family/patient were extremely obtained for any kind of placement and insisted on being discharged home.  See detailed hospital course below.    Chronic obstructive pulmonary disease with acute exacerbation (CMS/HCC)  Assessment & Plan  Known hx of mod severe COPD  Per records   C/w nebs/ steroid taper  No pna on CXR - dc abx and monitor   ABG mild hypoxia  BNP mininmally elevated, no significant effusions  ECHO - reviewed unremarkable   Code status - full code     Acute on chronic respiratory failure (CMS/HCC)  Assessment & Plan  As above     Leucocytosis  Assessment & Plan  Improving       Acute postoperative anemia due to expected blood loss  Assessment & Plan  Pt underwent 2 surgeries this admission  Monitor counts  preop hgb-13.9   S/p transfusion on 11/23 for hgb of 7.1 , repeat hemoglobin was 8.1.  No evidence of active bleeding noted.    Will be placed on iron pills at discharge.  Needs repeat CBC next week.    Hypomagnesemia  Assessment & Plan  - monitor/ replete    Chronic respiratory failure with hypoxia (CMS/[facility])  Assessment & Plan  Remains on home O2- on 3L ch    Idiopathic hypotension  Assessment & Plan  Nml cortisol   Resolved w/ small bolus     Paroxysmal atrial fibrillation (CMS/[facility])  Assessment & Plan  Further as per cards     Pre-op examination  Assessment & Plan  Cleared by cards for surgery       Depression  Assessment & Plan  Continue home meds    Closed right hip fracture (CMS/[facility])  Assessment & Plan  S/p surgery on 11/20   Pain control, DVT ppx, PT/OT per ortho    Exam on Day of Discharge  Physical Exam  Vitals signs and nursing note reviewed.   HENT:      Head: Normocephalic.      Nose: Nose normal.   Eyes:      Pupils: Pupils are equal, round, and reactive to light.   Cardiovascular:      Rate and Rhythm: Normal rate.   Pulmonary:      Effort: Pulmonary effort is normal.   Abdominal:      General: Abdomen is flat.      Comments: Ostomy  functioning well.  Abdomen soft   Musculoskeletal:      Comments: Right hip Aquacel dressing.  Poor range of motion the right hip cannot left against gravity.  Dressing intact.  No significant swelling noted.   Neurological:      General: No focal deficit present.      Mental Status: He is oriented to person, place, and time.          Medication List      START taking these medications    aspirin 81 mg enteric coated tablet  Take 1 tablet (81 mg total) by mouth 2 (two) times a day. Stop after 30 days  Dose: 81 mg     docusate sodium 100 mg capsule  Commonly known as: COLACE  Take 1 capsule (100 mg total) by mouth 2 (two) times a day.  Dose: 100 mg     ferrous sulfate 325 mg (65 mg iron) EC tablet  Take 1 tablet (325 mg total) by mouth 3 (three) times a day with meals.  Dose: 325 mg     oxyCODONE 5 mg immediate release tablet  Commonly known as: ROXICODONE  Take 1 tablet (5 mg total) by mouth every 4 (four) hours as needed for moderate pain or severe pain (1 tab for mod pain, 2 tabs for severe pain) for up to 5 days.  Dose: 5 mg     pantoprazole 40 mg EC tablet  Commonly known as: PROTONIX  Take 1 tablet (40 mg total) by mouth 2 (two) times a day Indications: gastroesophageal reflux disease. While you are on Aspirin  Dose: 40 mg     predniSONE 20 mg tablet  Commonly known as: DELTASONE  Start taking on: November 25, 2020  Take 2 tablets (40 mg total) by mouth daily for 3 days, THEN 1 tablet (20 mg total) daily for 3 days, THEN 0.5 tablets (10 mg total) daily for 3 days.        CHANGE how you take these medications    acetaminophen 325 mg tablet  Commonly known as: TYLENOL  Take 3 tablets (975 mg total) by mouth 3 (three) times a day for 267 doses.  Dose: 975 mg  What changed:   · medication strength  · how much to take  · when to take this  · reasons to take this        CONTINUE taking these medications    * albuterol 2.5 mg /3 mL (0.083 %) nebulizer solution  Take 2.5 mg by nebulization every 6 (six) hours as needed  for wheezing.  Dose: 2.5 mg     * albuterol sulfate 90 mcg/actuation inhaler  Inhale 2 puffs every 6 (six) hours as needed for wheezing.  Dose: 2 puff     escitalopram 10 mg tablet  Commonly known as: LEXAPRO  Take 10 mg by mouth daily.  Dose: 10 mg     fluticasone propion-salmeteroL 250-50 mcg/dose diskus inhaler  Commonly known as: ADVAIR DISKUS  Inhale 1 puff 2 (two) times a day.   Rinse mouth with water after use to reduce aftertaste and incidence of candidiasis.   Do not swallow.  For patients not on a ventilator, a spacer is recommended to be used with this medication/inhaler.  Dose: 1 puff     gabapentin 100 mg capsule  Commonly known as: NEURONTIN  Take 100 mg by mouth 2 (two) times a day.  Dose: 100 mg     ipratropium 0.02 % nebulizer solution  Commonly known as: ATROVENT  Take 500 mcg by nebulization 4 (four) times a day.  Dose: 500 mcg     ipratropium-albuteroL 0.5-2.5 mg/3 mL nebulizer solution  Commonly known as: DUO-NEB  Take 3 mL by nebulization 4 (four) times a day.  Dose: 3 mL     LORazepam 1 mg tablet  Commonly known as: ATIVAN  Take 1 mg by mouth every 8 (eight) hours as needed for anxiety.  Dose: 1 mg     PEPCID 40 mg tablet  Take 40 mg by mouth every morning.  Dose: 40 mg  Generic drug: famotidine         * This list has 2 medication(s) that are the same as other medications prescribed for you. Read the directions carefully, and ask your doctor or other care provider to review them with you.            ASK your doctor about these medications    oxyCODONE-acetaminophen 5-325 mg per tablet  Commonly known as: PERCOCET  Take 1 tablet by mouth every 4 (four) hours as needed for moderate pain or severe pain for up to 5 days.  Dose: 1 tablet  Ask about: Should I take this medication?            Consults During Admission  IP CONSULT TO ORTHOPEDIC SURGERY  IP CONSULT TO SOCIAL WORK  IP CONSULT TO HOSPITALIST  IP CONSULT TO CARDIOLOGY  IP CONSULT TO PULMONOLOGY/SLEEP MEDICINE  IP CONSULT TO PHYSICAL  MEDICINE REHAB    PROCEDURES / LABS / IMAGING      Operative Procedures  Right herniorrhaphy and right hip surgery    Pertinent Labs  Lab Results   Component Value Date    GLUCOSE 142 (H) 11/23/2020    CALCIUM 8.4 (L) 11/23/2020     (L) 11/23/2020    K 4.3 11/23/2020    CO2 24 11/23/2020     11/23/2020    BUN 38 (H) 11/23/2020    CREATININE 1.3 11/23/2020     Lab Results   Component Value Date    WBC 13.91 (H) 11/23/2020    HGB 8.1 (L) 11/23/2020    HCT 24.6 (L) 11/23/2020    .4 (H) 11/23/2020     (L) 11/23/2020       Pertinent Imaging  X-ray Knee Right 1 Or 2 Views    Result Date: 11/19/2020  IMPRESSION: Displaced comminuted right-sided intertrochanteric fracture. No definite more distal femoral fracture or fracture about the knee noting limited assessment. COMMENT: Comparison: None. Three views of the right femur with two views of the right knee.  Likely limits assessment of the knee. There is an impacted comminuted displaced intratrochanteric fracture of the right femur.  The remainder of the right femur is intact.  Limited assessment at the right knee reveals no definite fracture.  There are degenerative changes at the right knee.  Questionable small joint effusion.  Patellar enthesophyte formation.  Atherosclerotic calcifications.    X-ray Chest 1 View    Result Date: 11/21/2020  IMPRESSION: No significant interval change.    X-ray Chest 1 View    Result Date: 11/20/2020  IMPRESSION: Diffuse interstitial prominence of uncertain chronicity.  Otherwise, no active disease.    X-ray Pelvis 1 Or 2 Views    Result Date: 11/20/2020  IMPRESSION: Postoperative study    X-ray Femur Right 2+ View    Result Date: 11/19/2020  IMPRESSION: Displaced comminuted right-sided intertrochanteric fracture. No definite more distal femoral fracture or fracture about the knee noting limited assessment. COMMENT: Comparison: None. Three views of the right femur with two views of the right knee.  Likely limits  assessment of the knee. There is an impacted comminuted displaced intratrochanteric fracture of the right femur.  The remainder of the right femur is intact.  Limited assessment at the right knee reveals no definite fracture.  There are degenerative changes at the right knee.  Questionable small joint effusion.  Patellar enthesophyte formation.  Atherosclerotic calcifications.    X-ray Hip With Or Without Pelvis 2-3 Vw Right    Result Date: 11/20/2020  IMPRESSION: Intraoperative fluoroscopy    X-ray Hip With Or Without Pelvis 2-3 Vw Right    Result Date: 11/19/2020  IMPRESSION: Acute comminuted displaced right intratrochanteric fracture COMMENT: Comparison: None. AP view of the pelvis and crosstable lateral view of the right hip.  At the right hip there is an acute  displaced intertrochanteric fracture with avulsion of the greater trochanter and lesser trochanter.  Old fracture suspected of the left inferior pubic ramus.  No definite acute displaced bony pelvic ring fractures.  No gross and about the left hip.  Osteopenia limits assessment. Surgical clips project over the pelvis and inguinal regions.    Fl Fluoroscopy Technical Assistance    Result Date: 11/20/2020  IMPRESSION: Intraoperative fluoroscopy      OUTPATIENT  FOLLOW-UP / REFERRALS / PENDING TESTS        Outpatient Follow-Up Appointments  Encounter Information     You do not currently have any appointments scheduled.          Referrals  No orders of the defined types were placed in this encounter.      Test Results Pending at Discharge  Unresulted Labs (From admission, onward)     Start     Ordered    11/25/20 0000  CBC     Question:  Release to patient  Answer:  Immediate    11/25/20 0911    11/21/20 1231  Sputum culture / smear Expectorated Sputum  Once     Question:  Release to patient  Answer:  Immediate    11/21/20 1230    11/21/20 1231  Sputum Gram Stain (Lab Only) Expectorated Sputum  PROCEDURE ONCE     Question:  Release to patient  Answer:   Immediate    11/21/20 1230                Important Issues to Address in Follow-Up  Patient will follow up with general surgery and orthopedic surgery as outpatient.  DISCHARGE DISFinal discharge disposition not confirmed    POSITION      Disposition: Code Status At Discharge: Full Code  Time spent w/ DC coordination: 32min   discharged home with home care.    Physician Order for Life-Sustaining Treatment Document Status      No documents found

## 2020-11-25 NOTE — NURSING NOTE
"Right hip incision dressing was saturated, with drainage continued onto bed sheets, despite dressing being changed in the AM. Ortho surgery resident paged who came to bedside and assessed incision. States that incision looks \"good\" and to change dressing with silver mepilex or gauze and tegaderm. Passed off to FRANCHESCA Irene who will be taking over care of patient.   "

## 2020-11-25 NOTE — NURSING NOTE
Dressing changed to right hip incision, gauze and tegaderm on upper incision and mepilex on lower hip incision. Will monitor.

## 2020-11-25 NOTE — PATIENT CARE CONFERENCE
Care Progression Rounds Note  Date: 11/25/2020  Time: 10:43 AM     Patient Name: Hernesto Varghese     Medical Record Number: [id number]   YOB: 1940  Sex: Male      Room/Bed: 0307W    Admitting Diagnosis: Inguinal hernia without obstruction or gangrene, recurrence not specified, unspecified laterality [K40.90]  Closed right hip fracture (CMS/HCC) [S72.001A]   Admit Date/Time: 11/19/2020  7:42 AM    Primary Diagnosis: Closed fracture of right hip (CMS/HCC)  Principal Problem: Closed fracture of right hip (CMS/HCC)    GMLOS: 6.2  Anticipated Discharge Date: 11/25/2020    AM-PAC  Mobility Score: 16    Discharge Planning:  Living Arrangements: house  Anticipated Discharge Disposition: skilled nursing facility, home with home health services    Barriers to Discharge:  Barriers to Discharge: None  Comment: preop clearance    Participants:  advanced practice provider, nursing, social work/services

## 2020-11-25 NOTE — PROGRESS NOTES
11/25/2020  9:48 AM pt for discharge today. Called pts daughter, discussed dc, pts daughter agreeable to [facility], pts daughter and her brother will both be coming to pick pt up around 2pm, sw provided RN number to call once they arrive. pts daughter would like to go over the [place] paperwork with the RN. pts daughter stated that pt is aware that they will be picking him up around 2pm. Updated RN. Updated [facility] Updated [facility]. pts daughter will be bringing portable oxygen tank for transport home. Patricia MARTIN

## 2020-11-25 NOTE — NURSING NOTE
Dressing:  Lower incision- rinse with normal saline, use Mepilex foam dressing  Upper incision- clean with normal saline, use 4x4 gauze pads and the transparent dressing over top    Change dressing as needed

## 2020-11-25 NOTE — DISCHARGE INSTRUCTIONS
HIP FRACTURE REPAIR    MEDICATION:  If you are taking Aspirin:  Enteric coated aspirin 2 times a day for blood clot prevention and take for 6 weeks.  May use over-the-counter Pepcid or Zantac if stomach upset occurs.    PAIN CONTROL:  You will be given a prescription for pain medication. Take your pain medicine as prescribed with food if possible. You can expect to have pain during the healing process from the incision and it will improve.     DO NOT DRIVE WHILE TAKING PAIN MEDICATION.    Some patients find that they have some difficulty with constipation after surgery. This is due to a combination of things. Most of this is due to the pain medication you are taking. The pain medications, along with a decrease in activity, can sometimes lead to discomfort from constipation. You should eat plenty of fresh fruits, vegetables, drink fruit juices and drink plenty of water.    INCISION CARE:    Look at incision every day. Keep incision clean and dry.  Maintain Mepilex dressing for 5 days total.  Leave steri-strips on until they fall off, if you have them.  Your staples or stitches will be removed about 2 weeks after surgery. Your incision will heal and the swelling and bruising will get better over the next 3 weeks.  If you have glue closing your incision, do not pull or pick off it will dissolve naturally.  No tub baths, swimming, Jacuzzi tubs or any other activity that would require submersion of your incision in water.    Call your doctor if you notice any of the following:  Fever over 100.5 degrees  Drainage from incision  Increased swelling around incision  Increased redness around incision line  Thigh/calf pain or swelling in legs  Chest pain  Chest congestion  Problems with breathing    ACTIVITY:    Use walker when ambulating.      ***Needs follow-up with Dr. Navarrete/surgery in 2 weeks.  ***CBC BMP next week.      You may progress to a cane when ready.     Balance rest and activity.    DO NOT SMOKE! Smoking is not  healthy for your healing process.  No driving until seen by your physician postop.    FOLLOW-UP:  Call now for an appointment with Dr. Coronado/orthopedics in 2 weeks, [phone].        [facility] [place] [facility]) [phone]

## 2020-11-25 NOTE — PROGRESS NOTES
"General Surgery Daily Progress Note    Subjective: Pt seen and examined.  No new complaints.  Moderate right hip pain.  Ambulated yesterday.  Tolerated breakfast.  Ostomy working well.  Minimal groin pain.    Vital signs in last 24 hours:  Temp:  [36.1 °C (97 °F)-36.9 °C (98.5 °F)] 36.2 °C (97.2 °F)  Heart Rate:  [60-87] 71  Resp:  [18] 18  BP: (112-136)/(58-71) 112/70    Intake/Output this shift:    Intake/Output Summary (Last 24 hours) at 11/25/2020 0819  Last data filed at 11/25/2020 0500  Gross per 24 hour   Intake 960 ml   Output 2050 ml   Net -1090 ml       Physical Exam    General appearance: alert, appears stated age and cooperative    Abdomen: soft, non-tender, right groin incision clean dry and intact, significant scrotal ecchymosis; bowel sounds normal; no masses, no organomegaly\"      Labs  Results from last 7 days   Lab Units 11/23/20  0319   SODIUM mEQ/L 133*   POTASSIUM mEQ/L 4.3   CHLORIDE mEQ/L 100   CO2 mEQ/L 24   BUN mg/dL 38*   CREATININE mg/dL 1.3   GLUCOSE mg/dL 142*   CALCIUM mg/dL 8.4*     Results from last 7 days   Lab Units 11/23/20  1714 11/23/20  0319   WBC K/uL  --  13.91*   HEMOGLOBIN g/dL 8.1* 7.1*   HEMATOCRIT % 24.6* 21.6*   PLATELETS K/uL  --  136*         Results from last 7 days   Lab Units 11/23/20  0319   SODIUM mEQ/L 133*   POTASSIUM mEQ/L 4.3   CHLORIDE mEQ/L 100   CO2 mEQ/L 24   BUN mg/dL 38*   CREATININE mg/dL 1.3   CALCIUM mg/dL 8.4*   GLUCOSE mg/dL 142*     Results from last 7 days   Lab Units 11/23/20  1714   HEMOGLOBIN g/dL 8.1*   HEMATOCRIT % 24.6*     No results found for: LIPASE  Pathology Results     ** No results found for the last 720 hours. **              Imaging  X-ray Knee Right 1 Or 2 Views    Result Date: 11/19/2020  IMPRESSION: Displaced comminuted right-sided intertrochanteric fracture. No definite more distal femoral fracture or fracture about the knee noting limited assessment. COMMENT: Comparison: None. Three views of the right femur with two views of " the right knee.  Likely limits assessment of the knee. There is an impacted comminuted displaced intratrochanteric fracture of the right femur.  The remainder of the right femur is intact.  Limited assessment at the right knee reveals no definite fracture.  There are degenerative changes at the right knee.  Questionable small joint effusion.  Patellar enthesophyte formation.  Atherosclerotic calcifications.    X-ray Chest 1 View    Result Date: 11/21/2020  IMPRESSION: No significant interval change.    X-ray Chest 1 View    Result Date: 11/20/2020  IMPRESSION: Diffuse interstitial prominence of uncertain chronicity.  Otherwise, no active disease.    X-ray Pelvis 1 Or 2 Views    Result Date: 11/20/2020  IMPRESSION: Postoperative study    X-ray Femur Right 2+ View    Result Date: 11/19/2020  IMPRESSION: Displaced comminuted right-sided intertrochanteric fracture. No definite more distal femoral fracture or fracture about the knee noting limited assessment. COMMENT: Comparison: None. Three views of the right femur with two views of the right knee.  Likely limits assessment of the knee. There is an impacted comminuted displaced intratrochanteric fracture of the right femur.  The remainder of the right femur is intact.  Limited assessment at the right knee reveals no definite fracture.  There are degenerative changes at the right knee.  Questionable small joint effusion.  Patellar enthesophyte formation.  Atherosclerotic calcifications.    X-ray Hip With Or Without Pelvis 2-3 Vw Right    Result Date: 11/20/2020  IMPRESSION: Intraoperative fluoroscopy    X-ray Hip With Or Without Pelvis 2-3 Vw Right    Result Date: 11/19/2020  IMPRESSION: Acute comminuted displaced right intratrochanteric fracture COMMENT: Comparison: None. AP view of the pelvis and crosstable lateral view of the right hip.  At the right hip there is an acute  displaced intertrochanteric fracture with avulsion of the greater trochanter and lesser  trochanter.  Old fracture suspected of the left inferior pubic ramus.  No definite acute displaced bony pelvic ring fractures.  No gross and about the left hip.  Osteopenia limits assessment. Surgical clips project over the pelvis and inguinal regions.    Fl Fluoroscopy Technical Assistance    Result Date: 11/20/2020  IMPRESSION: Intraoperative fluoroscopy        Assessment   Postop day 6 status post right inguinal hernia repair  Postop day 5 status post right hip fracture repair  COPD    Plan   Patient for discharge to home today with home physical therapy.  I encouraged him to be cautious at home and work hard with physical therapy.  Follow-up in the office in 1-1/2 weeks.        Cordell Navarrete, DO

## 2020-11-26 LAB
ATRIAL RATE: 70
P AXIS: 64
PR INTERVAL: 140
QRS DURATION: 104
QT INTERVAL: 382
QTC CALCULATION(BAZETT): 412
R AXIS: 46
T WAVE AXIS: 29
VENTRICULAR RATE: 70

## 2020-11-27 ENCOUNTER — TELEPHONE (OUTPATIENT)
Dept: FAMILY MEDICINE CLINIC | Facility: CLINIC | Age: 80
End: 2020-11-27

## 2020-11-27 DIAGNOSIS — S72.009A CLOSED FRACTURE OF HIP, UNSPECIFIED LATERALITY, INITIAL ENCOUNTER (HCC): ICD-10-CM

## 2020-11-27 DIAGNOSIS — J41.8 MIXED SIMPLE AND MUCOPURULENT CHRONIC BRONCHITIS (HCC): Primary | ICD-10-CM

## 2020-11-27 DIAGNOSIS — M54.50 LOW BACK PAIN, UNSPECIFIED BACK PAIN LATERALITY, UNSPECIFIED CHRONICITY, UNSPECIFIED WHETHER SCIATICA PRESENT: ICD-10-CM

## 2020-11-27 NOTE — PROGRESS NOTES
11/27/202012:07 PM received voicemail from pts daughter stating that the pt is requiring too much care at home and they want additional care for the pt. sw worked with pts family earlier in the week and reiterated the recommendation of snf which family declined, sw then outreached and had [facility] evaluate the pt and accept the pt and when sw explained this to family they also declined. Family declined pt going to snf or to acute rehab and chose to take pt home with homecare on Wednesday. sw called pts daughter and left message suggesting to speak to homecare agency about hiring additional help and to assist with placement in a rehab near there home in [place]. Patricia Andrade LSW [id number]

## 2020-12-02 DIAGNOSIS — R52 SEVERE PAIN: Primary | ICD-10-CM

## 2020-12-02 RX ORDER — OXYCODONE HYDROCHLORIDE 5 MG/1
5 TABLET ORAL
Qty: 30 TABLET | Refills: 0 | Status: CANCELLED | OUTPATIENT
Start: 2020-12-02

## 2020-12-05 ENCOUNTER — APPOINTMENT (EMERGENCY)
Dept: RADIOLOGY | Facility: HOSPITAL | Age: 80
DRG: 481 | End: 2020-12-05
Payer: MEDICARE

## 2020-12-05 ENCOUNTER — HOSPITAL ENCOUNTER (INPATIENT)
Facility: HOSPITAL | Age: 80
LOS: 7 days | DRG: 481 | End: 2020-12-12
Attending: EMERGENCY MEDICINE | Admitting: INTERNAL MEDICINE
Payer: MEDICARE

## 2020-12-05 DIAGNOSIS — F41.9 ANXIETY: ICD-10-CM

## 2020-12-05 DIAGNOSIS — F32.A DEPRESSIVE DISORDER: ICD-10-CM

## 2020-12-05 DIAGNOSIS — S72.142A CLOSED DISPLACED INTERTROCHANTERIC FRACTURE OF LEFT FEMUR, INITIAL ENCOUNTER (HCC): Primary | ICD-10-CM

## 2020-12-05 DIAGNOSIS — E87.1 HYPONATREMIA: ICD-10-CM

## 2020-12-05 DIAGNOSIS — J44.9 COPD, MODERATE (HCC): ICD-10-CM

## 2020-12-05 PROBLEM — S72.002D CLOSED FRACTURE OF LEFT HIP WITH ROUTINE HEALING: Status: ACTIVE | Noted: 2020-12-05

## 2020-12-05 LAB
ANION GAP SERPL CALCULATED.3IONS-SCNC: 4 MMOL/L (ref 4–13)
BASOPHILS # BLD AUTO: 0.03 THOUSANDS/ΜL (ref 0–0.1)
BASOPHILS NFR BLD AUTO: 0 % (ref 0–1)
BUN SERPL-MCNC: 24 MG/DL (ref 5–25)
CALCIUM SERPL-MCNC: 8.5 MG/DL (ref 8.3–10.1)
CHLORIDE SERPL-SCNC: 102 MMOL/L (ref 100–108)
CO2 SERPL-SCNC: 31 MMOL/L (ref 21–32)
CREAT SERPL-MCNC: 1.1 MG/DL (ref 0.6–1.3)
EOSINOPHIL # BLD AUTO: 0.22 THOUSAND/ΜL (ref 0–0.61)
EOSINOPHIL NFR BLD AUTO: 1 % (ref 0–6)
ERYTHROCYTE [DISTWIDTH] IN BLOOD BY AUTOMATED COUNT: 14.7 % (ref 11.6–15.1)
GFR SERPL CREATININE-BSD FRML MDRD: 63 ML/MIN/1.73SQ M
GLUCOSE SERPL-MCNC: 105 MG/DL (ref 65–140)
HCT VFR BLD AUTO: 34.9 % (ref 36.5–49.3)
HGB BLD-MCNC: 10.9 G/DL (ref 12–17)
IMM GRANULOCYTES # BLD AUTO: 0.16 THOUSAND/UL (ref 0–0.2)
IMM GRANULOCYTES NFR BLD AUTO: 1 % (ref 0–2)
LYMPHOCYTES # BLD AUTO: 1.24 THOUSANDS/ΜL (ref 0.6–4.47)
LYMPHOCYTES NFR BLD AUTO: 7 % (ref 14–44)
MCH RBC QN AUTO: 33.6 PG (ref 26.8–34.3)
MCHC RBC AUTO-ENTMCNC: 31.2 G/DL (ref 31.4–37.4)
MCV RBC AUTO: 108 FL (ref 82–98)
MONOCYTES # BLD AUTO: 1.11 THOUSAND/ΜL (ref 0.17–1.22)
MONOCYTES NFR BLD AUTO: 7 % (ref 4–12)
NEUTROPHILS # BLD AUTO: 14.43 THOUSANDS/ΜL (ref 1.85–7.62)
NEUTS SEG NFR BLD AUTO: 84 % (ref 43–75)
NRBC BLD AUTO-RTO: 0 /100 WBCS
PLATELET # BLD AUTO: 248 THOUSANDS/UL (ref 149–390)
PMV BLD AUTO: 9 FL (ref 8.9–12.7)
POTASSIUM SERPL-SCNC: 4.4 MMOL/L (ref 3.5–5.3)
RBC # BLD AUTO: 3.24 MILLION/UL (ref 3.88–5.62)
SODIUM SERPL-SCNC: 137 MMOL/L (ref 136–145)
WBC # BLD AUTO: 17.19 THOUSAND/UL (ref 4.31–10.16)

## 2020-12-05 PROCEDURE — 1124F ACP DISCUSS-NO DSCNMKR DOCD: CPT | Performed by: PHYSICIAN ASSISTANT

## 2020-12-05 PROCEDURE — 85025 COMPLETE CBC W/AUTO DIFF WBC: CPT | Performed by: PHYSICIAN ASSISTANT

## 2020-12-05 PROCEDURE — 99285 EMERGENCY DEPT VISIT HI MDM: CPT | Performed by: PHYSICIAN ASSISTANT

## 2020-12-05 PROCEDURE — 99285 EMERGENCY DEPT VISIT HI MDM: CPT

## 2020-12-05 PROCEDURE — 93005 ELECTROCARDIOGRAM TRACING: CPT

## 2020-12-05 PROCEDURE — 99223 1ST HOSP IP/OBS HIGH 75: CPT | Performed by: INTERNAL MEDICINE

## 2020-12-05 PROCEDURE — 80048 BASIC METABOLIC PNL TOTAL CA: CPT | Performed by: PHYSICIAN ASSISTANT

## 2020-12-05 PROCEDURE — 71045 X-RAY EXAM CHEST 1 VIEW: CPT

## 2020-12-05 PROCEDURE — 36415 COLL VENOUS BLD VENIPUNCTURE: CPT | Performed by: PHYSICIAN ASSISTANT

## 2020-12-05 PROCEDURE — 73502 X-RAY EXAM HIP UNI 2-3 VIEWS: CPT

## 2020-12-05 RX ORDER — LORAZEPAM 1 MG/1
1 TABLET ORAL EVERY 8 HOURS PRN
Status: DISCONTINUED | OUTPATIENT
Start: 2020-12-05 | End: 2020-12-12 | Stop reason: HOSPADM

## 2020-12-05 RX ORDER — SODIUM CHLORIDE 9 MG/ML
75 INJECTION, SOLUTION INTRAVENOUS CONTINUOUS
Status: DISCONTINUED | OUTPATIENT
Start: 2020-12-05 | End: 2020-12-06

## 2020-12-05 RX ORDER — PANTOPRAZOLE SODIUM 40 MG/1
40 TABLET, DELAYED RELEASE ORAL 2 TIMES DAILY
COMMUNITY
Start: 2020-11-25 | End: 2020-12-25

## 2020-12-05 RX ORDER — ESCITALOPRAM OXALATE 10 MG/1
10 TABLET ORAL DAILY
Status: DISCONTINUED | OUTPATIENT
Start: 2020-12-06 | End: 2020-12-12

## 2020-12-05 RX ORDER — ONDANSETRON 2 MG/ML
4 INJECTION INTRAMUSCULAR; INTRAVENOUS EVERY 6 HOURS PRN
Status: DISCONTINUED | OUTPATIENT
Start: 2020-12-05 | End: 2020-12-12 | Stop reason: HOSPADM

## 2020-12-05 RX ORDER — HYDROMORPHONE HCL/PF 1 MG/ML
0.5 SYRINGE (ML) INJECTION
Status: DISCONTINUED | OUTPATIENT
Start: 2020-12-05 | End: 2020-12-12 | Stop reason: HOSPADM

## 2020-12-05 RX ORDER — ACETAMINOPHEN 325 MG/1
650 TABLET ORAL EVERY 6 HOURS PRN
Status: DISCONTINUED | OUTPATIENT
Start: 2020-12-05 | End: 2020-12-12 | Stop reason: HOSPADM

## 2020-12-05 RX ORDER — HEPARIN SODIUM 5000 [USP'U]/ML
5000 INJECTION, SOLUTION INTRAVENOUS; SUBCUTANEOUS EVERY 8 HOURS SCHEDULED
Status: DISCONTINUED | OUTPATIENT
Start: 2020-12-05 | End: 2020-12-07

## 2020-12-05 RX ORDER — FENTANYL CITRATE 50 UG/ML
INJECTION, SOLUTION INTRAMUSCULAR; INTRAVENOUS
Status: COMPLETED
Start: 2020-12-05 | End: 2020-12-05

## 2020-12-05 RX ORDER — PANTOPRAZOLE SODIUM 40 MG/1
40 TABLET, DELAYED RELEASE ORAL
Status: DISCONTINUED | OUTPATIENT
Start: 2020-12-06 | End: 2020-12-12 | Stop reason: HOSPADM

## 2020-12-05 RX ORDER — GABAPENTIN 100 MG/1
100 CAPSULE ORAL 2 TIMES DAILY
Status: DISCONTINUED | OUTPATIENT
Start: 2020-12-05 | End: 2020-12-12 | Stop reason: HOSPADM

## 2020-12-05 RX ORDER — FLUTICASONE FUROATE AND VILANTEROL 200; 25 UG/1; UG/1
1 POWDER RESPIRATORY (INHALATION) DAILY
Status: DISCONTINUED | OUTPATIENT
Start: 2020-12-06 | End: 2020-12-12 | Stop reason: HOSPADM

## 2020-12-05 RX ORDER — IPRATROPIUM BROMIDE AND ALBUTEROL SULFATE 2.5; .5 MG/3ML; MG/3ML
3 SOLUTION RESPIRATORY (INHALATION) 4 TIMES DAILY
Status: DISCONTINUED | OUTPATIENT
Start: 2020-12-05 | End: 2020-12-06

## 2020-12-05 RX ORDER — FENTANYL CITRATE 50 UG/ML
50 INJECTION, SOLUTION INTRAMUSCULAR; INTRAVENOUS ONCE
Status: COMPLETED | OUTPATIENT
Start: 2020-12-05 | End: 2020-12-05

## 2020-12-05 RX ADMIN — HYDROMORPHONE HYDROCHLORIDE 0.5 MG: 1 INJECTION, SOLUTION INTRAMUSCULAR; INTRAVENOUS; SUBCUTANEOUS at 20:05

## 2020-12-05 RX ADMIN — FENTANYL CITRATE 50 MCG: 50 INJECTION, SOLUTION INTRAMUSCULAR; INTRAVENOUS at 18:14

## 2020-12-05 RX ADMIN — SODIUM CHLORIDE 75 ML/HR: 0.9 INJECTION, SOLUTION INTRAVENOUS at 23:11

## 2020-12-05 RX ADMIN — HEPARIN SODIUM 5000 UNITS: 5000 INJECTION INTRAVENOUS; SUBCUTANEOUS at 23:10

## 2020-12-05 RX ADMIN — HYDROMORPHONE HYDROCHLORIDE 0.5 MG: 1 INJECTION, SOLUTION INTRAMUSCULAR; INTRAVENOUS; SUBCUTANEOUS at 23:10

## 2020-12-05 RX ADMIN — GABAPENTIN 100 MG: 100 CAPSULE ORAL at 20:05

## 2020-12-06 ENCOUNTER — ANESTHESIA EVENT (INPATIENT)
Dept: PERIOP | Facility: HOSPITAL | Age: 80
DRG: 481 | End: 2020-12-06
Payer: MEDICARE

## 2020-12-06 PROBLEM — D62 ACUTE BLOOD LOSS ANEMIA: Status: ACTIVE | Noted: 2020-12-06

## 2020-12-06 LAB
ABO GROUP BLD: NORMAL
ABO GROUP BLD: NORMAL
ANION GAP SERPL CALCULATED.3IONS-SCNC: 8 MMOL/L (ref 4–13)
ATRIAL RATE: 106 BPM
ATRIAL RATE: 108 BPM
ATRIAL RATE: 81 BPM
BILIRUB UR QL STRIP: NEGATIVE
BLD GP AB SCN SERPL QL: NEGATIVE
BUN SERPL-MCNC: 25 MG/DL (ref 5–25)
CALCIUM SERPL-MCNC: 8.3 MG/DL (ref 8.3–10.1)
CHLORIDE SERPL-SCNC: 101 MMOL/L (ref 100–108)
CLARITY UR: CLEAR
CO2 SERPL-SCNC: 25 MMOL/L (ref 21–32)
COLOR UR: YELLOW
CREAT SERPL-MCNC: 1.04 MG/DL (ref 0.6–1.3)
ERYTHROCYTE [DISTWIDTH] IN BLOOD BY AUTOMATED COUNT: 14.8 % (ref 11.6–15.1)
GFR SERPL CREATININE-BSD FRML MDRD: 67 ML/MIN/1.73SQ M
GLUCOSE SERPL-MCNC: 126 MG/DL (ref 65–140)
GLUCOSE UR STRIP-MCNC: NEGATIVE MG/DL
HCT VFR BLD AUTO: 28.6 % (ref 36.5–49.3)
HCT VFR BLD AUTO: 29.7 % (ref 36.5–49.3)
HGB BLD-MCNC: 9.2 G/DL (ref 12–17)
HGB BLD-MCNC: 9.5 G/DL (ref 12–17)
HGB UR QL STRIP.AUTO: NEGATIVE
KETONES UR STRIP-MCNC: NEGATIVE MG/DL
LEUKOCYTE ESTERASE UR QL STRIP: NEGATIVE
MCH RBC QN AUTO: 34.1 PG (ref 26.8–34.3)
MCHC RBC AUTO-ENTMCNC: 32 G/DL (ref 31.4–37.4)
MCV RBC AUTO: 107 FL (ref 82–98)
NITRITE UR QL STRIP: NEGATIVE
P AXIS: 39 DEGREES
P AXIS: 55 DEGREES
P AXIS: 56 DEGREES
PH UR STRIP.AUTO: 5 [PH]
PLATELET # BLD AUTO: 239 THOUSANDS/UL (ref 149–390)
PMV BLD AUTO: 9.5 FL (ref 8.9–12.7)
POTASSIUM SERPL-SCNC: 4.4 MMOL/L (ref 3.5–5.3)
PR INTERVAL: 140 MS
PR INTERVAL: 146 MS
PR INTERVAL: 166 MS
PROT UR STRIP-MCNC: NEGATIVE MG/DL
QRS AXIS: 49 DEGREES
QRS AXIS: 52 DEGREES
QRS AXIS: 72 DEGREES
QRSD INTERVAL: 90 MS
QRSD INTERVAL: 92 MS
QRSD INTERVAL: 98 MS
QT INTERVAL: 320 MS
QT INTERVAL: 328 MS
QT INTERVAL: 382 MS
QTC INTERVAL: 428 MS
QTC INTERVAL: 435 MS
QTC INTERVAL: 443 MS
RBC # BLD AUTO: 2.79 MILLION/UL (ref 3.88–5.62)
RH BLD: POSITIVE
RH BLD: POSITIVE
SODIUM SERPL-SCNC: 134 MMOL/L (ref 136–145)
SP GR UR STRIP.AUTO: >=1.03 (ref 1–1.03)
SPECIMEN EXPIRATION DATE: NORMAL
T WAVE AXIS: 28 DEGREES
T WAVE AXIS: 36 DEGREES
T WAVE AXIS: 38 DEGREES
UROBILINOGEN UR QL STRIP.AUTO: 0.2 E.U./DL
VENTRICULAR RATE: 106 BPM
VENTRICULAR RATE: 108 BPM
VENTRICULAR RATE: 81 BPM
WBC # BLD AUTO: 21.25 THOUSAND/UL (ref 4.31–10.16)

## 2020-12-06 PROCEDURE — 85018 HEMOGLOBIN: CPT | Performed by: INTERNAL MEDICINE

## 2020-12-06 PROCEDURE — 94760 N-INVAS EAR/PLS OXIMETRY 1: CPT

## 2020-12-06 PROCEDURE — 93005 ELECTROCARDIOGRAM TRACING: CPT

## 2020-12-06 PROCEDURE — 93010 ELECTROCARDIOGRAM REPORT: CPT | Performed by: INTERNAL MEDICINE

## 2020-12-06 PROCEDURE — 81003 URINALYSIS AUTO W/O SCOPE: CPT | Performed by: INTERNAL MEDICINE

## 2020-12-06 PROCEDURE — 99222 1ST HOSP IP/OBS MODERATE 55: CPT | Performed by: INTERNAL MEDICINE

## 2020-12-06 PROCEDURE — 86850 RBC ANTIBODY SCREEN: CPT | Performed by: PHYSICIAN ASSISTANT

## 2020-12-06 PROCEDURE — 99233 SBSQ HOSP IP/OBS HIGH 50: CPT | Performed by: INTERNAL MEDICINE

## 2020-12-06 PROCEDURE — 80048 BASIC METABOLIC PNL TOTAL CA: CPT | Performed by: INTERNAL MEDICINE

## 2020-12-06 PROCEDURE — 94640 AIRWAY INHALATION TREATMENT: CPT

## 2020-12-06 PROCEDURE — 99223 1ST HOSP IP/OBS HIGH 75: CPT | Performed by: PHYSICIAN ASSISTANT

## 2020-12-06 PROCEDURE — 86923 COMPATIBILITY TEST ELECTRIC: CPT

## 2020-12-06 PROCEDURE — 86901 BLOOD TYPING SEROLOGIC RH(D): CPT | Performed by: PHYSICIAN ASSISTANT

## 2020-12-06 PROCEDURE — 86900 BLOOD TYPING SEROLOGIC ABO: CPT | Performed by: PHYSICIAN ASSISTANT

## 2020-12-06 PROCEDURE — 85027 COMPLETE CBC AUTOMATED: CPT | Performed by: INTERNAL MEDICINE

## 2020-12-06 PROCEDURE — 85014 HEMATOCRIT: CPT | Performed by: INTERNAL MEDICINE

## 2020-12-06 RX ORDER — LEVALBUTEROL 1.25 MG/.5ML
1.25 SOLUTION, CONCENTRATE RESPIRATORY (INHALATION)
Status: DISCONTINUED | OUTPATIENT
Start: 2020-12-06 | End: 2020-12-12 | Stop reason: HOSPADM

## 2020-12-06 RX ADMIN — HYDROMORPHONE HYDROCHLORIDE 0.5 MG: 1 INJECTION, SOLUTION INTRAMUSCULAR; INTRAVENOUS; SUBCUTANEOUS at 21:38

## 2020-12-06 RX ADMIN — HYDROMORPHONE HYDROCHLORIDE 0.5 MG: 1 INJECTION, SOLUTION INTRAMUSCULAR; INTRAVENOUS; SUBCUTANEOUS at 13:51

## 2020-12-06 RX ADMIN — LEVALBUTEROL HYDROCHLORIDE 1.25 MG: 1.25 SOLUTION, CONCENTRATE RESPIRATORY (INHALATION) at 19:08

## 2020-12-06 RX ADMIN — HEPARIN SODIUM 5000 UNITS: 5000 INJECTION INTRAVENOUS; SUBCUTANEOUS at 05:33

## 2020-12-06 RX ADMIN — HEPARIN SODIUM 5000 UNITS: 5000 INJECTION INTRAVENOUS; SUBCUTANEOUS at 21:38

## 2020-12-06 RX ADMIN — GABAPENTIN 100 MG: 100 CAPSULE ORAL at 17:44

## 2020-12-06 RX ADMIN — LEVALBUTEROL HYDROCHLORIDE 1.25 MG: 1.25 SOLUTION, CONCENTRATE RESPIRATORY (INHALATION) at 07:33

## 2020-12-06 RX ADMIN — FLUTICASONE FUROATE AND VILANTEROL TRIFENATATE 1 PUFF: 200; 25 POWDER RESPIRATORY (INHALATION) at 09:15

## 2020-12-06 RX ADMIN — HYDROMORPHONE HYDROCHLORIDE 0.5 MG: 1 INJECTION, SOLUTION INTRAMUSCULAR; INTRAVENOUS; SUBCUTANEOUS at 09:16

## 2020-12-06 RX ADMIN — IPRATROPIUM BROMIDE 0.5 MG: 0.5 SOLUTION RESPIRATORY (INHALATION) at 07:33

## 2020-12-06 RX ADMIN — HEPARIN SODIUM 5000 UNITS: 5000 INJECTION INTRAVENOUS; SUBCUTANEOUS at 13:51

## 2020-12-06 RX ADMIN — HYDROMORPHONE HYDROCHLORIDE 0.5 MG: 1 INJECTION, SOLUTION INTRAMUSCULAR; INTRAVENOUS; SUBCUTANEOUS at 17:44

## 2020-12-06 RX ADMIN — HYDROMORPHONE HYDROCHLORIDE 0.5 MG: 1 INJECTION, SOLUTION INTRAMUSCULAR; INTRAVENOUS; SUBCUTANEOUS at 05:34

## 2020-12-06 RX ADMIN — ESCITALOPRAM OXALATE 10 MG: 10 TABLET ORAL at 09:16

## 2020-12-06 RX ADMIN — LEVALBUTEROL HYDROCHLORIDE 1.25 MG: 1.25 SOLUTION, CONCENTRATE RESPIRATORY (INHALATION) at 13:45

## 2020-12-06 RX ADMIN — IPRATROPIUM BROMIDE 0.5 MG: 0.5 SOLUTION RESPIRATORY (INHALATION) at 13:45

## 2020-12-06 RX ADMIN — IPRATROPIUM BROMIDE 0.5 MG: 0.5 SOLUTION RESPIRATORY (INHALATION) at 19:08

## 2020-12-06 RX ADMIN — GABAPENTIN 100 MG: 100 CAPSULE ORAL at 09:15

## 2020-12-07 ENCOUNTER — APPOINTMENT (INPATIENT)
Dept: RADIOLOGY | Facility: HOSPITAL | Age: 80
DRG: 481 | End: 2020-12-07
Payer: MEDICARE

## 2020-12-07 ENCOUNTER — ANESTHESIA (INPATIENT)
Dept: PERIOP | Facility: HOSPITAL | Age: 80
DRG: 481 | End: 2020-12-07
Payer: MEDICARE

## 2020-12-07 VITALS — HEART RATE: 98 BPM

## 2020-12-07 LAB
ABO GROUP BLD: NORMAL
ANION GAP SERPL CALCULATED.3IONS-SCNC: 4 MMOL/L (ref 4–13)
BLD GP AB SCN SERPL QL: NEGATIVE
BUN SERPL-MCNC: 24 MG/DL (ref 5–25)
CALCIUM SERPL-MCNC: 8.1 MG/DL (ref 8.3–10.1)
CHLORIDE SERPL-SCNC: 101 MMOL/L (ref 100–108)
CO2 SERPL-SCNC: 28 MMOL/L (ref 21–32)
CREAT SERPL-MCNC: 1.02 MG/DL (ref 0.6–1.3)
ERYTHROCYTE [DISTWIDTH] IN BLOOD BY AUTOMATED COUNT: 14.6 % (ref 11.6–15.1)
GFR SERPL CREATININE-BSD FRML MDRD: 69 ML/MIN/1.73SQ M
GLUCOSE SERPL-MCNC: 116 MG/DL (ref 65–140)
HCT VFR BLD AUTO: 25.9 % (ref 36.5–49.3)
HGB BLD-MCNC: 8.4 G/DL (ref 12–17)
MCH RBC QN AUTO: 34.3 PG (ref 26.8–34.3)
MCHC RBC AUTO-ENTMCNC: 32.4 G/DL (ref 31.4–37.4)
MCV RBC AUTO: 106 FL (ref 82–98)
PLATELET # BLD AUTO: 176 THOUSANDS/UL (ref 149–390)
PMV BLD AUTO: 9.3 FL (ref 8.9–12.7)
POTASSIUM SERPL-SCNC: 4.4 MMOL/L (ref 3.5–5.3)
RBC # BLD AUTO: 2.45 MILLION/UL (ref 3.88–5.62)
RH BLD: POSITIVE
SODIUM SERPL-SCNC: 133 MMOL/L (ref 136–145)
SPECIMEN EXPIRATION DATE: NORMAL
WBC # BLD AUTO: 16.11 THOUSAND/UL (ref 4.31–10.16)

## 2020-12-07 PROCEDURE — 85027 COMPLETE CBC AUTOMATED: CPT | Performed by: INTERNAL MEDICINE

## 2020-12-07 PROCEDURE — P9016 RBC LEUKOCYTES REDUCED: HCPCS

## 2020-12-07 PROCEDURE — 94640 AIRWAY INHALATION TREATMENT: CPT

## 2020-12-07 PROCEDURE — 94760 N-INVAS EAR/PLS OXIMETRY 1: CPT

## 2020-12-07 PROCEDURE — 73502 X-RAY EXAM HIP UNI 2-3 VIEWS: CPT

## 2020-12-07 PROCEDURE — 73502 X-RAY EXAM HIP UNI 2-3 VIEWS: CPT | Performed by: ORTHOPAEDIC SURGERY

## 2020-12-07 PROCEDURE — C1713 ANCHOR/SCREW BN/BN,TIS/BN: HCPCS | Performed by: ORTHOPAEDIC SURGERY

## 2020-12-07 PROCEDURE — 86901 BLOOD TYPING SEROLOGIC RH(D): CPT | Performed by: INTERNAL MEDICINE

## 2020-12-07 PROCEDURE — 99232 SBSQ HOSP IP/OBS MODERATE 35: CPT | Performed by: INTERNAL MEDICINE

## 2020-12-07 PROCEDURE — G9197 ORDER FOR CEPH: HCPCS | Performed by: ORTHOPAEDIC SURGERY

## 2020-12-07 PROCEDURE — 86850 RBC ANTIBODY SCREEN: CPT | Performed by: INTERNAL MEDICINE

## 2020-12-07 PROCEDURE — 86900 BLOOD TYPING SEROLOGIC ABO: CPT | Performed by: INTERNAL MEDICINE

## 2020-12-07 PROCEDURE — 80048 BASIC METABOLIC PNL TOTAL CA: CPT | Performed by: INTERNAL MEDICINE

## 2020-12-07 PROCEDURE — 30233N1 TRANSFUSION OF NONAUTOLOGOUS RED BLOOD CELLS INTO PERIPHERAL VEIN, PERCUTANEOUS APPROACH: ICD-10-PCS | Performed by: INTERNAL MEDICINE

## 2020-12-07 PROCEDURE — 27245 TREAT THIGH FRACTURE: CPT | Performed by: ORTHOPAEDIC SURGERY

## 2020-12-07 PROCEDURE — 94664 DEMO&/EVAL PT USE INHALER: CPT

## 2020-12-07 PROCEDURE — NC001 PR NO CHARGE: Performed by: ORTHOPAEDIC SURGERY

## 2020-12-07 PROCEDURE — 0QS736Z REPOSITION LEFT UPPER FEMUR WITH INTRAMEDULLARY INTERNAL FIXATION DEVICE, PERCUTANEOUS APPROACH: ICD-10-PCS | Performed by: ORTHOPAEDIC SURGERY

## 2020-12-07 DEVICE — 5.0MM TI LOCKING SCREW 38MM- FOR IM NAILS-STERILE: Type: IMPLANTABLE DEVICE | Status: FUNCTIONAL

## 2020-12-07 DEVICE — 11.0MM TI TROCH FIXATION NAIL SCREW/100MM - STERILE: Type: IMPLANTABLE DEVICE | Status: FUNCTIONAL

## 2020-12-07 DEVICE — 12MM/130 DEG TI CANN TROCH FIXATION NAIL 170MM-STERILE: Type: IMPLANTABLE DEVICE | Status: FUNCTIONAL

## 2020-12-07 RX ORDER — FENTANYL CITRATE 50 UG/ML
INJECTION, SOLUTION INTRAMUSCULAR; INTRAVENOUS AS NEEDED
Status: DISCONTINUED | OUTPATIENT
Start: 2020-12-07 | End: 2020-12-07

## 2020-12-07 RX ORDER — SODIUM CHLORIDE, SODIUM LACTATE, POTASSIUM CHLORIDE, CALCIUM CHLORIDE 600; 310; 30; 20 MG/100ML; MG/100ML; MG/100ML; MG/100ML
INJECTION, SOLUTION INTRAVENOUS CONTINUOUS PRN
Status: DISCONTINUED | OUTPATIENT
Start: 2020-12-07 | End: 2020-12-07

## 2020-12-07 RX ORDER — BUPIVACAINE HYDROCHLORIDE AND EPINEPHRINE 2.5; 5 MG/ML; UG/ML
INJECTION, SOLUTION EPIDURAL; INFILTRATION; INTRACAUDAL; PERINEURAL AS NEEDED
Status: DISCONTINUED | OUTPATIENT
Start: 2020-12-07 | End: 2020-12-07

## 2020-12-07 RX ORDER — FENTANYL CITRATE/PF 50 MCG/ML
25 SYRINGE (ML) INJECTION
Status: DISCONTINUED | OUTPATIENT
Start: 2020-12-07 | End: 2020-12-07 | Stop reason: HOSPADM

## 2020-12-07 RX ORDER — HEPARIN SODIUM 5000 [USP'U]/ML
5000 INJECTION, SOLUTION INTRAVENOUS; SUBCUTANEOUS EVERY 8 HOURS SCHEDULED
Status: DISCONTINUED | OUTPATIENT
Start: 2020-12-07 | End: 2020-12-12 | Stop reason: HOSPADM

## 2020-12-07 RX ORDER — CEFAZOLIN SODIUM 2 G/50ML
2000 SOLUTION INTRAVENOUS EVERY 8 HOURS
Status: DISCONTINUED | OUTPATIENT
Start: 2020-12-07 | End: 2020-12-08

## 2020-12-07 RX ORDER — CEFAZOLIN SODIUM 1 G/50ML
1000 SOLUTION INTRAVENOUS ONCE
Status: COMPLETED | OUTPATIENT
Start: 2020-12-07 | End: 2020-12-07

## 2020-12-07 RX ORDER — OXYCODONE HYDROCHLORIDE 5 MG/1
10 TABLET ORAL EVERY 4 HOURS PRN
Status: DISCONTINUED | OUTPATIENT
Start: 2020-12-07 | End: 2020-12-08

## 2020-12-07 RX ORDER — CEFTRIAXONE 2 G/50ML
2000 INJECTION, SOLUTION INTRAVENOUS EVERY 24 HOURS
Status: DISCONTINUED | OUTPATIENT
Start: 2020-12-08 | End: 2020-12-10

## 2020-12-07 RX ORDER — BUPIVACAINE HYDROCHLORIDE 7.5 MG/ML
INJECTION, SOLUTION INTRASPINAL AS NEEDED
Status: DISCONTINUED | OUTPATIENT
Start: 2020-12-07 | End: 2020-12-07

## 2020-12-07 RX ORDER — LIDOCAINE HYDROCHLORIDE 10 MG/ML
INJECTION, SOLUTION EPIDURAL; INFILTRATION; INTRACAUDAL; PERINEURAL AS NEEDED
Status: DISCONTINUED | OUTPATIENT
Start: 2020-12-07 | End: 2020-12-07

## 2020-12-07 RX ORDER — MIDAZOLAM HYDROCHLORIDE 2 MG/2ML
INJECTION, SOLUTION INTRAMUSCULAR; INTRAVENOUS AS NEEDED
Status: DISCONTINUED | OUTPATIENT
Start: 2020-12-07 | End: 2020-12-07

## 2020-12-07 RX ORDER — OXYCODONE HYDROCHLORIDE 5 MG/1
5 TABLET ORAL EVERY 4 HOURS PRN
Status: DISCONTINUED | OUTPATIENT
Start: 2020-12-07 | End: 2020-12-12 | Stop reason: HOSPADM

## 2020-12-07 RX ADMIN — BUPIVACAINE HYDROCHLORIDE IN DEXTROSE 1.2 ML: 7.5 INJECTION, SOLUTION SUBARACHNOID at 13:19

## 2020-12-07 RX ADMIN — LEVALBUTEROL HYDROCHLORIDE 1.25 MG: 1.25 SOLUTION, CONCENTRATE RESPIRATORY (INHALATION) at 19:38

## 2020-12-07 RX ADMIN — HEPARIN SODIUM 5000 UNITS: 5000 INJECTION INTRAVENOUS; SUBCUTANEOUS at 18:11

## 2020-12-07 RX ADMIN — PHENYLEPHRINE HYDROCHLORIDE 100 MCG: 10 INJECTION INTRAVENOUS at 13:53

## 2020-12-07 RX ADMIN — IPRATROPIUM BROMIDE 0.5 MG: 0.5 SOLUTION RESPIRATORY (INHALATION) at 08:06

## 2020-12-07 RX ADMIN — HEPARIN SODIUM 5000 UNITS: 5000 INJECTION INTRAVENOUS; SUBCUTANEOUS at 22:12

## 2020-12-07 RX ADMIN — LEVALBUTEROL HYDROCHLORIDE 1.25 MG: 1.25 SOLUTION, CONCENTRATE RESPIRATORY (INHALATION) at 08:06

## 2020-12-07 RX ADMIN — FENTANYL CITRATE 10 MCG: 50 INJECTION, SOLUTION INTRAMUSCULAR; INTRAVENOUS at 13:19

## 2020-12-07 RX ADMIN — FLUTICASONE FUROATE AND VILANTEROL TRIFENATATE 1 PUFF: 200; 25 POWDER RESPIRATORY (INHALATION) at 09:33

## 2020-12-07 RX ADMIN — LIDOCAINE HYDROCHLORIDE 5 ML: 10 INJECTION, SOLUTION EPIDURAL; INFILTRATION; INTRACAUDAL; PERINEURAL at 13:15

## 2020-12-07 RX ADMIN — PHENYLEPHRINE HYDROCHLORIDE 100 MCG: 10 INJECTION INTRAVENOUS at 14:15

## 2020-12-07 RX ADMIN — MIDAZOLAM 1 MG: 1 INJECTION INTRAMUSCULAR; INTRAVENOUS at 13:14

## 2020-12-07 RX ADMIN — ACETAMINOPHEN 650 MG: 325 TABLET, FILM COATED ORAL at 23:48

## 2020-12-07 RX ADMIN — PANTOPRAZOLE SODIUM 40 MG: 40 TABLET, DELAYED RELEASE ORAL at 09:33

## 2020-12-07 RX ADMIN — HEPARIN SODIUM 5000 UNITS: 5000 INJECTION INTRAVENOUS; SUBCUTANEOUS at 05:16

## 2020-12-07 RX ADMIN — FENTANYL CITRATE 50 MCG: 50 INJECTION, SOLUTION INTRAMUSCULAR; INTRAVENOUS at 13:15

## 2020-12-07 RX ADMIN — PHENYLEPHRINE HYDROCHLORIDE 50 MCG: 10 INJECTION INTRAVENOUS at 14:02

## 2020-12-07 RX ADMIN — HYDROMORPHONE HYDROCHLORIDE 0.5 MG: 1 INJECTION, SOLUTION INTRAMUSCULAR; INTRAVENOUS; SUBCUTANEOUS at 09:42

## 2020-12-07 RX ADMIN — PHENYLEPHRINE HYDROCHLORIDE 100 MCG: 10 INJECTION INTRAVENOUS at 13:45

## 2020-12-07 RX ADMIN — HYDROMORPHONE HYDROCHLORIDE 0.5 MG: 1 INJECTION, SOLUTION INTRAMUSCULAR; INTRAVENOUS; SUBCUTANEOUS at 22:15

## 2020-12-07 RX ADMIN — IPRATROPIUM BROMIDE 0.5 MG: 0.5 SOLUTION RESPIRATORY (INHALATION) at 19:39

## 2020-12-07 RX ADMIN — HYDROMORPHONE HYDROCHLORIDE 0.5 MG: 1 INJECTION, SOLUTION INTRAMUSCULAR; INTRAVENOUS; SUBCUTANEOUS at 18:16

## 2020-12-07 RX ADMIN — HYDROMORPHONE HYDROCHLORIDE 0.5 MG: 1 INJECTION, SOLUTION INTRAMUSCULAR; INTRAVENOUS; SUBCUTANEOUS at 02:46

## 2020-12-07 RX ADMIN — CEFAZOLIN SODIUM 1000 MG: 1 SOLUTION INTRAVENOUS at 13:57

## 2020-12-07 RX ADMIN — CEFAZOLIN SODIUM 2000 MG: 2 SOLUTION INTRAVENOUS at 15:39

## 2020-12-07 RX ADMIN — PHENYLEPHRINE HYDROCHLORIDE 50 MCG: 10 INJECTION INTRAVENOUS at 13:40

## 2020-12-07 RX ADMIN — PHENYLEPHRINE HYDROCHLORIDE 100 MCG: 10 INJECTION INTRAVENOUS at 14:08

## 2020-12-07 RX ADMIN — SODIUM CHLORIDE, SODIUM LACTATE, POTASSIUM CHLORIDE, AND CALCIUM CHLORIDE: .6; .31; .03; .02 INJECTION, SOLUTION INTRAVENOUS at 12:52

## 2020-12-07 RX ADMIN — GABAPENTIN 100 MG: 100 CAPSULE ORAL at 18:11

## 2020-12-08 ENCOUNTER — APPOINTMENT (INPATIENT)
Dept: RADIOLOGY | Facility: HOSPITAL | Age: 80
DRG: 481 | End: 2020-12-08
Payer: MEDICARE

## 2020-12-08 PROBLEM — A41.9 SEPSIS (HCC): Status: ACTIVE | Noted: 2020-12-08

## 2020-12-08 LAB
ABO GROUP BLD BPU: NORMAL
ALBUMIN SERPL BCP-MCNC: 2.5 G/DL (ref 3.5–5)
ALP SERPL-CCNC: 58 U/L (ref 46–116)
ALT SERPL W P-5'-P-CCNC: 20 U/L (ref 12–78)
ANION GAP SERPL CALCULATED.3IONS-SCNC: 3 MMOL/L (ref 4–13)
ANION GAP SERPL CALCULATED.3IONS-SCNC: 6 MMOL/L (ref 4–13)
AST SERPL W P-5'-P-CCNC: 17 U/L (ref 5–45)
BASOPHILS # BLD AUTO: 0.02 THOUSANDS/ΜL (ref 0–0.1)
BASOPHILS # BLD AUTO: 0.02 THOUSANDS/ΜL (ref 0–0.1)
BASOPHILS NFR BLD AUTO: 0 % (ref 0–1)
BASOPHILS NFR BLD AUTO: 0 % (ref 0–1)
BILIRUB SERPL-MCNC: 1.2 MG/DL (ref 0.2–1)
BPU ID: NORMAL
BUN SERPL-MCNC: 25 MG/DL (ref 5–25)
BUN SERPL-MCNC: 26 MG/DL (ref 5–25)
CALCIUM ALBUM COR SERPL-MCNC: 9.4 MG/DL (ref 8.3–10.1)
CALCIUM SERPL-MCNC: 8.2 MG/DL (ref 8.3–10.1)
CALCIUM SERPL-MCNC: 8.3 MG/DL (ref 8.3–10.1)
CHLORIDE SERPL-SCNC: 102 MMOL/L (ref 100–108)
CHLORIDE SERPL-SCNC: 99 MMOL/L (ref 100–108)
CO2 SERPL-SCNC: 27 MMOL/L (ref 21–32)
CO2 SERPL-SCNC: 30 MMOL/L (ref 21–32)
CREAT SERPL-MCNC: 1.14 MG/DL (ref 0.6–1.3)
CREAT SERPL-MCNC: 1.26 MG/DL (ref 0.6–1.3)
CROSSMATCH: NORMAL
EOSINOPHIL # BLD AUTO: 0 THOUSAND/ΜL (ref 0–0.61)
EOSINOPHIL # BLD AUTO: 0 THOUSAND/ΜL (ref 0–0.61)
EOSINOPHIL NFR BLD AUTO: 0 % (ref 0–6)
EOSINOPHIL NFR BLD AUTO: 0 % (ref 0–6)
ERYTHROCYTE [DISTWIDTH] IN BLOOD BY AUTOMATED COUNT: 15.5 % (ref 11.6–15.1)
ERYTHROCYTE [DISTWIDTH] IN BLOOD BY AUTOMATED COUNT: 15.5 % (ref 11.6–15.1)
FLUAV RNA RESP QL NAA+PROBE: NEGATIVE
FLUBV RNA RESP QL NAA+PROBE: NEGATIVE
GFR SERPL CREATININE-BSD FRML MDRD: 53 ML/MIN/1.73SQ M
GFR SERPL CREATININE-BSD FRML MDRD: 60 ML/MIN/1.73SQ M
GLUCOSE SERPL-MCNC: 101 MG/DL (ref 65–140)
GLUCOSE SERPL-MCNC: 109 MG/DL (ref 65–140)
HCT VFR BLD AUTO: 27.2 % (ref 36.5–49.3)
HCT VFR BLD AUTO: 28.1 % (ref 36.5–49.3)
HGB BLD-MCNC: 8.7 G/DL (ref 12–17)
HGB BLD-MCNC: 9.1 G/DL (ref 12–17)
IMM GRANULOCYTES # BLD AUTO: 0.11 THOUSAND/UL (ref 0–0.2)
IMM GRANULOCYTES # BLD AUTO: 0.16 THOUSAND/UL (ref 0–0.2)
IMM GRANULOCYTES NFR BLD AUTO: 1 % (ref 0–2)
IMM GRANULOCYTES NFR BLD AUTO: 1 % (ref 0–2)
LACTATE SERPL-SCNC: 1.2 MMOL/L (ref 0.5–2)
LYMPHOCYTES # BLD AUTO: 0.53 THOUSANDS/ΜL (ref 0.6–4.47)
LYMPHOCYTES # BLD AUTO: 1.03 THOUSANDS/ΜL (ref 0.6–4.47)
LYMPHOCYTES NFR BLD AUTO: 3 % (ref 14–44)
LYMPHOCYTES NFR BLD AUTO: 5 % (ref 14–44)
MCH RBC QN AUTO: 33.8 PG (ref 26.8–34.3)
MCH RBC QN AUTO: 34 PG (ref 26.8–34.3)
MCHC RBC AUTO-ENTMCNC: 32 G/DL (ref 31.4–37.4)
MCHC RBC AUTO-ENTMCNC: 32.4 G/DL (ref 31.4–37.4)
MCV RBC AUTO: 105 FL (ref 82–98)
MCV RBC AUTO: 106 FL (ref 82–98)
MONOCYTES # BLD AUTO: 1.32 THOUSAND/ΜL (ref 0.17–1.22)
MONOCYTES # BLD AUTO: 1.55 THOUSAND/ΜL (ref 0.17–1.22)
MONOCYTES NFR BLD AUTO: 7 % (ref 4–12)
MONOCYTES NFR BLD AUTO: 8 % (ref 4–12)
NEUTROPHILS # BLD AUTO: 16.98 THOUSANDS/ΜL (ref 1.85–7.62)
NEUTROPHILS # BLD AUTO: 17 THOUSANDS/ΜL (ref 1.85–7.62)
NEUTS SEG NFR BLD AUTO: 86 % (ref 43–75)
NEUTS SEG NFR BLD AUTO: 89 % (ref 43–75)
NRBC BLD AUTO-RTO: 0 /100 WBCS
NRBC BLD AUTO-RTO: 0 /100 WBCS
PLATELET # BLD AUTO: 154 THOUSANDS/UL (ref 149–390)
PLATELET # BLD AUTO: 176 THOUSANDS/UL (ref 149–390)
PMV BLD AUTO: 9.2 FL (ref 8.9–12.7)
PMV BLD AUTO: 9.8 FL (ref 8.9–12.7)
POTASSIUM SERPL-SCNC: 4.4 MMOL/L (ref 3.5–5.3)
POTASSIUM SERPL-SCNC: 4.5 MMOL/L (ref 3.5–5.3)
PROCALCITONIN SERPL-MCNC: 0.08 NG/ML
PROT SERPL-MCNC: 6.3 G/DL (ref 6.4–8.2)
RBC # BLD AUTO: 2.56 MILLION/UL (ref 3.88–5.62)
RBC # BLD AUTO: 2.69 MILLION/UL (ref 3.88–5.62)
RSV RNA RESP QL NAA+PROBE: NEGATIVE
SARS-COV-2 RNA RESP QL NAA+PROBE: NEGATIVE
SODIUM SERPL-SCNC: 132 MMOL/L (ref 136–145)
SODIUM SERPL-SCNC: 135 MMOL/L (ref 136–145)
UNIT DISPENSE STATUS: NORMAL
UNIT PRODUCT CODE: NORMAL
UNIT RH: NORMAL
WBC # BLD AUTO: 19.03 THOUSAND/UL (ref 4.31–10.16)
WBC # BLD AUTO: 19.69 THOUSAND/UL (ref 4.31–10.16)

## 2020-12-08 PROCEDURE — 99232 SBSQ HOSP IP/OBS MODERATE 35: CPT | Performed by: INTERNAL MEDICINE

## 2020-12-08 PROCEDURE — 83605 ASSAY OF LACTIC ACID: CPT | Performed by: INTERNAL MEDICINE

## 2020-12-08 PROCEDURE — 87040 BLOOD CULTURE FOR BACTERIA: CPT | Performed by: INTERNAL MEDICINE

## 2020-12-08 PROCEDURE — 97163 PT EVAL HIGH COMPLEX 45 MIN: CPT

## 2020-12-08 PROCEDURE — 97167 OT EVAL HIGH COMPLEX 60 MIN: CPT

## 2020-12-08 PROCEDURE — 80048 BASIC METABOLIC PNL TOTAL CA: CPT | Performed by: INTERNAL MEDICINE

## 2020-12-08 PROCEDURE — 71045 X-RAY EXAM CHEST 1 VIEW: CPT

## 2020-12-08 PROCEDURE — 0241U HB NFCT DS VIR RESP RNA 4 TRGT: CPT | Performed by: PHYSICIAN ASSISTANT

## 2020-12-08 PROCEDURE — 84145 PROCALCITONIN (PCT): CPT | Performed by: INTERNAL MEDICINE

## 2020-12-08 PROCEDURE — 80053 COMPREHEN METABOLIC PANEL: CPT | Performed by: INTERNAL MEDICINE

## 2020-12-08 PROCEDURE — 85025 COMPLETE CBC W/AUTO DIFF WBC: CPT | Performed by: INTERNAL MEDICINE

## 2020-12-08 PROCEDURE — 94640 AIRWAY INHALATION TREATMENT: CPT

## 2020-12-08 PROCEDURE — 99024 POSTOP FOLLOW-UP VISIT: CPT | Performed by: PHYSICIAN ASSISTANT

## 2020-12-08 PROCEDURE — 94760 N-INVAS EAR/PLS OXIMETRY 1: CPT

## 2020-12-08 RX ORDER — OXYCODONE HYDROCHLORIDE 5 MG/1
10 TABLET ORAL EVERY 4 HOURS PRN
Status: DISCONTINUED | OUTPATIENT
Start: 2020-12-08 | End: 2020-12-12 | Stop reason: HOSPADM

## 2020-12-08 RX ORDER — SODIUM CHLORIDE 9 MG/ML
75 INJECTION, SOLUTION INTRAVENOUS CONTINUOUS
Status: DISCONTINUED | OUTPATIENT
Start: 2020-12-08 | End: 2020-12-08

## 2020-12-08 RX ADMIN — OXYCODONE HYDROCHLORIDE 5 MG: 5 TABLET ORAL at 11:17

## 2020-12-08 RX ADMIN — PANTOPRAZOLE SODIUM 40 MG: 40 TABLET, DELAYED RELEASE ORAL at 05:35

## 2020-12-08 RX ADMIN — LEVALBUTEROL HYDROCHLORIDE 1.25 MG: 1.25 SOLUTION, CONCENTRATE RESPIRATORY (INHALATION) at 19:36

## 2020-12-08 RX ADMIN — SODIUM CHLORIDE 75 ML/HR: 0.9 INJECTION, SOLUTION INTRAVENOUS at 01:01

## 2020-12-08 RX ADMIN — METRONIDAZOLE 500 MG: 500 SOLUTION INTRAVENOUS at 17:14

## 2020-12-08 RX ADMIN — LEVALBUTEROL HYDROCHLORIDE 1.25 MG: 1.25 SOLUTION, CONCENTRATE RESPIRATORY (INHALATION) at 07:22

## 2020-12-08 RX ADMIN — OXYCODONE HYDROCHLORIDE 10 MG: 5 TABLET ORAL at 01:58

## 2020-12-08 RX ADMIN — IPRATROPIUM BROMIDE 0.5 MG: 0.5 SOLUTION RESPIRATORY (INHALATION) at 07:22

## 2020-12-08 RX ADMIN — GABAPENTIN 100 MG: 100 CAPSULE ORAL at 09:20

## 2020-12-08 RX ADMIN — METRONIDAZOLE 500 MG: 500 SOLUTION INTRAVENOUS at 01:58

## 2020-12-08 RX ADMIN — METRONIDAZOLE 500 MG: 500 SOLUTION INTRAVENOUS at 09:20

## 2020-12-08 RX ADMIN — GABAPENTIN 100 MG: 100 CAPSULE ORAL at 17:14

## 2020-12-08 RX ADMIN — IPRATROPIUM BROMIDE 0.5 MG: 0.5 SOLUTION RESPIRATORY (INHALATION) at 19:36

## 2020-12-08 RX ADMIN — FLUTICASONE FUROATE AND VILANTEROL TRIFENATATE 1 PUFF: 200; 25 POWDER RESPIRATORY (INHALATION) at 09:21

## 2020-12-08 RX ADMIN — CEFTRIAXONE 2000 MG: 2 INJECTION, SOLUTION INTRAVENOUS at 01:01

## 2020-12-08 RX ADMIN — HEPARIN SODIUM 5000 UNITS: 5000 INJECTION INTRAVENOUS; SUBCUTANEOUS at 15:55

## 2020-12-08 RX ADMIN — HEPARIN SODIUM 5000 UNITS: 5000 INJECTION INTRAVENOUS; SUBCUTANEOUS at 21:15

## 2020-12-08 RX ADMIN — LEVALBUTEROL HYDROCHLORIDE 1.25 MG: 1.25 SOLUTION, CONCENTRATE RESPIRATORY (INHALATION) at 14:12

## 2020-12-08 RX ADMIN — ESCITALOPRAM OXALATE 10 MG: 10 TABLET ORAL at 09:20

## 2020-12-08 RX ADMIN — IPRATROPIUM BROMIDE 0.5 MG: 0.5 SOLUTION RESPIRATORY (INHALATION) at 14:12

## 2020-12-08 RX ADMIN — HEPARIN SODIUM 5000 UNITS: 5000 INJECTION INTRAVENOUS; SUBCUTANEOUS at 05:35

## 2020-12-08 RX ADMIN — OXYCODONE HYDROCHLORIDE 10 MG: 5 TABLET ORAL at 20:03

## 2020-12-09 PROBLEM — R65.10 SIRS (SYSTEMIC INFLAMMATORY RESPONSE SYNDROME) (HCC): Status: ACTIVE | Noted: 2020-12-08

## 2020-12-09 LAB
ANION GAP SERPL CALCULATED.3IONS-SCNC: 5 MMOL/L (ref 4–13)
BASOPHILS # BLD AUTO: 0.01 THOUSANDS/ΜL (ref 0–0.1)
BASOPHILS NFR BLD AUTO: 0 % (ref 0–1)
BUN SERPL-MCNC: 25 MG/DL (ref 5–25)
CALCIUM SERPL-MCNC: 8 MG/DL (ref 8.3–10.1)
CHLORIDE SERPL-SCNC: 100 MMOL/L (ref 100–108)
CO2 SERPL-SCNC: 28 MMOL/L (ref 21–32)
CREAT SERPL-MCNC: 1.03 MG/DL (ref 0.6–1.3)
EOSINOPHIL # BLD AUTO: 0.04 THOUSAND/ΜL (ref 0–0.61)
EOSINOPHIL NFR BLD AUTO: 0 % (ref 0–6)
ERYTHROCYTE [DISTWIDTH] IN BLOOD BY AUTOMATED COUNT: 15 % (ref 11.6–15.1)
GFR SERPL CREATININE-BSD FRML MDRD: 68 ML/MIN/1.73SQ M
GLUCOSE SERPL-MCNC: 107 MG/DL (ref 65–140)
HCT VFR BLD AUTO: 24.3 % (ref 36.5–49.3)
HGB BLD-MCNC: 8 G/DL (ref 12–17)
IMM GRANULOCYTES # BLD AUTO: 0.04 THOUSAND/UL (ref 0–0.2)
IMM GRANULOCYTES NFR BLD AUTO: 0 % (ref 0–2)
LYMPHOCYTES # BLD AUTO: 0.75 THOUSANDS/ΜL (ref 0.6–4.47)
LYMPHOCYTES NFR BLD AUTO: 6 % (ref 14–44)
MCH RBC QN AUTO: 34.6 PG (ref 26.8–34.3)
MCHC RBC AUTO-ENTMCNC: 32.9 G/DL (ref 31.4–37.4)
MCV RBC AUTO: 105 FL (ref 82–98)
MONOCYTES # BLD AUTO: 0.77 THOUSAND/ΜL (ref 0.17–1.22)
MONOCYTES NFR BLD AUTO: 6 % (ref 4–12)
NEUTROPHILS # BLD AUTO: 11.28 THOUSANDS/ΜL (ref 1.85–7.62)
NEUTS SEG NFR BLD AUTO: 88 % (ref 43–75)
NRBC BLD AUTO-RTO: 0 /100 WBCS
PLATELET # BLD AUTO: 132 THOUSANDS/UL (ref 149–390)
PMV BLD AUTO: 9.8 FL (ref 8.9–12.7)
POTASSIUM SERPL-SCNC: 4.2 MMOL/L (ref 3.5–5.3)
PROCALCITONIN SERPL-MCNC: 0.32 NG/ML
RBC # BLD AUTO: 2.31 MILLION/UL (ref 3.88–5.62)
SODIUM SERPL-SCNC: 133 MMOL/L (ref 136–145)
WBC # BLD AUTO: 12.89 THOUSAND/UL (ref 4.31–10.16)

## 2020-12-09 PROCEDURE — 99232 SBSQ HOSP IP/OBS MODERATE 35: CPT | Performed by: INTERNAL MEDICINE

## 2020-12-09 PROCEDURE — 85025 COMPLETE CBC W/AUTO DIFF WBC: CPT | Performed by: INTERNAL MEDICINE

## 2020-12-09 PROCEDURE — 97530 THERAPEUTIC ACTIVITIES: CPT

## 2020-12-09 PROCEDURE — 94760 N-INVAS EAR/PLS OXIMETRY 1: CPT

## 2020-12-09 PROCEDURE — 97535 SELF CARE MNGMENT TRAINING: CPT

## 2020-12-09 PROCEDURE — 84145 PROCALCITONIN (PCT): CPT | Performed by: INTERNAL MEDICINE

## 2020-12-09 PROCEDURE — 99024 POSTOP FOLLOW-UP VISIT: CPT | Performed by: PHYSICIAN ASSISTANT

## 2020-12-09 PROCEDURE — 94640 AIRWAY INHALATION TREATMENT: CPT

## 2020-12-09 PROCEDURE — 80048 BASIC METABOLIC PNL TOTAL CA: CPT | Performed by: INTERNAL MEDICINE

## 2020-12-09 RX ORDER — METRONIDAZOLE 500 MG/1
500 TABLET ORAL EVERY 8 HOURS SCHEDULED
Status: DISCONTINUED | OUTPATIENT
Start: 2020-12-09 | End: 2020-12-09

## 2020-12-09 RX ADMIN — LEVALBUTEROL HYDROCHLORIDE 1.25 MG: 1.25 SOLUTION, CONCENTRATE RESPIRATORY (INHALATION) at 20:19

## 2020-12-09 RX ADMIN — GABAPENTIN 100 MG: 100 CAPSULE ORAL at 17:17

## 2020-12-09 RX ADMIN — HEPARIN SODIUM 5000 UNITS: 5000 INJECTION INTRAVENOUS; SUBCUTANEOUS at 23:05

## 2020-12-09 RX ADMIN — HEPARIN SODIUM 5000 UNITS: 5000 INJECTION INTRAVENOUS; SUBCUTANEOUS at 13:36

## 2020-12-09 RX ADMIN — IPRATROPIUM BROMIDE 0.5 MG: 0.5 SOLUTION RESPIRATORY (INHALATION) at 13:28

## 2020-12-09 RX ADMIN — LEVALBUTEROL HYDROCHLORIDE 1.25 MG: 1.25 SOLUTION, CONCENTRATE RESPIRATORY (INHALATION) at 13:28

## 2020-12-09 RX ADMIN — GABAPENTIN 100 MG: 100 CAPSULE ORAL at 08:29

## 2020-12-09 RX ADMIN — METRONIDAZOLE 500 MG: 500 SOLUTION INTRAVENOUS at 00:40

## 2020-12-09 RX ADMIN — LEVALBUTEROL HYDROCHLORIDE 1.25 MG: 1.25 SOLUTION, CONCENTRATE RESPIRATORY (INHALATION) at 07:51

## 2020-12-09 RX ADMIN — OXYCODONE HYDROCHLORIDE 10 MG: 5 TABLET ORAL at 19:53

## 2020-12-09 RX ADMIN — HEPARIN SODIUM 5000 UNITS: 5000 INJECTION INTRAVENOUS; SUBCUTANEOUS at 05:18

## 2020-12-09 RX ADMIN — CEFTRIAXONE 2000 MG: 2 INJECTION, SOLUTION INTRAVENOUS at 23:05

## 2020-12-09 RX ADMIN — ESCITALOPRAM OXALATE 10 MG: 10 TABLET ORAL at 08:29

## 2020-12-09 RX ADMIN — OXYCODONE HYDROCHLORIDE 10 MG: 5 TABLET ORAL at 05:41

## 2020-12-09 RX ADMIN — FLUTICASONE FUROATE AND VILANTEROL TRIFENATATE 1 PUFF: 200; 25 POWDER RESPIRATORY (INHALATION) at 08:29

## 2020-12-09 RX ADMIN — IPRATROPIUM BROMIDE 0.5 MG: 0.5 SOLUTION RESPIRATORY (INHALATION) at 07:51

## 2020-12-09 RX ADMIN — IPRATROPIUM BROMIDE 0.5 MG: 0.5 SOLUTION RESPIRATORY (INHALATION) at 20:19

## 2020-12-09 RX ADMIN — OXYCODONE HYDROCHLORIDE 10 MG: 5 TABLET ORAL at 12:16

## 2020-12-09 RX ADMIN — CEFTRIAXONE 2000 MG: 2 INJECTION, SOLUTION INTRAVENOUS at 00:05

## 2020-12-09 RX ADMIN — METRONIDAZOLE 500 MG: 500 SOLUTION INTRAVENOUS at 08:28

## 2020-12-09 RX ADMIN — PANTOPRAZOLE SODIUM 40 MG: 40 TABLET, DELAYED RELEASE ORAL at 05:18

## 2020-12-10 PROBLEM — E87.1 HYPONATREMIA: Status: ACTIVE | Noted: 2020-12-10

## 2020-12-10 LAB
ABO GROUP BLD: NORMAL
ANION GAP SERPL CALCULATED.3IONS-SCNC: 7 MMOL/L (ref 4–13)
BASOPHILS # BLD AUTO: 0.01 THOUSANDS/ΜL (ref 0–0.1)
BASOPHILS NFR BLD AUTO: 0 % (ref 0–1)
BLD GP AB SCN SERPL QL: NEGATIVE
BUN SERPL-MCNC: 22 MG/DL (ref 5–25)
CALCIUM SERPL-MCNC: 8.4 MG/DL (ref 8.3–10.1)
CHLORIDE SERPL-SCNC: 98 MMOL/L (ref 100–108)
CO2 SERPL-SCNC: 26 MMOL/L (ref 21–32)
CREAT SERPL-MCNC: 0.97 MG/DL (ref 0.6–1.3)
EOSINOPHIL # BLD AUTO: 0.08 THOUSAND/ΜL (ref 0–0.61)
EOSINOPHIL NFR BLD AUTO: 1 % (ref 0–6)
ERYTHROCYTE [DISTWIDTH] IN BLOOD BY AUTOMATED COUNT: 14.4 % (ref 11.6–15.1)
GFR SERPL CREATININE-BSD FRML MDRD: 73 ML/MIN/1.73SQ M
GLUCOSE SERPL-MCNC: 116 MG/DL (ref 65–140)
HCT VFR BLD AUTO: 23.7 % (ref 36.5–49.3)
HGB BLD-MCNC: 7.7 G/DL (ref 12–17)
IMM GRANULOCYTES # BLD AUTO: 0.07 THOUSAND/UL (ref 0–0.2)
IMM GRANULOCYTES NFR BLD AUTO: 1 % (ref 0–2)
LYMPHOCYTES # BLD AUTO: 0.71 THOUSANDS/ΜL (ref 0.6–4.47)
LYMPHOCYTES NFR BLD AUTO: 6 % (ref 14–44)
MCH RBC QN AUTO: 34.4 PG (ref 26.8–34.3)
MCHC RBC AUTO-ENTMCNC: 32.5 G/DL (ref 31.4–37.4)
MCV RBC AUTO: 106 FL (ref 82–98)
MONOCYTES # BLD AUTO: 0.54 THOUSAND/ΜL (ref 0.17–1.22)
MONOCYTES NFR BLD AUTO: 5 % (ref 4–12)
NEUTROPHILS # BLD AUTO: 10.59 THOUSANDS/ΜL (ref 1.85–7.62)
NEUTS SEG NFR BLD AUTO: 87 % (ref 43–75)
NRBC BLD AUTO-RTO: 0 /100 WBCS
OSMOLALITY UR/SERPL-RTO: 286 MMOL/KG (ref 282–298)
OSMOLALITY UR: 738 MMOL/KG
PLATELET # BLD AUTO: 145 THOUSANDS/UL (ref 149–390)
PMV BLD AUTO: 9.8 FL (ref 8.9–12.7)
POTASSIUM SERPL-SCNC: 4.2 MMOL/L (ref 3.5–5.3)
PROCALCITONIN SERPL-MCNC: 0.39 NG/ML
RBC # BLD AUTO: 2.24 MILLION/UL (ref 3.88–5.62)
RH BLD: POSITIVE
SODIUM 24H UR-SCNC: 42 MOL/L
SODIUM SERPL-SCNC: 131 MMOL/L (ref 136–145)
SPECIMEN EXPIRATION DATE: NORMAL
TSH SERPL DL<=0.05 MIU/L-ACNC: 1.97 UIU/ML (ref 0.36–3.74)
URATE SERPL-MCNC: 4.4 MG/DL (ref 4.2–8)
WBC # BLD AUTO: 12 THOUSAND/UL (ref 4.31–10.16)

## 2020-12-10 PROCEDURE — 97530 THERAPEUTIC ACTIVITIES: CPT

## 2020-12-10 PROCEDURE — 99024 POSTOP FOLLOW-UP VISIT: CPT | Performed by: PHYSICIAN ASSISTANT

## 2020-12-10 PROCEDURE — 86901 BLOOD TYPING SEROLOGIC RH(D): CPT | Performed by: INTERNAL MEDICINE

## 2020-12-10 PROCEDURE — P9016 RBC LEUKOCYTES REDUCED: HCPCS

## 2020-12-10 PROCEDURE — 83935 ASSAY OF URINE OSMOLALITY: CPT | Performed by: INTERNAL MEDICINE

## 2020-12-10 PROCEDURE — 84443 ASSAY THYROID STIM HORMONE: CPT | Performed by: INTERNAL MEDICINE

## 2020-12-10 PROCEDURE — 86900 BLOOD TYPING SEROLOGIC ABO: CPT | Performed by: INTERNAL MEDICINE

## 2020-12-10 PROCEDURE — 86850 RBC ANTIBODY SCREEN: CPT | Performed by: INTERNAL MEDICINE

## 2020-12-10 PROCEDURE — 97110 THERAPEUTIC EXERCISES: CPT

## 2020-12-10 PROCEDURE — 94760 N-INVAS EAR/PLS OXIMETRY 1: CPT

## 2020-12-10 PROCEDURE — 30233N1 TRANSFUSION OF NONAUTOLOGOUS RED BLOOD CELLS INTO PERIPHERAL VEIN, PERCUTANEOUS APPROACH: ICD-10-PCS | Performed by: INTERNAL MEDICINE

## 2020-12-10 PROCEDURE — 84145 PROCALCITONIN (PCT): CPT | Performed by: INTERNAL MEDICINE

## 2020-12-10 PROCEDURE — 99232 SBSQ HOSP IP/OBS MODERATE 35: CPT | Performed by: INTERNAL MEDICINE

## 2020-12-10 PROCEDURE — 86923 COMPATIBILITY TEST ELECTRIC: CPT

## 2020-12-10 PROCEDURE — 94640 AIRWAY INHALATION TREATMENT: CPT

## 2020-12-10 PROCEDURE — 83930 ASSAY OF BLOOD OSMOLALITY: CPT | Performed by: INTERNAL MEDICINE

## 2020-12-10 PROCEDURE — 85025 COMPLETE CBC W/AUTO DIFF WBC: CPT | Performed by: INTERNAL MEDICINE

## 2020-12-10 PROCEDURE — 84300 ASSAY OF URINE SODIUM: CPT | Performed by: INTERNAL MEDICINE

## 2020-12-10 PROCEDURE — 84550 ASSAY OF BLOOD/URIC ACID: CPT | Performed by: INTERNAL MEDICINE

## 2020-12-10 PROCEDURE — 80048 BASIC METABOLIC PNL TOTAL CA: CPT | Performed by: INTERNAL MEDICINE

## 2020-12-10 RX ADMIN — PANTOPRAZOLE SODIUM 40 MG: 40 TABLET, DELAYED RELEASE ORAL at 05:25

## 2020-12-10 RX ADMIN — OXYCODONE HYDROCHLORIDE 10 MG: 5 TABLET ORAL at 03:26

## 2020-12-10 RX ADMIN — HEPARIN SODIUM 5000 UNITS: 5000 INJECTION INTRAVENOUS; SUBCUTANEOUS at 15:06

## 2020-12-10 RX ADMIN — FLUTICASONE FUROATE AND VILANTEROL TRIFENATATE 1 PUFF: 200; 25 POWDER RESPIRATORY (INHALATION) at 08:38

## 2020-12-10 RX ADMIN — LEVALBUTEROL HYDROCHLORIDE 1.25 MG: 1.25 SOLUTION, CONCENTRATE RESPIRATORY (INHALATION) at 13:41

## 2020-12-10 RX ADMIN — GABAPENTIN 100 MG: 100 CAPSULE ORAL at 08:34

## 2020-12-10 RX ADMIN — LEVALBUTEROL HYDROCHLORIDE 1.25 MG: 1.25 SOLUTION, CONCENTRATE RESPIRATORY (INHALATION) at 22:14

## 2020-12-10 RX ADMIN — HEPARIN SODIUM 5000 UNITS: 5000 INJECTION INTRAVENOUS; SUBCUTANEOUS at 05:26

## 2020-12-10 RX ADMIN — IPRATROPIUM BROMIDE 0.5 MG: 0.5 SOLUTION RESPIRATORY (INHALATION) at 22:14

## 2020-12-10 RX ADMIN — HEPARIN SODIUM 5000 UNITS: 5000 INJECTION INTRAVENOUS; SUBCUTANEOUS at 21:27

## 2020-12-10 RX ADMIN — IPRATROPIUM BROMIDE 0.5 MG: 0.5 SOLUTION RESPIRATORY (INHALATION) at 13:41

## 2020-12-10 RX ADMIN — GABAPENTIN 100 MG: 100 CAPSULE ORAL at 17:25

## 2020-12-10 RX ADMIN — ESCITALOPRAM OXALATE 10 MG: 10 TABLET ORAL at 08:35

## 2020-12-10 RX ADMIN — OXYCODONE HYDROCHLORIDE 10 MG: 5 TABLET ORAL at 11:30

## 2020-12-10 RX ADMIN — IPRATROPIUM BROMIDE 0.5 MG: 0.5 SOLUTION RESPIRATORY (INHALATION) at 07:30

## 2020-12-10 RX ADMIN — LEVALBUTEROL HYDROCHLORIDE 1.25 MG: 1.25 SOLUTION, CONCENTRATE RESPIRATORY (INHALATION) at 07:30

## 2020-12-11 LAB
ABO GROUP BLD BPU: NORMAL
ANION GAP SERPL CALCULATED.3IONS-SCNC: 5 MMOL/L (ref 4–13)
ANION GAP SERPL CALCULATED.3IONS-SCNC: 7 MMOL/L (ref 4–13)
BASOPHILS # BLD AUTO: 0.01 THOUSANDS/ΜL (ref 0–0.1)
BASOPHILS NFR BLD AUTO: 0 % (ref 0–1)
BPU ID: NORMAL
BUN SERPL-MCNC: 22 MG/DL (ref 5–25)
BUN SERPL-MCNC: 24 MG/DL (ref 5–25)
CALCIUM SERPL-MCNC: 8 MG/DL (ref 8.3–10.1)
CALCIUM SERPL-MCNC: 8.5 MG/DL (ref 8.3–10.1)
CHLORIDE SERPL-SCNC: 95 MMOL/L (ref 100–108)
CHLORIDE SERPL-SCNC: 97 MMOL/L (ref 100–108)
CO2 SERPL-SCNC: 27 MMOL/L (ref 21–32)
CO2 SERPL-SCNC: 28 MMOL/L (ref 21–32)
CORTIS AM PEAK SERPL-MCNC: 12.1 UG/DL (ref 4.2–22.4)
CREAT SERPL-MCNC: 0.95 MG/DL (ref 0.6–1.3)
CREAT SERPL-MCNC: 1.08 MG/DL (ref 0.6–1.3)
CROSSMATCH: NORMAL
EOSINOPHIL # BLD AUTO: 0.09 THOUSAND/ΜL (ref 0–0.61)
EOSINOPHIL NFR BLD AUTO: 1 % (ref 0–6)
ERYTHROCYTE [DISTWIDTH] IN BLOOD BY AUTOMATED COUNT: 15.5 % (ref 11.6–15.1)
FLUAV RNA RESP QL NAA+PROBE: NEGATIVE
FLUBV RNA RESP QL NAA+PROBE: NEGATIVE
GFR SERPL CREATININE-BSD FRML MDRD: 64 ML/MIN/1.73SQ M
GFR SERPL CREATININE-BSD FRML MDRD: 75 ML/MIN/1.73SQ M
GLUCOSE SERPL-MCNC: 117 MG/DL (ref 65–140)
GLUCOSE SERPL-MCNC: 122 MG/DL (ref 65–140)
HCT VFR BLD AUTO: 27 % (ref 36.5–49.3)
HGB BLD-MCNC: 8.7 G/DL (ref 12–17)
IMM GRANULOCYTES # BLD AUTO: 0.04 THOUSAND/UL (ref 0–0.2)
IMM GRANULOCYTES NFR BLD AUTO: 0 % (ref 0–2)
LYMPHOCYTES # BLD AUTO: 0.85 THOUSANDS/ΜL (ref 0.6–4.47)
LYMPHOCYTES NFR BLD AUTO: 8 % (ref 14–44)
MCH RBC QN AUTO: 33.2 PG (ref 26.8–34.3)
MCHC RBC AUTO-ENTMCNC: 32.2 G/DL (ref 31.4–37.4)
MCV RBC AUTO: 103 FL (ref 82–98)
MONOCYTES # BLD AUTO: 0.57 THOUSAND/ΜL (ref 0.17–1.22)
MONOCYTES NFR BLD AUTO: 5 % (ref 4–12)
NEUTROPHILS # BLD AUTO: 9.1 THOUSANDS/ΜL (ref 1.85–7.62)
NEUTS SEG NFR BLD AUTO: 86 % (ref 43–75)
NRBC BLD AUTO-RTO: 0 /100 WBCS
PLATELET # BLD AUTO: 153 THOUSANDS/UL (ref 149–390)
PMV BLD AUTO: 9.8 FL (ref 8.9–12.7)
POTASSIUM SERPL-SCNC: 4.2 MMOL/L (ref 3.5–5.3)
POTASSIUM SERPL-SCNC: 4.2 MMOL/L (ref 3.5–5.3)
PROCALCITONIN SERPL-MCNC: 0.3 NG/ML
RBC # BLD AUTO: 2.62 MILLION/UL (ref 3.88–5.62)
RSV RNA RESP QL NAA+PROBE: NEGATIVE
SARS-COV-2 RNA RESP QL NAA+PROBE: NEGATIVE
SODIUM SERPL-SCNC: 129 MMOL/L (ref 136–145)
SODIUM SERPL-SCNC: 130 MMOL/L (ref 136–145)
UNIT DISPENSE STATUS: NORMAL
UNIT PRODUCT CODE: NORMAL
UNIT RH: NORMAL
WBC # BLD AUTO: 10.66 THOUSAND/UL (ref 4.31–10.16)

## 2020-12-11 PROCEDURE — 94640 AIRWAY INHALATION TREATMENT: CPT

## 2020-12-11 PROCEDURE — 94760 N-INVAS EAR/PLS OXIMETRY 1: CPT

## 2020-12-11 PROCEDURE — 94664 DEMO&/EVAL PT USE INHALER: CPT

## 2020-12-11 PROCEDURE — 0241U HB NFCT DS VIR RESP RNA 4 TRGT: CPT | Performed by: INTERNAL MEDICINE

## 2020-12-11 PROCEDURE — 99024 POSTOP FOLLOW-UP VISIT: CPT | Performed by: PHYSICIAN ASSISTANT

## 2020-12-11 PROCEDURE — 84145 PROCALCITONIN (PCT): CPT | Performed by: INTERNAL MEDICINE

## 2020-12-11 PROCEDURE — 99232 SBSQ HOSP IP/OBS MODERATE 35: CPT | Performed by: INTERNAL MEDICINE

## 2020-12-11 PROCEDURE — 85025 COMPLETE CBC W/AUTO DIFF WBC: CPT | Performed by: INTERNAL MEDICINE

## 2020-12-11 PROCEDURE — 80048 BASIC METABOLIC PNL TOTAL CA: CPT | Performed by: NURSE PRACTITIONER

## 2020-12-11 PROCEDURE — 80048 BASIC METABOLIC PNL TOTAL CA: CPT | Performed by: INTERNAL MEDICINE

## 2020-12-11 PROCEDURE — 99222 1ST HOSP IP/OBS MODERATE 55: CPT | Performed by: INTERNAL MEDICINE

## 2020-12-11 PROCEDURE — 82533 TOTAL CORTISOL: CPT | Performed by: INTERNAL MEDICINE

## 2020-12-11 RX ORDER — SODIUM CHLORIDE 1000 MG
2 TABLET, SOLUBLE MISCELLANEOUS 2 TIMES DAILY WITH MEALS
Status: DISCONTINUED | OUTPATIENT
Start: 2020-12-11 | End: 2020-12-12 | Stop reason: HOSPADM

## 2020-12-11 RX ORDER — FUROSEMIDE 10 MG/ML
40 INJECTION INTRAMUSCULAR; INTRAVENOUS ONCE
Status: DISCONTINUED | OUTPATIENT
Start: 2020-12-11 | End: 2020-12-11

## 2020-12-11 RX ADMIN — LEVALBUTEROL HYDROCHLORIDE 1.25 MG: 1.25 SOLUTION, CONCENTRATE RESPIRATORY (INHALATION) at 15:13

## 2020-12-11 RX ADMIN — LEVALBUTEROL HYDROCHLORIDE 1.25 MG: 1.25 SOLUTION, CONCENTRATE RESPIRATORY (INHALATION) at 10:13

## 2020-12-11 RX ADMIN — IPRATROPIUM BROMIDE 0.5 MG: 0.5 SOLUTION RESPIRATORY (INHALATION) at 10:13

## 2020-12-11 RX ADMIN — FLUTICASONE FUROATE AND VILANTEROL TRIFENATATE 1 PUFF: 200; 25 POWDER RESPIRATORY (INHALATION) at 09:48

## 2020-12-11 RX ADMIN — GABAPENTIN 100 MG: 100 CAPSULE ORAL at 18:34

## 2020-12-11 RX ADMIN — IPRATROPIUM BROMIDE 0.5 MG: 0.5 SOLUTION RESPIRATORY (INHALATION) at 19:38

## 2020-12-11 RX ADMIN — IPRATROPIUM BROMIDE 0.5 MG: 0.5 SOLUTION RESPIRATORY (INHALATION) at 15:14

## 2020-12-11 RX ADMIN — OXYCODONE HYDROCHLORIDE 10 MG: 5 TABLET ORAL at 02:13

## 2020-12-11 RX ADMIN — HEPARIN SODIUM 5000 UNITS: 5000 INJECTION INTRAVENOUS; SUBCUTANEOUS at 22:34

## 2020-12-11 RX ADMIN — HEPARIN SODIUM 5000 UNITS: 5000 INJECTION INTRAVENOUS; SUBCUTANEOUS at 05:36

## 2020-12-11 RX ADMIN — GABAPENTIN 100 MG: 100 CAPSULE ORAL at 09:40

## 2020-12-11 RX ADMIN — LEVALBUTEROL HYDROCHLORIDE 1.25 MG: 1.25 SOLUTION, CONCENTRATE RESPIRATORY (INHALATION) at 19:38

## 2020-12-11 RX ADMIN — PANTOPRAZOLE SODIUM 40 MG: 40 TABLET, DELAYED RELEASE ORAL at 05:36

## 2020-12-11 RX ADMIN — SODIUM CHLORIDE 2 G: 1 TABLET ORAL at 13:39

## 2020-12-11 RX ADMIN — OXYCODONE HYDROCHLORIDE 10 MG: 5 TABLET ORAL at 09:41

## 2020-12-11 RX ADMIN — ESCITALOPRAM OXALATE 10 MG: 10 TABLET ORAL at 09:40

## 2020-12-11 RX ADMIN — HEPARIN SODIUM 5000 UNITS: 5000 INJECTION INTRAVENOUS; SUBCUTANEOUS at 13:39

## 2020-12-11 RX ADMIN — SODIUM CHLORIDE 2 G: 1 TABLET ORAL at 18:34

## 2020-12-12 VITALS
DIASTOLIC BLOOD PRESSURE: 57 MMHG | HEART RATE: 94 BPM | SYSTOLIC BLOOD PRESSURE: 103 MMHG | RESPIRATION RATE: 20 BRPM | OXYGEN SATURATION: 93 % | HEIGHT: 66 IN | TEMPERATURE: 97.9 F | BODY MASS INDEX: 30.4 KG/M2 | WEIGHT: 189.15 LBS

## 2020-12-12 PROBLEM — R65.10 SIRS (SYSTEMIC INFLAMMATORY RESPONSE SYNDROME) (HCC): Status: RESOLVED | Noted: 2020-12-08 | Resolved: 2020-12-12

## 2020-12-12 LAB
ANION GAP SERPL CALCULATED.3IONS-SCNC: 5 MMOL/L (ref 4–13)
BUN SERPL-MCNC: 20 MG/DL (ref 5–25)
CALCIUM SERPL-MCNC: 8.2 MG/DL (ref 8.3–10.1)
CHLORIDE SERPL-SCNC: 99 MMOL/L (ref 100–108)
CO2 SERPL-SCNC: 27 MMOL/L (ref 21–32)
CREAT SERPL-MCNC: 0.88 MG/DL (ref 0.6–1.3)
ERYTHROCYTE [DISTWIDTH] IN BLOOD BY AUTOMATED COUNT: 15.2 % (ref 11.6–15.1)
GFR SERPL CREATININE-BSD FRML MDRD: 81 ML/MIN/1.73SQ M
GLUCOSE SERPL-MCNC: 104 MG/DL (ref 65–140)
HCT VFR BLD AUTO: 24.9 % (ref 36.5–49.3)
HGB BLD-MCNC: 8.1 G/DL (ref 12–17)
MCH RBC QN AUTO: 33.5 PG (ref 26.8–34.3)
MCHC RBC AUTO-ENTMCNC: 32.5 G/DL (ref 31.4–37.4)
MCV RBC AUTO: 103 FL (ref 82–98)
PLATELET # BLD AUTO: 168 THOUSANDS/UL (ref 149–390)
PMV BLD AUTO: 9.5 FL (ref 8.9–12.7)
POTASSIUM SERPL-SCNC: 4 MMOL/L (ref 3.5–5.3)
RBC # BLD AUTO: 2.42 MILLION/UL (ref 3.88–5.62)
SODIUM SERPL-SCNC: 131 MMOL/L (ref 136–145)
WBC # BLD AUTO: 8.4 THOUSAND/UL (ref 4.31–10.16)

## 2020-12-12 PROCEDURE — 99024 POSTOP FOLLOW-UP VISIT: CPT | Performed by: PHYSICIAN ASSISTANT

## 2020-12-12 PROCEDURE — 80048 BASIC METABOLIC PNL TOTAL CA: CPT | Performed by: NURSE PRACTITIONER

## 2020-12-12 PROCEDURE — 94640 AIRWAY INHALATION TREATMENT: CPT

## 2020-12-12 PROCEDURE — 85027 COMPLETE CBC AUTOMATED: CPT | Performed by: INTERNAL MEDICINE

## 2020-12-12 PROCEDURE — 94760 N-INVAS EAR/PLS OXIMETRY 1: CPT

## 2020-12-12 PROCEDURE — 99239 HOSP IP/OBS DSCHRG MGMT >30: CPT | Performed by: INTERNAL MEDICINE

## 2020-12-12 RX ORDER — ESCITALOPRAM OXALATE 10 MG/1
10 TABLET ORAL DAILY
Qty: 90 TABLET | Refills: 0
Start: 2020-12-19 | End: 2021-01-29 | Stop reason: SDUPTHER

## 2020-12-12 RX ORDER — ASPIRIN 325 MG
325 TABLET, DELAYED RELEASE (ENTERIC COATED) ORAL 2 TIMES DAILY
Qty: 60 TABLET | Refills: 0
Start: 2020-12-26 | End: 2022-04-04

## 2020-12-12 RX ORDER — SODIUM CHLORIDE 1000 MG
2 TABLET, SOLUBLE MISCELLANEOUS 2 TIMES DAILY WITH MEALS
Qty: 12 TABLET | Refills: 0
Start: 2020-12-12 | End: 2021-01-14 | Stop reason: ALTCHOICE

## 2020-12-12 RX ADMIN — IPRATROPIUM BROMIDE 0.5 MG: 0.5 SOLUTION RESPIRATORY (INHALATION) at 07:33

## 2020-12-12 RX ADMIN — HEPARIN SODIUM 5000 UNITS: 5000 INJECTION INTRAVENOUS; SUBCUTANEOUS at 05:29

## 2020-12-12 RX ADMIN — OXYCODONE HYDROCHLORIDE 10 MG: 5 TABLET ORAL at 09:43

## 2020-12-12 RX ADMIN — GABAPENTIN 100 MG: 100 CAPSULE ORAL at 09:43

## 2020-12-12 RX ADMIN — OXYCODONE HYDROCHLORIDE 10 MG: 5 TABLET ORAL at 16:48

## 2020-12-12 RX ADMIN — HEPARIN SODIUM 5000 UNITS: 5000 INJECTION INTRAVENOUS; SUBCUTANEOUS at 13:54

## 2020-12-12 RX ADMIN — SODIUM CHLORIDE 2 G: 1 TABLET ORAL at 16:48

## 2020-12-12 RX ADMIN — PANTOPRAZOLE SODIUM 40 MG: 40 TABLET, DELAYED RELEASE ORAL at 05:29

## 2020-12-12 RX ADMIN — GABAPENTIN 100 MG: 100 CAPSULE ORAL at 17:01

## 2020-12-12 RX ADMIN — LEVALBUTEROL HYDROCHLORIDE 1.25 MG: 1.25 SOLUTION, CONCENTRATE RESPIRATORY (INHALATION) at 07:33

## 2020-12-12 RX ADMIN — SODIUM CHLORIDE 2 G: 1 TABLET ORAL at 09:43

## 2020-12-12 RX ADMIN — FLUTICASONE FUROATE AND VILANTEROL TRIFENATATE 1 PUFF: 200; 25 POWDER RESPIRATORY (INHALATION) at 09:44

## 2020-12-13 LAB
BACTERIA BLD CULT: NORMAL
BACTERIA BLD CULT: NORMAL

## 2020-12-23 ENCOUNTER — TELEPHONE (OUTPATIENT)
Dept: FAMILY MEDICINE CLINIC | Facility: CLINIC | Age: 80
End: 2020-12-23

## 2020-12-30 ENCOUNTER — TELEPHONE (OUTPATIENT)
Dept: FAMILY MEDICINE CLINIC | Facility: CLINIC | Age: 80
End: 2020-12-30

## 2021-01-03 ENCOUNTER — NURSE TRIAGE (OUTPATIENT)
Dept: OTHER | Facility: OTHER | Age: 81
End: 2021-01-03

## 2021-01-03 ENCOUNTER — TELEMEDICINE (OUTPATIENT)
Dept: FAMILY MEDICINE CLINIC | Facility: CLINIC | Age: 81
End: 2021-01-03
Payer: MEDICARE

## 2021-01-03 DIAGNOSIS — R39.9 UTI SYMPTOMS: Primary | ICD-10-CM

## 2021-01-03 PROCEDURE — 99213 OFFICE O/P EST LOW 20 MIN: CPT | Performed by: FAMILY MEDICINE

## 2021-01-03 RX ORDER — SULFAMETHOXAZOLE AND TRIMETHOPRIM 800; 160 MG/1; MG/1
1 TABLET ORAL EVERY 12 HOURS SCHEDULED
Qty: 28 TABLET | Refills: 0 | Status: SHIPPED | OUTPATIENT
Start: 2021-01-03 | End: 2021-01-14 | Stop reason: ALTCHOICE

## 2021-01-03 NOTE — PROGRESS NOTES
Virtual Regular Visit      Assessment/Plan:    Problem List Items Addressed This Visit     None      Visit Diagnoses     UTI symptoms    -  Primary    Relevant Medications    sulfamethoxazole-trimethoprim (BACTRIM DS) 800-160 mg per tablet               Reason for visit is   Chief Complaint   Patient presents with    Virtual Regular Visit        Encounter provider Victoria Ozuna MD    Provider located at [address] [place] [id number]      Recent Visits  Date Type Provider Dept   12/30/20 Telephone [address] [facility] [address] [facility] recent visits within past 7 days and meeting all other requirements     Today's Visits  Date Type Provider Dept   01/03/21 Telemedicine Victoria Ozuna MD [facility] [address] [facility] today's visits and meeting all other requirements     Future Appointments  No visits were found meeting these conditions  Showing future appointments within next 150 days and meeting all other requirements        The patient was identified by name and date of birth  John Rodriguez was informed that this is a telemedicine visit and that the visit is being conducted through telephone  My office door was closed  No one else was in the room  He acknowledged consent and understanding of privacy and security of the video platform  The patient has agreed to participate and understands they can discontinue the visit at any time  Patient is aware this is a billable service  Subjective  John Rodriguez is a 79 y o  male -   Patient started with urinary tract symptoms  Frequency and burning  Has had these before  No recent antibiotics  Patient recently home for rehab following surgery for 2 broken hips  Discharge summary from today rehab was reviewed no evidence of urinary infections at that point  Does have visiting nurses coming in  Medications were reviewed    Patient discharge from rehab center December 02/20/2020  Patient had been discharged from [facility] December 12, 2020  Past Medical History:   Diagnosis Date    Anxiety     Bladder infection     current tx with cipro 5/29/16    BPH (benign prostatic hyperplasia)     Community acquired pneumonia     last assessed 8/28/17, resolved 9/25/17    COPD [facility]chronic obstructive pulmonary disease) ([facility] [address] [facility]     Dysphagia 6/8/2020    Enlarged prostate     GERD (gastroesophageal reflux disease)     History of colonoscopy 03/19/2015    POLYP IN THE ASCENDING COLON[facility]    Hx of bladder infections     Inguinal hernia, right 05/10/2019    Neck pain     Psychiatric disorder     depression    Pulmonary nodule 01/08/2019    STABLE 6MM LEFT APICAL NODULE    Respiratory failure with hypoxia ([facility] [address] ) 01/08/2019    Urinary retention        Past Surgical History:   Procedure Laterality Date    BLADDER SURGERY  08/15/2019    UROLIFT    COLOSTOMY  02/03/2011    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    INGUINAL HERNIA REPAIR Right 05/10/2019    DONE AT [facility]    LAPAROSCOPIC COLON RESECTION  02/03/2011    ABDOMIANOPERITONEAL RESECTION FOR RECTAL MASS   DONE BY RADHA FRY OPEN RX FEMUR FX+INTRAMED DEMETRICE Left 12/7/2020    Procedure: Left Hip TFN;  Surgeon: Veronica Montilla MD;  Location:  MAIN OR;  Service: Orthopedics       Current Outpatient Medications   Medication Sig Dispense Refill    acetaminophen (TYLENOL) 325 mg tablet Take 2 tablets (650 mg total) by mouth every 4 (four) hours as needed for mild pain 30 tablet 0    albuterol (2 5 mg/3 mL) 0 083 % nebulizer solution Take 2 5 mg by nebulization every 4 (four) hours as needed for wheezing or shortness of breath      albuterol (ProAir HFA) 90 mcg/act inhaler Inhale 2 puffs every 6 (six) hours as needed for wheezing 8 5 g 3    aspirin (ECOTRIN) 325 mg EC tablet Take 1 tablet (325 mg total) by mouth 2 (two) times a day 60 tablet 0    cyclobenzaprine (FLEXERIL) 10 mg tablet Take 1 tablet (10 mg total) by mouth 3 (three) times a day as needed for muscle spasms 30 tablet 1    escitalopram (LEXAPRO) 10 mg tablet Take 1 tablet (10 mg total) by mouth daily 90 tablet 0    famotidine (PEPCID) 40 MG tablet Take 1 tablet (40 mg total) by mouth daily 90 tablet 0    fluticasone-salmeterol (ADVAIR, WIXELA) 250-50 mcg/dose inhaler Inhale 1 puff 2 (two) times a day Rinse mouth after use  1 Inhaler 3    gabapentin (NEURONTIN) 100 mg capsule Take 1 capsule (100 mg total) by mouth 2 (two) times a day 180 capsule 1    ipratropium (ATROVENT) 0 02 % nebulizer solution Take 0 5 mg by nebulization 4 (four) times a day      ipratropium-albuterol (DUO-NEB) 0 5-2 5 mg/3 mL nebulizer solution Take 1 vial (3 mL total) by nebulization 4 (four) times a day 120 vial 5    LORazepam (ATIVAN) 1 mg tablet Take 1 tablet (1 mg total) by mouth every 8 (eight) hours as needed for anxiety 30 tablet 1    NON FORMULARY Take 1,000 mg by mouth 2 (two) times a day Med is MSM Pure, pt brought in from home      sodium chloride 1 g tablet Take 2 tablets (2 g total) by mouth 2 (two) times a day with meals for 3 days 12 tablet 0    sulfamethoxazole-trimethoprim (BACTRIM DS) 800-160 mg per tablet Take 1 tablet by mouth every 12 (twelve) hours for 14 days 28 tablet 0     No current facility-administered medications for this visit  Allergies   Allergen Reactions    Aspirin Other (See Comments)     Hx stomach ulcer       Review of Systems   Respiratory: Negative for cough, chest tightness and shortness of breath  Cardiovascular: Negative for chest pain, palpitations and leg swelling  Gastrointestinal: Negative for abdominal pain, blood in stool, diarrhea and nausea  Genitourinary: Positive for dysuria and urgency  Negative for difficulty urinating, flank pain, frequency and hematuria  Video Exam    There were no vitals filed for this visit      Physical Exam It was my intent to perform this visit via video technology but the patient was not able to do a video connection so the visit was completed via audio telephone only  I spent 15 minutes directly with the patient during this visit      VIRTUAL VISIT DISCLAIMER    Heena Cotton acknowledges that he has consented to an online visit or consultation  He understands that the online visit is based solely on information provided by him, and that, in the absence of a face-to-face physical evaluation by the physician, the diagnosis he receives is both limited and provisional in terms of accuracy and completeness  This is not intended to replace a full medical face-to-face evaluation by the physician  Heena Cotton understands and accepts these terms

## 2021-01-03 NOTE — TELEPHONE ENCOUNTER
pt's wife called because pt started with UTI symptoms today  He is experiencing burning with urination, frequency, and urgency  Denies fever but the thermometer is unreliable  Denies hematuria and other symptoms  Pt is currently recovering from B/L hip fractures as well  Reason for Disposition   All other males with painful urination    Answer Assessment - Initial Assessment Questions  1  SEVERITY: "How bad is the pain?"  (e g , Scale 1-10; mild, moderate, or severe)    - MILD (1-3): complains slightly about urination hurting    - MODERATE (4-7): interferes with normal activities      - SEVERE (8-10): excruciating, unwilling or unable to urinate because of the pain       Mild-moderate    2  FREQUENCY: "How many times have you had painful urination today?"       Every occurrence     3  PATTERN: "Is pain present every time you urinate or just sometimes?"       Every time    4  ONSET: "When did the painful urination start?"       This morning    5  FEVER: "Do you have a fever?" If so, ask: "What is your temperature, how was it measured, and when did it start?"       Denies  Thermometer is unreliable    6  PAST UTI: "Have you had a urine infection before?" If so, ask: "When was the last time?" and "What happened that time?"       Yes  Patient has a history of bladder infections and has been treated with antibiotics in the past      7  CAUSE: "What do you think is causing the painful urination?"       "Bladder infection"    8   OTHER SYMPTOMS: "Do you have any other symptoms?" (e g , flank pain, penile discharge, scrotal pain, blood in urine)      Frequency and urgency    Protocols used: [facility]

## 2021-01-03 NOTE — TELEPHONE ENCOUNTER
Regarding: bladder infection/broken hip   ----- Message from Dontae Webb sent at 1/3/2021  8:45 AM EST -----  " My  has a bladder infection and broken hip we need prescription sent out "

## 2021-01-03 NOTE — PATIENT INSTRUCTIONS
Will start on Bactrim DS twice daily for 7 days  Visiting nurses will be back in Tuesday and can assess  If not feeling better, they will need to collect urine sample  Will have staff also set up formal TCM later on in the week

## 2021-01-05 ENCOUNTER — TELEPHONE (OUTPATIENT)
Dept: PULMONOLOGY | Facility: CLINIC | Age: 81
End: 2021-01-05

## 2021-01-05 NOTE — TELEPHONE ENCOUNTER
Patient's nephew calling and is wondering if the patient can be put on a different inhaler that would be cheaper than the Advair  He stated the patient is doing really well on the medication but it's costing $400  Please advise

## 2021-01-05 NOTE — TELEPHONE ENCOUNTER
Called and spoke with patient's grandson Nunu Cydney Lamas stated that Pt is taking Breo not Advair  He said the Fransisca Gentleman is over $100 monthly and is to much for his grandson  Is Breo 200 ok for him to use so that I can call the insurance to see why is over $100? If it is due to his deductible, there is not anything we can do short of providing samples    Please Advise

## 2021-01-06 ENCOUNTER — APPOINTMENT (OUTPATIENT)
Dept: RADIOLOGY | Facility: CLINIC | Age: 81
End: 2021-01-06
Payer: MEDICARE

## 2021-01-06 ENCOUNTER — OFFICE VISIT (OUTPATIENT)
Dept: OBGYN CLINIC | Facility: CLINIC | Age: 81
End: 2021-01-06

## 2021-01-06 ENCOUNTER — TRANSITIONAL CARE MANAGEMENT (OUTPATIENT)
Dept: FAMILY MEDICINE CLINIC | Facility: CLINIC | Age: 81
End: 2021-01-06

## 2021-01-06 VITALS
DIASTOLIC BLOOD PRESSURE: 68 MMHG | WEIGHT: 180 LBS | BODY MASS INDEX: 28.93 KG/M2 | TEMPERATURE: 97.6 F | HEIGHT: 66 IN | SYSTOLIC BLOOD PRESSURE: 120 MMHG

## 2021-01-06 DIAGNOSIS — Z47.89 AFTERCARE FOLLOWING SURGERY OF THE MUSCULOSKELETAL SYSTEM: ICD-10-CM

## 2021-01-06 DIAGNOSIS — Z47.89 AFTERCARE FOLLOWING SURGERY OF THE MUSCULOSKELETAL SYSTEM: Primary | ICD-10-CM

## 2021-01-06 PROCEDURE — 73502 X-RAY EXAM HIP UNI 2-3 VIEWS: CPT

## 2021-01-06 PROCEDURE — 99024 POSTOP FOLLOW-UP VISIT: CPT | Performed by: ORTHOPAEDIC SURGERY

## 2021-01-06 NOTE — PROGRESS NOTES
Assessment:     1  Aftercare following surgery of the musculoskeletal system        Plan:  The patient was seen and examined by Dr Alissa Spaulding and myself  Problem List Items Addressed This Visit        Other    Aftercare following surgery of the musculoskeletal system - Primary     Patient is status post left hip TFN  X-rays were reviewed with him and his son  He is doing well  Continue physical therapy and home exercises  He is weight-bearing as tolerated to left lower extremity  Follow-up in 2-3 months with repeat x-rays  All questions were answered to their satisfaction  Relevant Orders    XR hip/pelv 2-3 vws right if performed    XR hip/pelv 2-3 vws left if performed         Subjective:     Patient ID: Heena Cotton is a 79 y o  male  Chief Complaint: This is an 76-year-old white male accompanied by his son who is status post left hip TFN on December 7, 2020  He is currently residing at home  He is getting home nursing and physical therapy  He denies any fevers, chills or sweats  Pain is well controlled  He arrives in a wheelchair today        Allergy:  Allergies   Allergen Reactions    Aspirin Other (See Comments)     Hx stomach ulcer     Medications:  all current active meds have been reviewed  Past Medical History:  Past Medical History:   Diagnosis Date    Anxiety     Bladder infection     current tx with cipro 5/29/16    BPH (benign prostatic hyperplasia)     Community acquired pneumonia     last assessed 8/28/17, resolved 9/25/17    COPD [facility]chronic obstructive [facility] disease) ([facility] [address] [facility]     Dysphagia 6/8/2020    Enlarged prostate     GERD (gastroesophageal reflux disease)     History of colonoscopy 03/19/2015    POLYP IN THE ASCENDING COLON-[facility]    Hx of bladder infections     Inguinal hernia, right 05/10/2019    Neck pain     Psychiatric disorder     depression    Pulmonary nodule 01/08/2019    STABLE 6MM LEFT APICAL NODULE    Respiratory failure with hypoxia [facility]) 2019    Urinary retention      Past Surgical History:  Past Surgical History:   Procedure Laterality Date    BLADDER SURGERY  08/15/2019    UROLIFT    COLOSTOMY  2011    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    INGUINAL HERNIA REPAIR Right 05/10/2019    DONE AT [facility]    LAPAROSCOPIC COLON RESECTION  2011    ABDOMIANOPERITONEAL RESECTION FOR RECTAL MASS  DONE BY RADHA FRY OPEN RX FEMUR FX+INTRAMED DEMETRICE Left 2020    Procedure: Left Hip TFN;  Surgeon: Adina Long MD;  Location:  MAIN OR;  Service: Orthopedics     Family History:  Family History   Problem Relation Age of Onset    Lung cancer Mother     Cancer Mother     Cancer Father     Alcohol abuse Father     Mental illness Neg Hx      Social History:  Social History     Substance and Sexual Activity   Alcohol Use Not Currently    Frequency: Never    Binge frequency: Never     Social History     Substance and Sexual Activity   Drug Use No     Social History     Tobacco Use   Smoking Status Former Smoker    Packs/day: 0 50    Years: 50 00    Pack years: 25 00    Types: Cigarettes    Start date: 64    Quit date: 2018    Years since quittin 8   Smokeless Tobacco Former User   Tobacco Comment    quit 2017 per [employer]      Review of Systems   Constitutional: Negative  HENT: Negative  Eyes: Negative  Respiratory: Negative  Cardiovascular: Negative  Gastrointestinal: Negative  Endocrine: Negative  Genitourinary: Negative  Musculoskeletal: Positive for gait problem (in a wheelchair)  Negative for arthralgias  Skin: Negative  Allergic/Immunologic: Negative  Hematological: Negative  Psychiatric/Behavioral: Negative            Objective:  BP Readings from Last 1 Encounters:   21 120/68      Wt Readings from Last 1 Encounters:   21 81 6 kg (180 lb)      BMI:   Estimated body mass index is 29 05 kg/m² as calculated from the following: Height as of this encounter: 5' 6" (1 676 m)  Weight as of this encounter: 81 6 kg (180 lb)  BSA:   Estimated body surface area is 1 91 meters squared as calculated from the following:    Height as of this encounter: 5' 6" (1 676 m)  Weight as of this encounter: 81 6 kg (180 lb)  Physical Exam  Constitutional:       General: He is not in acute distress  Appearance: He is well-developed  HENT:      Head: Normocephalic  Eyes:      Conjunctiva/sclera: Conjunctivae normal       Pupils: Pupils are equal, round, and reactive to light  Pulmonary:      Effort: Pulmonary effort is normal  No respiratory distress  Skin:     General: Skin is warm and dry  Neurological:      Mental Status: He is alert and oriented to person, place, and time  Psychiatric:         Behavior: Behavior normal        Left Hip Exam     Other   Erythema: absent  Scars: present (Well healing incisions with no evidence of infection  No active drainage )  Sensation: normal  Pulse: present    Comments:  Good passive range of motion with no increased pain  Calf soft, nontender  Moving ankle and toes            I have personally reviewed pertinent films in PACS and my interpretation is X-rays left hip reveal a well-aligned intertrochanteric fracture with IM nail intact

## 2021-01-06 NOTE — ASSESSMENT & PLAN NOTE
Patient is status post left hip TFN  X-rays were reviewed with him and his son  He is doing well  Continue physical therapy and home exercises  He is weight-bearing as tolerated to left lower extremity  Follow-up in 2-3 months with repeat x-rays  All questions were answered to their satisfaction

## 2021-01-07 NOTE — TELEPHONE ENCOUNTER
Ester Ludwig, can you please check to see if we have samples in [facility] when you go there tomorrow and supply pt with some    Thank you

## 2021-01-07 NOTE — TELEPHONE ENCOUNTER
Called the insurance company and unfortunately due to his deductible, their isn't anything on the affordable side for him  Short of providing him with samples of the Breo there is nothing we can do  Would you like for us to provide him with samples if we have? Please advise

## 2021-01-11 ENCOUNTER — TELEPHONE (OUTPATIENT)
Dept: FAMILY MEDICINE CLINIC | Facility: CLINIC | Age: 81
End: 2021-01-11

## 2021-01-11 ENCOUNTER — HOSPITAL ENCOUNTER (EMERGENCY)
Facility: HOSPITAL | Age: 81
Discharge: HOME | End: 2021-01-11
Attending: EMERGENCY MEDICINE
Payer: MEDICARE

## 2021-01-11 VITALS
SYSTOLIC BLOOD PRESSURE: 101 MMHG | TEMPERATURE: 97.8 F | BODY MASS INDEX: 27.75 KG/M2 | OXYGEN SATURATION: 98 % | WEIGHT: 171.96 LBS | RESPIRATION RATE: 18 BRPM | DIASTOLIC BLOOD PRESSURE: 58 MMHG | HEART RATE: 88 BPM

## 2021-01-11 DIAGNOSIS — R44.1 VISUAL HALLUCINATIONS: ICD-10-CM

## 2021-01-11 DIAGNOSIS — R41.0 DELIRIUM: Primary | ICD-10-CM

## 2021-01-11 LAB
ALBUMIN SERPL BCP-MCNC: 2.9 G/DL (ref 3.5–5)
ALP SERPL-CCNC: 93 U/L (ref 46–116)
ALT SERPL W P-5'-P-CCNC: 16 U/L (ref 12–78)
ANION GAP SERPL CALCULATED.3IONS-SCNC: 7 MMOL/L (ref 4–13)
AST SERPL W P-5'-P-CCNC: 14 U/L (ref 5–45)
BASOPHILS # BLD AUTO: 0.03 THOUSANDS/ΜL (ref 0–0.1)
BASOPHILS NFR BLD AUTO: 0 % (ref 0–1)
BILIRUB SERPL-MCNC: 0.2 MG/DL (ref 0.2–1)
BILIRUB UR QL STRIP: NEGATIVE
BUN SERPL-MCNC: 17 MG/DL (ref 5–25)
CALCIUM ALBUM COR SERPL-MCNC: 9.7 MG/DL (ref 8.3–10.1)
CALCIUM SERPL-MCNC: 8.8 MG/DL (ref 8.3–10.1)
CHLORIDE SERPL-SCNC: 102 MMOL/L (ref 100–108)
CLARITY UR: CLEAR
CO2 SERPL-SCNC: 28 MMOL/L (ref 21–32)
COLOR UR: YELLOW
CREAT SERPL-MCNC: 1.15 MG/DL (ref 0.6–1.3)
EOSINOPHIL # BLD AUTO: 0.24 THOUSAND/ΜL (ref 0–0.61)
EOSINOPHIL NFR BLD AUTO: 2 % (ref 0–6)
ERYTHROCYTE [DISTWIDTH] IN BLOOD BY AUTOMATED COUNT: 14.7 % (ref 11.6–15.1)
GFR SERPL CREATININE-BSD FRML MDRD: 59 ML/MIN/1.73SQ M
GLUCOSE SERPL-MCNC: 92 MG/DL (ref 65–140)
GLUCOSE UR STRIP-MCNC: NEGATIVE MG/DL
HCT VFR BLD AUTO: 35.2 % (ref 36.5–49.3)
HGB BLD-MCNC: 11 G/DL (ref 12–17)
HGB UR QL STRIP.AUTO: NEGATIVE
IMM GRANULOCYTES # BLD AUTO: 0.03 THOUSAND/UL (ref 0–0.2)
IMM GRANULOCYTES NFR BLD AUTO: 0 % (ref 0–2)
KETONES UR STRIP-MCNC: NEGATIVE MG/DL
LEUKOCYTE ESTERASE UR QL STRIP: NEGATIVE
LYMPHOCYTES # BLD AUTO: 1.09 THOUSANDS/ΜL (ref 0.6–4.47)
LYMPHOCYTES NFR BLD AUTO: 10 % (ref 14–44)
MCH RBC QN AUTO: 32.9 PG (ref 26.8–34.3)
MCHC RBC AUTO-ENTMCNC: 31.3 G/DL (ref 31.4–37.4)
MCV RBC AUTO: 105 FL (ref 82–98)
MONOCYTES # BLD AUTO: 0.6 THOUSAND/ΜL (ref 0.17–1.22)
MONOCYTES NFR BLD AUTO: 5 % (ref 4–12)
NEUTROPHILS # BLD AUTO: 9.48 THOUSANDS/ΜL (ref 1.85–7.62)
NEUTS SEG NFR BLD AUTO: 83 % (ref 43–75)
NITRITE UR QL STRIP: NEGATIVE
NRBC BLD AUTO-RTO: 0 /100 WBCS
PH UR STRIP.AUTO: 6 [PH]
PLATELET # BLD AUTO: 244 THOUSANDS/UL (ref 149–390)
PMV BLD AUTO: 9.4 FL (ref 8.9–12.7)
POTASSIUM SERPL-SCNC: 4.2 MMOL/L (ref 3.5–5.3)
PROT SERPL-MCNC: 7.2 G/DL (ref 6.4–8.2)
PROT UR STRIP-MCNC: NEGATIVE MG/DL
RBC # BLD AUTO: 3.34 MILLION/UL (ref 3.88–5.62)
SODIUM SERPL-SCNC: 137 MMOL/L (ref 136–145)
SP GR UR STRIP.AUTO: 1.01 (ref 1–1.03)
UROBILINOGEN UR QL STRIP.AUTO: 0.2 E.U./DL
WBC # BLD AUTO: 11.47 THOUSAND/UL (ref 4.31–10.16)

## 2021-01-11 PROCEDURE — 85025 COMPLETE CBC W/AUTO DIFF WBC: CPT | Performed by: PHYSICIAN ASSISTANT

## 2021-01-11 PROCEDURE — 99283 EMERGENCY DEPT VISIT LOW MDM: CPT

## 2021-01-11 PROCEDURE — 80053 COMPREHEN METABOLIC PANEL: CPT | Performed by: PHYSICIAN ASSISTANT

## 2021-01-11 PROCEDURE — 99285 EMERGENCY DEPT VISIT HI MDM: CPT | Performed by: PHYSICIAN ASSISTANT

## 2021-01-11 PROCEDURE — 36415 COLL VENOUS BLD VENIPUNCTURE: CPT | Performed by: PHYSICIAN ASSISTANT

## 2021-01-11 PROCEDURE — 81003 URINALYSIS AUTO W/O SCOPE: CPT | Performed by: PHYSICIAN ASSISTANT

## 2021-01-11 RX ORDER — QUETIAPINE FUMARATE 50 MG/1
50 TABLET, FILM COATED ORAL
Qty: 14 TABLET | Refills: 0 | Status: SHIPPED | OUTPATIENT
Start: 2021-01-11 | End: 2021-01-14

## 2021-01-11 NOTE — TELEPHONE ENCOUNTER
Patient generally having mental status changes  Has been on Bactrim for 10 days  Increase urinary difficulties over the last 48 hours  Family advised, the concerned he may be septic  Would have him evaluated hospital emergency room  She will need to call 911 as she is unable to transfer herself

## 2021-01-11 NOTE — TELEPHONE ENCOUNTER
Omid Butler from [address] [facility] called stating Pt is having hallucinations and pain when urinates  Requested a call back from Romy Babin   [phone]   As of today vitals   Oxigen= 94%  BP= 98/60 sitting  Pain= 8 to 10  Temp=98 7  Advice needed  Please review    thanks

## 2021-01-11 NOTE — ED PROVIDER NOTES
History  Chief Complaint   Patient presents with    Possible UTI     Recent dx with UTI  Family reports pt does "weird stuff", but is otherwise alert and oriented      65-year-old male with history of COPD, BPH status post colostomy and bilateral hip replacement presents to the emergency department for evaluation altered mental status and hallucinations  Patient reportedly was having dysuria 1 week ago was started on Bactrim by his primary care physician  He remains on this at this time  According to his daughter he will frequently reach out for objects that do not exist and will see individuals that are not present  This seems to be particularly worse at nighttime before he is going to sleep  He has not had any recent fevers  Eating and drinking normally  Daughter also notes that over the past 24 hours his exercise tolerance seems to have decreased in conjunction with his worsening sleep  Prior to Admission Medications   Prescriptions Last Dose Informant Patient Reported? Taking?    LORazepam (ATIVAN) 1 mg tablet   No No   Sig: Take 1 tablet (1 mg total) by mouth every 8 (eight) hours as needed for anxiety   NON FORMULARY   Yes No   Sig: Take 1,000 mg by mouth 2 (two) times a day Med is MSM Pure, pt brought in from home   acetaminophen (TYLENOL) 325 mg tablet   No No   Sig: Take 2 tablets (650 mg total) by mouth every 4 (four) hours as needed for mild pain   albuterol (2 5 mg/3 mL) 0 083 % nebulizer solution   Yes No   Sig: Take 2 5 mg by nebulization every 4 (four) hours as needed for wheezing or shortness of breath   albuterol (ProAir HFA) 90 mcg/act inhaler   No No   Sig: Inhale 2 puffs every 6 (six) hours as needed for wheezing   aspirin (ECOTRIN) 325 mg EC tablet   No No   Sig: Take 1 tablet (325 mg total) by mouth 2 (two) times a day   cyclobenzaprine (FLEXERIL) 10 mg tablet   No No   Sig: Take 1 tablet (10 mg total) by mouth 3 (three) times a day as needed for muscle spasms   escitalopram (LEXAPRO) 10 mg tablet   No No   Sig: Take 1 tablet (10 mg total) by mouth daily   famotidine (PEPCID) 40 MG tablet   No No   Sig: Take 1 tablet (40 mg total) by mouth daily   fluticasone-salmeterol (ADVAIR, WIXELA) 250-50 mcg/dose inhaler   No No   Sig: Inhale 1 puff 2 (two) times a day Rinse mouth after use    gabapentin (NEURONTIN) 100 mg capsule   No No   Sig: Take 1 capsule (100 mg total) by mouth 2 (two) times a day   ipratropium (ATROVENT) 0 02 % nebulizer solution   Yes No   Sig: Take 0 5 mg by nebulization 4 (four) times a day   ipratropium-albuterol (DUO-NEB) 0 5-2 5 mg/3 mL nebulizer solution   No No   Sig: Take 1 vial (3 mL total) by nebulization 4 (four) times a day   sodium chloride 1 g tablet   No No   Sig: Take 2 tablets (2 g total) by mouth 2 (two) times a day with meals for 3 days   sulfamethoxazole-trimethoprim (BACTRIM DS) 800-160 mg per tablet   No No   Sig: Take 1 tablet by mouth every 12 (twelve) hours for 14 days      Facility-Administered Medications: None       Past Medical History:   Diagnosis Date    Anxiety     Bladder infection     current tx with cipro 5/29/16    BPH (benign prostatic hyperplasia)     Community acquired pneumonia     last assessed 8/28/17, resolved 9/25/17    COPD (chronic obstructive pulmonary disease) (HCC)     Dysphagia 6/8/2020    Enlarged prostate     GERD (gastroesophageal reflux disease)     History of colonoscopy 03/19/2015    POLYP IN THE ASCENDING COLON-[facility]    Hx of bladder infections     Inguinal hernia, right 05/10/2019    Neck pain     Psychiatric disorder     depression    Pulmonary nodule 01/08/2019    STABLE 6MM LEFT APICAL NODULE    Respiratory failure with hypoxia ([address] ) 01/08/2019    Urinary retention        Past Surgical History:   Procedure Laterality Date    BLADDER SURGERY  08/15/2019    UROLIFT    COLOSTOMY  02/03/2011    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    INGUINAL HERNIA REPAIR Right 05/10/2019 DONE AT [facility]    LAPAROSCOPIC COLON RESECTION  2011    ABDOMIANOPERITONEAL RESECTION FOR RECTAL MASS  DONE BY RADHA FRY OPEN RX FEMUR FX+INTRAMED DEMETRICE Left 2020    Procedure: Left Hip TFN;  Surgeon: Veronica Montilla MD;  Location:  MAIN OR;  Service: Orthopedics       Family History   Problem Relation Age of Onset    Lung cancer Mother     Cancer Mother     Cancer Father     Alcohol abuse Father     Mental illness Neg Hx      I have reviewed and agree with the history as documented  E-Cigarette/Vaping    E-Cigarette Use Never User      E-Cigarette/Vaping Substances    Nicotine No     Flavoring No      Social History     Tobacco Use    Smoking status: Former Smoker     Packs/day: 0 50     Years: 50 00     Pack years: 25 00     Types: Cigarettes     Start date: 64     Quit date: 2018     Years since quittin 8    Smokeless tobacco: Former User    Tobacco comment: quit 2017 per [employer]    Substance Use Topics    Alcohol use: Not Currently     Frequency: Never     Binge frequency: Never    Drug use: No       Review of Systems   Constitutional: Negative for chills, diaphoresis and fever  Eyes: Negative for visual disturbance  Respiratory: Negative for cough and shortness of breath  Cardiovascular: Negative for chest pain and palpitations  Gastrointestinal: Negative for abdominal pain, diarrhea, nausea and vomiting  Genitourinary: Negative for dysuria, flank pain and frequency  Musculoskeletal: Negative for arthralgias and myalgias  Skin: Negative for color change, rash and wound  Allergic/Immunologic: Negative for immunocompromised state  Neurological: Negative for dizziness and light-headedness  Hematological: Does not bruise/bleed easily  Psychiatric/Behavioral: Positive for confusion and hallucinations  The patient is not nervous/anxious  Physical Exam  Physical Exam  Vitals signs and nursing note reviewed     Constitutional: Appearance: He is well-developed  HENT:      Head: Normocephalic and atraumatic  Mouth/Throat:      Mouth: Mucous membranes are moist    Eyes:      Conjunctiva/sclera: Conjunctivae normal    Neck:      Musculoskeletal: Neck supple  Cardiovascular:      Rate and Rhythm: Normal rate and regular rhythm  Heart sounds: No murmur  Pulmonary:      Effort: Pulmonary effort is normal  No respiratory distress  Breath sounds: Normal breath sounds  Abdominal:      Palpations: Abdomen is soft  Tenderness: There is no abdominal tenderness  Skin:     General: Skin is warm and dry  Capillary Refill: Capillary refill takes less than 2 seconds  Neurological:      General: No focal deficit present  Mental Status: He is alert and oriented to person, place, and time  Cranial Nerves: No cranial nerve deficit  Sensory: No sensory deficit        Comments: BLE weakness symmetric bilat, strength 4/5  BUE strength intact and symmetric in all fields   Psychiatric:         Mood and Affect: Mood normal          Behavior: Behavior normal          Vital Signs  ED Triage Vitals [01/11/21 1405]   Temperature Pulse Respirations Blood Pressure SpO2   97 8 °F (36 6 °C) 90 20 103/58 98 %      Temp Source Heart Rate Source Patient Position - Orthostatic VS BP Location FiO2 (%)   Temporal Monitor Lying Right arm --      Pain Score       No Pain           Vitals:    01/11/21 1405 01/11/21 1708   BP: 103/58 101/58   Pulse: 90 88   Patient Position - Orthostatic VS: Lying Lying         Visual Acuity      ED Medications  Medications - No data to display    Diagnostic Studies  Results Reviewed     Procedure Component Value Units Date/Time    UA w Reflex to Microscopic w Reflex to Culture [[id number]] Collected: 01/11/21 1520    Lab Status: Final result Specimen: Urine, Clean Catch Updated: 01/11/21 1536     Color, UA Yellow     Clarity, UA Clear     Specific Gravity, UA 1 015     pH, UA 6 0     Leukocytes, UA Negative     Nitrite, UA Negative     Protein, UA Negative mg/dl      Glucose, UA Negative mg/dl      Ketones, UA Negative mg/dl      Urobilinogen, UA 0 2 E U /dl      Bilirubin, UA Negative     Blood, UA Negative    Comprehensive metabolic panel [[id number]]  (Abnormal) Collected: 01/11/21 1427    Lab Status: Final result Specimen: Blood from Arm, Left Updated: 01/11/21 1454     Sodium 137 mmol/L      Potassium 4 2 mmol/L      Chloride 102 mmol/L      CO2 28 mmol/L      ANION GAP 7 mmol/L      BUN 17 mg/dL      Creatinine 1 15 mg/dL      Glucose 92 mg/dL      Calcium 8 8 mg/dL      Corrected Calcium 9 7 mg/dL      AST 14 U/L      ALT 16 U/L      Alkaline Phosphatase 93 U/L      Total Protein 7 2 g/dL      Albumin 2 9 g/dL      Total Bilirubin 0 20 mg/dL      eGFR 59 ml/min/1 73sq m     Narrative:      [place] guidelines for Chronic Kidney Disease (CKD):     Stage 1 with normal or high GFR (GFR > 90 mL/min/1 73 square meters)    Stage 2 Mild CKD (GFR = 60-89 mL/min/1 73 square meters)    Stage 3A Moderate CKD (GFR = 45-59 mL/min/1 73 square meters)    Stage 3B Moderate CKD (GFR = 30-44 mL/min/1 73 square meters)    Stage 4 Severe CKD (GFR = 15-29 mL/min/1 73 square meters)    Stage 5 End Stage CKD (GFR <15 mL/min/1 73 square meters)  Note: GFR calculation is accurate only with a steady state creatinine    CBC and differential [[id number]]  (Abnormal) Collected: 01/11/21 1427    Lab Status: Final result Specimen: Blood from Arm, Left Updated: 01/11/21 1436     WBC 11 47 Thousand/uL      RBC 3 34 Million/uL      Hemoglobin 11 0 g/dL      Hematocrit 35 2 %       fL      MCH 32 9 pg      MCHC 31 3 g/dL      RDW 14 7 %      MPV 9 4 fL      Platelets 301 Thousands/uL      nRBC 0 /100 WBCs      Neutrophils Relative 83 %      Immat GRANS % 0 %      Lymphocytes Relative 10 %      Monocytes Relative 5 %      Eosinophils Relative 2 %      Basophils Relative 0 %      Neutrophils Absolute 9 48 Thousands/µL      Immature Grans Absolute 0 03 Thousand/uL      Lymphocytes Absolute 1 09 Thousands/µL      Monocytes Absolute 0 60 Thousand/µL      Eosinophils Absolute 0 24 Thousand/µL      Basophils Absolute 0 03 Thousands/µL                  No orders to display              Procedures  Procedures         ED Course                             SBIRT 20yo+      Most Recent Value   SBIRT (22 yo +)   In order to provide better care to our patients, we are screening all of our patients for alcohol and drug use  Would it be okay to ask you these screening questions? No Filed at: 01/11/2021 1709   Initial Alcohol Screen: US AUDIT-C    1  How often do you have a drink containing alcohol?  0 Filed at: 01/11/2021 1709   2  How many drinks containing alcohol do you have on a typical day you are drinking? 0 Filed at: 01/11/2021 1709   3a  Male UNDER 65: How often do you have five or more drinks on one occasion? 0 Filed at: 01/11/2021 1709   3b  FEMALE Any Age, or MALE 65+: How often do you have 4 or more drinks on one occassion? 0 Filed at: 01/11/2021 1709   Audit-C Score  0 Filed at: 01/11/2021 1709                    MDM  Number of Diagnoses or Management Options  Delirium: new and requires workup  Visual hallucinations: new and requires workup  Diagnosis management comments:   Patient's labs are reassuring urinalysis is negative  Alert and oriented throughout duration emergency department stay and does not exhibit any gross hallucinations or neurologic deficits of any kind  I have recommended they discontinue his Bactrim at this point given that he has taken greater than 1 week of antibiotics  Additionally recommend discontinuation of lorazepam, will trial Seroquel at nighttime  Recommend close primary care follow-up  Do not suspect this is medical, rather psychiatric versus cognitive decline         Amount and/or Complexity of Data Reviewed  Clinical lab tests: ordered and reviewed  Tests in the medicine section of CPT®: ordered and reviewed  Review and summarize past medical records: yes        Disposition  Final diagnoses:   Delirium   Visual hallucinations     Time reflects when diagnosis was documented in both MDM as applicable and the Disposition within this note     Time User Action Codes Description Comment    1/11/2021  3:54 PM Kinga Grave Add [R41 0] Delirium     1/11/2021  3:54 PM Kinga Grave Add [R44 1] Visual hallucinations       ED Disposition     ED Disposition Condition Date/Time Comment    Discharge Stable Mon Jan 11, 2021  3:54 PM Elizabeth Olguin discharge to home/self care              Follow-up Information     Follow up With Specialties Details Why Contact Info    William Farias MD Family Medicine In 2 days If symptoms worsen [address]  [address]  [facility] [address]  [phone]            Discharge Medication List as of 1/11/2021  3:55 PM      START taking these medications    Details   QUEtiapine (SEROquel) 50 mg tablet Take 1 tablet (50 mg total) by mouth daily at bedtime, Starting Mon 1/11/2021, Normal         CONTINUE these medications which have NOT CHANGED    Details   acetaminophen (TYLENOL) 325 mg tablet Take 2 tablets (650 mg total) by mouth every 4 (four) hours as needed for mild pain, Starting Sat 6/13/2020, No Print      albuterol (2 5 mg/3 mL) 0 083 % nebulizer solution Take 2 5 mg by nebulization every 4 (four) hours as needed for wheezing or shortness of breath, Historical Med      albuterol (ProAir HFA) 90 mcg/act inhaler Inhale 2 puffs every 6 (six) hours as needed for wheezing, Starting Fri 5/29/2020, Normal      aspirin (ECOTRIN) 325 mg EC tablet Take 1 tablet (325 mg total) by mouth 2 (two) times a day, Starting Sat 12/26/2020, Until Mon 1/25/2021, No Print      cyclobenzaprine (FLEXERIL) 10 mg tablet Take 1 tablet (10 mg total) by mouth 3 (three) times a day as needed for muscle spasms, Starting Wed 5/13/2020, Normal      escitalopram (LEXAPRO) 10 mg tablet Take 1 tablet (10 mg total) by mouth daily, Starting Sat 12/19/2020, No Print      famotidine (PEPCID) 40 MG tablet Take 1 tablet (40 mg total) by mouth daily, Starting Mon 11/23/2020, Normal      fluticasone-salmeterol (ADVAIR, WIXELA) 250-50 mcg/dose inhaler Inhale 1 puff 2 (two) times a day Rinse mouth after use , Starting Thu 7/30/2020, Normal      gabapentin (NEURONTIN) 100 mg capsule Take 1 capsule (100 mg total) by mouth 2 (two) times a day, Starting Wed 9/30/2020, Normal      ipratropium (ATROVENT) 0 02 % nebulizer solution Take 0 5 mg by nebulization 4 (four) times a day, Historical Med      ipratropium-albuterol (DUO-NEB) 0 5-2 5 mg/3 mL nebulizer solution Take 1 vial (3 mL total) by nebulization 4 (four) times a day, Starting Fri 2/14/2020, Normal      NON FORMULARY Take 1,000 mg by mouth 2 (two) times a day Med is MSM Pure, pt brought in from home, Historical Med      sodium chloride 1 g tablet Take 2 tablets (2 g total) by mouth 2 (two) times a day with meals for 3 days, Starting Sat 12/12/2020, Until Tue 12/15/2020, No Print      sulfamethoxazole-trimethoprim (BACTRIM DS) 800-160 mg per tablet Take 1 tablet by mouth every 12 (twelve) hours for 14 days, Starting Sun 1/3/2021, Until Sun 1/17/2021, Normal         STOP taking these medications       LORazepam (ATIVAN) 1 mg tablet Comments:   Reason for Stopping:             No discharge procedures on file      PDMP Review       Value Time User    PDMP Reviewed  Yes 12/12/2020 11:15 AM Sofie Jamison MD          ED Provider  Electronically Signed by           Jeannie Jama PA-C  01/11/21 2001

## 2021-01-11 NOTE — ED NOTES
Called for transport:    [facility]: Nothing available  [facility]: Nothing available  [facility]: Nothing available  [address] [place] [address] Rd: Nothing available  [facility]: Unable to take patient up steps  [facility]: No answer  [facility]: Nothing available       Abdirahman Walls  01/11/21 1635

## 2021-01-12 ENCOUNTER — TELEPHONE (OUTPATIENT)
Dept: FAMILY MEDICINE CLINIC | Facility: CLINIC | Age: 81
End: 2021-01-12

## 2021-01-12 DIAGNOSIS — R33.8 BENIGN PROSTATIC HYPERPLASIA WITH URINARY RETENTION: Primary | ICD-10-CM

## 2021-01-12 DIAGNOSIS — N40.1 BENIGN PROSTATIC HYPERPLASIA WITH URINARY RETENTION: Primary | ICD-10-CM

## 2021-01-12 RX ORDER — TAMSULOSIN HYDROCHLORIDE 0.4 MG/1
0.4 CAPSULE ORAL
Qty: 30 CAPSULE | Refills: 0 | COMMUNITY
Start: 2021-01-12 | End: 2021-01-22 | Stop reason: SDUPTHER

## 2021-01-12 NOTE — TELEPHONE ENCOUNTER
Per wife, quetiapine tablets were RX'd by ER X 1-2 weeks  Patient already taking flomax daily    FYI to PM

## 2021-01-14 ENCOUNTER — VBI (OUTPATIENT)
Dept: ADMINISTRATIVE | Facility: OTHER | Age: 81
End: 2021-01-14

## 2021-01-14 ENCOUNTER — TELEMEDICINE (OUTPATIENT)
Dept: FAMILY MEDICINE CLINIC | Facility: CLINIC | Age: 81
End: 2021-01-14
Payer: MEDICARE

## 2021-01-14 VITALS — WEIGHT: 171 LBS | HEIGHT: 66 IN | BODY MASS INDEX: 27.48 KG/M2

## 2021-01-14 DIAGNOSIS — S72.009A CLOSED FRACTURE OF HIP, UNSPECIFIED LATERALITY, INITIAL ENCOUNTER (HCC): Primary | ICD-10-CM

## 2021-01-14 DIAGNOSIS — R44.1 VISUAL HALLUCINATIONS: ICD-10-CM

## 2021-01-14 DIAGNOSIS — R41.0 DELIRIUM: ICD-10-CM

## 2021-01-14 DIAGNOSIS — J96.11 CHRONIC RESPIRATORY FAILURE WITH HYPOXIA (HCC): ICD-10-CM

## 2021-01-14 DIAGNOSIS — J44.9 COPD, MODERATE (HCC): ICD-10-CM

## 2021-01-14 PROBLEM — S72.002D CLOSED FRACTURE OF LEFT HIP WITH ROUTINE HEALING: Status: RESOLVED | Noted: 2020-12-05 | Resolved: 2021-01-14

## 2021-01-14 PROCEDURE — 99495 TRANSJ CARE MGMT MOD F2F 14D: CPT | Performed by: FAMILY MEDICINE

## 2021-01-14 RX ORDER — QUETIAPINE FUMARATE 25 MG/1
25 TABLET, FILM COATED ORAL
Qty: 90 TABLET | Refills: 1 | Status: SHIPPED | OUTPATIENT
Start: 2021-01-14 | End: 2022-04-04

## 2021-01-14 NOTE — PROGRESS NOTES
Assessment/Plan:        Problem List Items Addressed This Visit        Respiratory    COPD, moderate ([facility] [address] [address] )    Chronic respiratory failure with hypoxia ([facility] [address] )      Other Visit Diagnoses     Closed fracture of hip, unspecified laterality, initial encounter ([facility] )    -  Primary    Delirium        Relevant Medications    QUEtiapine (SEROquel) 25 mg tablet    Visual hallucinations        Relevant Medications    QUEtiapine (SEROquel) 25 mg tablet             Reason for visit is TCM-     Encounter provider Luther Evans MD       Provider located at [address] [place] [id number]      Recent Visits  Date Type Provider Dept   01/12/21 Telephone Zoraida Guillory [facility]   01/11/21 Telephone Mami Del Cid [facility] [address] [facility] recent visits within past 7 days and meeting all other requirements     Today's Visits  Date Type Provider Dept   01/14/21 Telemedicine Luther Evans MD [facility] [address] [facility] today's visits and meeting all other requirements     Future Appointments  No visits were found meeting these conditions  Showing future appointments within next 150 days and meeting all other requirements        After connecting through [employer] the patient was identified by name and date of birth  Ana Luisa Buckner was informed that this is a telemedicine visit and that the visit is being conducted through [employer] and patient was informed that this is not a secure, HIPAA-compliant platform  He agrees to proceed     My office door was closed  No one else was in the room  He acknowledged consent and understanding of privacy and security of the video platform  The patient has agreed to participate and understands they can discontinue the visit at any time  Patient is aware this is a billable service  Subjective:     Patient ID: Ana Luisa Buckner is a 79 y o  male      Patient calls in for TCM following hospitalization for fracture of the left hip  He had had repair of the hip  Hospital stay was complicated by respiratory issues, blood-loss anemia, and urinary issues related to prostate  He spent time at a rehab center and has discharged to home  Since his stay home he also had more issues with confusion and urinary infection  This was treated with Bactrim  Your visit did not find evidence of sepsis  He was started on Seroquel by the ER physician  This seems to help with some of his delirium and confusion  He is currently taking Seroquel 25 mg daily  Patient is visit today was facilitated by his family caretaker  She states his confusion in delivery room is better since he has been on the Seroquel  He also is sleeping better  He is urinating 5-6 times per day  She is questioning possibility of a [place] catheter to be placed on at bedtime  Visiting nurses are coming in tomorrow  Physical therapy has been coming 3-4 times per week  Patient has follow-up Orthopedics in April 2021  Review of Systems   Constitutional: Negative for appetite change, chills, diaphoresis and fever  HENT: Negative for ear pain, rhinorrhea, sinus pressure and sore throat  Eyes: Negative for discharge, redness and itching  Respiratory: Negative for cough, shortness of breath and wheezing  Cardiovascular: Negative for chest pain and palpitations  Rapid or slow heart rate   Gastrointestinal: Negative for abdominal pain, diarrhea, nausea and vomiting  Musculoskeletal: Positive for arthralgias and gait problem  Psychiatric/Behavioral: Positive for confusion and hallucinations  Negative for dysphoric mood and sleep disturbance  The patient is not nervous/anxious  Objective:    Vitals:    01/14/21 1337   Weight: 77 6 kg (171 lb)   Height: 5' 6" (1 676 m)       Physical Exam  Constitutional:       General: He is not in acute distress  Appearance: Normal appearance   He is not ill-appearing  HENT:      Head: Normocephalic and atraumatic  Nose: Nose normal    Eyes:      Extraocular Movements: Extraocular movements intact  Pulmonary:      Effort: Pulmonary effort is normal  No respiratory distress  Neurological:      Mental Status: He is alert and oriented to person, place, and time  Motor: Weakness present  Psychiatric:         Mood and Affect: Mood normal          Behavior: Behavior normal              Transitional Care Management Review:  David Moore is a 79 y o  male here for TCM follow up  During the TCM phone call patient stated:    TCM Call (since 12/14/2020)     Date and time call was made  1/2/2021  9:13 AM    Hospital care reviewed  Records reviewed    Patient was hospitialized at  Other (comment)        [facility]    Date of Admission  01/05/21    Date of discharge  12/30/20    Diagnosis  HIP FX    Disposition  Home      TCM Call (since 12/14/2020)     Did you obtain your prescribed medications  Yes    Do you need help managing your prescriptions or medications  No    Is transportation to your appointment needed  No    I have advised the patient to call PCP with any new or worsening symptoms  DAVID [address]  Family members          I spent 20 minutes with the patient during this visit      Aroldo Israel MD

## 2021-01-15 DIAGNOSIS — S72.009A CLOSED FRACTURE OF HIP, UNSPECIFIED LATERALITY, INITIAL ENCOUNTER (HCC): Primary | ICD-10-CM

## 2021-01-15 NOTE — TELEPHONE ENCOUNTER
Nurse called requesting an order for condom catheters to wear at night time and absorbent pads    To be fax to [phone]  Please review   thanks

## 2021-01-22 DIAGNOSIS — R33.8 BENIGN PROSTATIC HYPERPLASIA WITH URINARY RETENTION: ICD-10-CM

## 2021-01-22 DIAGNOSIS — N40.1 BENIGN PROSTATIC HYPERPLASIA WITH URINARY RETENTION: ICD-10-CM

## 2021-01-23 RX ORDER — TAMSULOSIN HYDROCHLORIDE 0.4 MG/1
0.4 CAPSULE ORAL
Qty: 30 CAPSULE | Refills: 0 | Status: SHIPPED | OUTPATIENT
Start: 2021-01-23 | End: 2021-01-29

## 2021-01-29 ENCOUNTER — VBI (OUTPATIENT)
Dept: ADMINISTRATIVE | Facility: OTHER | Age: 81
End: 2021-01-29

## 2021-01-29 DIAGNOSIS — R33.8 BENIGN PROSTATIC HYPERPLASIA WITH URINARY RETENTION: ICD-10-CM

## 2021-01-29 DIAGNOSIS — F41.9 ANXIETY: ICD-10-CM

## 2021-01-29 DIAGNOSIS — F32.A DEPRESSIVE DISORDER: ICD-10-CM

## 2021-01-29 DIAGNOSIS — N40.1 BENIGN PROSTATIC HYPERPLASIA WITH URINARY RETENTION: ICD-10-CM

## 2021-01-29 RX ORDER — ESCITALOPRAM OXALATE 10 MG/1
10 TABLET ORAL DAILY
Qty: 90 TABLET | Refills: 0
Start: 2021-01-29 | End: 2021-05-03 | Stop reason: SDUPTHER

## 2021-01-29 RX ORDER — TAMSULOSIN HYDROCHLORIDE 0.4 MG/1
CAPSULE ORAL
Qty: 30 CAPSULE | Refills: 0 | Status: SHIPPED | OUTPATIENT
Start: 2021-01-29 | End: 2021-03-22 | Stop reason: SDUPTHER

## 2021-01-29 NOTE — TELEPHONE ENCOUNTER
Refill requested for escitalopram (LEXAPRO) 10 mg tablet to be send to [facility] [phone]  Please review   thanks

## 2021-02-01 ENCOUNTER — NURSE TRIAGE (OUTPATIENT)
Dept: OTHER | Facility: OTHER | Age: 81
End: 2021-02-01

## 2021-02-01 NOTE — TELEPHONE ENCOUNTER
Reason for Disposition   MILD or MODERATE vomiting (e g , 1 - 5 times / day)    Answer Assessment - Initial Assessment Questions  1  VOMITING SEVERITY: "How many times have you vomited in the past 24 hours?"      - MILD:  1 - 2 times/day     - MODERATE: 3 - 5 times/day, decreased oral intake without significant weight loss or symptoms of dehydration     - SEVERE: 6 or more times/day, vomits everything or nearly everything, with significant weight loss, symptoms of dehydration       Vomited once at 0730  2  ONSET: "When did the vomiting begin?"       This morning  3  FLUIDS: "What fluids or food have you vomited up today?" "Have you been able to keep any fluids down?"      Hasn't tried  4  ABDOMINAL PAIN: "Are your having any abdominal pain?" If yes : "How bad is it and what does it feel like?" (e g , crampy, dull, intermittent, constant)       Denied  5  DIARRHEA: "Is there any diarrhea?" If so, ask: "How many times today?"       Denied  6  CONTACTS: "Is there anyone else in the family with the same symptoms?"       Denied  7  CAUSE: "What do you think is causing your vomiting?"      Unknown  8  HYDRATION STATUS: "Any signs of dehydration?" (e g , dry mouth [not only dry lips], too weak to stand) "When did you last urinate?"      Has urine output in his catheter  9  OTHER SYMPTOMS: "Do you have any other symptoms?" (e g , fever, headache, vertigo, vomiting blood or coffee grounds, recent head injury)      97 0 (temporal) @ 070  Chills      Protocols used: [facility] AND [facility]

## 2021-02-01 NOTE — TELEPHONE ENCOUNTER
There is no communication consent in the document list, so I requested to speak with patient for consent  Patient verified his date of birth and gave permission to speak with his wife Jonathan Edge  While completing the assessment with patient's wife, an unidentified female came on the phone  I was asking wife about patient's hydration status  The person stated that patient has a catheter and there was urine output  She stated that she plans to have the [facility]ria Nunn nurse come out to the house to see patient  A virtual visit with the PCP was offered  I asked the person to identify herself and she said she was patient's daughter  I told her I need permission for the patient to speak with her or I offered to speak with wife  The daughter said "I'm done with this" and disconnected the call  I was not able to give any care advice or schedule a virtual visit  I attempted to call back to speak with patient's wife and did not receive an answer

## 2021-02-01 NOTE — TELEPHONE ENCOUNTER
Regarding: Vomiting  ----- Message from River Berumen sent at 2/1/2021  7:58 AM EST -----  " My  is throwing up, he's vomited   He has the chills "

## 2021-02-03 ENCOUNTER — HOSPITAL ENCOUNTER (EMERGENCY)
Facility: HOSPITAL | Age: 81
Discharge: HOME | End: 2021-02-04
Attending: EMERGENCY MEDICINE | Admitting: EMERGENCY MEDICINE
Payer: MEDICARE

## 2021-02-03 ENCOUNTER — TELEPHONE (OUTPATIENT)
Dept: FAMILY MEDICINE CLINIC | Facility: CLINIC | Age: 81
End: 2021-02-03

## 2021-02-03 ENCOUNTER — NURSE TRIAGE (OUTPATIENT)
Dept: OTHER | Facility: OTHER | Age: 81
End: 2021-02-03

## 2021-02-03 DIAGNOSIS — R82.90 ABNORMAL URINE ODOR: Primary | ICD-10-CM

## 2021-02-03 DIAGNOSIS — Z93.9 HISTORY OF CREATION OF OSTOMY (HCC): ICD-10-CM

## 2021-02-03 DIAGNOSIS — R11.2 NAUSEA AND VOMITING: ICD-10-CM

## 2021-02-03 DIAGNOSIS — N39.0 UTI (URINARY TRACT INFECTION): Primary | ICD-10-CM

## 2021-02-03 PROCEDURE — 83690 ASSAY OF LIPASE: CPT | Performed by: EMERGENCY MEDICINE

## 2021-02-03 PROCEDURE — 36415 COLL VENOUS BLD VENIPUNCTURE: CPT | Performed by: EMERGENCY MEDICINE

## 2021-02-03 PROCEDURE — 99284 EMERGENCY DEPT VISIT MOD MDM: CPT

## 2021-02-03 PROCEDURE — 99284 EMERGENCY DEPT VISIT MOD MDM: CPT | Performed by: EMERGENCY MEDICINE

## 2021-02-03 PROCEDURE — 85025 COMPLETE CBC W/AUTO DIFF WBC: CPT | Performed by: EMERGENCY MEDICINE

## 2021-02-03 PROCEDURE — 80053 COMPREHEN METABOLIC PANEL: CPT | Performed by: EMERGENCY MEDICINE

## 2021-02-03 NOTE — TELEPHONE ENCOUNTER
Ruben Junior from [address] calling requesting a possible UA for mr medina   Possible UTI, odor in urine , little lethargic no change on mental status      Per dr lennon   UA and URINE CULTURE    Order place to trina Nunez [phone] aware

## 2021-02-04 ENCOUNTER — APPOINTMENT (EMERGENCY)
Dept: RADIOLOGY | Facility: HOSPITAL | Age: 81
End: 2021-02-04
Payer: MEDICARE

## 2021-02-04 VITALS
RESPIRATION RATE: 24 BRPM | OXYGEN SATURATION: 95 % | DIASTOLIC BLOOD PRESSURE: 73 MMHG | SYSTOLIC BLOOD PRESSURE: 143 MMHG | TEMPERATURE: 97.4 F | HEART RATE: 100 BPM

## 2021-02-04 LAB
ALBUMIN SERPL BCP-MCNC: 3 G/DL (ref 3.5–5)
ALP SERPL-CCNC: 92 U/L (ref 46–116)
ALT SERPL W P-5'-P-CCNC: 16 U/L (ref 12–78)
ANION GAP SERPL CALCULATED.3IONS-SCNC: 2 MMOL/L (ref 4–13)
AST SERPL W P-5'-P-CCNC: 36 U/L (ref 5–45)
BACTERIA UR QL AUTO: ABNORMAL /HPF
BASOPHILS # BLD AUTO: 0.03 THOUSANDS/ΜL (ref 0–0.1)
BASOPHILS NFR BLD AUTO: 0 % (ref 0–1)
BILIRUB SERPL-MCNC: 0.5 MG/DL (ref 0.2–1)
BILIRUB UR QL STRIP: NEGATIVE
BUN SERPL-MCNC: 18 MG/DL (ref 5–25)
CALCIUM ALBUM COR SERPL-MCNC: 10.1 MG/DL (ref 8.3–10.1)
CALCIUM SERPL-MCNC: 9.3 MG/DL (ref 8.3–10.1)
CHLORIDE SERPL-SCNC: 102 MMOL/L (ref 100–108)
CLARITY UR: CLEAR
CO2 SERPL-SCNC: 32 MMOL/L (ref 21–32)
COLOR UR: YELLOW
CREAT SERPL-MCNC: 1.05 MG/DL (ref 0.6–1.3)
EOSINOPHIL # BLD AUTO: 0.08 THOUSAND/ΜL (ref 0–0.61)
EOSINOPHIL NFR BLD AUTO: 1 % (ref 0–6)
ERYTHROCYTE [DISTWIDTH] IN BLOOD BY AUTOMATED COUNT: 13.9 % (ref 11.6–15.1)
GFR SERPL CREATININE-BSD FRML MDRD: 66 ML/MIN/1.73SQ M
GLUCOSE SERPL-MCNC: 119 MG/DL (ref 65–140)
GLUCOSE UR STRIP-MCNC: NEGATIVE MG/DL
HCT VFR BLD AUTO: 38.9 % (ref 36.5–49.3)
HGB BLD-MCNC: 12.5 G/DL (ref 12–17)
HGB UR QL STRIP.AUTO: ABNORMAL
IMM GRANULOCYTES # BLD AUTO: 0.09 THOUSAND/UL (ref 0–0.2)
IMM GRANULOCYTES NFR BLD AUTO: 1 % (ref 0–2)
KETONES UR STRIP-MCNC: NEGATIVE MG/DL
LEUKOCYTE ESTERASE UR QL STRIP: ABNORMAL
LIPASE SERPL-CCNC: 79 U/L (ref 73–393)
LYMPHOCYTES # BLD AUTO: 0.33 THOUSANDS/ΜL (ref 0.6–4.47)
LYMPHOCYTES NFR BLD AUTO: 3 % (ref 14–44)
MCH RBC QN AUTO: 32.6 PG (ref 26.8–34.3)
MCHC RBC AUTO-ENTMCNC: 32.1 G/DL (ref 31.4–37.4)
MCV RBC AUTO: 101 FL (ref 82–98)
MONOCYTES # BLD AUTO: 0.41 THOUSAND/ΜL (ref 0.17–1.22)
MONOCYTES NFR BLD AUTO: 3 % (ref 4–12)
NEUTROPHILS # BLD AUTO: 11.14 THOUSANDS/ΜL (ref 1.85–7.62)
NEUTS SEG NFR BLD AUTO: 92 % (ref 43–75)
NITRITE UR QL STRIP: POSITIVE
NON-SQ EPI CELLS URNS QL MICRO: ABNORMAL /HPF
NRBC BLD AUTO-RTO: 0 /100 WBCS
OTHER STN SPEC: ABNORMAL
PH UR STRIP.AUTO: 5.5 [PH] (ref 4.5–8)
PLATELET # BLD AUTO: 236 THOUSANDS/UL (ref 149–390)
PMV BLD AUTO: 9.9 FL (ref 8.9–12.7)
POTASSIUM SERPL-SCNC: 5.1 MMOL/L (ref 3.5–5.3)
PROT SERPL-MCNC: 8.3 G/DL (ref 6.4–8.2)
PROT UR STRIP-MCNC: ABNORMAL MG/DL
RBC # BLD AUTO: 3.84 MILLION/UL (ref 3.88–5.62)
RBC #/AREA URNS AUTO: ABNORMAL /HPF
SODIUM SERPL-SCNC: 136 MMOL/L (ref 136–145)
SP GR UR STRIP.AUTO: 1.02 (ref 1–1.03)
UROBILINOGEN UR QL STRIP.AUTO: 0.2 E.U./DL
WBC # BLD AUTO: 12.08 THOUSAND/UL (ref 4.31–10.16)
WBC #/AREA URNS AUTO: ABNORMAL /HPF

## 2021-02-04 PROCEDURE — G1004 CDSM NDSC: HCPCS

## 2021-02-04 PROCEDURE — 96365 THER/PROPH/DIAG IV INF INIT: CPT

## 2021-02-04 PROCEDURE — 74177 CT ABD & PELVIS W/CONTRAST: CPT

## 2021-02-04 PROCEDURE — 96361 HYDRATE IV INFUSION ADD-ON: CPT

## 2021-02-04 PROCEDURE — 81001 URINALYSIS AUTO W/SCOPE: CPT

## 2021-02-04 PROCEDURE — 87086 URINE CULTURE/COLONY COUNT: CPT

## 2021-02-04 RX ORDER — ONDANSETRON 4 MG/1
4 TABLET, FILM COATED ORAL EVERY 6 HOURS
Qty: 12 TABLET | Refills: 0 | Status: SHIPPED | OUTPATIENT
Start: 2021-02-04 | End: 2022-04-04

## 2021-02-04 RX ORDER — CEFUROXIME AXETIL 500 MG/1
250 TABLET ORAL EVERY 12 HOURS SCHEDULED
Qty: 7 TABLET | Refills: 0 | Status: SHIPPED | OUTPATIENT
Start: 2021-02-04 | End: 2021-02-11

## 2021-02-04 RX ADMIN — IOHEXOL 100 ML: 350 INJECTION, SOLUTION INTRAVENOUS at 00:56

## 2021-02-04 RX ADMIN — Medication 1000 MG: at 02:00

## 2021-02-04 RX ADMIN — SODIUM CHLORIDE 1000 ML: 0.9 INJECTION, SOLUTION INTRAVENOUS at 00:07

## 2021-02-04 NOTE — DISCHARGE INSTRUCTIONS
You were seen in the ED today for your nausea and vomiting  At this time you were found to have a UTI  We started you on an antibiotic called cefuroxime which you will take half a tablet twice a day for 7 days  I also gave you a prescription for zofran which you can take 1 tablet every 6 hours as needed for nausea  You also had a CT finding which you will need to follow up with urology for as an outpatient  Their number can be found below  Please follow up with your primary care provider within 48 hours  Return to the ED if you develop chest pain, shortness of breath, fevers, chills

## 2021-02-04 NOTE — TELEPHONE ENCOUNTER
Regarding: vomiting/fever  ----- Message from Jaida Quintanilla sent at 2/3/2021  9:59 PM EST -----  " My  is vomiting and has a fever and I think it is a bladder infection "

## 2021-02-04 NOTE — ED PROVIDER NOTES
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 that presents to the ED today for nausea and vomiting x1 week  Patient said that over the last week he has had multiple bouts of nausea and vomiting intermittently  He came in today because there was concern for increased frequency of the nausea events  Patient said that today he had about 6-7 episodes of emesis  Patient denies any recent travel, any recent medication switches or any recent illness  When I ask about the ostomy, he says that he continues to have output from it but is unsure if there is any decrease in the output  He denies any urinary urgency or dysuria  Patient denies any fevers or chills  Denies chest pain or shortness of breath  Prior to Admission Medications   Prescriptions Last Dose Informant Patient Reported? Taking?    Catheters (Gizmo Condom Catheter) MISC   No Yes   Sig: Use daily at bedtime   Incontinence Supply Disposable (PadSORBer Bed Pan Liners) MISC   No Yes   Sig: Use daily at bedtime   NON FORMULARY   Yes Yes   Sig: Take 1,000 mg by mouth 2 (two) times a day Med is MSM Pure, pt brought in from home   QUEtiapine (SEROquel) 25 mg tablet   No Yes   Sig: Take 1 tablet (25 mg total) by mouth daily at bedtime   acetaminophen (TYLENOL) 325 mg tablet   No Yes   Sig: Take 2 tablets (650 mg total) by mouth every 4 (four) hours as needed for mild pain   albuterol (2 5 mg/3 mL) 0 083 % nebulizer solution   Yes Yes   Sig: Take 2 5 mg by nebulization every 4 (four) hours as needed for wheezing or shortness of breath   albuterol (ProAir HFA) 90 mcg/act inhaler   No Yes   Sig: Inhale 2 puffs every 6 (six) hours as needed for wheezing   aspirin (ECOTRIN) 325 mg EC tablet   No No   Sig: Take 1 tablet (325 mg total) by mouth 2 (two) times a day   cyclobenzaprine (FLEXERIL) 10 mg tablet   No Yes   Sig: Take 1 tablet (10 mg total) by mouth 3 (three) times a day as needed for muscle spasms   escitalopram (LEXAPRO) 10 mg tablet   No Yes   Sig: Take 1 tablet (10 mg total) by mouth daily   famotidine (PEPCID) 40 MG tablet   No Yes   Sig: Take 1 tablet (40 mg total) by mouth daily   fluticasone-salmeterol (ADVAIR, WIXELA) 250-50 mcg/dose inhaler   No Yes   Sig: Inhale 1 puff 2 (two) times a day Rinse mouth after use    gabapentin (NEURONTIN) 100 mg capsule   No Yes   Sig: Take 1 capsule (100 mg total) by mouth 2 (two) times a day   ipratropium (ATROVENT) 0 02 % nebulizer solution   Yes Yes   Sig: Take 0 5 mg by nebulization 4 (four) times a day   ipratropium-albuterol (DUO-NEB) 0 5-2 5 mg/3 mL nebulizer solution   No Yes   Sig: Take 1 vial (3 mL total) by nebulization 4 (four) times a day   tamsulosin (FLOMAX) 0 4 mg   No Yes   Sig: TAKE 1 CAPSULE BY MOUTH ONCE DAILY WITH SUPPER      Facility-Administered Medications: None       Past Medical History:   Diagnosis Date    Anxiety     Bladder infection     current tx with cipro 5/29/16    BPH (benign prostatic hyperplasia)     Community acquired pneumonia     last assessed 8/28/17, resolved 9/25/17    COPD [facility]chronic obstructive pulmonary disease) [facility])     Dysphagia 6/8/2020    Enlarged prostate     GERD (gastroesophageal reflux disease)     History of colonoscopy 03/19/2015    POLYP IN THE ASCENDING COLON-[facility]    Hx of bladder infections     Inguinal hernia, right 05/10/2019    Neck pain     Psychiatric disorder     depression    Pulmonary nodule 01/08/2019    STABLE 6MM LEFT APICAL NODULE    Respiratory failure with hypoxia ([address] ) 01/08/2019    Urinary retention        Past Surgical History:   Procedure Laterality Date    BLADDER SURGERY  08/15/2019    UROLIFT    COLOSTOMY  02/03/2011    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    INGUINAL HERNIA REPAIR Right 05/10/2019    DONE AT [facility]AROSCOP[facility] RESECTION  2011    ABDOMIANOPERITONEAL RESECTION FOR RECTAL MASS  DONE BY RADHA FRY OPEN RX FEMUR FX+INTRAMED DEMETRICE Left 2020    Procedure: Left Hip TFN;  Surgeon: Alma Trevizo MD;  Location:  MAIN OR;  Service: Orthopedics       Family History   Problem Relation Age of Onset    Lung cancer Mother     Cancer Mother     Cancer Father     Alcohol abuse Father     Mental illness Neg Hx      I have reviewed and agree with the history as documented  E-Cigarette/Vaping    E-Cigarette Use Never User      E-Cigarette/Vaping Substances    Nicotine No     Flavoring No      Social History     Tobacco Use    Smoking status: Former Smoker     Packs/day: 0 50     Years: 50 00     Pack years: 25 00     Types: Cigarettes     Start date:      Quit date: 2018     Years since quittin 9    Smokeless tobacco: Former User    Tobacco comment: quit 2017 per [employer]    Substance Use Topics    Alcohol use: Not Currently     Frequency: Never     Binge frequency: Never    Drug use: No        Review of Systems   Constitutional: Negative for chills and fever  HENT: Negative for hearing loss  Eyes: Negative for visual disturbance  Respiratory: Negative for shortness of breath  Cardiovascular: Negative for chest pain  Gastrointestinal: Positive for nausea and vomiting  Negative for abdominal pain, constipation and diarrhea  Genitourinary: Negative for difficulty urinating  Musculoskeletal: Negative for myalgias  Skin: Negative for rash  Neurological: Negative for dizziness  Psychiatric/Behavioral: Negative for agitation  All other systems reviewed and are negative        Physical Exam  ED Triage Vitals [21 2341]   Temperature Pulse Respirations Blood Pressure SpO2   (!) 97 4 °F (36 3 °C) 100 18 144/67 90 %      Temp src Heart Rate Source Patient Position - Orthostatic VS BP Location FiO2 (%)   -- -- -- -- --      Pain Score       -- Orthostatic Vital Signs  Vitals:    02/03/21 2341 02/04/21 0100   BP: 144/67 143/73   Pulse: 100 100       Physical Exam  Vitals signs and nursing note reviewed  Constitutional:       General: He is not in acute distress  Appearance: Normal appearance  He is not ill-appearing  HENT:      Head: Normocephalic and atraumatic  Right Ear: External ear normal       Left Ear: External ear normal       Nose: Nose normal       Mouth/Throat:      Mouth: Mucous membranes are dry  Eyes:      General:         Right eye: No discharge  Left eye: No discharge  Extraocular Movements: Extraocular movements intact  Conjunctiva/sclera: Conjunctivae normal       Pupils: Pupils are equal, round, and reactive to light  Neck:      Musculoskeletal: Normal range of motion and neck supple  No neck rigidity  Cardiovascular:      Rate and Rhythm: Normal rate and regular rhythm  Pulses: Normal pulses  Heart sounds: Normal heart sounds  Pulmonary:      Effort: Pulmonary effort is normal  No respiratory distress  Breath sounds: Normal breath sounds  Abdominal:      General: Abdomen is flat  Bowel sounds are normal  There is no distension  Palpations: Abdomen is soft  Tenderness: There is no abdominal tenderness  Comments: Ostomy in place in the left lower quadrant of his abdomen  No tenderness to palpation around site of the ostomy   Musculoskeletal: Normal range of motion  Skin:     General: Skin is warm and dry  Capillary Refill: Capillary refill takes less than 2 seconds  Neurological:      General: No focal deficit present  Mental Status: He is alert and oriented to person, place, and time  Mental status is at baseline     Psychiatric:         Mood and Affect: Mood normal          Behavior: Behavior normal          ED Medications  Medications   sodium chloride 0 9 % bolus 1,000 mL (0 mL Intravenous Stopped 2/4/21 0250)   iohexol (OMNIPAQUE) 350 MG/ML injection (MULTI-DOSE) 100 mL (100 mL Intravenous Given 2/4/21 0056)   ceftriaxone (ROCEPHIN) 1 g/50 mL in dextrose IVPB (0 mg Intravenous Stopped 2/4/21 0250)       Diagnostic Studies  Results Reviewed     Procedure Component Value Units Date/Time    CBC and differential [[id number]]  (Abnormal) Collected: 02/03/21 2356    Lab Status: Final result Specimen: Blood from Arm, Right Updated: 02/04/21 0038     WBC 12 08 Thousand/uL      RBC 3 84 Million/uL      Hemoglobin 12 5 g/dL      Hematocrit 38 9 %       fL      MCH 32 6 pg      MCHC 32 1 g/dL      RDW 13 9 %      MPV 9 9 fL      Platelets 705 Thousands/uL      nRBC 0 /100 WBCs      Neutrophils Relative 92 %      Immat GRANS % 1 %      Lymphocytes Relative 3 %      Monocytes Relative 3 %      Eosinophils Relative 1 %      Basophils Relative 0 %      Neutrophils Absolute 11 14 Thousands/µL      Immature Grans Absolute 0 09 Thousand/uL      Lymphocytes Absolute 0 33 Thousands/µL      Monocytes Absolute 0 41 Thousand/µL      Eosinophils Absolute 0 08 Thousand/µL      Basophils Absolute 0 03 Thousands/µL     Narrative: This is an appended report  These results have been appended to a previously verified report  Urine Microscopic [[id number]]  (Abnormal) Collected: 02/04/21 0001    Lab Status: Final result Specimen: Urine, Other Updated: 02/04/21 0036     RBC, UA None Seen /hpf      WBC, UA Innumerable /hpf      Epithelial Cells Occasional /hpf      Bacteria, UA Innumerable /hpf      OTHER OBSERVATIONS Yeast Cells Present    Urine culture [[id number]] Collected: 02/04/21 0001    Lab Status:  In process Specimen: Urine, Other Updated: 02/04/21 0036    CMP [[id number]]  (Abnormal) Collected: 02/03/21 2356    Lab Status: Final result Specimen: Blood from Arm, Right Updated: 02/04/21 0033     Sodium 136 mmol/L      Potassium 5 1 mmol/L      Chloride 102 mmol/L      CO2 32 mmol/L      ANION GAP 2 mmol/L      BUN 18 mg/dL      Creatinine 1 05 mg/dL      Glucose 119 mg/dL      Calcium 9 3 mg/dL      Corrected Calcium 10 1 mg/dL      AST 36 U/L      ALT 16 U/L      Alkaline Phosphatase 92 U/L      Total Protein 8 3 g/dL      Albumin 3 0 g/dL      Total Bilirubin 0 50 mg/dL      eGFR 66 ml/min/1 73sq m     Narrative:      [place] guidelines for Chronic Kidney Disease (CKD):     Stage 1 with normal or high GFR (GFR > 90 mL/min/1 73 square meters)    Stage 2 Mild CKD (GFR = 60-89 mL/min/1 73 square meters)    Stage 3A Moderate CKD (GFR = 45-59 mL/min/1 73 square meters)    Stage 3B Moderate CKD (GFR = 30-44 mL/min/1 73 square meters)    Stage 4 Severe CKD (GFR = 15-29 mL/min/1 73 square meters)    Stage 5 End Stage CKD (GFR <15 mL/min/1 73 square meters)  Note: GFR calculation is accurate only with a steady state creatinine    Lipase [[id number]]  (Normal) Collected: 02/03/21 2356    Lab Status: Final result Specimen: Blood from Arm, Right Updated: 02/04/21 0033     Lipase 79 u/L     Urine Macroscopic, POC [[id number]]  (Abnormal) Collected: 02/04/21 0001    Lab Status: Final result Specimen: Urine Updated: 02/04/21 0003     Color, UA Yellow     Clarity, UA Clear     pH, UA 5 5     Leukocytes, UA Large     Nitrite, UA Positive     Protein, UA 30 (1+) mg/dl      Glucose, UA Negative mg/dl      Ketones, UA Negative mg/dl      Urobilinogen, UA 0 2 E U /dl      Bilirubin, UA Negative     Blood, UA Small     Specific Vian, UA 1 020    Narrative:      CLINITEK RESULT                 CT Abdomen pelvis with contrast   Final Result by Jaylen Garcia MD (02/04 0126)      Airspace disease at the right lower lobe consistent with pneumonia, possibly secondary to aspiration  Persistent asymmetric right bladder wall thickening for which urologic consultation was recommended on CT examination of 6/3/2020              Workstation performed: [id number]               Procedures  Procedures      ED Course                                       MDM  Number of Diagnoses or Management Options  History of creation of ostomy [facility]):   Nausea and vomiting:   UTI (urinary tract infection):   Diagnosis management comments: 79-year-old male presenting to the ED today for nausea and vomiting x1 week  At this time the patient is a poor historian and unable to tell me much about his ostomy output so there is concern for possible obstruction  I am unable to tell from exam whether not there is stool output as the back is not see through  At this time will order abdominal workup including a CMP, CBC, lipase, CT scan of the abdomen pelvis with contrast   Will also do a urinalysis on the patient to evaluate for possible UTI giving him symptoms  Upon re-evaluation, patient no longer vomiting after getting pre-hospital Zofran  I discussed with the patient and his daughter who was at bedside the results of her CT scan finding  I let them know that the CT scan showed a asymmetrical thickening of the bladder which is stable compared to a scan done in June of 2020  I discussed with them the importance of following up with Urology as an outpatient for this finding  They both showed their understanding of the situation as well as the importance of following up with Urology as an outpatient  Questions were answered to their satisfaction  I also reviewed that with them that there is evidence of urinary tract infection on lab work  Will give the patient 1 dose of IV ceftriaxone while in the ED  Will also prescribe the patient p o  cefuroxime 250 mg for 7 days  Patient to be discharged to the care of his daughter        Disposition  Final diagnoses:   UTI (urinary tract infection)   Nausea and vomiting   History of creation of ostomy [facility])     Time reflects when diagnosis was documented in both MDM as applicable and the Disposition within this note     Time User Action Codes Description Comment    2/4/2021  1:37 AM Mahin Kaba Add [N39 0] UTI (urinary tract infection)     2/4/2021 1: 40 AM Aurelio Rowe Add [R11 2] Nausea and vomiting     2/4/2021  1:37 AM Jimenes Bristol Add [Z93 9] History of creation of ostomy [facility])       ED Disposition     ED Disposition Condition Date/Time Comment    Discharge Stable u Feb 4, 2021  1:49 AM Jeremias Hernandez discharge to home/self care              Follow-up Information     Follow up With Specialties Details Why Contact Info Additional [address] [facility] Schedule an appointment as soon as possible for a visit  to discuss your CT finding [address] [facility] [address]  707  [facility], [address] [place], [place], [address]    [address] [place] [address] 34 [facility] Emergency Department Emergency Medicine Go to  If symptoms worsen, As needed [address]  [address] [facility] 64 [facility] Emergency Department, [address], [place], [place], [facility] 108    Ngoc Bran MD Family Medicine Schedule an appointment as soon as possible for a visit in 2 days to discuss your ED visit [address]  [address]             Discharge Medication List as of 2/4/2021  2:31 AM      START taking these medications    Details   cefuroxime (CEFTIN) 500 mg tablet Take 0 5 tablets (250 mg total) by mouth every 12 (twelve) hours for 7 days, Starting Thu 2/4/2021, Until Thu 2/11/2021, Normal      ondansetron (ZOFRAN) 4 mg tablet Take 1 tablet (4 mg total) by mouth every 6 (six) hours, Starting Thu 2/4/2021, Normal         CONTINUE these medications which have NOT CHANGED    Details   acetaminophen (TYLENOL) 325 mg tablet Take 2 tablets (650 mg total) by mouth every 4 (four) hours as needed for mild pain, Starting Sat 6/13/2020, No Print      albuterol (2 5 mg/3 mL) 0 083 % nebulizer solution Take 2 5 mg by nebulization every 4 (four) hours as needed for wheezing or shortness of breath, Historical Med      albuterol (ProAir HFA) 90 mcg/act inhaler Inhale 2 puffs every 6 (six) hours as needed for wheezing, Starting Fri 5/29/2020, Normal      Catheters (Gizmo Condom Catheter) MISC Use daily at bedtime, Starting Mon 1/18/2021, Print      cyclobenzaprine (FLEXERIL) 10 mg tablet Take 1 tablet (10 mg total) by mouth 3 (three) times a day as needed for muscle spasms, Starting Wed 5/13/2020, Normal      escitalopram (LEXAPRO) 10 mg tablet Take 1 tablet (10 mg total) by mouth daily, Starting Fri 1/29/2021, No Print      famotidine (PEPCID) 40 MG tablet Take 1 tablet (40 mg total) by mouth daily, Starting Mon 11/23/2020, Normal      fluticasone-salmeterol (ADVAIR, WIXELA) 250-50 mcg/dose inhaler Inhale 1 puff 2 (two) times a day Rinse mouth after use , Starting Thu 7/30/2020, Normal      gabapentin (NEURONTIN) 100 mg capsule Take 1 capsule (100 mg total) by mouth 2 (two) times a day, Starting Wed 9/30/2020, Normal      Incontinence Supply Disposable (PadSORBer Bed Pan Liners) MISC Use daily at bedtime, Starting Mon 1/18/2021, Print      ipratropium (ATROVENT) 0 02 % nebulizer solution Take 0 5 mg by nebulization 4 (four) times a day, Historical Med      ipratropium-albuterol (DUO-NEB) 0 5-2 5 mg/3 mL nebulizer solution Take 1 vial (3 mL total) by nebulization 4 (four) times a day, Starting Fri 2/14/2020, Normal      NON FORMULARY Take 1,000 mg by mouth 2 (two) times a day Med is MSM Pure, pt brought in from home, Historical Med      QUEtiapine (SEROquel) 25 mg tablet Take 1 tablet (25 mg total) by mouth daily at bedtime, Starting Thu 1/14/2021, Normal      tamsulosin (FLOMAX) 0 4 mg TAKE 1 CAPSULE BY MOUTH ONCE DAILY WITH SUPPER, Normal      aspirin (ECOTRIN) 325 mg EC tablet Take 1 tablet (325 mg total) by mouth 2 (two) times a day, Starting Sat 12/26/2020, Until Mon 1/25/2021, No Print           No discharge procedures on file      PDMP Review       Value Time User    PDMP Reviewed  Yes 12/12/2020 11:15 AM Mikey Redmond MD           ED Provider  Attending physically available and evaluated Sharon Harvey  RIMMA managed the patient along with the ED Attending      Electronically Signed by         Redd Fink MD  02/04/21 8887

## 2021-02-04 NOTE — TELEPHONE ENCOUNTER
Upon call back emergency contact stated "The ambulance is coming  We're taking him to the hospital " and then hung up  Reason for Disposition   Patient already left for the hospital/clinic      Protocols used: NO CONTACT OR DUPLICATE CONTACT CALL-ADULT-

## 2021-02-04 NOTE — ED NOTES
Attempting to get BLS transport for pt back to his home  Called [place], [facility], [facility] and nobody is able to transfer pt  Call [facility] back at [phone]       Fredy Sims RN  02/04/21 3283

## 2021-02-04 NOTE — ED ATTENDING ATTESTATION
2/3/2021  Renu Pace DO, saw and evaluated the patient  I have discussed the patient with the resident/non-physician practitioner and agree with the resident's/non-physician practitioner's findings, Plan of Care, and MDM as documented in the resident's/non-physician practitioner's note, except where noted  All available labs and Radiology studies were reviewed  I was present for key portions of any procedure(s) performed by the resident/non-physician practitioner and I was immediately available to provide assistance  At this point I agree with the current assessment done in the Emergency Department  I have conducted an independent evaluation of this patient a history and physical is as follows:    78 yo male c/o    NBNB emesis x 3 days  Stopped yesterday then started again today 6-7 episodes  Denies abd pain, chest pain/SOB, HA, flank pain, urinary sxs  Pt has an ostomy, no change in output  abd sndnt no r/g bS+      Imp: n/v plan: abd labs, CT abd/pelvis  ECG   IVF, reassess        ED Course         Critical Care Time  Procedures

## 2021-02-05 ENCOUNTER — TELEPHONE (OUTPATIENT)
Dept: FAMILY MEDICINE CLINIC | Facility: CLINIC | Age: 81
End: 2021-02-05

## 2021-02-05 LAB — BACTERIA UR CULT: NORMAL

## 2021-02-05 NOTE — TELEPHONE ENCOUNTER
Herlinda Macdonald from [facility] call report pt was admitted at [facility] unable to drink fluid and lethargic Febrile

## 2021-02-08 ENCOUNTER — VBI (OUTPATIENT)
Dept: FAMILY MEDICINE CLINIC | Facility: CLINIC | Age: 81
End: 2021-02-08

## 2021-02-08 NOTE — TELEPHONE ENCOUNTER
Maximus Meredith    ED Visit Information     Ed visit date: 2/2/2021  Diagnosis Description: UTI (urinary tract infection); Nausea and vomiting; History of creation of ostomy [facility])  In Network? Yes [facility]  Discharge status: Home  Discharged with meds ? Yes  Number of ED visits to date: 1  ED Severity:NA     Outreach Information    Outreach successful: Yes 1  Date letter mailed:NA  Date Kimmy 1281 Coordination    Follow up appointment with pcp: no Declined to schedule  Transportation issues ? No    Value [employer] type: 7 Day Outreach  Emergent necessity warranted by diagnosis: No  [facility] PCP: Yes  Reason Patient went to ED instead of Urgent Care or PCP?: Perceived Severity of Illness  Urgent care Education?: No  02/08/2021 03:05 PM Phone (Oumou Ness) Miguel BARRERA (Self) [phone] (H)   Call Complete  Personal communication with patient regarding his recent ED visit on 2/2/2021 for UTI (urinary tract infection); Nausea and vomiting; History of creation of ostomy ([facility] [address] )  Patient was discharged with medication and was advised to follow up with urology and PCP  Patient stated that he is feeling better and declined to schedule a follow up appt at this time  Patient wanted to know when his orthopedics appt was  I gave patient date and time  Patient does not meet OPCM criteria at this time  Patient stated that occasionally he has transportation problems but stated that he does not have problems getting to appts as his family helps him

## 2021-02-12 ENCOUNTER — IMMUNIZATIONS (OUTPATIENT)
Dept: FAMILY MEDICINE CLINIC | Facility: HOSPITAL | Age: 81
End: 2021-02-12

## 2021-02-12 DIAGNOSIS — Z23 ENCOUNTER FOR IMMUNIZATION: Primary | ICD-10-CM

## 2021-02-12 PROCEDURE — 0011A SARS-COV-2 / COVID-19 MRNA VACCINE (MODERNA) 100 MCG: CPT

## 2021-02-12 PROCEDURE — 91301 SARS-COV-2 / COVID-19 MRNA VACCINE (MODERNA) 100 MCG: CPT

## 2021-02-24 ENCOUNTER — TELEPHONE (OUTPATIENT)
Dept: OBGYN CLINIC | Facility: HOSPITAL | Age: 81
End: 2021-02-24

## 2021-02-24 NOTE — TELEPHONE ENCOUNTER
Patient sees Dr Bertha Christina from [facility] is calling for office notes from 1/6 to be faxed        Faxed to [phone] per request

## 2021-03-02 ENCOUNTER — TELEPHONE (OUTPATIENT)
Dept: FAMILY MEDICINE CLINIC | Facility: CLINIC | Age: 81
End: 2021-03-02

## 2021-03-02 DIAGNOSIS — K21.9 GASTROESOPHAGEAL REFLUX DISEASE: ICD-10-CM

## 2021-03-02 RX ORDER — FAMOTIDINE 40 MG/1
40 TABLET, FILM COATED ORAL DAILY
Qty: 90 TABLET | Refills: 1 | Status: SHIPPED | OUTPATIENT
Start: 2021-03-02 | End: 2021-07-21 | Stop reason: SDUPTHER

## 2021-03-02 NOTE — TELEPHONE ENCOUNTER
Reviewed chart I do not see this was prescribed, I do see famotidine--spoke to wife thinks maybe from hospital at one point, would like one of them for his GERD--ask to send you a note for RX-    Please Review send RX to [facility]

## 2021-03-02 NOTE — TELEPHONE ENCOUNTER
Pharmacy  has been trying to get in contact with us to get the new prescription pantoprazole for pt         [facility] pharmacy   Phone [phone]  [address] [facility],[address]

## 2021-03-03 DIAGNOSIS — K21.9 GASTROESOPHAGEAL REFLUX DISEASE: ICD-10-CM

## 2021-03-03 RX ORDER — FAMOTIDINE 40 MG/1
40 TABLET, FILM COATED ORAL DAILY
Qty: 90 TABLET | Refills: 1 | Status: CANCELLED | OUTPATIENT
Start: 2021-03-03

## 2021-03-09 DIAGNOSIS — J44.9 COPD, MODERATE (HCC): ICD-10-CM

## 2021-03-09 RX ORDER — IPRATROPIUM BROMIDE AND ALBUTEROL SULFATE 2.5; .5 MG/3ML; MG/3ML
SOLUTION RESPIRATORY (INHALATION)
Qty: 360 ML | Refills: 0 | Status: SHIPPED | OUTPATIENT
Start: 2021-03-09 | End: 2021-05-10 | Stop reason: SDUPTHER

## 2021-03-12 ENCOUNTER — IMMUNIZATIONS (OUTPATIENT)
Dept: FAMILY MEDICINE CLINIC | Facility: HOSPITAL | Age: 81
End: 2021-03-12

## 2021-03-12 DIAGNOSIS — Z23 ENCOUNTER FOR IMMUNIZATION: Primary | ICD-10-CM

## 2021-03-12 PROCEDURE — 0012A SARS-COV-2 / COVID-19 MRNA VACCINE (MODERNA) 100 MCG: CPT

## 2021-03-12 PROCEDURE — 91301 SARS-COV-2 / COVID-19 MRNA VACCINE (MODERNA) 100 MCG: CPT

## 2021-03-22 DIAGNOSIS — N40.1 BENIGN PROSTATIC HYPERPLASIA WITH URINARY RETENTION: ICD-10-CM

## 2021-03-22 DIAGNOSIS — R33.8 BENIGN PROSTATIC HYPERPLASIA WITH URINARY RETENTION: ICD-10-CM

## 2021-03-22 RX ORDER — TAMSULOSIN HYDROCHLORIDE 0.4 MG/1
0.4 CAPSULE ORAL
Qty: 30 CAPSULE | Refills: 3 | Status: SHIPPED | OUTPATIENT
Start: 2021-03-22 | End: 2021-07-21 | Stop reason: SDUPTHER

## 2021-03-22 NOTE — TELEPHONE ENCOUNTER
Pt called requesting refill for tamsulosin (FLOMAX) 0 4 mg to be send to [facility]   Please review thanks

## 2021-04-01 NOTE — ANESTHESIA PREPROCEDURE EVALUATION
Relevant Problems   No relevant active problems       Anesthesia ROS/MED HX      Pulmonary    history of tobacco use and ex-smoker  ROS/MED HX Comments:    ROS/Hx: Right Inguinal Hernia Repair w/Mesh (Right )    Seasonal allergies   COPD (chronic obstructive pulmonary disease) (CMS/[facility])   Chronic respiratory failure with hypoxia (CMS/[facility])   Snores   Oxygen dependent on 4 liters/ mostly just at night  Pneumonia June 2020/ aspiration pneumonia  GERD (gastroesophageal reflux disease)   History of stomach ulcers yrs ago  Colostomy in place (CMS/[facility])   BPH (benign prostatic hyperplasia)   Inguinal hernia right  Depression   Ambulates with cane   Uses roller walker   Chronic back pain   SOB (shortness of breath)   History of rib fracture   Hx of fall   Neck pain   Urinary retention            Past Surgical History:   Procedure Laterality Date   • COLECTOMY      w/ colostomy   • HERNIA REPAIR     • OTHER SURGICAL HISTORY      urolift       Physical Exam    Airway   Mallampati: II   TM distance: >3 FB   Neck ROM: full  Cardiovascular    Rate: normalPulmonary    Decreased breath sounds        Anesthesia Plan    Plan: spinal    Technique: spinal   ASA 3  Anesthetic plan and risks discussed with: patient  Postop Plan:   Pain Management: IV analgesics and spinal    
aerobic capacity/endurance/ergonomics and body mechanics/gait, locomotion, and balance

## 2021-04-06 ENCOUNTER — TELEPHONE (OUTPATIENT)
Dept: FAMILY MEDICINE CLINIC | Facility: CLINIC | Age: 81
End: 2021-04-06

## 2021-04-06 ENCOUNTER — TELEMEDICINE (OUTPATIENT)
Dept: FAMILY MEDICINE CLINIC | Facility: CLINIC | Age: 81
End: 2021-04-06
Payer: MEDICARE

## 2021-04-06 VITALS — BODY MASS INDEX: 27.48 KG/M2 | HEIGHT: 66 IN | WEIGHT: 171 LBS

## 2021-04-06 DIAGNOSIS — R39.9 UTI SYMPTOMS: Primary | ICD-10-CM

## 2021-04-06 PROCEDURE — 99213 OFFICE O/P EST LOW 20 MIN: CPT | Performed by: FAMILY MEDICINE

## 2021-04-06 RX ORDER — SULFAMETHOXAZOLE AND TRIMETHOPRIM 800; 160 MG/1; MG/1
1 TABLET ORAL EVERY 12 HOURS SCHEDULED
Qty: 14 TABLET | Refills: 0 | Status: SHIPPED | OUTPATIENT
Start: 2021-04-06 | End: 2021-04-13

## 2021-04-06 NOTE — PROGRESS NOTES
Virtual Brief Visit    Assessment/Plan:    Problem List Items Addressed This Visit     None      Visit Diagnoses     UTI symptoms    -  Primary    Relevant Medications    sulfamethoxazole-trimethoprim (BACTRIM DS) 800-160 mg per tablet                Reason for visit is   Chief Complaint   Patient presents with    Urinary Tract Infection    Virtual Brief Visit        Encounter provider Parisa Arcos MD    Provider located at [address] [place] [id number]    Recent Visits  No visits were found meeting these conditions  Showing recent visits within past 7 days and meeting all other requirements     Today's Visits  Date Type Provider Dept   04/06/21 Telemedicine Parisa Arcos MD [facility]   04/06/21 Telephone Miguel Mathis [facility]   Showing today's visits and meeting all other requirements     Future Appointments  No visits were found meeting these conditions  Showing future appointments within next 150 days and meeting all other requirements        After connecting through telephone, the patient was identified by name and date of birth  Marla Acuna was informed that this is a telemedicine visit and that the visit is being conducted through telephone  My office door was closed  No one else was in the room  He acknowledged consent and understanding of privacy and security of the platform  The patient has agreed to participate and understands he can discontinue the visit at any time  Patient is aware this is a billable service  Subjective    Marla Acuna is a 79 y o  male -  Patient calls in today  Conversation held with wife  She notes he is having darker urine in his [place] catheter bag  He does seem more fatigued  Also sometimes appears to be slightly confused  History of urinary tract infections in the past   They have been trying to push the fluids on him    No fevers are reported  Past Medical History:   Diagnosis Date    Anxiety     Bladder infection     current tx with cipro 5/29/16    BPH (benign prostatic hyperplasia)     Community acquired pneumonia     last assessed 8/28/17, resolved 9/25/17    COPD (chronic obstructive pulmonary disease) ([facility] [address] )     Dysphagia 6/8/2020    Enlarged prostate     GERD (gastroesophageal reflux disease)     History of colonoscopy 03/19/2015    POLYP IN THE ASCENDING COLON-[facility]    Hx of bladder infections     Inguinal hernia, right 05/10/2019    Neck pain     Psychiatric disorder     depression    Pulmonary nodule 01/08/2019    STABLE 6MM LEFT APICAL NODULE    Respiratory failure with hypoxia ([facility] [address] ) 01/08/2019    Urinary retention        Past Surgical History:   Procedure Laterality Date    BLADDER SURGERY  08/15/2019    UROLIFT    COLOSTOMY  02/03/2011    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    INGUINAL HERNIA REPAIR Right 05/10/2019    DONE AT [facility]    LAPAROSCOPIC COLON RESECTION  02/03/2011    ABDOMIANOPERITONEAL RESECTION FOR RECTAL MASS   DONE BY RADHA FRY OPEN RX FEMUR FX+INTRAMED DEMETRICE Left 12/7/2020    Procedure: Left Hip TFN;  Surgeon: Patricio Vazquez MD;  Location:  MAIN OR;  Service: Orthopedics       Current Outpatient Medications   Medication Sig Dispense Refill    acetaminophen (TYLENOL) 325 mg tablet Take 2 tablets (650 mg total) by mouth every 4 (four) hours as needed for mild pain 30 tablet 0    albuterol (2 5 mg/3 mL) 0 083 % nebulizer solution Take 2 5 mg by nebulization every 4 (four) hours as needed for wheezing or shortness of breath      albuterol (ProAir HFA) 90 mcg/act inhaler Inhale 2 puffs every 6 (six) hours as needed for wheezing 8 5 g 3    aspirin (ECOTRIN) 325 mg EC tablet Take 1 tablet (325 mg total) by mouth 2 (two) times a day 60 tablet 0    Catheters (Gizmo Condom Catheter) MISC Use daily at bedtime 100 each 5    cyclobenzaprine (FLEXERIL) 10 mg tablet Take 1 tablet (10 mg total) by mouth 3 (three) times a day as needed for muscle spasms 30 tablet 1    escitalopram (LEXAPRO) 10 mg tablet Take 1 tablet (10 mg total) by mouth daily 90 tablet 0    famotidine (PEPCID) 40 MG tablet Take 1 tablet (40 mg total) by mouth daily 90 tablet 1    fluticasone-salmeterol (ADVAIR, WIXELA) 250-50 mcg/dose inhaler Inhale 1 puff 2 (two) times a day Rinse mouth after use  1 Inhaler 3    gabapentin (NEURONTIN) 100 mg capsule Take 1 capsule (100 mg total) by mouth 2 (two) times a day 180 capsule 1    Incontinence Supply Disposable (PadSORBer Bed Pan Liners) MISC Use daily at bedtime 50 each 10    ipratropium (ATROVENT) 0 02 % nebulizer solution Take 0 5 mg by nebulization 4 (four) times a day      ipratropium-albuterol (DUO-NEB) 0 5-2 5 mg/3 mL nebulizer solution USE 1 AMPULE IN NEBULIZER 4 TIMES DAILY 360 mL 0    NON FORMULARY Take 1,000 mg by mouth 2 (two) times a day Med is MSM Pure, pt brought in from home      ondansetron (ZOFRAN) 4 mg tablet Take 1 tablet (4 mg total) by mouth every 6 (six) hours 12 tablet 0    QUEtiapine (SEROquel) 25 mg tablet Take 1 tablet (25 mg total) by mouth daily at bedtime 90 tablet 1    sulfamethoxazole-trimethoprim (BACTRIM DS) 800-160 mg per tablet Take 1 tablet by mouth every 12 (twelve) hours for 7 days 14 tablet 0    tamsulosin (FLOMAX) 0 4 mg Take 1 capsule (0 4 mg total) by mouth daily with dinner 30 capsule 3     No current facility-administered medications for this visit  Allergies   Allergen Reactions    Aspirin Other (See Comments)     Hx stomach ulcer       Review of Systems   Constitutional: Positive for fatigue  Negative for chills, diaphoresis and fever  Respiratory: Negative for cough, chest tightness and shortness of breath  Cardiovascular: Negative for chest pain, palpitations and leg swelling  Gastrointestinal: Negative for abdominal pain, blood in stool, diarrhea and nausea     Genitourinary: Positive for difficulty urinating  Negative for dysuria, flank pain, frequency, hematuria and urgency  Vitals:    04/06/21 1120   Weight: 77 6 kg (171 lb)   Height: 5' 6" (1 676 m)         I spent 10 minutes directly with the patient during this visit    VIRTUAL VISIT DISCLAIMER    Shanon Purcell acknowledges that he has consented to an online visit or consultation  He understands that the online visit is based solely on information provided by him, and that, in the absence of a face-to-face physical evaluation by the physician, the diagnosis he receives is both limited and provisional in terms of accuracy and completeness  This is not intended to replace a full medical face-to-face evaluation by the physician  Shanon Purcell understands and accepts these terms

## 2021-04-06 NOTE — TELEPHONE ENCOUNTER
Pt has a bladder infection and needs an antibiotic    The pt can not move/walk very well because of his hip so he will not be able to come into the office     Should I schedule pt for a virtual visit( pt does not have a cell phone) or would Dr Kalen Brown be able to send in the medication?     SEND TO  [phone]

## 2021-04-06 NOTE — PATIENT INSTRUCTIONS
Will treat with Bactrim DS twice daily for 5-7 days  Patient will continue try to increase his fluid intake  Wife is aware should he start getting shaking chills or become less responsive of the need to get him to the hospital emergency room for evaluation

## 2021-04-08 ENCOUNTER — TELEPHONE (OUTPATIENT)
Dept: FAMILY MEDICINE CLINIC | Facility: CLINIC | Age: 81
End: 2021-04-08

## 2021-04-08 DIAGNOSIS — R39.9 UTI SYMPTOMS: Primary | ICD-10-CM

## 2021-04-08 RX ORDER — CEPHALEXIN 500 MG/1
500 CAPSULE ORAL 3 TIMES DAILY
Qty: 21 CAPSULE | Refills: 0 | Status: SHIPPED | OUTPATIENT
Start: 2021-04-08 | End: 2021-04-15

## 2021-04-08 NOTE — TELEPHONE ENCOUNTER
Patient is currently on bactrim and is feeling  weird and can not explain  Can be due to the antibiotic , wife stated he had the same reaction last time he was on bactrim and antibiotic was swish  Not much information given from wife      If ok swishing please send to [facility]

## 2021-04-13 ENCOUNTER — APPOINTMENT (OUTPATIENT)
Dept: RADIOLOGY | Facility: CLINIC | Age: 81
End: 2021-04-13
Payer: MEDICARE

## 2021-04-13 ENCOUNTER — OFFICE VISIT (OUTPATIENT)
Dept: OBGYN CLINIC | Facility: CLINIC | Age: 81
End: 2021-04-13
Payer: MEDICARE

## 2021-04-13 VITALS — SYSTOLIC BLOOD PRESSURE: 122 MMHG | DIASTOLIC BLOOD PRESSURE: 72 MMHG | TEMPERATURE: 97.5 F

## 2021-04-13 DIAGNOSIS — Z47.89 AFTERCARE FOLLOWING SURGERY OF THE MUSCULOSKELETAL SYSTEM: Primary | ICD-10-CM

## 2021-04-13 DIAGNOSIS — Z47.89 AFTERCARE FOLLOWING SURGERY OF THE MUSCULOSKELETAL SYSTEM: ICD-10-CM

## 2021-04-13 DIAGNOSIS — M17.12 PRIMARY LOCALIZED OSTEOARTHRITIS OF LEFT KNEE: ICD-10-CM

## 2021-04-13 DIAGNOSIS — M25.561 PAIN IN BOTH KNEES, UNSPECIFIED CHRONICITY: ICD-10-CM

## 2021-04-13 DIAGNOSIS — M17.11 PRIMARY LOCALIZED OSTEOARTHRITIS OF RIGHT KNEE: ICD-10-CM

## 2021-04-13 DIAGNOSIS — M25.562 PAIN IN BOTH KNEES, UNSPECIFIED CHRONICITY: ICD-10-CM

## 2021-04-13 PROCEDURE — 99213 OFFICE O/P EST LOW 20 MIN: CPT | Performed by: ORTHOPAEDIC SURGERY

## 2021-04-13 PROCEDURE — 73502 X-RAY EXAM HIP UNI 2-3 VIEWS: CPT

## 2021-04-13 PROCEDURE — 73564 X-RAY EXAM KNEE 4 OR MORE: CPT

## 2021-04-13 NOTE — PROGRESS NOTES
Assessment:     1  Aftercare following surgery of the musculoskeletal system    2  Primary localized osteoarthritis of right knee    3  Primary localized osteoarthritis of left knee        Plan:     Problem List Items Addressed This Visit        Musculoskeletal and Integument    Primary localized osteoarthritis of right knee    Relevant Orders    XR knee 4+ vw right injury    Ambulatory referral to Physical Therapy    Primary localized osteoarthritis of left knee    Relevant Orders    Ambulatory referral to Physical Therapy       Other    Aftercare following surgery of the musculoskeletal system - Primary    Relevant Orders    XR hip/pelv 2-3 vws left if performed    XR knee 4+ vw left injury            The patient was seen and examined by Dr No Gayle and myself  Patient is doing well status post left hip TFN  Findings also consistent with bilateral knee osteoarthritis  Findings and treatment options were discussed with the patient  X-rays reviewed with them  X-rays of the bilateral knee show mild osteoarthritis and left hip intertrochanteric fracture is healed with hardware intact  Recommend continuing formal physical therapy to strengthen legs and help with gait training  Continue Voltaren gel on Aspercreme to the bilateral knees  Follow-up in 2 months for re-evaluation  Discussed possible joint supplement injections if there is no improvement in pain  All questions were answered to patient's satisfaction  Subjective:     Patient ID: Jalil Rucker is a 79 y o  male  Chief Complaint: This is an 48-year-old white male accompanied by his daughter who is status post left hip TFN on December 7, 2020  He is currently residing at home  He has been going to [facility] for physical therapy for the left hip  He states he was given cortisone injections to his bilateral knees about 6 weeks ago at [facility]  He ambulates with assistance from a walker at home  He denies any pain in the left hip    He complains of aching and throbbing pain mostly in his thighs that radiate to his knees  The pain is worse when lying down at nighttime  He denies any increased lumbar spine pain  Allergy:  Allergies   Allergen Reactions    Bactrim [Sulfamethoxazole-Trimethoprim] GI Intolerance    Aspirin Other (See Comments)     Hx stomach ulcer     Medications:  all current active meds have been reviewed  Past Medical History:  Past Medical History:   Diagnosis Date    Anxiety     Bladder infection     current tx with cipro 5/29/16    BPH (benign prostatic hyperplasia)     Community acquired pneumonia     last assessed 8/28/17, resolved 9/25/17    COPD (chronic obstructive pulmonary disease) ([facility] [address] )     Dysphagia 6/8/2020    Enlarged prostate     GERD (gastroesophageal reflux disease)     History of colonoscopy 03/19/2015    POLYP IN THE ASCENDING COLON-[facility]    Hx of bladder infections     Inguinal hernia, right 05/10/2019    Neck pain     Psychiatric disorder     depression    Pulmonary nodule 01/08/2019    STABLE 6MM LEFT APICAL NODULE    Respiratory failure with hypoxia ([facility] [address] ) 01/08/2019    Urinary retention      Past Surgical History:  Past Surgical History:   Procedure Laterality Date    BLADDER SURGERY  08/15/2019    UROLIFT    COLOSTOMY  02/03/2011    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    INGUINAL HERNIA REPAIR Right 05/10/2019    [facility]ONE AT [facility]    LAPAROSCOPIC COLON RESECTION  02/03/2011    ABDOMIANOPERITONEAL RESECTION FOR RECTAL MASS   DONE BY RADHA MCDUFFIE    IN OPEN RX FEMUR FX+INTRAMED DEMETRICE Left 12/7/2020    Procedure: Left Hip TFN;  Surgeon: Loraine Lockhart MD;  Location:  MAIN OR;  Service: Orthopedics     Family History:  Family History   Problem Relation Age of Onset    Lung cancer Mother     Cancer Mother     Cancer Father     Alcohol abuse Father     Mental illness Neg Hx      Social History:  Social History     Substance and Sexual Activity   Alcohol Use Not Currently    Frequency: Never    Binge frequency: Never     Social History     Substance and Sexual Activity   Drug Use No     Social History     Tobacco Use   Smoking Status Former Smoker    Packs/day: 0 50    Years: 50 00    Pack years: 25 00    Types: Cigarettes    Start date: 64    Quit date: 03/2018    Years since quitting: 3 1   Smokeless Tobacco Former User   Tobacco Comment    quit 8/2017 per [employer]      Review of Systems   Constitutional: Negative  HENT: Negative  Eyes: Negative  Respiratory: Negative  Cardiovascular: Negative  Gastrointestinal: Negative  Endocrine: Negative  Genitourinary: Negative  Musculoskeletal: Positive for arthralgias and gait problem (in a wheelchair)  Skin: Negative  Allergic/Immunologic: Negative  Hematological: Negative  Psychiatric/Behavioral: Negative  Objective:  BP Readings from Last 1 Encounters:   04/13/21 122/72      Wt Readings from Last 1 Encounters:   04/06/21 77 6 kg (171 lb)      BMI:   Estimated body mass index is 27 6 kg/m² as calculated from the following:    Height as of 4/6/21: 5' 6" (1 676 m)  Weight as of 4/6/21: 77 6 kg (171 lb)  BSA:   Estimated body surface area is 1 87 meters squared as calculated from the following:    Height as of 4/6/21: 5' 6" (1 676 m)  Weight as of 4/6/21: 77 6 kg (171 lb)  Physical Exam  Constitutional:       General: He is not in acute distress  Appearance: He is well-developed  HENT:      Head: Normocephalic  Eyes:      Conjunctiva/sclera: Conjunctivae normal       Pupils: Pupils are equal, round, and reactive to light  Pulmonary:      Effort: Pulmonary effort is normal  No respiratory distress  Musculoskeletal:      Right knee: He exhibits no effusion  Left knee: He exhibits no effusion  Skin:     General: Skin is warm and dry  Neurological:      Mental Status: He is alert and oriented to person, place, and time     Psychiatric: Behavior: Behavior normal        Right Knee Exam     Tenderness   The patient is experiencing no tenderness  Range of Motion   The patient has normal right knee ROM  Tests   Varus: negative Valgus: negative    Other   Erythema: absent  Scars: absent  Sensation: normal  Pulse: present  Swelling: none  Effusion: no effusion present      Left Knee Exam     Tenderness   Left knee tenderness location: diffuse anterior thigh  Range of Motion   Extension: normal   Flexion: 120     Tests   Varus: negative Valgus: negative    Other   Erythema: absent  Scars: absent  Sensation: normal  Pulse: present  Swelling: none  Effusion: no effusion present      Left Hip Exam     Muscle Strength   Flexion: 4/5     Other   Erythema: absent  Scars: present (Well healed incisions with no evidence of infection  )  Sensation: normal  Pulse: present    Comments:  Good passive range of motion with no increased pain  Calf soft, nontender  Moving ankle and toes            I have personally reviewed pertinent films in PACS and my interpretation is X-rays left hip reveal a well-aligned intertrochanteric fracture with IM nail intact  There is significant bone callous formation  X-rays of the bilateral knees show mild tricompartmental osteoarthritis  There is chondrocalcinosis medially on the left knee  Libby Soria

## 2021-05-03 ENCOUNTER — TELEPHONE (OUTPATIENT)
Dept: FAMILY MEDICINE CLINIC | Facility: CLINIC | Age: 81
End: 2021-05-03

## 2021-05-03 DIAGNOSIS — F41.9 ANXIETY: ICD-10-CM

## 2021-05-03 DIAGNOSIS — F32.A DEPRESSIVE DISORDER: ICD-10-CM

## 2021-05-03 DIAGNOSIS — M54.2 NECK PAIN: ICD-10-CM

## 2021-05-03 RX ORDER — ESCITALOPRAM OXALATE 10 MG/1
10 TABLET ORAL DAILY
Qty: 90 TABLET | Refills: 0
Start: 2021-05-03 | End: 2021-05-07

## 2021-05-03 RX ORDER — CYCLOBENZAPRINE HCL 10 MG
10 TABLET ORAL 3 TIMES DAILY PRN
Qty: 30 TABLET | Refills: 1 | Status: CANCELLED | OUTPATIENT
Start: 2021-05-03

## 2021-05-07 DIAGNOSIS — F32.A DEPRESSIVE DISORDER: ICD-10-CM

## 2021-05-07 DIAGNOSIS — F41.9 ANXIETY: ICD-10-CM

## 2021-05-07 RX ORDER — ESCITALOPRAM OXALATE 10 MG/1
TABLET ORAL
Qty: 90 TABLET | Refills: 0 | Status: SHIPPED | OUTPATIENT
Start: 2021-05-07 | End: 2021-08-16

## 2021-05-07 NOTE — TELEPHONE ENCOUNTER
APPROVED  Refill sent on 05/03   Pharmacy never received it / confirmed with pharmacy    Please send it again

## 2021-05-09 DIAGNOSIS — J44.9 COPD, MODERATE (HCC): ICD-10-CM

## 2021-05-10 DIAGNOSIS — J44.9 COPD, MODERATE (HCC): ICD-10-CM

## 2021-05-10 RX ORDER — IPRATROPIUM BROMIDE AND ALBUTEROL SULFATE 2.5; .5 MG/3ML; MG/3ML
SOLUTION RESPIRATORY (INHALATION)
Qty: 360 ML | Refills: 0 | OUTPATIENT
Start: 2021-05-10

## 2021-05-10 RX ORDER — IPRATROPIUM BROMIDE AND ALBUTEROL SULFATE 2.5; .5 MG/3ML; MG/3ML
3 SOLUTION RESPIRATORY (INHALATION) 4 TIMES DAILY
Qty: 360 ML | Refills: 0 | Status: SHIPPED | OUTPATIENT
Start: 2021-05-10 | End: 2021-08-16

## 2021-05-18 DIAGNOSIS — S22.41XD CLOSED FRACTURE OF MULTIPLE RIBS OF RIGHT SIDE WITH ROUTINE HEALING, SUBSEQUENT ENCOUNTER: Primary | ICD-10-CM

## 2021-05-18 DIAGNOSIS — M17.12 PRIMARY LOCALIZED OSTEOARTHRITIS OF LEFT KNEE: ICD-10-CM

## 2021-05-18 DIAGNOSIS — M17.11 PRIMARY LOCALIZED OSTEOARTHRITIS OF RIGHT KNEE: ICD-10-CM

## 2021-06-10 ENCOUNTER — TELEPHONE (OUTPATIENT)
Dept: PULMONOLOGY | Facility: CLINIC | Age: 81
End: 2021-06-10

## 2021-06-11 NOTE — TELEPHONE ENCOUNTER
Called patient spoke with wife and informed there are samples of bre[place] in [place]  She replied she will see when someone can come pick it up

## 2021-06-15 ENCOUNTER — TELEPHONE (OUTPATIENT)
Dept: FAMILY MEDICINE CLINIC | Facility: CLINIC | Age: 81
End: 2021-06-15

## 2021-06-15 DIAGNOSIS — R39.9 UTI SYMPTOMS: Primary | ICD-10-CM

## 2021-06-15 RX ORDER — CEPHALEXIN 500 MG/1
500 CAPSULE ORAL 3 TIMES DAILY
Qty: 21 CAPSULE | Refills: 0 | Status: SHIPPED | OUTPATIENT
Start: 2021-06-15 | End: 2021-06-22

## 2021-06-15 NOTE — TELEPHONE ENCOUNTER
Pt has a bladder infection it burns we he pees and would like medication        Send to   [facility] [phone]          [phone]

## 2021-06-23 ENCOUNTER — OFFICE VISIT (OUTPATIENT)
Dept: OBGYN CLINIC | Facility: CLINIC | Age: 81
End: 2021-06-23
Payer: MEDICARE

## 2021-06-23 VITALS
DIASTOLIC BLOOD PRESSURE: 70 MMHG | WEIGHT: 169 LBS | BODY MASS INDEX: 27.16 KG/M2 | HEIGHT: 66 IN | SYSTOLIC BLOOD PRESSURE: 116 MMHG

## 2021-06-23 DIAGNOSIS — Z47.89 AFTERCARE FOLLOWING SURGERY OF THE MUSCULOSKELETAL SYSTEM: Primary | ICD-10-CM

## 2021-06-23 DIAGNOSIS — M17.12 PRIMARY LOCALIZED OSTEOARTHRITIS OF LEFT KNEE: ICD-10-CM

## 2021-06-23 DIAGNOSIS — M17.11 PRIMARY LOCALIZED OSTEOARTHRITIS OF RIGHT KNEE: ICD-10-CM

## 2021-06-23 PROCEDURE — 99213 OFFICE O/P EST LOW 20 MIN: CPT | Performed by: ORTHOPAEDIC SURGERY

## 2021-06-23 NOTE — PROGRESS NOTES
Assessment:     1  Aftercare following surgery of the musculoskeletal system    2  Primary localized osteoarthritis of right knee    3  Primary localized osteoarthritis of left knee        Plan:     Problem List Items Addressed This Visit        Musculoskeletal and Integument    Primary localized osteoarthritis of right knee    Primary localized osteoarthritis of left knee       Other    Aftercare following surgery of the musculoskeletal system - Primary     Patient is doing well status post left hip TFN  Patient is being treated for his bilateral osteoarthritis at the [place]  I discussed with the patient's daughter that Kelsey Nicole can continue to treat his knees  Continue Voltaren gel on Aspercreme to the bilateral knees  Follow-up in 2 months for re-evaluation  I will follow-up with him as needed  All patient's questions were answered to their satisfaction  This note is created using dictation transcription  It may contain typographical errors, grammatical errors, improperly dictated words, background noise and other errors  Subjective:     Patient ID: Fatoumata Paz is a 79 y o  male  Chief Complaint:  Patient is a 79year old who presents to the office today with his daughter status post left hip TFN on December 7, 2020 and bilateral knee osteoarthritis  He is currently residing at home  He has been going to [facility] for physical therapy for his bilateral knee osteoarthritis  He states that he notes improvement with formal physical therapy  He states that he received steroid injections about 4 weeks ago for his bilateral knees  He notes relief from the steroid injections      Allergy:  Allergies   Allergen Reactions    Bactrim [Sulfamethoxazole-Trimethoprim] GI Intolerance    Aspirin Other (See Comments)     Hx stomach ulcer     Medications:  all current active meds have been reviewed  Past Medical History:  Past Medical History:   Diagnosis Date    Anxiety     Bladder infection     current tx with cipro 5/29/16    BPH (benign prostatic hyperplasia)     Community acquired pneumonia     last assessed 8/28/17, resolved 9/25/17    COPD [facility]chronic obstructive [facility] disease) ([facility] [address]     Dysphagia 6/8/2020    Enlarged prostate     GERD (gastroesophageal reflux disease)     History of colonoscopy 03/19/2015    POLYP IN THE ASCENDING COLON-[facility]    Hx of bladder infections     Inguinal hernia, right 05/10/2019    Neck pain     Psychiatric disorder     depression    Pulmonary nodule 01/08/2019    STABLE 6MM LEFT APICAL NODULE    Respiratory failure with hypoxia ([facility] [address] ) 01/08/2019    Urinary retention      Past Surgical History:  Past Surgical History:   Procedure Laterality Date    BLADDER SURGERY  08/15/2019    UROLIFT    COLOSTOMY  02/03/2011    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    INGUINAL HERNIA REPAIR Right 05/10/2019    DONE AT [facility]    LAPAROSCOPIC COLON RESECTION  02/03/2011    ABDOMIANOPERITONEAL RESECTION FOR RECTAL MASS  DONE BY RADHA MCDUFFIE    NE OPEN RX FEMUR FX+INTRAMED DEMETRICE Left 12/7/2020    Procedure: Left Hip TFN;  Surgeon: Fidel Saha MD;  Location:  MAIN OR;  Service: Orthopedics     Family History:  Family History   Problem Relation Age of Onset    Lung cancer Mother     Cancer Mother     Cancer Father     Alcohol abuse Father     Mental illness Neg Hx      Social History:  Social History     Substance and Sexual Activity   Alcohol Use Not Currently     Social History     Substance and Sexual Activity   Drug Use No     Social History     Tobacco Use   Smoking Status Former Smoker    Packs/day: 0 50    Years: 50 00    Pack years: 25 00    Types: Cigarettes    Start date: 64    Quit date: 03/2018    Years since quitting: 3 3   Smokeless Tobacco Former User   Tobacco Comment    quit 8/2017 per [employer]      Review of Systems   Constitutional: Negative  HENT: Negative  Eyes: Negative  Respiratory: Negative  Cardiovascular: Negative  Gastrointestinal: Negative  Endocrine: Negative  Genitourinary: Negative  Musculoskeletal: Positive for back pain and gait problem (Ambulate with a walker)  Negative for arthralgias  Skin: Negative  Allergic/Immunologic: Negative  Hematological: Negative  Psychiatric/Behavioral: Negative  Objective:  BP Readings from Last 1 Encounters:   06/23/21 116/70      Wt Readings from Last 1 Encounters:   06/23/21 76 7 kg (169 lb)      BMI:   Estimated body mass index is 27 28 kg/m² as calculated from the following:    Height as of this encounter: 5' 6" (1 676 m)  Weight as of this encounter: 76 7 kg (169 lb)  BSA:   Estimated body surface area is 1 86 meters squared as calculated from the following:    Height as of this encounter: 5' 6" (1 676 m)  Weight as of this encounter: 76 7 kg (169 lb)  Physical Exam  Vitals and nursing note reviewed  Constitutional:       Appearance: Normal appearance  He is well-developed  HENT:      Head: Normocephalic and atraumatic  Right Ear: External ear normal       Left Ear: External ear normal    Eyes:      Extraocular Movements: Extraocular movements intact  Conjunctiva/sclera: Conjunctivae normal    Pulmonary:      Effort: Pulmonary effort is normal    Musculoskeletal:      Cervical back: Neck supple  Right knee: No effusion  Left knee: No effusion  Skin:     General: Skin is warm  Neurological:      Mental Status: He is alert and oriented to person, place, and time  Psychiatric:         Mood and Affect: Mood normal          Behavior: Behavior normal        Right Knee Exam     Tenderness   The patient is experiencing no tenderness  Range of Motion   The patient has normal right knee ROM      Tests   Varus: negative Valgus: negative  Patellar apprehension: negative    Other   Erythema: absent  Scars: absent  Sensation: normal  Pulse: present  Swelling: none  Effusion: no effusion present      Left Knee Exam     Tenderness   The patient is experiencing no tenderness (diffuse anterior thigh)  Range of Motion   Extension: normal   Flexion: 120     Tests   Varus: negative Valgus: negative  Patellar apprehension: negative    Other   Erythema: absent  Scars: absent  Sensation: normal  Pulse: present  Swelling: none  Effusion: no effusion present      Left Hip Exam     Tenderness   The patient is experiencing no tenderness  Range of Motion   The patient has normal left hip ROM  Muscle Strength   Flexion: 4/5     Tests   CONG: negative  Tonya: negative    Other   Erythema: absent  Scars: present (Well healed incisions with no evidence of infection  )  Sensation: normal  Pulse: present    Comments:  Good passive range of motion with no increased pain  Calf soft, nontender  Moving ankle and toes            No new images reviewed today      Scribe Attestation    I,:  Annabella Porras am acting as a scribe while in the presence of the attending physician :       I,:  Ynes Shanks MD personally performed the services described in this documentation    as scribed in my presence :

## 2021-06-23 NOTE — ASSESSMENT & PLAN NOTE
Patient is doing well status post left hip TFN  Patient is being [facility] for [facility] bilateral [facility]  I discussed with the patient's daughter that Chano Goodrich can continue to treat his knees  Continue Voltaren gel on Aspercreme to the bilateral knees  Follow-up in 2 months for re-evaluation  I will follow-up with him as needed  All patient's questions were answered to their satisfaction  This note is created using dictation transcription  It may contain typographical errors, grammatical errors, improperly dictated words, background noise and other errors

## 2021-07-14 ENCOUNTER — TELEPHONE (OUTPATIENT)
Dept: OBGYN CLINIC | Facility: OTHER | Age: 81
End: 2021-07-14

## 2021-07-14 NOTE — TELEPHONE ENCOUNTER
Dr Zafar Ash @ [facility] is calling requesting we fax Office Notes from 06-23      Fax # [phone]  Attn : Dr Zafar Ash     C/b # [phone]

## 2021-07-21 DIAGNOSIS — R33.8 BENIGN PROSTATIC HYPERPLASIA WITH URINARY RETENTION: ICD-10-CM

## 2021-07-21 DIAGNOSIS — K21.9 GASTROESOPHAGEAL REFLUX DISEASE: ICD-10-CM

## 2021-07-21 DIAGNOSIS — N40.1 BENIGN PROSTATIC HYPERPLASIA WITH URINARY RETENTION: ICD-10-CM

## 2021-07-26 RX ORDER — FAMOTIDINE 40 MG/1
40 TABLET, FILM COATED ORAL DAILY
Qty: 90 TABLET | Refills: 1 | Status: SHIPPED | OUTPATIENT
Start: 2021-07-26 | End: 2021-12-09 | Stop reason: SDUPTHER

## 2021-07-26 RX ORDER — TAMSULOSIN HYDROCHLORIDE 0.4 MG/1
0.4 CAPSULE ORAL
Qty: 30 CAPSULE | Refills: 3 | Status: SHIPPED | OUTPATIENT
Start: 2021-07-26 | End: 2021-12-28

## 2021-08-03 ENCOUNTER — TELEMEDICINE (OUTPATIENT)
Dept: FAMILY MEDICINE CLINIC | Facility: CLINIC | Age: 81
End: 2021-08-03
Payer: MEDICARE

## 2021-08-03 VITALS
BODY MASS INDEX: 27.16 KG/M2 | OXYGEN SATURATION: 96 % | HEART RATE: 95 BPM | TEMPERATURE: 99.8 F | WEIGHT: 169 LBS | HEIGHT: 66 IN

## 2021-08-03 DIAGNOSIS — M54.50 CHRONIC BILATERAL LOW BACK PAIN WITHOUT SCIATICA: ICD-10-CM

## 2021-08-03 DIAGNOSIS — F41.9 ANXIETY: ICD-10-CM

## 2021-08-03 DIAGNOSIS — G89.29 CHRONIC BILATERAL LOW BACK PAIN WITHOUT SCIATICA: ICD-10-CM

## 2021-08-03 DIAGNOSIS — R39.9 UTI SYMPTOMS: Primary | ICD-10-CM

## 2021-08-03 PROCEDURE — 99213 OFFICE O/P EST LOW 20 MIN: CPT | Performed by: FAMILY MEDICINE

## 2021-08-03 RX ORDER — CIPROFLOXACIN 250 MG/1
250 TABLET, FILM COATED ORAL EVERY 12 HOURS SCHEDULED
Qty: 14 TABLET | Refills: 0 | Status: SHIPPED | OUTPATIENT
Start: 2021-08-03 | End: 2021-08-10

## 2021-08-03 RX ORDER — GABAPENTIN 100 MG/1
100 CAPSULE ORAL 2 TIMES DAILY
Qty: 180 CAPSULE | Refills: 1 | Status: SHIPPED | OUTPATIENT
Start: 2021-08-03 | End: 2022-04-15 | Stop reason: SDUPTHER

## 2021-08-03 NOTE — PROGRESS NOTES
Virtual Regular Visit    Verification of patient location:    Patient is located in the following state in which I hold an active license [place]      Assessment/Plan:    Problem List Items Addressed This Visit        Other    Anxiety    Relevant Medications    gabapentin (NEURONTIN) 100 mg capsule    Chronic bilateral low back pain    Relevant Medications    gabapentin (NEURONTIN) 100 mg capsule      Other Visit Diagnoses     UTI symptoms    -  Primary    Relevant Medications    ciprofloxacin (CIPRO) 250 mg tablet    Other Relevant Orders    Urine culture               Reason for visit is   Chief Complaint   Patient presents with    Virtual Brief Visit     pt is complaining about burning sensation when goes to the bathroom, sinus pain, back and joint pain, and vomiting last evening  daughter states she gave him tylenol around 4 am      Virtual Regular Visit        Encounter provider Burak Aparicio MD    Provider located at [address] [place] [id number]      Recent Visits  No visits were found meeting these conditions  Showing recent visits within past 7 days and meeting all other requirements  Today's Visits  Date Type Provider Dept   08/03/21 Telemedicine Burak Aparicio MD [address] today's visits and meeting all other requirements  Future Appointments  No visits were found meeting these conditions  Showing future appointments within next 150 days and meeting all other requirements       The patient was identified by name and date of birth  Amberlakeisha Ann was informed that this is a telemedicine visit and that the visit is being conducted through [address] [place] [address] and patient was informed that this is not a secure, HIPAA-compliant platform  He agrees to proceed     My office door was closed  No one else was in the room  He acknowledged consent and understanding of privacy and security of the video platform   The patient has agreed to participate and understands they can discontinue the visit at any time  Patient is aware this is a billable service  [address] did not connect to patient  Subjective  Sergei Mayers is a 79 y o  male UTI symptoms   Patient calls in today with urinary tract symptoms  Last urinary tract infection was April of 2021  He denies any fevers  Is having joint aches which is not unusual   Patient also needs a refill gabapentin  Past Medical History:   Diagnosis Date    Anxiety     Bladder infection     current tx with cipro 5/29/16    BPH (benign prostatic hyperplasia)     Community acquired pneumonia     last assessed 8/28/17, resolved 9/25/17    COPD (chronic obstructive pulmonary disease) ([facility] )     Dysphagia 6/8/2020    Enlarged prostate     GERD (gastroesophageal reflux disease)     History of colonoscopy 03/19/2015    POLYP IN THE ASCENDING COLON-[facility]    Hx of bladder infections     Inguinal hernia, right 05/10/2019    Neck pain     Psychiatric disorder     depression    Pulmonary nodule 01/08/2019    STABLE 6MM LEFT APICAL NODULE    Respiratory failure with hypoxia ([facility] ) 01/08/2019    Urinary retention        Past Surgical History:   Procedure Laterality Date    BLADDER SURGERY  08/15/2019    UROLIFT    COLOSTOMY  02/03/2011    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    INGUINAL HERNIA REPAIR Right 05/10/2019    [facility]ONE [facility]    LAPAROSCOPIC COLON RESECTION  02/03/2011    ABDOMIANOPERITONEAL RESECTION FOR RECTAL MASS   DONE BY RADHA MCDUFFIE    [place] OPEN RX FEMUR FX+INTRAMED DEMETRICE Left 12/7/2020    Procedure: Left Hip TFN;  Surgeon: Dean Bella MD;  Location:  MAIN OR;  Service: Orthopedics       Current Outpatient Medications   Medication Sig Dispense Refill    acetaminophen (TYLENOL) 325 mg tablet Take 2 tablets (650 mg total) by mouth every 4 (four) hours as needed for mild pain 30 tablet 0    albuterol (2 5 mg/3 mL) 0 083 % nebulizer solution Take 2 5 mg by nebulization every 4 (four) hours as needed for wheezing or shortness of breath      albuterol (ProAir HFA) 90 mcg/act inhaler Inhale 2 puffs every 6 (six) hours as needed for wheezing 8 5 g 3    Catheters (Gizmo Condom Catheter) MISC Use daily at bedtime 100 each 5    cyclobenzaprine (FLEXERIL) 10 mg tablet Take 1 tablet (10 mg total) by mouth 3 (three) times a day as needed for muscle spasms 30 tablet 1    escitalopram (LEXAPRO) 10 mg tablet Take 1 tablet by mouth once daily 90 tablet 0    famotidine (PEPCID) 40 MG tablet Take 1 tablet (40 mg total) by mouth daily 90 tablet 1    fluticasone-salmeterol (ADVAIR, WIXELA) 250-50 mcg/dose inhaler Inhale 1 puff 2 (two) times a day Rinse mouth after use  1 Inhaler 3    gabapentin (NEURONTIN) 100 mg capsule Take 1 capsule (100 mg total) by mouth 2 (two) times a day 180 capsule 1    Incontinence Supply Disposable (PadSORBer Bed Pan Liners) MISC Use daily at bedtime 50 each 10    ipratropium (ATROVENT) 0 02 % nebulizer solution Take 0 5 mg by nebulization 4 (four) times a day      ipratropium-albuterol (DUO-NEB) 0 5-2 5 mg/3 mL nebulizer solution Take 1 vial (3 mL total) by nebulization 4 (four) times a day 360 mL 0    NON FORMULARY Take 1,000 mg by mouth 2 (two) times a day Med is MSM Pure, pt brought in from home      QUEtiapine (SEROquel) 25 mg tablet Take 1 tablet (25 mg total) by mouth daily at bedtime 90 tablet 1    tamsulosin (FLOMAX) 0 4 mg Take 1 capsule (0 4 mg total) by mouth daily with dinner 30 capsule 3    aspirin (ECOTRIN) 325 mg EC tablet Take 1 tablet (325 mg total) by mouth 2 (two) times a day 60 tablet 0    ciprofloxacin (CIPRO) 250 mg tablet Take 1 tablet (250 mg total) by mouth every 12 (twelve) hours for 7 days 14 tablet 0    ondansetron (ZOFRAN) 4 mg tablet Take 1 tablet (4 mg total) by mouth every 6 (six) hours 12 tablet 0     No current facility-administered medications for this visit          Allergies Allergen Reactions    Bactrim [Sulfamethoxazole-Trimethoprim] GI Intolerance    Aspirin Other (See Comments)     Hx stomach ulcer       Review of Systems   Respiratory: Negative for cough, chest tightness and shortness of breath  Cardiovascular: Negative for chest pain, palpitations and leg swelling  Gastrointestinal: Negative for abdominal pain, blood in stool, diarrhea and nausea  Genitourinary: Positive for difficulty urinating, dysuria and frequency  Negative for flank pain, hematuria and urgency  Video Exam    Vitals:    08/03/21 0823   Pulse: 95   Temp: 99 8 °F (37 7 °C)   SpO2: 96%   Weight: 76 7 kg (169 lb)   Height: 5' 6" (1 676 m)       Physical Exam  Constitutional:       General: He is not in acute distress  Appearance: Normal appearance  He is not ill-appearing  HENT:      Head: Normocephalic and atraumatic  Pulmonary:      Effort: Pulmonary effort is normal  No respiratory distress  Neurological:      Mental Status: He is alert  I spent 15 minutes directly with the patient during this visit  Patient Instructions   Cipro X 7 days, urine culture to [id number] verbally agrees to participate in [employer]  Pt is aware that [employer] could be limited without vital signs or the ability to perform a full hands-on physical Mariah Atkins understands he or the provider may request at any time to terminate the video visit and request the patient to seek care or treatment in person

## 2021-08-05 LAB
APPEARANCE UR: ABNORMAL
BACTERIA UR QL AUTO: ABNORMAL /HPF
BILIRUB UR QL STRIP: NEGATIVE
COLOR UR: YELLOW
GLUCOSE UR QL STRIP: NEGATIVE
HGB UR QL STRIP: ABNORMAL
HYALINE CASTS #/AREA URNS LPF: ABNORMAL /LPF
KETONES UR QL STRIP: NEGATIVE
LEUKOCYTE ESTERASE UR QL STRIP: ABNORMAL
NITRITE UR QL STRIP: POSITIVE
PH UR STRIP: 5.5 [PH] (ref 5–8)
PROT UR QL STRIP: NEGATIVE
RBC #/AREA URNS HPF: ABNORMAL /HPF
SP GR UR STRIP: 1.02 (ref 1–1.03)
SQUAMOUS #/AREA URNS HPF: ABNORMAL /HPF
WBC #/AREA URNS HPF: ABNORMAL /HPF

## 2021-08-09 NOTE — RESULT ENCOUNTER NOTE
Call patient with lab result-call family member-culture positive should be sensitive to Cipro which was prescribed

## 2021-08-12 ENCOUNTER — TELEPHONE (OUTPATIENT)
Dept: FAMILY MEDICINE CLINIC | Facility: CLINIC | Age: 81
End: 2021-08-12

## 2021-08-12 NOTE — TELEPHONE ENCOUNTER
----- Message from Jose Ramon Lmabert MD sent at 8/9/2021 10:22 AM EDT -----  Call patient with lab result-call family member-culture positive should be sensitive to Cipro which was prescribed

## 2021-08-13 ENCOUNTER — TELEPHONE (OUTPATIENT)
Dept: FAMILY MEDICINE CLINIC | Facility: CLINIC | Age: 81
End: 2021-08-13

## 2021-08-13 NOTE — TELEPHONE ENCOUNTER
Pt's wife is requesting ostomy bag medical supplies for pt  She is requesting a call from you    Please review  thanks

## 2021-08-14 DIAGNOSIS — F41.9 ANXIETY: ICD-10-CM

## 2021-08-14 DIAGNOSIS — J44.9 COPD, MODERATE (HCC): ICD-10-CM

## 2021-08-14 DIAGNOSIS — F32.A DEPRESSIVE DISORDER: ICD-10-CM

## 2021-08-16 RX ORDER — ESCITALOPRAM OXALATE 10 MG/1
TABLET ORAL
Qty: 90 TABLET | Refills: 0 | Status: SHIPPED | OUTPATIENT
Start: 2021-08-16 | End: 2021-12-17 | Stop reason: SDUPTHER

## 2021-08-16 RX ORDER — IPRATROPIUM BROMIDE AND ALBUTEROL SULFATE 2.5; .5 MG/3ML; MG/3ML
SOLUTION RESPIRATORY (INHALATION)
Qty: 360 ML | Refills: 0 | Status: SHIPPED | OUTPATIENT
Start: 2021-08-16 | End: 2021-10-07 | Stop reason: SDUPTHER

## 2021-08-18 NOTE — TELEPHONE ENCOUNTER
Multiple phone calls between Bertin Snow and patients wife  !st call to [facility], they told me because patient was getting home care service Medicare will only pay 1 entity at a time and therefor [facility] has to fill the ostomy supplies  I told her I did not believe they were a home care service with DME but, I will call them to follow up  Per Dayana Marie at [facility] they are not a [address] [place] [address] and do not deal with DME therefore [facility] can fill the ostomy supplies with Medicare  Samir is what is considered "strictly outpatient service"    Called and spoke to Jim Conte at [facility] and gave verbal order OK to fill  Harvinder told me it would go out 1-2 days and would take about 3-4 days for patient to receive  Per wife only has enough for 2 days, I told her if she could find somewhere locally we could authorize some to hold him over  advised she can call around and try to locate -- suggested [facility] or [facility]       8/19/21  Patients wife called, and said she secured some supplies from a supplier, she paid for them and we do not need to contact them  I once again told her that [facility] was to be sending out the supplies  If she decides to change companies at a later date to have the new company make contact with our office as to what supplies are needed  8/25/21  Call from patients wife, has still not received supplies  Advised her to call [facility] as when I gave the order to Jim Cnote she told me it would go out 1-2 days and would take about 3-4 days for patient to receive

## 2021-10-07 DIAGNOSIS — J44.9 COPD, MODERATE (HCC): ICD-10-CM

## 2021-10-07 RX ORDER — IPRATROPIUM BROMIDE AND ALBUTEROL SULFATE 2.5; .5 MG/3ML; MG/3ML
3 SOLUTION RESPIRATORY (INHALATION) EVERY 8 HOURS PRN
Qty: 360 ML | Refills: 0 | Status: SHIPPED | OUTPATIENT
Start: 2021-10-07 | End: 2021-10-08 | Stop reason: SDUPTHER

## 2021-10-08 DIAGNOSIS — J44.9 COPD, MODERATE (HCC): ICD-10-CM

## 2021-10-08 RX ORDER — IPRATROPIUM BROMIDE AND ALBUTEROL SULFATE 2.5; .5 MG/3ML; MG/3ML
3 SOLUTION RESPIRATORY (INHALATION) EVERY 8 HOURS PRN
Qty: 360 ML | Refills: 0 | Status: SHIPPED | OUTPATIENT
Start: 2021-10-08 | End: 2021-11-12 | Stop reason: SDUPTHER

## 2021-11-02 ENCOUNTER — TELEPHONE (OUTPATIENT)
Dept: FAMILY MEDICINE CLINIC | Facility: CLINIC | Age: 81
End: 2021-11-02

## 2021-11-08 ENCOUNTER — TELEPHONE (OUTPATIENT)
Dept: FAMILY MEDICINE CLINIC | Facility: CLINIC | Age: 81
End: 2021-11-08

## 2021-11-08 DIAGNOSIS — R39.9 UTI SYMPTOMS: Primary | ICD-10-CM

## 2021-11-08 RX ORDER — CIPROFLOXACIN 250 MG/1
250 TABLET, FILM COATED ORAL EVERY 12 HOURS SCHEDULED
Qty: 14 TABLET | Refills: 0 | Status: SHIPPED | OUTPATIENT
Start: 2021-11-08 | End: 2021-11-15

## 2021-11-12 DIAGNOSIS — J44.9 COPD, MODERATE (HCC): ICD-10-CM

## 2021-11-12 RX ORDER — IPRATROPIUM BROMIDE AND ALBUTEROL SULFATE 2.5; .5 MG/3ML; MG/3ML
3 SOLUTION RESPIRATORY (INHALATION) EVERY 8 HOURS PRN
Qty: 360 ML | Refills: 0 | Status: SHIPPED | OUTPATIENT
Start: 2021-11-12 | End: 2022-01-03 | Stop reason: SDUPTHER

## 2021-11-24 ENCOUNTER — OFFICE VISIT (OUTPATIENT)
Dept: FAMILY MEDICINE CLINIC | Facility: CLINIC | Age: 81
End: 2021-11-24
Payer: MEDICARE

## 2021-11-24 VITALS
HEIGHT: 66 IN | BODY MASS INDEX: 28.45 KG/M2 | HEART RATE: 79 BPM | DIASTOLIC BLOOD PRESSURE: 74 MMHG | OXYGEN SATURATION: 92 % | TEMPERATURE: 96.6 F | WEIGHT: 177 LBS | SYSTOLIC BLOOD PRESSURE: 120 MMHG

## 2021-11-24 DIAGNOSIS — Z23 NEED FOR 23-POLYVALENT PNEUMOCOCCAL POLYSACCHARIDE VACCINE: ICD-10-CM

## 2021-11-24 DIAGNOSIS — F41.9 ANXIETY: ICD-10-CM

## 2021-11-24 DIAGNOSIS — R33.8 BENIGN PROSTATIC HYPERPLASIA WITH URINARY RETENTION: ICD-10-CM

## 2021-11-24 DIAGNOSIS — M17.11 PRIMARY LOCALIZED OSTEOARTHRITIS OF RIGHT KNEE: ICD-10-CM

## 2021-11-24 DIAGNOSIS — N40.1 BENIGN PROSTATIC HYPERPLASIA WITH URINARY RETENTION: ICD-10-CM

## 2021-11-24 DIAGNOSIS — Z23 NEED FOR INFLUENZA VACCINATION: ICD-10-CM

## 2021-11-24 DIAGNOSIS — J96.11 CHRONIC RESPIRATORY FAILURE WITH HYPOXIA (HCC): ICD-10-CM

## 2021-11-24 DIAGNOSIS — J44.9 COPD, MODERATE (HCC): ICD-10-CM

## 2021-11-24 DIAGNOSIS — M17.12 PRIMARY LOCALIZED OSTEOARTHRITIS OF LEFT KNEE: ICD-10-CM

## 2021-11-24 DIAGNOSIS — Z00.00 MEDICARE ANNUAL WELLNESS VISIT, SUBSEQUENT: Primary | ICD-10-CM

## 2021-11-24 PROBLEM — S22.49XA MULTIPLE RIB FRACTURES: Status: RESOLVED | Noted: 2020-06-03 | Resolved: 2021-11-24

## 2021-11-24 PROBLEM — D62 ACUTE BLOOD LOSS ANEMIA: Status: RESOLVED | Noted: 2020-12-06 | Resolved: 2021-11-24

## 2021-11-24 PROBLEM — R33.9 URINARY RETENTION: Status: RESOLVED | Noted: 2020-06-03 | Resolved: 2021-11-24

## 2021-11-24 PROCEDURE — 96372 THER/PROPH/DIAG INJ SC/IM: CPT | Performed by: FAMILY MEDICINE

## 2021-11-24 PROCEDURE — 99214 OFFICE O/P EST MOD 30 MIN: CPT | Performed by: FAMILY MEDICINE

## 2021-11-24 PROCEDURE — 90662 IIV NO PRSV INCREASED AG IM: CPT

## 2021-11-24 PROCEDURE — G0439 PPPS, SUBSEQ VISIT: HCPCS | Performed by: FAMILY MEDICINE

## 2021-11-24 PROCEDURE — G0009 ADMIN PNEUMOCOCCAL VACCINE: HCPCS

## 2021-11-24 PROCEDURE — 1123F ACP DISCUSS/DSCN MKR DOCD: CPT

## 2021-11-24 PROCEDURE — G0008 ADMIN INFLUENZA VIRUS VAC: HCPCS

## 2021-11-24 PROCEDURE — 90732 PPSV23 VACC 2 YRS+ SUBQ/IM: CPT

## 2021-11-24 RX ORDER — OMEPRAZOLE 20 MG/1
CAPSULE, DELAYED RELEASE ORAL
COMMUNITY
End: 2022-04-04

## 2021-12-09 DIAGNOSIS — K21.9 GASTROESOPHAGEAL REFLUX DISEASE: ICD-10-CM

## 2021-12-09 RX ORDER — FAMOTIDINE 40 MG/1
40 TABLET, FILM COATED ORAL DAILY
Qty: 90 TABLET | Refills: 1 | Status: SHIPPED | OUTPATIENT
Start: 2021-12-09 | End: 2022-01-03 | Stop reason: SDUPTHER

## 2021-12-17 DIAGNOSIS — F32.A DEPRESSIVE DISORDER: ICD-10-CM

## 2021-12-17 DIAGNOSIS — F41.9 ANXIETY: ICD-10-CM

## 2021-12-17 RX ORDER — ESCITALOPRAM OXALATE 10 MG/1
10 TABLET ORAL DAILY
Qty: 90 TABLET | Refills: 0 | Status: SHIPPED | OUTPATIENT
Start: 2021-12-17 | End: 2022-04-15 | Stop reason: SDUPTHER

## 2021-12-21 ENCOUNTER — OFFICE VISIT (OUTPATIENT)
Dept: OBGYN CLINIC | Facility: CLINIC | Age: 81
End: 2021-12-21
Payer: MEDICARE

## 2021-12-21 VITALS
SYSTOLIC BLOOD PRESSURE: 110 MMHG | BODY MASS INDEX: 29.41 KG/M2 | WEIGHT: 183 LBS | HEIGHT: 66 IN | DIASTOLIC BLOOD PRESSURE: 68 MMHG

## 2021-12-21 DIAGNOSIS — M17.12 PRIMARY LOCALIZED OSTEOARTHRITIS OF LEFT KNEE: ICD-10-CM

## 2021-12-21 DIAGNOSIS — M17.11 PRIMARY LOCALIZED OSTEOARTHRITIS OF RIGHT KNEE: Primary | ICD-10-CM

## 2021-12-21 PROCEDURE — 99214 OFFICE O/P EST MOD 30 MIN: CPT | Performed by: ORTHOPAEDIC SURGERY

## 2021-12-21 PROCEDURE — 20610 DRAIN/INJ JOINT/BURSA W/O US: CPT | Performed by: ORTHOPAEDIC SURGERY

## 2021-12-21 RX ORDER — BETAMETHASONE SODIUM PHOSPHATE AND BETAMETHASONE ACETATE 3; 3 MG/ML; MG/ML
6 INJECTION, SUSPENSION INTRA-ARTICULAR; INTRALESIONAL; INTRAMUSCULAR; SOFT TISSUE
Status: COMPLETED | OUTPATIENT
Start: 2021-12-21 | End: 2021-12-21

## 2021-12-21 RX ORDER — LIDOCAINE HYDROCHLORIDE 10 MG/ML
7 INJECTION, SOLUTION INFILTRATION; PERINEURAL
Status: COMPLETED | OUTPATIENT
Start: 2021-12-21 | End: 2021-12-21

## 2021-12-21 RX ADMIN — BETAMETHASONE SODIUM PHOSPHATE AND BETAMETHASONE ACETATE 6 MG: 3; 3 INJECTION, SUSPENSION INTRA-ARTICULAR; INTRALESIONAL; INTRAMUSCULAR; SOFT TISSUE at 16:03

## 2021-12-21 RX ADMIN — LIDOCAINE HYDROCHLORIDE 7 ML: 10 INJECTION, SOLUTION INFILTRATION; PERINEURAL at 16:03

## 2021-12-28 DIAGNOSIS — R33.8 BENIGN PROSTATIC HYPERPLASIA WITH URINARY RETENTION: ICD-10-CM

## 2021-12-28 DIAGNOSIS — N40.1 BENIGN PROSTATIC HYPERPLASIA WITH URINARY RETENTION: ICD-10-CM

## 2021-12-28 RX ORDER — TAMSULOSIN HYDROCHLORIDE 0.4 MG/1
CAPSULE ORAL
Qty: 30 CAPSULE | Refills: 0 | Status: SHIPPED | OUTPATIENT
Start: 2021-12-28 | End: 2022-01-27 | Stop reason: SDUPTHER

## 2022-01-03 DIAGNOSIS — K21.9 GASTROESOPHAGEAL REFLUX DISEASE: ICD-10-CM

## 2022-01-03 DIAGNOSIS — J44.9 COPD, MODERATE (HCC): ICD-10-CM

## 2022-01-03 RX ORDER — IPRATROPIUM BROMIDE AND ALBUTEROL SULFATE 2.5; .5 MG/3ML; MG/3ML
3 SOLUTION RESPIRATORY (INHALATION) EVERY 8 HOURS PRN
Qty: 360 ML | Refills: 0 | Status: SHIPPED | OUTPATIENT
Start: 2022-01-03 | End: 2022-01-27

## 2022-01-03 RX ORDER — FAMOTIDINE 40 MG/1
40 TABLET, FILM COATED ORAL DAILY
Qty: 90 TABLET | Refills: 1 | Status: SHIPPED | OUTPATIENT
Start: 2022-01-03 | End: 2022-06-30

## 2022-01-03 NOTE — TELEPHONE ENCOUNTER
Send ALL of the pt's medication to [facility]  Pts medication insurance changed and insurance won't cover medication sent to [address]        Send to [phone]

## 2022-01-04 ENCOUNTER — TELEPHONE (OUTPATIENT)
Dept: FAMILY MEDICINE CLINIC | Facility: CLINIC | Age: 82
End: 2022-01-04

## 2022-01-04 DIAGNOSIS — M54.2 NECK PAIN: ICD-10-CM

## 2022-01-04 RX ORDER — CYCLOBENZAPRINE HCL 10 MG
10 TABLET ORAL 3 TIMES DAILY PRN
Qty: 30 TABLET | Refills: 2 | Status: SHIPPED | OUTPATIENT
Start: 2022-01-04 | End: 2022-04-04

## 2022-01-04 NOTE — TELEPHONE ENCOUNTER
Pt's is having back pain, pt would like muscle relaxer        Send to [facility] [phone]

## 2022-01-24 ENCOUNTER — CONSULT (OUTPATIENT)
Dept: SURGERY | Facility: HOSPITAL | Age: 82
End: 2022-01-24
Payer: MEDICARE

## 2022-01-24 ENCOUNTER — TELEPHONE (OUTPATIENT)
Dept: PULMONOLOGY | Facility: CLINIC | Age: 82
End: 2022-01-24

## 2022-01-24 VITALS
OXYGEN SATURATION: 96 % | HEART RATE: 56 BPM | HEIGHT: 68 IN | WEIGHT: 181 LBS | RESPIRATION RATE: 24 BRPM | DIASTOLIC BLOOD PRESSURE: 72 MMHG | TEMPERATURE: 97.2 F | BODY MASS INDEX: 27.43 KG/M2 | SYSTOLIC BLOOD PRESSURE: 130 MMHG

## 2022-01-24 DIAGNOSIS — J44.9 CHRONIC OBSTRUCTIVE PULMONARY DISEASE, UNSPECIFIED COPD TYPE (HCC): Primary | ICD-10-CM

## 2022-01-24 DIAGNOSIS — K40.90 LEFT INGUINAL HERNIA: ICD-10-CM

## 2022-01-24 PROCEDURE — 99203 OFFICE O/P NEW LOW 30 MIN: CPT | Performed by: SURGERY

## 2022-01-24 NOTE — TELEPHONE ENCOUNTER
Established patient needing pre-op clearance  Surgery: Right Inguinal Hernia Repair  Surgery date: TBS  Last seen by us: 10/5/20  On oxygen: Yes  Kind of Sedation: General  Length of surgery: 60 minutes  Surgeon: Dr Mindy Catalan contact: [phone]  Form/Office Note: Note  Fax:    Scheduled for 1/31

## 2022-01-27 DIAGNOSIS — R33.8 BENIGN PROSTATIC HYPERPLASIA WITH URINARY RETENTION: ICD-10-CM

## 2022-01-27 DIAGNOSIS — J44.9 COPD, MODERATE (HCC): ICD-10-CM

## 2022-01-27 DIAGNOSIS — N40.1 BENIGN PROSTATIC HYPERPLASIA WITH URINARY RETENTION: ICD-10-CM

## 2022-01-27 RX ORDER — IPRATROPIUM BROMIDE AND ALBUTEROL SULFATE 2.5; .5 MG/3ML; MG/3ML
3 SOLUTION RESPIRATORY (INHALATION) EVERY 8 HOURS PRN
Qty: 360 ML | Refills: 0 | Status: SHIPPED | OUTPATIENT
Start: 2022-01-27 | End: 2022-02-20

## 2022-01-27 RX ORDER — TAMSULOSIN HYDROCHLORIDE 0.4 MG/1
0.4 CAPSULE ORAL
Qty: 30 CAPSULE | Refills: 0 | Status: SHIPPED | OUTPATIENT
Start: 2022-01-27 | End: 2022-02-20

## 2022-01-31 ENCOUNTER — OFFICE VISIT (OUTPATIENT)
Dept: PULMONOLOGY | Facility: HOSPITAL | Age: 82
End: 2022-01-31
Payer: MEDICARE

## 2022-01-31 ENCOUNTER — OFFICE VISIT (OUTPATIENT)
Dept: LAB | Facility: HOSPITAL | Age: 82
End: 2022-01-31
Attending: SURGERY
Payer: MEDICARE

## 2022-01-31 VITALS
WEIGHT: 181 LBS | TEMPERATURE: 97.8 F | DIASTOLIC BLOOD PRESSURE: 78 MMHG | HEART RATE: 91 BPM | SYSTOLIC BLOOD PRESSURE: 118 MMHG | BODY MASS INDEX: 27.43 KG/M2 | OXYGEN SATURATION: 98 % | HEIGHT: 68 IN

## 2022-01-31 DIAGNOSIS — J44.9 COPD, MODERATE (HCC): Primary | ICD-10-CM

## 2022-01-31 DIAGNOSIS — J44.9 CHRONIC OBSTRUCTIVE PULMONARY DISEASE, UNSPECIFIED COPD TYPE (HCC): ICD-10-CM

## 2022-01-31 DIAGNOSIS — J96.11 CHRONIC RESPIRATORY FAILURE WITH HYPOXIA (HCC): ICD-10-CM

## 2022-01-31 DIAGNOSIS — Z01.811 PREOP PULMONARY/RESPIRATORY EXAM: ICD-10-CM

## 2022-01-31 PROCEDURE — 99214 OFFICE O/P EST MOD 30 MIN: CPT | Performed by: INTERNAL MEDICINE

## 2022-01-31 PROCEDURE — 93005 ELECTROCARDIOGRAM TRACING: CPT

## 2022-01-31 RX ORDER — ARFORMOTEROL TARTRATE 15 UG/2ML
15 SOLUTION RESPIRATORY (INHALATION) 2 TIMES DAILY
Qty: 120 ML | Refills: 5 | Status: SHIPPED | OUTPATIENT
Start: 2022-01-31

## 2022-01-31 RX ORDER — BUDESONIDE 0.5 MG/2ML
0.5 INHALANT ORAL 2 TIMES DAILY
Qty: 120 ML | Refills: 5 | Status: SHIPPED | OUTPATIENT
Start: 2022-01-31

## 2022-01-31 NOTE — ASSESSMENT & PLAN NOTE
Oxygenation is adequate on room air at rest   He does have oxygen that he can use at 2 liters/minutes to maintain saturations above 88 % with exertion

## 2022-01-31 NOTE — PROGRESS NOTES
Pulmonary Follow Up Note   Woo Villarreal 79 y o  male MRN: [id number]  1/31/2022      Assessment/Plan:     COPD, moderate ([facility] )  Patient has COPD, without evidence of exacerbation  He finds Breo Ellipta to be beneficial, but is too costly and we do not have sufficient samples to keep him supplied  I would like to try to transition to budesonide and Brovana twice daily in place of the [facility]  Chronic respiratory failure with hypoxia (HCC)  Oxygenation is adequate on room air at rest   He does have oxygen that he can use at 2 liters/minutes to maintain saturations above 88 % with exertion    Preop pulmonary/respiratory exam  Patient will be having inguinal hernia repair, date to be determined  From pulmonary perspective, he is at moderate risk of developing perioperative pulmonary complications, including but not limited to, COPD exacerbation, respiratory failure requiring mechanical ventilator support, pneumonia and atelectasis  These risks are not prohibitive in the patient may proceed with planned surgery  We will be available if there are any pulmonary issues that arise perioperatively  Visit orders:    Diagnoses and all orders for this visit:    COPD, moderate ([facility] )    Chronic obstructive pulmonary disease, unspecified COPD type ([place] [address] )  -     Ambulatory Referral to Pulmonology  -     budesonide (Pulmicort) 0 5 mg/2 mL nebulizer solution; Take 2 mL (0 5 mg total) by nebulization 2 (two) times a day Rinse mouth after use  -     arformoterol (BROVANA) 15 mcg/2 mL nebulizer solution; Take 2 mL (15 mcg total) by nebulization 2 (two) times a day    Chronic respiratory failure with hypoxia (HCC)    Preop pulmonary/respiratory exam        No follow-ups on file  History of Present Illness   HPI:  Woo Villarreal is a 79 y o  male who is here today for follow-up regarding chronic hypoxic respiratory failure and COPD    Patient also requires preoperative pulmonary clearance for upcoming inguinal hernia repair  Patient has been using Breo Ellipta intermittently  He finds it beneficial, but too costly to maintain on daily basis  He did not find generic Advair to be as beneficial   He has no significant cough or sputum production  He has some shortness of breath with exertion, but has been fairly inactive  He is using oxygen at 2 liters/minute  Review of Systems   Constitutional: Negative for chills, fever and unexpected weight change  HENT: Negative for postnasal drip and sore throat  Eyes: Negative for visual disturbance  Respiratory:        As noted in HPI   Cardiovascular: Negative for chest pain  Gastrointestinal: Negative for abdominal pain, diarrhea and vomiting  Musculoskeletal: Negative for arthralgias  Skin: Negative for rash  Neurological: Negative for headaches  Hematological: Negative for adenopathy  Psychiatric/Behavioral: Negative  All other systems reviewed and are negative        Medical, Family and Social history reviewed and updated as appropriate    Historical Information   Past Medical History:   Diagnosis Date    Acute blood loss anemia 12/6/2020    Anxiety     Bladder infection     current tx with cipro 5/29/16    BPH (benign prostatic hyperplasia)     Community acquired pneumonia     last assessed 8/28/17, resolved 9/25/17    COPD (chronic obstructive [facility] disease) ([facility] )     Dysphagia 6/8/2020    Enlarged prostate     GERD (gastroesophageal reflux disease)     History of colonoscopy 03/19/2015    POLYP IN THE ASCENDING COLON[facility]    Hx of bladder infections     Inguinal hernia, right 05/10/2019    Multiple rib fractures 6/3/2020    Neck pain     Psychiatric disorder     depression    Pulmonary nodule 01/08/2019    STABLE 6MM LEFT APICAL NODULE    Respiratory failure with hypoxia ([facility] ) 01/08/2019    Urinary retention      Past Surgical History:   Procedure Laterality Date    BLADDER SURGERY  08/15/2019    UROLIFT    COLOSTOMY 02/03/2011    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    INGUINAL HERNIA REPAIR Right 05/10/2019    DONE AT [facility]    LAPAROSCOPIC COLON RESECTION  02/03/2011    ABDOMIANOPERITONEAL RESECTION FOR RECTAL MASS   DONE BY RADHA FRY OPEN RX FEMUR FX+INTRAMED DEMETRICE Left 12/7/2020    Procedure: Left Hip TFN;  Surgeon: Belle Miranda MD;  Location:  MAIN OR;  Service: Orthopedics     Family History   Problem Relation Age of Onset    Lung cancer Mother     Cancer Mother     Cancer Father     Alcohol abuse Father     Mental illness Neg Hx        Social History     Tobacco Use   Smoking Status Former Smoker    Packs/day: 0 50    Years: 50 00    Pack years: 25 00    Types: Cigarettes    Start date: 64    Quit date: 03/2018    Years since quitting: 3 9   Smokeless Tobacco Former User   Tobacco Comment    quit 8/2017 per [employer]          Meds/Allergies     Current Outpatient Medications:     acetaminophen (TYLENOL) 325 mg tablet, Take 2 tablets (650 mg total) by mouth every 4 (four) hours as needed for mild pain, Disp: 30 tablet, Rfl: 0    albuterol (2 5 mg/3 mL) 0 083 % nebulizer solution, Take 2 5 mg by nebulization every 4 (four) hours as needed for wheezing or shortness of breath, Disp: , Rfl:     albuterol (ProAir HFA) 90 mcg/act inhaler, Inhale 2 puffs every 6 (six) hours as needed for wheezing, Disp: 8 5 g, Rfl: 3    Catheters (Gizmo Condom Catheter) MISC, Use daily at bedtime, Disp: 100 each, Rfl: 5    cyclobenzaprine (FLEXERIL) 10 mg tablet, Take 1 tablet (10 mg total) by mouth 3 (three) times a day as needed for muscle spasms, Disp: 30 tablet, Rfl: 1    cyclobenzaprine (FLEXERIL) 10 mg tablet, Take 1 tablet (10 mg total) by mouth 3 (three) times a day as needed for muscle spasms, Disp: 30 tablet, Rfl: 2    escitalopram (LEXAPRO) 10 mg tablet, Take 1 tablet (10 mg total) by mouth daily, Disp: 90 tablet, Rfl: 0    famotidine (PEPCID) 40 MG tablet, Take 1 tablet (40 mg total) by mouth daily, Disp: 90 tablet, Rfl: 1    gabapentin (NEURONTIN) 100 mg capsule, Take 1 capsule (100 mg total) by mouth 2 (two) times a day, Disp: 180 capsule, Rfl: 1    Incontinence Supply Disposable (PadSORBer Bed Pan Liners) MISC, Use daily at bedtime, Disp: 50 each, Rfl: 10    ipratropium (ATROVENT) 0 02 % nebulizer solution, Take 0 5 mg by nebulization 4 (four) times a day, Disp: , Rfl:     ipratropium-albuterol (DUO-NEB) 0 5-2 5 mg/3 mL nebulizer solution, TAKE 3 ML BY NEBULIZATION EVERY 8 (EIGHT) HOURS AS NEEDED FOR WHEEZING OR SHORTNESS OF BREATH, Disp: 360 mL, Rfl: 0    NON FORMULARY, Take 1,000 mg by mouth 2 (two) times a day Med is MSM Pure, pt brought in from home, Disp: , Rfl:     ondansetron (ZOFRAN) 4 mg tablet, Take 1 tablet (4 mg total) by mouth every 6 (six) hours, Disp: 12 tablet, Rfl: 0    QUEtiapine (SEROquel) 25 mg tablet, Take 1 tablet (25 mg total) by mouth daily at bedtime, Disp: 90 tablet, Rfl: 1    tamsulosin (FLOMAX) 0 4 mg, Take 1 capsule (0 4 mg total) by mouth daily with dinner, Disp: 30 capsule, Rfl: 0    arformoterol (BROVANA) 15 mcg/2 mL nebulizer solution, Take 2 mL (15 mcg total) by nebulization 2 (two) times a day, Disp: 120 mL, Rfl: 5    aspirin (ECOTRIN) 325 mg EC tablet, Take 1 tablet (325 mg total) by mouth 2 (two) times a day, Disp: 60 tablet, Rfl: 0    budesonide (Pulmicort) 0 5 mg/2 mL nebulizer solution, Take 2 mL (0 5 mg total) by nebulization 2 (two) times a day Rinse mouth after use , Disp: 120 mL, Rfl: 5    omeprazole (PriLOSEC) 20 mg delayed release capsule, , Disp: , Rfl:   Allergies   Allergen Reactions    Bactrim [Sulfamethoxazole-Trimethoprim] GI Intolerance    Aspirin Other (See Comments)     Hx stomach ulcer       Vitals: Blood pressure 118/78, pulse 91, temperature 97 8 °F (36 6 °C), height 5' 8" (1 727 m), weight 82 1 kg (181 lb), SpO2 98 %  Body mass index is 27 52 kg/m²   Oxygen Therapy  SpO2: 98 %  Oxygen Therapy: None (Room air)    Physical Exam   Physical Exam  Constitutional:       General: He is not in acute distress  HENT:      Head: Normocephalic  Mouth/Throat:      Pharynx: No oropharyngeal exudate  Eyes:      General: No scleral icterus  Pupils: Pupils are equal, round, and reactive to light  Neck:      Vascular: No JVD  Cardiovascular:      Rate and Rhythm: Normal rate and regular rhythm  Pulmonary:      Breath sounds: Rhonchi present  No wheezing or rales  Abdominal:      Palpations: Abdomen is soft  Tenderness: There is no abdominal tenderness  Musculoskeletal:      Cervical back: Neck supple  Lymphadenopathy:      Cervical: No cervical adenopathy  Skin:     General: Skin is warm and dry  Neurological:      Mental Status: He is alert and oriented to person, place, and time  Psychiatric:         Mood and Affect: Mood normal          Labs: I have personally reviewed pertinent lab results  Lab Results   Component Value Date    WBC 12 08 (H) 02/03/2021    HGB 12 5 02/03/2021    HCT 38 9 02/03/2021     (H) 02/03/2021     02/03/2021     Lab Results   Component Value Date    CALCIUM 9 3 02/03/2021     03/13/2017    K 5 1 02/03/2021    CO2 32 02/03/2021     02/03/2021    BUN 18 02/03/2021    CREATININE 1 05 02/03/2021     No results found for: IGE  Lab Results   Component Value Date    ALT 16 02/03/2021    AST 36 02/03/2021    ALKPHOS 92 02/03/2021    BILITOT 0 6 03/13/2017       Pulmonary function testing:  Performed 5/8/19 and personally interpreted  FEV1/FVC ratio 49%   FEV1 75% predicted  % predicted  (+) response to bronchodilators  DLCO corrected for hemoglobin 64 % predicted  Spirometry with moderate obstruction  There is improvement with bronchodilators    Reduced diffusion capacity

## 2022-01-31 NOTE — ASSESSMENT & PLAN NOTE
Patient will be having inguinal hernia repair, date to be determined  From pulmonary perspective, he is at moderate risk of developing perioperative pulmonary complications, including but not limited to, COPD exacerbation, respiratory failure requiring mechanical ventilator support, pneumonia and atelectasis  These risks are not prohibitive in the patient may proceed with planned surgery  We will be available if there are any pulmonary issues that arise perioperatively

## 2022-01-31 NOTE — ASSESSMENT & PLAN NOTE
Patient has COPD, without evidence of exacerbation  He finds Breo Ellipta to be beneficial, but is too costly and we do not have sufficient samples to keep him supplied  I would like to try to transition to budesonide and Brovana twice daily in place of the [facility]

## 2022-02-01 LAB
ATRIAL RATE: 87 BPM
PR INTERVAL: 200 MS
QRS AXIS: 48 DEGREES
QRSD INTERVAL: 96 MS
QT INTERVAL: 382 MS
QTC INTERVAL: 459 MS
T WAVE AXIS: 30 DEGREES
VENTRICULAR RATE: 87 BPM

## 2022-02-01 PROCEDURE — 93010 ELECTROCARDIOGRAM REPORT: CPT | Performed by: INTERNAL MEDICINE

## 2022-02-02 NOTE — PROGRESS NOTES
Assessment/Plan:   Andrea Moser is a 79 y o male who is here for Groin Pain (New patient consult for evaluation of possible new LI[facility]  Referred by family friend  Not evaluated by his PCP  Notesting done  States pain is a "6" at times in left groin area  Also notes a bulge at times  No n/v/f/c  Urinating and moving bowels normally  History of RIH x2 in the past )    Plan: Left inguinal hernia/COPD - discussed conservative vs operative mgt, surgical approaches, risks and benefits  Pt has significant COPD and I would like him evaluated by pulmonary prior to scheduling surgery for clearance and optimization  Preoperative Clearance: None    HPI:  Andrea Moser is a 79 y o male who was referred for evaluation of Groin Pain (New patient consult for evaluation of possible new LI[facility]  Referred by family friend  Not evaluated by his PCP  Notesting done  States pain is a "6" at times in left groin area  Also notes a bulge at times  No n/v/f/c  Urinating and moving bowels normally  History of RIH x2 in the past )    Currently having left groin bulge and pain       ROS:  General ROS: negative  negative for - chills, fatigue, fever or night sweats, weight loss  Respiratory ROS: no cough, shortness of breath, or wheezing  Cardiovascular ROS: no chest pain or dyspnea on exertion  Genito-Urinary ROS: no dysuria, trouble voiding, or hematuria  Musculoskeletal ROS: negative for - gait disturbance, joint pain or muscle pain  Neurological ROS: no TIA or stroke symptoms  Left groin pain and bulge    [unfilled]  Bactrim [sulfamethoxazole-trimethoprim] and Aspirin    Current Outpatient Medications:     acetaminophen (TYLENOL) 325 mg tablet, Take 2 tablets (650 mg total) by mouth every 4 (four) hours as needed for mild pain, Disp: 30 tablet, Rfl: 0    albuterol (2 5 mg/3 mL) 0 083 % nebulizer solution, Take 2 5 mg by nebulization every 4 (four) hours as needed for wheezing or shortness of breath, Disp: , Rfl:     albuterol (ProAir HFA) 90 mcg/act inhaler, Inhale 2 puffs every 6 (six) hours as needed for wheezing, Disp: 8 5 g, Rfl: 3    Catheters (Gizmo Condom Catheter) MISC, Use daily at bedtime, Disp: 100 each, Rfl: 5    cyclobenzaprine (FLEXERIL) 10 mg tablet, Take 1 tablet (10 mg total) by mouth 3 (three) times a day as needed for muscle spasms, Disp: 30 tablet, Rfl: 1    cyclobenzaprine (FLEXERIL) 10 mg tablet, Take 1 tablet (10 mg total) by mouth 3 (three) times a day as needed for muscle spasms, Disp: 30 tablet, Rfl: 2    escitalopram (LEXAPRO) 10 mg tablet, Take 1 tablet (10 mg total) by mouth daily, Disp: 90 tablet, Rfl: 0    famotidine (PEPCID) 40 MG tablet, Take 1 tablet (40 mg total) by mouth daily, Disp: 90 tablet, Rfl: 1    gabapentin (NEURONTIN) 100 mg capsule, Take 1 capsule (100 mg total) by mouth 2 (two) times a day, Disp: 180 capsule, Rfl: 1    ipratropium (ATROVENT) 0 02 % nebulizer solution, Take 0 5 mg by nebulization 4 (four) times a day, Disp: , Rfl:     NON FORMULARY, Take 1,000 mg by mouth 2 (two) times a day Med is MSM Pure, pt brought in from home, Disp: , Rfl:     ondansetron (ZOFRAN) 4 mg tablet, Take 1 tablet (4 mg total) by mouth every 6 (six) hours, Disp: 12 tablet, Rfl: 0    QUEtiapine (SEROquel) 25 mg tablet, Take 1 tablet (25 mg total) by mouth daily at bedtime, Disp: 90 tablet, Rfl: 1    arformoterol (BROVANA) 15 mcg/2 mL nebulizer solution, Take 2 mL (15 mcg total) by nebulization 2 (two) times a day, Disp: 120 mL, Rfl: 5    aspirin (ECOTRIN) 325 mg EC tablet, Take 1 tablet (325 mg total) by mouth 2 (two) times a day, Disp: 60 tablet, Rfl: 0    budesonide (Pulmicort) 0 5 mg/2 mL nebulizer solution, Take 2 mL (0 5 mg total) by nebulization 2 (two) times a day Rinse mouth after use , Disp: 120 mL, Rfl: 5    Incontinence Supply Disposable (PadSORBer Bed Pan Liners) MISC, Use daily at bedtime, Disp: 50 each, Rfl: 10    ipratropium-albuterol (DUO-NEB) 0 5-2 5 mg/3 mL nebulizer solution, TAKE 3 ML BY NEBULIZATION EVERY 8 (EIGHT) HOURS AS NEEDED FOR WHEEZING OR SHORTNESS OF BREATH, Disp: 360 mL, Rfl: 0    omeprazole (PriLOSEC) 20 mg delayed release capsule, , Disp: , Rfl:     tamsulosin (FLOMAX) 0 4 mg, Take 1 capsule (0 4 mg total) by mouth daily with dinner, Disp: 30 capsule, Rfl: 0  Past Medical History:   Diagnosis Date    Acute blood loss anemia 12/6/2020    Anxiety     Bladder infection     current tx with cipro 5/29/16    BPH (benign prostatic hyperplasia)     Community acquired pneumonia     last assessed 8/28/17, resolved 9/25/17    COPD (chronic obstructive pulmonary disease) ([facility] [address] )     Dysphagia 6/8/2020    Enlarged prostate     GERD (gastroesophageal reflux disease)     History of colonoscopy 03/19/2015    POLYP IN THE ASCENDING COLON-BM-BRITTNEY MCKEON    Hx of bladder infections     Inguinal hernia, right 05/10/2019    Multiple rib fractures 6/3/2020    Neck pain     Psychiatric disorder     depression    Pulmonary nodule 01/08/2019    STABLE 6MM LEFT APICAL NODULE    Respiratory failure with hypoxia ([facility] [address] ) 01/08/2019    Urinary retention      Past Surgical History:   Procedure Laterality Date    BLADDER SURGERY  08/15/2019    UROLIFT    COLOSTOMY  02/03/2011    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    INGUINAL HERNIA REPAIR Right 05/10/2019    DONE AT [facility]    LAPAROSCOPIC COLON RESECTION  02/03/2011    ABDOMIANOPERITONEAL RESECTION FOR RECTAL MASS  DONE BY RADHA MCDUFFIE    HI OPEN RX FEMUR FX+INTRAMED DEMETRICE Left 12/7/2020    Procedure: Left Hip TFN;  Surgeon: Felicia Esquivel MD;  Location:  MAIN OR;  Service: Orthopedics     Family History   Problem Relation Age of Onset    Lung cancer Mother     Cancer Mother     Cancer Father     Alcohol abuse Father     Mental illness Neg Hx       reports that he quit smoking about 3 years ago  His smoking use included cigarettes  He started smoking about 63 years ago  He has a 25 00 pack-year smoking history   He has quit using smokeless tobacco  He reports previous alcohol use  He reports that he does not use drugs  Labs:   Lab Results   Component Value Date    WBC 12 08 (H) 02/03/2021    WBC 11 47 (H) 01/11/2021    WBC 8 40 12/12/2020    HGB 12 5 02/03/2021    HGB 11 0 (L) 01/11/2021    HGB 8 1 (L) 12/12/2020     02/03/2021     01/11/2021     12/12/2020     Lab Results   Component Value Date    ALT 16 02/03/2021    ALT 16 01/11/2021    ALT 20 12/08/2020    ALT 11 08/06/2018    ALT 9 03/13/2017    ALT 10 02/22/2016    AST 36 02/03/2021    AST 14 01/11/2021    AST 17 12/08/2020    AST 17 08/06/2018    AST 16 03/13/2017    AST 15 02/22/2016     This SmartLink has not been configured with any valid records  PHYSICAL EXAM  General Appearance:    Alert, cooperative, no distress,    Head:    Normocephalic without obvious abnormality   Eyes:    PERRL, conjunctiva/corneas clear, EOM's intact        Neck:   Supple, no adenopathy, no JVD   Back:     Symmetric, no spinal or CVA tenderness   Lungs:     Clear to auscultation bilaterally, no wheezing or rhonchi   Heart:    Regular rate and rhythm, S1 and S2 normal, no murmur   Abdomen:     Soft +BS ND TTP in left groin and +LIH   Extremities:   Extremities normal  No clubbing, cyanosis or edema   Psych:   Normal Affect, AOx3  Neurologic:  Skin:   CNII-XII intact  Strength symmetric, speech intact    Warm, dry, intact, no visible rashes or lesions         Physical Exam              Some portions of this record may have been generated with voice recognition software  There may be translation, syntax,  or grammatical errors  Occasional wrong word or "sound-a-like" substitutions may have occurred due to the inherent limitations of the voice recognition software  Read the chart carefully and recognize, using context, where substitutions may have occurred   If you have any questions, please contact the dictating provider for clarification or correction, as needed  This encounter has been coded by a non-certified coder

## 2022-02-20 DIAGNOSIS — J44.9 COPD, MODERATE (HCC): ICD-10-CM

## 2022-02-20 RX ORDER — IPRATROPIUM BROMIDE AND ALBUTEROL SULFATE 2.5; .5 MG/3ML; MG/3ML
3 SOLUTION RESPIRATORY (INHALATION) EVERY 8 HOURS PRN
Qty: 360 ML | Refills: 0 | Status: SHIPPED | OUTPATIENT
Start: 2022-02-20 | End: 2022-04-23

## 2022-03-10 ENCOUNTER — TELEPHONE (OUTPATIENT)
Dept: FAMILY MEDICINE CLINIC | Facility: CLINIC | Age: 82
End: 2022-03-10

## 2022-03-10 DIAGNOSIS — R39.9 UTI SYMPTOMS: Primary | ICD-10-CM

## 2022-03-10 RX ORDER — CIPROFLOXACIN 250 MG/1
250 TABLET, FILM COATED ORAL EVERY 12 HOURS SCHEDULED
Qty: 20 TABLET | Refills: 0 | Status: SHIPPED | OUTPATIENT
Start: 2022-03-10 | End: 2022-03-20

## 2022-03-10 NOTE — TELEPHONE ENCOUNTER
Wife called- pt c/o burning urination, feels lousy, frequency urination  Began last night  No fever, no abdominal pain, no back pain, no nausea, no vomiting  No allergies to medications  Does not want an appointment  PM to review      [facility]

## 2022-03-17 ENCOUNTER — OFFICE VISIT (OUTPATIENT)
Dept: SURGERY | Facility: HOSPITAL | Age: 82
End: 2022-03-17
Payer: MEDICARE

## 2022-03-17 VITALS — BODY MASS INDEX: 27.16 KG/M2 | TEMPERATURE: 97.3 F | HEIGHT: 68 IN | WEIGHT: 179.2 LBS | RESPIRATION RATE: 20 BRPM

## 2022-03-17 DIAGNOSIS — E89.1 POSTPROCEDURAL HYPOINSULINEMIA: ICD-10-CM

## 2022-03-17 DIAGNOSIS — K40.90 NON-RECURRENT UNILATERAL INGUINAL HERNIA WITHOUT OBSTRUCTION OR GANGRENE: Primary | ICD-10-CM

## 2022-03-17 DIAGNOSIS — M62.82 RHABDOMYOLYSIS: ICD-10-CM

## 2022-03-17 PROCEDURE — 99213 OFFICE O/P EST LOW 20 MIN: CPT | Performed by: SURGERY

## 2022-03-22 ENCOUNTER — TELEPHONE (OUTPATIENT)
Dept: FAMILY MEDICINE CLINIC | Facility: CLINIC | Age: 82
End: 2022-03-22

## 2022-03-22 DIAGNOSIS — R39.9 UTI SYMPTOMS: Primary | ICD-10-CM

## 2022-03-22 RX ORDER — CIPROFLOXACIN 250 MG/1
250 TABLET, FILM COATED ORAL EVERY 12 HOURS SCHEDULED
Qty: 14 TABLET | Refills: 0 | Status: SHIPPED | OUTPATIENT
Start: 2022-03-22 | End: 2022-03-29

## 2022-03-22 NOTE — PROGRESS NOTES
Patient have symptoms is previous indicated urinary infection  Will treat empirically Cipro 250 twice daily for 7 days  Family aware

## 2022-03-22 NOTE — TELEPHONE ENCOUNTER
Pt's wife called to report Pt's recurring pain and difficulty with urination       Pt's wife reports another "infection of the bladder "    Pt's wife requests that antibiotic (ciprofloxacin (CIPRO) 250 mg tablet)     be refilled at:    [facility]   [phone]

## 2022-03-23 ENCOUNTER — LAB (OUTPATIENT)
Dept: LAB | Facility: CLINIC | Age: 82
End: 2022-03-23
Payer: MEDICARE

## 2022-03-23 ENCOUNTER — APPOINTMENT (OUTPATIENT)
Dept: LAB | Facility: CLINIC | Age: 82
End: 2022-03-23
Payer: MEDICARE

## 2022-03-23 ENCOUNTER — APPOINTMENT (OUTPATIENT)
Dept: RADIOLOGY | Facility: CLINIC | Age: 82
End: 2022-03-23
Payer: MEDICARE

## 2022-03-23 DIAGNOSIS — K40.90 NON-RECURRENT UNILATERAL INGUINAL HERNIA WITHOUT OBSTRUCTION OR GANGRENE: ICD-10-CM

## 2022-03-23 DIAGNOSIS — E89.1 POSTPROCEDURAL HYPOINSULINEMIA: ICD-10-CM

## 2022-03-23 LAB
ANION GAP SERPL CALCULATED.3IONS-SCNC: 3 MMOL/L (ref 4–13)
BASOPHILS # BLD AUTO: 0.04 THOUSANDS/ΜL (ref 0–0.1)
BASOPHILS NFR BLD AUTO: 0 % (ref 0–1)
BILIRUB UR QL STRIP: NEGATIVE
BUN SERPL-MCNC: 19 MG/DL (ref 5–25)
CALCIUM SERPL-MCNC: 9.3 MG/DL (ref 8.3–10.1)
CHLORIDE SERPL-SCNC: 106 MMOL/L (ref 100–108)
CLARITY UR: CLEAR
CO2 SERPL-SCNC: 30 MMOL/L (ref 21–32)
COLOR UR: NORMAL
CREAT SERPL-MCNC: 1.16 MG/DL (ref 0.6–1.3)
EOSINOPHIL # BLD AUTO: 0.3 THOUSAND/ΜL (ref 0–0.61)
EOSINOPHIL NFR BLD AUTO: 3 % (ref 0–6)
ERYTHROCYTE [DISTWIDTH] IN BLOOD BY AUTOMATED COUNT: 12.9 % (ref 11.6–15.1)
EST. AVERAGE GLUCOSE BLD GHB EST-MCNC: 111 MG/DL
GFR SERPL CREATININE-BSD FRML MDRD: 58 ML/MIN/1.73SQ M
GLUCOSE SERPL-MCNC: 83 MG/DL (ref 65–140)
GLUCOSE UR STRIP-MCNC: NEGATIVE MG/DL
HBA1C MFR BLD: 5.5 %
HCT VFR BLD AUTO: 42.8 % (ref 36.5–49.3)
HGB BLD-MCNC: 13.5 G/DL (ref 12–17)
HGB UR QL STRIP.AUTO: NEGATIVE
IMM GRANULOCYTES # BLD AUTO: 0.04 THOUSAND/UL (ref 0–0.2)
IMM GRANULOCYTES NFR BLD AUTO: 0 % (ref 0–2)
KETONES UR STRIP-MCNC: NEGATIVE MG/DL
LEUKOCYTE ESTERASE UR QL STRIP: NEGATIVE
LYMPHOCYTES # BLD AUTO: 1.89 THOUSANDS/ΜL (ref 0.6–4.47)
LYMPHOCYTES NFR BLD AUTO: 17 % (ref 14–44)
MCH RBC QN AUTO: 32.7 PG (ref 26.8–34.3)
MCHC RBC AUTO-ENTMCNC: 31.5 G/DL (ref 31.4–37.4)
MCV RBC AUTO: 104 FL (ref 82–98)
MONOCYTES # BLD AUTO: 0.68 THOUSAND/ΜL (ref 0.17–1.22)
MONOCYTES NFR BLD AUTO: 6 % (ref 4–12)
NEUTROPHILS # BLD AUTO: 8.48 THOUSANDS/ΜL (ref 1.85–7.62)
NEUTS SEG NFR BLD AUTO: 74 % (ref 43–75)
NITRITE UR QL STRIP: NEGATIVE
NRBC BLD AUTO-RTO: 0 /100 WBCS
PH UR STRIP.AUTO: 5.5 [PH]
PLATELET # BLD AUTO: 217 THOUSANDS/UL (ref 149–390)
PMV BLD AUTO: 9.5 FL (ref 8.9–12.7)
POTASSIUM SERPL-SCNC: 4.9 MMOL/L (ref 3.5–5.3)
PROT UR STRIP-MCNC: NEGATIVE MG/DL
RBC # BLD AUTO: 4.13 MILLION/UL (ref 3.88–5.62)
SODIUM SERPL-SCNC: 139 MMOL/L (ref 136–145)
SP GR UR STRIP.AUTO: 1.01 (ref 1–1.03)
UROBILINOGEN UR STRIP-ACNC: <2 MG/DL
WBC # BLD AUTO: 11.43 THOUSAND/UL (ref 4.31–10.16)

## 2022-03-23 PROCEDURE — 36415 COLL VENOUS BLD VENIPUNCTURE: CPT

## 2022-03-23 PROCEDURE — 81003 URINALYSIS AUTO W/O SCOPE: CPT | Performed by: SURGERY

## 2022-03-23 PROCEDURE — 85025 COMPLETE CBC W/AUTO DIFF WBC: CPT

## 2022-03-23 PROCEDURE — 80048 BASIC METABOLIC PNL TOTAL CA: CPT

## 2022-03-23 PROCEDURE — 71046 X-RAY EXAM CHEST 2 VIEWS: CPT

## 2022-03-23 PROCEDURE — 83036 HEMOGLOBIN GLYCOSYLATED A1C: CPT

## 2022-03-23 PROCEDURE — 93005 ELECTROCARDIOGRAM TRACING: CPT

## 2022-03-23 RX ORDER — SODIUM CHLORIDE, SODIUM LACTATE, POTASSIUM CHLORIDE, CALCIUM CHLORIDE 600; 310; 30; 20 MG/100ML; MG/100ML; MG/100ML; MG/100ML
125 INJECTION, SOLUTION INTRAVENOUS CONTINUOUS
Status: CANCELLED | OUTPATIENT
Start: 2022-04-05

## 2022-03-23 RX ORDER — CEFAZOLIN SODIUM 1 G/50ML
1000 SOLUTION INTRAVENOUS ONCE
Status: CANCELLED | OUTPATIENT
Start: 2022-04-05 | End: 2022-04-05

## 2022-03-23 NOTE — PROGRESS NOTES
Assessment/Plan:   Andrea Moser is a 79 y o male who is here for Inguinal Hernia ([facility]  Established patient cleared by Pulmonary for hernia surgery  Returns today to schedule repair  Still has discomfort of a "6" in left groin )    Plan: Left inguinal hernia -  discussed operative vs conservative mgt, surgical approaches, risks and benefits and patient and family has decided to proceed with open left inguinal hernia repair and understand that patient is high risk given his multiple comorbities  I will schedule at their earliest convenience  Preoperative Clearance: cleared per pulm    HPI:  Andrea Moser is a 79 y o male who was referred for evaluation of Inguinal Hernia ([facility]  Established patient cleared by Pulmonary for hernia surgery  Returns today to schedule repair  Still has discomfort of a "6" in left groin )    Currently having left groin pain       ROS:  General ROS: negative  negative for - chills, fatigue, fever or night sweats, weight loss  Respiratory ROS: no cough, shortness of breath, or wheezing  Cardiovascular ROS: no chest pain or dyspnea on exertion  Genito-Urinary ROS: no dysuria, trouble voiding, or hematuria  Musculoskeletal ROS: negative for - gait disturbance, joint pain or muscle pain  Neurological ROS: no TIA or stroke symptoms  Left groin pain and bulge    [unfilled]  Bactrim [sulfamethoxazole-trimethoprim] and Aspirin    Current Outpatient Medications:     acetaminophen (TYLENOL) 325 mg tablet, Take 2 tablets (650 mg total) by mouth every 4 (four) hours as needed for mild pain, Disp: 30 tablet, Rfl: 0    albuterol (2 5 mg/3 mL) 0 083 % nebulizer solution, Take 2 5 mg by nebulization every 4 (four) hours as needed for wheezing or shortness of breath, Disp: , Rfl:     albuterol (ProAir HFA) 90 mcg/act inhaler, Inhale 2 puffs every 6 (six) hours as needed for wheezing, Disp: 8 5 g, Rfl: 3    arformoterol (BROVANA) 15 mcg/2 mL nebulizer solution, Take 2 mL (15 mcg total) by nebulization 2 (two) times a day, Disp: 120 mL, Rfl: 5    aspirin (ECOTRIN) 325 mg EC tablet, Take 1 tablet (325 mg total) by mouth 2 (two) times a day, Disp: 60 tablet, Rfl: 0    budesonide (Pulmicort) 0 5 mg/2 mL nebulizer solution, Take 2 mL (0 5 mg total) by nebulization 2 (two) times a day Rinse mouth after use , Disp: 120 mL, Rfl: 5    Catheters (Gizmo Condom Catheter) MISC, Use daily at bedtime, Disp: 100 each, Rfl: 5    ciprofloxacin (CIPRO) 250 mg tablet, Take 1 tablet (250 mg total) by mouth every 12 (twelve) hours for 7 days, Disp: 14 tablet, Rfl: 0    cyclobenzaprine (FLEXERIL) 10 mg tablet, Take 1 tablet (10 mg total) by mouth 3 (three) times a day as needed for muscle spasms, Disp: 30 tablet, Rfl: 1    cyclobenzaprine (FLEXERIL) 10 mg tablet, Take 1 tablet (10 mg total) by mouth 3 (three) times a day as needed for muscle spasms, Disp: 30 tablet, Rfl: 2    escitalopram (LEXAPRO) 10 mg tablet, Take 1 tablet (10 mg total) by mouth daily, Disp: 90 tablet, Rfl: 0    famotidine (PEPCID) 40 MG tablet, Take 1 tablet (40 mg total) by mouth daily, Disp: 90 tablet, Rfl: 1    gabapentin (NEURONTIN) 100 mg capsule, Take 1 capsule (100 mg total) by mouth 2 (two) times a day, Disp: 180 capsule, Rfl: 1    Incontinence Supply Disposable (PadSORBer Bed Pan Liners) MISC, Use daily at bedtime, Disp: 50 each, Rfl: 10    ipratropium (ATROVENT) 0 02 % nebulizer solution, Take 0 5 mg by nebulization 4 (four) times a day, Disp: , Rfl:     ipratropium-albuterol (DUO-NEB) 0 5-2 5 mg/3 mL nebulizer solution, TAKE 3 ML BY NEBULIZATION EVERY 8 (EIGHT) HOURS AS NEEDED FOR WHEEZING OR SHORTNESS OF BREATH, Disp: 360 mL, Rfl: 0    NON FORMULARY, Take 1,000 mg by mouth 2 (two) times a day Med is MSM Pure, pt brought in from home, Disp: , Rfl:     omeprazole (PriLOSEC) 20 mg delayed release capsule, , Disp: , Rfl:     ondansetron (ZOFRAN) 4 mg tablet, Take 1 tablet (4 mg total) by mouth every 6 (six) hours, Disp: 12 tablet, Rfl: 0    QUEtiapine (SEROquel) 25 mg tablet, Take 1 tablet (25 mg total) by mouth daily at bedtime, Disp: 90 tablet, Rfl: 1    tamsulosin (FLOMAX) 0 4 mg, TAKE 1 CAPSULE BY MOUTH EVERY DAY WITH DINNER, Disp: 30 capsule, Rfl: 5  Past Medical History:   Diagnosis Date    Acute blood loss anemia 12/6/2020    Anxiety     Bladder infection     current tx with cipro 5/29/16    BPH (benign prostatic hyperplasia)     Community acquired pneumonia     last assessed 8/28/17, resolved 9/25/17    COPD [facility]ch[facility] obstructive [facility]) ([facility] [address] [facility]     Dysphagia 6/8/2020    Enlarged prostate     GERD (gastroesophageal reflux disease)     History of colonoscopy 03/19/2015    POLYP IN THE ASCENDING COLON-BM-[facility]ITTNEY [facility]KEON    Hx of bladder infections     Inguinal hernia, right 05/10/2019    Multiple rib fractures 6/3/2020    Neck pain     Psychiatric disorder     depression    Pulmonary nodule 01/08/2019    STABLE 6MM LEFT APICAL NODULE    Respiratory failure with hypoxia ([facility] [address] ) 01/08/2019    Urinary retention      Past Surgical History:   Procedure Laterality Date    BLADDER SURGERY  08/15/2019    UROLIFT    COLOSTOMY  02/03/2011    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    INGUINAL HERNIA REPAIR Right 05/10/2019    [facility]ONE [facility]    LAPAROSCOPIC COLON RESECTION  02/03/2011    ABDOMIANOPERITONEAL RESECTION FOR RECTAL MASS  DONE BY RADHA MCDUFFIE    NV OPEN RX FEMUR FX+INTRAMED DEMETRICE Left 12/7/2020    Procedure: Left Hip TFN;  Surgeon: Ynes Shanks MD;  Location:  MAIN OR;  Service: Orthopedics     Family History   Problem Relation Age of Onset    Lung cancer Mother     Cancer Mother     Cancer Father     Alcohol abuse Father     Mental illness Neg Hx       reports that he quit smoking about 4 years ago  His smoking use included cigarettes  He started smoking about 63 years ago  He has a 25 00 pack-year smoking history   He has quit using smokeless tobacco  He reports previous alcohol use  He reports that he does not use drugs  Labs:   Lab Results   Component Value Date    WBC 12 08 (H) 02/03/2021    WBC 11 47 (H) 01/11/2021    WBC 8 40 12/12/2020    HGB 12 5 02/03/2021    HGB 11 0 (L) 01/11/2021    HGB 8 1 (L) 12/12/2020     02/03/2021     01/11/2021     12/12/2020     Lab Results   Component Value Date    ALT 16 02/03/2021    ALT 16 01/11/2021    ALT 20 12/08/2020    ALT 11 08/06/2018    ALT 9 03/13/2017    ALT 10 02/22/2016    AST 36 02/03/2021    AST 14 01/11/2021    AST 17 12/08/2020    AST 17 08/06/2018    AST 16 03/13/2017    AST 15 02/22/2016     This SmartLink has not been configured with any valid records  PHYSICAL EXAM  General Appearance:    Alert, cooperative, no distress,    Head:    Normocephalic without obvious abnormality   Eyes:    PERRL, conjunctiva/corneas clear, EOM's intact        Neck:   Supple, no adenopathy, no JVD   Back:     Symmetric, no spinal or CVA tenderness   Lungs:     Clear to auscultation bilaterally, no wheezing or rhonchi   Heart:    Regular rate and rhythm, S1 and S2 normal, no murmur   Abdomen:     Soft +BS ND TTP in left groin +LIH   Extremities:   Extremities normal  No clubbing, cyanosis or edema   Psych:   Normal Affect, AOx3  Neurologic:  Skin:   CNII-XII intact  Strength symmetric, speech intact    Warm, dry, intact, no visible rashes or lesions         Physical Exam              Some portions of this record may have been generated with voice recognition software  There may be translation, syntax,  or grammatical errors  Occasional wrong word or "sound-a-like" substitutions may have occurred due to the inherent limitations of the voice recognition software  Read the chart carefully and recognize, using context, where substitutions may have occurred  If you have any questions, please contact the dictating provider for clarification or correction, as needed  This encounter has been coded by a non-certified coder

## 2022-03-23 NOTE — H&P (VIEW-ONLY)
Assessment/Plan:   Robe Harris is a 79 y o male who is here for Inguinal Hernia ([facility]  Established patient cleared by Pulmonary for hernia surgery  Returns today to schedule repair  Still has discomfort of a "6" in left groin )    Plan: Left inguinal hernia -  discussed operative vs conservative mgt, surgical approaches, risks and benefits and patient and family has decided to proceed with open left inguinal hernia repair and understand that patient is high risk given his multiple comorbities  I will schedule at their earliest convenience  Preoperative Clearance: cleared per pulm    HPI:  Robe Harris is a 79 y o male who was referred for evaluation of Inguinal Hernia ([facility]  Established patient cleared by Pulmonary for hernia surgery  Returns today to schedule repair  Still has discomfort of a "6" in left groin )    Currently having left groin pain       ROS:  General ROS: negative  negative for - chills, fatigue, fever or night sweats, weight loss  Respiratory ROS: no cough, shortness of breath, or wheezing  Cardiovascular ROS: no chest pain or dyspnea on exertion  Genito-Urinary ROS: no dysuria, trouble voiding, or hematuria  Musculoskeletal ROS: negative for - gait disturbance, joint pain or muscle pain  Neurological ROS: no TIA or stroke symptoms  Left groin pain and bulge    [unfilled]  Bactrim [sulfamethoxazole-trimethoprim] and Aspirin    Current Outpatient Medications:     acetaminophen (TYLENOL) 325 mg tablet, Take 2 tablets (650 mg total) by mouth every 4 (four) hours as needed for mild pain, Disp: 30 tablet, Rfl: 0    albuterol (2 5 mg/3 mL) 0 083 % nebulizer solution, Take 2 5 mg by nebulization every 4 (four) hours as needed for wheezing or shortness of breath, Disp: , Rfl:     albuterol (ProAir HFA) 90 mcg/act inhaler, Inhale 2 puffs every 6 (six) hours as needed for wheezing, Disp: 8 5 g, Rfl: 3    arformoterol (BROVANA) 15 mcg/2 mL nebulizer solution, Take 2 mL (15 mcg total) by nebulization 2 (two) times a day, Disp: 120 mL, Rfl: 5    aspirin (ECOTRIN) 325 mg EC tablet, Take 1 tablet (325 mg total) by mouth 2 (two) times a day, Disp: 60 tablet, Rfl: 0    budesonide (Pulmicort) 0 5 mg/2 mL nebulizer solution, Take 2 mL (0 5 mg total) by nebulization 2 (two) times a day Rinse mouth after use , Disp: 120 mL, Rfl: 5    Catheters (Gizmo Condom Catheter) MISC, Use daily at bedtime, Disp: 100 each, Rfl: 5    ciprofloxacin (CIPRO) 250 mg tablet, Take 1 tablet (250 mg total) by mouth every 12 (twelve) hours for 7 days, Disp: 14 tablet, Rfl: 0    cyclobenzaprine (FLEXERIL) 10 mg tablet, Take 1 tablet (10 mg total) by mouth 3 (three) times a day as needed for muscle spasms, Disp: 30 tablet, Rfl: 1    cyclobenzaprine (FLEXERIL) 10 mg tablet, Take 1 tablet (10 mg total) by mouth 3 (three) times a day as needed for muscle spasms, Disp: 30 tablet, Rfl: 2    escitalopram (LEXAPRO) 10 mg tablet, Take 1 tablet (10 mg total) by mouth daily, Disp: 90 tablet, Rfl: 0    famotidine (PEPCID) 40 MG tablet, Take 1 tablet (40 mg total) by mouth daily, Disp: 90 tablet, Rfl: 1    gabapentin (NEURONTIN) 100 mg capsule, Take 1 capsule (100 mg total) by mouth 2 (two) times a day, Disp: 180 capsule, Rfl: 1    Incontinence Supply Disposable (PadSORBer Bed Pan Liners) MISC, Use daily at bedtime, Disp: 50 each, Rfl: 10    ipratropium (ATROVENT) 0 02 % nebulizer solution, Take 0 5 mg by nebulization 4 (four) times a day, Disp: , Rfl:     ipratropium-albuterol (DUO-NEB) 0 5-2 5 mg/3 mL nebulizer solution, TAKE 3 ML BY NEBULIZATION EVERY 8 (EIGHT) HOURS AS NEEDED FOR WHEEZING OR SHORTNESS OF BREATH, Disp: 360 mL, Rfl: 0    NON FORMULARY, Take 1,000 mg by mouth 2 (two) times a day Med is MSM Pure, pt brought in from home, Disp: , Rfl:     omeprazole (PriLOSEC) 20 mg delayed release capsule, , Disp: , Rfl:     ondansetron (ZOFRAN) 4 mg tablet, Take 1 tablet (4 mg total) by mouth every 6 (six) hours, Disp: 12 tablet, Rfl: 0    QUEtiapine (SEROquel) 25 mg tablet, Take 1 tablet (25 mg total) by mouth daily at bedtime, Disp: 90 tablet, Rfl: 1    tamsulosin (FLOMAX) 0 4 mg, TAKE 1 CAPSULE BY MOUTH EVERY DAY WITH DINNER, Disp: 30 capsule, Rfl: 5  Past Medical History:   Diagnosis Date    Acute blood loss anemia 12/6/2020    Anxiety     Bladder infection     current tx with cipro 5/29/16    BPH (benign prostatic hyperplasia)     Community acquired pneumonia     last assessed 8/28/17, resolved 9/25/17    COPD (chronic obstructive pulmonary disease) ([facility] [address] )     Dysphagia 6/8/2020    Enlarged prostate     GERD (gastroesophageal reflux disease)     History of colonoscopy 03/19/2015    POLYP IN THE ASCENDING COLON-[facility]    Hx of bladder infections     Inguinal hernia, right 05/10/2019    Multiple rib fractures 6/3/2020    Neck pain     Psychiatric disorder     depression    Pulmonary nodule 01/08/2019    STABLE 6MM LEFT APICAL NODULE    Respiratory failure with hypoxia ([facility] [address] ) 01/08/2019    Urinary retention      Past Surgical History:   Procedure Laterality Date    BLADDER SURGERY  08/15/2019    UROLIFT    COLOSTOMY  02/03/2011    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    INGUINAL HERNIA REPAIR Right 05/10/2019    DONE AT [facility]    LAPAROSCOPIC COLON RESECTION  02/03/2011    ABDOMIANOPERITONEAL RESECTION FOR RECTAL MASS  DONE BY RADHA MCDUFFIE    TN OPEN RX FEMUR FX+INTRAMED DEMETRICE Left 12/7/2020    Procedure: Left Hip TFN;  Surgeon: Adina Long MD;  Location:  MAIN OR;  Service: Orthopedics     Family History   Problem Relation Age of Onset    Lung cancer Mother     Cancer Mother     Cancer Father     Alcohol abuse Father     Mental illness Neg Hx       reports that he quit smoking about 4 years ago  His smoking use included cigarettes  He started smoking about 63 years ago  He has a 25 00 pack-year smoking history   He has quit using smokeless tobacco  He reports previous alcohol use  He reports that he does not use drugs  Labs:   Lab Results   Component Value Date    WBC 12 08 (H) 02/03/2021    WBC 11 47 (H) 01/11/2021    WBC 8 40 12/12/2020    HGB 12 5 02/03/2021    HGB 11 0 (L) 01/11/2021    HGB 8 1 (L) 12/12/2020     02/03/2021     01/11/2021     12/12/2020     Lab Results   Component Value Date    ALT 16 02/03/2021    ALT 16 01/11/2021    ALT 20 12/08/2020    ALT 11 08/06/2018    ALT 9 03/13/2017    ALT 10 02/22/2016    AST 36 02/03/2021    AST 14 01/11/2021    AST 17 12/08/2020    AST 17 08/06/2018    AST 16 03/13/2017    AST 15 02/22/2016     This SmartLink has not been configured with any valid records  PHYSICAL EXAM  General Appearance:    Alert, cooperative, no distress,    Head:    Normocephalic without obvious abnormality   Eyes:    PERRL, conjunctiva/corneas clear, EOM's intact        Neck:   Supple, no adenopathy, no JVD   Back:     Symmetric, no spinal or CVA tenderness   Lungs:     Clear to auscultation bilaterally, no wheezing or rhonchi   Heart:    Regular rate and rhythm, S1 and S2 normal, no murmur   Abdomen:     Soft +BS ND TTP in left groin +LIH   Extremities:   Extremities normal  No clubbing, cyanosis or edema   Psych:   Normal Affect, AOx3  Neurologic:  Skin:   CNII-XII intact  Strength symmetric, speech intact    Warm, dry, intact, no visible rashes or lesions         Physical Exam              Some portions of this record may have been generated with voice recognition software  There may be translation, syntax,  or grammatical errors  Occasional wrong word or "sound-a-like" substitutions may have occurred due to the inherent limitations of the voice recognition software  Read the chart carefully and recognize, using context, where substitutions may have occurred  If you have any questions, please contact the dictating provider for clarification or correction, as needed  This encounter has been coded by a non-certified coder

## 2022-03-24 LAB
ATRIAL RATE: 76 BPM
P AXIS: 49 DEGREES
PR INTERVAL: 152 MS
QRS AXIS: 23 DEGREES
QRSD INTERVAL: 82 MS
QT INTERVAL: 386 MS
QTC INTERVAL: 440 MS
T WAVE AXIS: 18 DEGREES
VENTRICULAR RATE: 78 BPM

## 2022-03-24 PROCEDURE — 93010 ELECTROCARDIOGRAM REPORT: CPT | Performed by: INTERNAL MEDICINE

## 2022-04-04 ENCOUNTER — ANESTHESIA EVENT (OUTPATIENT)
Dept: PERIOP | Facility: HOSPITAL | Age: 82
End: 2022-04-04
Payer: MEDICARE

## 2022-04-04 NOTE — PRE-PROCEDURE INSTRUCTIONS
Pre-Surgery Instructions:   Medication Instructions    acetaminophen (TYLENOL) 325 mg tablet Instructed to take as needed including DOS with sips water    albuterol (2 5 mg/3 mL) 0 083 % nebulizer solution Instructed to take as needed including DOS    albuterol (ProAir HFA) 90 mcg/act inhaler Instructed to take as needed including DOS    arformoterol (BROVANA) 15 mcg/2 mL nebulizer solution Instructed to take per normal schedule including DOS    budesonide (Pulmicort) 0 5 mg/2 mL nebulizer solution Instructed to take per normal schedule including DOS    escitalopram (LEXAPRO) 10 mg tablet Instructed to take per normal schedule @ Bedtime    famotidine (PEPCID) 40 MG tablet Instructed to take per normal schedule @ Bedtime    gabapentin (NEURONTIN) 100 mg capsule Instructed to take per normal schedule including DOS with sips water    ipratropium-albuterol (DUO-NEB) 0 5-2 5 mg/3 mL nebulizer solution Instructed to take per normal schedule including DOS    NON FORMULARY  If your surgeon has not specifically prescribed this vitamin or instructed you to continue then stop taking 7 days prior to surgery per anesthesia guidelines   tamsulosin (FLOMAX) 0 4 mg Instructed to take per normal schedule after dinner    Pre op instructions per My Surgical Experience booklet,medications per anesthesia guidelines and showering instructions per [facility] protocol reviewed-Patient has CHG  Pt  Verbalized understanding of current visitor restrictions  Instructed to call surgeon with any illness or covid exposure now till DOS  Aware masks are optional for covid vaccinated patients/visitors  Instructed to avoid all ASA/NSAIDs and OTC Vit/Supp from now until after surgery per anesthesia guidelines  Tylenol ok prn  Pt  Verbalized an understanding of all instructions reviewed and offers no concerns at this time

## 2022-04-05 ENCOUNTER — ANESTHESIA (OUTPATIENT)
Dept: PERIOP | Facility: HOSPITAL | Age: 82
End: 2022-04-05
Payer: MEDICARE

## 2022-04-05 ENCOUNTER — HOSPITAL ENCOUNTER (OUTPATIENT)
Facility: HOSPITAL | Age: 82
Setting detail: OUTPATIENT SURGERY
Discharge: HOME | End: 2022-04-05
Attending: SURGERY | Admitting: SURGERY
Payer: MEDICARE

## 2022-04-05 VITALS
TEMPERATURE: 97.7 F | HEART RATE: 74 BPM | OXYGEN SATURATION: 94 % | HEIGHT: 68 IN | WEIGHT: 179 LBS | RESPIRATION RATE: 18 BRPM | DIASTOLIC BLOOD PRESSURE: 72 MMHG | BODY MASS INDEX: 27.13 KG/M2 | SYSTOLIC BLOOD PRESSURE: 125 MMHG

## 2022-04-05 DIAGNOSIS — M62.82 RHABDOMYOLYSIS: ICD-10-CM

## 2022-04-05 DIAGNOSIS — K40.90 NON-RECURRENT UNILATERAL INGUINAL HERNIA WITHOUT OBSTRUCTION OR GANGRENE: ICD-10-CM

## 2022-04-05 DIAGNOSIS — Z98.890 S/P HERNIA REPAIR: Primary | ICD-10-CM

## 2022-04-05 DIAGNOSIS — Z87.19 S/P HERNIA REPAIR: Primary | ICD-10-CM

## 2022-04-05 PROBLEM — N18.9 CHRONIC KIDNEY DISEASE: Status: ACTIVE | Noted: 2022-04-05

## 2022-04-05 PROCEDURE — 49505 PRP I/HERN INIT REDUC >5 YR: CPT | Performed by: SURGERY

## 2022-04-05 PROCEDURE — 88302 TISSUE EXAM BY PATHOLOGIST: CPT | Performed by: PATHOLOGY

## 2022-04-05 PROCEDURE — C1781 MESH (IMPLANTABLE): HCPCS | Performed by: SURGERY

## 2022-04-05 DEVICE — BARD MESH PERFIX PLUG, EXTRA LARGE
Type: IMPLANTABLE DEVICE | Site: INGUINAL | Status: FUNCTIONAL
Brand: BARD MESH PERFIX PLUG

## 2022-04-05 RX ORDER — SODIUM CHLORIDE, SODIUM LACTATE, POTASSIUM CHLORIDE, CALCIUM CHLORIDE 600; 310; 30; 20 MG/100ML; MG/100ML; MG/100ML; MG/100ML
125 INJECTION, SOLUTION INTRAVENOUS CONTINUOUS
Status: DISCONTINUED | OUTPATIENT
Start: 2022-04-05 | End: 2022-04-05 | Stop reason: HOSPADM

## 2022-04-05 RX ORDER — OXYCODONE HYDROCHLORIDE 5 MG/1
5 TABLET ORAL EVERY 4 HOURS PRN
Status: DISCONTINUED | OUTPATIENT
Start: 2022-04-05 | End: 2022-04-05 | Stop reason: HOSPADM

## 2022-04-05 RX ORDER — PHENYLEPHRINE HCL IN 0.9% NACL 1 MG/10 ML
SYRINGE (ML) INTRAVENOUS AS NEEDED
Status: DISCONTINUED | OUTPATIENT
Start: 2022-04-05 | End: 2022-04-05

## 2022-04-05 RX ORDER — HYDROMORPHONE HCL/PF 1 MG/ML
0.5 SYRINGE (ML) INJECTION
Status: DISCONTINUED | OUTPATIENT
Start: 2022-04-05 | End: 2022-04-05 | Stop reason: HOSPADM

## 2022-04-05 RX ORDER — FENTANYL CITRATE 50 UG/ML
INJECTION, SOLUTION INTRAMUSCULAR; INTRAVENOUS AS NEEDED
Status: DISCONTINUED | OUTPATIENT
Start: 2022-04-05 | End: 2022-04-05

## 2022-04-05 RX ORDER — ONDANSETRON 2 MG/ML
4 INJECTION INTRAMUSCULAR; INTRAVENOUS EVERY 6 HOURS PRN
Status: DISCONTINUED | OUTPATIENT
Start: 2022-04-05 | End: 2022-04-05 | Stop reason: HOSPADM

## 2022-04-05 RX ORDER — ACETAMINOPHEN 325 MG/1
650 TABLET ORAL EVERY 6 HOURS PRN
Qty: 60 TABLET | Refills: 0
Start: 2022-04-05

## 2022-04-05 RX ORDER — CEFAZOLIN SODIUM 1 G/50ML
1000 SOLUTION INTRAVENOUS ONCE
Status: DISCONTINUED | OUTPATIENT
Start: 2022-04-05 | End: 2022-04-05 | Stop reason: HOSPADM

## 2022-04-05 RX ORDER — OXYCODONE HYDROCHLORIDE 5 MG/1
2.5 TABLET ORAL EVERY 4 HOURS PRN
Status: DISCONTINUED | OUTPATIENT
Start: 2022-04-05 | End: 2022-04-05 | Stop reason: HOSPADM

## 2022-04-05 RX ORDER — LIDOCAINE HYDROCHLORIDE 10 MG/ML
0.5 INJECTION, SOLUTION EPIDURAL; INFILTRATION; INTRACAUDAL; PERINEURAL ONCE AS NEEDED
Status: DISCONTINUED | OUTPATIENT
Start: 2022-04-05 | End: 2022-04-05 | Stop reason: HOSPADM

## 2022-04-05 RX ORDER — SODIUM CHLORIDE, SODIUM LACTATE, POTASSIUM CHLORIDE, CALCIUM CHLORIDE 600; 310; 30; 20 MG/100ML; MG/100ML; MG/100ML; MG/100ML
INJECTION, SOLUTION INTRAVENOUS CONTINUOUS PRN
Status: DISCONTINUED | OUTPATIENT
Start: 2022-04-05 | End: 2022-04-05

## 2022-04-05 RX ORDER — PROPOFOL 10 MG/ML
INJECTION, EMULSION INTRAVENOUS CONTINUOUS PRN
Status: DISCONTINUED | OUTPATIENT
Start: 2022-04-05 | End: 2022-04-05

## 2022-04-05 RX ORDER — OXYCODONE HYDROCHLORIDE 5 MG/1
2.5 TABLET ORAL EVERY 4 HOURS PRN
Qty: 9 TABLET | Refills: 0 | Status: SHIPPED | OUTPATIENT
Start: 2022-04-05 | End: 2022-04-08

## 2022-04-05 RX ORDER — ACETAMINOPHEN 325 MG/1
650 TABLET ORAL EVERY 6 HOURS PRN
Status: DISCONTINUED | OUTPATIENT
Start: 2022-04-05 | End: 2022-04-05 | Stop reason: HOSPADM

## 2022-04-05 RX ORDER — DEXMEDETOMIDINE HYDROCHLORIDE 100 UG/ML
INJECTION, SOLUTION INTRAVENOUS AS NEEDED
Status: DISCONTINUED | OUTPATIENT
Start: 2022-04-05 | End: 2022-04-05

## 2022-04-05 RX ADMIN — PROPOFOL 50 MCG/KG/MIN: 10 INJECTION, EMULSION INTRAVENOUS at 09:51

## 2022-04-05 RX ADMIN — Medication 100 MCG: at 10:21

## 2022-04-05 RX ADMIN — SODIUM CHLORIDE, SODIUM LACTATE, POTASSIUM CHLORIDE, AND CALCIUM CHLORIDE 125 ML/HR: .6; .31; .03; .02 INJECTION, SOLUTION INTRAVENOUS at 09:23

## 2022-04-05 RX ADMIN — Medication 200 MCG: at 10:26

## 2022-04-05 RX ADMIN — Medication 100 MCG: at 10:32

## 2022-04-05 RX ADMIN — FENTANYL CITRATE 50 MCG: 50 INJECTION, SOLUTION INTRAMUSCULAR; INTRAVENOUS at 09:51

## 2022-04-05 RX ADMIN — Medication 100 MCG: at 10:05

## 2022-04-05 RX ADMIN — DEXMEDETOMIDINE 8 MCG: 100 INJECTION, SOLUTION, CONCENTRATE INTRAVENOUS at 10:10

## 2022-04-05 RX ADMIN — FENTANYL CITRATE 25 MCG: 50 INJECTION, SOLUTION INTRAMUSCULAR; INTRAVENOUS at 10:47

## 2022-04-05 RX ADMIN — DEXMEDETOMIDINE HYDROCHLORIDE 0.3 MCG/KG/HR: 100 INJECTION, SOLUTION INTRAVENOUS at 09:51

## 2022-04-05 RX ADMIN — Medication 100 MCG: at 10:13

## 2022-04-05 NOTE — INTERIM OP NOTE
REPAIR HERNIA INGUINAL OPEN  Postoperative Note  PATIENT NAME: Manoj Patino  : 1940  MRN: [id number]  [facility] OR ROOM 02    Surgery Date: 2022    Preop Diagnosis:  Non-recurrent unilateral inguinal hernia without obstruction or gangrene [K40 90]    Post-Op Diagnosis Codes:     * Non-recurrent unilateral inguinal hernia without obstruction or gangrene [K40 90]    Procedure(s) (LRB):  REPAIR HERNIA INGUINAL OPEN (Left)    Surgeon(s) and Role:     * Otoniel Bergman MD - Primary     Princess Kaye PA-C - Assisting    Specimens:  ID Type Source Tests Collected by Time Destination   1 :  Tissue Hernia Sac, Left Inguinal TISSUE EXAM Otoniel Bergman MD 2022 1031        Estimated Blood Loss:   Minimal    Anesthesia Type:   IV Sedation with Anesthesia     Findings:   As above  Complications:   None    Hernia Surgery Operative Note    Name: Manoj Patino    Gender: male    Age: 79 y o  Race:     BMI: Body mass index is 27 22 kg/m²  DIAGNOSIS:   1  Non-recurrent unilateral inguinal hernia without obstruction or gangrene  ceFAZolin (ANCEF) IVPB (premix in dextrose) 1,000 mg 50 mL    lactated ringers infusion    Reason for no Mechanical VTE Prophylaxis    Reason for no Mechanical VTE Prophylaxis    Tissue Exam    Tissue Exam   2   Rhabdomyolysis   lactated ringers infusion       Diabetes Mellitis: No    Coronary Heart Disease: No    Cancer: No    Steroid Use: No    Tobacco use: No   Last used:    Type: cigarettes   Frequency (per day): 10 cigarettes/day   Duration (years): 50    Alcohol use: No    Location of Hernia: left indirect inguinal  Length:2 cm  Width:2 cm  Primary: Yes  Recurrent: No   Number of recurrences:    Access: Open    Component Separation: No    Mesh:   Yes -  Type: Synthetic   BARD Perfix Plug XL    Operative Time: 48 min             SIGNATURE: Lorraine Kaye PA-C   DATE: 2022   TIME: 11:14 AM

## 2022-04-05 NOTE — DISCHARGE INSTRUCTIONS
Jolanta Espinoza MD, [facility]  [phone]    1  General: You will feel pulling sensations around the wound or funny aches and pains around the incisions  This is normal  Even minor surgery is a change in your body and this is your body's way of reacting to it  If you have had abdominal surgery, it may help to support the incision with a small pillow or blanket for comfort when moving or coughing  2  Wound care:  Okay to shower  The glue will fall off over the next week or 2  Use ice for the first 5 days after surgery  Do not use for longer than 20 minutes at a time  Use ice 5 times per day  3  Water: You may shower over the wounds  Do not bathe or use a pool or hot tub until cleared by the physician  If you were discharged with a drain, make sure drain site is covered with plastic wrap before showering  4  Activity: You may go up and down stairs, walk as much as you are comfortable, but walk at least 3 times each day  If you have had abdominal surgery, do not lift anything heavier than 15 lbs for the 1st 2 weeks and 25 lbs for weeks 3 and 4      5  Diet: You may resume a regular diet  If you had a same-day surgery or overnight stay surgery, you may wish to eat lightly for a few days: soups, crackers, and sandwiches  You may resume a regular diet when ready  6  Medications: Resume all of your previous medications, unless told otherwise by the doctor  Avoid aspirin products for 2-3 days after the date of surgery  You may, at that time, begin to take them again  Use Tylenol and Ibuprofen for pain control  You may alternate these medications every 3 hours  For example: you may take Tylenol at noon, Ibuprofen at 3:00 p m , and Tylenol again at 6:00 p m , etc   You should use ice to assist with pain control as above  You do not need to take narcotic pain medication unless you are having significant pain  7  Driving:  You will need someone to drive you home on the day of surgery or discharge  Do not drive or make any important decisions while on narcotic pain medication or 24 hours and after anesthesia or sedation for surgery  Generally, you may drive when your off all narcotic pain medications and you are comfortable  8  Upset Stomach: You may take Maalox, Tums, or similar items for an upset stomach  If your narcotic pain medication causes an upset stomach, do not take it on an empty stomach  Try taking it with at least some crackers or toast      9  Constipation: Patients often experience constipation after surgery  You may take over-the-counter medication for this, such as Metamucil, Senokot, Dulcolax, milk of magnesia, etc  You may take a suppository unless you have had anorectal surgery such as a procedure on your hemorrhoids  If you experience significant nausea or vomiting after abdominal surgery, call the office before trying any of these medications  10  Call the office: If you are experiencing any of the following: fevers above 101 5°, significant nausea or vomiting, if the wound develops drainage and/or there is excessive redness around the wound, or if you have significant diarrhea or other worsening symptoms  11  Pain: You may be given a prescription for pain medication  This will be sent to your pharmacy prior to discharge  12  Sexual Activity: You may resume sexual activity when you feel ready and comfortable and your incision is sealed and healed without apparent infection risk  13  Urination: If you have not urinated in 6 hours, go directly to the ER for evaluation for urinary retention  14  Follow-up in 2 weeks  ***READ ONLY IF YOU HAVE BEEN DISCHARGED WITH A URINARY CATHETER***  Calero Insertion for Post-Op Urinary Retention    - A prescription for Flomax will be sent to your pharmacy  This should be taken daily while the urinary catheter remains in place      You will not be given a prescription for Flomax if your prostate has been removed  If you are already taking Flomax, continue the medication as prescribed  - We will send a message to the urology group who will contact you within the next 48 hours with further instructions and to schedule an appointment for voiding trial and catheter removal   The urinary catheter will remain in place for approximately 1 week  If you are not contacted within the next 48 hours please call our office to assist with scheduling your follow-up      - If you have your own urologist, you should contact your physician the day after discharge for instructions and to schedule a voiding trial and catheter removal

## 2022-04-05 NOTE — INTERVAL H&P NOTE
H&P reviewed  After examining the patient I find no changes in the patients condition since the H&P had been written      Vitals:    04/05/22 0914   BP:    Pulse:    Resp:    Temp: 98 9 °F (37 2 °C)   SpO2:

## 2022-04-05 NOTE — ANESTHESIA PREPROCEDURE EVALUATION
Procedure:  REPAIR HERNIA INGUINAL OPEN (Left Groin)    Relevant Problems   ANESTHESIA  prior spinal anesthetics for right inguinal hernia repair, pt had subsequent falls and ended up needing multiple surgeries for hip fxs subsequently, all done under spinal         CARDIO (within normal limits)      GI/HEPATIC   (+) Acid reflux disease (controlled)      /RENAL   (+) Chronic kidney disease   (+) Enlarged prostate with lower urinary tract symptoms (LUTS)      MUSCULOSKELETAL   (+) Chronic bilateral low back pain      NEURO/PSYCH   (+) Anxiety   (+) Chronic bilateral low back pain      PULMONARY  optimized per pulmonology   (+) COPD, moderate (HCC)   (+) Chronic respiratory failure with hypoxia (HCC) (home O2 4L prn - wears most of the day and overnight)   (-) Sleep apnea   (-) Smoking (quit 2018)   (-) URI (upper respiratory infection)      Physical Exam    Airway    Mallampati score: II  TM Distance: >3 FB  Neck ROM: full     Dental   Comment: Upper denture to be removed; lower edentulous,     Cardiovascular      Pulmonary      Other Findings       Lab Results   Component Value Date    WBC 11 43 (H) 03/23/2022    HGB 13 5 03/23/2022     03/23/2022     Lab Results   Component Value Date    SODIUM 139 03/23/2022    K 4 9 03/23/2022    BUN 19 03/23/2022    CREATININE 1 16 03/23/2022    EGFR 58 03/23/2022     Lab Results   Component Value Date    HGBA1C 5 5 03/23/2022     Anesthesia Plan  ASA Score- 3     Anesthesia Type- IV sedation with anesthesia with ASA Monitors  Additional Monitors:   Airway Plan:     Comment: Discussed desire to avoid GA given his significant COPD  Plan for sedation + local by surgeon - given option for spinal but given prior complications/falls and surgeon's ability to give local on the field, pt opts to proceed with sedation plan  Pt and daughter aware we can convert to GA if needed  Plan Factors-Exercise tolerance (METS): >4 METS  Chart reviewed  EKG reviewed  Existing labs reviewed  Patient summary reviewed  Patient is not a current smoker  Induction- intravenous  Postoperative Plan-     Informed Consent- Anesthetic plan and risks discussed with patient and daughter  I personally reviewed this patient with the CRNA  Discussed and agreed on the Anesthesia Plan with the CRNA  Davina Emmanuel

## 2022-04-05 NOTE — OP NOTE
Inguinal Hernia, Open, Procedure Note    Name: Veronica Cruz   : 1940  MRN: [id number]  Date: 2022    Indications: The patient presented with a history of a left, reducible hernia  Pre-operative Diagnosis: left reducible inguinal hernia    Post-operative Diagnosis: left reducible indirectinguinal hernia    Procedure: Open repair of left inguinal hernia with mesh    Surgeon: Zakiya Prieto MD    Assistants: Shayy Kaye PA-C, no qualified resident available  PA was medically necessary for the surgical safety of the case, including suturing, retraction, hemostasis  Anesthesia: Monitored Local Anesthesia with Sedation    Procedure Details   The patient was seen again in the Holding Room  The risks, benefits, complications, treatment options, and expected outcomes were discussed with the patient  The possibilities of reaction to medication, pulmonary aspiration, perforation of viscus, bleeding, postoperative short or long term nerve entrapment causing pain,recurrent infection, the need for additional procedures, and development of a complication requiring transfusion or further operation were discussed with the patient and/or family  There was concurrence with the proposed plan, and informed consent was obtained  The site of surgery was properly noted/marked  The patient was taken to the Operating Room, identified as Veronica Cruz and the procedure verified as hernia repair  A Time Out was held and the above information confirmed  The patient was prepped and draped in a sterile fashion  A timeout was again performed  Local anesthesia was used in the incision as well as performing an ilioinguinal nerve block  An inguinal incision was made  Dissection carried out to Neel's fascia  Dissection carried out to the external oblique  The external oblique fascia was opened sharply  Medial and lateral flaps were created and retracted  Care was taken to preserve the sensory nerves    The cord structures were brought out of the wound and secured using a Penrose drain  The cord was carefully inspected for the evidence of a hernia sac  If there was a hernia sac, this was dissected off the cord structures, opened, its contents reduced, and suture ligated at its base using a 0 Vicryl pursestring suture under direct vision  The lipoma was teased off the cord structures and also suture ligated at its base using a 0 Vicryl suture, this was excised  The floor of the canal was examined for any defect  If there was a defect in the floor of the canal this was scored and reduced  An appropriate size plug was placed into the defect of the floor and/or ring  This was secured using interrupted 2-0 Prolene sutures  An onlay patch was trimmed to fit the size of the canal   This was secured to the pubic tubercle, shelving edge and conjoint tendon using interrupted 2-0 Prolene sutures  The edges of the mesh were tucked around the cord and tacked down slightly  The cord structures were returned to their anatomic location  The wound was irrigated  The external oblique was closed using a running 2-0 PDS suture, taking care to preserve the sensory nerves if possible  The wound was closed in multiple layers using 3-0 Vicryl sutures and the skin closed using a 4-0 Monocryl subcuticular stitch  The wound was dressed  The patient was anatomically correct at the end of the procedure  The patient tolerated the procedure in good condition  Instrument, sponge, and needle counts were correct prior to closure and at the conclusion of the case  This text is generated with voice recognition software  There may be translation, syntax,  or grammatical errors  If you have any questions, please contact the dictating provider  Findings: left reducible indirect inguinal hernia    Estimated Blood Loss: Minimal           Specimens: All specimens were sent for pathology     Order Name [id number] Order Time   TISSUE EXAM Hernia Sac, Left Inguinal  Collected By: Ulises Lama MD 4/5/2022 10:34 AM     Release to patient through Mychart   Immediate                 Complications: None; patient tolerated the procedure well             Disposition: PACU            Condition: stable    Signature:   Ulises Lama MD  Date: 4/5/2022 Time: 11:20 AM

## 2022-04-05 NOTE — ANESTHESIA POSTPROCEDURE EVALUATION
Post-Op Assessment Note    CV Status:  Stable  Pain Score: 0    Pain management: adequate     Mental Status:  Awake   Hydration Status:  Euvolemic   PONV Controlled:  None   Airway Patency:  Patent      Post Op Vitals Reviewed: Yes      Staff: CRNA         No complications documented      BP   96/54   Temp      Pulse 64   Resp 17   SpO2 97%

## 2022-04-08 ENCOUNTER — TELEPHONE (OUTPATIENT)
Dept: SURGERY | Facility: HOSPITAL | Age: 82
End: 2022-04-08

## 2022-04-08 NOTE — TELEPHONE ENCOUNTER
Post op call  Spoke to patient's wife  Patient is doing well  Pain well controlled, patient ambulating well  No questions or concerns  Reminded patient of post op appointment and to call if there are any questions or concerns prior to appointment

## 2022-04-15 DIAGNOSIS — G89.29 CHRONIC BILATERAL LOW BACK PAIN WITHOUT SCIATICA: ICD-10-CM

## 2022-04-15 DIAGNOSIS — R33.8 BENIGN PROSTATIC HYPERPLASIA WITH URINARY RETENTION: ICD-10-CM

## 2022-04-15 DIAGNOSIS — N40.1 BENIGN PROSTATIC HYPERPLASIA WITH URINARY RETENTION: ICD-10-CM

## 2022-04-15 DIAGNOSIS — F32.A DEPRESSIVE DISORDER: ICD-10-CM

## 2022-04-15 DIAGNOSIS — F41.9 ANXIETY: ICD-10-CM

## 2022-04-15 DIAGNOSIS — M54.50 CHRONIC BILATERAL LOW BACK PAIN WITHOUT SCIATICA: ICD-10-CM

## 2022-04-15 RX ORDER — GABAPENTIN 100 MG/1
100 CAPSULE ORAL 2 TIMES DAILY
Qty: 180 CAPSULE | Refills: 1 | Status: SHIPPED | OUTPATIENT
Start: 2022-04-15

## 2022-04-15 RX ORDER — ESCITALOPRAM OXALATE 10 MG/1
10 TABLET ORAL
Qty: 30 TABLET | Refills: 5 | Status: SHIPPED | OUTPATIENT
Start: 2022-04-15

## 2022-04-15 RX ORDER — TAMSULOSIN HYDROCHLORIDE 0.4 MG/1
0.4 CAPSULE ORAL
Qty: 30 CAPSULE | Refills: 5 | Status: SHIPPED | OUTPATIENT
Start: 2022-04-15

## 2022-04-15 NOTE — TELEPHONE ENCOUNTER
Pt's wife called in to request 3 rx refills:    1  gabapentin (100 mg)  -   2  escritalopram (10 mg) -  3  tamsulosin (0 4 mg) -      Pt specifically requested generic rx only  Pt aware that PM is out of the office today, and will return on Monday, 4/18/22  [facility] [address] [facility]   [phone]      Please review and send rx to pharm  NO PDMP access

## 2022-04-22 ENCOUNTER — OFFICE VISIT (OUTPATIENT)
Dept: SURGERY | Facility: HOSPITAL | Age: 82
End: 2022-04-22

## 2022-04-22 VITALS
HEIGHT: 68 IN | DIASTOLIC BLOOD PRESSURE: 64 MMHG | RESPIRATION RATE: 20 BRPM | TEMPERATURE: 97.5 F | SYSTOLIC BLOOD PRESSURE: 148 MMHG | BODY MASS INDEX: 26.07 KG/M2 | WEIGHT: 172 LBS | HEART RATE: 97 BPM

## 2022-04-22 DIAGNOSIS — Z98.890 POST-OPERATIVE STATE: Primary | ICD-10-CM

## 2022-04-22 PROCEDURE — 99024 POSTOP FOLLOW-UP VISIT: CPT | Performed by: PHYSICIAN ASSISTANT

## 2022-04-22 NOTE — PATIENT INSTRUCTIONS
Your recovering well  Continue to monitor incision site  If lump does not improve or becomes larger or painful please call for re-evaluation  If area continues to decrease in size there is no follow-up needed  Continue follow lifting activity restrictions as discussed until 05/05/2022  At that point he may return to activity without restrictions  Please call with any questions or concerns

## 2022-04-22 NOTE — PROGRESS NOTES
Assessment/Plan:   Jd Vital is a 79 y o male who comes in today for postoperative check after open repair of left inguinal hernia done on 04/05/2022  Patient presents with daughter  C/o mild incisional pain  Using Tylenol for pain as needed  He does mention a lump at incision site without tenderness  He is otherwise eating well and moving his bowels  He is following lifting and activity instructions  No fevers or chills  On exam patient's abdomen is benign  Incision site is intact and healing well  He does have a palpable lump underneath his incision site that extends to the proximal scrotum  This area is nontender  Consistent with underlying hematoma  No overlying skin changes  No signs of infection  Hernia repair is intact  At this point patient is healing well  Discussed that hematoma will likely take some time to resorb  Hernia repair is intact  Patient may use heat to the area as needed  He should continue Tylenol as needed for discomfort  Heme should continue walking  He should continue to refrain from strenuous exercise or lifting anything greater than 25 lb until 05/05/2022  At that point he may return to activity without restrictions  He should continue to monitor his incision site  If the area becomes painful or larger he should call for re-evaluation  If it continues to decrease in size he does not need further evaluation  Discussed in detail with patient and daughter  All questions answered      HPI:  Jd Vital is a 79 y o male who comes in today for postoperative check after recent  open repair of left inguinal hernia as above    Patient feels well  Complains only minimal incisional pain  Does notice lump at incision site  He is eating well having regular bowel movements  Denies any fevers or chills  He is following lifting activity restrictions      ROS:  General ROS: negative for - chills, fatigue, fever or night sweats, weight loss  Respiratory ROS: no cough, shortness of breath, or wheezing  Cardiovascular ROS: no chest pain or dyspnea on exertion  Abdomen ROS: as per HPI  Genito-Urinary ROS: no dysuria, trouble voiding, or hematuria  Musculoskeletal ROS: negative for - gait disturbance, joint pain or muscle pain  Neurological ROS: no TIA or stroke symptoms  Skin ROS: as per HPI    ALLERGIES  Aspirin and Bactrim [sulfamethoxazole-trimethoprim]    Current Outpatient Medications:     acetaminophen (TYLENOL) 325 mg tablet, Take 2 tablets (650 mg total) by mouth every 4 (four) hours as needed for mild pain, Disp: 30 tablet, Rfl: 0    acetaminophen (TYLENOL) 325 mg tablet, Take 2 tablets (650 mg total) by mouth every 6 (six) hours as needed for mild pain or moderate pain, Disp: 60 tablet, Rfl: 0    albuterol (2 5 mg/3 mL) 0 083 % nebulizer solution, Take 2 5 mg by nebulization every 4 (four) hours as needed for wheezing or shortness of breath, Disp: , Rfl:     albuterol (ProAir HFA) 90 mcg/act inhaler, Inhale 2 puffs every 6 (six) hours as needed for wheezing, Disp: 8 5 g, Rfl: 3    arformoterol (BROVANA) 15 mcg/2 mL nebulizer solution, Take 2 mL (15 mcg total) by nebulization 2 (two) times a day (Patient taking differently: Take 15 mcg by nebulization 4 (four) times a day  ), Disp: 120 mL, Rfl: 5    budesonide (Pulmicort) 0 5 mg/2 mL nebulizer solution, Take 2 mL (0 5 mg total) by nebulization 2 (two) times a day Rinse mouth after use   (Patient taking differently: Take 0 5 mg by nebulization 4 (four) times a day Rinse mouth after use  ), Disp: 120 mL, Rfl: 5    Catheters (Gizmo Condom Catheter) MISC, Use daily at bedtime, Disp: 100 each, Rfl: 5    escitalopram (LEXAPRO) 10 mg tablet, Take 1 tablet (10 mg total) by mouth daily at bedtime, Disp: 30 tablet, Rfl: 5    famotidine (PEPCID) 40 MG tablet, Take 1 tablet (40 mg total) by mouth daily (Patient taking differently: Take 40 mg by mouth daily at bedtime  ), Disp: 90 tablet, Rfl: 1    gabapentin (NEURONTIN) 100 mg capsule, Take 1 capsule (100 mg total) by mouth 2 (two) times a day, Disp: 180 capsule, Rfl: 1    Incontinence Supply Disposable (PadSORBer Bed Pan Liners) MISC, Use daily at bedtime, Disp: 50 each, Rfl: 10    ipratropium-albuterol (DUO-NEB) 0 5-2 5 mg/3 mL nebulizer solution, TAKE 3 ML BY NEBULIZATION EVERY 8 (EIGHT) HOURS AS NEEDED FOR WHEEZING OR SHORTNESS OF BREATH, Disp: 360 mL, Rfl: 0    NON FORMULARY, Take 1,000 mg by mouth 2 (two) times a day Med is MSM Pure, pt brought in from home, Disp: , Rfl:     tamsulosin (FLOMAX) 0 4 mg, Take 1 capsule (0 4 mg total) by mouth daily with dinner, Disp: 30 capsule, Rfl: 5  Past Medical History:   Diagnosis Date    Acute blood loss anemia 12/6/2020    Anxiety     Bladder infection     current tx with cipro 5/29/16    BPH (benign prostatic hyperplasia)     Community acquired pneumonia     last assessed 8/28/17, resolved 9/25/17    COPD (chronic obstructive pulmonary disease) ([facility] )     Dysphagia 6/8/2020    Enlarged prostate     GERD (gastroesophageal reflux disease)     History of colonoscopy 03/19/2015    POLYP IN THE ASCENDING COLON[facility]    Hx of bladder infections     Inguinal hernia, right 05/10/2019    Multiple rib fractures 6/3/2020    Neck pain     Psychiatric disorder     depression    Pulmonary nodule 01/08/2019    STABLE 6MM LEFT APICAL NODULE    Respiratory failure with hypoxia ([facility] ) 01/08/2019    Urinary retention      Past Surgical History:   Procedure Laterality Date    BLADDER SURGERY  08/15/2019    UROLIFT    COLOSTOMY  02/03/2011    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    INGUINAL HERNIA REPAIR Right 05/10/2019    DONE AT [facility]    LAPAROSCOPIC COLON RESECTION  02/03/2011    ABDOMIANOPERITONEAL RESECTION FOR RECTAL MASS   DONE BY RADHA FRY OPEN RX FEMUR FX+INTRAMED DEMETRICE Left 12/7/2020    Procedure: Left Hip TFN;  Surgeon: Wagner Collado MD;  Location:  MAIN OR;  Service: Orthopedics   Kehinde Haile MI REPAIR ING HERNIA,5+Y/O,REDUCIBL Left 4/5/2022    Procedure: REPAIR HERNIA INGUINAL OPEN;  Surgeon: Louisa Mora MD;  Location: [facility] MAIN OR;  Service: General     Family History   Problem Relation Age of Onset    Lung cancer Mother     Cancer Mother     Cancer Father     Alcohol abuse Father     Mental illness Neg Hx       reports that he quit smoking about 4 years ago  His smoking use included cigarettes  He started smoking about 63 years ago  He has a 25 00 pack-year smoking history  He has quit using smokeless tobacco  He reports previous alcohol use  He reports that he does not use drugs  PHYSICAL EXAM  Vitals:    04/22/22 1110   BP: 148/64   Pulse: 97   Resp: 20   Temp: 97 5 °F (36 4 °C)     Weight (last 2 days)     Date/Time Weight    04/22/22 1110 78 (172)          General: normal, cooperative, no distress  Abdominal: soft, nondistended or nontender  Left groin incision with palpable mass extending to proximal scrotum without tenderness consistent with underlying hematoma  Hernia repair is intact    Incision: clean, dry, and intact and healing well    Sunday Jae Kaye PA-C

## 2022-04-23 DIAGNOSIS — J44.9 COPD, MODERATE (HCC): ICD-10-CM

## 2022-04-23 RX ORDER — IPRATROPIUM BROMIDE AND ALBUTEROL SULFATE 2.5; .5 MG/3ML; MG/3ML
3 SOLUTION RESPIRATORY (INHALATION) EVERY 8 HOURS PRN
Qty: 270 ML | Refills: 1 | Status: SHIPPED | OUTPATIENT
Start: 2022-04-23 | End: 2022-08-01

## 2022-04-23 RX ORDER — IPRATROPIUM BROMIDE AND ALBUTEROL SULFATE 2.5; .5 MG/3ML; MG/3ML
3 SOLUTION RESPIRATORY (INHALATION) EVERY 8 HOURS PRN
Qty: 270 ML | Refills: 1 | Status: SHIPPED | OUTPATIENT
Start: 2022-04-23 | End: 2022-04-23 | Stop reason: SDUPTHER

## 2022-06-30 DIAGNOSIS — K21.9 GASTROESOPHAGEAL REFLUX DISEASE: ICD-10-CM

## 2022-06-30 RX ORDER — FAMOTIDINE 40 MG/1
TABLET, FILM COATED ORAL
Qty: 90 TABLET | Refills: 1 | Status: SHIPPED | OUTPATIENT
Start: 2022-06-30

## 2022-07-19 ENCOUNTER — TELEPHONE (OUTPATIENT)
Dept: OBGYN CLINIC | Facility: HOSPITAL | Age: 82
End: 2022-07-19

## 2022-07-19 NOTE — TELEPHONE ENCOUNTER
Bhumika from [address] [place] [address] called to verify if we received medical request for patient  Verified request received on 7/1/22  Caller satisfied

## 2022-07-31 DIAGNOSIS — J44.9 COPD, MODERATE (HCC): ICD-10-CM

## 2022-08-01 RX ORDER — IPRATROPIUM BROMIDE AND ALBUTEROL SULFATE 2.5; .5 MG/3ML; MG/3ML
3 SOLUTION RESPIRATORY (INHALATION) EVERY 8 HOURS PRN
Qty: 270 ML | Refills: 1 | Status: SHIPPED | OUTPATIENT
Start: 2022-08-01

## 2022-08-09 ENCOUNTER — TELEPHONE (OUTPATIENT)
Dept: FAMILY MEDICINE CLINIC | Facility: CLINIC | Age: 82
End: 2022-08-09

## 2022-08-09 DIAGNOSIS — R52 PAIN: Primary | ICD-10-CM

## 2022-08-09 RX ORDER — OXYCODONE HYDROCHLORIDE AND ACETAMINOPHEN 5; 325 MG/1; MG/1
1 TABLET ORAL EVERY 4 HOURS PRN
Qty: 30 TABLET | Refills: 0 | Status: SHIPPED | OUTPATIENT
Start: 2022-08-09

## 2022-08-09 NOTE — TELEPHONE ENCOUNTER
Patient's wife called in because the patient fell Friday 8/5 and may have broken his ribs  Patient's wife is inquiring about pain medication for this  Patient's wife declined OV and got offended when it was offered and said that he did not get checked out at the ER or urgent care when he fell on Friday because he has broken his ribs plenty of times  [facility] [phone]

## 2022-10-07 DIAGNOSIS — M54.50 CHRONIC BILATERAL LOW BACK PAIN WITHOUT SCIATICA: ICD-10-CM

## 2022-10-07 DIAGNOSIS — G89.29 CHRONIC BILATERAL LOW BACK PAIN WITHOUT SCIATICA: ICD-10-CM

## 2022-10-07 DIAGNOSIS — F41.9 ANXIETY: ICD-10-CM

## 2022-10-08 RX ORDER — GABAPENTIN 100 MG/1
CAPSULE ORAL
Qty: 180 CAPSULE | Refills: 1 | Status: SHIPPED | OUTPATIENT
Start: 2022-10-08

## 2022-11-10 ENCOUNTER — TELEPHONE (OUTPATIENT)
Dept: FAMILY MEDICINE CLINIC | Facility: CLINIC | Age: 82
End: 2022-11-10

## 2022-11-10 DIAGNOSIS — R39.9 UTI SYMPTOMS: Primary | ICD-10-CM

## 2022-11-10 RX ORDER — CIPROFLOXACIN 250 MG/1
250 TABLET, FILM COATED ORAL EVERY 12 HOURS SCHEDULED
Qty: 14 TABLET | Refills: 0 | Status: SHIPPED | OUTPATIENT
Start: 2022-11-10 | End: 2022-11-17

## 2022-11-10 NOTE — TELEPHONE ENCOUNTER
PT woke up this morning with increased frequency of urination and believes he has a bladder infection   [facility] [phone]

## 2022-11-18 DIAGNOSIS — J44.9 COPD, MODERATE (HCC): ICD-10-CM

## 2022-11-18 RX ORDER — IPRATROPIUM BROMIDE AND ALBUTEROL SULFATE 2.5; .5 MG/3ML; MG/3ML
SOLUTION RESPIRATORY (INHALATION)
Qty: 270 ML | Refills: 1 | Status: SHIPPED | OUTPATIENT
Start: 2022-11-18

## 2022-12-22 ENCOUNTER — NEW PATIENT (OUTPATIENT)
Dept: URBAN - METROPOLITAN AREA CLINIC 79 | Facility: CLINIC | Age: 82
End: 2022-12-22

## 2022-12-22 DIAGNOSIS — H40.033: ICD-10-CM

## 2022-12-22 DIAGNOSIS — H53.031: ICD-10-CM

## 2022-12-22 DIAGNOSIS — H35.30: ICD-10-CM

## 2022-12-22 DIAGNOSIS — H50.10: ICD-10-CM

## 2022-12-22 DIAGNOSIS — H25.13: ICD-10-CM

## 2022-12-22 PROCEDURE — 92134 CPTRZ OPH DX IMG PST SGM RTA: CPT

## 2022-12-22 PROCEDURE — 99203 OFFICE O/P NEW LOW 30 MIN: CPT

## 2022-12-22 PROCEDURE — 92020 GONIOSCOPY: CPT

## 2022-12-22 ASSESSMENT — TONOMETRY
OS_IOP_MMHG: 09
OD_IOP_MMHG: 09

## 2022-12-22 ASSESSMENT — VISUAL ACUITY
OD_CC: J16
OS_SC: 20/80-1
OS_CC: J5
OD_SC: 20/100
OS_CC: 20/40
OD_CC: 20/80

## 2022-12-29 DIAGNOSIS — N40.1 BENIGN PROSTATIC HYPERPLASIA WITH URINARY RETENTION: ICD-10-CM

## 2022-12-29 DIAGNOSIS — R33.8 BENIGN PROSTATIC HYPERPLASIA WITH URINARY RETENTION: ICD-10-CM

## 2022-12-29 DIAGNOSIS — F41.9 ANXIETY: ICD-10-CM

## 2022-12-29 DIAGNOSIS — F32.A DEPRESSIVE DISORDER: ICD-10-CM

## 2022-12-29 RX ORDER — TAMSULOSIN HYDROCHLORIDE 0.4 MG/1
CAPSULE ORAL
Qty: 30 CAPSULE | Refills: 5 | Status: SHIPPED | OUTPATIENT
Start: 2022-12-29

## 2022-12-29 RX ORDER — ESCITALOPRAM OXALATE 10 MG/1
TABLET ORAL
Qty: 30 TABLET | Refills: 5 | Status: SHIPPED | OUTPATIENT
Start: 2022-12-29

## 2022-12-29 NOTE — TELEPHONE ENCOUNTER
Called the patient to schedule and he asked if we can call his daughter to schedule for him  Called the daughter and left a vm to call the office to schedule for patient

## 2023-01-10 ENCOUNTER — TELEPHONE (OUTPATIENT)
Dept: FAMILY MEDICINE CLINIC | Facility: CLINIC | Age: 83
End: 2023-01-10

## 2023-01-10 NOTE — TELEPHONE ENCOUNTER
Patient's daughter suspects that her father has the flu because she suspects that she had the flu  None of them tested for the flu so they suspect that they have it due to symptoms  Sx the patient is currently experiencing are no energy, no appetite, isn't sure about fever and upset stomach  Patient's daughter declined virtual visit and office visit  She wants Kayla Correia to call something in to help with the symptoms the patient is currently experiencing  [facility] [phone]

## 2023-01-10 NOTE — TELEPHONE ENCOUNTER
Called the wife and she is unsure if the patient had a flu shot  She believes he had one here the past couple of months but the patient hasn't been seen with us since 11/24/2021  She couldn't confirm with him because he was asleep  Symptoms are stated on previous note taken  Patient's wife states that she'll see if her daughters can  a swab but it's unlikely because they're both sick   They have not tested for Covid and have not done an at home test

## 2023-01-11 ENCOUNTER — TELEMEDICINE (OUTPATIENT)
Dept: FAMILY MEDICINE CLINIC | Facility: CLINIC | Age: 83
End: 2023-01-11

## 2023-01-11 DIAGNOSIS — U07.1 COVID-19: Primary | ICD-10-CM

## 2023-01-11 DIAGNOSIS — J44.9 COPD, MODERATE (HCC): ICD-10-CM

## 2023-01-11 RX ORDER — NIRMATRELVIR AND RITONAVIR 150-100 MG
2 KIT ORAL 2 TIMES DAILY
Qty: 20 TABLET | Refills: 0 | Status: SHIPPED | OUTPATIENT
Start: 2023-01-11 | End: 2023-01-16

## 2023-01-11 NOTE — PROGRESS NOTES
COVID-19 Outpatient Progress Note    Assessment/Plan:    Problem List Items Addressed This Visit        Respiratory    COPD, moderate ([address] )   Other Visit Diagnoses     COVID-19    -  Primary    Relevant Medications    nirmatrelvir & ritonavir (Paxlovid, 150/100,) tablet therapy pack         Patient Instructions   We will start Paxlovid  Patient's daughter able to monitor oxygen levels  Has home O2  She is aware should he start dropping his oxygen saturation and appear to be getting sicker, she should have been evaluated hospital emergency room  She has deferred on that option today  Disposition:     Discussed symptom directed medication options with patient  Discussed vitamin D, vitamin C, and/or zinc supplementation with patient  Patient meets criteria for PAXLOVID and they have been counseled appropriately according to EUA documentation released by the FDA  After discussion, patient agrees to treatment  Maryannejohanny Crespo is an investigational medicine used to treat mild-to-moderate COVID-19 in adults and children (15years of age and older weighing at least 80 pounds (40 kg)) with positive results of direct SARS-CoV-2 viral testing, and who are at high risk for progression to severe COVID-19, including hospitalization or death  PAXLOVID is investigational because it is still being studied  There is limited information about the safety and effectiveness of using PAXLOVID to treat people with mild-to-moderate COVID-19  The FDA has authorized the emergency use of PAXLOVID for the treatment of mild-tomoderate COVID-19 in adults and children (15years of age and older weighing at least 80 pounds (40 kg)) with a positive test for the virus that causes COVID-19, and who are at high risk for progression to severe COVID-19, including hospitalization or death, under an EUA  What should I tell my healthcare provider before I take PAXLOVID?     Tell your healthcare provider if you:  - Have any allergies  - Have liver or kidney disease  - Are pregnant or plan to become pregnant  - Are breastfeeding a child  - Have any serious illnesses    Tell your healthcare provider about all the medicines you take, including prescription and over-the-counter medicines, vitamins, and herbal supplements  Some medicines may interact with PAXLOVID and may cause serious side effects  Keep a list of your medicines to show your healthcare provider and pharmacist when you get a new medicine  You can ask your healthcare provider or pharmacist for a list of medicines that interact with PAXLOVID  Do not start taking a new medicine without telling your healthcare provider  Your healthcare provider can tell you if it is safe to take PAXLOVID with other medicines  Tell your healthcare provider if you are taking combined hormonal contraceptive  PAXLOVID may affect how your birth control pills work  Females who are able to become pregnant should use another effective alternative form of contraception or an additional barrier method of contraception  Talk to your healthcare provider if you have any questions about contraceptive methods that might be right for you  How do I take PAXLOVID? PAXLOVID consists of 2 medicines: nirmatrelvir and ritonavir  - Take 2 pink tablets of nirmatrelvir with 1 white tablet of ritonavir by mouth 2 times each day (in the morning and in the evening) for 5 days  For each dose, take all 3 tablets at the same time  - If you have kidney disease, talk to your healthcare provider  You may need a different dose  - Swallow the tablets whole  Do not chew, break, or crush the tablets  - Take PAXLOVID with or without food  - Do not stop taking PAXLOVID without talking to your healthcare provider, even if you feel better  - If you miss a dose of PAXLOVID within 8 hours of the time it is usually taken, take it as soon as you remember   If you miss a dose by more than 8 hours, skip the missed dose and take the next dose at your regular time  Do not take 2 doses of PAXLOVID at the same time  - If you take too much PAXLOVID, call your healthcare provider or go to the nearest hospital emergency room right away  - If you are taking a ritonavir- or cobicistat-containing medicine to treat hepatitis C or Human Immunodeficiency Virus (HIV), you should continue to take your medicine as prescribed by your healthcare provider   - Talk to your healthcare provider if you do not feel better or if you feel worse after 5 days  Who should generally not take PAXLOVID? Do not take PAXLOVID if:  You are allergic to nirmatrelvir, ritonavir, or any of the ingredients in PAXLOVID  You are taking any of the following medicines:  - Alfuzosin  - Pethidine, piroxicam, propoxyphene  - Ranolazine  - Amiodarone, dronedarone, flecainide, propafenone, quinidine  - Colchicine  - Lurasidone, pimozide, clozapine  - Dihydroergotamine, ergotamine, methylergonovine  - Lovastatin, simvastatin  - Sildenafil (Revatio®) for pulmonary arterial hypertension (PAH)  - Triazolam, oral midazolam  - Apalutamide  - Carbamazepine, phenobarbital, phenytoin  - Rifampin  - St  Te’s Wort (hypericum perforatum)    What are the important possible side effects of PAXLOVID? Possible side effects of PAXLOVID are:  - Liver Problems  Tell your healthcare provider right away if you have any of these signs and symptoms of liver problems: loss of appetite, yellowing of your skin and the whites of eyes (jaundice), dark-colored urine, pale colored stools and itchy skin, stomach area (abdominal) pain  - Resistance to HIV Medicines  If you have untreated HIV infection, PAXLOVID may lead to some HIV medicines not working as well in the future  - Other possible side effects include: altered sense of taste, diarrhea, high blood pressure, or muscle aches    These are not all the possible side effects of PAXLOVID  Not many people have taken PAXLOVID   Serious and unexpected side effects may happen  Shayan Duval is still being studied, so it is possible that all of the risks are not known at this time  What other treatment choices are there? Like Chance Corral may allow for the emergency use of other medicines to treat people with COVID-19  Go to https://ReSnap/ for information on the emergency use of other medicines that are authorized by FDA to treat people with COVID-19  Your healthcare provider may talk with you about clinical trials for which you may be eligible  It is your choice to be treated or not to be treated with PAXLOVID  Should you decide not to receive it or for your child not to receive it, it will not change your standard medical care  What if I am pregnant or breastfeeding? There is no experience treating pregnant women or breastfeeding mothers with PAXLOVID  For a mother and unborn baby, the benefit of taking PAXLOVID may be greater than the risk from the treatment  If you are pregnant, discuss your options and specific situation with your healthcare provider  It is recommended that you use effective barrier contraception or do not have sexual activity while taking PAXLOVID  If you are breastfeeding, discuss your options and specific situation with your healthcare provider  How do I report side effects with PAXLOVID? Contact your healthcare provider if you have any side effects that bother you or do not go away  Report side effects to FDA MedWatch at www fda gov/medwatch or call 0-078-TGL2167 or you can report side effects to [facility]  at the contact information provided below  Website Fax number Telephone number   [employer] [phone]     How should I store Shayan Duval? Store PAXLOVID tablets at room temperature between 68°F to 77°F (20°C to 25°C)      Full fact sheet for patients, parents, and caregivers can be found at: Chip banegas    I have spent 15 minutes directly with the patient  Greater than 50% of this time was spent in counseling/coordination of care regarding: risk factor reductions and impressions  Encounter provider: Kia Epps MD     Provider located at: [address] [place] [phone]     Recent Visits  Date Type Provider Dept   01/10/23 Telephone Oliva Martinez [facility] [address] [facility] recent visits within past 7 days and meeting all other requirements  Today's Visits  Date Type Provider Dept   01/11/23 Telemedicine Kia Epps MD  [address] [facility] today's visits and meeting all other requirements  Future Appointments  No visits were found meeting these conditions  Showing future appointments within next 150 days and meeting all other requirements     This virtual check-in was done via [employer] and patient was informed that this is a secure, HIPAA-compliant platform  He agrees to proceed  Patient agrees to participate in a virtual check in via telephone or video visit instead of presenting to the office to address urgent/immediate medical needs  Patient is aware this is a billable service  He acknowledged consent and understanding of privacy and security of the video platform  The patient has agreed to participate and understands they can discontinue the visit at any time  After connecting through [facility], the patient was identified by name and date of birth  Juan David Al was informed that this was a telemedicine visit and that the exam was being conducted confidentially over secure lines  My office door was closed  No one else was in the room  Juan David Al acknowledged consent and understanding of privacy and security of the telemedicine visit   I informed the patient that I have reviewed his record in Epic and presented the opportunity for him to ask any questions regarding the visit today  The patient agreed to participate  Verification of patient location:  Patient is located in the following state in which I hold an active license: [place]    Subjective:   Mabel Roman is a 79 y o  male who is concerned about COVID-19  Patient's symptoms include fatigue, malaise, nasal congestion, cough, myalgias and headache  Patient denies fever, chills, anosmia, loss of taste, shortness of breath, chest tightness, abdominal pain, nausea, vomiting and diarrhea  - Date of symptom onset: 1/8/2023      COVID-19 vaccination status: Fully vaccinated (primary series)    Exposure:   Contact with a person who is under investigation (PUI) for or who is positive for COVID-19 within the last 14 days?: No    Hospitalized recently for fever and/or lower respiratory symptoms?: No      Currently a healthcare worker that is involved in direct patient care?: No      Works in a special setting where the risk of COVID-19 transmission may be high? (this may include long-term care, correctional and alf facilities; homeless shelters; assisted-living facilities and group homes ): No      Resident in a special setting where the risk of COVID-19 transmission may be high? (this may include long-term care, correctional and alf facilities; homeless shelters; assisted-living facilities and group homes ): No      Family gathering 1/7/23-has O2 at home  With oxygen in place O2 saturations are at 96%  Lab Results   Component Value Date    SARSCOV2 Negative 12/11/2020       Review of Systems   Constitutional: Positive for fatigue  Negative for chills and fever  HENT: Positive for congestion  Respiratory: Positive for cough  Negative for chest tightness and shortness of breath  Gastrointestinal: Negative for abdominal pain, diarrhea, nausea and vomiting  Musculoskeletal: Positive for myalgias  Neurological: Positive for headaches       Current Outpatient Medications on File Prior to Visit   Medication Sig   • acetaminophen (TYLENOL) 325 mg tablet Take 2 tablets (650 mg total) by mouth every 4 (four) hours as needed for mild pain   • acetaminophen (TYLENOL) 325 mg tablet Take 2 tablets (650 mg total) by mouth every 6 (six) hours as needed for mild pain or moderate pain   • albuterol (2 5 mg/3 mL) 0 083 % nebulizer solution Take 2 5 mg by nebulization every 4 (four) hours as needed for wheezing or shortness of breath   • albuterol (ProAir HFA) 90 mcg/act inhaler Inhale 2 puffs every 6 (six) hours as needed for wheezing   • arformoterol (BROVANA) 15 mcg/2 mL nebulizer solution Take 2 mL (15 mcg total) by nebulization 2 (two) times a day (Patient taking differently: Take 15 mcg by nebulization 4 (four) times a day)   • budesonide (Pulmicort) 0 5 mg/2 mL nebulizer solution Take 2 mL (0 5 mg total) by nebulization 2 (two) times a day Rinse mouth after use   (Patient taking differently: Take 0 5 mg by nebulization 4 (four) times a day Rinse mouth after use )   • Catheters (Gizmo Condom Catheter) MISC Use daily at bedtime   • escitalopram (LEXAPRO) 10 mg tablet TAKE 1 TABLET BY MOUTH DAILY AT BEDTIME   • famotidine (PEPCID) 40 MG tablet TAKE 1 TABLET BY MOUTH EVERY DAY   • gabapentin (NEURONTIN) 100 mg capsule TAKE 1 CAPSULE BY MOUTH TWICE A DAY   • Incontinence Supply Disposable (PadSORBer Bed Pan Liners) MISC Use daily at bedtime   • ipratropium-albuterol (DUO-NEB) 0 5-2 5 mg/3 mL nebulizer solution TAKE 3 ML BY NEBULIZATION EVERY 8 HOURS AS NEEDED FOR WHEEZING OR SHORTNESS OF BREATH   • NON FORMULARY Take 1,000 mg by mouth 2 (two) times a day Med is MSM Pure, pt brought in from home   • tamsulosin (FLOMAX) 0 4 mg TAKE 1 CAPSULE BY MOUTH EVERY DAY WITH DINNER   • [DISCONTINUED] oxyCODONE-acetaminophen (PERCOCET) 5-325 mg per tablet Take 1 tablet by mouth every 4 (four) hours as needed for severe pain Max Daily Amount: 6 tablets   • [DISCONTINUED] LORazepam (ATIVAN) 1 mg tablet Take 1 tablet (1 mg total) by mouth every 8 (eight) hours as needed for anxiety       Objective: There were no vitals taken for this visit  Physical Exam  Constitutional:       General: He is not in acute distress  HENT:      Nose: Congestion present  Pulmonary:      Effort: Pulmonary effort is normal  No respiratory distress  Neurological:      Mental Status: He is alert         Kimberly Pedraza MD

## 2023-01-11 NOTE — PATIENT INSTRUCTIONS
We will start Paxlovid  Patient's daughter able to monitor oxygen levels  Has home O2  She is aware should he start dropping his oxygen saturation and appear to be getting sicker, she should have been evaluated hospital emergency room  She has deferred on that option today

## 2023-01-16 DIAGNOSIS — K21.9 GASTROESOPHAGEAL REFLUX DISEASE: ICD-10-CM

## 2023-01-16 RX ORDER — FAMOTIDINE 40 MG/1
TABLET, FILM COATED ORAL
Qty: 90 TABLET | Refills: 1 | Status: SHIPPED | OUTPATIENT
Start: 2023-01-16

## 2023-01-18 ENCOUNTER — OFFICE VISIT (OUTPATIENT)
Dept: FAMILY MEDICINE CLINIC | Facility: CLINIC | Age: 83
End: 2023-01-18

## 2023-01-18 VITALS
WEIGHT: 167 LBS | RESPIRATION RATE: 16 BRPM | BODY MASS INDEX: 25.31 KG/M2 | TEMPERATURE: 97.3 F | OXYGEN SATURATION: 93 % | HEART RATE: 88 BPM | HEIGHT: 68 IN | DIASTOLIC BLOOD PRESSURE: 65 MMHG | SYSTOLIC BLOOD PRESSURE: 125 MMHG

## 2023-01-18 DIAGNOSIS — N18.31 STAGE 3A CHRONIC KIDNEY DISEASE (HCC): ICD-10-CM

## 2023-01-18 DIAGNOSIS — Z93.9 HISTORY OF CREATION OF OSTOMY (HCC): ICD-10-CM

## 2023-01-18 DIAGNOSIS — J44.9 COPD, MODERATE (HCC): ICD-10-CM

## 2023-01-18 DIAGNOSIS — F32.A DEPRESSIVE DISORDER: ICD-10-CM

## 2023-01-18 DIAGNOSIS — J44.9 CHRONIC OBSTRUCTIVE PULMONARY DISEASE, UNSPECIFIED COPD TYPE (HCC): ICD-10-CM

## 2023-01-18 DIAGNOSIS — F41.9 ANXIETY: ICD-10-CM

## 2023-01-18 DIAGNOSIS — Z00.00 MEDICARE ANNUAL WELLNESS VISIT, SUBSEQUENT: Primary | ICD-10-CM

## 2023-01-18 DIAGNOSIS — I48.0 PAROXYSMAL ATRIAL FIBRILLATION (HCC): ICD-10-CM

## 2023-01-18 DIAGNOSIS — J96.11 CHRONIC RESPIRATORY FAILURE WITH HYPOXIA (HCC): ICD-10-CM

## 2023-01-18 LAB
DME PARACHUTE DELIVERY DATE REQUESTED: NORMAL
DME PARACHUTE DELIVERY NOTE: NORMAL
DME PARACHUTE ITEM DESCRIPTION: NORMAL
DME PARACHUTE ORDER STATUS: NORMAL
DME PARACHUTE SUPPLIER NAME: NORMAL
DME PARACHUTE SUPPLIER PHONE: NORMAL

## 2023-01-18 RX ORDER — ALBUTEROL SULFATE 2.5 MG/3ML
2.5 SOLUTION RESPIRATORY (INHALATION) EVERY 4 HOURS PRN
Qty: 180 ML | Refills: 3 | Status: SHIPPED | OUTPATIENT
Start: 2023-01-18

## 2023-01-18 RX ORDER — IPRATROPIUM BROMIDE AND ALBUTEROL SULFATE 2.5; .5 MG/3ML; MG/3ML
3 SOLUTION RESPIRATORY (INHALATION) 2 TIMES DAILY
Qty: 180 ML | Refills: 1 | Status: SHIPPED | OUTPATIENT
Start: 2023-01-18

## 2023-01-18 RX ORDER — PREDNISOLONE ACETATE 10 MG/ML
SUSPENSION/ DROPS OPHTHALMIC
COMMUNITY
Start: 2022-12-22

## 2023-01-18 RX ORDER — BUDESONIDE 0.5 MG/2ML
0.5 INHALANT ORAL 4 TIMES DAILY
Qty: 100 ML | Refills: 2 | Status: SHIPPED | OUTPATIENT
Start: 2023-01-18

## 2023-01-18 RX ORDER — ESCITALOPRAM OXALATE 10 MG/1
10 TABLET ORAL
Qty: 90 TABLET | Refills: 3 | Status: SHIPPED | OUTPATIENT
Start: 2023-01-18

## 2023-01-18 NOTE — PROGRESS NOTES
Assessment and Plan:   ontinue current medications  Printed prescription for the DuoNeb to be used if the steroid nebulizer does not control current symptoms  Continue other medications  Recheck either in office or via video in 3 months  Patient needs a new nebulizer  Does much better with nebulized solution due to poor inspiratory strength  Problem List Items Addressed This Visit        Respiratory    COPD, moderate (HCC)     Slight worsening of current symptoms due to recent COVID infection  We will continue steroid via nebulizer with rescue inhaler  Patient also given a prescription for DuoNeb to be filled if excessive use of albuterol rescue nebulization  Relevant Medications    budesonide (Pulmicort) 0 5 mg/2 mL nebulizer solution    albuterol (2 5 mg/3 mL) 0 083 % nebulizer solution    ipratropium-albuterol (DUO-NEB) 0 5-2 5 mg/3 mL nebulizer solution    Chronic respiratory failure with hypoxia ([place] [address] )     Patient continues to use oxygen at 2 L  Recent COVID infection  Oxygen saturation remain slightly below his baseline from 1 year ago  Cardiovascular and Mediastinum    Paroxysmal atrial fibrillation (HCC)     Appears in sinus rhythm today  No recent episodes reported  Genitourinary    Stage 3a chronic kidney disease (HCC)       Other    Anxiety    Relevant Medications    escitalopram (LEXAPRO) 10 mg tablet    History of creation of ostomy ([facility] )     No current issues with ostomy  No blood noted          Other Visit Diagnoses     Medicare annual wellness visit, subsequent    -  Primary    Chronic obstructive pulmonary disease, unspecified COPD type ([place] [address] )        Relevant Medications    budesonide (Pulmicort) 0 5 mg/2 mL nebulizer solution    albuterol (2 5 mg/3 mL) 0 083 % nebulizer solution    ipratropium-albuterol (DUO-NEB) 0 5-2 5 mg/3 mL nebulizer solution    Depressive disorder        Relevant Medications    escitalopram (LEXAPRO) 10 mg tablet        BMI Counseling: Body mass index is 25 39 kg/m²  The BMI is above normal  Nutrition recommendations include decreasing portion sizes and moderation in carbohydrate intake  No pharmacotherapy was ordered  Rationale for BMI follow-up plan is due to patient being overweight or obese  Preventive health issues were discussed with patient, and age appropriate screening tests were ordered as noted in patient's After Visit Summary  Personalized health advice and appropriate referrals for health education or preventive services given if needed, as noted in patient's After Visit Summary  History of Present Illness:     Patient presents for a Medicare Wellness Visit    Patient comes in for medical follow-up Medicare wellness  No recent labs  1) recent COVID infection now 10 days out  Still having some difficulty with his breathing  No current fevers  Cough is nonproductive  Using his medications to nebulizer  2) COPD event uses nebulizers as he finds it difficult to use the inhalers  Also some financial concerns  3) paroxysmal atrial fibrillation-appears to be in sinus rhythm today  4) chronic kidney disease-this has been stable  5) anxiety-needs refill on his escitalopram   6) reviewed colon resection with current ostomy  Patient Care Team:  Mason Tay MD as PCP - General  De La Cruz Fabrizio, DO     Review of Systems:     Review of Systems   Constitutional: Positive for fatigue  Negative for appetite change, chills, diaphoresis and fever  HENT: Positive for congestion  Negative for ear pain, rhinorrhea, sinus pressure and sore throat  Eyes: Negative for discharge, redness and itching  Respiratory: Positive for cough and shortness of breath  Negative for wheezing  Cardiovascular: Negative for chest pain and palpitations  Rapid or slow heart rate   Gastrointestinal: Negative for diarrhea, nausea and vomiting  Musculoskeletal: Positive for arthralgias and back pain          Problem List:     Patient Active Problem List   Diagnosis   • COPD, moderate ([address] )   • Fall   • Acid reflux disease   • Enlarged prostate with lower urinary tract symptoms (LUTS)   • Anxiety   • Neck pain   • Accident due to mechanical fall without injury   • Chronic bilateral low back pain   • Chronic respiratory failure with hypoxia (HCC)   • History of pneumonia   • Hyponatremia   • Aftercare following surgery of the musculoskeletal system   • Primary localized osteoarthritis of right knee   • Primary localized osteoarthritis of left knee   • Preop pulmonary/respiratory exam   • Chronic kidney disease   • History of creation of ostomy [facility])   • Paroxysmal atrial fibrillation (HCC)   • Stage 3a chronic kidney disease (HCC)      Past Medical and Surgical History:     Past Medical History:   Diagnosis Date   • Acute blood loss anemia 12/6/2020   • Anxiety    • Bladder infection     current tx with cipro 5/29/16   • BPH (benign prostatic hyperplasia)    • Community acquired pneumonia     last assessed 8/28/17, resolved 9/25/17   • COPD (chronic obstructive pulmonary disease) ([facility] )    • Dysphagia 6/8/2020   • Enlarged prostate    • GERD (gastroesophageal reflux disease)    • History of colonoscopy 03/19/2015    POLYP IN THE ASCENDING COLON[facility]   • Hx of bladder infections    • Inguinal hernia, right 05/10/2019   • Multiple rib fractures 6/3/2020   • Neck pain    • Psychiatric disorder     depression   • Pulmonary nodule 01/08/2019    STABLE 6MM LEFT APICAL NODULE   • Respiratory failure with hypoxia ([facility] ) 01/08/2019   • Urinary retention      Past Surgical History:   Procedure Laterality Date   • BLADDER SURGERY  08/15/2019    UROLIFT   • COLOSTOMY  02/03/2011    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   • INGUINAL HERNIA REPAIR Right 05/10/2019    DONE AT [facility]   • LAPAROSCOPIC COLON RESECTION  02/03/2011    ABDOMIANOPERITONEAL RESECTION FOR RECTAL MASS   DONE BY RADHA MCDUFFIE   • NM OPTX FEM SHFT FX W/INSJ IMED IMPLT W/WO SCREW Left 2020    Procedure: Left Hip TFN;  Surgeon: Jorden Bryant MD;  Location: [facility] MAIN OR;  Service: Orthopedics   • MI RPR 1ST INGUN HRNA AGE 4 YRS/> REDUCIBLE Left 2022    Procedure: REPAIR HERNIA INGUINAL OPEN;  Surgeon: Delfin An MD;  Location: [facility] MAIN OR;  Service: General      Family History:     Family History   Problem Relation Age of Onset   • Lung cancer Mother    • Cancer Mother    • Cancer Father    • Alcohol abuse Father    • Mental illness Neg Hx       Social History:     Social History     Socioeconomic History   • Marital status: /Civil Union     Spouse name: None   • Number of children: None   • Years of education: None   • Highest education level: None   Occupational History   • None   Tobacco Use   • Smoking status: Former     Packs/day: 0 50     Years: 50 00     Pack years: 25 00     Types: Cigarettes     Start date:      Quit date: 2018     Years since quittin 8   • Smokeless tobacco: Former   • Tobacco comments:     quit 2017 per [employer]    Vaping Use   • Vaping Use: Never used   Substance and Sexual Activity   • Alcohol use: Not Currently   • Drug use: Never   • Sexual activity: None   Other Topics Concern   • None   Social History Narrative   • None     Social Determinants of Health     Financial Resource Strain: Low Risk    • Difficulty of Paying Living Expenses: Not hard at all   Food Insecurity: Not on file   Transportation Needs: No Transportation Needs   • Lack of Transportation (Medical): No   • Lack of Transportation (Non-Medical):  No   Physical Activity: Not on file   Stress: Not on file   Social Connections: Not on file   Intimate Partner Violence: Not on file   Housing Stability: Not on file      Medications and Allergies:     Current Outpatient Medications   Medication Sig Dispense Refill   • albuterol (2 5 mg/3 mL) 0 083 % nebulizer solution Take 3 mL (2 5 mg total) by nebulization every 4 (four) hours as needed for wheezing or shortness of breath 180 mL 3   • budesonide (Pulmicort) 0 5 mg/2 mL nebulizer solution Take 2 mL (0 5 mg total) by nebulization 4 (four) times a day Rinse mouth after use  100 mL 2   • escitalopram (LEXAPRO) 10 mg tablet Take 1 tablet (10 mg total) by mouth daily at bedtime 90 tablet 3   • ipratropium-albuterol (DUO-NEB) 0 5-2 5 mg/3 mL nebulizer solution Take 3 mL by nebulization 2 (two) times a day 180 mL 1   • acetaminophen (TYLENOL) 325 mg tablet Take 2 tablets (650 mg total) by mouth every 4 (four) hours as needed for mild pain 30 tablet 0   • acetaminophen (TYLENOL) 325 mg tablet Take 2 tablets (650 mg total) by mouth every 6 (six) hours as needed for mild pain or moderate pain 60 tablet 0   • albuterol (ProAir HFA) 90 mcg/act inhaler Inhale 2 puffs every 6 (six) hours as needed for wheezing 8 5 g 3   • arformoterol (BROVANA) 15 mcg/2 mL nebulizer solution Take 2 mL (15 mcg total) by nebulization 2 (two) times a day (Patient taking differently: Take 15 mcg by nebulization 4 (four) times a day) 120 mL 5   • Catheters (Gizmo Condom Catheter) MISC Use daily at bedtime 100 each 5   • famotidine (PEPCID) 40 MG tablet TAKE 1 TABLET BY MOUTH EVERY DAY 90 tablet 1   • gabapentin (NEURONTIN) 100 mg capsule TAKE 1 CAPSULE BY MOUTH TWICE A  capsule 1   • Incontinence Supply Disposable (PadSORBer Bed Pan Liners) MISC Use daily at bedtime 50 each 10   • NON FORMULARY Take 1,000 mg by mouth 2 (two) times a day Med is MSM Pure, pt brought in from home     • prednisoLONE acetate (PRED FORTE) 1 % ophthalmic suspension      • tamsulosin (FLOMAX) 0 4 mg TAKE 1 CAPSULE BY MOUTH EVERY DAY WITH DINNER 30 capsule 5     No current facility-administered medications for this visit       Allergies   Allergen Reactions   • Aspirin Other (See Comments)     Hx stomach ulcer   • Bactrim [Sulfamethoxazole-Trimethoprim] GI Intolerance      Immunizations:     Immunization History   Administered Date(s) Administered   • COVID-19 MODERNA VACC 0 5 ML IM 02/12/2021, 03/12/2021   • INFLUENZA 11/18/2013, 10/26/2014, 09/17/2016, 11/21/2017   • Influenza Split High Dose Preservative Free IM 11/17/2015   • Influenza, high dose seasonal 0 7 mL 11/27/2018, 09/12/2019, 09/30/2020, 11/24/2021   • Influenza, seasonal, injectable 11/16/2012, 11/18/2013   • Pneumococcal Conjugate 13-Valent 11/17/2015, 07/15/2016   • Pneumococcal Polysaccharide PPV23 11/12/2012, 11/24/2021      Health Maintenance:         Topic Date Due   • Colorectal Cancer Screening  04/24/2021         Topic Date Due   • COVID-19 Vaccine (3 - Booster for Moderna series) 05/07/2021   • Influenza Vaccine (1) 09/01/2022      Medicare Screening Tests and Risk Assessments:     Bayron Sauceda is here for his Subsequent Wellness visit  Last Medicare Wellness visit information reviewed, patient interviewed, no change since last AWV  Health Risk Assessment:   Patient rates overall health as fair  Patient feels that their physical health rating is slightly worse  Patient is satisfied with their life  Eyesight was rated as same  Hearing was rated as same  Patients states they are never, rarely angry  Patient states they are often unusually tired/fatigued  Pain experienced in the last 7 days has been a lot  Patient's pain rating has been 6/10  Patient states that he has experienced no weight loss or gain in last 6 months  Arthritis pain    Fall Risk Screening: In the past year, patient has experienced: no history of falling in past year      Home Safety:  Patient has trouble with stairs inside or outside of their home  Patient has working smoke alarms and has working carbon monoxide detector  Home safety hazards include: none  Nutrition:   Current diet is Regular  Medications:   Patient is currently taking over-the-counter supplements  OTC medications include: see medication list  Patient is able to manage medications       Activities of Daily Living (ADLs)/Instrumental Activities of Daily Living (IADLs):   Walk and transfer into and out of bed and chair?: Yes  Dress and groom yourself?: Yes    Bathe or shower yourself?: Yes    Feed yourself? Yes  Do your laundry/housekeeping?: Yes  Manage your money, pay your bills and track your expenses?: Yes  Make your own meals?: Yes    Do your own shopping?: No    Previous Hospitalizations:   Any hospitalizations or ED visits within the last 12 months?: No      Advance Care Planning:   Living will: Yes    Durable POA for healthcare: Yes    Advanced directive: Yes    Provider agrees with end of life decisions: Yes      Cognitive Screening:   Provider or family/friend/caregiver concerned regarding cognition?: No    PREVENTIVE SCREENINGS      Cardiovascular Screening:    General: Screening Current      Diabetes Screening:     General: Screening Current      Prostate Cancer Screening:    General: Screening Not Indicated      Osteoporosis Screening:    General: Screening Not Indicated      Abdominal Aortic Aneurysm (AAA) Screening:    Risk factors include: tobacco use        General: Screening Not Indicated      Lung Cancer Screening:     General: Screening Not Indicated      Hepatitis C Screening:    General: Screening Not Indicated    Screening, Brief Intervention, and Referral to Treatment (SBIRT)    Screening  Typical number of drinks in a day: 0  Typical number of drinks in a week: 0  Interpretation: Low risk drinking behavior  Single Item Drug Screening:  How often have you used an illegal drug (including marijuana) or a prescription medication for non-medical reasons in the past year? never    Single Item Drug Screen Score: 0  Interpretation: Negative screen for possible drug use disorder    Brief Intervention  Alcohol & drug use screenings were reviewed  No concerns regarding substance use disorder identified  Other Counseling Topics:   Car/seat belt/driving safety and regular weightbearing exercise  No results found       Physical Exam: /65 (BP Location: Left arm, Patient Position: Sitting, Cuff Size: Standard)   Pulse 88   Temp (!) 97 3 °F (36 3 °C)   Resp 16   Ht 5' 8" (1 727 m)   Wt 75 8 kg (167 lb)   SpO2 93% Comment: 4 liters nasal cannula  BMI 25 39 kg/m²     Physical Exam  Vitals reviewed  Constitutional:       General: He is not in acute distress  Appearance: Normal appearance  He is not ill-appearing  HENT:      Head: Normocephalic and atraumatic  Nose: Nose normal  No congestion  Eyes:      Extraocular Movements: Extraocular movements intact  Pupils: Pupils are equal, round, and reactive to light  Cardiovascular:      Rate and Rhythm: Normal rate and regular rhythm  Heart sounds: Normal heart sounds  Pulmonary:      Effort: Pulmonary effort is normal  No respiratory distress  Breath sounds: Examination of the right-lower field reveals decreased breath sounds  Examination of the left-lower field reveals decreased breath sounds  Decreased breath sounds present  No wheezing, rhonchi or rales  Musculoskeletal:      Right lower leg: No edema  Left lower leg: No edema  Neurological:      Mental Status: He is alert and oriented to person, place, and time     Psychiatric:         Mood and Affect: Mood normal          Behavior: Behavior normal           Yifan Durand MD

## 2023-01-18 NOTE — ASSESSMENT & PLAN NOTE
Slight worsening of current symptoms due to recent COVID infection  We will continue steroid via nebulizer with rescue inhaler  Patient also given a prescription for DuoNeb to be filled if excessive use of albuterol rescue nebulization

## 2023-01-18 NOTE — ASSESSMENT & PLAN NOTE
Patient continues to use oxygen at 2 L  Recent COVID infection  Oxygen saturation remain slightly below his baseline from 1 year ago

## 2023-01-18 NOTE — PATIENT INSTRUCTIONS
Continue current medications  Printed prescription for the DuoNeb to be used if the steroid nebulizer does not control current symptoms  Continue other medications  Recheck either in office or via video in 3 months  Patient needs a new nebulizer  Does much better with nebulized solution due to poor inspiratory strength  Medicare Preventive Visit Patient Instructions  Thank you for completing your Welcome to Medicare Visit or Medicare Annual Wellness Visit today  Your next wellness visit will be due in one year (1/19/2024)  The screening/preventive services that you may require over the next 5-10 years are detailed below  Some tests may not apply to you based off risk factors and/or age  Screening tests ordered at today's visit but not completed yet may show as past due  Also, please note that scanned in results may not display below  Preventive Screenings:  Service Recommendations Previous Testing/Comments   Colorectal Cancer Screening  Colonoscopy    Fecal Occult Blood Test (FOBT)/Fecal Immunochemical Test (FIT)  Fecal DNA/Cologuard Test  Flexible Sigmoidoscopy Age: 39-70 years old   Colonoscopy: every 10 years (May be performed more frequently if at higher risk)  OR  FOBT/FIT: every 1 year  OR  Cologuard: every 3 years  OR  Sigmoidoscopy: every 5 years  Screening may be recommended earlier than age 39 if at higher risk for colorectal cancer  Also, an individualized decision between you and your healthcare provider will decide whether screening between the ages of 74-80 would be appropriate   Colonoscopy: 04/24/2018  FOBT/FIT: Not on file  Cologuard: Not on file  Sigmoidoscopy: Not on file          Prostate Cancer Screening Individualized decision between patient and health care provider in men between ages of 53-78   Medicare will cover every 12 months beginning on the day after your 50th birthday PSA: No results in last 5 years           Hepatitis C Screening Once for adults born between 80 and 1965  More frequently in patients at high risk for Hepatitis C Hep C Antibody: Not on file        Diabetes Screening 1-2 times per year if you're at risk for diabetes or have pre-diabetes Fasting glucose: 83 mg/dL (3/12/2020)  A1C: 5 5 % (3/23/2022)      Cholesterol Screening Once every 5 years if you don't have a lipid disorder  May order more often based on risk factors  Lipid panel: 08/06/2018         Other Preventive Screenings Covered by Medicare:  Abdominal Aortic Aneurysm (AAA) Screening: covered once if your at risk  You're considered to be at risk if you have a family history of AAA or a male between the age of 73-68 who smoking at least 100 cigarettes in your lifetime  Lung Cancer Screening: covers low dose CT scan once per year if you meet all of the following conditions: (1) Age 50-69; (2) No signs or symptoms of lung cancer; (3) Current smoker or have quit smoking within the last 15 years; (4) You have a tobacco smoking history of at least 20 pack years (packs per day x number of years you smoked); (5) You get a written order from a healthcare provider  Glaucoma Screening: covered annually if you're considered high risk: (1) You have diabetes OR (2) Family history of glaucoma OR (3)  aged 48 and older OR (3)  American aged 72 and older  Osteoporosis Screening: covered every 2 years if you meet one of the following conditions: (1) Have a vertebral abnormality; (2) On glucocorticoid therapy for more than 3 months; (3) Have primary hyperparathyroidism; (4) On osteoporosis medications and need to assess response to drug therapy  HIV Screening: covered annually if you're between the age of 12-76  Also covered annually if you are younger than 13 and older than 72 with risk factors for HIV infection  For pregnant patients, it is covered up to 3 times per pregnancy      Immunizations:  Immunization Recommendations   Influenza Vaccine Annual influenza vaccination during flu season is recommended for all persons aged >= 6 months who do not have contraindications   Pneumococcal Vaccine   * Pneumococcal conjugate vaccine = PCV13 (Prevnar 13), PCV15 (Vaxneuvance), PCV20 (Prevnar 20)  * Pneumococcal polysaccharide vaccine = PPSV23 (Pneumovax) Adults 2364 years old: 1-3 doses may be recommended based on certain risk factors  Adults 72 years old: 1-2 doses may be recommended based off what pneumonia vaccine you previously received   Hepatitis B Vaccine 3 dose series if at intermediate or high risk (ex: diabetes, end stage renal disease, liver disease)   Tetanus (Td) Vaccine - COST NOT COVERED BY MEDICARE PART B Following completion of primary series, a booster dose should be given every 10 years to maintain immunity against tetanus  Td may also be given as tetanus wound prophylaxis  Tdap Vaccine - COST NOT COVERED BY MEDICARE PART B Recommended at least once for all adults  For pregnant patients, recommended with each pregnancy  Shingles Vaccine (Shingrix) - COST NOT COVERED BY MEDICARE PART B  2 shot series recommended in those aged 48 and above     Health Maintenance Due:      Topic Date Due    Colorectal Cancer Screening  04/24/2021     Immunizations Due:      Topic Date Due    COVID-19 Vaccine (3 - Booster for Moderna series) 05/07/2021    Influenza Vaccine (1) 09/01/2022     Advance Directives   What are advance directives? Advance directives are legal documents that state your wishes and plans for medical care  These plans are made ahead of time in case you lose your ability to make decisions for yourself  Advance directives can apply to any medical decision, such as the treatments you want, and if you want to donate organs  What are the types of advance directives? There are many types of advance directives, and each state has rules about how to use them  You may choose a combination of any of the following:  Living will: This is a written record of the treatment you want   You can also choose which treatments you do not want, which to limit, and which to stop at a certain time  This includes surgery, medicine, IV fluid, and tube feedings  [facility] power of  for healthcare [facility]): This is a written record that states who you want to make healthcare choices for you when you are unable to make them for yourself  This person, called a proxy, is usually a family member or a friend  You may choose more than 1 proxy  Do not resuscitate (DNR) order:  A DNR order is used in case your heart stops beating or you stop breathing  It is a request not to have certain forms of treatment, such as CPR  A DNR order may be included in other types of advance directives  Medical directive: This covers the care that you want if you are in a coma, near death, or unable to make decisions for yourself  You can list the treatments you want for each condition  Treatment may include pain medicine, surgery, blood transfusions, dialysis, IV or tube feedings, and a ventilator (breathing machine)  Values history: This document has questions about your views, beliefs, and how you feel and think about life  This information can help others choose the care that you would choose  Why are advance directives important? An advance directive helps you control your care  Although spoken wishes may be used, it is better to have your wishes written down  Spoken wishes can be misunderstood, or not followed  Treatments may be given even if you do not want them  An advance directive may make it easier for your family to make difficult choices about your care  Weight Management   Why it is important to manage your weight:  Being overweight increases your risk of health conditions such as heart disease, high blood pressure, type 2 diabetes, and certain types of cancer  It can also increase your risk for osteoarthritis, sleep apnea, and other respiratory problems  Aim for a slow, steady weight loss   Even a small amount of weight loss can lower your risk of health problems  How to lose weight safely:  A safe and healthy way to lose weight is to eat fewer calories and get regular exercise  You can lose up about 1 pound a week by decreasing the number of calories you eat by 500 calories each day  Healthy meal plan for weight management:  A healthy meal plan includes a variety of foods, contains fewer calories, and helps you stay healthy  A healthy meal plan includes the following:  Eat whole-grain foods more often  A healthy meal plan should contain fiber  Fiber is the part of grains, fruits, and vegetables that is not broken down by your body  Whole-grain foods are healthy and provide extra fiber in your diet  Some examples of whole-grain foods are whole-wheat breads and pastas, oatmeal, brown rice, and bulgur  Eat a variety of vegetables every day  Include dark, leafy greens such as spinach, kale, dakotah greens, and mustard greens  Eat yellow and orange vegetables such as carrots, sweet potatoes, and winter squash  Eat a variety of fruits every day  Choose fresh or canned fruit (canned in its own juice or light syrup) instead of juice  Fruit juice has very little or no fiber  Eat low-fat dairy foods  Drink fat-free (skim) milk or 1% milk  Eat fat-free yogurt and low-fat cottage cheese  Try low-fat cheeses such as mozzarella and other reduced-fat cheeses  Choose meat and other protein foods that are low in fat  Choose beans or other legumes such as split peas or lentils  Choose fish, skinless poultry (chicken or turkey), or lean cuts of red meat (beef or pork)  Before you cook meat or poultry, cut off any visible fat  Use less fat and oil  Try baking foods instead of frying them  Add less fat, such as margarine, sour cream, regular salad dressing and mayonnaise to foods  Eat fewer high-fat foods  Some examples of high-fat foods include french fries, doughnuts, ice cream, and cakes  Eat fewer sweets    Limit foods and drinks that are high in sugar  This includes candy, cookies, regular soda, and sweetened drinks  Exercise:  Exercise at least 30 minutes per day on most days of the week  Some examples of exercise include walking, biking, dancing, and swimming  You can also fit in more physical activity by taking the stairs instead of the elevator or parking farther away from stores  Ask your healthcare provider about the best exercise plan for you  © Copyright [employer] 2018 Information is for End User's use only and may not be sold, redistributed or otherwise used for commercial purposes   All illustrations and images included in CareNotes® are the copyrighted property of A D A M , Inc  or [address] [place] [address]

## 2023-01-19 ENCOUNTER — TELEPHONE (OUTPATIENT)
Dept: PULMONOLOGY | Facility: HOSPITAL | Age: 83
End: 2023-01-19

## 2023-01-19 NOTE — TELEPHONE ENCOUNTER
Attempted to contact patient to schedule a 6 month follow up at our [facility] office with [facility] but phone disconnected with no voicemail option  Mailing patient a reminder letter to schedule follow up

## 2023-01-21 ENCOUNTER — APPOINTMENT (EMERGENCY)
Dept: CT IMAGING | Facility: HOSPITAL | Age: 83
End: 2023-01-21

## 2023-01-21 ENCOUNTER — APPOINTMENT (EMERGENCY)
Dept: RADIOLOGY | Facility: HOSPITAL | Age: 83
End: 2023-01-21

## 2023-01-21 ENCOUNTER — HOSPITAL ENCOUNTER (EMERGENCY)
Facility: HOSPITAL | Age: 83
End: 2023-01-22
Attending: EMERGENCY MEDICINE

## 2023-01-21 DIAGNOSIS — J98.09 BRONCHIAL OBSTRUCTION: ICD-10-CM

## 2023-01-21 DIAGNOSIS — R06.00 DYSPNEA: Primary | ICD-10-CM

## 2023-01-21 DIAGNOSIS — N39.0 UTI (URINARY TRACT INFECTION): ICD-10-CM

## 2023-01-21 DIAGNOSIS — J44.9 COPD, MODERATE (HCC): ICD-10-CM

## 2023-01-21 PROBLEM — R91.8 HILAR MASS: Status: ACTIVE | Noted: 2023-01-21

## 2023-01-21 PROBLEM — J96.21 ACUTE ON CHRONIC RESPIRATORY FAILURE WITH HYPOXIA (HCC): Status: ACTIVE | Noted: 2019-01-10

## 2023-01-21 LAB
2HR DELTA HS TROPONIN: -1 NG/L
ALBUMIN SERPL BCP-MCNC: 2.9 G/DL (ref 3.5–5)
ALP SERPL-CCNC: 70 U/L (ref 46–116)
ALT SERPL W P-5'-P-CCNC: 27 U/L (ref 12–78)
ANION GAP SERPL CALCULATED.3IONS-SCNC: 2 MMOL/L (ref 4–13)
APTT PPP: 29 SECONDS (ref 23–37)
AST SERPL W P-5'-P-CCNC: 25 U/L (ref 5–45)
BACTERIA UR QL AUTO: ABNORMAL /HPF
BASOPHILS # BLD AUTO: 0.02 THOUSANDS/ÂΜL (ref 0–0.1)
BASOPHILS NFR BLD AUTO: 0 % (ref 0–1)
BILIRUB SERPL-MCNC: 0.7 MG/DL (ref 0.2–1)
BILIRUB UR QL STRIP: NEGATIVE
BUN SERPL-MCNC: 16 MG/DL (ref 5–25)
CALCIUM ALBUM COR SERPL-MCNC: 9.1 MG/DL (ref 8.3–10.1)
CALCIUM SERPL-MCNC: 8.2 MG/DL (ref 8.3–10.1)
CARDIAC TROPONIN I PNL SERPL HS: 6 NG/L
CARDIAC TROPONIN I PNL SERPL HS: 7 NG/L
CHLORIDE SERPL-SCNC: 101 MMOL/L (ref 96–108)
CLARITY UR: ABNORMAL
CO2 SERPL-SCNC: 30 MMOL/L (ref 21–32)
COLOR UR: YELLOW
CREAT SERPL-MCNC: 1.02 MG/DL (ref 0.6–1.3)
D DIMER PPP FEU-MCNC: 1.54 UG/ML FEU
EOSINOPHIL # BLD AUTO: 0.05 THOUSAND/ÂΜL (ref 0–0.61)
EOSINOPHIL NFR BLD AUTO: 0 % (ref 0–6)
ERYTHROCYTE [DISTWIDTH] IN BLOOD BY AUTOMATED COUNT: 12.6 % (ref 11.6–15.1)
GFR SERPL CREATININE-BSD FRML MDRD: 67 ML/MIN/1.73SQ M
GLUCOSE SERPL-MCNC: 96 MG/DL (ref 65–140)
GLUCOSE UR STRIP-MCNC: NEGATIVE MG/DL
HCT VFR BLD AUTO: 39.5 % (ref 36.5–49.3)
HGB BLD-MCNC: 12.8 G/DL (ref 12–17)
HGB UR QL STRIP.AUTO: ABNORMAL
IMM GRANULOCYTES # BLD AUTO: 0.08 THOUSAND/UL (ref 0–0.2)
IMM GRANULOCYTES NFR BLD AUTO: 1 % (ref 0–2)
INR PPP: 1.12 (ref 0.84–1.19)
KETONES UR STRIP-MCNC: NEGATIVE MG/DL
L PNEUMO1 AG UR QL IA.RAPID: NEGATIVE
LACTATE SERPL-SCNC: 0.8 MMOL/L (ref 0.5–2)
LEUKOCYTE ESTERASE UR QL STRIP: ABNORMAL
LYMPHOCYTES # BLD AUTO: 1.11 THOUSANDS/ÂΜL (ref 0.6–4.47)
LYMPHOCYTES NFR BLD AUTO: 7 % (ref 14–44)
MCH RBC QN AUTO: 33.1 PG (ref 26.8–34.3)
MCHC RBC AUTO-ENTMCNC: 32.4 G/DL (ref 31.4–37.4)
MCV RBC AUTO: 102 FL (ref 82–98)
MONOCYTES # BLD AUTO: 1.02 THOUSAND/ÂΜL (ref 0.17–1.22)
MONOCYTES NFR BLD AUTO: 7 % (ref 4–12)
NEUTROPHILS # BLD AUTO: 12.96 THOUSANDS/ÂΜL (ref 1.85–7.62)
NEUTS SEG NFR BLD AUTO: 85 % (ref 43–75)
NITRITE UR QL STRIP: NEGATIVE
NON-SQ EPI CELLS URNS QL MICRO: ABNORMAL /HPF
NRBC BLD AUTO-RTO: 0 /100 WBCS
NT-PROBNP SERPL-MCNC: 458 PG/ML
PH UR STRIP.AUTO: 6 [PH]
PLATELET # BLD AUTO: 273 THOUSANDS/UL (ref 149–390)
PMV BLD AUTO: 9.1 FL (ref 8.9–12.7)
POTASSIUM SERPL-SCNC: 4.2 MMOL/L (ref 3.5–5.3)
PROCALCITONIN SERPL-MCNC: <0.05 NG/ML
PROT SERPL-MCNC: 7.6 G/DL (ref 6.4–8.4)
PROT UR STRIP-MCNC: ABNORMAL MG/DL
PROTHROMBIN TIME: 15.2 SECONDS (ref 11.6–14.5)
RBC # BLD AUTO: 3.87 MILLION/UL (ref 3.88–5.62)
RBC #/AREA URNS AUTO: ABNORMAL /HPF
S PNEUM AG UR QL: NEGATIVE
SODIUM SERPL-SCNC: 133 MMOL/L (ref 135–147)
SP GR UR STRIP.AUTO: <1.005 (ref 1–1.03)
UROBILINOGEN UR STRIP-ACNC: <2 MG/DL
WBC # BLD AUTO: 15.24 THOUSAND/UL (ref 4.31–10.16)
WBC #/AREA URNS AUTO: ABNORMAL /HPF

## 2023-01-21 RX ORDER — ACETAMINOPHEN 325 MG/1
650 TABLET ORAL ONCE
Status: COMPLETED | OUTPATIENT
Start: 2023-01-21 | End: 2023-01-21

## 2023-01-21 RX ORDER — CEFTRIAXONE 1 G/50ML
1000 INJECTION, SOLUTION INTRAVENOUS EVERY 24 HOURS
Status: DISCONTINUED | OUTPATIENT
Start: 2023-01-22 | End: 2023-01-22 | Stop reason: HOSPADM

## 2023-01-21 RX ORDER — SODIUM CHLORIDE FOR INHALATION 3 %
4 VIAL, NEBULIZER (ML) INHALATION
Status: DISCONTINUED | OUTPATIENT
Start: 2023-01-22 | End: 2023-01-22 | Stop reason: HOSPADM

## 2023-01-21 RX ORDER — SODIUM CHLORIDE FOR INHALATION 3 %
4 VIAL, NEBULIZER (ML) INHALATION 3 TIMES DAILY
Status: DISCONTINUED | OUTPATIENT
Start: 2023-01-21 | End: 2023-01-21

## 2023-01-21 RX ORDER — PREDNISONE 20 MG/1
40 TABLET ORAL DAILY
Status: DISCONTINUED | OUTPATIENT
Start: 2023-01-21 | End: 2023-01-22 | Stop reason: HOSPADM

## 2023-01-21 RX ORDER — ONDANSETRON 2 MG/ML
4 INJECTION INTRAMUSCULAR; INTRAVENOUS ONCE
Status: COMPLETED | OUTPATIENT
Start: 2023-01-21 | End: 2023-01-21

## 2023-01-21 RX ORDER — ESCITALOPRAM OXALATE 10 MG/1
10 TABLET ORAL
Status: DISCONTINUED | OUTPATIENT
Start: 2023-01-21 | End: 2023-01-22 | Stop reason: HOSPADM

## 2023-01-21 RX ORDER — HEPARIN SODIUM 5000 [USP'U]/ML
5000 INJECTION, SOLUTION INTRAVENOUS; SUBCUTANEOUS EVERY 8 HOURS SCHEDULED
Status: DISCONTINUED | OUTPATIENT
Start: 2023-01-21 | End: 2023-01-22 | Stop reason: HOSPADM

## 2023-01-21 RX ORDER — PANTOPRAZOLE SODIUM 40 MG/1
40 TABLET, DELAYED RELEASE ORAL
Status: DISCONTINUED | OUTPATIENT
Start: 2023-01-22 | End: 2023-01-22 | Stop reason: HOSPADM

## 2023-01-21 RX ORDER — CEFTRIAXONE 2 G/50ML
2000 INJECTION, SOLUTION INTRAVENOUS ONCE
Status: COMPLETED | OUTPATIENT
Start: 2023-01-21 | End: 2023-01-21

## 2023-01-21 RX ORDER — LEVALBUTEROL INHALATION SOLUTION 0.63 MG/3ML
0.63 SOLUTION RESPIRATORY (INHALATION)
Status: DISCONTINUED | OUTPATIENT
Start: 2023-01-21 | End: 2023-01-22 | Stop reason: HOSPADM

## 2023-01-21 RX ORDER — SODIUM CHLORIDE 9 MG/ML
75 INJECTION, SOLUTION INTRAVENOUS CONTINUOUS
Status: DISCONTINUED | OUTPATIENT
Start: 2023-01-21 | End: 2023-01-22 | Stop reason: HOSPADM

## 2023-01-21 RX ADMIN — HEPARIN SODIUM 5000 UNITS: 5000 INJECTION INTRAVENOUS; SUBCUTANEOUS at 21:11

## 2023-01-21 RX ADMIN — SODIUM CHLORIDE SOLN NEBU 3% 4 ML: 3 NEBU SOLN at 11:38

## 2023-01-21 RX ADMIN — ACETAMINOPHEN 650 MG: 325 TABLET ORAL at 11:15

## 2023-01-21 RX ADMIN — IPRATROPIUM BROMIDE 0.5 MG: 0.5 SOLUTION RESPIRATORY (INHALATION) at 10:43

## 2023-01-21 RX ADMIN — IPRATROPIUM BROMIDE 0.5 MG: 0.5 SOLUTION RESPIRATORY (INHALATION) at 19:26

## 2023-01-21 RX ADMIN — SODIUM CHLORIDE 75 ML/HR: 9 INJECTION, SOLUTION INTRAVENOUS at 17:13

## 2023-01-21 RX ADMIN — ONDANSETRON 4 MG: 2 INJECTION INTRAMUSCULAR; INTRAVENOUS at 09:15

## 2023-01-21 RX ADMIN — SODIUM CHLORIDE 500 ML: 0.9 INJECTION, SOLUTION INTRAVENOUS at 07:44

## 2023-01-21 RX ADMIN — SODIUM CHLORIDE SOLN NEBU 3% 4 ML: 3 NEBU SOLN at 17:00

## 2023-01-21 RX ADMIN — LEVALBUTEROL HYDROCHLORIDE 0.63 MG: 0.63 SOLUTION RESPIRATORY (INHALATION) at 10:43

## 2023-01-21 RX ADMIN — IOHEXOL 85 ML: 350 INJECTION, SOLUTION INTRAVENOUS at 06:44

## 2023-01-21 RX ADMIN — PREDNISONE 40 MG: 20 TABLET ORAL at 11:15

## 2023-01-21 RX ADMIN — AZITHROMYCIN MONOHYDRATE 500 MG: 500 INJECTION, POWDER, LYOPHILIZED, FOR SOLUTION INTRAVENOUS at 10:30

## 2023-01-21 RX ADMIN — CEFTRIAXONE 2000 MG: 2 INJECTION, SOLUTION INTRAVENOUS at 09:11

## 2023-01-21 RX ADMIN — LEVALBUTEROL HYDROCHLORIDE 0.63 MG: 0.63 SOLUTION RESPIRATORY (INHALATION) at 19:26

## 2023-01-21 RX ADMIN — ESCITALOPRAM OXALATE 10 MG: 10 TABLET ORAL at 21:11

## 2023-01-21 NOTE — CONSULTS
[address] [place] [address] 1940, 79 y o  male MRN: [id number]  Unit/Bed#: ED 01 Encounter: [id number]  Primary Care Provider: Percy Rai MD   Date and time admitted to hospital: 1/21/2023  5:41 AM    Consult to internal medicine  Consult performed by: Liana Macias MD  Consult ordered by: Sanjuana Correa DO          Hilar mass  Assessment & Plan  Patient CT chest showed-Right hilar mass obstructing right middle lobe bronchus resulting in complete post obstructive right middle lobe collapse  Mediastinal and right hilar lymphadenopathy  Emphysema    Pulmonary consult appreciated  Urine culture, streptococcal and Legionella urine antigen  Follow-up blood culture results  On Rocephin and Zithromax  Pulmonary recommended transfer to [facility] for bronchoscopy, EBUS  Patient is awaiting bed placement at [place] [address]    Stage 3a chronic kidney disease [facility])  Assessment & Plan  Lab Results   Component Value Date    EGFR 67 01/21/2023    EGFR 58 03/23/2022    EGFR 66 02/03/2021    CREATININE 1 02 01/21/2023    CREATININE 1 16 03/23/2022    CREATININE 1 05 02/03/2021   Has CKD stage III  At baseline    Acute on chronic respiratory failure with hypoxia [facility])  Assessment & Plan  Patient has chronic respiratory failure and is on 2 L of supplemental oxygen at home  Patient presented to the ER with shortness of breath, tachypnea and requiring 4 L of supplemental oxygen  Secondary to possible postobstructive pneumonia/COPD exacerbation  Continue antibiotics and steroid  Wean oxygen as tolerated    Acid reflux disease  Assessment & Plan  On Protonix    UTI (urinary tract infection)  Assessment & Plan  On Rocephin  Follow-up culture results  Trend WBC and fever curve    COPD, moderate (HCC)  Assessment & Plan  History of emphysema/COPD  Started on prednisone  Bronchodilators  Oxygen supplementation        VTE Prophylaxis:   Moderate Risk (Score 3-4) - Pharmacological DVT Prophylaxis Ordered: heparin  Counseling / Coordination of Care Time: 30 minutes Greater than 50% of total time spent on patient counseling and coordination of care  Collaboration of Care: Were Recommendations Directly Discussed with Primary Treatment Team? Yes    History of Present Illness:  Nevin Rios is a 79 y o  male with PMH COPD on chronic 2 L, BPH, GERD, recent COVID infection who presented to the emergency department for shortness of breath and increased fatigue  He was found to have a right hilar obstructing mass on CTA with right middle lobe collapse  He was evaluated pulmonology who recommended transfer to [facility] for bronchoscopy and/or EBUS  We are consulted for medical management while awaiting transfer  On arrival to the ED, he is requiring 4 L via nasal cannula  Additionally, his UA is positive for infection he was started on Rocephin  Of note, patient had a recent COVID infection and completed a course of Paxil bed  Review of Systems:  Review of Systems   Constitutional: Positive for fatigue  Negative for appetite change, chills and fever  HENT: Negative for ear pain, sore throat and trouble swallowing  Eyes: Negative for visual disturbance  Respiratory: Positive for cough (non Productive) and shortness of breath  Negative for chest tightness and wheezing  Cardiovascular: Negative for chest pain, palpitations and leg swelling  Gastrointestinal: Negative for abdominal distention, abdominal pain, diarrhea, nausea and vomiting  Endocrine: Negative  Genitourinary: Negative for dysuria, flank pain and hematuria  Musculoskeletal: Negative for arthralgias, gait problem and myalgias  Skin: Negative for pallor  Allergic/Immunologic: Negative for immunocompromised state  Neurological: Negative for dizziness, syncope, light-headedness, numbness and headaches         Past Medical and Surgical History:   Past Medical History:   Diagnosis Date   • Acute blood loss anemia 12/6/2020   • Anxiety    • Bladder infection     current tx with cipro 5/29/16   • BPH (benign prostatic hyperplasia)    • Community acquired pneumonia     last assessed 8/28/17, resolved 9/25/17   • COPD (chronic obstructive pulmonary disease) ([facility] [address] )    • Dysphagia 6/8/2020   • Enlarged prostate    • GERD (gastroesophageal reflux disease)    • History of colonoscopy 03/19/2015    POLYP IN THE ASCENDING COLON-[facility]   • Hx of bladder infections    • Inguinal hernia, right 05/10/2019   • Multiple rib fractures 6/3/2020   • Neck pain    • Psychiatric disorder     depression   • Pulmonary nodule 01/08/2019    STABLE 6MM LEFT APICAL NODULE   • Respiratory failure with hypoxia ([facility] [address] ) 01/08/2019   • Urinary retention        Past Surgical History:   Procedure Laterality Date   • BLADDER SURGERY  08/15/2019    UROLIFT   • COLOSTOMY  02/03/2011    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   • INGUINAL HERNIA REPAIR Right 05/10/2019    DONE AT [facility]   • LAPAROSCOPIC COLON RESECTION  02/03/2011    ABDOMIANOPERITONEAL RESECTION FOR RECTAL MASS  DONE BY RADHA MCDUFFIE   • DC OPTX FEM SHFT FX W/INSJ IMED IMPLT W/WO SCREW Left 12/7/2020    Procedure: Left Hip TFN;  Surgeon: Lucy Rodriguez MD;  Location:  MAIN OR;  Service: Orthopedics   • DC RPR 1ST INGUN HRNA AGE 5 YRS/> REDUCIBLE Left 4/5/2022    Procedure: REPAIR HERNIA INGUINAL OPEN;  Surgeon: Dennise Serrato MD;  Location: [facility] MAIN OR;  Service: General       Meds/Allergies:  all medications and allergies reviewed    Allergies:    Allergies   Allergen Reactions   • Aspirin Other (See Comments)     Hx stomach ulcer   • Bactrim [Sulfamethoxazole-Trimethoprim] GI Intolerance       Social History:  Marital Status: /Civil Union  Substance Use History:   Social History     Substance and Sexual Activity   Alcohol Use Not Currently     Social History     Tobacco Use   Smoking Status Former   • Packs/day: 0 50   • Years: 50 00   • Pack years: 25 00   • Types: Cigarettes   • Start date: 64   • Quit date: 2018   • Years since quittin 8   Smokeless Tobacco Former   Tobacco Comments    quit 2017 per [employer]      Social History     Substance and Sexual Activity   Drug Use Never       Family History:  Family History   Problem Relation Age of Onset   • Lung cancer Mother    • Cancer Mother    • Cancer Father    • Alcohol abuse Father    • Mental illness Neg Hx        Physical Exam:   Vitals:   Blood Pressure: 99/55 (23 1544)  Pulse: 63 (23 1544)  Temperature: 98 4 °F (36 9 °C) (23)  Temp Source: Oral (23)  Respirations: 18 (23 1544)  Height: 5' 8" (172 7 cm) (23)  Weight - Scale: 75 8 kg (167 lb) (23)  SpO2: 94 % (23 1544)    Physical Exam  Vitals and nursing note reviewed  Constitutional:       Appearance: Normal appearance  HENT:      Head: Normocephalic and atraumatic  Mouth/Throat:      Mouth: Mucous membranes are moist       Pharynx: Oropharynx is clear  No oropharyngeal exudate  Eyes:      Extraocular Movements: Extraocular movements intact  Cardiovascular:      Rate and Rhythm: Normal rate and regular rhythm  Pulses: Normal pulses  Heart sounds: Normal heart sounds  No murmur heard  No friction rub  No gallop  Pulmonary:      Effort: Pulmonary effort is normal  No respiratory distress  Breath sounds: No stridor  Wheezing (mild expiratory) present  No rales  Comments: Initial breath sounds bilaterally R>L  Abdominal:      General: Abdomen is flat  Bowel sounds are normal  There is no distension  Palpations: Abdomen is soft  Tenderness: There is no abdominal tenderness  Musculoskeletal:      Right lower leg: No edema  Left lower leg: No edema  Skin:     General: Skin is warm and dry  Neurological:      General: No focal deficit present        Mental Status: He is alert and oriented to person, place, and time           Additional Data:   Lab Results:    Results from last 7 days   Lab Units 01/21/23  0543   WBC Thousand/uL 15 24*   HEMOGLOBIN g/dL 12 8   HEMATOCRIT % 39 5   PLATELETS Thousands/uL 273   NEUTROS PCT % 85*   LYMPHS PCT % 7*   MONOS PCT % 7   EOS PCT % 0     Results from last 7 days   Lab Units 01/21/23  0543   SODIUM mmol/L 133*   POTASSIUM mmol/L 4 2   CHLORIDE mmol/L 101   CO2 mmol/L 30   BUN mg/dL 16   CREATININE mg/dL 1 02   ANION GAP mmol/L 2*   CALCIUM mg/dL 8 2*   ALBUMIN g/dL 2 9*   TOTAL BILIRUBIN mg/dL 0 70   ALK PHOS U/L 70   ALT U/L 27   AST U/L 25   GLUCOSE RANDOM mg/dL 96     Results from last 7 days   Lab Units 01/21/23  0543   INR  1 12         Lab Results   Component Value Date/Time    HGBA1C 5 5 03/23/2022 12:01 PM         Results from last 7 days   Lab Units 01/21/23  0543   LACTIC ACID mmol/L 0 8   PROCALCITONIN ng/ml <0 05       Imaging: Personally reviewed the following imaging: CTA chest  CTA ED chest PE Study   Final Result by Nicolette Rayo MD (01/21 2054)      No pulmonary embolus  Right hilar mass obstructing right middle lobe bronchus resulting in complete post obstructive right middle lobe collapse  Mediastinal and right hilar lymphadenopathy  Emphysema  This examination was marked "immediate notification" in Epic in order to begin the standard process by which the radiology reading room liaison alerts the referring practitioner  I also reported these results to [employer] [employer] via HIPAA compliant    secure electronic messaging on 1/21/2023 at 7:43 AM                 Workstation performed: [id number]         XR chest 1 view portable   ED Interpretation by Ari Verduzco MD (01/21 3324)   Patchy consolidation R base      Final Result by Nicolette Rayo MD (01/21 2757)      Emphysematous changes  Complete collapse of the right middle lobe  Right middle lobe collapse appears on subsequent CT to be the result of a right hilar mass  Workstation performed: [id number]             EKG, Pathology, and Other Studies Reviewed on Admission:   · EKG: Sinus Tachycardia    ** Please Note: This note may have been constructed using a voice recognition system   **

## 2023-01-21 NOTE — ED PROVIDER NOTES
History  Chief Complaint   Patient presents with   • Shortness of Breath     Pt arrived via EMS with c/o increased SOB, dx with Covid 14 days ago, hx of COPD and on 4 L NC chronically  Denies n/v/d  Denies CP  78 yo M BIBA from home for increased fatigue, SOB  COVID sx 1/8/23 - dx about that time as well  Started on paxlovid (completed course)  4L NC O2 chronically  No CP  Denies GI complaints  Gladys Man out of bed onto floor today due to generalized weakness when trying to get out of bed  No head injury or LOC, no thinners, denies any pain  Using nebs at home 3-4x/day  Denies hospitalization with covid  History provided by:  Patient and medical records   used: No    Shortness of Breath  Severity:  Moderate  Onset quality:  Gradual  Timing:  Constant  Progression:  Worsening  Chronicity:  Recurrent  Context: URI    Relieved by:  Oxygen  Worsened by: Activity  Associated symptoms: no abdominal pain, no chest pain, no cough, no diaphoresis, no ear pain, no fever, no headaches, no neck pain, no rash, no sore throat and no vomiting        Prior to Admission Medications   Prescriptions Last Dose Informant Patient Reported? Taking?    Catheters (Gizmo Condom Catheter) MISC   No No   Sig: Use daily at bedtime   Incontinence Supply Disposable (PadSORBer Bed Pan Liners) MISC   No No   Sig: Use daily at bedtime   NON FORMULARY   Yes No   Sig: Take 1,000 mg by mouth 2 (two) times a day Med is MSM Pure, pt brought in from home   acetaminophen (TYLENOL) 325 mg tablet   No No   Sig: Take 2 tablets (650 mg total) by mouth every 4 (four) hours as needed for mild pain   acetaminophen (TYLENOL) 325 mg tablet   No No   Sig: Take 2 tablets (650 mg total) by mouth every 6 (six) hours as needed for mild pain or moderate pain   albuterol (2 5 mg/3 mL) 0 083 % nebulizer solution   No No   Sig: Take 3 mL (2 5 mg total) by nebulization every 4 (four) hours as needed for wheezing or shortness of breath   albuterol (ProAir HFA) 90 mcg/act inhaler   No No   Sig: Inhale 2 puffs every 6 (six) hours as needed for wheezing   arformoterol (BROVANA) 15 mcg/2 mL nebulizer solution   No No   Sig: Take 2 mL (15 mcg total) by nebulization 2 (two) times a day   Patient taking differently: Take 15 mcg by nebulization 4 (four) times a day   budesonide (Pulmicort) 0 5 mg/2 mL nebulizer solution   No No   Sig: Take 2 mL (0 5 mg total) by nebulization 4 (four) times a day Rinse mouth after use    escitalopram (LEXAPRO) 10 mg tablet   No No   Sig: Take 1 tablet (10 mg total) by mouth daily at bedtime   famotidine (PEPCID) 40 MG tablet   No No   Sig: TAKE 1 TABLET BY MOUTH EVERY DAY   gabapentin (NEURONTIN) 100 mg capsule   No No   Sig: TAKE 1 CAPSULE BY MOUTH TWICE A DAY   ipratropium-albuterol (DUO-NEB) 0 5-2 5 mg/3 mL nebulizer solution   No No   Sig: Take 3 mL by nebulization 2 (two) times a day   prednisoLONE acetate (PRED FORTE) 1 % ophthalmic suspension   Yes No   tamsulosin (FLOMAX) 0 4 mg   No No   Sig: TAKE 1 CAPSULE BY MOUTH EVERY DAY WITH DINNER      Facility-Administered Medications: None       Past Medical History:   Diagnosis Date   • Acute blood loss anemia 12/6/2020   • Anxiety    • Bladder infection     current tx with cipro 5/29/16   • BPH (benign prostatic hyperplasia)    • Community acquired pneumonia     last assessed 8/28/17, resolved 9/25/17   • COPD (chronic obstructive pulmonary disease) ([facility])    • Dysphagia 6/8/2020   • Enlarged prostate    • GERD (gastroesophageal reflux disease)    • History of colonoscopy 03/19/2015    POLYP IN THE ASCENDING COLON-[facility]   • Hx of bladder infections    • Inguinal hernia, right 05/10/2019   • Multiple rib fractures 6/3/2020   • Neck pain    • Psychiatric disorder     depression   • Pulmonary nodule 01/08/2019    STABLE 6MM LEFT APICAL NODULE   • Respiratory failure with hypoxia ([facility] [address] ) 01/08/2019   • Urinary retention        Past Surgical History:   Procedure Laterality Date   • BLADDER SURGERY  08/15/2019    UROLIFT   • COLOSTOMY  2011    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   • INGUINAL HERNIA REPAIR Right 05/10/2019    DONE AT [facility]   • LAPAROSCOPIC COLON RESECTION  2011    ABDOMIANOPERITONEAL RESECTION FOR RECTAL MASS  DONE BY RADHA MCDUFFIE   • AZ OPTX FEM SHFT FX W/INSJ IMED IMPLT W/WO SCREW Left 2020    Procedure: Left Hip TFN;  Surgeon: Ayni Ortez MD;  Location: [facility] MAIN OR;  Service: Orthopedics   • AZ RPR 1ST INGUN HRNA AGE 5 YRS/> REDUCIBLE Left 2022    Procedure: REPAIR HERNIA INGUINAL OPEN;  Surgeon: Laura Broderick MD;  Location: [facility] MAIN OR;  Service: General       Family History   Problem Relation Age of Onset   • Lung cancer Mother    • Cancer Mother    • Cancer Father    • Alcohol abuse Father    • Mental illness Neg Hx      I have reviewed and agree with the history as documented  E-Cigarette/Vaping   • E-Cigarette Use Never User      E-Cigarette/Vaping Substances   • Nicotine No    • Flavoring No      Social History     Tobacco Use   • Smoking status: Former     Packs/day: 0 50     Years: 50 00     Pack years: 25 00     Types: Cigarettes     Start date: 64     Quit date: 2018     Years since quittin 8   • Smokeless tobacco: Former   • Tobacco comments:     quit 2017 per [employer]    Vaping Use   • Vaping Use: Never used   Substance Use Topics   • Alcohol use: Not Currently   • Drug use: Never       Review of Systems   Constitutional: Positive for fatigue  Negative for chills, diaphoresis, fever and unexpected weight change  HENT: Negative for congestion, ear pain, rhinorrhea, sore throat, trouble swallowing and voice change  Eyes: Negative for pain and visual disturbance  Respiratory: Positive for shortness of breath  Negative for cough and chest tightness  Cardiovascular: Negative for chest pain, palpitations and leg swelling     Gastrointestinal: Negative for abdominal pain, blood in stool, constipation, diarrhea, nausea and vomiting  Genitourinary: Negative for difficulty urinating and hematuria  Musculoskeletal: Negative for arthralgias, back pain and neck pain  Skin: Negative for rash  Neurological: Negative for dizziness, syncope, light-headedness and headaches  Psychiatric/Behavioral: Negative for confusion and suicidal ideas  The patient is not nervous/anxious  Physical Exam  Physical Exam  Vitals and nursing note reviewed  Constitutional:       General: He is not in acute distress  Appearance: He is well-developed  He is ill-appearing  He is not diaphoretic  HENT:      Head: Normocephalic and atraumatic  Right Ear: External ear normal       Left Ear: External ear normal       Nose: Nose normal       Mouth/Throat:      Mouth: Mucous membranes are dry  Eyes:      General: No scleral icterus  Right eye: No discharge  Left eye: No discharge  Conjunctiva/sclera: Conjunctivae normal       Pupils: Pupils are equal, round, and reactive to light  Neck:      Vascular: No JVD  Trachea: No tracheal deviation  Cardiovascular:      Rate and Rhythm: Regular rhythm  Tachycardia present  Pulses: Normal pulses  Heart sounds: Normal heart sounds  No murmur heard  No friction rub  No gallop  Pulmonary:      Effort: Pulmonary effort is normal  No respiratory distress  Breath sounds: No stridor  Wheezing (scattered, expiratory) and rales (at bases) present  Chest:      Chest wall: No tenderness  Abdominal:      General: Bowel sounds are normal  There is no distension  Palpations: Abdomen is soft  Tenderness: There is no abdominal tenderness  There is no guarding or rebound  Musculoskeletal:         General: No tenderness or deformity  Normal range of motion  Cervical back: Normal range of motion and neck supple  No rigidity  Right lower leg: No edema  Left lower leg: No edema     Lymphadenopathy: Cervical: No cervical adenopathy  Skin:     General: Skin is warm and dry  Findings: No rash  Neurological:      General: No focal deficit present  Mental Status: He is alert and oriented to person, place, and time  Cranial Nerves: No cranial nerve deficit  Sensory: No sensory deficit        Coordination: Coordination normal    Psychiatric:         Behavior: Behavior normal          Vital Signs  ED Triage Vitals [01/21/23 0536]   Temperature Pulse Respirations Blood Pressure SpO2   98 4 °F (36 9 °C) 102 (!) 26 139/88 94 %      Temp Source Heart Rate Source Patient Position - Orthostatic VS BP Location FiO2 (%)   Oral Monitor -- Right arm --      Pain Score       No Pain           Vitals:    01/21/23 0536   BP: 139/88   Pulse: 102         Visual Acuity      ED Medications  Medications   sodium chloride 0 9 % bolus 500 mL (has no administration in time range)   iohexol (OMNIPAQUE) 350 MG/ML injection (SINGLE-DOSE) 85 mL (85 mL Intravenous Given 1/21/23 0644)       Diagnostic Studies  Results Reviewed     Procedure Component Value Units Date/Time    NT-BNP PRO-BE,[facility],[place] campuses only [[id number]]  (Abnormal) Collected: 01/21/23 0543    Lab Status: Final result Specimen: Blood from Arm, Left Updated: 01/21/23 0645     NT-proBNP 458 pg/mL     Procalcitonin [[id number]]  (Normal) Collected: 01/21/23 0543    Lab Status: Final result Specimen: Blood from Arm, Left Updated: 01/21/23 0621     Procalcitonin <0 05 ng/ml     HS Troponin I 2hr [[id number]]     Lab Status: No result Specimen: Blood     HS Troponin 0hr (reflex protocol) [[id number]]  (Normal) Collected: 01/21/23 0546    Lab Status: Final result Specimen: Blood from Arm, Left Updated: 01/21/23 0618     hs TnI 0hr 7 ng/L     Lactic acid [[id number]]  (Normal) Collected: 01/21/23 0543    Lab Status: Final result Specimen: Blood from Arm, Left Updated: 01/21/23 0615     LACTIC ACID 0 8 mmol/L     Narrative:      Result may be elevated if tourniquet was used during collection  D-dimer, quantitative [[id number]]  (Abnormal) Collected: 01/21/23 0543    Lab Status: Final result Specimen: Blood from Arm, Left Updated: 01/21/23 5955     D-Dimer, Quant 1 54 ug/ml FEU     Narrative: In the evaluation for possible pulmonary embolism, in the appropriate (Well's Score of 4 or less) patient, the age adjusted d-dimer cutoff for this patient can be calculated as:    Age x 0 01 (in ug/mL) for Age-adjusted D-dimer exclusion threshold for a patient over 50 years      APTT [[id number]]  (Normal) Collected: 01/21/23 0543    Lab Status: Final result Specimen: Blood from Arm, Left Updated: 01/21/23 0612     PTT 29 seconds     Protime-INR [[id number]]  (Abnormal) Collected: 01/21/23 0543    Lab Status: Final result Specimen: Blood from Arm, Left Updated: 01/21/23 0612     Protime 15 2 seconds      INR 1 12    Comprehensive metabolic panel [[id number]]  (Abnormal) Collected: 01/21/23 0543    Lab Status: Final result Specimen: Blood from Arm, Left Updated: 01/21/23 0612     Sodium 133 mmol/L      Potassium 4 2 mmol/L      Chloride 101 mmol/L      CO2 30 mmol/L      ANION GAP 2 mmol/L      BUN 16 mg/dL      Creatinine 1 02 mg/dL      Glucose 96 mg/dL      Calcium 8 2 mg/dL      Corrected Calcium 9 1 mg/dL      AST 25 U/L      ALT 27 U/L      Alkaline Phosphatase 70 U/L      Total Protein 7 6 g/dL      Albumin 2 9 g/dL      Total Bilirubin 0 70 mg/dL      eGFR 67 ml/min/1 73sq m     Narrative:      Jonathon guidelines for Chronic Kidney Disease (CKD):   •  Stage 1 with normal or high GFR (GFR > 90 mL/min/1 73 square meters)  •  Stage 2 Mild CKD (GFR = 60-89 mL/min/1 73 square meters)  •  Stage 3A Moderate CKD (GFR = 45-59 mL/min/1 73 square meters)  •  Stage 3B Moderate CKD (GFR = 30-44 mL/min/1 73 square meters)  •  Stage 4 Severe CKD (GFR = 15-29 mL/min/1 73 square meters)  •  Stage 5 End Stage CKD (GFR <15 mL/min/1 73 square meters)  Note: GFR calculation is accurate only with a steady state creatinine    CBC and differential [[id number]]  (Abnormal) Collected: 01/21/23 0543    Lab Status: Final result Specimen: Blood from Arm, Left Updated: 01/21/23 0555     WBC 15 24 Thousand/uL      RBC 3 87 Million/uL      Hemoglobin 12 8 g/dL      Hematocrit 39 5 %       fL      MCH 33 1 pg      MCHC 32 4 g/dL      RDW 12 6 %      MPV 9 1 fL      Platelets 715 Thousands/uL      nRBC 0 /100 WBCs      Neutrophils Relative 85 %      Immat GRANS % 1 %      Lymphocytes Relative 7 %      Monocytes Relative 7 %      Eosinophils Relative 0 %      Basophils Relative 0 %      Neutrophils Absolute 12 96 Thousands/µL      Immature Grans Absolute 0 08 Thousand/uL      Lymphocytes Absolute 1 11 Thousands/µL      Monocytes Absolute 1 02 Thousand/µL      Eosinophils Absolute 0 05 Thousand/µL      Basophils Absolute 0 02 Thousands/µL     Blood culture #1 [[id number]] Collected: 01/21/23 0546    Lab Status: In process Specimen: Blood from Arm, Right Updated: 01/21/23 0553    Blood culture #2 [[id number]] Collected: 01/21/23 0543    Lab Status:  In process Specimen: Blood from Arm, Left Updated: 01/21/23 0553    UA w Reflex to Microscopic w Reflex to Culture [[id number]]     Lab Status: No result Specimen: Urine                  XR chest 1 view portable   ED Interpretation by Pieter Dsouza MD (01/21 0559)   Patchy consolidation R base      CTA ED chest PE Study    (Results Pending)              Procedures  ECG 12 Lead Documentation Only    Date/Time: 1/21/2023 6:17 AM  Performed by: Pieter Dsouza MD  Authorized by: Pieter Dsouza MD     Indications / Diagnosis:  Sob  ECG reviewed by me, the ED Provider: yes    Patient location:  ED  Previous ECG:     Previous ECG:  Compared to current    Similarity:  Changes noted    Comparison to cardiac monitor: Yes    Interpretation:     Interpretation: non-specific    Quality:     Tracing quality:  Limited by artifact  Rate: ECG rate:  101    ECG rate assessment: tachycardic    Rhythm:     Rhythm: sinus tachycardia    Ectopy:     Ectopy: PAC    QRS:     QRS axis:  Normal    QRS intervals:  Normal  Conduction:     Conduction: normal    ST segments:     ST segments:  Normal  T waves:     T waves: non-specific               ED Course  ED Course as of 01/21/23 0659   Sat Jan 21, 2023   0641 The 30ml/kg fluid bolus was not given to the patient despite hypotension and/or significantly elevated lactate of = 4 and/or presence of septic shock due to: Heart Failure  The patient will be administered 500 ml of crystalloid fluid instead  Orders for this have been placed in Epic  The patient may receive additional colloid or crystalloid fluids thereafter based on clinical condition  Igor Ridley MD     1666 Will hold on abx, given no fever, normal procal, normal lactate, and recent covid infection  0650 Pt at 4L NC O2 - baseline  Ambulatory dysfunction at home per daughter - too weak to walk  Delta trop, CT pend  Suspect pt may need admission  3882 So to Dr Kristy Castellon at end of shift  Initial Sepsis Screening     Row Name 01/21/23 7283                Is the patient's history suggestive of a new or worsening infection?  Yes (Proceed)  -SM        Suspected source of infection suspect infection, source unknown  -SM        Are two or more of the following signs & symptoms of infection both present and new to the patient? --        Indicate SIRS criteria Tachycardia > 90 bpm;Tachypnea > 20 resp per min  -SM        If the answer is yes to both questions, suspicion of sepsis is present --        If severe sepsis is present AND tissue hypoperfusion perists in the hour after fluid resuscitation or lactate > 4, the patient meets criteria for SEPTIC SHOCK --        Are any of the following organ dysfunction criteria present within 6 hours of suspected infection and SIRS criteria that are NOT considered to be chronic conditions? No  -SM        Organ dysfunction --        Date of presentation of severe sepsis --        Time of presentation of severe sepsis --        Tissue hypoperfusion persists in the hour after crystalloid fluid administration, evidenced, by either: --        Was hypotension present within one hour of the conclusion of crystalloid fluid administration? --        Date of presentation of septic shock --        Time of presentation of septic shock --              User Key  (r) = Recorded By, (t) = Taken By, (c) = Cosigned By    [address] Name Provider Melissa Nam MD Physician                              MDM    Disposition  Final diagnoses:   Dyspnea     Time reflects when diagnosis was documented in both MDM as applicable and the Disposition within this note     Time User Action Codes Description Comment    1/21/2023  6:44 AM Roger Freire Add [R06 00] Dyspnea       ED Disposition     None      Follow-up Information    None         Patient's Medications   Discharge Prescriptions    No medications on file       No discharge procedures on file      PDMP Review       Value Time User    PDMP Reviewed  Yes 4/5/2022  7:44 AM Jesse Kaye PA-C          ED Provider  Electronically Signed by           Christine Muniz MD  01/21/23 9753

## 2023-01-21 NOTE — ASSESSMENT & PLAN NOTE
Lab Results   Component Value Date    EGFR 67 01/21/2023    EGFR 58 03/23/2022    EGFR 66 02/03/2021    CREATININE 1 02 01/21/2023    CREATININE 1 16 03/23/2022    CREATININE 1 05 02/03/2021   Has CKD stage III  At baseline  Trend BMP

## 2023-01-21 NOTE — ASSESSMENT & PLAN NOTE
Patient has chronic respiratory failure and is on 2 L of supplemental oxygen at home  Patient presented to the ER with shortness of breath, tachypnea and requiring 4 L of supplemental oxygen  Secondary to possible postobstructive pneumonia/COPD exacerbation  Continue antibiotics and steroid  Wean oxygen as tolerated

## 2023-01-21 NOTE — ED CARE HANDOFF
Emergency Department Sign Out Note        Sign out and transfer of care from Dr Nahum Dennison  See Separate Emergency Department note  The patient, Danie Whiteside, was evaluated by the previous provider for weakness and fall when he attempted to get out of bed yesterday  Denies injury and loss of consciousness  Completed course of Paxlovid for COVID approximately 1 week ago  Has had increased fatigue, anorexia and shortness of breath  Had cough productive of yellow sputum that is no longer productive  No chest pain or GI bleeding  Continues on his 4 L/min nasal cannula  States that this is normal FiO2  Workup Completed:  Examined  500 cc saline bolus given  Labs and imaging performed  ED Course / Workup Pending (followup): Patient is comfortable currently  He is lying in bed with no cough  Vital signs are stable on 4 L nasal cannula  Very pleasant and cooperative, A&O x3  Denies symptoms currently  Asking for breakfast   Await CT results  My reading of x-ray and CT chest shows right middle lobe consolidation  Infectious disease biomarkers negative  Leukocytosis d/t steroids  Patient too weak to get up from the bed though  Daughter at bedside states he is extremely weak currently and she feels it is not felt safe for him to be discharged  I texted Dr Kristin Wheeler for admission after review of CT results and finding that patient is too weak to get up from bed  ED Course as of 01/22/23 0922   Sat Jan 21, 2023   0802 TT to [facility] for admission   0852 D/W Dr Kristin Wheeler and Dr Talisha Lockhart  Pulmonary feels patient better served if transferred to [facility]  Request placed  1970 UA appreciated  Will order ABX    [id number] After eating patient started gagging and vomited  Will order Zofran  Pulmonologist will be seeing the patient here in consultation  [id number] Patient is an daughter would like further work-up and accept transfer to [facility]    Patient is excepted at bedtime by Dr Radha Rojas  [address] to Dr Ugo Howell  Patient still waiting for transfer to [facility]  He has been stable  Consult ordered for Dr Ugo Howell for comanagement while patient is here  Procedures  Medical Decision Making  Patient with acute right middle lobe collapse due to bronchial obstruction  Concern for cancer  Hilar adenopathy and mediastinal adenopathy present  Patient is hemodynamically stable  Spoke with pulmonologist who states that if patient wants aggressive work-up for this he would be better served by transfer to [facility] for access to advanced bronchoscopic stools, cardiothoracic surgery and radiation oncology services  Patient and daughter agree that they would like this further work-up  Patient is accepted at [facility] by Dr Radha Rojas  Patient remains stable here    Bronchial obstruction: acute illness or injury  Dyspnea: acute illness or injury  UTI (urinary tract infection): acute illness or injury  Amount and/or Complexity of Data Reviewed  Labs: ordered  Radiology: ordered and independent interpretation performed  Risk  Prescription drug management  Disposition  Final diagnoses:   Dyspnea   Bronchial obstruction   UTI (urinary tract infection)   COPD, moderate ([facility] [address] )     Time reflects when diagnosis was documented in both MDM as applicable and the Disposition within this note     Time User Action Codes Description Comment    1/21/2023  6:44 AM Sinea Gonzalez S Add [R06 00] Dyspnea     1/21/2023  8:51 AM Rayetta Flatten Add [J98 09] Bronchial obstruction     1/21/2023  8:54 AM Rayetta Flatten Add [N39 0] UTI (urinary tract infection)     1/21/2023  4:45 PM Rayetta Flatten Add [J44 9] COPD, moderate [facility])       ED Disposition     ED Disposition   Transfer to Another Facility-In Network    Condition   --    Date/Time   Sat Jan 21, 2023  9:56 AM    Comment   Lizet Chojenny should be transferred out to B             MD Documentation    [address] Most Recent Value   Patient Condition The patient has been stabilized such that within reasonable medical probability, no material deterioration of the patient condition or the condition of the unborn child(zaire) is likely to result from the transfer   Reason for Transfer Level of Care needed not available at this facility   Benefits of Transfer Specialized equipment and/or services available at the receiving facility (Include comment)________________________  [facility] bronchoscopic tools, radiation onc, CT surgery]   Risks of Transfer Potential for delay in receiving treatment, Potential deterioration of medical condition, Loss of IV, Increased discomfort during transfer   Accepting Physician [id number] Name, [address]    (Name & Tel number) Carson Jimenes   Transported by [employer] and Unit #) Kacey Argueta  [address] Name, [address] [place]    (Name & Tel number) Joaquin [facility] and Unit [facility]) SELENA      Follow-up Information    None       Patient's Medications   Discharge Prescriptions    No medications on file     No discharge procedures on file         ED Provider  Electronically Signed by     Romy Cmaacho,   01/21/23 [address] [place] [address], DO  01/21/23 [address] [place] [address], DO  01/22/23 6791

## 2023-01-21 NOTE — EMTALA/ACUTE CARE TRANSFER
[facility] EMERGENCY DEPARTMENT  [address] [facility] [address]  Dept: [phone]      EMTALA TRANSFER CONSENT    NAME Vesna Osorio                                         1940                              MRN [id number]    I have been informed of my rights regarding examination, treatment, and transfer   by Dr Myles att  providers found    Benefits: Specialized equipment and/or services available at the receiving facility (Include comment)________________________ (Advanced bronchoscopic tools, radiation onc, CT surgery)    Risks: Potential for delay in receiving treatment, Potential deterioration of medical condition, Loss of IV, Increased discomfort during transfer      Consent for Transfer:  I acknowledge that my medical condition has been evaluated and explained to me by the emergency department physician or other qualified medical person and/or my attending physician, who has recommended that I be transferred to the service of  Accepting Physician: Adriana Puga at [address] Name, [address] : [facility]  The above potential benefits of such transfer, the potential risks associated with such transfer, and the probable risks of not being transferred have been explained to me, and I fully understand them  The doctor has explained that, in my case, the benefits of transfer outweigh the risks  I agree to be transferred  I authorize the performance of emergency medical procedures and treatments upon me in both transit and upon arrival at the receiving facility  Additionally, I authorize the release of any and all medical records to the receiving facility and request they be transported with me, if possible  I understand that the safest mode of transportation during a medical emergency is an ambulance and that the Hospital advocates the use of this mode of transport   Risks of traveling to the receiving facility by car, including absence of medical control, life sustaining equipment, such as oxygen, and medical personnel has been explained to me and I fully understand them  (KAY CORRECT BOX BELOW)  [X]  I consent to the stated transfer and to be transported by ambulance/helicopter  [  ]  I consent to the stated transfer, but refuse transportation by ambulance and accept full responsibility for my transportation by car  I understand the risks of non-ambulance transfers and I exonerate the Hospital and its staff from any deterioration in my condition that results from this refusal     X___________________________________________    DATE  23  TIME________  Signature of patient or legally responsible individual signing on patient behalf           RELATIONSHIP TO PATIENT_________________________          Provider Certification    NAME Daphne Ramos                                         1940                              MRN [id number]    A medical screening exam was performed on the above named patient  Based on the examination:    Condition Necessitating Transfer The primary encounter diagnosis was Dyspnea  Diagnoses of Bronchial obstruction and UTI (urinary tract infection) were also pertinent to this visit      Patient Condition: The patient has been stabilized such that within reasonable medical probability, no material deterioration of the patient condition or the condition of the unborn child(zaire) is likely to result from the transfer    Reason for Transfer: Level of Care needed not available at this facility    Transfer Requirements: Facility [facility]   · Space available and qualified personnel available for treatment as acknowledged by Emma Martinez  · Agreed to accept transfer and to provide appropriate medical treatment as acknowledged by       Grant Bray  · Appropriate medical records of the examination and treatment of the patient are provided at the time of transfer   [address], [address] _______  · Transfer will be performed by qualified personnel from [facility]  and appropriate transfer equipment as required, including the use of necessary and appropriate life support measures  Provider Certification: I have examined the patient and explained the following risks and benefits of being transferred/refusing transfer to the patient/family:         Based on these reasonable risks and benefits to the patient and/or the unborn child(zaire), and based upon the information available at the time of the patient’s examination, I certify that the medical benefits reasonably to be expected from the provision of appropriate medical treatments at another medical facility outweigh the increasing risks, if any, to the individual’s medical condition, and in the case of labor to the unborn child, from effecting the transfer      X____________________________________________ DATE 01/21/23        TIME_______      ORIGINAL - SEND TO MEDICAL RECORDS   COPY - SEND WITH PATIENT DURING TRANSFER

## 2023-01-21 NOTE — ASSESSMENT & PLAN NOTE
Patient CT chest showed-Right hilar mass obstructing right middle lobe bronchus resulting in complete post obstructive right middle lobe collapse  Mediastinal and right hilar lymphadenopathy  Emphysema    Pulmonary consult appreciated  Urine culture, streptococcal and Legionella urine antigen  Follow-up blood culture results  On Rocephin and Zithromax  Pulmonary recommended transfer to [facility] for bronchoscopy, EBUS  Patient is awaiting bed placement at [place] [address]

## 2023-01-21 NOTE — CONSULTS
Consult Note - Pulmonary   Bailee Leaks 79 y o  male MRN: [id number]  Unit/Bed#: ED 01 Encounter: [id number]      Reason for consultation: right hilar mass, COPD, right middle lobe obstruction from right hilar mass    Requesting physician: Edilson Dutta, DO    Assessment  Moderate COPD with emphysema (75% FEV1 in May 2019) in exacerbation  Acute on chronic respiratory insufficiency with hypoxia requiring 4 L/min of supplemental oxygen via nasal cannula  Abnormal CT chest with right hilar mass, right middle lobe atelectasis and right lower lobe infiltrates  Right middle lobe atelectasis/consolidation due to airway obstruction from extrinsic compression from right hilar mass  Mediastinal lymphadenopathy  Urinary tract infection  Mechanical fall   Recent COVID-19 infection status post packed fluid, vaccinated for COVID  Tobacco abuse in remission      Recommendations:   Patient recently had COVID, with recovery with paxlovid  Relapse of symptoms in the past few days with malaise, dizziness, shortness of breath    Findings on CT chest suggests 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 carcinoma in the right hilar region with enlarged precarinal, subcarinal, right hilar lymphadenopathy    Right middle lobe atelectasis and consolidation, as well as right lower lobe infiltrates possibly suggestive of postobstructive pneumonia patient also has concurrent urinary tract infection  Recommend sputum culture, Streptococcus urine antigen, Legionella urine antigen    Recommend ceftriaxone and azithromycin at this time to cover for respiratory and urinary infections    I discussed the process for diagnosis of the right hilar mass extensively with the patient and his daughter at bedside  Ideally the right hilar mass will need bronchoscopy, EBUS TBNA for diagnosis, for which he is being transferred to [facility] for    However, due to concurrent respiratory and urinary infections, hypoxia, he needs further respiratory optimization to decrease risk of anesthesia or EBUS related complications  After transfer to [facility] he should be reassessed for appropriateness of EBUS TBNA on a daily basis  EBUS may even need to be performed as an outpatient after the patient recovers to close to functional baseline  The daughter and the patient understands that a diagnostic procedure may not occur during this inpatient stay due to his acute infections, respiratory status  Per the patient and daughter, if he is diagnosed with lung cancer, he is not adverse to discussing treatment options including chemotherapy and radiation therapy  As for COPD, I placed him on Xopenex and ipratropium 3 times daily  I placed him on prednisone 40 mg daily at this time  He can be continued on his Brovana and Pulmicort twice daily  He should be started on hypertonic saline nebs 3 times daily for mucociliary clearance  As an outpatient he is currently not with duo nebs or albuterol due to cost and only using Coletta Larry per his daughter  We may need case management assistance in assuring that he has short acting beta agonist via nebulizer solution at appropriate cost at discharge  He follows with Dr Yadira Hernandez in our group  I discussed the case with the pulmonary consultation team at [facility]  History of Present Illness   HPI:  Nevin Rios is a 79 y o  male w/ history of emphysema, tobacco in remission, moderate COPD, supplemental oxygen use with 4 L with exertion and sleep, history of a colonic mass that was resected and non-cancerous with resulting colostomy over 10 years ago, depression, urinary retention due to BPH, recent COVID-19 infection, who presents with mechanical fall, dizziness, malaise, shortness of breath  We are consulted for right hilar mass with right middle lobe airway obstruction, COPD    Contracted COVID 2 weeks ago  Symptoms were exhaustion, loss of appetite  Was vaccinated for COVID    Took Paxlovid and was doing better as far as energy, appetite was improving  Yesterday he started doing worse with dizziness, malaise  He had a urinary tract infection  He was given a nebulizer treatment and given some food  At 2am this morning he had a fall after standing up to urinate  By 4am he had wheezing and febrile  He used the flutter valve and it was not functioning  He used his incentive spirometry at home and respiration seemed worse per the daughter  He had an episode of vomiting in the ED  He feels like he has mucus that he cannot expectorate  He is on 4 L/min via nasal cannula  He received 500 mL of isotonic fluid in the ER and 1 dose of ceftriaxone 2g  He reports no dyspnea at rest and feeling mildly better since been in the ER  Less wheezing, dyspnea, and less "raspy voice", still having trouble expectoring mucus  He has some difficulty hearing, but is alert and oriented  He denies any chest pain, dizziness, lightheadedness at rest at this time  No abnormal bleeding  No hemoptysis  He usually does not use supplemental O2 at rest, only with exertion/sleep up to 4LPM O2  However over the past 2 weeks due to [place] he has been using 4LPM O2 the whole time  Daughter has been given him budesonide nebulized  At home only uses Marl[place] but not DuoNebs since they are too costly per his daughter  Some unintentional weight loss recently - usually in 180s, now 167lbs  Review of systems:  12 point review of systems was completed and was otherwise negative except as listed in HPI  Occupational History: , retired currently, denies any significant occupational exposures    Social History: Quit 10 years ago  Smoked starting in his teens  >40 pack years        Family hx of emphysema     Historical Information   Past Medical History:   Diagnosis Date   • Acute blood loss anemia 12/6/2020   • Anxiety    • Bladder infection     current tx with cipro 5/29/16   • BPH (benign prostatic hyperplasia)    • Community acquired pneumonia     last assessed 17, resolved 17   • COPD (chronic obstructive pulmonary disease) ([facility] [address] )    • Dysphagia 2020   • Enlarged prostate    • GERD (gastroesophageal reflux disease)    • History of colonoscopy 2015    POLYP IN THE ASCENDING COLON[facility]   • Hx of bladder infections    • Inguinal hernia, right 05/10/2019   • Multiple rib fractures 6/3/2020   • Neck pain    • Psychiatric disorder     depression   • Pulmonary nodule 2019    STABLE 6MM LEFT APICAL NODULE   • Respiratory failure with hypoxia ([facility] [address] ) 2019   • Urinary retention      Past Surgical History:   Procedure Laterality Date   • BLADDER SURGERY  08/15/2019    UROLIFT   • COLOSTOMY  2011    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   • INGUINAL HERNIA REPAIR Right 05/10/2019    DONE AT [facility]   • LAPAROSCOPIC COLON RESECTION  2011    ABDOMIANOPERITONEAL RESECTION FOR RECTAL MASS   DONE BY RADHA MCDUFFIE   • RI OPTX FEM SHFT FX W/INSJ IMED IMPLT W/WO SCREW Left 2020    Procedure: Left Hip TFN;  Surgeon: Juve Leslie MD;  Location:  MAIN OR;  Service: Orthopedics   • RI RPR 1ST INGUN HRNA AGE 5 YRS/> REDUCIBLE Left 2022    Procedure: REPAIR HERNIA INGUINAL OPEN;  Surgeon: Estella Prabhakar MD;  Location:  MAIN OR;  Service: General     Family History   Problem Relation Age of Onset   • Lung cancer Mother    • Cancer Mother    • Cancer Father    • Alcohol abuse Father    • Mental illness Neg Hx      Social History     Socioeconomic History   • Marital status: /Civil Union     Spouse name: Not on file   • Number of children: Not on file   • Years of education: Not on file   • Highest education level: Not on file   Occupational History   • Not on file   Tobacco Use   • Smoking status: Former     Packs/day: 0 50     Years: 50 00     Pack years: 25 00     Types: Cigarettes     Start date: 64     Quit date: 2018     Years since quittin 8   • Smokeless tobacco: Former   • Tobacco comments:     quit 8/2017 per [employer]    Vaping Use   • Vaping Use: Never used   Substance and Sexual Activity   • Alcohol use: Not Currently   • Drug use: Never   • Sexual activity: Not on file   Other Topics Concern   • Not on file   Social History Narrative   • Not on file     Social Determinants of Health     Financial Resource Strain: Low Risk    • Difficulty of Paying Living Expenses: Not hard at all   Food Insecurity: Not on file   Transportation Needs: No Transportation Needs   • Lack of Transportation (Medical): No   • Lack of Transportation (Non-Medical): No   Physical Activity: Not on file   Stress: Not on file   Social Connections: Not on file   Intimate Partner Violence: Not on file   Housing Stability: Not on file       Meds/Allergies   No current facility-administered medications for this encounter  (Not in a hospital admission)    Allergies   Allergen Reactions   • Aspirin Other (See Comments)     Hx stomach ulcer   • Bactrim [Sulfamethoxazole-Trimethoprim] GI Intolerance       Vitals: Vitals personally reviewed  Vitals:    01/21/23 0536 01/21/23 0900 01/21/23 0930 01/21/23 1000   BP: 139/88 123/77 113/55 102/59   BP Location: Right arm      Pulse: 102 (!) 109 91 91   Resp: (!) 26 (!) 34 21 22   Temp: 98 4 °F (36 9 °C)      TempSrc: Oral      SpO2: 94% 94% 95% 93%   Weight: 75 8 kg (167 lb)      Height: 5' 8" (1 727 m)         Body mass index is 25 39 kg/m²      Intake/Output Summary (Last 24 hours) at 1/21/2023 1005  Last data filed at 1/21/2023 0957  Gross per 24 hour   Intake 600 ml   Output --   Net 600 ml     Invasive Devices     Peripheral Intravenous Line  Duration           Peripheral IV 01/21/23 Left Antecubital <1 day          Drain  Duration           Colostomy LUQ -- days    Colostomy LUQ -- days                Physical Exam  General: Awake alert and oriented x 3, hard of hearing, conversant without conversational dyspnea, NAD  HEENT:   Sclera noninjected, nonicteric OU, Nares patent, no nasal flaring, no nasal drainage, Mucous membranes, moist, no oral lesions, no dentition  NECK: Trachea midline, no accessory muscle use, no stridor, no cervical or supraclavicular adenopathy  CARDIAC: Reg, single s1/S2  PULM: Poor air movement bilaterally with end expiratory wheezing  CHEST: No gross deformities, equal chest expansion on inspiration bilaterally  ABD: Normoactive bowel sounds, soft nontender, nondistended, no rebound, no rigidity, no guarding Ostomy in place, appears normal with brown stool  EXT: No cyanosis, no clubbing, no edema, normal capillary refill  SKIN:  No rashes, no lesions  NEURO: no focal neurologic deficits, AAOx3, moving all extremities appropriately    Labs: ABG: No results found for: PHART, FXY2WOM, PO2ART, WHR1MUW, W3CCTIJY, BEART, SOURCE  Laboratory and Diagnostics  Results from last 7 days   Lab Units 01/21/23  0543   WBC Thousand/uL 15 24*   HEMOGLOBIN g/dL 12 8   HEMATOCRIT % 39 5   PLATELETS Thousands/uL 273   NEUTROS PCT % 85*   MONOS PCT % 7     Results from last 7 days   Lab Units 01/21/23  0543   SODIUM mmol/L 133*   POTASSIUM mmol/L 4 2   CHLORIDE mmol/L 101   CO2 mmol/L 30   ANION GAP mmol/L 2*   BUN mg/dL 16   CREATININE mg/dL 1 02   CALCIUM mg/dL 8 2*   GLUCOSE RANDOM mg/dL 96   ALT U/L 27   AST U/L 25   ALK PHOS U/L 70   ALBUMIN g/dL 2 9*   TOTAL BILIRUBIN mg/dL 0 70          Results from last 7 days   Lab Units 01/21/23  0543   INR  1 12   PTT seconds 29          Results from last 7 days   Lab Units 01/21/23  0543   LACTIC ACID mmol/L 0 8                 Results from last 7 days   Lab Units 01/21/23  0543   D-DIMER QUANTITATIVE ug/ml FEU 1 54*     Results from last 7 days   Lab Units 01/21/23  0543   PROCALCITONIN ng/ml <0 05       Imaging and other studies: I have personally reviewed pertinent films in PACS  1/21/23 - LUNGS:  There are centrilobular and paraseptal emphysematous changes are noted    There is a right hilar mass is borders are difficult to directly visualize because of surrounding collapsed lung but is estimated at 2 6 cm on image 119 of series 6  The lesion obstructs right middle lobe bronchus and there is complete postobstructive collapse of the right middle lobe  Mucoid debris noted in the right mainstem bronchus and right lower lobe bronchi with mild atelectasis in the posterior right lower lobe  Pulmonary function testing:   PFT 5/8/2019 - Results:  FEV1/FVC Ratio: 49 %  Forced Vital Capacity: 3 90 L    109 % predicted  FEV1: 1 88 L     75 % predicted     DLCO corrected for patients hemoglobin level: 64 %    Code Status: Prior      Jose Miguel Saldaña MD  Pulmonary, Critical Care and Sleep Medicine  [facility]     Portions of the record may have been created with voice recognition software  Occasional wrong word or "sound a like" substitutions may have occurred due to the inherent limitations of voice recognition software  Please read the chart carefully and recognize, using context, where substitutions have occurred

## 2023-01-22 ENCOUNTER — HOSPITAL ENCOUNTER (INPATIENT)
Facility: HOSPITAL | Age: 83
LOS: 5 days | Discharge: NURSING, REHAB OR LONG-TERM CARE | End: 2023-01-27
Attending: INTERNAL MEDICINE | Admitting: INTERNAL MEDICINE

## 2023-01-22 VITALS
BODY MASS INDEX: 25.31 KG/M2 | HEART RATE: 80 BPM | HEIGHT: 68 IN | SYSTOLIC BLOOD PRESSURE: 121 MMHG | OXYGEN SATURATION: 94 % | WEIGHT: 167 LBS | DIASTOLIC BLOOD PRESSURE: 70 MMHG | TEMPERATURE: 98.4 F | RESPIRATION RATE: 20 BRPM

## 2023-01-22 DIAGNOSIS — R91.8 HILAR MASS: Primary | ICD-10-CM

## 2023-01-22 DIAGNOSIS — N39.0 URINARY TRACT INFECTION WITHOUT HEMATURIA, SITE UNSPECIFIED: ICD-10-CM

## 2023-01-22 DIAGNOSIS — J96.21 ACUTE ON CHRONIC RESPIRATORY FAILURE WITH HYPOXIA (HCC): ICD-10-CM

## 2023-01-22 PROBLEM — R26.2 AMBULATORY DYSFUNCTION: Status: ACTIVE | Noted: 2023-01-22

## 2023-01-22 PROBLEM — Z93.3 S/P COLOSTOMY (HCC): Status: ACTIVE | Noted: 2023-01-22

## 2023-01-22 PROBLEM — E46 PROTEIN CALORIE MALNUTRITION (HCC): Status: ACTIVE | Noted: 2023-01-22

## 2023-01-22 PROBLEM — E87.5 HYPERKALEMIA: Status: ACTIVE | Noted: 2023-01-22

## 2023-01-22 LAB
ANION GAP SERPL CALCULATED.3IONS-SCNC: 6 MMOL/L (ref 4–13)
ATRIAL RATE: 101 BPM
BUN SERPL-MCNC: 23 MG/DL (ref 5–25)
CALCIUM SERPL-MCNC: 8.6 MG/DL (ref 8.3–10.1)
CHLORIDE SERPL-SCNC: 107 MMOL/L (ref 96–108)
CO2 SERPL-SCNC: 27 MMOL/L (ref 21–32)
CREAT SERPL-MCNC: 0.97 MG/DL (ref 0.6–1.3)
GFR SERPL CREATININE-BSD FRML MDRD: 71 ML/MIN/1.73SQ M
GLUCOSE SERPL-MCNC: 112 MG/DL (ref 65–140)
MRSA NOSE QL CULT: NORMAL
P AXIS: 56 DEGREES
PLATELET # BLD AUTO: 262 THOUSANDS/UL (ref 149–390)
PMV BLD AUTO: 9.4 FL (ref 8.9–12.7)
POTASSIUM SERPL-SCNC: 4.6 MMOL/L (ref 3.5–5.3)
POTASSIUM SERPL-SCNC: 5.7 MMOL/L (ref 3.5–5.3)
PR INTERVAL: 200 MS
PROCALCITONIN SERPL-MCNC: <0.05 NG/ML
QRS AXIS: 77 DEGREES
QRSD INTERVAL: 88 MS
QT INTERVAL: 336 MS
QTC INTERVAL: 435 MS
SODIUM SERPL-SCNC: 140 MMOL/L (ref 135–147)
T WAVE AXIS: 14 DEGREES
VENTRICULAR RATE: 101 BPM

## 2023-01-22 RX ORDER — TAMSULOSIN HYDROCHLORIDE 0.4 MG/1
0.4 CAPSULE ORAL
Status: DISCONTINUED | OUTPATIENT
Start: 2023-01-22 | End: 2023-01-27 | Stop reason: HOSPADM

## 2023-01-22 RX ORDER — DOCUSATE SODIUM 100 MG/1
100 CAPSULE, LIQUID FILLED ORAL 2 TIMES DAILY
Status: DISCONTINUED | OUTPATIENT
Start: 2023-01-22 | End: 2023-01-27 | Stop reason: HOSPADM

## 2023-01-22 RX ORDER — PREDNISOLONE ACETATE 10 MG/ML
1 SUSPENSION/ DROPS OPHTHALMIC 2 TIMES DAILY
Status: DISCONTINUED | OUTPATIENT
Start: 2023-01-22 | End: 2023-01-27 | Stop reason: HOSPADM

## 2023-01-22 RX ORDER — MAGNESIUM HYDROXIDE/ALUMINUM HYDROXICE/SIMETHICONE 120; 1200; 1200 MG/30ML; MG/30ML; MG/30ML
30 SUSPENSION ORAL EVERY 6 HOURS PRN
Status: DISCONTINUED | OUTPATIENT
Start: 2023-01-22 | End: 2023-01-27 | Stop reason: HOSPADM

## 2023-01-22 RX ORDER — ENOXAPARIN SODIUM 100 MG/ML
40 INJECTION SUBCUTANEOUS DAILY
Status: DISCONTINUED | OUTPATIENT
Start: 2023-01-23 | End: 2023-01-27 | Stop reason: HOSPADM

## 2023-01-22 RX ORDER — BENZONATATE 100 MG/1
200 CAPSULE ORAL 3 TIMES DAILY
Status: DISCONTINUED | OUTPATIENT
Start: 2023-01-22 | End: 2023-01-27 | Stop reason: HOSPADM

## 2023-01-22 RX ORDER — ALBUTEROL SULFATE 2.5 MG/3ML
2.5 SOLUTION RESPIRATORY (INHALATION) EVERY 4 HOURS PRN
Status: DISCONTINUED | OUTPATIENT
Start: 2023-01-22 | End: 2023-01-27 | Stop reason: HOSPADM

## 2023-01-22 RX ORDER — GABAPENTIN 100 MG/1
100 CAPSULE ORAL 2 TIMES DAILY
Status: DISCONTINUED | OUTPATIENT
Start: 2023-01-22 | End: 2023-01-27 | Stop reason: HOSPADM

## 2023-01-22 RX ORDER — BUDESONIDE 0.5 MG/2ML
0.5 INHALANT ORAL 4 TIMES DAILY
Status: DISCONTINUED | OUTPATIENT
Start: 2023-01-22 | End: 2023-01-22

## 2023-01-22 RX ORDER — BUDESONIDE 0.5 MG/2ML
0.5 INHALANT ORAL
Status: DISCONTINUED | OUTPATIENT
Start: 2023-01-22 | End: 2023-01-27 | Stop reason: HOSPADM

## 2023-01-22 RX ORDER — ACETAMINOPHEN 325 MG/1
650 TABLET ORAL EVERY 4 HOURS PRN
Status: DISCONTINUED | OUTPATIENT
Start: 2023-01-22 | End: 2023-01-27 | Stop reason: HOSPADM

## 2023-01-22 RX ORDER — ONDANSETRON 2 MG/ML
4 INJECTION INTRAMUSCULAR; INTRAVENOUS EVERY 6 HOURS PRN
Status: DISCONTINUED | OUTPATIENT
Start: 2023-01-22 | End: 2023-01-27 | Stop reason: HOSPADM

## 2023-01-22 RX ORDER — ESCITALOPRAM OXALATE 10 MG/1
10 TABLET ORAL
Status: DISCONTINUED | OUTPATIENT
Start: 2023-01-22 | End: 2023-01-27 | Stop reason: HOSPADM

## 2023-01-22 RX ORDER — SODIUM CHLORIDE FOR INHALATION 0.9 %
3 VIAL, NEBULIZER (ML) INHALATION
Status: DISCONTINUED | OUTPATIENT
Start: 2023-01-22 | End: 2023-01-22

## 2023-01-22 RX ORDER — FAMOTIDINE 20 MG/1
40 TABLET, FILM COATED ORAL DAILY
Status: DISCONTINUED | OUTPATIENT
Start: 2023-01-23 | End: 2023-01-27 | Stop reason: HOSPADM

## 2023-01-22 RX ORDER — POLYETHYLENE GLYCOL 3350 17 G/17G
17 POWDER, FOR SOLUTION ORAL DAILY
Status: DISCONTINUED | OUTPATIENT
Start: 2023-01-23 | End: 2023-01-27 | Stop reason: HOSPADM

## 2023-01-22 RX ORDER — LEVALBUTEROL 1.25 MG/.5ML
1.25 SOLUTION, CONCENTRATE RESPIRATORY (INHALATION)
Status: DISCONTINUED | OUTPATIENT
Start: 2023-01-22 | End: 2023-01-27 | Stop reason: HOSPADM

## 2023-01-22 RX ORDER — IPRATROPIUM BROMIDE AND ALBUTEROL SULFATE 2.5; .5 MG/3ML; MG/3ML
3 SOLUTION RESPIRATORY (INHALATION) EVERY 8 HOURS
Status: DISCONTINUED | OUTPATIENT
Start: 2023-01-22 | End: 2023-01-22

## 2023-01-22 RX ORDER — GUAIFENESIN 600 MG/1
1200 TABLET, EXTENDED RELEASE ORAL EVERY 12 HOURS SCHEDULED
Status: COMPLETED | OUTPATIENT
Start: 2023-01-22 | End: 2023-01-27

## 2023-01-22 RX ORDER — ARFORMOTEROL TARTRATE 15 UG/2ML
15 SOLUTION RESPIRATORY (INHALATION) 2 TIMES DAILY
Status: DISCONTINUED | OUTPATIENT
Start: 2023-01-22 | End: 2023-01-22

## 2023-01-22 RX ORDER — PREDNISONE 20 MG/1
40 TABLET ORAL DAILY
Status: DISCONTINUED | OUTPATIENT
Start: 2023-01-23 | End: 2023-01-26

## 2023-01-22 RX ADMIN — LEVALBUTEROL HYDROCHLORIDE 0.63 MG: 0.63 SOLUTION RESPIRATORY (INHALATION) at 06:34

## 2023-01-22 RX ADMIN — GUAIFENESIN 1200 MG: 600 TABLET, EXTENDED RELEASE ORAL at 19:05

## 2023-01-22 RX ADMIN — SODIUM CHLORIDE SOLN NEBU 3% 4 ML: 3 NEBU SOLN at 06:34

## 2023-01-22 RX ADMIN — CEFTRIAXONE 1000 MG: 1 INJECTION, SOLUTION INTRAVENOUS at 06:22

## 2023-01-22 RX ADMIN — SODIUM CHLORIDE 75 ML/HR: 9 INJECTION, SOLUTION INTRAVENOUS at 09:42

## 2023-01-22 RX ADMIN — LEVALBUTEROL HYDROCHLORIDE 0.63 MG: 0.63 SOLUTION RESPIRATORY (INHALATION) at 16:08

## 2023-01-22 RX ADMIN — TAMSULOSIN HYDROCHLORIDE 0.4 MG: 0.4 CAPSULE ORAL at 19:05

## 2023-01-22 RX ADMIN — IPRATROPIUM BROMIDE 0.5 MG: 0.5 SOLUTION RESPIRATORY (INHALATION) at 16:08

## 2023-01-22 RX ADMIN — DOCUSATE SODIUM 100 MG: 100 CAPSULE, LIQUID FILLED ORAL at 19:05

## 2023-01-22 RX ADMIN — SODIUM CHLORIDE SOLN NEBU 3% 4 ML: 3 NEBU SOLN at 16:12

## 2023-01-22 RX ADMIN — PREDNISOLONE ACETATE 1 DROP: 10 SUSPENSION/ DROPS OPHTHALMIC at 20:46

## 2023-01-22 RX ADMIN — LEVALBUTEROL HYDROCHLORIDE 1.25 MG: 1.25 SOLUTION, CONCENTRATE RESPIRATORY (INHALATION) at 19:51

## 2023-01-22 RX ADMIN — PANTOPRAZOLE SODIUM 40 MG: 40 TABLET, DELAYED RELEASE ORAL at 06:23

## 2023-01-22 RX ADMIN — HEPARIN SODIUM 5000 UNITS: 5000 INJECTION INTRAVENOUS; SUBCUTANEOUS at 17:09

## 2023-01-22 RX ADMIN — GABAPENTIN 100 MG: 100 CAPSULE ORAL at 19:05

## 2023-01-22 RX ADMIN — BENZONATATE 200 MG: 100 CAPSULE ORAL at 19:05

## 2023-01-22 RX ADMIN — BUDESONIDE 0.5 MG: 0.5 INHALANT ORAL at 19:51

## 2023-01-22 RX ADMIN — IPRATROPIUM BROMIDE 0.5 MG: 0.5 SOLUTION RESPIRATORY (INHALATION) at 19:51

## 2023-01-22 RX ADMIN — HEPARIN SODIUM 5000 UNITS: 5000 INJECTION INTRAVENOUS; SUBCUTANEOUS at 06:23

## 2023-01-22 RX ADMIN — ESCITALOPRAM OXALATE 10 MG: 10 TABLET ORAL at 20:40

## 2023-01-22 RX ADMIN — PREDNISONE 40 MG: 20 TABLET ORAL at 09:42

## 2023-01-22 RX ADMIN — AZITHROMYCIN MONOHYDRATE 500 MG: 500 INJECTION, POWDER, LYOPHILIZED, FOR SOLUTION INTRAVENOUS at 10:30

## 2023-01-22 RX ADMIN — IPRATROPIUM BROMIDE 0.5 MG: 0.5 SOLUTION RESPIRATORY (INHALATION) at 06:34

## 2023-01-22 RX ADMIN — SODIUM CHLORIDE 75 ML/HR: 9 INJECTION, SOLUTION INTRAVENOUS at 10:30

## 2023-01-22 NOTE — ASSESSMENT & PLAN NOTE
Continue respiratory treatments  Pulmonary inputs noted patient is planned for prednisone 40 mg for 5 days  Supportive care  Pulmonary following

## 2023-01-22 NOTE — ASSESSMENT & PLAN NOTE
At baseline on 4 L supplemental oxygen  Continue supplemental oxygen to keep O2 sats more than 88%  Encourage incentive spirometry

## 2023-01-22 NOTE — ASSESSMENT & PLAN NOTE
CT chest reveals right hilar mass with obstructing right middle lobe bronchus  Concerning for malignancy  Transferred for pulmonary evaluation for bronchoscopy/EBUS for tissue diagnosis  Pulmonary following

## 2023-01-22 NOTE — ASSESSMENT & PLAN NOTE
Lab Results   Component Value Date    EGFR 71 01/22/2023    EGFR 67 01/21/2023    EGFR 58 03/23/2022    CREATININE 0 97 01/22/2023    CREATININE 1 02 01/21/2023    CREATININE 1 16 03/23/2022     Monitor kidney function closely  Avoid nephrotoxins  Monitor postvoid residuals

## 2023-01-22 NOTE — ED NOTES
Pt left UBED via ambulance, attempted to call report to nurse getting pt at [facility]  Spoke to nurse, who said she was not getting pt, and transferred me to charge nurse  Charge nurse informed me that had in fact be the correct nurse I had talked to, but they were too busy to take report        Katiuska Licea, RN  01/22/23 6219

## 2023-01-22 NOTE — ASSESSMENT & PLAN NOTE
Patient CT chest showed-Right hilar mass obstructing right middle lobe bronchus resulting in complete post obstructive right middle lobe collapse  Mediastinal and right hilar lymphadenopathy  Emphysema    Pulmonary consult appreciated  Streptococcal and Legionella urine antigen is negative  Follow-up blood culture results  On Rocephin and Zithromax  Pulmonary recommended transfer to [address] [place] [address] for bronchoscopy, EBUS  Patient is awaiting bed placement at [place] [address]

## 2023-01-22 NOTE — H&P
[facility]  H&P- Karen Row 1940, 79 y o  male MRN: [id number]  Unit/Bed#: [facility] 520-01 Encounter: [id number]  Primary Care Provider: Roxy Kraus MD   Date and time admitted to hospital: 1/22/2023  5:36 PM    * Hilar mass  Assessment & Plan  CT chest reveals right hilar mass with obstructing right middle lobe bronchus  Concerning for malignancy  Transferred for pulmonary evaluation for bronchoscopy/EBUS for tissue diagnosis  Pulmonary following    Acute on chronic respiratory failure with hypoxia (HCC)  Assessment & Plan  At baseline on 4 L supplemental oxygen  Continue supplemental oxygen to keep O2 sats more than 88%  Encourage incentive spirometry    Pneumonia  Assessment & Plan  Acute postobstructive pneumonia  IV ceftriaxone  Monitor counts temperatures  Aspiration precautions  Urine Legionella strep antigen negative  Follow-up on culture studies    Hyperkalemia  Assessment & Plan  Potassium 5 7  Sample slightly hemolyzed  Repeat potassium now    S/P colostomy [facility])  Assessment & Plan  Daughter at bedside reports history of colonic mass requiring colostomy  Colostomy care    Ambulatory dysfunction  Assessment & Plan  Multifactorial  Safe ambulation  Fall precautions  Physical therapy    Protein calorie malnutrition ([address] )  Assessment & Plan  Malnutrition Findings: Body mass index is 24 94 kg/m²     Encourage adequate protein and calorie intake    Paroxysmal atrial fibrillation [facility])  Assessment & Plan  Outpatient PCP notes reviewed not on anticoagulation  Monitor    Chronic kidney disease  Assessment & Plan  Lab Results   Component Value Date    EGFR 71 01/22/2023    EGFR 67 01/21/2023    EGFR 58 03/23/2022    CREATININE 0 97 01/22/2023    CREATININE 1 02 01/21/2023    CREATININE 1 16 03/23/2022     Monitor kidney function closely  Avoid nephrotoxins  Monitor postvoid residuals    Anxiety  Assessment & Plan  Continue citalopram  Patient was reassured    COPD, moderate (HCC)  Assessment & Plan  Continue respiratory treatments  Pulmonary inputs noted patient is planned for prednisone 40 mg for 5 days  Supportive care  Pulmonary following        VTE Pharmacologic Prophylaxis: VTE Score: 9 High Risk (Score >/= 5) - Pharmacological DVT Prophylaxis Ordered: enoxaparin (Lovenox)  Sequential Compression Devices Ordered  Code Status: Level 1 - Full Code     Discussion with family: Discussed with the patient, daughter at bedside updated questions answered  Anticipated Length of Stay: Patient will be admitted on an inpatient basis with an anticipated length of stay of greater than 2 midnights secondary to Hilar mass transferred for pulmonary evaluation possible bronchoscopy/EBUS  Chief Complaint:       Transferred to [facility] for management of right hilar mass requiring tissue diagnosis with bronchoscopy/EBUS    History of Present Illness:  Sander Stevenson is a 79 y o  male with a PMH of chronic hypoxic respiratory failure on 4 L supplemental oxygen, COPD, BPH, GERD, recent COVID infection, ambulatory dysfunction, history of colonic mass requiring colostomy, depression, anxiety who presents with transferred from [facility] for management of right hilar mass  Patient admitted to [facility] ED, initially presented with shortness of breath  Work-up revealed right hilar mass with likely postobstructive pneumonia  He was placed on antibiotics seen in consultation with pulmonary  Given his hilar mass [facility]  [facility] pulm evaluation bronchoscopy/EBUS for tissue diagnosis  History is obtained from the patient as well as his daughter at bedside  He is hospital course at [facility] [place] reviewed on epic  Patient reports history of weight loss poor appetite  Since last couple of years he has had rough time, he also had a COVID infection which she was recovering from    Presently reports cough unable to expectorate mucoid sputum  He has easy fatigability and exertional shortness of breath  His hospital course at [address] [place] reviewed on [facility]  Outpatient notes reviewed in epic    Review of Systems:  Review of Systems   All other systems reviewed and are negative  Past Medical and Surgical History:   Past Medical History:   Diagnosis Date   • Acute blood loss anemia 12/6/2020   • Anxiety    • Bladder infection     current tx with cipro 5/29/16   • BPH (benign prostatic hyperplasia)    • Community acquired pneumonia     last assessed 8/28/17, resolved 9/25/17   • COPD (chronic obstructive pulmonary disease) ([facility] [address] )    • Dysphagia 6/8/2020   • Enlarged prostate    • GERD (gastroesophageal reflux disease)    • History of colonoscopy 03/19/2015    POLYP IN THE ASCENDING COLON-[facility] [place]BRITTNEY [facility]KEON   • Hx of bladder infections    • Inguinal hernia, right 05/10/2019   • Multiple rib fractures 6/3/2020   • Neck pain    • Psychiatric disorder     depression   • Pulmonary nodule 01/08/2019    STABLE 6MM LEFT APICAL NODULE   • Respiratory failure with hypoxia (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   • INGUINAL HERNIA REPAIR Right 05/10/2019    DONE AT [facility]   • LAPAROSCOPIC COLON RESECTION  02/03/2011    ABDOMIANOPERITONEAL RESECTION FOR RECTAL MASS  DONE BY RADHA MCDUFFIE   • MA OPTX FEM SHFT FX W/INSJ IMED IMPLT W/WO SCREW Left 12/7/2020    Procedure: Left Hip TFN;  Surgeon: Navid Gonzalez MD;  Location:  MAIN OR;  Service: Orthopedics   • MA RPR 1ST INGUN HRNA AGE 5 YRS/> REDUCIBLE Left 4/5/2022    Procedure: REPAIR HERNIA INGUINAL OPEN;  Surgeon: Helon Blizzard, MD;  Location:  MAIN OR;  Service: General       Meds/Allergies:  Prior to Admission medications    Medication Sig Start Date End Date Taking? Authorizing Provider   acetaminophen (TYLENOL) 325 mg tablet Take 2 tablets (650 mg total) by mouth every 4 (four) hours as needed for mild pain 6/13/20   Suzanne Troy PA-C   acetaminophen (TYLENOL) 325 mg tablet Take 2 tablets (650 mg total) by mouth every 6 (six) hours as needed for mild pain or moderate pain 4/5/22   Ophelia Kaye PA-C   albuterol (2 5 mg/3 mL) 0 083 % nebulizer solution Take 3 mL (2 5 mg total) by nebulization every 4 (four) hours as needed for wheezing or shortness of breath 1/18/23   Justine Lepe MD   albuterol (ProAir HFA) 90 mcg/act inhaler Inhale 2 puffs every 6 (six) hours as needed for wheezing 5/29/20   Justine Lepe MD   arformoterol (BROVANA) 15 mcg/2 mL nebulizer solution Take 2 mL (15 mcg total) by nebulization 2 (two) times a day  Patient taking differently: Take 15 mcg by nebulization 4 (four) times a day 1/31/22   Javan Candelario DO   budesonide (Pulmicort) 0 5 mg/2 mL nebulizer solution Take 2 mL (0 5 mg total) by nebulization 4 (four) times a day Rinse mouth after use   1/18/23   Justine Lepe MD   Catheters (Gizmo Condom Catheter) MISC Use daily at bedtime 1/18/21   Justine Lepe MD   escitalopram (LEXAPRO) 10 mg tablet Take 1 tablet (10 mg total) by mouth daily at bedtime 1/18/23   Justine Lepe MD   famotidine (PEPCID) 40 MG tablet TAKE 1 TABLET BY MOUTH EVERY DAY 1/16/23   Justine Lepe MD   gabapentin (NEURONTIN) 100 mg capsule TAKE 1 CAPSULE BY MOUTH TWICE A DAY 10/8/22   Justine Lepe MD   Incontinence Supply Disposable (PadSORBer Bed Pan Liners) MISC Use daily at bedtime 1/18/21   Justine Lepe MD   ipratropium-albuterol (DUO-NEB) 0 5-2 5 mg/3 mL nebulizer solution Take 3 mL by nebulization 2 (two) times a day 1/18/23   Justine Lepe MD   NON FORMULARY Take 1,000 mg by mouth 2 (two) times a day Med is MSM Pure, pt brought in from home    Historical Provider, MD   prednisoLONE acetate (PRED FORTE) 1 % ophthalmic suspension  12/22/22   Historical Provider, MD tamsulosin (FLOMAX) 0 4 mg TAKE 1 CAPSULE BY MOUTH EVERY DAY WITH DINNER 22   Danae Crisostomo MD   LORazepam (ATIVAN) 1 mg tablet Take 1 tablet (1 mg total) by mouth every 8 (eight) hours as needed for anxiety 20  Danae Crisostomo MD     I have reviewed home medications using recent Epic encounter  Allergies:    Allergies   Allergen Reactions   • Aspirin Other (See Comments)     Hx stomach ulcer   • Bactrim [Sulfamethoxazole-Trimethoprim] GI Intolerance       Social History:  Marital Status: /Civil Union   Occupation:   Patient Pre-hospital Living Situation: Home  Patient Pre-hospital Level of Mobility: Ambulatory dysfunction  Patient Pre-hospital Diet Restrictions:   Substance Use History:   Social History     Substance and Sexual Activity   Alcohol Use Not Currently     Social History     Tobacco Use   Smoking Status Former   • Packs/day: 0 50   • Years: 50 00   • Pack years: 25 00   • Types: Cigarettes   • Start date: 64   • Quit date: 2018   • Years since quittin 8   Smokeless Tobacco Former   Tobacco Comments    quit 2017 per [employer]      Social History     Substance and Sexual Activity   Drug Use Never       Family History:  Family History   Problem Relation Age of Onset   • Lung cancer Mother    • Cancer Mother    • Cancer Father    • Alcohol abuse Father    • Mental illness Neg Hx        Physical Exam:     Vitals:   Blood Pressure: 129/75 (23)  Pulse: 81 (23)  Temperature: (!) 97 2 °F (36 2 °C) (23)  Temp Source: Oral (23)  Respirations: 21 (23)  Height: 5' 8" (172 7 cm) (23)  Weight - Scale: 74 4 kg (164 lb) (23)  SpO2: 96 % (23)    Physical Exam       Comfortably in bed  Appears dyspneic but able to complete sentences  Features of protein calorie malnutrition noted  Neck supple  Lungs emphysematous  Diminished breath sounds bilaterally  Heart sounds S1 and S2 noted  Heart sounds are distant  Abdomen soft nontender  Colostomy noted  Awake alert obey simple commands  Moves extremities  No pedal edema  No rash    Additional Data:     Lab Results:  Results from last 7 days   Lab Units 01/21/23  0543   WBC Thousand/uL 15 24*   HEMOGLOBIN g/dL 12 8   HEMATOCRIT % 39 5   PLATELETS Thousands/uL 273   NEUTROS PCT % 85*   LYMPHS PCT % 7*   MONOS PCT % 7   EOS PCT % 0     Results from last 7 days   Lab Units 01/22/23  1523 01/21/23  0543   SODIUM mmol/L 140 133*   POTASSIUM mmol/L 5 7* 4 2   CHLORIDE mmol/L 107 101   CO2 mmol/L 27 30   BUN mg/dL 23 16   CREATININE mg/dL 0 97 1 02   ANION GAP mmol/L 6 2*   CALCIUM mg/dL 8 6 8 2*   ALBUMIN g/dL  --  2 9*   TOTAL BILIRUBIN mg/dL  --  0 70   ALK PHOS U/L  --  70   ALT U/L  --  27   AST U/L  --  25   GLUCOSE RANDOM mg/dL 112 96     Results from last 7 days   Lab Units 01/21/23  0543   INR  1 12             Results from last 7 days   Lab Units 01/22/23  1523 01/21/23  0543   LACTIC ACID mmol/L  --  0 8   PROCALCITONIN ng/ml <0 05 <0 05       Lines/Drains:  Invasive Devices     Peripheral Intravenous Line  Duration           Peripheral IV 01/21/23 Left Antecubital 1 day          Drain  Duration           Colostomy LUQ -- days    Colostomy LUQ -- days                    Imaging: Reviewed radiology reports from this admission including: chest xray and chest CT scan    EKG and Other Studies Reviewed on Admission:   · EKG: Sinus rhythm on telemetry  ** Please Note: This note has been constructed using a voice recognition system   **

## 2023-01-22 NOTE — ASSESSMENT & PLAN NOTE
Lab Results   Component Value Date    EGFR 71 01/22/2023    EGFR 67 01/21/2023    EGFR 58 03/23/2022    CREATININE 0 97 01/22/2023    CREATININE 1 02 01/21/2023    CREATININE 1 16 03/23/2022   Has CKD stage III  At baseline

## 2023-01-22 NOTE — ASSESSMENT & PLAN NOTE
Patient CT chest showed-Right hilar mass obstructing right middle lobe bronchus resulting in complete post obstructive right middle lobe collapse  Mediastinal and right hilar lymphadenopathy  Emphysema    Pulmonary consult appreciated  Streptococcal and Legionella urine antigen is negative  Follow-up blood culture results  On Rocephin and Zithromax  Pulmonary recommended transfer to [address] [place] [address] for bronchoscopy, EBUS  Patient is awaiting bed placement at [place] [address]

## 2023-01-22 NOTE — PROGRESS NOTES
[place]  Progress Note - Bailee Leaks 1940, 79 y o  male MRN: [id number]  Unit/Bed#: ED 01 Encounter: [id number]  Primary Care Provider: Yifan Durand MD   Date and time admitted to hospital: 1/21/2023  5:41 AM    Hilar mass  Assessment & Plan  Patient CT chest showed-Right hilar mass obstructing right middle lobe bronchus resulting in complete post obstructive right middle lobe collapse  Mediastinal and right hilar lymphadenopathy  Emphysema  Pulmonary consult appreciated  Streptococcal and Legionella urine antigen is negative  Follow-up blood culture results  On Rocephin and Zithromax  Pulmonary recommended transfer to [address] [place] [address] for bronchoscopy, EBUS  Patient is awaiting bed placement at [place] [address]    Stage 3a chronic kidney disease [facility])  Assessment & Plan  Lab Results   Component Value Date    EGFR 67 01/21/2023    EGFR 58 03/23/2022    EGFR 66 02/03/2021    CREATININE 1 02 01/21/2023    CREATININE 1 16 03/23/2022    CREATININE 1 05 02/03/2021   Has CKD stage III  At baseline  Will order repeat blood work this morning    Acute on chronic respiratory failure with hypoxia [facility])  Assessment & Plan  Patient has chronic respiratory failure and is on 2 L of supplemental oxygen at home  Patient presented to the ER with shortness of breath, tachypnea and requiring 4 L of supplemental oxygen  Secondary to possible postobstructive pneumonia/COPD exacerbation  Continue antibiotics and steroid  Wean oxygen as tolerated    Acid reflux disease  Assessment & Plan  On Protonix    UTI (urinary tract infection)  Assessment & Plan  On Rocephin  Follow-up culture results  Trend WBC and fever curve    COPD, moderate (HCC)  Assessment & Plan  History of emphysema/COPD  Continue on prednisone  Bronchodilators  Oxygen supplementation        Labs & Imaging: I have personally reviewed pertinent reports  VTE Prophylaxis: in place      Code Status:   Prior    Patient Centered Rounds: I have performed bedside rounds with nursing staff today  Current Length of Stay: 0 day(s)    Current Patient Status: Emergency   Certification Statement: The patient will continue to require additional inpatient hospital stay due to see my assessment and plan  Subjective:   Patient is seen and examined at bedside  No new complaints  Feels better than yesterday  Still has shortness of breath  Afebrile  All other ROS are negative  Objective:    Vitals: Blood pressure 112/63, pulse 80, temperature 98 4 °F (36 9 °C), temperature source Oral, resp  rate 22, height 5' 8" (1 727 m), weight 75 8 kg (167 lb), SpO2 93 %  ,Body mass index is 25 39 kg/m²  SPO2 RA Rest    Flowsheet Row ED from 1/21/2023 in  [address] Emergency Department   SpO2 93 %   SpO2 Activity At Rest   O2 Device Nasal cannula   O2 Flow Rate --        I&O: No intake or output data in the 24 hours ending 01/22/23 1310    Physical Exam:    General- Alert, lying comfortably in bed  Not in any acute distress  Neck- Supple, No JVD  CVS- regular, S1 and S2 normal  Chest- Bilateral Air entry, decreased at bases  Abdomen- soft, nontender, not distended, no guarding or rigidity, BS+  Extremities-  No pedal edema, No calf tenderness         Moving all 4 extremities  CNS-   Alert, awake and orientedx3  No focal deficits present      Invasive Devices     Peripheral Intravenous Line  Duration           Peripheral IV 01/21/23 Left Antecubital 1 day          Drain  Duration           Colostomy LUQ -- days    Colostomy LUQ -- days                      Social History  reviewed  Family History   Problem Relation Age of Onset   • Lung cancer Mother    • Cancer Mother    • Cancer Father    • Alcohol abuse Father    • Mental illness Neg Hx     reviewed    Meds:  Current Facility-Administered Medications   Medication Dose Route Frequency Provider Last Rate Last Admin   • azithromycin (ZITHROMAX) 500 mg in sodium chloride 0 9% 250mL IVPB 500 mg  500 mg Intravenous Q24H Linda Scruggs MD 0 mL/hr at 01/21/23 1130 500 mg at 01/22/23 1030   • cefTRIAXone (ROCEPHIN) IVPB (premix in dextrose) 1,000 mg 50 mL  1,000 mg Intravenous Q24H Linda Scruggs  mL/hr at 01/22/23 0622 1,000 mg at 01/22/23 9012   • escitalopram (LEXAPRO) tablet 10 mg  10 mg Oral HS Linda Scruggs MD   10 mg at 01/21/23 2111   • heparin (porcine) subcutaneous injection 5,000 Units  5,000 Units Subcutaneous [facility] ARCADIO Arias   5,000 Units at 01/22/23 1165   • ipratropium (ATROVENT) 0 02 % inhalation solution 0 5 mg  0 5 mg Nebulization TID Linda Scruggs MD   0 5 mg at 01/22/23 3601   • levalbuterol (Ambrocio Folk) inhalation solution 0 63 mg  0 63 mg Nebulization TID Linda Scruggs MD   0 63 mg at 01/22/23 1472   • pantoprazole (PROTONIX) EC tablet 40 mg  40 mg Oral Early Morning Tete Mancera MD   40 mg at 01/22/23 6807   • predniSONE tablet 40 mg  40 mg Oral Daily Linda Scruggs MD   40 mg at 01/22/23 6982   • sodium chloride 0 9 % infusion  75 mL/hr Intravenous Continuous Tete Mancera MD 75 mL/hr at 01/22/23 1030 75 mL/hr at 01/22/23 1030   • sodium chloride 3 % inhalation solution 4 mL  4 mL Nebulization TID Nikole Urbina MD   4 mL at 01/22/23 9316     Current Outpatient Medications   Medication Sig Dispense Refill   • acetaminophen (TYLENOL) 325 mg tablet Take 2 tablets (650 mg total) by mouth every 4 (four) hours as needed for mild pain 30 tablet 0   • acetaminophen (TYLENOL) 325 mg tablet Take 2 tablets (650 mg total) by mouth every 6 (six) hours as needed for mild pain or moderate pain 60 tablet 0   • albuterol (2 5 mg/3 mL) 0 083 % nebulizer solution Take 3 mL (2 5 mg total) by nebulization every 4 (four) hours as needed for wheezing or shortness of breath 180 mL 3   • albuterol (ProAir HFA) 90 mcg/act inhaler Inhale 2 puffs every 6 (six) hours as needed for wheezing 8 5 g 3   • arformoterol (BROVANA) 15 mcg/2 mL nebulizer solution Take 2 mL (15 mcg total) by nebulization 2 (two) times a day (Patient taking differently: Take 15 mcg by nebulization 4 (four) times a day) 120 mL 5   • budesonide (Pulmicort) 0 5 mg/2 mL nebulizer solution Take 2 mL (0 5 mg total) by nebulization 4 (four) times a day Rinse mouth after use  100 mL 2   • Catheters (Gizmo Condom Catheter) MISC Use daily at bedtime 100 each 5   • escitalopram (LEXAPRO) 10 mg tablet Take 1 tablet (10 mg total) by mouth daily at bedtime 90 tablet 3   • famotidine (PEPCID) 40 MG tablet TAKE 1 TABLET BY MOUTH EVERY DAY 90 tablet 1   • gabapentin (NEURONTIN) 100 mg capsule TAKE 1 CAPSULE BY MOUTH TWICE A  capsule 1   • Incontinence Supply Disposable (PadSORBer Bed Pan Liners) MISC Use daily at bedtime 50 each 10   • ipratropium-albuterol (DUO-NEB) 0 5-2 5 mg/3 mL nebulizer solution Take 3 mL by nebulization 2 (two) times a day 180 mL 1   • NON FORMULARY Take 1,000 mg by mouth 2 (two) times a day Med is MSM Pure, pt brought in from home     • prednisoLONE acetate (PRED FORTE) 1 % ophthalmic suspension      • tamsulosin (FLOMAX) 0 4 mg TAKE 1 CAPSULE BY MOUTH EVERY DAY WITH DINNER 30 capsule 5      (Not in a hospital admission)      Labs:  Results from last 7 days   Lab Units 01/21/23  0543   WBC Thousand/uL 15 24*   HEMOGLOBIN g/dL 12 8   HEMATOCRIT % 39 5   PLATELETS Thousands/uL 273   NEUTROS PCT % 85*   LYMPHS PCT % 7*   MONOS PCT % 7   EOS PCT % 0     Results from last 7 days   Lab Units 01/21/23  0543   POTASSIUM mmol/L 4 2   CHLORIDE mmol/L 101   CO2 mmol/L 30   BUN mg/dL 16   CREATININE mg/dL 1 02   CALCIUM mg/dL 8 2*   ALK PHOS U/L 70   ALT U/L 27   AST U/L 25     Lab Results   Component Value Date    TROPONINI <0 02 06/05/2020    TROPONINI <0 02 06/03/2020    TROPONINI <0 02 06/03/2020     Results from last 7 days   Lab Units 01/21/23  0543   INR  1 12     Lab Results   Component Value Date    BLOODCX Received in Microbiology Lab  Culture in Progress   01/21/2023    BLOODCX Received in Microbiology Lab  Culture in Progress  01/21/2023    BLOODCX No Growth After 5 Days  12/08/2020    BLOODCX No Growth After 5 Days  12/08/2020    URINECX >100,000 cfu/ml 02/04/2021    URINECX >100,000 cfu/ml Proteus mirabilis (A) 09/19/2020    URINECX No Growth <1000 cfu/mL 04/22/2019         Imaging:  Results for orders placed during the hospital encounter of 01/21/23    XR chest 1 view portable    Narrative  CHEST    INDICATION:   Shortness of breath  COMPARISON:  March 23, 2022    EXAM PERFORMED/VIEWS:  XR CHEST PORTABLE      FINDINGS:    Cardiomediastinal silhouette appears unremarkable  Emphysematous changes  Complete collapse of the right middle lobe  No airspace opacity in the left lung  Osseous structures appear within normal limits for patient age  Impression  Emphysematous changes  Complete collapse of the right middle lobe  Right middle lobe collapse appears on subsequent CT to be the result of a right hilar mass  Workstation performed: [id number]    Results for orders placed in visit on 03/23/22    XR chest pa & lateral    Narrative  CHEST    INDICATION:   K40 90: Unilateral inguinal hernia, without obstruction or gangrene, not specified as recurrent  COMPARISON:  Chest radiograph from 12/8/2020 and chest from 6/3/2020  Abdomen CT from 5/4/2021  EXAM PERFORMED/VIEWS:  XR CHEST PA & LATERAL  DUAL ENERGY SUBTRACTION  FINDINGS:    Cardiomediastinal silhouette appears unremarkable  Mild opacity in the lateral right base  Upper lobe reticulation due to mild fibrosis and emphysema  No acute disease  No effusion or pneumothorax  Osseous structures appear within normal limits for patient age  Impression  No definite acute cardiopulmonary disease  Mild opacity in the lateral right base, likely residual scar from the right lower lobe pneumonia on the abdomen CT from 2/4/2021              Workstation performed: [id number]      Last 24 Hours Medication List: Current Facility-Administered Medications   Medication Dose Route Frequency Provider Last Rate   • azithromycin  500 mg Intravenous Q24H Marin Bray MD 0 mg (01/21/23 1130)   • cefTRIAXone  1,000 mg Intravenous Q24H Marin Bray MD 1,000 mg (01/22/23 0622)   • escitalopram  10 mg Oral HS Marin Bray MD     • heparin (porcine)  5,000 Units Subcutaneous [address]     • ipratropium  0 5 mg Nebulization TID Marin Bray MD     • levalbuterol  0 63 mg Nebulization TID Marin Bray MD     • pantoprazole  40 mg Oral Early Morning Jann Merlos MD     • predniSONE  40 mg Oral Daily Marin Bray MD     • sodium chloride  75 mL/hr Intravenous Continuous Jann Merlos MD 75 mL/hr (01/22/23 1030)   • sodium chloride  4 mL Nebulization TID Romy Pugh MD          Today, Patient Was Seen By: Jann Merlos MD    ** Please Note: Dictation voice to text software may have been used in the creation of this document   **

## 2023-01-22 NOTE — ED NOTES
Pts  Ostomy bag changed and site cleansed with warm soapy water     Demarcus Markham RN  01/22/23 5199

## 2023-01-22 NOTE — H&P
[facility]  H&P- Mitch Pack 1940, 79 y o  male MRN: [id number]  Unit/Bed#: CORBY Encounter: [id number]  Primary Care Provider: Marcelo Martinez MD   Date and time admitted to hospital: 1/21/2023  5:41 AM    Hilar mass  Assessment & Plan  Patient CT chest showed-Right hilar mass obstructing right middle lobe bronchus resulting in complete post obstructive right middle lobe collapse  Mediastinal and right hilar lymphadenopathy  Emphysema  Pulmonary consult appreciated  Streptococcal and Legionella urine antigen is negative  Follow-up blood culture results  On Rocephin and Zithromax  Pulmonary recommended transfer to [address] [place] [address] for bronchoscopy, EBUS  Patient is awaiting bed placement at [place] [address]    Stage 3a chronic kidney disease [facility])  Assessment & Plan  Lab Results   Component Value Date    EGFR 71 01/22/2023    EGFR 67 01/21/2023    EGFR 58 03/23/2022    CREATININE 0 97 01/22/2023    CREATININE 1 02 01/21/2023    CREATININE 1 16 03/23/2022   Has CKD stage III  At baseline    Acute on chronic respiratory failure with hypoxia [facility])  Assessment & Plan  Patient has chronic respiratory failure and is on 2 L of supplemental oxygen at home  Patient presented to the ER with shortness of breath, tachypnea and requiring 4 L of supplemental oxygen  Secondary to possible postobstructive pneumonia/COPD exacerbation  Continue antibiotics and steroid  Wean oxygen as tolerated    Acid reflux disease  Assessment & Plan  On Protonix    UTI (urinary tract infection)  Assessment & Plan  On Rocephin  Follow-up culture results  Trend WBC and fever curve    COPD, moderate (HCC)  Assessment & Plan  History of emphysema/COPD  Continue on prednisone  Bronchodilators  Oxygen supplementation      VTE Pharmacologic Prophylaxis:   Moderate Risk (Score 3-4) - Pharmacological DVT Prophylaxis Ordered: heparin    Code Status: Prior level 1 full code    Anticipated Length of Stay: Patient will be admitted on an inpatient basis with an anticipated length of stay of greater than 2 midnights secondary to Awaiting transfer to [address] [place] [address]  Total Time for Visit, including Counseling / Coordination of Care: 45 minutes Greater than 50% of this total time spent on direct patient counseling and coordination of care  Chief Complaint: Shortness of breath    History of Present Illness:  Karen Buchanan is a 79 y o  male with PMH COPD on chronic 2 L, BPH, GERD, recent COVID infection who presented to the emergency department for shortness of breath and increased fatigue  He was found to have a right hilar obstructing mass on CTA with right middle lobe collapse  He was evaluated pulmonology who recommended transfer to [facility] for bronchoscopy and/or EBUS  We are consulted for medical management while awaiting transfer  On arrival to the ED, he is requiring 4 L via nasal cannula  Additionally, his UA is positive for infection he was started on Rocephin  Of note, patient had a recent COVID infection and completed a course of Paxil bed  Review of Systems:  Review of Systems   Constitutional: Negative for appetite change, chills, fatigue and fever  HENT: Negative for ear pain, sore throat and trouble swallowing  Eyes: Negative for visual disturbance  Respiratory: Positive for shortness of breath  Negative for cough, chest tightness and wheezing  Cardiovascular: Negative for chest pain, palpitations and leg swelling  Gastrointestinal: Negative for abdominal distention, abdominal pain, diarrhea, nausea and vomiting  Endocrine: Negative  Genitourinary: Negative for dysuria, flank pain and hematuria  Musculoskeletal: Negative for arthralgias, gait problem and myalgias  Skin: Negative for pallor  Allergic/Immunologic: Negative for immunocompromised state  Neurological: Negative for dizziness, syncope, light-headedness, numbness and headaches         Past Medical and Surgical History:   Past Medical History:   Diagnosis Date   • Acute blood loss anemia 12/6/2020   • Anxiety    • Bladder infection     current tx with cipro 5/29/16   • BPH (benign prostatic hyperplasia)    • Community acquired pneumonia     last assessed 8/28/17, resolved 9/25/17   • COPD (chronic obstructive pulmonary disease) ([facility] [address] )    • Dysphagia 6/8/2020   • Enlarged prostate    • GERD (gastroesophageal reflux disease)    • History of colonoscopy 03/19/2015    POLYP IN THE ASCENDING COLON-[facility]   • Hx of bladder infections    • Inguinal hernia, right 05/10/2019   • Multiple rib fractures 6/3/2020   • Neck pain    • Psychiatric disorder     depression   • Pulmonary nodule 01/08/2019    STABLE 6MM LEFT APICAL NODULE   • Respiratory failure with hypoxia (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   • INGUINAL HERNIA REPAIR Right 05/10/2019    DONE AT [facility]   • LAPAROSCOPIC COLON RESECTION  02/03/2011    ABDOMIANOPERITONEAL RESECTION FOR RECTAL MASS  DONE BY RADHA MCDUFFIE   • CO OPTX FEM SHFT FX W/INSJ IMED IMPLT W/WO SCREW Left 12/7/2020    Procedure: Left Hip TFN;  Surgeon: Ricardo Craft MD;  Location:  MAIN OR;  Service: Orthopedics   • CO RPR 1ST INGUN HRNA AGE 5 YRS/> REDUCIBLE Left 4/5/2022    Procedure: REPAIR HERNIA INGUINAL OPEN;  Surgeon: Francisco Garsia MD;  Location:  MAIN OR;  Service: General       Meds/Allergies:  Prior to Admission medications    Medication Sig Start Date End Date Taking?  Authorizing Provider   acetaminophen (TYLENOL) 325 mg tablet Take 2 tablets (650 mg total) by mouth every 4 (four) hours as needed for mild pain 6/13/20   Jack Soto PA-C   acetaminophen (TYLENOL) 325 mg tablet Take 2 tablets (650 mg total) by mouth every 6 (six) hours as needed for mild pain or moderate pain 4/5/22   Jose Kaye ARCADIO   albuterol (2 5 mg/3 mL) 0 083 % nebulizer solution Take 3 mL (2 5 mg total) by nebulization every 4 (four) hours as needed for wheezing or shortness of breath 1/18/23   Mirza Montanez MD   albuterol (ProAir HFA) 90 mcg/act inhaler Inhale 2 puffs every 6 (six) hours as needed for wheezing 5/29/20   Mirza Montanez MD   arformoterol (BROVANA) 15 mcg/2 mL nebulizer solution Take 2 mL (15 mcg total) by nebulization 2 (two) times a day  Patient taking differently: Take 15 mcg by nebulization 4 (four) times a day 1/31/22   Filiberto Bang DO   budesonide (Pulmicort) 0 5 mg/2 mL nebulizer solution Take 2 mL (0 5 mg total) by nebulization 4 (four) times a day Rinse mouth after use  1/18/23   Mirza Montanez MD   Catheters (Gizmo Condom Catheter) MISC Use daily at bedtime 1/18/21   Mirza Montanez MD   escitalopram (LEXAPRO) 10 mg tablet Take 1 tablet (10 mg total) by mouth daily at bedtime 1/18/23   Mirza Montanez MD   famotidine (PEPCID) 40 MG tablet TAKE 1 TABLET BY MOUTH EVERY DAY 1/16/23   Mirza Montanez MD   gabapentin (NEURONTIN) 100 mg capsule TAKE 1 CAPSULE BY MOUTH TWICE A DAY 10/8/22   Mirza Montanez MD   Incontinence Supply Disposable (PadSORBer Bed Pan Liners) MISC Use daily at bedtime 1/18/21   Mirza Montanez MD   ipratropium-albuterol (DUO-NEB) 0 5-2 5 mg/3 mL nebulizer solution Take 3 mL by nebulization 2 (two) times a day 1/18/23   Mirza Montanez MD   NON FORMULARY Take 1,000 mg by mouth 2 (two) times a day Med is MSM Pure, pt brought in from home    Historical Provider, MD   prednisoLONE acetate (PRED FORTE) 1 % ophthalmic suspension  12/22/22   Historical Provider, MD   tamsulosin (FLOMAX) 0 4 mg TAKE 1 CAPSULE BY MOUTH EVERY DAY WITH DINNER 12/29/22   Mirza Montanez MD   LORazepam (ATIVAN) 1 mg tablet Take 1 tablet (1 mg total) by mouth every 8 (eight) hours as needed for anxiety 11/23/20 1/11/21  Mirza Montanez MD     I have reviewed home medications with patient personally  Allergies:    Allergies   Allergen Reactions   • Aspirin Other (See Comments)     Hx stomach ulcer   • Bactrim [Sulfamethoxazole-Trimethoprim] GI Intolerance       Social History:  Marital Status: /Civil Union   Occupation: Noncontributory  Patient Pre-hospital Living Situation: Home  Patient Pre-hospital Level of Mobility: walks  Patient Pre-hospital Diet Restrictions: None  Substance Use History:   Social History     Substance and Sexual Activity   Alcohol Use Not Currently     Social History     Tobacco Use   Smoking Status Former   • Packs/day: 0 50   • Years: 50 00   • Pack years: 25 00   • Types: Cigarettes   • Start date: 64   • Quit date: 2018   • Years since quittin 8   Smokeless Tobacco Former   Tobacco Comments    quit 2017 per [employer]      Social History     Substance and Sexual Activity   Drug Use Never       Family History:  Family History   Problem Relation Age of Onset   • Lung cancer Mother    • Cancer Mother    • Cancer Father    • Alcohol abuse Father    • Mental illness Neg Hx        Physical Exam:     Vitals:   Blood Pressure: 121/70 (23 1618)  Pulse: 80 (23 1618)  Temperature: 98 4 °F (36 9 °C) (23)  Temp Source: Oral (23)  Respirations: 20 (23 1618)  Height: 5' 8" (172 7 cm) (23)  Weight - Scale: 75 8 kg (167 lb) (23)  SpO2: 94 % (23 1618)    Physical Exam  Vitals and nursing note reviewed  Constitutional:       Appearance: Normal appearance  HENT:      Head: Normocephalic and atraumatic  Mouth/Throat:      Mouth: Mucous membranes are moist       Pharynx: Oropharynx is clear  No oropharyngeal exudate  Eyes:      Extraocular Movements: Extraocular movements intact  Cardiovascular:      Rate and Rhythm: Normal rate and regular rhythm  Pulses: Normal pulses  Heart sounds: Normal heart sounds  No murmur heard  No friction rub  No gallop  Pulmonary:      Effort: Pulmonary effort is normal  No respiratory distress  Breath sounds: No stridor  Wheezing present  No rales  Comments: Diminished breath sounds R>L  Abdominal:      General: Abdomen is flat  Bowel sounds are normal  There is no distension  Palpations: Abdomen is soft  Tenderness: There is no abdominal tenderness  Musculoskeletal:      Right lower leg: No edema  Left lower leg: No edema  Skin:     General: Skin is warm and dry  Neurological:      General: No focal deficit present  Mental Status: He is alert and oriented to person, place, and time  Additional Data:     Lab Results:  Results from last 7 days   Lab Units 01/21/23  0543   WBC Thousand/uL 15 24*   HEMOGLOBIN g/dL 12 8   HEMATOCRIT % 39 5   PLATELETS Thousands/uL 273   NEUTROS PCT % 85*   LYMPHS PCT % 7*   MONOS PCT % 7   EOS PCT % 0     Results from last 7 days   Lab Units 01/22/23  1523 01/21/23  0543   SODIUM mmol/L 140 133*   POTASSIUM mmol/L 5 7* 4 2   CHLORIDE mmol/L 107 101   CO2 mmol/L 27 30   BUN mg/dL 23 16   CREATININE mg/dL 0 97 1 02   ANION GAP mmol/L 6 2*   CALCIUM mg/dL 8 6 8 2*   ALBUMIN g/dL  --  2 9*   TOTAL BILIRUBIN mg/dL  --  0 70   ALK PHOS U/L  --  70   ALT U/L  --  27   AST U/L  --  25   GLUCOSE RANDOM mg/dL 112 96     Results from last 7 days   Lab Units 01/21/23  0543   INR  1 12             Results from last 7 days   Lab Units 01/22/23  1523 01/21/23  0543   LACTIC ACID mmol/L  --  0 8   PROCALCITONIN ng/ml <0 05 <0 05       Lines/Drains:  Invasive Devices     Peripheral Intravenous Line  Duration           Peripheral IV 01/21/23 Left Antecubital 1 day          Drain  Duration           Colostomy LUQ -- days    Colostomy LUQ -- days                    Imaging: No pertinent imaging reviewed  CTA ED chest PE Study   Final Result by Thaddeus Rivero MD (01/21 0745)      No pulmonary embolus  Right hilar mass obstructing right middle lobe bronchus resulting in complete post obstructive right middle lobe collapse    Mediastinal and right hilar lymphadenopathy  Emphysema  This examination was marked "immediate notification" in Epic in order to begin the standard process by which the radiology reading room liaison alerts the referring practitioner  I also reported these results to [employer] [facility] via HIPAA compliant    secure electronic messaging on 1/21/2023 at 7:43 AM                 Workstation performed: [id number]         XR chest 1 view portable   ED Interpretation by Mya Gage MD (01/21 9394)   Patchy consolidation R base      Final Result by Mary Raygoza MD (01/21 3656)      Emphysematous changes  Complete collapse of the right middle lobe  Right middle lobe collapse appears on subsequent CT to be the result of a right hilar mass  Workstation performed: [id number]             EKG and Other Studies Reviewed on Admission:       ** Please Note: This note has been constructed using a voice recognition system   **

## 2023-01-22 NOTE — ASSESSMENT & PLAN NOTE
Malnutrition Findings: Body mass index is 24 94 kg/m²     Encourage adequate protein and calorie intake

## 2023-01-22 NOTE — ED NOTES
to [facility] CLINICS room [id number]  Nurse report [phone]  Dr Varsha Angulo accepting      Oleg Day RN  01/22/23 0967

## 2023-01-22 NOTE — PROGRESS NOTES
Pulmonary Progress Note  Lauren Juarez 79 y o  male MRN: [id number]  Unit/Bed#: ED 01 Encounter: [id number]    Assessment  Moderate COPD with emphysema (75% FEV1 in May 2019) in mild exacerbation  Acute on chronic respiratory insufficiency with hypoxia requiring 4LPM (at home on O2 only with exertion/sleep, but on 4LPM 24 hrs/day after COVID)  Possible aspiration pneumonia  Abnormal CT chest with right hilar mass, right middle lobe atelectasis and right lower lobe infiltrates  Right middle lobe atelectasis/consolidation due to airway obstruction from extrinsic compression from right hilar mass  Mediastinal lymphadenopathy  Recent COVID-19 infection status post Paxlovid, vaccinated for COVID  Urinary tract infection  Mechanical fall    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 with resulting colostomy  Tobacco abuse in remission     Recommendations:   Patient recently had COVID, with recovery with paxlovid  Relapse of symptoms in the past few days with malaise, dizziness, shortness of breath, emesis     Findings on CT chest suggests 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 carcinoma in the right hilar region with enlarged precarinal, subcarinal, right hilar lymphadenopathy     Right middle lobe atelectasis and consolidation, as well as right lower lobe infiltrates possibly suggestive of postobstructive pneumonia vs aspiration pneumonia (family states he coughs after eating, recent emesis)    Patient also has concurrent urinary tract infection        Strep pna ag negative, Legionella ag negative, MRSA culture pending     On Ceftriaxone/azithromycin day 2     I discussed the process for diagnosis of the right hilar mass extensively with the patient and his daughter at bedside  Ideally the right hilar mass will need bronchoscopy, EBUS TBNA for diagnosis, for which he is being transferred to [facility] for    However, due to concurrent respiratory and urinary infections, hypoxia, he needs further respiratory optimization to decrease risk of anesthesia or EBUS related complications      After transfer to [facility] he should be reassessed for appropriateness of EBUS TBNA on a daily basis  EBUS may even need to be performed as an outpatient after the patient recovers to close to functional baseline  The daughter and the patient understands that a diagnostic procedure may not occur during this inpatient stay due to his acute infections, respiratory status       Per the patient and daughter, if he is diagnosed with lung cancer, he is not adverse to discussing treatment options including chemotherapy and radiation therapy      As for COPD, I placed him on Xopenex and ipratropium 3 times daily, TID saline nebs  prednisone 40 mg daily x 5 days  He can be continued on his Brovana and Pulmicort twice daily  Consulted speech/swallow to eval for aspiration      As an outpatient he is currently not with duo nebs or albuterol due to cost and only using Katherene Pare per his daughter  We may need case management assistance in assuring that he has short acting beta agonist via nebulizer solution at appropriate cost at discharge      He follows with Dr Ness Quinn in our group  I discussed the case with the pulmonary consultation team at [facility]  Subjective:  No beds for transfer to [facility] at this time  Still in ED  Improving mental status  Able to tolerate lunch  Family says he coughs after eating or drinking  Respiratory status per the patient is improved  Still on 4 L/min    Objective:  Vitals: Vitals personally reviewed  Vitals:    01/22/23 0600 01/22/23 0634 01/22/23 1100 01/22/23 1236   BP: 124/62  107/57 112/63   BP Location:   Right arm Right arm   Pulse: 72  83 80   Resp: (!) 23 22 22   Temp:       TempSrc:       SpO2: 93% 93% 92% 93%   Weight:       Height:          Body mass index is 25 39 kg/m²    No intake or output data in the 24 hours ending 01/22/23 1437  Invasive Devices     Peripheral Intravenous Line  Duration           Peripheral IV 01/21/23 Left Antecubital 1 day          Drain  Duration           Colostomy LUQ -- days    Colostomy LUQ -- days              Physical Exam  General: Awake alert and oriented x 3, hard of hearing, conversant without conversational dyspnea, NAD  HEENT:   Sclera noninjected, nonicteric OU,  no nasal flaring, no nasal drainage, Mucous membranes, moist, no oral lesions, no dentition  NECK: Trachea midline, no accessory muscle use, no stridor, no cervical or supraclavicular adenopathy  CARDIAC: Reg, single s1/S2  PULM:  Clear to auscultation bilaterally with mild end expiratory wheeze  CHEST: No gross deformities, equal chest expansion on inspiration bilaterally  ABD: Normoactive bowel sounds, soft nontender, nondistended, no rebound, no rigidity, no guarding  Ostomy in place, appears normal with brown stool  EXT: No cyanosis, no clubbing, no edema, normal capillary refill  SKIN:  No rashes, no lesions  NEURO:  AAOx3, moving all extremities appropriately    +Dystonia, chronic    Labs: ABG: No results found for: PHART, QGN2VWR, PO2ART, FXB7KAR, A1IGTCFY, BEART, SOURCE  Laboratory and Diagnostics  Results from last 7 days   Lab Units 01/21/23  0543   WBC Thousand/uL 15 24*   HEMOGLOBIN g/dL 12 8   HEMATOCRIT % 39 5   PLATELETS Thousands/uL 273   NEUTROS PCT % 85*   MONOS PCT % 7     Results from last 7 days   Lab Units 01/21/23  0543   SODIUM mmol/L 133*   POTASSIUM mmol/L 4 2   CHLORIDE mmol/L 101   CO2 mmol/L 30   ANION GAP mmol/L 2*   BUN mg/dL 16   CREATININE mg/dL 1 02   CALCIUM mg/dL 8 2*   GLUCOSE RANDOM mg/dL 96   ALT U/L 27   AST U/L 25   ALK PHOS U/L 70   ALBUMIN g/dL 2 9*   TOTAL BILIRUBIN mg/dL 0 70          Results from last 7 days   Lab Units 01/21/23  0543   INR  1 12   PTT seconds 29          Results from last 7 days   Lab Units 01/21/23  0543   LACTIC ACID mmol/L 0 8                 Results from last 7 days   Lab Units 01/21/23  0543   D-DIMER QUANTITATIVE ug/ml FEU 1 54*     Results from last 7 days   Lab Units 01/21/23  0543   PROCALCITONIN ng/ml <0 05         Imaging and other studies: I have personally reviewed pertinent films in PACS  1/21/23 - LUNGS:  There are centrilobular and paraseptal emphysematous changes are noted  Jarod Ivory is a right hilar mass is borders are difficult to directly visualize because of surrounding collapsed lung but is estimated at 2 6 cm on image 119 of series 6   The lesion obstructs right middle lobe bronchus and there is complete postobstructive collapse of the right middle lobe   Mucoid debris noted in the right mainstem bronchus and right lower lobe bronchi with mild atelectasis in the posterior right lower lobe      Pulmonary function testing:   PFT 5/8/2019 - Results:  FEV1/FVC Ratio: 49 %  Forced Vital Capacity: 3 90 L    109 % predicted  FEV1: 1 88 L     75 % predicted       Code Status: Prior      Colby Colby MD  Pulmonary, Critical Care and Sleep Medicine  [facility]     Portions of the record may have been created with voice recognition software  Occasional wrong word or "sound a like" substitutions may have occurred due to the inherent limitations of voice recognition software  Please read the chart carefully and recognize, using context, where substitutions have occurred

## 2023-01-22 NOTE — ASSESSMENT & PLAN NOTE
Acute postobstructive pneumonia  IV ceftriaxone  Monitor counts temperatures  Aspiration precautions  Urine Legionella strep antigen negative  Follow-up on culture studies

## 2023-01-22 NOTE — ED NOTES
Pt  Requesting 0800 breathing tx early    Respiratory called and made aware      Chanel Diamond, RN  01/22/23 9222

## 2023-01-22 NOTE — ASSESSMENT & PLAN NOTE
Lab Results   Component Value Date    EGFR 67 01/21/2023    EGFR 58 03/23/2022    EGFR 66 02/03/2021    CREATININE 1 02 01/21/2023    CREATININE 1 16 03/23/2022    CREATININE 1 05 02/03/2021   Has CKD stage III  At baseline  Will order repeat blood work this morning

## 2023-01-23 LAB
ANION GAP SERPL CALCULATED.3IONS-SCNC: 5 MMOL/L (ref 4–13)
BASOPHILS # BLD AUTO: 0.01 THOUSANDS/ÂΜL (ref 0–0.1)
BASOPHILS NFR BLD AUTO: 0 % (ref 0–1)
BUN SERPL-MCNC: 26 MG/DL (ref 5–25)
CALCIUM SERPL-MCNC: 9 MG/DL (ref 8.3–10.1)
CHLORIDE SERPL-SCNC: 106 MMOL/L (ref 96–108)
CO2 SERPL-SCNC: 28 MMOL/L (ref 21–32)
CREAT SERPL-MCNC: 1.14 MG/DL (ref 0.6–1.3)
EOSINOPHIL # BLD AUTO: 0 THOUSAND/ÂΜL (ref 0–0.61)
EOSINOPHIL NFR BLD AUTO: 0 % (ref 0–6)
ERYTHROCYTE [DISTWIDTH] IN BLOOD BY AUTOMATED COUNT: 12.9 % (ref 11.6–15.1)
GFR SERPL CREATININE-BSD FRML MDRD: 59 ML/MIN/1.73SQ M
GLUCOSE SERPL-MCNC: 115 MG/DL (ref 65–140)
HCT VFR BLD AUTO: 37.6 % (ref 36.5–49.3)
HGB BLD-MCNC: 12.2 G/DL (ref 12–17)
IMM GRANULOCYTES # BLD AUTO: 0.1 THOUSAND/UL (ref 0–0.2)
IMM GRANULOCYTES NFR BLD AUTO: 1 % (ref 0–2)
LYMPHOCYTES # BLD AUTO: 0.67 THOUSANDS/ÂΜL (ref 0.6–4.47)
LYMPHOCYTES NFR BLD AUTO: 5 % (ref 14–44)
MCH RBC QN AUTO: 33.2 PG (ref 26.8–34.3)
MCHC RBC AUTO-ENTMCNC: 32.4 G/DL (ref 31.4–37.4)
MCV RBC AUTO: 102 FL (ref 82–98)
MONOCYTES # BLD AUTO: 0.62 THOUSAND/ÂΜL (ref 0.17–1.22)
MONOCYTES NFR BLD AUTO: 5 % (ref 4–12)
NEUTROPHILS # BLD AUTO: 12.49 THOUSANDS/ÂΜL (ref 1.85–7.62)
NEUTS SEG NFR BLD AUTO: 89 % (ref 43–75)
NRBC BLD AUTO-RTO: 0 /100 WBCS
PLATELET # BLD AUTO: 284 THOUSANDS/UL (ref 149–390)
PMV BLD AUTO: 10 FL (ref 8.9–12.7)
POTASSIUM SERPL-SCNC: 4.4 MMOL/L (ref 3.5–5.3)
RBC # BLD AUTO: 3.68 MILLION/UL (ref 3.88–5.62)
SODIUM SERPL-SCNC: 139 MMOL/L (ref 135–147)
WBC # BLD AUTO: 13.89 THOUSAND/UL (ref 4.31–10.16)

## 2023-01-23 RX ADMIN — GABAPENTIN 100 MG: 100 CAPSULE ORAL at 18:12

## 2023-01-23 RX ADMIN — BENZONATATE 200 MG: 100 CAPSULE ORAL at 22:12

## 2023-01-23 RX ADMIN — LEVALBUTEROL HYDROCHLORIDE 1.25 MG: 1.25 SOLUTION, CONCENTRATE RESPIRATORY (INHALATION) at 19:45

## 2023-01-23 RX ADMIN — ESCITALOPRAM OXALATE 10 MG: 10 TABLET ORAL at 22:12

## 2023-01-23 RX ADMIN — TAMSULOSIN HYDROCHLORIDE 0.4 MG: 0.4 CAPSULE ORAL at 16:05

## 2023-01-23 RX ADMIN — BUDESONIDE 0.5 MG: 0.5 INHALANT ORAL at 07:49

## 2023-01-23 RX ADMIN — DOCUSATE SODIUM 100 MG: 100 CAPSULE, LIQUID FILLED ORAL at 09:08

## 2023-01-23 RX ADMIN — PREDNISOLONE ACETATE 1 DROP: 10 SUSPENSION/ DROPS OPHTHALMIC at 18:12

## 2023-01-23 RX ADMIN — CEFTRIAXONE SODIUM 1000 MG: 10 INJECTION, POWDER, FOR SOLUTION INTRAVENOUS at 06:19

## 2023-01-23 RX ADMIN — IPRATROPIUM BROMIDE 0.5 MG: 0.5 SOLUTION RESPIRATORY (INHALATION) at 19:45

## 2023-01-23 RX ADMIN — BENZONATATE 200 MG: 100 CAPSULE ORAL at 16:05

## 2023-01-23 RX ADMIN — GABAPENTIN 100 MG: 100 CAPSULE ORAL at 09:09

## 2023-01-23 RX ADMIN — IPRATROPIUM BROMIDE 0.5 MG: 0.5 SOLUTION RESPIRATORY (INHALATION) at 13:46

## 2023-01-23 RX ADMIN — IPRATROPIUM BROMIDE 0.5 MG: 0.5 SOLUTION RESPIRATORY (INHALATION) at 07:49

## 2023-01-23 RX ADMIN — FAMOTIDINE 40 MG: 20 TABLET ORAL at 09:08

## 2023-01-23 RX ADMIN — GUAIFENESIN 1200 MG: 600 TABLET, EXTENDED RELEASE ORAL at 22:12

## 2023-01-23 RX ADMIN — GUAIFENESIN 1200 MG: 600 TABLET, EXTENDED RELEASE ORAL at 09:08

## 2023-01-23 RX ADMIN — DOCUSATE SODIUM 100 MG: 100 CAPSULE, LIQUID FILLED ORAL at 18:12

## 2023-01-23 RX ADMIN — LEVALBUTEROL HYDROCHLORIDE 1.25 MG: 1.25 SOLUTION, CONCENTRATE RESPIRATORY (INHALATION) at 07:49

## 2023-01-23 RX ADMIN — LEVALBUTEROL HYDROCHLORIDE 1.25 MG: 1.25 SOLUTION, CONCENTRATE RESPIRATORY (INHALATION) at 13:46

## 2023-01-23 RX ADMIN — PREDNISOLONE ACETATE 1 DROP: 10 SUSPENSION/ DROPS OPHTHALMIC at 09:11

## 2023-01-23 RX ADMIN — BENZONATATE 200 MG: 100 CAPSULE ORAL at 09:09

## 2023-01-23 RX ADMIN — ENOXAPARIN SODIUM 40 MG: 40 INJECTION SUBCUTANEOUS at 09:09

## 2023-01-23 RX ADMIN — PREDNISONE 40 MG: 20 TABLET ORAL at 09:09

## 2023-01-23 RX ADMIN — BUDESONIDE 0.5 MG: 0.5 INHALANT ORAL at 19:45

## 2023-01-23 NOTE — ASSESSMENT & PLAN NOTE
CT chest reveals right hilar mass with obstructing right middle lobe bronchus  Concerning for malignancy  Transferred for pulmonary evaluation for bronchoscopy/EBUS for tissue diagnosis  Tentative plan for bronc on Wednesday  Pulmonary following

## 2023-01-23 NOTE — PROGRESS NOTES
[address] [facility]  Progress Note - Renny Brightnce 1940, 79 y o  male MRN: [id number]  Unit/Bed#: [facility] 520-01 Encounter: [id number]  Primary Care Provider: Justine Lepe MD   Date and time admitted to hospital: 1/22/2023  5:36 PM    Hyperkalemia  Assessment & Plan  Potassium 5 7 -improved to 4 4 this morning  Sample slightly hemolyzed  Repeat potassium now    S/P colostomy [facility])  Assessment & Plan  Daughter at bedside reports history of colonic mass requiring colostomy  Colostomy care    Ambulatory dysfunction  Assessment & Plan  Multifactorial  Safe ambulation  Fall precautions  Physical therapy    Protein calorie malnutrition [facility])  Assessment & Plan  Malnutrition Findings: Body mass index is 24 94 kg/m²     Encourage adequate protein and calorie intake    Paroxysmal atrial fibrillation [facility])  Assessment & Plan  Outpatient PCP notes reviewed not on anticoagulation  Would likely benefit from anticoagulation actually in the setting of likely malignancy with hypercoagulability and A  fib, can discuss further after bronchoscopy if wanting to initiate here versus follow-up with his outpatient provider  Monitor    Chronic kidney disease  Assessment & Plan  Lab Results   Component Value Date    EGFR 59 01/23/2023    EGFR 71 01/22/2023    EGFR 67 01/21/2023    CREATININE 1 14 01/23/2023    CREATININE 0 97 01/22/2023    CREATININE 1 02 01/21/2023     Monitor kidney function closely  Avoid nephrotoxins  Monitor postvoid residuals    Acute on chronic respiratory failure with hypoxia (HCC)  Assessment & Plan  At baseline on 4 L supplemental oxygen  Continue supplemental oxygen to keep O2 sats more than 88%  Encourage incentive spirometry    Anxiety  Assessment & Plan  Continue citalopram  Patient was reassured    Pneumonia  Assessment & Plan  Acute postobstructive pneumonia  IV ceftriaxone  Monitor counts temperatures  Aspiration precautions  Urine Legionella strep antigen negative  Follow-up on culture studies  Currently stable on 4 L nasal cannula  And for 5-day treatment course    COPD, moderate (HCC)  Assessment & Plan  Continue respiratory treatments  Pulmonary inputs noted patient is planned for prednisone 40 mg for 5 days  Supportive care  Pulmonary following    * Hilar mass  Assessment & Plan  CT chest reveals right hilar mass with obstructing right middle lobe bronchus  Concerning for malignancy  Transferred for pulmonary evaluation for bronchoscopy/EBUS for tissue diagnosis  Tentative plan for bronc on Wednesday  Pulmonary following          VTE Pharmacologic Prophylaxis: VTE Score: 9 High Risk (Score >/= 5) - Pharmacological DVT Prophylaxis Ordered: enoxaparin (Lovenox)  Sequential Compression Devices Ordered  Patient Centered Rounds: I performed bedside rounds with nursing staff today  Discussions with Specialists or Other Care Team Provider: pulm    Education and Discussions with Family / Patient: Attempted to update  (daughter) via phone  Left voicemail  Time Spent for Care: 30 minutes  More than 50% of total time spent on counseling and coordination of care as described above  Current Length of Stay: 1 day(s)  Current Patient Status: Inpatient   Certification Statement: The patient will continue to require additional inpatient hospital stay due to Pending bronchoscopy for hilar mass  Discharge Plan: Anticipate discharge in >72 hrs to discharge location to be determined pending rehab evaluations  Code Status: Level 1 - Full Code    Subjective:   Denies any acute complaints during my evaluation, notes improvement in his energy levels and his shortness of breath today    Denies hemoptysis nausea vomiting abdominal pain fever chills    Objective:     Vitals:   Temp (24hrs), Av 7 °F (36 5 °C), Min:97 2 °F (36 2 °C), Max:98 3 °F (36 8 °C)    Temp:  [97 2 °F (36 2 °C)-98 3 °F (36 8 °C)] 97 3 °F (36 3 °C)  HR:  [67-95] 80  Resp:  [16-21] 16  BP: (121-138)/(67-75) 130/71  SpO2:  [93 %-99 %] 97 %  Body mass index is 24 94 kg/m²  Input and Output Summary (last 24 hours): Intake/Output Summary (Last 24 hours) at 1/23/2023 1658  Last data filed at 1/23/2023 1401  Gross per 24 hour   Intake 1203 33 ml   Output 400 ml   Net 803 33 ml       Physical Exam:   Physical Exam  Vitals and nursing note reviewed  Constitutional:       General: He is not in acute distress  Appearance: He is well-developed  He is not ill-appearing, toxic-appearing or diaphoretic  HENT:      Head: Normocephalic and atraumatic  Eyes:      General: No scleral icterus  Conjunctiva/sclera: Conjunctivae normal    Cardiovascular:      Rate and Rhythm: Normal rate and regular rhythm  Heart sounds: No murmur heard  No friction rub  No gallop  Pulmonary:      Effort: Pulmonary effort is normal  No respiratory distress  Breath sounds: Normal breath sounds  No stridor  No wheezing, rhonchi or rales  Comments: 4l nc    Chest:      Chest wall: No tenderness  Abdominal:      General: There is no distension  Palpations: Abdomen is soft  There is no mass  Tenderness: There is no abdominal tenderness  There is no guarding or rebound  Hernia: No hernia is present  Musculoskeletal:         General: No swelling, tenderness, deformity or signs of injury  Cervical back: Neck supple  Skin:     General: Skin is warm and dry  Capillary Refill: Capillary refill takes less than 2 seconds  Coloration: Skin is not jaundiced or pale  Neurological:      Mental Status: He is alert and oriented to person, place, and time     Psychiatric:         Mood and Affect: Mood normal           Additional Data:     Labs:  Results from last 7 days   Lab Units 01/23/23  0447   WBC Thousand/uL 13 89*   HEMOGLOBIN g/dL 12 2   HEMATOCRIT % 37 6   PLATELETS Thousands/uL 284   NEUTROS PCT % 89*   LYMPHS PCT % 5*   MONOS PCT % 5   EOS PCT % 0     Results from last 7 days   Lab Units 01/23/23  0447 01/22/23  1523 01/21/23  0543   SODIUM mmol/L 139   < > 133*   POTASSIUM mmol/L 4 4   < > 4 2   CHLORIDE mmol/L 106   < > 101   CO2 mmol/L 28   < > 30   BUN mg/dL 26*   < > 16   CREATININE mg/dL 1 14   < > 1 02   ANION GAP mmol/L 5   < > 2*   CALCIUM mg/dL 9 0   < > 8 2*   ALBUMIN g/dL  --   --  2 9*   TOTAL BILIRUBIN mg/dL  --   --  0 70   ALK PHOS U/L  --   --  70   ALT U/L  --   --  27   AST U/L  --   --  25   GLUCOSE RANDOM mg/dL 115   < > 96    < > = values in this interval not displayed  Results from last 7 days   Lab Units 01/21/23  0543   INR  1 12             Results from last 7 days   Lab Units 01/22/23  1523 01/21/23  0543   LACTIC ACID mmol/L  --  0 8   PROCALCITONIN ng/ml <0 05 <0 05       Lines/Drains:  Invasive Devices     Peripheral Intravenous Line  Duration           Peripheral IV 01/21/23 Left Antecubital 2 days          Drain  Duration           Colostomy LUQ -- days    Colostomy LUQ -- days                  Telemetry:  Telemetry Orders (From admission, onward)             24 Hour Telemetry Monitoring  Continuous x 24 Hours (Telem)        References:    Telemetry Guidelines   Question:  Reason for 24 Hour Telemetry  Answer:  Metabolic/Electrolyte Disturbance with High Probability of Dysrhythmia (K level <3 or >6, or KCL infusion >=10mEq/hr)                            Imaging: No pertinent imaging reviewed  Recent Cultures (last 7 days):   Results from last 7 days   Lab Units 01/21/23  0743 01/21/23  0546 01/21/23  0543   BLOOD CULTURE   --  No Growth at 48 hrs  No Growth at 48 hrs     URINE CULTURE  Culture too young- will reincubate  --   --    LEGIONELLA URINARY ANTIGEN  Negative  --   --        Last 24 Hours Medication List:   Current Facility-Administered Medications   Medication Dose Route Frequency Provider Last Rate   • acetaminophen  650 mg Oral Q4H PRN Jesse Basilio MD     • albuterol  2 5 mg Nebulization Q4H PRN Jesse Basilio MD • aluminum-magnesium hydroxide-simethicone  30 mL Oral Q6H PRN Libia Rasmussen MD     • benzonatate  200 mg Oral TID Libia Rasmussen MD     • budesonide  0 5 mg Nebulization Q12H Libia Rasmussen MD     • cefTRIAXone  1,000 mg Intravenous Q24H Libia Rasmussen MD Stopped (01/23/23 0800)   • docusate sodium  100 mg Oral BID Libia Rasmussen MD     • enoxaparin  40 mg Subcutaneous Daily Libia Rasmussen MD     • escitalopram  10 mg Oral HS Libia Rasmussen MD     • famotidine  40 mg Oral Daily Libia Rasmussen MD     • gabapentin  100 mg Oral BID Libia Rasmussen MD     • guaiFENesin  1,200 mg Oral Q12H [address] Libia Rasmussen MD     • ipratropium  0 5 mg Nebulization TID Libia Rasmussen MD     • levalbuterol  1 25 mg Nebulization TID Libia Rasmussen MD     • ondansetron  4 mg Intravenous Q6H PRN Libia Rasmussen MD     • polyethylene glycol  17 g Oral Daily Libia Rasmussen MD     • prednisoLONE acetate  1 drop Both Eyes BID Libia Rasmussen MD     • predniSONE  40 mg Oral Daily Libia Rasmussen MD     • tamsulosin  0 4 mg Oral Daily With Jessiac Bianchi MD          Today, Patient Was Seen By: Patricia Schaefer DO    **Please Note: This note may have been constructed using a voice recognition system  **

## 2023-01-23 NOTE — PLAN OF CARE
Problem: MOBILITY - ADULT  Goal: Maintain or return to baseline ADL function  Description: INTERVENTIONS:  -  Assess patient's ability to carry out ADLs; assess patient's baseline for ADL function and identify physical deficits which impact ability to perform ADLs (bathing, care of mouth/teeth, toileting, grooming, dressing, etc )  - Assess/evaluate cause of self-care deficits   - Assess range of motion  - Assess patient's mobility; develop plan if impaired  - Assess patient's need for assistive devices and provide as appropriate  - Encourage maximum independence but intervene and supervise when necessary  - Involve family in performance of ADLs  - Assess for home care needs following discharge   - Consider OT consult to assist with ADL evaluation and planning for discharge  - Provide patient education as appropriate  Outcome: Progressing  Goal: Maintains/Returns to pre admission functional level  Description: INTERVENTIONS:  - Perform BMAT or MOVE assessment daily    - Set and communicate daily mobility goal to care team and patient/family/caregiver  - Collaborate with rehabilitation services on mobility goals if consulted  - Perform Range of Motion  times a day  - Reposition patient every  hours    - Dangle patient  times a day  - Stand patient  times a day  - Ambulate patient  times a day  - Out of bed to chair  times a day   - Out of bed for meals times a day  - Out of bed for toileting  - Record patient progress and toleration of activity level   Outcome: Progressing     Problem: INFECTION - ADULT  Goal: Absence or prevention of progression during hospitalization  Description: INTERVENTIONS:  - Assess and monitor for signs and symptoms of infection  - Monitor lab/diagnostic results  - Monitor all insertion sites, i e  indwelling lines, tubes, and drains  - Monitor endotracheal if appropriate and nasal secretions for changes in amount and color  - [place] appropriate cooling/warming therapies per order  - Administer medications as ordered  - Instruct and encourage patient and family to use good hand hygiene technique  - Identify and instruct in appropriate isolation precautions for identified infection/condition  Outcome: Progressing  Goal: Absence of fever/infection during neutropenic period  Description: INTERVENTIONS:  - Monitor WBC    Outcome: Progressing     Problem: RESPIRATORY - ADULT  Goal: Achieves optimal ventilation and oxygenation  Description: INTERVENTIONS:  - Assess for changes in respiratory status  - Assess for changes in mentation and behavior  - Position to facilitate oxygenation and minimize respiratory effort  - Oxygen administered by appropriate delivery if ordered  - Initiate smoking cessation education as indicated  - Encourage broncho-pulmonary hygiene including cough, deep breathe, Incentive Spirometry  - Assess the need for suctioning and aspirate as needed  - Assess and instruct to report SOB or any respiratory difficulty  - Respiratory Therapy support as indicated  Outcome: Progressing

## 2023-01-23 NOTE — ASSESSMENT & PLAN NOTE
Acute postobstructive pneumonia  IV ceftriaxone  Monitor counts temperatures  Aspiration precautions  Urine Legionella strep antigen negative  Follow-up on culture studies  Currently stable on 4 L nasal cannula  And for 5-day treatment course

## 2023-01-23 NOTE — ASSESSMENT & PLAN NOTE
Lab Results   Component Value Date    EGFR 59 01/23/2023    EGFR 71 01/22/2023    EGFR 67 01/21/2023    CREATININE 1 14 01/23/2023    CREATININE 0 97 01/22/2023    CREATININE 1 02 01/21/2023     Monitor kidney function closely  Avoid nephrotoxins  Monitor postvoid residuals

## 2023-01-23 NOTE — PHYSICAL THERAPY NOTE
Physical Therapy Evaluation     Patient's Name: Kimberly Verduzco    Admitting Diagnosis  Dyspnea [R06 00]    Problem List  Patient Active Problem List   Diagnosis    COPD, moderate ([facility] [address] )    Fall    Pneumonia    UTI (urinary tract infection)    Acid reflux disease    Enlarged prostate with lower urinary tract symptoms (LUTS)    Anxiety    Neck pain    Acute on chronic respiratory failure with hypoxia ([facility] [address] )    Accident due to mechanical fall without injury    Chronic bilateral low back pain    Chronic respiratory failure with hypoxia ([facility] [address] )    History of pneumonia    Hyponatremia    Aftercare following surgery of the musculoskeletal system    Primary localized osteoarthritis of right knee    Primary localized osteoarthritis of left knee    Preop pulmonary/respiratory exam    Chronic kidney disease    History of creation of ostomy ([facility] [address] )    Paroxysmal atrial fibrillation ([facility] [address] )    Stage 3a chronic kidney disease ([facility] [address] )    Hilar mass    Protein calorie malnutrition ([facility] [address] )    Ambulatory dysfunction    S/P colostomy ([facility] [address] )    Hyperkalemia       Past Medical History  Past Medical History:   Diagnosis Date    Acute blood loss anemia 12/6/2020    Anxiety     Bladder infection     current tx with cipro 5/29/16    BPH (benign prostatic hyperplasia)     Community acquired pneumonia     last assessed 8/28/17, resolved 9/25/17    COPD (chronic obstructive pulmonary disease) ([facility] [address] )     Dysphagia 6/8/2020    Enlarged prostate     GERD (gastroesophageal reflux disease)     History of colonoscopy 03/19/2015    POLYP IN THE ASCENDING COLON-[facility]    Hx of bladder infections     Inguinal hernia, right 05/10/2019    Multiple rib fractures 6/3/2020    Neck pain     Psychiatric disorder     depression    Pulmonary nodule 01/08/2019    STABLE 6MM LEFT APICAL NODULE    Respiratory failure with hypoxia (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    INGUINAL HERNIA REPAIR Right 05/10/2019    DONE AT [facility]    LAPAROSCOPIC COLON RESECTION  02/03/2011    ABDOMIANOPERITONEAL RESECTION FOR RECTAL MASS  DONE BY RADHA MCDUFFIE    AZ OPTX FEM SHFT FX W/INSJ IMED IMPLT W/WO SCREW Left 12/7/2020    Procedure: Left Hip TFN;  Surgeon: Kirstin Menjivar MD;  Location:  MAIN OR;  Service: Orthopedics    AZ RPR 1ST INGUN HRNA AGE 5 YRS/> REDUCIBLE Left 4/5/2022    Procedure: REPAIR HERNIA INGUINAL OPEN;  Surgeon: Nimo Beckford MD;  Location: [facility] MAIN OR;  Service: General          01/23/23 1300   PT Last Visit   PT Visit Date 01/23/23   Note Type   Note type Evaluation   Pain Assessment   Pain Assessment Tool 0-10   Pain Score No Pain   Restrictions/Precautions   Weight Bearing Precautions Per Order No   Other Precautions Chair Alarm; Bed Alarm;Multiple lines;Telemetry; Fall Risk   Home Living   Type of [address] [place] [address] Two level  (4-5STE, bathroom upstairs or in basement)   Bathroom Shower/Tub Tub/shower unit   [employer] Raised   Bathroom Equipment Grab bars in shower; Shower chair;Grab bars around toilet   Home Equipment Walker;Crutches; Other (Comment)  (rollator)   Additional Comments Reports use of RW in home and rollator in community  Prior Function   Level of [place] Independent with ADLs; Independent with functional mobility; Independent with IADLS   Lives With Spouse  (spouse requires assist from pt)   Receives Help From [facility] in the last 6 months 1 to 4  (1 fall)   Comments Pt reports daughter is supportive and able to provide daily checks   General   Family/Caregiver Present No   Cognition   Overall Cognitive Status WFL   Arousal/Participation Alert   Orientation Level Oriented X4   Memory Decreased recall of precautions   Following Commands Follows one step commands without difficulty   Subjective   Subjective Very pleasant and agreeable to particiapte in therapy session  RLE Assessment   RLE Assessment   (functionally 3/5)   LLE Assessment   LLE Assessment   (functionally 3/5)   Coordination   Movements are Fluid and Coordinated 0   Coordination and Movement Description significant tremors at rest   Bed Mobility   Supine to Sit 4  Minimal assistance   Additional items Assist x 1;HOB elevated; Increased time required;Verbal cues;LE management   Additional Comments Left OOB in chair with chair alarm intact and all needs in reach  Transfers   Sit to Stand 4  Minimal assistance   Additional items Assist x 1; Increased time required;Verbal cues   Stand to Sit 4  Minimal assistance   Additional items Assist x 1; Increased time required;Verbal cues   Additional Comments with RW   Ambulation/Elevation   Gait pattern Excessively slow; Short stride; Foward flexed;Decreased foot clearance; Improper Weight shift  (unsteady)   Gait Assistance 3  Moderate assist   Additional items Assist x 1;Verbal cues; Tactile cues  (second for lines and chair follow)   Assistive Device Rolling walker   Distance 8 ft x 1  (limited by dizziness and fatigue)   Balance   Static Sitting Fair +   Dynamic Sitting Fair   Static Standing Fair -   Dynamic Standing Poor +   Ambulatory Poor +   Activity Tolerance   Activity Tolerance Patient limited by fatigue   Medical Staff Made Aware AMY Rutherford   Nurse Made Aware RN cleared pt to be seen by PT   Assessment   Prognosis Good   Problem List Decreased strength;Decreased range of motion;Decreased endurance; Impaired balance;Decreased mobility   Assessment Pt seen for high complexity PT evaluation due to decrease in functional mobility status compared to baseline  Pt with active PT eval/treat orders at this time  Pt is a 79 y o  M who [facility] with hilar mass on 1/22/23    Pt  has a past medical history of Acute blood loss anemia (12/6/2020), Anxiety, Bladder infection, BPH (benign prostatic hyperplasia), Community acquired pneumonia, COPD (chronic obstructive pulmonary disease) ([facility] [address] ), Dysphagia (6/8/2020), Enlarged prostate, GERD (gastroesophageal reflux disease), History of colonoscopy (03/19/2015), bladder infections, Inguinal hernia, right (05/10/2019), Multiple rib fractures (6/3/2020), Neck pain, Psychiatric disorder, Pulmonary nodule (01/08/2019), Respiratory failure with hypoxia ([facility] [address] ) (01/08/2019), and Urinary retention  Pt resides with wife in [facility] with 4-5STE  Pt presents with decreased strength, balance, endurnace that contribute to limitations in bed mobility, functional transfers, functional mobility  Pt requires Min A for supine>sit and STS and Mod A for ambulation at this time  Pt left upright in bedside chair with chair alarm intact and with all needs in reach  Pt will benefit from skilled therapy in order to address current impairments and functional limitations  PT to follow pt and recommending rehab once medically cleared  The patient's AM-PAC Basic Mobility Inpatient Short Form Raw Score is 15  A Raw score of less than or equal to 16 suggests the patient may benefit from discharge to post-acute rehabilitation services  Please also refer to the recommendation of the Physical Therapist for safe discharge planning  Barriers to Discharge Inaccessible home environment;Decreased caregiver support   Goals   Patient Goals to get moving better   STG Expiration Date 02/06/23   Short Term Goal #1 1  Pt will demonstrate ability to perform all aspects of bed mobility with I in order to increase independence and decrease burden on caregivers  2  Pt will demonstrate ability to perform functional transfers with Mod I in order to increase independence and decrease burden on caregivers  3  Pt will demonstrate ability to ambulate 200 ft with least restrictive AD with Mod I in order to return to mobility safely   4  Pt will demonstrate ability to negotiate full flight steps with/without HR and Mod I in order to return to household/community mobility safely  5  Pt will demonstrate improved balance by one grade order to decrease risk of falls  6  Pt will increase b/l LE strength by 1 grade in order to increase ease of functional mobility and transfers  Plan   Treatment/Interventions Functional transfer training;LE strengthening/ROM; Therapeutic exercise; Endurance training;Patient/family training;Equipment eval/education; Bed mobility;Gait training;Elevations; Spoke to nursing   PT Frequency 2-3x/wk   Recommendation   PT Discharge Recommendation Post acute rehabilitation services   AM-PAC Basic Mobility Inpatient   Turning in Flat Bed Without Bedrails 3   Lying on Back to Sitting on Edge of Flat Bed Without Bedrails 3   Moving Bed to Chair 3   Standing Up From Chair Using Arms 3   Walk in Room 2   Climb 3-5 Stairs With Railing 1   Basic Mobility Inpatient Raw Score 15   Basic Mobility Standardized Score 36 97   Highest Level Of Mobility   JH-HLM Goal 4: Move to chair/commode   JH-HLM Achieved 5: Stand (1 or more minutes)   Modified [place] Scale   Modified [place] Scale 4         Yelitza Tam, PT, DPT

## 2023-01-23 NOTE — SPEECH THERAPY NOTE
Speech Language/Pathology    Speech-Language Pathology Bedside Swallow Evaluation      Patient Name: Macarena Emanuel    BSJUD'A Date: 1/23/2023     Problem List  Principal Problem:    Hilar mass  Active Problems:    COPD, moderate (HCC)    Pneumonia    Anxiety    Acute on chronic respiratory failure with hypoxia (HCC)    Chronic kidney disease    Paroxysmal atrial fibrillation (HCC)    Protein calorie malnutrition ([address] )    Ambulatory dysfunction    S/P colostomy ([facility] [address] )    Hyperkalemia      Past Medical History  Past Medical History:   Diagnosis Date   • Acute blood loss anemia 12/6/2020   • Anxiety    • Bladder infection     current tx with cipro 5/29/16   • BPH (benign prostatic hyperplasia)    • Community acquired pneumonia     last assessed 8/28/17, resolved 9/25/17   • COPD (chronic obstructive pulmonary disease) ([facility] [address] )    • Dysphagia 6/8/2020   • Enlarged prostate    • GERD (gastroesophageal reflux disease)    • History of colonoscopy 03/19/2015    POLYP IN THE ASCENDING COLON-[facility]   • Hx of bladder infections    • Inguinal hernia, right 05/10/2019   • Multiple rib fractures 6/3/2020   • Neck pain    • Psychiatric disorder     depression   • Pulmonary nodule 01/08/2019    STABLE 6MM LEFT APICAL NODULE   • Respiratory failure with hypoxia ([address] ) 01/08/2019   • Urinary retention        Past Surgical History  Past Surgical History:   Procedure Laterality Date   • BLADDER SURGERY  08/15/2019    UROLIFT   • COLOSTOMY  02/03/2011    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   • INGUINAL HERNIA REPAIR Right 05/10/2019    DONE AT [facility]   • LAPAROSCOPIC COLON RESECTION  02/03/2011    ABDOMIANOPERITONEAL RESECTION FOR RECTAL MASS   DONE BY RADHA MCDUFFIE   • IA OPTX FEM SHFT FX W/INSJ IMED IMPLT W/WO SCREW Left 12/7/2020    Procedure: Left Hip TFN;  Surgeon: Temo Luther MD;  Location:  MAIN OR;  Service: Orthopedics   • IA RPR 1ST INGUN HRNA AGE 5 YRS/> REDUCIBLE Left 4/5/2022    Procedure: REPAIR HERNIA INGUINAL OPEN;  Surgeon: Kalen Guillaume MD;  Location:  MAIN OR;  Service: General       Summary   Pt presented with s/s suggestive of mild oral and suspected min-mild  pharyngeal dysphagia  Pt w/ limited dentition, impacting efficient bolus break down  Pt ultimately able to masticate and manipulate all material  No significant oral residue  Swallows suspected fairly prompt  Cough x1 w/ dry solid  No overt s/s aspiration across other trials, including regular/dry textures and thin liquids  S/w pt regarding various dysphagia diet textures and recommendation for moistened material, pt agreeable to dysphagia 3 diet at this time  Education initiated on strategies to optimize swallow safety and s/s aspiration to monitor for and notify nursing of should they arise  Pt demonstrated understanding and denied questions at this time  Risk/s for Aspiration: Mild     Recommended Diet: soft/level 3 diet and thin liquids   Recommended Form of Meds: whole with liquid   Aspiration precautions and swallowing strategies: upright posture, only feed when fully alert, slow rate of feeding and small bites/sips  Other Recommendations: Continue frequent oral care        Current Medical Status  Pt is a 79 y o  male who presented to [facility] with a PMH of chronic hypoxic respiratory failure on 4 L supplemental oxygen, COPD, BPH, GERD, recent COVID infection, ambulatory dysfunction, history of colonic mass requiring colostomy, depression, anxiety who presents with transferred from [facility] for management of right hilar mass      Patient admitted to [facility] ED, initially presented with shortness of breath  Work-up revealed right hilar mass with likely postobstructive pneumonia  He was placed on antibiotics seen in consultation with pulmonary    Given his hilar mass he was referred to [place] [id number] for pulm evaluation bronchoscopy/EBUS for tissue diagnosis      History is obtained from the patient as well as his daughter at bedside  He is hospital course at [address] reviewed on epic      Patient reports history of weight loss poor appetite  Since last couple of years he has had rough time, he also had a COVID infection which she was recovering from  Presently reports cough unable to expectorate mucoid sputum  He has easy fatigability and exertional shortness of breath      His hospital course at [address] reviewed on [facility]  Outpatient notes reviewed in epic    Current Precautions:  Aspiration      Allergies:  No known food allergies    Past medical history:  Please see H&P for details    Special Studies:  CTA chest PE study 1/21: No pulmonary embolus      Right hilar mass obstructing right middle lobe bronchus resulting in complete post obstructive right middle lobe collapse  Mediastinal and right hilar lymphadenopathy      Emphysema      This examination was marked "immediate notification" in Epic in order to begin the standard process by which the radiology reading room liaison alerts the referring practitioner  I also reported these results to       CXR 1/21:   Emphysematous changes  Complete collapse of the right middle lobe  Right middle lobe collapse appears on subsequent CT to be the result of a right hilar mass        Social/Education/Vocational Hx:  Pt lives with family    Swallow Information   Current Risks for Dysphagia & Aspiration: known history of dysphagia and respiratory status  Current Symptoms/Concerns: change in respiratory status and RLL pna  Current Diet: regular diet and thin liquids   Baseline Diet: regular diet, thin liquids and soft material/added gravy       Baseline Assessment   Behavior/Cognition: alert  Speech/Language Status: able to participate in conversation and able to follow commands  Patient Positioning: upright in chair  Pain Status/Interventions/Response to Interventions:   No report of or nonverbal indications of pain  Swallow Mechanism Exam  Facial: ?r facial droop  Labial: WFL  Lingual: WFL  Velum: symmetrical  Mandible: adequate ROM  Dentition: upper dentures  Vocal quality:clear/adequate   Volitional Cough: strong/productive   Respiratory Status: On 4 L O2     Consistencies Assessed and Performance   Consistencies Administered: thin liquids, puree, soft solids and hard solids    Oral Stage: mild  Mastication was mild prolonged/reduced 2/2 limited dentition  Bolus formation and transfer were functional with no significant oral residue noted  No overt s/s reduced oral control  Pharyngeal Stage:   Swallow Mechanics:  Swallowing initiation appeared fairly prompt  Laryngeal rise was palpated and judged to be within functional limits  Dry cough x1 w/ dry solid, ?related to current pna as it was not reproducible w/ subsequent trial of same consistency  No overt s/s w/ toasted sandwich, puree, or thin liquids  Esophageal Concerns: none reported    Strategies and Efficacy: added moisture      Summary and Recommendations (see above)    Results Reviewed with: patient, RN and MD     Treatment Recommended: Yes     Frequency of treatment: As able/appropriate     Patient Stated Goal:"I could do some toast"     Dysphagia LTG  -Patient will demonstrate safe and effective oral intake (without overt s/s significant oral/pharyngeal dysphagia including s/s penetration or aspiration) for the highest appropriate diet level       Short Term Goals:  -Pt will tolerate Dysphagia 3/advanced (dental soft) diet and thin liquid with no significant s/s oral or pharyngeal dysphagia across 1-3 diagnostic session/s     -Patient will tolerate trials of upgraded food and/or liquid texture with no significant s/s of oral or pharyngeal dysphagia including aspiration across 1-3 diagnostic sessions     -Patient will comply with a Video/Modified Barium Swallow study for more complete assessment of swallowing anatomy/physiology/aspiration risk and to assess efficacy of treatment techniques so as to best guide treatment plan            Speech Therapy Prognosis   Prognosis: fair    Prognosis Considerations: age, medical status and prior medical history

## 2023-01-23 NOTE — PLAN OF CARE
Problem: PHYSICAL THERAPY ADULT  Goal: Performs mobility at highest level of function for planned discharge setting  See evaluation for individualized goals  Description: Treatment/Interventions: Functional transfer training, LE strengthening/ROM, Therapeutic exercise, Endurance training, Patient/family training, Equipment eval/education, Bed mobility, Gait training, Elevations, Spoke to nursing          See flowsheet documentation for full assessment, interventions and recommendations  Note: Prognosis: Good  Problem List: Decreased strength, Decreased range of motion, Decreased endurance, Impaired balance, Decreased mobility  Assessment: Pt seen for high complexity PT evaluation due to decrease in functional mobility status compared to baseline  Pt with active PT eval/treat orders at this time  Pt is a 79 y o  M who presented to [facility] with hilar mass on 1/22/23  Pt  has a past medical history of Acute blood loss anemia (12/6/2020), Anxiety, Bladder infection, BPH (benign prostatic hyperplasia), Community acquired pneumonia, COPD (chronic obstructive pulmonary disease) ([facility] [address] ), Dysphagia (6/8/2020), Enlarged prostate, GERD (gastroesophageal reflux disease), History of colonoscopy (03/19/2015), bladder infections, Inguinal hernia, right (05/10/2019), Multiple rib fractures (6/3/2020), Neck pain, Psychiatric disorder, Pulmonary nodule (01/08/2019), Respiratory failure with hypoxia ([facility] [address] ) (01/08/2019), and Urinary retention  Pt resides with wife in [facility] with 4-5STE  Pt presents with decreased strength, balance, endurnace that contribute to limitations in bed mobility, functional transfers, functional mobility  Pt requires Min A for supine>sit and STS and Mod A for ambulation at this time  Pt left upright in bedside chair with chair alarm intact and with all needs in reach  Pt will benefit from skilled therapy in order to address current impairments and functional limitations   PT to follow pt and recommending rehab once medically cleared  The patient's AM-PAC Basic Mobility Inpatient Short Form Raw Score is 15  A Raw score of less than or equal to 16 suggests the patient may benefit from discharge to post-acute rehabilitation services  Please also refer to the recommendation of the Physical Therapist for safe discharge planning  Barriers to Discharge: Inaccessible home environment, Decreased caregiver support     PT Discharge Recommendation: Post acute rehabilitation services    See flowsheet documentation for full assessment

## 2023-01-23 NOTE — CONSULTS
PULMONOLOGY CONSULT NOTE     Name: Rubia Patel   Age & Sex: 79 y o  male   MRN: [id number]  Unit/Bed#: [facility] 520-01   Encounter: [id number]        Reason for consultation: 1  Hilar mass; 2  Acute on chronic respiratory failure w/ hypoxia    Requesting physician: Neda Vasques DO     Assessment:     1  Hilar mass  - Chest x-ray (1/21): Collapse of right middle lobe, right-shift of the esophagus   - CTA (1/21): 2 6 cm hilar mass obstructing the right middle lobe bronchus resulting in complete postobstructive RML collapse  Mediastinal lymphadenopathy  Centrilobar and paraseptal emphysema  Mild atelectasis of poster RLL  2  COPD  - PFTs (5/8/2019) - FEV1/FVC 49%, FEV1 75% (moderate by GOLD)  DLCO 64%  - Sees Dr Manisha De Oliveira on outpatient basis  - Home regimen: Chronic 4L oxygen 24 hrs, arformoterol 15 mcg nebs 2x BID, budesonide 0 5 mg nebs 2x BID  Patient does note use Duonebs or albuterol inhaler due to cost    - Reports smoking 1/2 pack for 50 years  Quite about 10 years ago  3  Acute on chronic respiratory failure with hypoxia  - Blood cx - no growth 24 hrs  - Sputum MRSA cx - normal  - Procal - WNL  - LA - WNL  - Was on 4 L oxygen NC, now on 2 5 L sating 95%    4  Urinary tract infection  - Urine microscopic - 30-50 WBC, innumerable bacteria, Cx pending    - Urine strep and legionella antigen - normal  - WBC 13 8 from 15 24 on admission  Plan:    • Planning for EBUS biopsy of R hilar mass, tentative for Wednesday  • Continue nebs - Xopenex 1 25 mg and Atorvent 0 5 mg 3x daily  Pulmicort 0 5 mg q12hr  • Continue oral prednisone 40 mg, day 3  Complete 5 day course  · Continue ceftriaxone 1g day 3    • Not currently on TAMMIE at home due to cost - may need CM to help patient obtain TAMMIE on discharge       HPI:     Rubia Patel is a 79 y o  male with a PMHx of moderate COPD with chronic home oxygen 4L 24hrs, emphysema, non-malignant colonic mass s/p colostomy about 10 years ago, GERD, dysphagia, depression, anxiety, stage IIIa CKD, and BPH w/ urinary retention who initially presented to [facility] on Saturday 1/21 after experiencing a mechanical fall out of bed followed by the onset of wheezing the night prior  The patient had COVID-19 about 2 weeks ago treated with a course of paxlovid  Since then, he reports malaise, weakness, fatigue, and loss of appetite with a 10 pound weight loss  He also has a cough but feels as though he cannot expectorate  He is vaccinated and did not require hospitalization  In the ED, the patient reports having an episode of non-bloody vomiting  He was afebrile and on 4 L NC  He was given isotonic fluids and started on ceftriaxone  Chest-xray revealed collapse of the right middle lobe  CTA revealed a 2 6 cm right hilar mass obstructing the right middle lobe bronchus, resulting in complete post-obstructive collapse  CTA was also notable for centrilobular and periseptal emphysema, and mild atelectatic changes in the posterior right lower lobe  Dr Ashly Pérez of pulmonology was thus consulted and initiated antibiotic, nebulizer, and systemic steroid therapy  The patient was transferred to [facility] for pulmonology evaluation for possible EBUS biopsy of the hilar mass  Today, the patient reports having more energy - feels more "sharp", with notable improvement in his breathing  He is comfortably on 2 5 L of oxygen  He denies hemoptysis, difficulty breathing, chest pain, abdominal pain, nausea, vomiting, fever, chills, blood in stool  Review of systems:  12 point review of systems was completed and was otherwise negative except as listed in HPI        Historical Information   Past Medical History:   Diagnosis Date   • Acute blood loss anemia 12/6/2020   • Anxiety    • Bladder infection     current tx with cipro 5/29/16   • BPH (benign prostatic hyperplasia)    • Community acquired pneumonia     last assessed 8/28/17, resolved 9/25/17   • COPD (chronic obstructive pulmonary disease) ([facility] [address] )    • Dysphagia 2020   • Enlarged prostate    • GERD (gastroesophageal reflux disease)    • History of colonoscopy 2015    POLYP IN THE ASCENDING COLON[facility]   • Hx of bladder infections    • Inguinal hernia, right 05/10/2019   • Multiple rib fractures 6/3/2020   • Neck pain    • Psychiatric disorder     depression   • Pulmonary nodule 2019    STABLE 6MM LEFT APICAL NODULE   • Respiratory failure with hypoxia ([facility] [address] ) 2019   • Urinary retention      Past Surgical History:   Procedure Laterality Date   • BLADDER SURGERY  08/15/2019    UROLIFT   • COLOSTOMY  2011    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   • INGUINAL HERNIA REPAIR Right 05/10/2019    DONE AT [facility]   • LAPAROSCOPIC COLON RESECTION  2011    ABDOMIANOPERITONEAL RESECTION FOR RECTAL MASS   DONE BY RADHA MCDUFFIE   • AK OPTX FEM SHFT FX W/INSJ IMED IMPLT W/WO SCREW Left 2020    Procedure: Left Hip TFN;  Surgeon: Anyi Ortez MD;  Location: [facility] MAIN OR;  Service: Orthopedics   • AK RPR 1ST INGUN HRNA AGE 5 YRS/> REDUCIBLE Left 2022    Procedure: REPAIR HERNIA INGUINAL OPEN;  Surgeon: Laura Broderick MD;  Location: [facility] MAIN OR;  Service: General     Family History   Problem Relation Age of Onset   • Lung cancer Mother    • Cancer Mother    • Cancer Father    • Alcohol abuse Father    • Mental illness Neg Hx        Social History    Social History:   Social History     Socioeconomic History   • Marital status: /Civil Union     Spouse name: Not on file   • Number of children: Not on file   • Years of education: Not on file   • Highest education level: Not on file   Occupational History   • Not on file   Tobacco Use   • Smoking status: Former     Packs/day: 0 50     Years: 50 00     Pack years: 25 00     Types: Cigarettes     Start date: 64     Quit date: 2018     Years since quittin 9   • Smokeless tobacco: Former   • Tobacco comments:     quit 2017 per [employer]    Vaping Use   • Vaping Use: Never used   Substance and Sexual Activity   • Alcohol use: Not Currently   • Drug use: Never   • Sexual activity: Not on file   Other Topics Concern   • Not on file   Social History Narrative   • Not on file     Social Determinants of Health     Financial Resource Strain: Low Risk    • Difficulty of Paying Living Expenses: Not hard at all   Food Insecurity: Not on file   Transportation Needs: No Transportation Needs   • Lack of Transportation (Medical): No   • Lack of Transportation (Non-Medical): No   Physical Activity: Not on file   Stress: Not on file   Social Connections: Not on file   Intimate Partner Violence: Not on file   Housing Stability: Not on file       Occupational History: , retire 8 years ago       Meds/Allergies   Current Facility-Administered Medications   Medication Dose Route Frequency   • acetaminophen (TYLENOL) tablet 650 mg  650 mg Oral Q4H PRN   • albuterol inhalation solution 2 5 mg  2 5 mg Nebulization Q4H PRN   • aluminum-magnesium hydroxide-simethicone (MYLANTA) oral suspension 30 mL  30 mL Oral Q6H PRN   • benzonatate (TESSALON PERLES) capsule 200 mg  200 mg Oral TID   • budesonide (PULMICORT) inhalation solution 0 5 mg  0 5 mg Nebulization Q12H   • cefTRIAXone (ROCEPHIN) 1,000 mg in dextrose 5 % 50 mL IVPB  1,000 mg Intravenous Q24H   • docusate sodium (COLACE) capsule 100 mg  100 mg Oral BID   • enoxaparin (LOVENOX) subcutaneous injection 40 mg  40 mg Subcutaneous Daily   • escitalopram (LEXAPRO) tablet 10 mg  10 mg Oral HS   • famotidine (PEPCID) tablet 40 mg  40 mg Oral Daily   • gabapentin (NEURONTIN) capsule 100 mg  100 mg Oral BID   • guaiFENesin (MUCINEX) 12 hr tablet 1,200 mg  1,200 mg Oral Q12H KAREN   • ipratropium (ATROVENT) 0 02 % inhalation solution 0 5 mg  0 5 mg Nebulization TID   • levalbuterol (XOPENEX) inhalation solution 1 25 mg  1 25 mg Nebulization TID   • ondansetron (ZOFRAN) injection 4 mg  4 mg Intravenous Q6H PRN • polyethylene glycol (MIRALAX) packet 17 g  17 g Oral Daily   • prednisoLONE acetate (PRED FORTE) 1 % ophthalmic suspension 1 drop  1 drop Both Eyes BID   • predniSONE tablet 40 mg  40 mg Oral Daily   • tamsulosin (FLOMAX) capsule 0 4 mg  0 4 mg Oral Daily With Dinner     Medications Prior to Admission   Medication   • acetaminophen (TYLENOL) 325 mg tablet   • acetaminophen (TYLENOL) 325 mg tablet   • albuterol (2 5 mg/3 mL) 0 083 % nebulizer solution   • albuterol (ProAir HFA) 90 mcg/act inhaler   • arformoterol (BROVANA) 15 mcg/2 mL nebulizer solution   • budesonide (Pulmicort) 0 5 mg/2 mL nebulizer solution   • Catheters (Gizmo Condom Catheter) MISC   • escitalopram (LEXAPRO) 10 mg tablet   • famotidine (PEPCID) 40 MG tablet   • gabapentin (NEURONTIN) 100 mg capsule   • Incontinence Supply Disposable (PadSORBer Bed Pan Liners) MISC   • ipratropium-albuterol (DUO-NEB) 0 5-2 5 mg/3 mL nebulizer solution   • NON FORMULARY   • prednisoLONE acetate (PRED FORTE) 1 % ophthalmic suspension   • tamsulosin (FLOMAX) 0 4 mg     Allergies   Allergen Reactions   • Aspirin Other (See Comments)     Hx stomach ulcer   • Bactrim [Sulfamethoxazole-Trimethoprim] GI Intolerance       Objective    Vitals: Blood pressure 123/68, pulse 67, temperature 98 °F (36 7 °C), resp  rate 16, height 5' 8" (1 727 m), weight 74 4 kg (164 lb), SpO2 93 %  Body mass index is 24 94 kg/m²  Intake/Output Summary (Last 24 hours) at 1/23/2023 0837  Last data filed at 1/23/2023 8538  Gross per 24 hour   Intake 577 ml   Output 200 ml   Net 377 ml       Physical Exam  Vitals reviewed  Constitutional:       Comments: No conversational dyspnea   HENT:      Head: Normocephalic and atraumatic  Mouth/Throat:      Mouth: Mucous membranes are moist    Eyes:      Conjunctiva/sclera: Conjunctivae normal    Cardiovascular:      Rate and Rhythm: Normal rate and regular rhythm  Pulses: Normal pulses  Heart sounds: Normal heart sounds   No murmur heard     No friction rub  No gallop  Pulmonary:      Effort: Pulmonary effort is normal  No respiratory distress  Breath sounds: No stridor  No wheezing, rhonchi or rales  Comments: Reduced breath sounds  Abdominal:      General: Bowel sounds are normal  There is no distension  Palpations: Abdomen is soft  Tenderness: There is no abdominal tenderness  There is no guarding  Comments: Colostomy bag intacked   Musculoskeletal:      Right lower leg: No edema  Left lower leg: No edema  Skin:     General: Skin is warm and dry  Comments: Clubbing of digits   Neurological:      Mental Status: He is alert and oriented to person, place, and time  Comments: Choreoathetosis   Psychiatric:         Mood and Affect: Mood normal          Behavior: Behavior normal        Labs: I have personally reviewed pertinent lab results    Laboratory and Diagnostics  Results from last 7 days   Lab Units 01/23/23 0447 01/22/23 2041 01/21/23  0543   WBC Thousand/uL 13 89*  --  15 24*   HEMOGLOBIN g/dL 12 2  --  12 8   HEMATOCRIT % 37 6  --  39 5   PLATELETS Thousands/uL 284 262 273   NEUTROS PCT % 89*  --  85*   MONOS PCT % 5  --  7     Results from last 7 days   Lab Units 01/23/23 0447 01/22/23 2041 01/22/23  1523 01/21/23  0543   SODIUM mmol/L 139  --  140 133*   POTASSIUM mmol/L 4 4 4 6 5 7* 4 2   CHLORIDE mmol/L 106  --  107 101   CO2 mmol/L 28  --  27 30   ANION GAP mmol/L 5  --  6 2*   BUN mg/dL 26*  --  23 16   CREATININE mg/dL 1 14  --  0 97 1 02   CALCIUM mg/dL 9 0  --  8 6 8 2*   GLUCOSE RANDOM mg/dL 115  --  112 96   ALT U/L  --   --   --  27   AST U/L  --   --   --  25   ALK PHOS U/L  --   --   --  70   ALBUMIN g/dL  --   --   --  2 9*   TOTAL BILIRUBIN mg/dL  --   --   --  0 70          Results from last 7 days   Lab Units 01/21/23  0543   INR  1 12   PTT seconds 29          Results from last 7 days   Lab Units 01/21/23  0543   LACTIC ACID mmol/L 0 8                 Results from last 7 days   Lab Units 01/21/23  0543   D-DIMER QUANTITATIVE ug/ml FEU 1 54*     Results from last 7 days   Lab Units 01/22/23  1523 01/21/23  0543   PROCALCITONIN ng/ml <0 05 <0 05       ABG:       Micro:  Results from last 7 days   Lab Units 01/21/23  1201 01/21/23  0743 01/21/23  0546 01/21/23  0543   BLOOD CULTURE   --   --  No Growth at 24 hrs  No Growth at 24 hrs  URINE CULTURE   --  Culture too young- will reincubate  --   --    MRSA CULTURE ONLY  No Methicillin Resistant Staphlyococcus aureus (MRSA) isolated  --   --   --    LEGIONELLA URINARY ANTIGEN   --  Negative  --   --    STREP PNEUMONIAE ANTIGEN, URINE   --  Negative  --   --        Imaging and other studies: I have personally reviewed pertinent reports  Pulmonary function testing: (5/8/2019) - FEV1/FVC 49%, FEV1 75%, DLCO 64%  EKG, Pathology, and Other Studies: I have personally reviewed pertinent reports  Code Status: Level 1 - Full Code    VTE Pharmacologic Prophylaxis: Enoxaparin (Lovenox)  VTE Mechanical Prophylaxis: sequential compression device    Disclaimer: Portions of the record may have been created with voice recognition software  Occasional wrong word or "sound a like" substitutions may have occurred due to the inherent limitations of voice recognition software  Careful consideration should be taken to recognize, using context, where substitutions have occurred      ----------------------------------------------------  Tory Solano

## 2023-01-23 NOTE — ASSESSMENT & PLAN NOTE
Outpatient PCP notes reviewed not on anticoagulation  Would likely benefit from anticoagulation actually in the setting of likely malignancy with hypercoagulability and A  fib, can discuss further after bronchoscopy if wanting to initiate here versus follow-up with his outpatient provider  Monitor

## 2023-01-23 NOTE — PLAN OF CARE
Problem: OCCUPATIONAL THERAPY ADULT  Goal: Performs self-care activities at highest level of function for planned discharge setting  See evaluation for individualized goals  Description: Treatment Interventions: ADL retraining, Functional transfer training, Endurance training, Patient/family training, Equipment evaluation/education, Compensatory technique education, Energy conservation, Activityengagement          See flowsheet documentation for full assessment, interventions and recommendations  Note: Limitation: Decreased ADL status, Decreased Safe judgement during ADL, Decreased endurance, Decreased cognition, Decreased self-care trans, Decreased high-level ADLs  Prognosis: Fair  Assessment: Pt is a 79 y o  male admitted 1/22/23 from [address] [place] [address] for further management of R hilar mass  Pt initially presented to [facility] w SOB, where work up revealed postobstructive pneumonia  Pt w active OT eval and treat orders at this time  PMH includes  has a past medical history of Acute blood loss anemia, Anxiety, Bladder infection, BPH (benign prostatic hyperplasia), Community acquired pneumonia, COPD (chronic obstructive pulmonary disease) ([facility] ), Dysphagia, Enlarged prostate, GERD (gastroesophageal reflux disease), History of colonoscopy, bladder infections, Inguinal hernia, right, Multiple rib fractures, Neck pain, Psychiatric disorder, Pulmonary nodule, Respiratory failure with hypoxia ([facility] ), and Urinary retention  Pt lives w spouse who is not in [facility], in a 2 SH with 6 JACK, reports tub shower with grab bars and shower chair, raised toilet  Pta, pt was independent w/ ADL/IADL and used RW for functional mobility, was driving   Reports local dtr's who can assist as needed  Currently, pt is min A for UB ADL, mod A for LB ADL and completed transfers/FM w mod Ax1, RW for support   Pt is limited at this time 2* decreased endurance/activtiy tolerance, decreased cognition, decreased ADL/High-level ADL status, decreased self-care trans, decreased safety awareness, limited home support and is a fall risk  This impacts pt's ability to complete UB and LB dressing and bathing, toileting, transfers, functional mobility, community mobility, home and health maintenance, and safe engagement in typical daily routine  The patient's raw score on the AM-PAC Daily Activity inpatient short form is 17, standardized score is 37 26, less than 39 4  Patients at this level are likely to benefit from discharge to post-acute rehabilitation services  Please refer to the recommendation of the Occupational Therapist for safe discharge planning  From OT standpoint, pt should D/C to STR when medically stable  Pt will benefit from continued acute OT services 2-3x/wk for 10-14 days to meet goals       OT Discharge Recommendation: Post acute rehabilitation services

## 2023-01-24 LAB
ANION GAP SERPL CALCULATED.3IONS-SCNC: 5 MMOL/L (ref 4–13)
BACTERIA UR CULT: ABNORMAL
BASOPHILS # BLD AUTO: 0.01 THOUSANDS/ÂΜL (ref 0–0.1)
BASOPHILS NFR BLD AUTO: 0 % (ref 0–1)
BUN SERPL-MCNC: 26 MG/DL (ref 5–25)
CALCIUM SERPL-MCNC: 8.9 MG/DL (ref 8.3–10.1)
CHLORIDE SERPL-SCNC: 107 MMOL/L (ref 96–108)
CO2 SERPL-SCNC: 28 MMOL/L (ref 21–32)
CREAT SERPL-MCNC: 1.03 MG/DL (ref 0.6–1.3)
EOSINOPHIL # BLD AUTO: 0.01 THOUSAND/ÂΜL (ref 0–0.61)
EOSINOPHIL NFR BLD AUTO: 0 % (ref 0–6)
ERYTHROCYTE [DISTWIDTH] IN BLOOD BY AUTOMATED COUNT: 12.8 % (ref 11.6–15.1)
GFR SERPL CREATININE-BSD FRML MDRD: 66 ML/MIN/1.73SQ M
GLUCOSE SERPL-MCNC: 99 MG/DL (ref 65–140)
HCT VFR BLD AUTO: 35 % (ref 36.5–49.3)
HGB BLD-MCNC: 11.6 G/DL (ref 12–17)
IMM GRANULOCYTES # BLD AUTO: 0.06 THOUSAND/UL (ref 0–0.2)
IMM GRANULOCYTES NFR BLD AUTO: 1 % (ref 0–2)
LYMPHOCYTES # BLD AUTO: 1.3 THOUSANDS/ÂΜL (ref 0.6–4.47)
LYMPHOCYTES NFR BLD AUTO: 10 % (ref 14–44)
MCH RBC QN AUTO: 33.4 PG (ref 26.8–34.3)
MCHC RBC AUTO-ENTMCNC: 33.1 G/DL (ref 31.4–37.4)
MCV RBC AUTO: 101 FL (ref 82–98)
MONOCYTES # BLD AUTO: 0.73 THOUSAND/ÂΜL (ref 0.17–1.22)
MONOCYTES NFR BLD AUTO: 6 % (ref 4–12)
NEUTROPHILS # BLD AUTO: 10.69 THOUSANDS/ÂΜL (ref 1.85–7.62)
NEUTS SEG NFR BLD AUTO: 83 % (ref 43–75)
NRBC BLD AUTO-RTO: 0 /100 WBCS
PLATELET # BLD AUTO: 279 THOUSANDS/UL (ref 149–390)
PMV BLD AUTO: 9.4 FL (ref 8.9–12.7)
POTASSIUM SERPL-SCNC: 4.3 MMOL/L (ref 3.5–5.3)
PROCALCITONIN SERPL-MCNC: 0.08 NG/ML
RBC # BLD AUTO: 3.47 MILLION/UL (ref 3.88–5.62)
SODIUM SERPL-SCNC: 140 MMOL/L (ref 135–147)
WBC # BLD AUTO: 12.8 THOUSAND/UL (ref 4.31–10.16)

## 2023-01-24 RX ORDER — AMOXICILLIN 500 MG/1
500 CAPSULE ORAL EVERY 6 HOURS
Status: DISCONTINUED | OUTPATIENT
Start: 2023-01-24 | End: 2023-01-24

## 2023-01-24 RX ADMIN — BUDESONIDE 0.5 MG: 0.5 INHALANT ORAL at 20:09

## 2023-01-24 RX ADMIN — GABAPENTIN 100 MG: 100 CAPSULE ORAL at 09:21

## 2023-01-24 RX ADMIN — IPRATROPIUM BROMIDE 0.5 MG: 0.5 SOLUTION RESPIRATORY (INHALATION) at 20:09

## 2023-01-24 RX ADMIN — BENZONATATE 200 MG: 100 CAPSULE ORAL at 17:20

## 2023-01-24 RX ADMIN — BENZONATATE 200 MG: 100 CAPSULE ORAL at 09:20

## 2023-01-24 RX ADMIN — IPRATROPIUM BROMIDE 0.5 MG: 0.5 SOLUTION RESPIRATORY (INHALATION) at 13:36

## 2023-01-24 RX ADMIN — PREDNISOLONE ACETATE 1 DROP: 10 SUSPENSION/ DROPS OPHTHALMIC at 09:25

## 2023-01-24 RX ADMIN — PREDNISONE 40 MG: 20 TABLET ORAL at 09:21

## 2023-01-24 RX ADMIN — ESCITALOPRAM OXALATE 10 MG: 10 TABLET ORAL at 21:04

## 2023-01-24 RX ADMIN — DOCUSATE SODIUM 100 MG: 100 CAPSULE, LIQUID FILLED ORAL at 09:21

## 2023-01-24 RX ADMIN — BENZONATATE 200 MG: 100 CAPSULE ORAL at 20:53

## 2023-01-24 RX ADMIN — TAMSULOSIN HYDROCHLORIDE 0.4 MG: 0.4 CAPSULE ORAL at 17:20

## 2023-01-24 RX ADMIN — LEVALBUTEROL HYDROCHLORIDE 1.25 MG: 1.25 SOLUTION, CONCENTRATE RESPIRATORY (INHALATION) at 20:09

## 2023-01-24 RX ADMIN — DOCUSATE SODIUM 100 MG: 100 CAPSULE, LIQUID FILLED ORAL at 17:20

## 2023-01-24 RX ADMIN — LEVALBUTEROL HYDROCHLORIDE 1.25 MG: 1.25 SOLUTION, CONCENTRATE RESPIRATORY (INHALATION) at 13:36

## 2023-01-24 RX ADMIN — BUDESONIDE 0.5 MG: 0.5 INHALANT ORAL at 07:47

## 2023-01-24 RX ADMIN — PREDNISOLONE ACETATE 1 DROP: 10 SUSPENSION/ DROPS OPHTHALMIC at 17:22

## 2023-01-24 RX ADMIN — GUAIFENESIN 1200 MG: 600 TABLET, EXTENDED RELEASE ORAL at 09:22

## 2023-01-24 RX ADMIN — GUAIFENESIN 1200 MG: 600 TABLET, EXTENDED RELEASE ORAL at 20:53

## 2023-01-24 RX ADMIN — ENOXAPARIN SODIUM 40 MG: 40 INJECTION SUBCUTANEOUS at 09:18

## 2023-01-24 RX ADMIN — IPRATROPIUM BROMIDE 0.5 MG: 0.5 SOLUTION RESPIRATORY (INHALATION) at 07:47

## 2023-01-24 RX ADMIN — FAMOTIDINE 40 MG: 20 TABLET ORAL at 09:22

## 2023-01-24 RX ADMIN — LEVALBUTEROL HYDROCHLORIDE 1.25 MG: 1.25 SOLUTION, CONCENTRATE RESPIRATORY (INHALATION) at 07:47

## 2023-01-24 RX ADMIN — CEFTRIAXONE SODIUM 1000 MG: 10 INJECTION, POWDER, FOR SOLUTION INTRAVENOUS at 05:12

## 2023-01-24 RX ADMIN — GABAPENTIN 100 MG: 100 CAPSULE ORAL at 17:20

## 2023-01-24 NOTE — ASSESSMENT & PLAN NOTE
Lab Results   Component Value Date    EGFR 66 01/24/2023    EGFR 59 01/23/2023    EGFR 71 01/22/2023    CREATININE 1 03 01/24/2023    CREATININE 1 14 01/23/2023    CREATININE 0 97 01/22/2023     Monitor kidney function closely  Avoid nephrotoxins  Monitor postvoid residuals

## 2023-01-24 NOTE — CASE MANAGEMENT
Case Management Assessment & Discharge Planning Note    Patient name Amol Rodriguez  [facility] MRN [id number]  : 1940 Date 2023       Current Admission Date: 2023  Current Admission Diagnosis:Hilar mass   Patient Active Problem List    Diagnosis Date Noted   • Protein calorie malnutrition ([address] ) 2023   • Ambulatory dysfunction 2023   • S/P colostomy ([address] ) 2023   • Hyperkalemia 2023   • Hilar mass 2023   • History of creation of ostomy ([address] ) 2023   • Paroxysmal atrial fibrillation ([address] ) 2023   • Stage 3a chronic kidney disease ([address] ) 2023   • Chronic kidney disease 2022   • Preop pulmonary/respiratory exam 2022   • Primary localized osteoarthritis of right knee 2021   • Primary localized osteoarthritis of left knee 2021   • Aftercare following surgery of the musculoskeletal system 2021   • Hyponatremia 12/10/2020   • Chronic respiratory failure with hypoxia ([address] ) 2020   • History of pneumonia 2020   • Chronic bilateral low back pain 2020   • Accident due to mechanical fall without injury 2020   • Acute on chronic respiratory failure with hypoxia ([address] ) 01/10/2019   • Neck pain 2019   • Anxiety 08/10/2018   • Fall 08/15/2017   • Pneumonia 08/15/2017   • UTI (urinary tract infection) 08/15/2017   • COPD, moderate (HCC)    • Acid reflux disease 2016   • Enlarged prostate with lower urinary tract symptoms (LUTS) 2015      LOS (days): 2  Geometric Mean LOS (GMLOS) (days): 2 10  Days to GMLOS:0 4     OBJECTIVE:    Risk of Unplanned Readmission Score: 17 21         Current admission status: Inpatient       Preferred Pharmacy:   [facility], [address] [facility] [address] [facility]  [address] [facility],[address]  [facility] [address]  Phone: [phone] Fax: [phone]    Primary Care Provider: Krysta Rodgers MD    Primary Insurance: MEDICARE  Secondary Insurance: [facility]    ASSESSMENT:  [address], [address] Representative - Spouse   Primary Phone: [phone] (Home)               Advance Directives  Does patient have a [address]?: Yes  Does patient have Advance Directives?: No  Was patient offered paperwork?: Yes (Pt's spouse declined)  Primary Contact: Varsha         Readmission Root Cause  30 Day Readmission: No    Patient Information  Admitted from[de-identified] Home  Mental Status: Alert  During Assessment patient was accompanied by: Not accompanied during assessment  Assessment information provided by[de-identified] Spouse  Primary Caregiver: Self  Support Systems: Self, Spouse/significant other  [place] of Residence: [address] [facility] do you live in?: [address] entry access options  Select all that apply : Stairs  Number of steps to enter home : 8  Do the steps have railings?: Yes  Type of Current Residence: Bi-level  Upon entering residence, is there a bedroom on the main floor (no further steps)?: Yes  Upon entering residence, is there a bathroom on the main floor (no further steps)?: Yes  In the last 12 months, was there a time when you were not able to pay the mortgage or rent on time?: No  In the last 12 months, how many places have you lived?: 1  In the last 12 months, was there a time when you did not have a steady place to sleep or slept in a shelter (including now)?: No  Homeless/housing insecurity resource given?: N/A  Living Arrangements: Lives w/ Spouse/significant other  Is patient a ?: No    Activities of Daily Living Prior to Admission  Functional Status: Independent  Completes ADLs independently?: Yes  Ambulates independently?: Yes  Does patient use assisted devices?: Yes  Assisted Devices (DME) used:  Shower Chair, Walker  Does patient currently own DME?: Yes  What DME does the patient currently own?: Sue Resendiz  Does patient have a history of Outpatient Therapy (PT/OT)?: No  Does the patient have a history of Short-Term Rehab?: Yes (Cannot remember the facility name)  Does patient have a history of HHC?: Yes (Cannot remember the name of agency)  Does patient currently have [address]?: No         Patient Information Continued  Income Source: Pension/shelter  Does patient have prescription coverage?: Yes  Within the past 12 months, you worried that your food would run out before you got the money to buy more : Never true  Within the past 12 months, the food you bought just didn't last and you didn't have money to get more : Never true  Food insecurity resource given?: N/A  Does patient receive dialysis treatments?: No  Does patient have a history of substance abuse?: No  Does patient have a history of Mental Health Diagnosis?: No    PHQ 2/9 Screening   Reviewed PHQ 2/9 Depression Screening Score?: No    Means of Transportation  Means of Transport to Appts[de-identified] Family transport  In the past 12 months, has lack of transportation kept you from medical appointments or from getting medications?: No  In the past 12 months, has lack of transportation kept you from meetings, work, or from getting things needed for daily living?: No  Was application for public transport provided?: N/A        DISCHARGE DETAILS:    Discharge planning discussed with[de-identified] Varsha-spouse  Freedom of Choice: Yes  Comments - Freedom of Choice: Varsha provided the information for the completion of the Pt's assessment      Contacts  Patient Contacts: Varsha  Relationship to Patient[de-identified] Family  Contact Method: Phone  Phone Number: [phone]  Reason/Outcome: Continuity of [address]         Is the patient interested in [address] at discharge?:  (TBD)         Other Referral/Resources/Interventions Provided:  Interventions:  (TBD)         Treatment Team Recommendation:  (TBD)  Discharge Destination Plan[de-identified]  (TBD)

## 2023-01-24 NOTE — RESTORATIVE TECHNICIAN NOTE
Restorative Technician Note      Patient Name: Martha Hodges     Note Type: Mobility  Patient Position Upon Consult: Supine  Activity Performed: Ambulated  Assistive Device: Roller walker  Patient Position at End of Consult: Bedside chair;  All needs within reach; Bed/Chair alarm activated

## 2023-01-24 NOTE — ASSESSMENT & PLAN NOTE
CT chest reveals right hilar mass with obstructing right middle lobe bronchus  Concerning for malignancy  Transferred for pulmonary evaluation for bronchoscopy/EBUS for tissue diagnosis  Plan for bronc tomorrow Wednesday 1/25  Pulmonary following

## 2023-01-24 NOTE — PROGRESS NOTES
[address]  Progress Note - Marv Coley 1940, 79 y o  male MRN: [id number]  Unit/Bed#: [facility] 520-01 Encounter: [id number]  Primary Care Provider: Hayder Rodas MD   Date and time admitted to hospital: 1/22/2023  5:36 PM    S/P colostomy [facility])  Assessment & Plan  Daughter at bedside reports history of colonic mass requiring colostomy  Colostomy care    Ambulatory dysfunction  Assessment & Plan  Multifactorial  Safe ambulation  Fall precautions  Physical therapy -recommending rehab    Protein calorie malnutrition [facility])  Assessment & Plan  Malnutrition Findings: Body mass index is 24 94 kg/m²     Encourage adequate protein and calorie intake    Paroxysmal atrial fibrillation [facility])  Assessment & Plan  Outpatient PCP notes reviewed not on anticoagulation  Would likely benefit from anticoagulation actually in the setting of likely malignancy with hypercoagulability and A  fib, can discuss further after bronchoscopy if wanting to initiate here versus follow-up with his outpatient provider  Monitor    Chronic kidney disease  Assessment & Plan  Lab Results   Component Value Date    EGFR 66 01/24/2023    EGFR 59 01/23/2023    EGFR 71 01/22/2023    CREATININE 1 03 01/24/2023    CREATININE 1 14 01/23/2023    CREATININE 0 97 01/22/2023     Monitor kidney function closely  Avoid nephrotoxins  Monitor postvoid residuals    Acute on chronic respiratory failure with hypoxia (HCC)  Assessment & Plan  At baseline on 4 L supplemental oxygen  Continue supplemental oxygen to keep O2 sats more than 88%  Encourage incentive spirometry    Anxiety  Assessment & Plan  Continue citalopram  Patient was reassured    Pneumonia  Assessment & Plan  Acute postobstructive pneumonia  IV ceftriaxone  Monitor counts temperatures  Aspiration precautions  Urine Legionella strep antigen negative  Follow-up on culture studies  Currently stable on 4 L nasal cannula  And for 5-day treatment course    COPD, moderate (HCC)  Assessment & Plan  Continue respiratory treatments  Pulmonary inputs noted patient is planned for prednisone 40 mg for 5 days  Supportive care  Pulmonary following    * Hilar mass  Assessment & Plan  CT chest reveals right hilar mass with obstructing right middle lobe bronchus  Concerning for malignancy  Transferred for pulmonary evaluation for bronchoscopy/EBUS for tissue diagnosis  Plan for bronc tomorrow   Pulmonary following        VTE Pharmacologic Prophylaxis: VTE Score: 9 High Risk (Score >/= 5) - Pharmacological DVT Prophylaxis Ordered: enoxaparin (Lovenox)  Sequential Compression Devices Ordered  Patient Centered Rounds: Discussed with RN  Discussions with Specialists or Other Care Team Provider: pulm,     Education and Discussions with Family / Patient: Updated  (daughter) via phone  Time Spent for Care: 20 minutes  More than 50% of total time spent on counseling and coordination of care as described above  Current Length of Stay: 2 day(s)  Current Patient Status: Inpatient   Certification Statement: The patient will continue to require additional inpatient hospital stay due to Bronc tomorrow  Discharge Plan: Anticipate discharge in 48-72 hrs to rehab facility  Code Status: Level 1 - Full Code    Subjective:   No acute concerns  Objective:     Vitals:   Temp (24hrs), Av 7 °F (36 5 °C), Min:97 3 °F (36 3 °C), Max:98 3 °F (36 8 °C)    Temp:  [97 3 °F (36 3 °C)-98 3 °F (36 8 °C)] 97 3 °F (36 3 °C)  HR:  [] 86  Resp:  [16-17] 16  BP: (120-137)/(69-75) 120/72  SpO2:  [90 %-98 %] 92 %  Body mass index is 24 94 kg/m²  Input and Output Summary (last 24 hours): Intake/Output Summary (Last 24 hours) at 2023 1703  Last data filed at 2023 1500  Gross per 24 hour   Intake 1140 ml   Output 875 ml   Net 265 ml       Physical Exam:   Physical Exam  Vitals reviewed  Constitutional:       General: He is not in acute distress  Appearance: He is not ill-appearing or toxic-appearing  HENT:      Mouth/Throat:      Mouth: Mucous membranes are moist    Eyes:      General: No scleral icterus  Cardiovascular:      Rate and Rhythm: Normal rate and regular rhythm  Pulmonary:      Effort: Pulmonary effort is normal  No respiratory distress  Comments: Rhonchi   Abdominal:      General: Bowel sounds are normal       Palpations: Abdomen is soft  Tenderness: There is no abdominal tenderness  Musculoskeletal:      Right lower leg: No edema  Left lower leg: No edema  Skin:     General: Skin is warm and dry  Neurological:      Mental Status: He is alert and oriented to person, place, and time  Psychiatric:         Mood and Affect: Mood normal          Behavior: Behavior normal             Additional Data:     Labs:  Results from last 7 days   Lab Units 01/24/23  0522   WBC Thousand/uL 12 80*   HEMOGLOBIN g/dL 11 6*   HEMATOCRIT % 35 0*   PLATELETS Thousands/uL 279   NEUTROS PCT % 83*   LYMPHS PCT % 10*   MONOS PCT % 6   EOS PCT % 0     Results from last 7 days   Lab Units 01/24/23  0522 01/22/23  1523 01/21/23  0543   SODIUM mmol/L 140   < > 133*   POTASSIUM mmol/L 4 3   < > 4 2   CHLORIDE mmol/L 107   < > 101   CO2 mmol/L 28   < > 30   BUN mg/dL 26*   < > 16   CREATININE mg/dL 1 03   < > 1 02   ANION GAP mmol/L 5   < > 2*   CALCIUM mg/dL 8 9   < > 8 2*   ALBUMIN g/dL  --   --  2 9*   TOTAL BILIRUBIN mg/dL  --   --  0 70   ALK PHOS U/L  --   --  70   ALT U/L  --   --  27   AST U/L  --   --  25   GLUCOSE RANDOM mg/dL 99   < > 96    < > = values in this interval not displayed       Results from last 7 days   Lab Units 01/21/23  0543   INR  1 12             Results from last 7 days   Lab Units 01/24/23  0522 01/22/23  1523 01/21/23  0543   LACTIC ACID mmol/L  --   --  0 8   PROCALCITONIN ng/ml 0 08 <0 05 <0 05       Lines/Drains:  Invasive Devices     Peripheral Intravenous Line  Duration           Peripheral IV 01/21/23 Left Antecubital 3 days          Drain  Duration           Colostomy LUQ -- days    Colostomy LUQ -- days                      Imaging: No pertinent imaging reviewed  Recent Cultures (last 7 days):   Results from last 7 days   Lab Units 01/21/23  0743 01/21/23  0546 01/21/23  0543   BLOOD CULTURE   --  No Growth at 72 hrs  No Growth at 72 hrs     URINE CULTURE  >100,000 cfu/ml Enterococcus faecalis*  >100,000 cfu/ml Aerococcus urinae*  10,000-19,000 cfu/ml Staphylococcus coagulase negative*  --   --    LEGIONELLA URINARY ANTIGEN  Negative  --   --        Last 24 Hours Medication List:   Current Facility-Administered Medications   Medication Dose Route Frequency Provider Last Rate   • acetaminophen  650 mg Oral Q4H PRN Marshall Salinas MD     • albuterol  2 5 mg Nebulization Q4H PRN Marshall Salinas MD     • aluminum-magnesium hydroxide-simethicone  30 mL Oral Q6H PRN Marshall Salinas MD     • benzonatate  200 mg Oral TID Marshall Salinas MD     • budesonide  0 5 mg Nebulization Q12H Marshall Salinas MD     • cefTRIAXone  1,000 mg Intravenous Q24H Marshall Salinas MD 1,000 mg (01/24/23 2066)   • docusate sodium  100 mg Oral BID Marshall Salinas MD     • enoxaparin  40 mg Subcutaneous Daily Marshall Salinas MD     • escitalopram  10 mg Oral HS Marshall Salinas MD     • famotidine  40 mg Oral Daily Marshall Salinas MD     • gabapentin  100 mg Oral BID Marshall Salinas MD     • guaiFENesin  1,200 mg Oral Q12H [address] Marshall Salinas MD     • ipratropium  0 5 mg Nebulization TID Marshall Salinas MD     • levalbuterol  1 25 mg Nebulization TID Marshall Salinas MD     • ondansetron  4 mg Intravenous Q6H PRN Marshall Salinas MD     • polyethylene glycol  17 g Oral Daily Marshall Salinas MD     • prednisoLONE acetate  1 drop Both Eyes BID Marshall Salinas MD     • predniSONE  40 mg Oral Daily Marshall Salinas MD     • tamsulosin  0 4 mg Oral Daily With Eldonna Nageotte, MD          Today, Patient Was Seen By: Nate Dickey MD    **Please Note: This note may have been constructed using a voice recognition system  **

## 2023-01-24 NOTE — RESULT ENCOUNTER NOTE
Currently inpatient at [facility], results available to inpatient treatment team

## 2023-01-24 NOTE — CASE MANAGEMENT
Case Management Discharge Planning Note    Patient name Vesna Osorio  Location [facility]-44 MRN [id number]  : 1940 Date 2023       Current Admission Date: 2023  Current Admission Diagnosis:Hilar mass   Patient Active Problem List    Diagnosis Date Noted   • Protein calorie malnutrition ([address] ) 2023   • Ambulatory dysfunction 2023   • S/P colostomy ([address] ) 2023   • Hyperkalemia 2023   • Hilar mass 2023   • History of creation of ostomy ([address] ) 2023   • Paroxysmal atrial fibrillation ([address] ) 2023   • Stage 3a chronic kidney disease ([address] ) 2023   • Chronic kidney disease 2022   • Preop pulmonary/respiratory exam 2022   • Primary localized osteoarthritis of right knee 2021   • Primary localized osteoarthritis of left knee 2021   • Aftercare following surgery of the musculoskeletal system 2021   • Hyponatremia 12/10/2020   • Chronic respiratory failure with hypoxia ([address] ) 2020   • History of pneumonia 2020   • Chronic bilateral low back pain 2020   • Accident due to mechanical fall without injury 2020   • Acute on chronic respiratory failure with hypoxia ([address] ) 01/10/2019   • Neck pain 2019   • Anxiety 08/10/2018   • Fall 08/15/2017   • Pneumonia 08/15/2017   • UTI (urinary tract infection) 08/15/2017   • COPD, moderate (HCC)    • Acid reflux disease 2016   • Enlarged prostate with lower urinary tract symptoms (LUTS) 2015      LOS (days): 2  Geometric Mean LOS (GMLOS) (days): 2 10  Days to GMLOS:0 3     OBJECTIVE:  Risk of Unplanned Readmission Score: 17 21         Current admission status: Inpatient   Preferred Pharmacy:   [facility], [address] [facility] [address] [facility]  [address] [facility]  [facility] [place] [address]  Phone: [phone] Fax: [phone]    Primary Care Provider: Katelyn Daley MD    Primary Insurance: MEDICARE  Secondary Insurance: Danelle Berman DETAILS:    Discharge planning discussed with[de-identified] Varsha-spouse     Comments - Freedom of Choice: Varsha unsure at this time about the recommendation of SNF  Contacts  Patient Contacts: Varsha  Relationship to Patient[de-identified] Family  Contact Method: Phone  Phone Number: [phone]  Reason/Outcome: Referral, Discharge Planning    Other Referral/Resources/Interventions Provided:  Interventions: Short Term Rehab  Referral Comments: CM was in contact with Varsha regarding the recommendation of SNF  Varsha reported that is something she would like to discuss with family prior to making a decision  CM to follow up      Treatment Team Recommendation: Short Term Rehab

## 2023-01-24 NOTE — PROGRESS NOTES
PULMONOLOGY PROGRESS NOTE     Name: Karen Buchanan   Age & Sex: 79 y o  male   MRN: [id number]  Unit/Bed#: [facility] 520-01   Encounter: [id number]    PATIENT INFORMATION     Name: Karen Buchanan   Age & Sex: 79 y o  male   MRN: [id number]  Hospital Stay Days: 2    ASSESSMENT/PLAN     Assessment:   1  Right hilar mass with RML atelectasis  2  Mediastinal LAD  3  COPD exacerbation  4  Acute on chronic respiratory failure with hypoxia  5  Possible aspiration/post-obstructive pneumonia  6  Former tobacco use    Plan:  • Will preform EBUS-TBNA on  at 9:45 AM  May NPO PMN in anticipation for procedure  • Continue with 5 day course of antiboitics  • Continue with 5 day prednisone burst  • Continue bronchodilators  • Supplement O2 as needed to maintain an SpO2>88%  SUBJECTIVE     Patient seen and examined  No acute events overnight  No acute complaints  Hungry    OBJECTIVE     Vitals:    23 0009 23 0731 23 0749   BP:  123/69 137/74    BP Location:  Left arm Right arm    Pulse:  75 71 86   Resp:  17 16 16   Temp:  98 3 °F (36 8 °C) 97 9 °F (36 6 °C)    TempSrc:  Oral Oral    SpO2: 95% 90% 95% 93%   Weight:       Height:          Temperature:   Temp (24hrs), Av 9 °F (36 6 °C), Min:97 3 °F (36 3 °C), Max:98 3 °F (36 8 °C)    Temperature: 97 9 °F (36 6 °C)  Intake & Output:  I/O        0701   0700  0701   0700  0701   0700    P  O  577 1358     IV Piggyback  168 3     Total Intake(mL/kg) 577 (7 8) 1526 3 (20 5)     Urine (mL/kg/hr) 200 600 (0 3) 175 (1)    Total Output 200 600 175    Net +377 +926 3 -175               Weights:   IBW (Ideal Body Weight): 68 4 kg    Body mass index is 24 94 kg/m²  Weight (last 2 days)     Date/Time Weight    23 17:49:48 74 4 (164)        Physical Exam      LABORATORY DATA     Labs: I have personally reviewed pertinent reports    Results from last 7 days   Lab Units 23  0522 23  0447 23 01/21/23  0543   WBC Thousand/uL 12 80* 13 89*  --  15 24*   HEMOGLOBIN g/dL 11 6* 12 2  --  12 8   HEMATOCRIT % 35 0* 37 6  --  39 5   PLATELETS Thousands/uL 279 284 262 273   NEUTROS PCT % 83* 89*  --  85*   MONOS PCT % 6 5  --  7      Results from last 7 days   Lab Units 01/24/23  0522 01/23/23  0447 01/22/23  2041 01/22/23  1523 01/21/23  0543   POTASSIUM mmol/L 4 3 4 4 4 6 5 7* 4 2   CHLORIDE mmol/L 107 106  --  107 101   CO2 mmol/L 28 28  --  27 30   BUN mg/dL 26* 26*  --  23 16   CREATININE mg/dL 1 03 1 14  --  0 97 1 02   CALCIUM mg/dL 8 9 9 0  --  8 6 8 2*   ALK PHOS U/L  --   --   --   --  70   ALT U/L  --   --   --   --  27   AST U/L  --   --   --   --  25              Results from last 7 days   Lab Units 01/21/23  0543   INR  1 12   PTT seconds 29     Results from last 7 days   Lab Units 01/21/23  0543   LACTIC ACID mmol/L 0 8             ABG:       Micro:   Results from last 7 days   Lab Units 01/21/23  1201 01/21/23  0743 01/21/23  0546 01/21/23  0543   BLOOD CULTURE   --   --  No Growth at 48 hrs  No Growth at 48 hrs  URINE CULTURE   --  Culture too young- will reincubate  --   --    MRSA CULTURE ONLY  No Methicillin Resistant Staphlyococcus aureus (MRSA) isolated  --   --   --    LEGIONELLA URINARY ANTIGEN   --  Negative  --   --    STREP PNEUMONIAE ANTIGEN, URINE   --  Negative  --   --          IMAGING & DIAGNOSTIC TESTING     Radiology Results: I have personally reviewed pertinent reports  No results found  Other Diagnostic Testing: I have personally reviewed pertinent reports      ACTIVE MEDICATIONS     Current Facility-Administered Medications   Medication Dose Route Frequency   • acetaminophen (TYLENOL) tablet 650 mg  650 mg Oral Q4H PRN   • albuterol inhalation solution 2 5 mg  2 5 mg Nebulization Q4H PRN   • aluminum-magnesium hydroxide-simethicone (MYLANTA) oral suspension 30 mL  30 mL Oral Q6H PRN   • benzonatate (TESSALON PERLES) capsule 200 mg  200 mg Oral TID   • budesonide (PULMICORT) inhalation solution 0 5 mg  0 5 mg Nebulization Q12H   • cefTRIAXone (ROCEPHIN) 1,000 mg in dextrose 5 % 50 mL IVPB  1,000 mg Intravenous Q24H   • docusate sodium (COLACE) capsule 100 mg  100 mg Oral BID   • enoxaparin (LOVENOX) subcutaneous injection 40 mg  40 mg Subcutaneous Daily   • escitalopram (LEXAPRO) tablet 10 mg  10 mg Oral HS   • famotidine (PEPCID) tablet 40 mg  40 mg Oral Daily   • gabapentin (NEURONTIN) capsule 100 mg  100 mg Oral BID   • guaiFENesin (MUCINEX) 12 hr tablet 1,200 mg  1,200 mg Oral Q12H KAREN   • ipratropium (ATROVENT) 0 02 % inhalation solution 0 5 mg  0 5 mg Nebulization TID   • levalbuterol (XOPENEX) inhalation solution 1 25 mg  1 25 mg Nebulization TID   • ondansetron (ZOFRAN) injection 4 mg  4 mg Intravenous Q6H PRN   • polyethylene glycol (MIRALAX) packet 17 g  17 g Oral Daily   • prednisoLONE acetate (PRED FORTE) 1 % ophthalmic suspension 1 drop  1 drop Both Eyes BID   • predniSONE tablet 40 mg  40 mg Oral Daily   • tamsulosin (FLOMAX) capsule 0 4 mg  0 4 mg Oral Daily With Dinner         Disclaimer: Portions of the record may have been created with voice recognition software  Occasional wrong word or "sound a like" substitutions may have occurred due to the inherent limitations of voice recognition software  Careful consideration should be taken to recognize, using context, where substitutions have occurred      Asad Mayorga DO   Pulmonary and Critical Care Fellow, PGY-V  [facility]

## 2023-01-25 ENCOUNTER — ANESTHESIA (OUTPATIENT)
Dept: ANESTHESIOLOGY | Facility: HOSPITAL | Age: 83
End: 2023-01-25

## 2023-01-25 ENCOUNTER — ANESTHESIA EVENT (INPATIENT)
Dept: PERIOP | Facility: HOSPITAL | Age: 83
End: 2023-01-25

## 2023-01-25 ENCOUNTER — ANESTHESIA (INPATIENT)
Dept: PERIOP | Facility: HOSPITAL | Age: 83
End: 2023-01-25

## 2023-01-25 ENCOUNTER — ANESTHESIA EVENT (OUTPATIENT)
Dept: ANESTHESIOLOGY | Facility: HOSPITAL | Age: 83
End: 2023-01-25

## 2023-01-25 ENCOUNTER — APPOINTMENT (OUTPATIENT)
Dept: PERIOP | Facility: HOSPITAL | Age: 83
End: 2023-01-25

## 2023-01-25 PROBLEM — R25.9 ABNORMAL INVOLUNTARY MOVEMENTS: Status: ACTIVE | Noted: 2023-01-25

## 2023-01-25 PROBLEM — E87.5 HYPERKALEMIA: Status: RESOLVED | Noted: 2023-01-22 | Resolved: 2023-01-25

## 2023-01-25 LAB
ANION GAP SERPL CALCULATED.3IONS-SCNC: 5 MMOL/L (ref 4–13)
BASOPHILS # BLD AUTO: 0.02 THOUSANDS/ÂΜL (ref 0–0.1)
BASOPHILS NFR BLD AUTO: 0 % (ref 0–1)
BUN SERPL-MCNC: 32 MG/DL (ref 5–25)
CALCIUM SERPL-MCNC: 9.1 MG/DL (ref 8.3–10.1)
CHLORIDE SERPL-SCNC: 106 MMOL/L (ref 96–108)
CO2 SERPL-SCNC: 29 MMOL/L (ref 21–32)
CREAT SERPL-MCNC: 1.16 MG/DL (ref 0.6–1.3)
EOSINOPHIL # BLD AUTO: 0.01 THOUSAND/ÂΜL (ref 0–0.61)
EOSINOPHIL NFR BLD AUTO: 0 % (ref 0–6)
ERYTHROCYTE [DISTWIDTH] IN BLOOD BY AUTOMATED COUNT: 13.1 % (ref 11.6–15.1)
GFR SERPL CREATININE-BSD FRML MDRD: 57 ML/MIN/1.73SQ M
GLUCOSE SERPL-MCNC: 85 MG/DL (ref 65–140)
HCT VFR BLD AUTO: 37.5 % (ref 36.5–49.3)
HGB BLD-MCNC: 12.3 G/DL (ref 12–17)
IMM GRANULOCYTES # BLD AUTO: 0.1 THOUSAND/UL (ref 0–0.2)
IMM GRANULOCYTES NFR BLD AUTO: 1 % (ref 0–2)
LYMPHOCYTES # BLD AUTO: 1.41 THOUSANDS/ÂΜL (ref 0.6–4.47)
LYMPHOCYTES NFR BLD AUTO: 10 % (ref 14–44)
MCH RBC QN AUTO: 33.5 PG (ref 26.8–34.3)
MCHC RBC AUTO-ENTMCNC: 32.8 G/DL (ref 31.4–37.4)
MCV RBC AUTO: 102 FL (ref 82–98)
MONOCYTES # BLD AUTO: 0.9 THOUSAND/ÂΜL (ref 0.17–1.22)
MONOCYTES NFR BLD AUTO: 6 % (ref 4–12)
NEUTROPHILS # BLD AUTO: 12.02 THOUSANDS/ÂΜL (ref 1.85–7.62)
NEUTS SEG NFR BLD AUTO: 83 % (ref 43–75)
NRBC BLD AUTO-RTO: 0 /100 WBCS
PLATELET # BLD AUTO: 322 THOUSANDS/UL (ref 149–390)
PMV BLD AUTO: 9.7 FL (ref 8.9–12.7)
POTASSIUM SERPL-SCNC: 4.2 MMOL/L (ref 3.5–5.3)
RBC # BLD AUTO: 3.67 MILLION/UL (ref 3.88–5.62)
SODIUM SERPL-SCNC: 140 MMOL/L (ref 135–147)
WBC # BLD AUTO: 14.46 THOUSAND/UL (ref 4.31–10.16)

## 2023-01-25 PROCEDURE — 0BBK8ZX EXCISION OF RIGHT LUNG, VIA NATURAL OR ARTIFICIAL OPENING ENDOSCOPIC, DIAGNOSTIC: ICD-10-PCS | Performed by: INTERNAL MEDICINE

## 2023-01-25 PROCEDURE — BB4CZZZ ULTRASONOGRAPHY OF MEDIASTINUM: ICD-10-PCS | Performed by: INTERNAL MEDICINE

## 2023-01-25 RX ORDER — SODIUM CHLORIDE, SODIUM LACTATE, POTASSIUM CHLORIDE, CALCIUM CHLORIDE 600; 310; 30; 20 MG/100ML; MG/100ML; MG/100ML; MG/100ML
INJECTION, SOLUTION INTRAVENOUS CONTINUOUS PRN
Status: DISCONTINUED | OUTPATIENT
Start: 2023-01-25 | End: 2023-01-25

## 2023-01-25 RX ORDER — PROPOFOL 10 MG/ML
INJECTION, EMULSION INTRAVENOUS CONTINUOUS PRN
Status: DISCONTINUED | OUTPATIENT
Start: 2023-01-25 | End: 2023-01-25

## 2023-01-25 RX ORDER — DIPHENHYDRAMINE HYDROCHLORIDE 50 MG/ML
12.5 INJECTION INTRAMUSCULAR; INTRAVENOUS ONCE AS NEEDED
Status: DISCONTINUED | OUTPATIENT
Start: 2023-01-25 | End: 2023-01-25 | Stop reason: HOSPADM

## 2023-01-25 RX ORDER — DEXAMETHASONE SODIUM PHOSPHATE 10 MG/ML
INJECTION, SOLUTION INTRAMUSCULAR; INTRAVENOUS AS NEEDED
Status: DISCONTINUED | OUTPATIENT
Start: 2023-01-25 | End: 2023-01-25

## 2023-01-25 RX ORDER — PROPOFOL 10 MG/ML
INJECTION, EMULSION INTRAVENOUS AS NEEDED
Status: DISCONTINUED | OUTPATIENT
Start: 2023-01-25 | End: 2023-01-25

## 2023-01-25 RX ORDER — EPHEDRINE SULFATE 50 MG/ML
INJECTION INTRAVENOUS AS NEEDED
Status: DISCONTINUED | OUTPATIENT
Start: 2023-01-25 | End: 2023-01-25

## 2023-01-25 RX ORDER — LIDOCAINE HYDROCHLORIDE 10 MG/ML
INJECTION, SOLUTION EPIDURAL; INFILTRATION; INTRACAUDAL; PERINEURAL AS NEEDED
Status: DISCONTINUED | OUTPATIENT
Start: 2023-01-25 | End: 2023-01-25

## 2023-01-25 RX ORDER — FENTANYL CITRATE 50 UG/ML
INJECTION, SOLUTION INTRAMUSCULAR; INTRAVENOUS AS NEEDED
Status: DISCONTINUED | OUTPATIENT
Start: 2023-01-25 | End: 2023-01-25

## 2023-01-25 RX ORDER — FENTANYL CITRATE/PF 50 MCG/ML
50 SYRINGE (ML) INJECTION
Status: DISCONTINUED | OUTPATIENT
Start: 2023-01-25 | End: 2023-01-25 | Stop reason: HOSPADM

## 2023-01-25 RX ORDER — AMOXICILLIN 500 MG/1
500 CAPSULE ORAL EVERY 8 HOURS SCHEDULED
Status: DISCONTINUED | OUTPATIENT
Start: 2023-01-25 | End: 2023-01-27 | Stop reason: HOSPADM

## 2023-01-25 RX ORDER — FENTANYL CITRATE/PF 50 MCG/ML
25 SYRINGE (ML) INJECTION
Status: DISCONTINUED | OUTPATIENT
Start: 2023-01-25 | End: 2023-01-25 | Stop reason: HOSPADM

## 2023-01-25 RX ORDER — HYDROMORPHONE HCL/PF 1 MG/ML
0.5 SYRINGE (ML) INJECTION
Status: DISCONTINUED | OUTPATIENT
Start: 2023-01-25 | End: 2023-01-25 | Stop reason: HOSPADM

## 2023-01-25 RX ORDER — ONDANSETRON 2 MG/ML
INJECTION INTRAMUSCULAR; INTRAVENOUS AS NEEDED
Status: DISCONTINUED | OUTPATIENT
Start: 2023-01-25 | End: 2023-01-25

## 2023-01-25 RX ORDER — ONDANSETRON 2 MG/ML
4 INJECTION INTRAMUSCULAR; INTRAVENOUS ONCE AS NEEDED
Status: DISCONTINUED | OUTPATIENT
Start: 2023-01-25 | End: 2023-01-25 | Stop reason: HOSPADM

## 2023-01-25 RX ORDER — SODIUM CHLORIDE, SODIUM LACTATE, POTASSIUM CHLORIDE, CALCIUM CHLORIDE 600; 310; 30; 20 MG/100ML; MG/100ML; MG/100ML; MG/100ML
100 INJECTION, SOLUTION INTRAVENOUS CONTINUOUS
Status: DISCONTINUED | OUTPATIENT
Start: 2023-01-25 | End: 2023-01-26

## 2023-01-25 RX ADMIN — PROPOFOL 100 MCG/KG/MIN: 10 INJECTION, EMULSION INTRAVENOUS at 10:43

## 2023-01-25 RX ADMIN — FENTANYL CITRATE 50 MCG: 50 INJECTION INTRAMUSCULAR; INTRAVENOUS at 12:10

## 2023-01-25 RX ADMIN — PREDNISOLONE ACETATE 1 DROP: 10 SUSPENSION/ DROPS OPHTHALMIC at 08:34

## 2023-01-25 RX ADMIN — SODIUM CHLORIDE, SODIUM LACTATE, POTASSIUM CHLORIDE, AND CALCIUM CHLORIDE: .6; .31; .03; .02 INJECTION, SOLUTION INTRAVENOUS at 12:12

## 2023-01-25 RX ADMIN — LIDOCAINE HYDROCHLORIDE 50 MG: 10 INJECTION, SOLUTION EPIDURAL; INFILTRATION; INTRACAUDAL; PERINEURAL at 12:01

## 2023-01-25 RX ADMIN — FENTANYL CITRATE 50 MCG: 50 INJECTION INTRAMUSCULAR; INTRAVENOUS at 10:36

## 2023-01-25 RX ADMIN — LEVALBUTEROL HYDROCHLORIDE 1.25 MG: 1.25 SOLUTION, CONCENTRATE RESPIRATORY (INHALATION) at 19:32

## 2023-01-25 RX ADMIN — SODIUM CHLORIDE, SODIUM LACTATE, POTASSIUM CHLORIDE, AND CALCIUM CHLORIDE: .6; .31; .03; .02 INJECTION, SOLUTION INTRAVENOUS at 10:24

## 2023-01-25 RX ADMIN — ESCITALOPRAM OXALATE 10 MG: 10 TABLET ORAL at 21:41

## 2023-01-25 RX ADMIN — LIDOCAINE HYDROCHLORIDE 50 MG: 10 INJECTION, SOLUTION EPIDURAL; INFILTRATION; INTRACAUDAL; PERINEURAL at 10:33

## 2023-01-25 RX ADMIN — PREDNISONE 40 MG: 20 TABLET ORAL at 08:29

## 2023-01-25 RX ADMIN — BUDESONIDE 0.5 MG: 0.5 INHALANT ORAL at 19:32

## 2023-01-25 RX ADMIN — DEXAMETHASONE SODIUM PHOSPHATE 10 MG: 10 INJECTION, SOLUTION INTRAMUSCULAR; INTRAVENOUS at 10:38

## 2023-01-25 RX ADMIN — CEFTRIAXONE SODIUM 1000 MG: 10 INJECTION, POWDER, FOR SOLUTION INTRAVENOUS at 05:03

## 2023-01-25 RX ADMIN — FAMOTIDINE 40 MG: 20 TABLET ORAL at 08:30

## 2023-01-25 RX ADMIN — DOCUSATE SODIUM 100 MG: 100 CAPSULE, LIQUID FILLED ORAL at 08:29

## 2023-01-25 RX ADMIN — BUDESONIDE 0.5 MG: 0.5 INHALANT ORAL at 07:31

## 2023-01-25 RX ADMIN — BENZONATATE 200 MG: 100 CAPSULE ORAL at 08:29

## 2023-01-25 RX ADMIN — PREDNISOLONE ACETATE 1 DROP: 10 SUSPENSION/ DROPS OPHTHALMIC at 18:39

## 2023-01-25 RX ADMIN — BENZONATATE 200 MG: 100 CAPSULE ORAL at 21:41

## 2023-01-25 RX ADMIN — IPRATROPIUM BROMIDE 0.5 MG: 0.5 SOLUTION RESPIRATORY (INHALATION) at 07:31

## 2023-01-25 RX ADMIN — IPRATROPIUM BROMIDE 0.5 MG: 0.5 SOLUTION RESPIRATORY (INHALATION) at 19:32

## 2023-01-25 RX ADMIN — IPRATROPIUM BROMIDE 0.5 MG: 0.5 SOLUTION RESPIRATORY (INHALATION) at 14:10

## 2023-01-25 RX ADMIN — PHENYLEPHRINE HYDROCHLORIDE 50 MCG/MIN: 10 INJECTION INTRAVENOUS at 10:42

## 2023-01-25 RX ADMIN — FENTANYL CITRATE 50 MCG: 50 INJECTION INTRAMUSCULAR; INTRAVENOUS at 12:03

## 2023-01-25 RX ADMIN — FENTANYL CITRATE 50 MCG: 50 INJECTION INTRAMUSCULAR; INTRAVENOUS at 11:08

## 2023-01-25 RX ADMIN — ENOXAPARIN SODIUM 40 MG: 40 INJECTION SUBCUTANEOUS at 08:30

## 2023-01-25 RX ADMIN — ONDANSETRON 4 MG: 2 INJECTION INTRAMUSCULAR; INTRAVENOUS at 12:01

## 2023-01-25 RX ADMIN — LEVALBUTEROL HYDROCHLORIDE 1.25 MG: 1.25 SOLUTION, CONCENTRATE RESPIRATORY (INHALATION) at 14:10

## 2023-01-25 RX ADMIN — AMOXICILLIN 500 MG: 500 CAPSULE ORAL at 21:41

## 2023-01-25 RX ADMIN — EPHEDRINE SULFATE 10 MG: 50 INJECTION INTRAVENOUS at 12:13

## 2023-01-25 RX ADMIN — BENZONATATE 200 MG: 100 CAPSULE ORAL at 18:39

## 2023-01-25 RX ADMIN — GABAPENTIN 100 MG: 100 CAPSULE ORAL at 08:29

## 2023-01-25 RX ADMIN — GUAIFENESIN 1200 MG: 600 TABLET, EXTENDED RELEASE ORAL at 21:41

## 2023-01-25 RX ADMIN — SODIUM CHLORIDE, SODIUM LACTATE, POTASSIUM CHLORIDE, AND CALCIUM CHLORIDE 100 ML/HR: .6; .31; .03; .02 INJECTION, SOLUTION INTRAVENOUS at 14:39

## 2023-01-25 RX ADMIN — GABAPENTIN 100 MG: 100 CAPSULE ORAL at 18:39

## 2023-01-25 RX ADMIN — GUAIFENESIN 1200 MG: 600 TABLET, EXTENDED RELEASE ORAL at 08:29

## 2023-01-25 RX ADMIN — AMOXICILLIN 500 MG: 500 CAPSULE ORAL at 18:38

## 2023-01-25 RX ADMIN — PROPOFOL 50 MG: 10 INJECTION, EMULSION INTRAVENOUS at 10:33

## 2023-01-25 RX ADMIN — DOCUSATE SODIUM 100 MG: 100 CAPSULE, LIQUID FILLED ORAL at 18:39

## 2023-01-25 RX ADMIN — PROPOFOL 100 MG: 10 INJECTION, EMULSION INTRAVENOUS at 10:36

## 2023-01-25 RX ADMIN — EPHEDRINE SULFATE 10 MG: 50 INJECTION INTRAVENOUS at 11:30

## 2023-01-25 RX ADMIN — EPHEDRINE SULFATE 10 MG: 50 INJECTION INTRAVENOUS at 10:42

## 2023-01-25 RX ADMIN — LEVALBUTEROL HYDROCHLORIDE 1.25 MG: 1.25 SOLUTION, CONCENTRATE RESPIRATORY (INHALATION) at 07:31

## 2023-01-25 RX ADMIN — TAMSULOSIN HYDROCHLORIDE 0.4 MG: 0.4 CAPSULE ORAL at 18:38

## 2023-01-25 NOTE — PHYSICAL THERAPY NOTE
Physical Therapy Cancellation Note         01/25/23 0953   PT Last Visit   PT Visit Date 01/25/23   Note Type   Note Type Cancelled Session   Cancel Reasons Patient to operating room     PT consult received  Chart reviewed  Patient is currently off floor in OR  Planned for bronchoscopy  PT will continue to follow on caseload and perform future treatments as medically appropriate        JORGE LEONARDO PT, DPT

## 2023-01-25 NOTE — ANESTHESIA POSTPROCEDURE EVALUATION
Post-Op Assessment Note    CV Status:  Stable  Pain Score: 0    Pain management: adequate     Mental Status:  Awake   Hydration Status:  Stable   PONV Controlled:  None   Airway Patency:  Patent      Post Op Vitals Reviewed: Yes      Staff: Anesthesiologist, CRNA         No notable events documented      /66 (01/25/23 1250)    Temp 99 9 °F (37 7 °C) (01/25/23 1250)    Pulse (!) 116 (01/25/23 1250)   Resp 16 (01/25/23 1250)    SpO2 95 % (01/25/23 1250)

## 2023-01-25 NOTE — ASSESSMENT & PLAN NOTE
· Outpatient PCP notes reviewed not on anticoagulation  · Would likely benefit from anticoagulation actually in the setting of likely malignancy with hypercoagulability and A  fib, however will defer to outpatient provider after patient completes course of rehab given recent falls  · Monitor

## 2023-01-25 NOTE — ANESTHESIA PREPROCEDURE EVALUATION
Procedure:  ENDOBRONCHIAL ULTRASOUND (EBUS)    Relevant Problems   CARDIO   (+) Paroxysmal atrial fibrillation (HCC)      GI/HEPATIC   (+) Acid reflux disease      /RENAL   (+) Chronic kidney disease   (+) Enlarged prostate with lower urinary tract symptoms (LUTS)   (+) Stage 3a chronic kidney disease (HCC)      MUSCULOSKELETAL   (+) Chronic bilateral low back pain   (+) Primary localized osteoarthritis of left knee   (+) Primary localized osteoarthritis of right knee      NEURO/PSYCH   (+) Anxiety   (+) Chronic bilateral low back pain   (+) History of pneumonia      PULMONARY   (+) Acute on chronic respiratory failure with hypoxia (HCC)   (+) COPD, moderate (HCC)   (+) Chronic respiratory failure with hypoxia (HCC)   (+) Pneumonia        Physical Exam    Airway    Mallampati score: III  TM Distance: <3 FB  Neck ROM: limited     Dental   upper dentures and lower dentures,     Cardiovascular  Cardiovascular exam normal    Pulmonary  Rhonchi, Decreased breath sounds,     Other Findings        Anesthesia Plan  ASA Score- 4     Anesthesia Type- general with ASA Monitors  Additional Monitors:   Airway Plan: LMA  Plan Factors-Exercise tolerance (METS): <4 METS  Chart reviewed  EKG reviewed  Imaging results reviewed  Existing labs reviewed  Patient summary reviewed  Patient is not a current smoker  Induction- intravenous  Postoperative Plan-   Planned trial extubation    Informed Consent- Anesthetic plan and risks discussed with patient  I personally reviewed this patient with the CRNA  Discussed and agreed on the Anesthesia Plan with the CRNA  Thomasina Cooks

## 2023-01-25 NOTE — ASSESSMENT & PLAN NOTE
· Patient reported dysuria prior to hospitalization  · UA done in ED grossly positive, initially patient on ceftriaxone with reasonable presumption this would cover both pneumonia and UTI  · UA resulted with Enterococcus, given age and GFR, treat with amoxicillin x5d

## 2023-01-25 NOTE — ASSESSMENT & PLAN NOTE
· CT chest reveals right hilar mass with obstructing right middle lobe bronchus  · Concerning for malignancy  · Transferred for pulmonary evaluation for bronchoscopy/EBUS for tissue diagnosis  · 1/25-underwent bronchoscopy which showed purulent sputum  · Follow-up final studies, pending  · Pulmonary following

## 2023-01-25 NOTE — CASE MANAGEMENT
Case Management Discharge Planning Note    Patient name Amador Cuello  Location [facility] 914/[facility] 897-41 MRN [id number]  : 1940 Date 2023       Current Admission Date: 2023  Current Admission Diagnosis:Hilar mass   Patient Active Problem List    Diagnosis Date Noted   • Protein calorie malnutrition ([address] ) 2023   • Ambulatory dysfunction 2023   • S/P colostomy ([address] ) 2023   • Hyperkalemia 2023   • Hilar mass 2023   • History of creation of ostomy ([address] ) 2023   • Paroxysmal atrial fibrillation ([address] ) 2023   • Stage 3a chronic kidney disease ([address] ) 2023   • Chronic kidney disease 2022   • Preop pulmonary/respiratory exam 2022   • Primary localized osteoarthritis of right knee 2021   • Primary localized osteoarthritis of left knee 2021   • Aftercare following surgery of the musculoskeletal system 2021   • Hyponatremia 12/10/2020   • Chronic respiratory failure with hypoxia ([address] ) 2020   • History of pneumonia 2020   • Chronic bilateral low back pain 2020   • Accident due to mechanical fall without injury 2020   • Acute on chronic respiratory failure with hypoxia ([address] ) 01/10/2019   • Neck pain 2019   • Anxiety 08/10/2018   • Fall 08/15/2017   • Pneumonia 08/15/2017   • UTI (urinary tract infection) 08/15/2017   • COPD, moderate (HCC)    • Acid reflux disease 2016   • Enlarged prostate with lower urinary tract symptoms (LUTS) 2015      LOS (days): 3  Geometric Mean LOS (GMLOS) (days): 2 10  Days to GMLOS:-0 9     OBJECTIVE:  Risk of Unplanned Readmission Score: 15 73         Current admission status: Inpatient   Preferred Pharmacy:   [facility], [address] [facility] [address] [facility]  [address] [facility]  [address] [place] [address]  Phone: [phone] Fax: [phone]    Primary Care Provider: Geoffrey Davidson MD    Primary Insurance: MEDICARE  Secondary Insurance: Cayden Daniels DETAILS:    Additional Comments: CM spoke to pt's dtr to review d/c plan  Pt's dtr in agreement for pt to transition to STR   CM sent referrals via [employer] as requested

## 2023-01-25 NOTE — RESTORATIVE TECHNICIAN NOTE
Restorative Technician Note      Patient Name: Crow Galdamez     Note Type: Mobility  Patient Position Upon Consult: Supine  Activity Performed: Ambulated  Assistive Device: Roller walker  Patient Position at End of Consult:  Other (comment) (Fredy Bermeo)

## 2023-01-25 NOTE — PROGRESS NOTES
PULMONOLOGY PROGRESS NOTE     Name: Vesna Osorio   Age & Sex: 79 y o  male   MRN: [id number]  Unit/Bed#: [facility] 520-01   Encounter: [id number]    PATIENT INFORMATION     Name: Vesna Osorio   Age & Sex: 79 y o  male   MRN: [id number]  Hospital Stay Days: 3    ASSESSMENT/PLAN     Assessment:      1  Hilar mass  - Chest x-ray (1/21): Collapse of right middle lobe, right-shift of the esophagus   - CTA (1/21): 2 6 cm hilar mass obstructing the right middle lobe bronchus resulting in complete postobstructive RML collapse  Mediastinal lymphadenopathy  Centrilobar and paraseptal emphysema  Mild atelectasis of poster RLL       2  COPD  - PFTs (5/8/2019) - FEV1/FVC 49%, FEV1 75% (moderate by GOLD)  DLCO 64%  - Sees Dr Binh Brown on outpatient basis  - Home regimen: Chronic 4L oxygen 24 hrs, arformoterol 15 mcg nebs 2x BID, budesonide 0 5 mg nebs 2x BID  Patient does note use Duonebs or albuterol inhaler due to cost    - Reports smoking 1/2 pack for 50 years  Quite about 10 years ago       3  Acute on chronic respiratory failure with hypoxia  - Blood cx - no growth 72 hrs  - Sputum MRSA cx - normal  - Procal - WNL  - LA - WNL  - Currently on 3L     4  Urinary tract infection  - Urine microscopic - 30-50 WBC, innumerable bacteria  - Urine Cx - Grew >100,000 E  Faecalis (susceptible to Vanc, Nitrofurantoin, and Amp); > 100,000 Aerococcus urinae - a rare gram positive organism reported to cause UTIs particularly in the elderly (typically susceptible to most penicillins, cephalosporins, and nitrofurantion); 10-20,000 Staph coagulase negative  - Urine strep and legionella antigen - normal  - WBC 14 46 from 15 24 on admission    - Likely 2/2 BPH-associated chronic urine retention        Plan:     • Today - Planning for EBUS biopsy of R hilar mass  • Continue nebs - Xopenex 1 25 mg and Atorvent 0 5 mg 3x daily  Pulmicort 0 5 mg q12hr  • Discontinue prednisone 40 mg after today     • Pending discussion with attending - Discontinue ceftriaxone after today, initiate nitrofurantoin to cover E faecalis and A urinae  • Not currently on TAMMIE at home due to cost - may need CM to help patient obtain TAMMEI on discharge  SUBJECTIVE     Patient seen and examined  No acute events overnight  Patient states his breathing is at baseline  He is feeling anxious today  Reports some chest pain he experienced yesterday which he attributes to anxiety  He also feels a bit SOB due to anxiety  Denies SOB on his walk around the halls of the hospital yesterday  Denies cough, congestion, hemoptysis, fevers, chills, nausea, vomiting, abdominal pain  OBJECTIVE     Vitals:    23 2100 23 2315 23 0619 23 0733   BP:  127/73 (!) 120/101    BP Location:  Right arm Left arm    Pulse:  82 70 84   Resp:  16  16   Temp:  98 1 °F (36 7 °C) 98 2 °F (36 8 °C)    TempSrc:  Oral Oral    SpO2: 93% 95% 93% 100%   Weight:       Height:          Temperature:   Temp (24hrs), Av 7 °F (36 5 °C), Min:97 3 °F (36 3 °C), Max:98 2 °F (36 8 °C)    Temperature: 98 2 °F (36 8 °C)  Intake & Output:  I/O        0701   0700  0701   0700  0701   0700    P  O  1358 594     IV Piggyback 168 3      Total Intake(mL/kg) 1526 3 (20 5) 594 (8)     Urine (mL/kg/hr) 600 (0 3) 1570 (0 9)     Stool  50     Total Output 600 1620     Net +926 3 -1026                Weights:   IBW (Ideal Body Weight): 68 4 kg    Body mass index is 24 94 kg/m²  Weight (last 2 days)     None        Physical Exam  Vitals reviewed  HENT:      Head: Normocephalic and atraumatic  Nose: No congestion  Mouth/Throat:      Mouth: Mucous membranes are moist    Eyes:      Conjunctiva/sclera: Conjunctivae normal    Cardiovascular:      Rate and Rhythm: Normal rate  Rhythm irregular  Pulses: Normal pulses  Heart sounds: Normal heart sounds  No murmur heard  No friction rub  No gallop  Pulmonary:      Effort: No respiratory distress  Breath sounds: No stridor  No wheezing, rhonchi or rales  Comments: Reduced breath sounds bilaterally   Abdominal:      General: Bowel sounds are normal  There is no distension  Palpations: Abdomen is soft  Tenderness: There is no abdominal tenderness  There is no guarding  Musculoskeletal:      Right lower leg: No edema  Left lower leg: No edema  Comments: Clubbed digits bilaterally   Skin:     General: Skin is warm and dry  Comments: Onychomyoctic nail changes of the left hand   Neurological:      Mental Status: He is alert and oriented to person, place, and time  Comments: Choreoathetosis    Psychiatric:         Mood and Affect: Mood normal          Behavior: Behavior normal          LABORATORY DATA     Labs: I have personally reviewed pertinent reports  Results from last 7 days   Lab Units 01/25/23  0449 01/24/23  0522 01/23/23  0447   WBC Thousand/uL 14 46* 12 80* 13 89*   HEMOGLOBIN g/dL 12 3 11 6* 12 2   HEMATOCRIT % 37 5 35 0* 37 6   PLATELETS Thousands/uL 322 279 284   NEUTROS PCT % 83* 83* 89*   MONOS PCT % 6 6 5      Results from last 7 days   Lab Units 01/25/23  0449 01/24/23  0522 01/23/23  0447 01/22/23  1523 01/21/23  0543   POTASSIUM mmol/L 4 2 4 3 4 4   < > 4 2   CHLORIDE mmol/L 106 107 106   < > 101   CO2 mmol/L 29 28 28   < > 30   BUN mg/dL 32* 26* 26*   < > 16   CREATININE mg/dL 1 16 1 03 1 14   < > 1 02   CALCIUM mg/dL 9 1 8 9 9 0   < > 8 2*   ALK PHOS U/L  --   --   --   --  70   ALT U/L  --   --   --   --  27   AST U/L  --   --   --   --  25    < > = values in this interval not displayed  Results from last 7 days   Lab Units 01/21/23  0543   INR  1 12   PTT seconds 29     Results from last 7 days   Lab Units 01/21/23  0543   LACTIC ACID mmol/L 0 8             ABG:       Micro:   Results from last 7 days   Lab Units 01/21/23  1201 01/21/23  0743 01/21/23  0546 01/21/23  0543   BLOOD CULTURE   --   --  No Growth at 72 hrs  No Growth at 72 hrs  URINE CULTURE   --  >100,000 cfu/ml Enterococcus faecalis*  >100,000 cfu/ml Aerococcus urinae*  10,000-19,000 cfu/ml Staphylococcus coagulase negative*  --   --    MRSA CULTURE ONLY  No Methicillin Resistant Staphlyococcus aureus (MRSA) isolated  --   --   --    LEGIONELLA URINARY ANTIGEN   --  Negative  --   --    STREP PNEUMONIAE ANTIGEN, URINE   --  Negative  --   --          IMAGING & DIAGNOSTIC TESTING     Radiology Results: I have personally reviewed pertinent reports  No results found  Other Diagnostic Testing: I have personally reviewed pertinent reports      ACTIVE MEDICATIONS     Current Facility-Administered Medications   Medication Dose Route Frequency   • acetaminophen (TYLENOL) tablet 650 mg  650 mg Oral Q4H PRN   • albuterol inhalation solution 2 5 mg  2 5 mg Nebulization Q4H PRN   • aluminum-magnesium hydroxide-simethicone (MYLANTA) oral suspension 30 mL  30 mL Oral Q6H PRN   • benzonatate (TESSALON PERLES) capsule 200 mg  200 mg Oral TID   • budesonide (PULMICORT) inhalation solution 0 5 mg  0 5 mg Nebulization Q12H   • cefTRIAXone (ROCEPHIN) 1,000 mg in dextrose 5 % 50 mL IVPB  1,000 mg Intravenous Q24H   • docusate sodium (COLACE) capsule 100 mg  100 mg Oral BID   • enoxaparin (LOVENOX) subcutaneous injection 40 mg  40 mg Subcutaneous Daily   • escitalopram (LEXAPRO) tablet 10 mg  10 mg Oral HS   • famotidine (PEPCID) tablet 40 mg  40 mg Oral Daily   • gabapentin (NEURONTIN) capsule 100 mg  100 mg Oral BID   • guaiFENesin (MUCINEX) 12 hr tablet 1,200 mg  1,200 mg Oral Q12H KAREN   • ipratropium (ATROVENT) 0 02 % inhalation solution 0 5 mg  0 5 mg Nebulization TID   • levalbuterol (XOPENEX) inhalation solution 1 25 mg  1 25 mg Nebulization TID   • ondansetron (ZOFRAN) injection 4 mg  4 mg Intravenous Q6H PRN   • polyethylene glycol (MIRALAX) packet 17 g  17 g Oral Daily   • prednisoLONE acetate (PRED FORTE) 1 % ophthalmic suspension 1 drop  1 drop Both Eyes BID   • predniSONE tablet 40 mg  40 mg Oral Daily   • tamsulosin (FLOMAX) capsule 0 4 mg  0 4 mg Oral Daily With Dinner       VTE Pharmacologic Prophylaxis: Enoxaparin (Lovenox)  VTE Mechanical Prophylaxis: sequential compression device      Disclaimer: Portions of the record may have been created with voice recognition software  Occasional wrong word or "sound a like" substitutions may have occurred due to the inherent limitations of voice recognition software  Careful consideration should be taken to recognize, using context, where substitutions have occurred        ----------------------------------------------------  Tory Solano

## 2023-01-25 NOTE — PROGRESS NOTES
[facility]  Progress Note - Vivian Vu 1940, 79 y o  male MRN: [id number]  Unit/Bed#: [facility] 914-01 Encounter: [id number]  Primary Care Provider: Percy Rai MD   Date and time admitted to hospital: 1/22/2023  5:36 PM    Abnormal involuntary movements  Assessment & Plan  · Patient noted to have abnormal involuntary repetitive movements  · No documented neurologic diagnosis noted on chart search  · However per review, abnormal movements and twitching/tremors have been documented since at least 2016  · Will discussed with patient and family taking collateral    S/P colostomy ([facility] )  Assessment & Plan  History of colonic mass requiring colostomy  Colostomy care    Ambulatory dysfunction  Assessment & Plan  Multifactorial  Safe ambulation  Fall precautions  Physical therapy -recommending rehab    Protein calorie malnutrition [facility])  Assessment & Plan  Malnutrition Findings: Body mass index is 24 94 kg/m²     Encourage adequate protein and calorie intake    Paroxysmal atrial fibrillation ([facility] )  Assessment & Plan  · Outpatient PCP notes reviewed not on anticoagulation  · Would likely benefit from anticoagulation actually in the setting of likely malignancy with hypercoagulability and A  fib, however will defer to outpatient provider after patient completes course of rehab given recent falls  · Monitor    Chronic kidney disease  Assessment & Plan    Monitor kidney function closely  Avoid nephrotoxins  Monitor postvoid residuals    Acute on chronic respiratory failure with hypoxia ([facility] )  Assessment & Plan  At baseline on 4 L supplemental oxygen  Continue supplemental oxygen to keep O2 sats more than 88%  Encourage incentive spirometry    Anxiety  Assessment & Plan  Continue citalopram  Patient was reassured    UTI (urinary tract infection)  Assessment & Plan  · Patient reported dysuria prior to hospitalization  · UA done in ED grossly positive, initially patient on ceftriaxone with reasonable presumption this would cover both pneumonia and UTI  · UA resulted with Enterococcus, given age and GFR, treat with amoxicillin x5d     Pneumonia  Assessment & Plan  Acute postobstructive pneumonia  IV ceftriaxone -completed 5-day course on   Monitor counts temperatures  Aspiration precautions  Urine Legionella strep antigen negative  Follow-up on culture studies  Currently stable on 4 L nasal cannula  And for 5-day treatment course    COPD, moderate ([address] )  Assessment & Plan  Continue respiratory treatments  Pulmonary inputs noted patient is planned for prednisone 40 mg for 5 days  Supportive care  Pulmonary following    * Hilar mass  Assessment & Plan  · CT chest reveals right hilar mass with obstructing right middle lobe bronchus  · Concerning for malignancy  · Transferred for pulmonary evaluation for bronchoscopy/EBUS for tissue diagnosis  · -underwent bronchoscopy which showed purulent sputum  · Follow-up final studies, pending  · Pulmonary following        VTE Pharmacologic Prophylaxis: VTE Score: 9 High Risk (Score >/= 5) - Pharmacological DVT Prophylaxis Ordered: enoxaparin (Lovenox)  Sequential Compression Devices Ordered  Patient Centered Rounds: Discussed with RN  Discussions with Specialists or Other Care Team Provider: pulm    Education and Discussions with Family / Patient: Updated  (daughter) via phone  Time Spent for Care: 30 minutes  More than 50% of total time spent on counseling and coordination of care as described above  Current Length of Stay: 3 day(s)  Current Patient Status: Inpatient   Certification Statement: The patient will continue to require additional inpatient hospital stay due to as above  Discharge Plan: Anticipate discharge in 24-48 hrs to rehab facility  Code Status: Level 1 - Full Code    Subjective:   Seen after bronchoscopy  No acute concerns  No shortness of breath or pain      Objective:     Vitals:   Temp (24hrs), Av 1 °F (36 7 °C), Min:97 °F (36 1 °C), Max:99 9 °F (37 7 °C)    Temp:  [97 °F (36 1 °C)-99 9 °F (37 7 °C)] 97 °F (36 1 °C)  HR:  [] 91  Resp:  [16-20] 17  BP: (100-127)/() 100/67  SpO2:  [88 %-100 %] 100 %  Body mass index is 24 94 kg/m²  Input and Output Summary (last 24 hours): Intake/Output Summary (Last 24 hours) at 1/25/2023 1831  Last data filed at 1/25/2023 1212  Gross per 24 hour   Intake 1354 ml   Output 1345 ml   Net 9 ml       Physical Exam:   Physical Exam  Vitals reviewed  Constitutional:       General: He is not in acute distress  Appearance: He is not ill-appearing or toxic-appearing  HENT:      Mouth/Throat:      Mouth: Mucous membranes are moist    Eyes:      General: No scleral icterus  Cardiovascular:      Rate and Rhythm: Normal rate and regular rhythm  Pulmonary:      Effort: Pulmonary effort is normal  No respiratory distress  Comments: Decreased breath sounds  Abdominal:      General: Bowel sounds are normal       Palpations: Abdomen is soft  Tenderness: There is no abdominal tenderness  Musculoskeletal:      Right lower leg: No edema  Left lower leg: No edema  Skin:     General: Skin is warm and dry  Neurological:      Mental Status: He is alert and oriented to person, place, and time        Comments: Abnormal movements noted   Psychiatric:         Mood and Affect: Mood normal          Behavior: Behavior normal             Additional Data:     Labs:  Results from last 7 days   Lab Units 01/25/23  0449   WBC Thousand/uL 14 46*   HEMOGLOBIN g/dL 12 3   HEMATOCRIT % 37 5   PLATELETS Thousands/uL 322   NEUTROS PCT % 83*   LYMPHS PCT % 10*   MONOS PCT % 6   EOS PCT % 0     Results from last 7 days   Lab Units 01/25/23  0449 01/22/23  1523 01/21/23  0543   SODIUM mmol/L 140   < > 133*   POTASSIUM mmol/L 4 2   < > 4 2   CHLORIDE mmol/L 106   < > 101   CO2 mmol/L 29   < > 30   BUN mg/dL 32*   < > 16   CREATININE mg/dL 1 16   < > 1 02   ANION GAP mmol/L 5 < > 2*   CALCIUM mg/dL 9 1   < > 8 2*   ALBUMIN g/dL  --   --  2 9*   TOTAL BILIRUBIN mg/dL  --   --  0 70   ALK PHOS U/L  --   --  70   ALT U/L  --   --  27   AST U/L  --   --  25   GLUCOSE RANDOM mg/dL 85   < > 96    < > = values in this interval not displayed  Results from last 7 days   Lab Units 01/21/23  0543   INR  1 12             Results from last 7 days   Lab Units 01/24/23  0522 01/22/23  1523 01/21/23  0543   LACTIC ACID mmol/L  --   --  0 8   PROCALCITONIN ng/ml 0 08 <0 05 <0 05       Lines/Drains:  Invasive Devices     Peripheral Intravenous Line  Duration           Peripheral IV 01/25/23 Left;Ventral (anterior) Forearm <1 day          Drain  Duration           Colostomy LUQ -- days    Colostomy LUQ -- days                      Imaging: No pertinent imaging reviewed  Recent Cultures (last 7 days):   Results from last 7 days   Lab Units 01/21/23  0743 01/21/23  0546 01/21/23  0543   BLOOD CULTURE   --  No Growth After 4 Days  No Growth After 4 Days     URINE CULTURE  >100,000 cfu/ml Enterococcus faecalis*  >100,000 cfu/ml Aerococcus urinae*  10,000-19,000 cfu/ml Staphylococcus coagulase negative*  --   --    LEGIONELLA URINARY ANTIGEN  Negative  --   --        Last 24 Hours Medication List:   Current Facility-Administered Medications   Medication Dose Route Frequency Provider Last Rate   • acetaminophen  650 mg Oral Q4H PRN Phoenix Villa MD     • albuterol  2 5 mg Nebulization Q4H PRN Phoenix Villa MD     • aluminum-magnesium hydroxide-simethicone  30 mL Oral Q6H PRN Phoenix Villa MD     • amoxicillin  500 mg Oral [facility] Julissa Ashton MD     • benzonatate  200 mg Oral TID Phoenix Villa MD     • budesonide  0 5 mg Nebulization Q12H Phoenix Villa MD     • docusate sodium  100 mg Oral BID Phoenix Villa MD     • enoxaparin  40 mg Subcutaneous Daily Phoenix Villa MD     • escitalopram  10 mg Oral HS Phoenix Villa MD     • famotidine  40 mg Oral Daily Brenda Wilson MD     • gabapentin  100 mg Oral BID Brenda Wilson MD     • guaiFENesin  1,200 mg Oral Q12H [address] Brenda Wilson MD     • ipratropium  0 5 mg Nebulization TID Brenda Wilson MD     • lactated ringers  100 mL/hr Intravenous Continuous Romero Sarna, CRNA 100 mL/hr (01/25/23 1439)   • levalbuterol  1 25 mg Nebulization TID Brenda Wilson MD     • ondansetron  4 mg Intravenous Q6H PRN Brenda Wilson MD     • polyethylene glycol  17 g Oral Daily Brenda Wilson MD     • prednisoLONE acetate  1 drop Both Eyes BID Brenda Wilson MD     • predniSONE  40 mg Oral Daily Brenda Wilson MD     • tamsulosin  0 4 mg Oral Daily With Carlos Valles MD          Today, Patient Was Seen By: Fabi Conklin MD    **Please Note: This note may have been constructed using a voice recognition system  **

## 2023-01-25 NOTE — ANESTHESIA PREPROCEDURE EVALUATION
Procedure:  ENDOBRONCHIAL ULTRASOUND (EBUS)    Relevant Problems   ANESTHESIA  prior spinal anesthetics for right inguinal hernia repair, pt had subsequent falls and ended up needing multiple surgeries for hip fxs subsequently, all done under spinal         CARDIO   (+) Paroxysmal atrial fibrillation (HCC)      GI/HEPATIC   (+) Acid reflux disease (controlled)      /RENAL   (+) Chronic kidney disease   (+) Enlarged prostate with lower urinary tract symptoms (LUTS)   (+) Stage 3a chronic kidney disease (HCC)      MUSCULOSKELETAL   (+) Chronic bilateral low back pain   (+) Primary localized osteoarthritis of left knee   (+) Primary localized osteoarthritis of right knee      NEURO/PSYCH   (+) Anxiety   (+) Chronic bilateral low back pain   (+) History of pneumonia      PULMONARY  optimized per pulmonology   (+) Acute on chronic respiratory failure with hypoxia (HCC)   (+) COPD, moderate (HCC)   (+) Chronic respiratory failure with hypoxia (HCC) (home O2 4L prn - wears most of the day and overnight)   (+) Pneumonia   (-) Sleep apnea   (-) Smoking (quit 2018)   (-) URI (upper respiratory infection)          Physical Exam    Airway    Mallampati score: II  TM Distance: >3 FB  Neck ROM: full     Dental   upper dentures and lower dentures,     Cardiovascular      Pulmonary      Other Findings        Anesthesia Plan  ASA Score- 4     Anesthesia Type- general with ASA Monitors  Additional Monitors:   Airway Plan: LMA  Comment: LMA VS ETT, ALL COMPLICATIONS AND POSSIBLE ANESTHESIA TX'S DISCUSSED W/ PT  Plan Factors-    Chart reviewed  EKG reviewed  Imaging results reviewed  Existing labs reviewed  Patient summary reviewed  Patient is not a current smoker  Induction- intravenous  Postoperative Plan- Plan for postoperative opioid use  Planned trial extubation    Informed Consent- Anesthetic plan and risks discussed with patient  I personally reviewed this patient with the CRNA   Discussed and agreed on the Anesthesia Plan with the CRNA             Lab Results   Component Value Date    WBC 14 46 (H) 01/25/2023    HGB 12 3 01/25/2023     01/25/2023     Lab Results   Component Value Date     03/13/2017    K 4 2 01/25/2023    BUN 32 (H) 01/25/2023    CREATININE 1 16 01/25/2023     Lab Results   Component Value Date    PTT 29 01/21/2023      Lab Results   Component Value Date    INR 1 12 01/21/2023       Blood type O    Lab Results   Component Value Date    HGBA1C 5 5 03/23/2022

## 2023-01-25 NOTE — ASSESSMENT & PLAN NOTE
Acute postobstructive pneumonia  IV ceftriaxone -completed 5-day course on 1/25  Monitor counts temperatures  Aspiration precautions  Urine Legionella strep antigen negative  Follow-up on culture studies  Currently stable on 4 L nasal cannula  And for 5-day treatment course

## 2023-01-25 NOTE — OCCUPATIONAL THERAPY NOTE
Occupational Therapy Cancel Note        Patient Name: Bossman WARNER Date: 1/25/2023 01/25/23 0951   OT Last Visit   OT Visit Date 01/25/23   Note Type   Note Type Treatment   Cancel Reasons Patient to operating room         OT orders were received and chart reviewed  Pt to the operating room for bronch  OT will hold and continue to follow and see as medically appropriate and able       Monica Dominguez OTR/JOSE ALBERTO

## 2023-01-25 NOTE — ASSESSMENT & PLAN NOTE
· Patient noted to have abnormal involuntary repetitive movements  · No documented neurologic diagnosis noted on chart search  · However per review, abnormal movements and twitching/tremors have been documented since at least 2016  · Will discussed with patient and family taking collateral

## 2023-01-26 PROBLEM — D72.829 LEUKOCYTOSIS: Status: ACTIVE | Noted: 2023-01-26

## 2023-01-26 LAB
ANION GAP SERPL CALCULATED.3IONS-SCNC: 3 MMOL/L (ref 4–13)
BACTERIA BLD CULT: NORMAL
BACTERIA BLD CULT: NORMAL
BASOPHILS # BLD AUTO: 0.01 THOUSANDS/ÂΜL (ref 0–0.1)
BASOPHILS NFR BLD AUTO: 0 % (ref 0–1)
BUN SERPL-MCNC: 26 MG/DL (ref 5–25)
CALCIUM SERPL-MCNC: 8.7 MG/DL (ref 8.3–10.1)
CHLORIDE SERPL-SCNC: 105 MMOL/L (ref 96–108)
CO2 SERPL-SCNC: 30 MMOL/L (ref 21–32)
CREAT SERPL-MCNC: 0.95 MG/DL (ref 0.6–1.3)
EOSINOPHIL # BLD AUTO: 0.01 THOUSAND/ÂΜL (ref 0–0.61)
EOSINOPHIL NFR BLD AUTO: 0 % (ref 0–6)
ERYTHROCYTE [DISTWIDTH] IN BLOOD BY AUTOMATED COUNT: 13.2 % (ref 11.6–15.1)
GFR SERPL CREATININE-BSD FRML MDRD: 73 ML/MIN/1.73SQ M
GLUCOSE SERPL-MCNC: 95 MG/DL (ref 65–140)
HCT VFR BLD AUTO: 35.1 % (ref 36.5–49.3)
HGB BLD-MCNC: 11.3 G/DL (ref 12–17)
IMM GRANULOCYTES # BLD AUTO: 0.09 THOUSAND/UL (ref 0–0.2)
IMM GRANULOCYTES NFR BLD AUTO: 1 % (ref 0–2)
LYMPHOCYTES # BLD AUTO: 0.87 THOUSANDS/ÂΜL (ref 0.6–4.47)
LYMPHOCYTES NFR BLD AUTO: 5 % (ref 14–44)
MCH RBC QN AUTO: 33.6 PG (ref 26.8–34.3)
MCHC RBC AUTO-ENTMCNC: 32.2 G/DL (ref 31.4–37.4)
MCV RBC AUTO: 105 FL (ref 82–98)
MONOCYTES # BLD AUTO: 0.87 THOUSAND/ÂΜL (ref 0.17–1.22)
MONOCYTES NFR BLD AUTO: 5 % (ref 4–12)
NEUTROPHILS # BLD AUTO: 14.82 THOUSANDS/ÂΜL (ref 1.85–7.62)
NEUTS SEG NFR BLD AUTO: 89 % (ref 43–75)
NRBC BLD AUTO-RTO: 0 /100 WBCS
PLATELET # BLD AUTO: 303 THOUSANDS/UL (ref 149–390)
PMV BLD AUTO: 9.5 FL (ref 8.9–12.7)
POTASSIUM SERPL-SCNC: 4.2 MMOL/L (ref 3.5–5.3)
RBC # BLD AUTO: 3.36 MILLION/UL (ref 3.88–5.62)
RHODAMINE-AURAMINE STN SPEC: NORMAL
SODIUM SERPL-SCNC: 138 MMOL/L (ref 135–147)
WBC # BLD AUTO: 16.67 THOUSAND/UL (ref 4.31–10.16)

## 2023-01-26 RX ORDER — PREDNISONE 20 MG/1
40 TABLET ORAL DAILY
Status: DISCONTINUED | OUTPATIENT
Start: 2023-01-27 | End: 2023-01-26

## 2023-01-26 RX ORDER — PREDNISONE 20 MG/1
40 TABLET ORAL DAILY
Status: DISCONTINUED | OUTPATIENT
Start: 2023-01-27 | End: 2023-01-27 | Stop reason: HOSPADM

## 2023-01-26 RX ORDER — PREDNISONE 20 MG/1
40 TABLET ORAL DAILY
Status: DISCONTINUED | OUTPATIENT
Start: 2023-01-26 | End: 2023-01-26

## 2023-01-26 RX ADMIN — TAMSULOSIN HYDROCHLORIDE 0.4 MG: 0.4 CAPSULE ORAL at 16:40

## 2023-01-26 RX ADMIN — AMOXICILLIN 500 MG: 500 CAPSULE ORAL at 14:48

## 2023-01-26 RX ADMIN — GABAPENTIN 100 MG: 100 CAPSULE ORAL at 16:40

## 2023-01-26 RX ADMIN — IPRATROPIUM BROMIDE 0.5 MG: 0.5 SOLUTION RESPIRATORY (INHALATION) at 19:05

## 2023-01-26 RX ADMIN — GABAPENTIN 100 MG: 100 CAPSULE ORAL at 09:17

## 2023-01-26 RX ADMIN — IPRATROPIUM BROMIDE 0.5 MG: 0.5 SOLUTION RESPIRATORY (INHALATION) at 07:41

## 2023-01-26 RX ADMIN — ACETAMINOPHEN 650 MG: 325 TABLET ORAL at 19:56

## 2023-01-26 RX ADMIN — FAMOTIDINE 40 MG: 20 TABLET ORAL at 09:17

## 2023-01-26 RX ADMIN — BUDESONIDE 0.5 MG: 0.5 INHALANT ORAL at 07:41

## 2023-01-26 RX ADMIN — SODIUM CHLORIDE, SODIUM LACTATE, POTASSIUM CHLORIDE, AND CALCIUM CHLORIDE 100 ML/HR: .6; .31; .03; .02 INJECTION, SOLUTION INTRAVENOUS at 09:16

## 2023-01-26 RX ADMIN — PREDNISOLONE ACETATE 1 DROP: 10 SUSPENSION/ DROPS OPHTHALMIC at 22:28

## 2023-01-26 RX ADMIN — BENZONATATE 200 MG: 100 CAPSULE ORAL at 22:29

## 2023-01-26 RX ADMIN — IPRATROPIUM BROMIDE 0.5 MG: 0.5 SOLUTION RESPIRATORY (INHALATION) at 13:47

## 2023-01-26 RX ADMIN — PREDNISONE 40 MG: 20 TABLET ORAL at 09:17

## 2023-01-26 RX ADMIN — GUAIFENESIN 1200 MG: 600 TABLET, EXTENDED RELEASE ORAL at 09:17

## 2023-01-26 RX ADMIN — AMOXICILLIN 500 MG: 500 CAPSULE ORAL at 22:28

## 2023-01-26 RX ADMIN — LEVALBUTEROL HYDROCHLORIDE 1.25 MG: 1.25 SOLUTION, CONCENTRATE RESPIRATORY (INHALATION) at 07:41

## 2023-01-26 RX ADMIN — BENZONATATE 200 MG: 100 CAPSULE ORAL at 09:17

## 2023-01-26 RX ADMIN — LEVALBUTEROL HYDROCHLORIDE 1.25 MG: 1.25 SOLUTION, CONCENTRATE RESPIRATORY (INHALATION) at 19:05

## 2023-01-26 RX ADMIN — GUAIFENESIN 1200 MG: 600 TABLET, EXTENDED RELEASE ORAL at 22:28

## 2023-01-26 RX ADMIN — PREDNISOLONE ACETATE 1 DROP: 10 SUSPENSION/ DROPS OPHTHALMIC at 09:21

## 2023-01-26 RX ADMIN — BUDESONIDE 0.5 MG: 0.5 INHALANT ORAL at 19:05

## 2023-01-26 RX ADMIN — POLYETHYLENE GLYCOL 3350 17 G: 17 POWDER, FOR SOLUTION ORAL at 09:19

## 2023-01-26 RX ADMIN — LEVALBUTEROL HYDROCHLORIDE 1.25 MG: 1.25 SOLUTION, CONCENTRATE RESPIRATORY (INHALATION) at 13:47

## 2023-01-26 RX ADMIN — BENZONATATE 200 MG: 100 CAPSULE ORAL at 16:40

## 2023-01-26 RX ADMIN — DOCUSATE SODIUM 100 MG: 100 CAPSULE, LIQUID FILLED ORAL at 16:40

## 2023-01-26 RX ADMIN — ENOXAPARIN SODIUM 40 MG: 40 INJECTION SUBCUTANEOUS at 09:21

## 2023-01-26 RX ADMIN — AMOXICILLIN 500 MG: 500 CAPSULE ORAL at 06:05

## 2023-01-26 RX ADMIN — ESCITALOPRAM OXALATE 10 MG: 10 TABLET ORAL at 22:29

## 2023-01-26 RX ADMIN — DOCUSATE SODIUM 100 MG: 100 CAPSULE, LIQUID FILLED ORAL at 09:17

## 2023-01-26 NOTE — CASE MANAGEMENT
Case Management Discharge Planning Note    Patient name Rubia Sensor  Location [facility] MRN [id number]  : 1940 Date 2023       Current Admission Date: 2023  Current Admission Diagnosis:Hilar mass   Patient Active Problem List    Diagnosis Date Noted   • Abnormal involuntary movements 2023   • Protein calorie malnutrition ([address] ) 2023   • Ambulatory dysfunction 2023   • S/P colostomy ([address] ) 2023   • Hilar mass 2023   • History of creation of ostomy ([address] ) 2023   • Paroxysmal atrial fibrillation ([address] ) 2023   • Stage 3a chronic kidney disease ([address] ) 2023   • Chronic kidney disease 2022   • Preop pulmonary/respiratory exam 2022   • Primary localized osteoarthritis of right knee 2021   • Primary localized osteoarthritis of left knee 2021   • Aftercare following surgery of the musculoskeletal system 2021   • Hyponatremia 12/10/2020   • Chronic respiratory failure with hypoxia ([address] ) 2020   • History of pneumonia 2020   • Chronic bilateral low back pain 2020   • Accident due to mechanical fall without injury 2020   • Acute on chronic respiratory failure with hypoxia ([address] ) 01/10/2019   • Neck pain 2019   • Anxiety 08/10/2018   • Fall 08/15/2017   • Pneumonia 08/15/2017   • UTI (urinary tract infection) 08/15/2017   • COPD, moderate (HCC)    • Acid reflux disease 2016   • Enlarged prostate with lower urinary tract symptoms (LUTS) 2015      LOS (days): 4  Geometric Mean LOS (GMLOS) (days): 2 10  Days to GMLOS:-1 9     OBJECTIVE:  Risk of Unplanned Readmission Score: 17 3         Current admission status: Inpatient   Preferred Pharmacy:   [facility], [address] [facility] [address] [facility] [place],[address]  [address] [facility],[address]  [address] [place] [address]  Phone: [phone] Fax: [phone]    Primary Care Provider: Grace Collado MD    Primary Insurance: MEDICARE  Secondary Insurance: HAFSA    DISCHARGE DETAILS:           Additional Comments: CM made aware by [facility] that they will be able to accept pt  CM updated provider and dtr  Pt will d/c tomorrow  Transport has been requested

## 2023-01-26 NOTE — OCCUPATIONAL THERAPY NOTE
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   Service to Others retired   Intrinsic Gratification spending time w his family  ADL   Where Assessed Chair   Grooming Assistance 5  Supervision/Setup   Grooming Deficit Wash/dry face;Brushing hair; Other (Comment)  (wash hair)   UB Bathing Assistance 5  Supervision/Setup   UB Bathing Deficit Chest;Right arm;Left arm; Abdomen   LB Bathing Assistance 4  Minimal Assistance   LB Bathing Deficit Perineal area;Right upper leg;Left upper leg;Buttocks;Right lower leg including foot; Left lower leg including foot   LB Bathing Comments Pt requires A for L foot   UB Dressing Assistance 5  Supervision/Setup   UB Dressing Deficit Thread RUE; Thread LUE;Pull around back   LB Dressing Assistance 3  Moderate Assistance   LB Dressing Deficit Don/doff R sock; Don/doff L sock   LB Dressing Comments MAX A for L sock, S for R sock   Bed Mobility   Supine to Sit Unable to assess   Sit to Supine Unable to assess   Additional Comments pt in recliner upon arrival   Transfers   Sit to Stand 5  Supervision   Additional items Armrests; Increased time required; Other;Verbal cues  (CGA but progressed to S)   Stand to Sit 5  Supervision   Additional items Armrests; Increased time required;Verbal cues   Additional Comments RW used to transfer   Functional Mobility   Functional Mobility 4  Minimal assistance   Additional items Rolling walker   Subjective   Subjective "I'm okay, just short of breath "   Cognition   Overall Cognitive Status WFL   Arousal/Participation Alert; Responsive; Cooperative   Attention Within functional limits   Orientation Level Oriented X4   Memory Decreased recall of precautions   Following Commands Follows one step commands without difficulty   Comments Pt pleasant and cooperative t/o session  Activity Tolerance   Activity Tolerance Patient limited by fatigue   Medical Staff Made Aware MARKUS Kru[address] 9101 clearance for session  Assessment   Assessment Pt participated in skilled OT treatment session to address ADL performance, functional mobility, endurance, and balance  Upon arrival, pt found in recliner  Pt's current performance includes S for grooming and UB ADLs  Pt performs LB bathing with MIN A and MOD A for LBD  Pt performs STS transfer with CGA/S, MIN A for fnxl mobility with RW  Pt continues to perform below baseline, occupational performance limited due to factors above, increased risk for falls and injury  Pt to benefit from continued skilled OT treatment to address deficits, maximize level of functional mobility, and improve overall independence  From OT standpoint, recommended discharge is STR  Pt left in recliner at end of session with all needs within reach  The patient's raw score on the AM-PAC Daily Activity inpatient short form is 18, standardized score is 38 66, less than 39 4  Patients at this level are likely to benefit from discharge to post-acute rehabilitation services  Please refer to the recommendation of the Occupational Therapist for safe discharge planning  Plan   Treatment Interventions ADL retraining;Functional transfer training;UE strengthening/ROM; Endurance training;Patient/family training; Compensatory technique education;Continued evaluation; Activityengagement; Energy conservation   Goal Expiration Date 02/06/23   OT Treatment Day 1   OT Frequency 2-3x/wk   Recommendation   OT Discharge Recommendation Post acute rehabilitation services   [facility] Daily Activity Inpatient   Lower Body Dressing 2   Bathing 2   Toileting 3   Upper Body Dressing 3   Grooming 4   Eating 4   Daily Activity Raw Score 18   Daily Activity Standardized Score (Calc for Raw Score >=11) 38 66   AM-PAC Applied Cognition Inpatient   Following a Speech/Presentation 3   Understanding Ordinary Conversation 4   Taking Medications 4   Remembering Where Things Are Placed or Put Away 4   Remembering List of 4-5 Errands 3   Taking Care of Complicated Tasks 3   Applied Cognition Raw Score 21   Applied Cognition Standardized Score 44 3     Yvonne Flores, OTS

## 2023-01-26 NOTE — ASSESSMENT & PLAN NOTE
· Acute postobstructive pneumonia  · IV ceftriaxone -completed 5-day course on 1/25  · Currently stable on 4 L nasal cannula

## 2023-01-26 NOTE — CASE MANAGEMENT
Case Management Discharge Planning Note    Patient name Daphne Ramos  Location [facility]14/[facility]53[facility]76 MRN [id number]  : 1940 Date 2023       Current Admission Date: 2023  Current Admission Diagnosis:Hilar mass   Patient Active Problem List    Diagnosis Date Noted   • Abnormal involuntary movements 2023   • Protein calorie malnutrition ([address] ) 2023   • Ambulatory dysfunction 2023   • S/P colostomy ([address] ) 2023   • Hilar mass 2023   • History of creation of ostomy ([address] ) 2023   • Paroxysmal atrial fibrillation ([address] ) 2023   • Stage 3a chronic kidney disease ([facility] ) 2023   • Chronic kidney disease 2022   • Preop pulmonary/respiratory exam 2022   • Primary localized osteoarthritis of right knee 2021   • Primary localized osteoarthritis of left knee 2021   • Aftercare following surgery of the musculoskeletal system 2021   • Hyponatremia 12/10/2020   • Chronic respiratory failure with hypoxia ([address] ) 2020   • History of pneumonia 2020   • Chronic bilateral low back pain 2020   • Accident due to mechanical fall without injury 2020   • Acute on chronic respiratory failure with hypoxia ([address] ) 01/10/2019   • Neck pain 2019   • Anxiety 08/10/2018   • Fall 08/15/2017   • Pneumonia 08/15/2017   • UTI (urinary tract infection) 08/15/2017   • COPD, moderate (HCC)    • Acid reflux disease 2016   • Enlarged prostate with lower urinary tract symptoms (LUTS) 2015      LOS (days): 4  Geometric Mean LOS (GMLOS) (days): 2 10  Days to GMLOS:-1 8     OBJECTIVE:  Risk of Unplanned Readmission Score: 17 26         Current admission status: Inpatient   Preferred Pharmacy:   [facility], [address] [facility] [address] [facility],[address]  [address] [facility],[address]  [facility] [address]  Phone: [phone] Fax: [phone]    Primary Care Provider: Brian Jimenez MD    Primary Insurance: MEDICARE  Secondary Insurance: HAFSA    DISCHARGE DETAILS:       Additional Comments: CM made aware by provider that pt is cleared for d/c today  CM messaged accepting facility and they requested updated clinicals  CM sent updated clinincals as requested  Pt's dtr updated regarding d/c plan

## 2023-01-26 NOTE — PLAN OF CARE
Problem: PHYSICAL THERAPY ADULT  Goal: Performs mobility at highest level of function for planned discharge setting  See evaluation for individualized goals  Description: Treatment/Interventions: Functional transfer training, LE strengthening/ROM, Therapeutic exercise, Endurance training, Patient/family training, Equipment eval/education, Bed mobility, Gait training, Elevations, Spoke to nursing          See flowsheet documentation for full assessment, interventions and recommendations  Outcome: Progressing  Note: Prognosis: Good  Problem List: Decreased strength, Decreased range of motion, Decreased endurance, Impaired balance, Decreased mobility  Assessment: Pt seen for PT treatment session this date  Therapy session focused on bed mobility, functional transfers, gait training and endurance training in order to improve overall mobility and independence  Pt requires assist of 1 for all mobility performed this date  Pt making steady progress toward goals  Pt with improvements in bed mobility and functional transfers this date  Continues to require A with ambulation 2* full body movements, pt reports this is baseline  Pt on 4L O2 throughout session, SANCHEZ noted with rest breaks required throughout  Pt was left sitting OOB in recliner at the end of PT session with all needs in reach  Pt would benefit from continued PT services while in hospital to address remaining limitations  PT to continue to follow pt and recommends STR pending progress  The patient's AM-PAC Basic Mobility Inpatient Short Form Raw Score is 16  A Raw score of less than or equal to 16 suggests the patient may benefit from discharge to post-acute rehabilitation services  Please also refer to the recommendation of the Physical Therapist for safe discharge planning    Barriers to Discharge: Inaccessible home environment, Decreased caregiver support     PT Discharge Recommendation: Post acute rehabilitation services (pending progress)    See flowsheet documentation for full assessment

## 2023-01-26 NOTE — ASSESSMENT & PLAN NOTE
Continue respiratory treatments  Pulmonary inputs noted patient is planned for prednisone 40 mg for 7 days  Supportive care  Follow-up with pulmonary outpatient

## 2023-01-26 NOTE — PROGRESS NOTES
[address] [facility]  Progress Note - Faby Delcid 1940, 79 y o  male MRN: [id number]  Unit/Bed#: [facility] 914-01 Encounter: [id number]  Primary Care Provider: Gemini Hanson MD   Date and time admitted to hospital: 1/22/2023  5:36 PM    Leukocytosis  Assessment & Plan  · Review of chart notes that patient has had a mild to moderate leukocytosis with neutrophilic predominance on most lab checks dating back to 2016  · Current increase could be related to steroids  · Continue to monitor, outpatient follow-up and further work-up if needed    Abnormal involuntary movements  Assessment & Plan  · Patient noted to have abnormal involuntary repetitive movements  · Abnormal movements and twitching/tremors have been documented since at least 2016  · Per patient, this has been evaluated in the past and diagnoses such as Parkinson's or seizures have been ruled out  He has been told it is due to anxiety  · No further inpatient management    S/P colostomy [facility])  Assessment & Plan  History of colonic mass requiring colostomy  Colostomy care    Ambulatory dysfunction  Assessment & Plan  · Multifactorial  · Safe ambulation  · Fall precautions  · Physical therapy -recommending rehab    Protein calorie malnutrition [facility])  Assessment & Plan  Malnutrition Findings: Body mass index is 24 94 kg/m²     Encourage adequate protein and calorie intake    Paroxysmal atrial fibrillation ([address] )  Assessment & Plan  · Outpatient PCP notes reviewed not on anticoagulation  · Would likely benefit from anticoagulation actually in the setting of likely malignancy with hypercoagulability and A  fib, however will defer to outpatient provider after patient completes course of rehab given recent falls  · Monitor    Chronic kidney disease  Assessment & Plan    Monitor kidney function closely  Avoid nephrotoxins  Monitor postvoid residuals    Acute on chronic respiratory failure with hypoxia ([address] )  Assessment & Plan  · At baseline on 4 L supplemental oxygen  · Continue supplemental oxygen to keep O2 sats more than 88%  · Encourage incentive spirometry    Anxiety  Assessment & Plan  · Continue citalopram  · Patient was reassured    UTI (urinary tract infection)  Assessment & Plan  · Patient reported dysuria prior to hospitalization  · UA done in ED grossly positive, initially patient on ceftriaxone with reasonable presumption this would cover both pneumonia and UTI  · UA resulted with Enterococcus, given age and GFR, treat with amoxicillin x5d     Pneumonia  Assessment & Plan  · Acute postobstructive pneumonia  · IV ceftriaxone -completed 5-day course on 1/25  · Currently stable on 4 L nasal cannula    COPD, moderate (HCC)  Assessment & Plan  Continue respiratory treatments  Pulmonary inputs noted patient is planned for prednisone 40 mg for 7 days  Supportive care  Follow-up with pulmonary outpatient    * Hilar mass  Assessment & Plan  · CT chest reveals right hilar mass with obstructing right middle lobe bronchus  · Concerning for malignancy  · Transferred for pulmonary evaluation for bronchoscopy/EBUS for tissue diagnosis  · 1/25-underwent bronchoscopy which showed purulent sputum  · Completed course of ceftriaxone as discussed elsewhere  · Follow-up final studies, pulmonology will call patient with final results and will schedule outpatient follow-up appointment        VTE Pharmacologic Prophylaxis: VTE Score: 9 High Risk (Score >/= 5) - Pharmacological DVT Prophylaxis Ordered: enoxaparin (Lovenox)  Sequential Compression Devices Ordered  Patient Centered Rounds: d/w RN  Discussions with Specialists or Other Care Team Provider: pulm    Education and Discussions with Family / Patient: Updated  (daughter) via phone  Time Spent for Care: 20 minutes  More than 50% of total time spent on counseling and coordination of care as described above      Current Length of Stay: 4 day(s)  Current Patient Status: Inpatient Certification Statement: The patient will continue to require additional inpatient hospital stay due to rehab tmrw  Discharge Plan: Anticipate discharge tomorrow to rehab facility  Code Status: Level 1 - Full Code    Subjective:   No acute concerns  No pain or shortness of breath  Feels well  Objective:     Vitals:   Temp (24hrs), Av 3 °F (36 3 °C), Min:97 3 °F (36 3 °C), Max:97 3 °F (36 3 °C)    Temp:  [97 3 °F (36 3 °C)] 97 3 °F (36 3 °C)  HR:  [69-70] 70  Resp:  [20] 20  BP: ()/(58-64) 105/64  SpO2:  [92 %-98 %] 93 %  Body mass index is 24 94 kg/m²  Input and Output Summary (last 24 hours): Intake/Output Summary (Last 24 hours) at 2023 1757  Last data filed at 2023 1601  Gross per 24 hour   Intake 633 33 ml   Output 600 ml   Net 33 33 ml       Physical Exam:   Physical Exam  Vitals reviewed  Constitutional:       General: He is not in acute distress  Appearance: He is not ill-appearing or toxic-appearing  HENT:      Mouth/Throat:      Mouth: Mucous membranes are moist    Eyes:      General: No scleral icterus  Pulmonary:      Effort: Pulmonary effort is normal  No respiratory distress  Comments: Decreased breath sounds  Abdominal:      General: Bowel sounds are normal       Palpations: Abdomen is soft  Tenderness: There is no abdominal tenderness  Musculoskeletal:      Right lower leg: No edema  Left lower leg: No edema  Skin:     General: Skin is warm and dry  Neurological:      Mental Status: He is alert and oriented to person, place, and time        Comments: Abnormal movements noted   Psychiatric:         Mood and Affect: Mood normal          Behavior: Behavior normal             Additional Data:     Labs:  Results from last 7 days   Lab Units 23  0525   WBC Thousand/uL 16 67*   HEMOGLOBIN g/dL 11 3*   HEMATOCRIT % 35 1*   PLATELETS Thousands/uL 303   NEUTROS PCT % 89*   LYMPHS PCT % 5*   MONOS PCT % 5   EOS PCT % 0     Results from last 7 days   Lab Units 01/26/23  0525 01/22/23  1523 01/21/23  0543   SODIUM mmol/L 138   < > 133*   POTASSIUM mmol/L 4 2   < > 4 2   CHLORIDE mmol/L 105   < > 101   CO2 mmol/L 30   < > 30   BUN mg/dL 26*   < > 16   CREATININE mg/dL 0 95   < > 1 02   ANION GAP mmol/L 3*   < > 2*   CALCIUM mg/dL 8 7   < > 8 2*   ALBUMIN g/dL  --   --  2 9*   TOTAL BILIRUBIN mg/dL  --   --  0 70   ALK PHOS U/L  --   --  70   ALT U/L  --   --  27   AST U/L  --   --  25   GLUCOSE RANDOM mg/dL 95   < > 96    < > = values in this interval not displayed  Results from last 7 days   Lab Units 01/21/23  0543   INR  1 12             Results from last 7 days   Lab Units 01/24/23  0522 01/22/23  1523 01/21/23  0543   LACTIC ACID mmol/L  --   --  0 8   PROCALCITONIN ng/ml 0 08 <0 05 <0 05       Lines/Drains:  Invasive Devices     Peripheral Intravenous Line  Duration           Peripheral IV 01/25/23 Left;Ventral (anterior) Forearm 1 day          Drain  Duration           Colostomy LUQ -- days    Colostomy LUQ -- days                      Imaging: No pertinent imaging reviewed  Recent Cultures (last 7 days):   Results from last 7 days   Lab Units 01/25/23  1055 01/21/23  0743 01/21/23  0546 01/21/23  0543   BLOOD CULTURE   --   --  No Growth After 5 Days  No Growth After 5 Days     GRAM STAIN RESULT  2+ Gram negative rods*  2+ Gram positive cocci in pairs*  No polys seen*  --   --   --    URINE CULTURE   --  >100,000 cfu/ml Enterococcus faecalis*  >100,000 cfu/ml Aerococcus urinae*  10,000-19,000 cfu/ml Staphylococcus coagulase negative*  --   --    LEGIONELLA URINARY ANTIGEN   --  Negative  --   --        Last 24 Hours Medication List:   Current Facility-Administered Medications   Medication Dose Route Frequency Provider Last Rate   • acetaminophen  650 mg Oral Q4H PRN Alyson Cleaning MD     • albuterol  2 5 mg Nebulization Q4H PRN Alyson Cleaning MD     • aluminum-magnesium hydroxide-simethicone  30 mL Oral Q6H PRN Giovanna Laure Costa MD     • amoxicillin  500 mg Oral [facility] Jesica Myles MD     • benzonatate  200 mg Oral TID Kinga Elliott MD     • budesonide  0 5 mg Nebulization Q12H Kinga Elliott MD     • docusate sodium  100 mg Oral BID Kinga Elliott MD     • enoxaparin  40 mg Subcutaneous Daily Kinga Elliott MD     • escitalopram  10 mg Oral HS Kinga Elliott MD     • famotidine  40 mg Oral Daily Kinga Elliott MD     • gabapentin  100 mg Oral BID Kinga Elliott MD     • guaiFENesin  1,200 mg Oral Q12H [facility] Kinga Elliott MD     • ipratropium  0 5 mg Nebulization TID Kinga Elliott MD     • levalbuterol  1 25 mg Nebulization TID Kinga Elliott MD     • ondansetron  4 mg Intravenous Q6H PRN Kinga Elliott MD     • polyethylene glycol  17 g Oral Daily Kinga Elliott MD     • prednisoLONE acetate  1 drop Both Eyes BID Kinga Elliott MD     • [START ON 1/27/2023] predniSONE  40 mg Oral Daily Radha Dillon MD     • tamsulosin  0 4 mg Oral Daily With Sveta Sorto MD          Today, Patient Was Seen By: Jesica Myles MD    **Please Note: This note may have been constructed using a voice recognition system  **

## 2023-01-26 NOTE — ASSESSMENT & PLAN NOTE
· Patient noted to have abnormal involuntary repetitive movements  · Abnormal movements and twitching/tremors have been documented since at least 2016  · Per patient, this has been evaluated in the past and diagnoses such as Parkinson's or seizures have been ruled out  He has been told it is due to anxiety    · No further inpatient management

## 2023-01-26 NOTE — ASSESSMENT & PLAN NOTE
· At baseline on 4 L supplemental oxygen  · Continue supplemental oxygen to keep O2 sats more than 88%  · Encourage incentive spirometry

## 2023-01-26 NOTE — ASSESSMENT & PLAN NOTE
· CT chest reveals right hilar mass with obstructing right middle lobe bronchus  · Concerning for malignancy  · Transferred for pulmonary evaluation for bronchoscopy/EBUS for tissue diagnosis  · 1/25-underwent bronchoscopy which showed purulent sputum  · Completed course of ceftriaxone as discussed elsewhere  · Follow-up final studies, pulmonology will call patient with final results and will schedule outpatient follow-up appointment

## 2023-01-26 NOTE — ASSESSMENT & PLAN NOTE
· Review of chart notes that patient has had a mild to moderate leukocytosis with neutrophilic predominance on most lab checks dating back to 2016  · Current increase could be related to steroids  · Continue to monitor, outpatient follow-up and further work-up if needed

## 2023-01-26 NOTE — PLAN OF CARE
Problem: OCCUPATIONAL THERAPY ADULT  Goal: Performs self-care activities at highest level of function for planned discharge setting  See evaluation for individualized goals  Description: Treatment Interventions: ADL retraining, Functional transfer training, Endurance training, Patient/family training, Equipment evaluation/education, Compensatory technique education, Energy conservation, Activityengagement          See flowsheet documentation for full assessment, interventions and recommendations  Outcome: Progressing  Note: Limitation: Decreased ADL status, Decreased Safe judgement during ADL, Decreased endurance, Decreased cognition, Decreased self-care trans, Decreased high-level ADLs  Prognosis: Fair  Assessment: Pt participated in skilled OT treatment session to address ADL performance, functional mobility, endurance, and balance  Upon arrival, pt found in recliner  Pt's current performance includes S for grooming and UB ADLs  Pt performs LB bathing with MIN A to wash feet  Pt performs LB dressing with MOD A to don/doff socks  Pt continues to perform below baseline, occupational performance limited due to factors above, increased risk for falls and injury  Pt to benefit from continued skilled OT treatment to address deficits, maximize level of functional mobility, and improve overall independence  From OT standpoint, recommended discharge is STR  Pt left in recliner at end of session with all needs within reach  The patient's raw score on the AM-PAC Daily Activity inpatient short form is 17, standardized score is 37 26, less than 39 4  Patients at this level are likely to benefit from discharge to post-acute rehabilitation services  Please refer to the recommendation of the Occupational Therapist for safe discharge planning  (Simultaneous filing   User may not have seen previous data )     OT Discharge Recommendation: Post acute rehabilitation services

## 2023-01-26 NOTE — PHYSICAL THERAPY NOTE
PHYSICAL THERAPY NOTE          Patient Name: Crow Galdamez  PMHAY'J Date: 1/26/2023 01/26/23 0953   PT Last Visit   PT Visit Date 01/26/23   Note Type   Note Type Treatment   Pain Assessment   Pain Assessment Tool 0-10   Pain Score No Pain   Restrictions/Precautions   Weight Bearing Precautions Per Order No   Other Precautions Chair Alarm; Bed Alarm;Multiple lines; Fall Risk;O2  (4L O2)   General   Chart Reviewed Yes   Response to Previous Treatment Patient with no complaints from previous session  Family/Caregiver Present No   Cognition   Overall Cognitive Status WFL   Arousal/Participation Alert; Responsive; Cooperative   Attention Within functional limits   Orientation Level Oriented X4   Memory Decreased recall of precautions   Following Commands Follows one step commands without difficulty   Comments pt pleasant and cooperative to participate in therapy session   Bed Mobility   Supine to Sit 5  Supervision   Additional items HOB elevated; Bedrails; Increased time required   Additional Comments pt supine in bed upon arrival  Pt sititng OOB in reclienr with all needs wihtin reach   Transfers   Sit to Stand 5  Supervision   Additional items Armrests; Increased time required;Verbal cues   Stand to Sit 5  Supervision   Additional items Armrests; Increased time required;Verbal cues   Additional Comments transfers with RW; initially CGA progresses to close S level    Ambulation/Elevation   Gait pattern Excessively slow; Short stride; Foward flexed;Decreased foot clearance; Improper Weight shift  (unsteady, full body tremor/ unintentional body movements)   Gait Assistance 4  Minimal assist   Additional items Assist x 1;Verbal cues   Assistive Device Rolling walker   Distance 50ft (seated rest) 50ft (seated rest) 50ft (seated rest)   Stair Management Assistance Not tested   Balance   Static Sitting Fair +   Dynamic Sitting Fair   Static Standing Fair -   Dynamic Standing Fair -   Ambulatory Poor +   Endurance Deficit   Endurance Deficit Yes   Endurance Deficit Description SOB/ SANCHEZ   Activity Tolerance   Activity Tolerance Patient tolerated treatment well   Medical Staff Made Aware restorative [employer]   Nurse Made Aware RN cleared pt to participate in therapy session   Assessment   Prognosis Good   Problem List Decreased strength;Decreased range of motion;Decreased endurance; Impaired balance;Decreased mobility   Assessment Pt seen for PT treatment session this date  Therapy session focused on bed mobility, functional transfers, gait training and endurance training in order to improve overall mobility and independence  Pt requires assist of 1 for all mobility performed this date  Pt making steady progress toward goals  Pt with improvements in bed mobility and functional transfers this date  Continues to require A with ambulation 2* full body movements, pt reports this is baseline  Pt on 4L O2 throughout session, SANCHEZ noted with rest breaks required throughout  Pt was left sitting OOB in recliner at the end of PT session with all needs in reach  Pt would benefit from continued PT services while in hospital to address remaining limitations  PT to continue to follow pt and recommends STR pending progress  The patient's AM-PAC Basic Mobility Inpatient Short Form Raw Score is 16  A Raw score of less than or equal to 16 suggests the patient may benefit from discharge to post-acute rehabilitation services  Please also refer to the recommendation of the Physical Therapist for safe discharge planning  Barriers to Discharge Inaccessible home environment;Decreased caregiver support   Goals   Patient Goals to get stronger   STG Expiration Date 02/06/23   PT Treatment Day 1   Plan   Treatment/Interventions Functional transfer training;LE strengthening/ROM; Endurance training;Patient/family training;Equipment eval/education; Bed mobility;Gait training;Spoke to nursing;Spoke to case management;OT   Progress Progressing toward goals   PT Frequency 2-3x/wk   Recommendation   PT Discharge Recommendation Post acute rehabilitation services  (pending progress)   AM-PAC Basic Mobility Inpatient   Turning in Flat Bed Without Bedrails 3   Lying on Back to Sitting on Edge of Flat Bed Without Bedrails 3   Moving Bed to Chair 3   Standing Up From Chair Using Arms 3   Walk in Room 3   Climb 3-5 Stairs With Railing 1   Basic Mobility Inpatient Raw Score 16   Basic Mobility Standardized Score 38 32   Highest Level Of Mobility   JH-HLM Goal 5: Stand one or more mins   JH-HLM Achieved 7: Walk 25 feet or more   Education   Education Provided Mobility training;Assistive device   Patient Demonstrates acceptance/verbal understanding   End of Consult   Patient Position at End of Consult Bedside chair; All needs within reach     Shoshana Mary, PT, DPT

## 2023-01-26 NOTE — PROGRESS NOTES
PULMONOLOGY PROGRESS NOTE     Name: Amador Cuello   Age & Sex: 79 y o  male   MRN: [id number]  Unit/Bed#: [facility] 914-01   Encounter: [id number]    PATIENT INFORMATION     Name: Amador Cuello   Age & Sex: 79 y o  male   MRN: [id number]  Hospital Stay Days: 4    ASSESSMENT/PLAN     Assessment:      1  Hilar mass  - Chest x-ray (1/21): Collapse of right middle lobe, right-shift of the esophagus   - CTA (1/21): 2 6 cm hilar mass appears to be obstructing the right middle lobe bronchus resulting in complete postobstructive RML collapse  Mediastinal lymphadenopathy  Centrilobar and paraseptal emphysema  Mild atelectasis of poster RLL    - S/p EBUS 1/25: Preliminary FNA unrevealing - final results pending       2  COPD  - PFTs (5/8/2019) - FEV1/FVC 49%, FEV1 75% (moderate by GOLD)  DLCO 64%    - Sees Dr Matt Yeboah on outpatient basis  - Home regimen: Chronic 4L oxygen 24 hrs, arformoterol 15 mcg nebs 2x BID, budesonide 0 5 mg nebs 2x BID  Patient does note use Duonebs or albuterol inhaler due to cost    - Reports smoking 1/2 pack for 50 years  Quite about 10 years ago       3  Acute on chronic respiratory failure with hypoxia  - Likely 2/2 collapsed RML, which may be due to bronchial compression by hilar mass or mucus plugging (large amount of mucus removed on bronch 1/25)  - Blood cx - no growth after 4 days  - Sputum MRSA cx - normal  - Procal - WNL  - LA - WNL  - WBC 16 65 s/p steroids and bronch  - Currently on 4L     4  Bacteruria   - Urine microscopic - 30-50 WBC, innumerable bacteria  - Urine Cx - Grew >100,000 E  Faecalis and Aerococcus urinae; 10-20,000 Staph coagulase negative  - Urine strep and legionella antigen - normal  - Likely 2/2 BPH-associated chronic urine retention  - Patient asymptomatic         Plan:     • F/u EBUS labs - FNA, AFB cx/stain, bacterial/virus/fungal cx  • Continue nebs - Xopenex 1 25 mg and Atorvent 0 5 mg 3x daily   Pulmicort 0 5 mg q12hr  • Continue mucinex, tessalon perls, and pulmonary toilet/spirometry  • Discontinue prednisone 40 mg, completed 5 day course  • Not currently on TAMMIE at home due to cost - may need CM to help patient obtain TAMMIE on discharge        SUBJECTIVE     Patient seen and examined  No acute events overnight  Patient states his breathing is improved since his bronchoscopy procedure  He coughed up phlegm this morning - did not look at it, although states he does not have a frequent cough  He denies difficulty breathing, dyspnea, hemoptysis, nausea, vomiting, chest pain, abdominal pain, constipation, diarrhea, dysuria  OBJECTIVE     Vitals:    23 1406 23 1500 23 1933 23 2214   BP: 100/67   94/58   BP Location: Left arm   Right arm   Pulse: 100 91  69   Resp:    20   Temp: (!) 97 3 °F (36 3 °C) (!) 97 °F (36 1 °C)  (!) 97 3 °F (36 3 °C)   TempSrc: Tympanic Tympanic  Temporal   SpO2: (!) 88% 100% 98% 92%   Weight:       Height:          Temperature:   Temp (24hrs), Av 9 °F (36 6 °C), Min:97 °F (36 1 °C), Max:99 9 °F (37 7 °C)    Temperature: (!) 97 3 °F (36 3 °C)  Intake & Output:  I/O        0701   0700  0701   0700  0701   0700    P  O  594      I V  (mL/kg)  1000 (13 4)     IV Piggyback       Total Intake(mL/kg) 594 (8) 1000 (13 4)     Urine (mL/kg/hr) 1570 (0 9) 250 (0 1)     Stool 50      Total Output 1620 250     Net -1026 +750                Weights:   IBW (Ideal Body Weight): 68 4 kg    Body mass index is 24 94 kg/m²  Weight (last 2 days)     None        Physical Exam  Vitals reviewed  Constitutional:       General: He is not in acute distress  Appearance: He is not ill-appearing, toxic-appearing or diaphoretic  HENT:      Head: Normocephalic  Nose: No congestion  Mouth/Throat:      Mouth: Mucous membranes are moist    Eyes:      Conjunctiva/sclera: Conjunctivae normal    Cardiovascular:      Rate and Rhythm: Normal rate  Rhythm irregular  Pulses: Normal pulses        Heart sounds: Normal heart sounds  No murmur heard  No friction rub  No gallop  Pulmonary:      Effort: Pulmonary effort is normal  No respiratory distress  Breath sounds: No stridor  No wheezing, rhonchi or rales  Comments: Transmitted breath sounds  Abdominal:      General: Abdomen is flat  Bowel sounds are normal  There is no distension  Palpations: Abdomen is soft  Tenderness: There is no abdominal tenderness  There is no guarding  Musculoskeletal:      Right lower leg: No edema  Left lower leg: No edema  Comments: Clubbing of bilateral digits   Skin:     General: Skin is warm and dry  Neurological:      Mental Status: He is alert and oriented to person, place, and time  Comments: Choreoathetosis   Psychiatric:         Mood and Affect: Mood normal          Behavior: Behavior normal          LABORATORY DATA     Labs: I have personally reviewed pertinent reports  Results from last 7 days   Lab Units 01/26/23  0525 01/25/23 0449 01/24/23  0522   WBC Thousand/uL 16 67* 14 46* 12 80*   HEMOGLOBIN g/dL 11 3* 12 3 11 6*   HEMATOCRIT % 35 1* 37 5 35 0*   PLATELETS Thousands/uL 303 322 279   NEUTROS PCT % 89* 83* 83*   MONOS PCT % 5 6 6      Results from last 7 days   Lab Units 01/26/23  0525 01/25/23  0449 01/24/23  0522 01/22/23  1523 01/21/23  0543   POTASSIUM mmol/L 4 2 4 2 4 3   < > 4 2   CHLORIDE mmol/L 105 106 107   < > 101   CO2 mmol/L 30 29 28   < > 30   BUN mg/dL 26* 32* 26*   < > 16   CREATININE mg/dL 0 95 1 16 1 03   < > 1 02   CALCIUM mg/dL 8 7 9 1 8 9   < > 8 2*   ALK PHOS U/L  --   --   --   --  70   ALT U/L  --   --   --   --  27   AST U/L  --   --   --   --  25    < > = values in this interval not displayed                Results from last 7 days   Lab Units 01/21/23  0543   INR  1 12   PTT seconds 29     Results from last 7 days   Lab Units 01/21/23  0543   LACTIC ACID mmol/L 0 8             ABG:       Micro:   Results from last 7 days   Lab Units 01/25/23  1055 01/21/23  1201 01/21/23  0743 01/21/23  0546 01/21/23  0543   BLOOD CULTURE   --   --   --  No Growth After 4 Days  No Growth After 4 Days  GRAM STAIN RESULT  2+ Gram negative rods*  2+ Gram positive cocci in pairs*  No polys seen*  --   --   --   --    URINE CULTURE   --   --  >100,000 cfu/ml Enterococcus faecalis*  >100,000 cfu/ml Aerococcus urinae*  10,000-19,000 cfu/ml Staphylococcus coagulase negative*  --   --    MRSA CULTURE ONLY   --  No Methicillin Resistant Staphlyococcus aureus (MRSA) isolated  --   --   --    LEGIONELLA URINARY ANTIGEN   --   --  Negative  --   --    STREP PNEUMONIAE ANTIGEN, URINE   --   --  Negative  --   --          IMAGING & DIAGNOSTIC TESTING     Radiology Results: I have personally reviewed pertinent reports  Endobronchial ultrasound (EBUS)    Result Date: 1/25/2023  Impression: Mediastinal and hilar lymphadenopathy RECOMMENDATION:  Await pathology results  Follow up EBUS  Follow up with referring physician Post anesthesia care Await final path reports Possible PET-CT as an outpatient I was present from the throughout entire procedure, and guided bronchoscopy  I agree with above findings, impression and recommendations per fellow  Sanaz Galo MD Attending Physician Pulmonary and Critical Care Medicine     Other Diagnostic Testing: I have personally reviewed pertinent reports      ACTIVE MEDICATIONS     Current Facility-Administered Medications   Medication Dose Route Frequency   • acetaminophen (TYLENOL) tablet 650 mg  650 mg Oral Q4H PRN   • albuterol inhalation solution 2 5 mg  2 5 mg Nebulization Q4H PRN   • aluminum-magnesium hydroxide-simethicone (MYLANTA) oral suspension 30 mL  30 mL Oral Q6H PRN   • amoxicillin (AMOXIL) capsule 500 mg  500 mg Oral Q8H [address]   • benzonatate (TESSALON PERLES) capsule 200 mg  200 mg Oral TID   • budesonide (PULMICORT) inhalation solution 0 5 mg  0 5 mg Nebulization Q12H   • docusate sodium (COLACE) capsule 100 mg  100 mg Oral BID   • enoxaparin (LOVENOX) subcutaneous injection 40 mg  40 mg Subcutaneous Daily   • escitalopram (LEXAPRO) tablet 10 mg  10 mg Oral HS   • famotidine (PEPCID) tablet 40 mg  40 mg Oral Daily   • gabapentin (NEURONTIN) capsule 100 mg  100 mg Oral BID   • guaiFENesin (MUCINEX) 12 hr tablet 1,200 mg  1,200 mg Oral Q12H KAREN   • ipratropium (ATROVENT) 0 02 % inhalation solution 0 5 mg  0 5 mg Nebulization TID   • lactated ringers infusion  100 mL/hr Intravenous Continuous   • levalbuterol (XOPENEX) inhalation solution 1 25 mg  1 25 mg Nebulization TID   • ondansetron (ZOFRAN) injection 4 mg  4 mg Intravenous Q6H PRN   • polyethylene glycol (MIRALAX) packet 17 g  17 g Oral Daily   • prednisoLONE acetate (PRED FORTE) 1 % ophthalmic suspension 1 drop  1 drop Both Eyes BID   • predniSONE tablet 40 mg  40 mg Oral Daily   • tamsulosin (FLOMAX) capsule 0 4 mg  0 4 mg Oral Daily With Dinner       VTE Pharmacologic Prophylaxis: Enoxaparin (Lovenox)  VTE Mechanical Prophylaxis: sequential compression device    Disclaimer: Portions of the record may have been created with voice recognition software  Occasional wrong word or "sound a like" substitutions may have occurred due to the inherent limitations of voice recognition software  Careful consideration should be taken to recognize, using context, where substitutions have occurred      ----------------------------------------------------  Tory Alvares

## 2023-01-27 ENCOUNTER — TRANSITIONAL CARE MANAGEMENT (OUTPATIENT)
Dept: FAMILY MEDICINE CLINIC | Facility: CLINIC | Age: 83
End: 2023-01-27

## 2023-01-27 ENCOUNTER — NURSING HOME VISIT (OUTPATIENT)
Dept: FAMILY MEDICINE CLINIC | Facility: CLINIC | Age: 83
End: 2023-01-27

## 2023-01-27 VITALS
RESPIRATION RATE: 28 BRPM | BODY MASS INDEX: 24.86 KG/M2 | DIASTOLIC BLOOD PRESSURE: 61 MMHG | WEIGHT: 164 LBS | HEART RATE: 81 BPM | SYSTOLIC BLOOD PRESSURE: 91 MMHG | HEIGHT: 68 IN | TEMPERATURE: 97.7 F | OXYGEN SATURATION: 96 %

## 2023-01-27 DIAGNOSIS — R25.9 ABNORMAL INVOLUNTARY MOVEMENTS: ICD-10-CM

## 2023-01-27 DIAGNOSIS — J96.11 CHRONIC RESPIRATORY FAILURE WITH HYPOXIA (HCC): ICD-10-CM

## 2023-01-27 DIAGNOSIS — R26.2 AMBULATORY DYSFUNCTION: ICD-10-CM

## 2023-01-27 DIAGNOSIS — N39.0 URINARY TRACT INFECTION WITHOUT HEMATURIA, SITE UNSPECIFIED: ICD-10-CM

## 2023-01-27 DIAGNOSIS — J44.9 COPD, MODERATE (HCC): ICD-10-CM

## 2023-01-27 DIAGNOSIS — J96.21 ACUTE ON CHRONIC RESPIRATORY FAILURE WITH HYPOXIA (HCC): ICD-10-CM

## 2023-01-27 DIAGNOSIS — E44.0 MODERATE PROTEIN-CALORIE MALNUTRITION (HCC): ICD-10-CM

## 2023-01-27 DIAGNOSIS — Z93.3 S/P COLOSTOMY (HCC): ICD-10-CM

## 2023-01-27 DIAGNOSIS — I48.0 PAROXYSMAL ATRIAL FIBRILLATION (HCC): ICD-10-CM

## 2023-01-27 DIAGNOSIS — R91.8 HILAR MASS: Primary | ICD-10-CM

## 2023-01-27 DIAGNOSIS — J18.9 PNEUMONIA OF RIGHT LUNG DUE TO INFECTIOUS ORGANISM, UNSPECIFIED PART OF LUNG: ICD-10-CM

## 2023-01-27 LAB
ATRIAL RATE: 96 BPM
BACTERIA BRONCH AEROBE CULT: ABNORMAL
FUNGUS SPEC CULT: ABNORMAL
GRAM STN SPEC: ABNORMAL
P AXIS: 59 DEGREES
PR INTERVAL: 168 MS
QRS AXIS: 59 DEGREES
QRSD INTERVAL: 100 MS
QT INTERVAL: 358 MS
QTC INTERVAL: 452 MS
T WAVE AXIS: 39 DEGREES
VENTRICULAR RATE: 96 BPM

## 2023-01-27 RX ORDER — AMOXICILLIN 500 MG/1
500 CAPSULE ORAL EVERY 8 HOURS SCHEDULED
Qty: 9 CAPSULE | Refills: 0
Start: 2023-01-27 | End: 2023-01-30

## 2023-01-27 RX ORDER — BENZONATATE 200 MG/1
200 CAPSULE ORAL 3 TIMES DAILY
Qty: 20 CAPSULE | Refills: 0
Start: 2023-01-27

## 2023-01-27 RX ORDER — PREDNISONE 20 MG/1
40 TABLET ORAL DAILY
Qty: 4 TABLET | Refills: 0
Start: 2023-01-27 | End: 2023-01-29

## 2023-01-27 RX ADMIN — DOCUSATE SODIUM 100 MG: 100 CAPSULE, LIQUID FILLED ORAL at 11:15

## 2023-01-27 RX ADMIN — BENZONATATE 200 MG: 100 CAPSULE ORAL at 11:18

## 2023-01-27 RX ADMIN — AMOXICILLIN 500 MG: 500 CAPSULE ORAL at 05:54

## 2023-01-27 RX ADMIN — ENOXAPARIN SODIUM 40 MG: 40 INJECTION SUBCUTANEOUS at 11:16

## 2023-01-27 RX ADMIN — BUDESONIDE 0.5 MG: 0.5 INHALANT ORAL at 07:16

## 2023-01-27 RX ADMIN — PREDNISOLONE ACETATE 1 DROP: 10 SUSPENSION/ DROPS OPHTHALMIC at 11:22

## 2023-01-27 RX ADMIN — PREDNISONE 40 MG: 20 TABLET ORAL at 11:25

## 2023-01-27 RX ADMIN — FAMOTIDINE 40 MG: 20 TABLET ORAL at 11:16

## 2023-01-27 RX ADMIN — GABAPENTIN 100 MG: 100 CAPSULE ORAL at 11:19

## 2023-01-27 RX ADMIN — IPRATROPIUM BROMIDE 0.5 MG: 0.5 SOLUTION RESPIRATORY (INHALATION) at 07:16

## 2023-01-27 RX ADMIN — ACETAMINOPHEN 650 MG: 325 TABLET ORAL at 05:53

## 2023-01-27 RX ADMIN — GUAIFENESIN 1200 MG: 600 TABLET, EXTENDED RELEASE ORAL at 11:25

## 2023-01-27 RX ADMIN — LEVALBUTEROL HYDROCHLORIDE 1.25 MG: 1.25 SOLUTION, CONCENTRATE RESPIRATORY (INHALATION) at 07:16

## 2023-01-27 NOTE — PROGRESS NOTES
[address]  Facility: [facility]    NAME: Zuly Jacobs  AGE: 79 y o  SEX: male    DATE OF ENCOUNTER: 1/27/2023    Code status:  Full Code    Assessment and Plan     1  Hilar mass    2  Pneumonia of right lung due to infectious organism, unspecified part of lung    3  Acute on chronic respiratory failure with hypoxia (HCC)    4  Chronic respiratory failure with hypoxia (HCC)    5  COPD, moderate ([address] )    6  Paroxysmal atrial fibrillation ([facility] [address] )    7  Abnormal involuntary movements    8  Urinary tract infection without hematuria, site unspecified    9  Ambulatory dysfunction    10  S/P colostomy ([facility] [address] )    11  Moderate protein-calorie malnutrition ([facility] [address] )        All medications and routine orders were reviewed and updated as needed  Plan discussed with: Patient    Chief Complaint     Seen for admission at [address] [place] [address]    History of Present Illness     79-year-old male here after hospitalization for hilar mass  He received bronchoscopy and biopsy of the mass  It was noted he had a obstructive pneumonia which he received antibiotics  Patient has a history of severe COPD and is oxygen dependent  He lives at home with his wife in a bilevel home  He is normally independent with most activities of daily living  He cares for his colostomy himself  He has had several falls over the past 6 months  He denies any loss of consciousness  He notes dyspnea with exertion  He has no dysuria  He denies any swallowing issues  The patient has a motion disorder that is unspecified  It resembles muscular tic  He notes some tenderness at the biopsy site but otherwise denies pain as a restricting symptoms      HISTORY:  Past Medical History:   Diagnosis Date   • Acute blood loss anemia 12/6/2020   • Anxiety    • Bladder infection     current tx with cipro 5/29/16   • BPH (benign prostatic hyperplasia)    • Community acquired pneumonia     last assessed 8/28/17, resolved 17   • COPD (chronic obstructive pulmonary disease) ([facility])    • Dysphagia 2020   • Enlarged prostate    • GERD (gastroesophageal reflux disease)    • History of colonoscopy 2015    POLYP IN THE ASCENDING COLON-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   • LAPAROSCOPIC COLON RESECTION  2011    ABDOMIANOPERITONEAL RESECTION FOR RECTAL MASS   DONE BY RADHA MCDUFFIE   • MI OPTX FEM SHFT FX W/INSJ IMED IMPLT W/WO SCREW Left 2020    Procedure: Left Hip TFN;  Surgeon: Lucy Rodriguez MD;  Location: [facility] MAIN OR;  Service: Orthopedics   • MI RPR 1ST INGUN HRNA AGE 5 YRS/> REDUCIBLE Left 2022    Procedure: REPAIR HERNIA INGUINAL OPEN;  Surgeon: Dennise Serrato MD;  Location: [facility] MAIN OR;  Service: General     Family History   Problem Relation Age of Onset   • Lung cancer Mother    • Cancer Mother    • Cancer Father    • Alcohol abuse Father    • Mental illness Neg Hx      Social History     Socioeconomic History   • Marital status: /Civil Union     Spouse name: None   • Number of children: None   • Years of education: None   • Highest education level: None   Occupational History   • None   Tobacco Use   • Smoking status: Former     Packs/day: 0 50     Years: 50 00     Pack years: 25 00     Types: Cigarettes     Start date: 64     Quit date: 2018     Years since quittin 9   • Smokeless tobacco: Former   • Tobacco comments:     quit 2017 per [employer]    Vaping Use   • Vaping Use: Never used   Substance and Sexual Activity   • Alcohol use: Not Currently   • Drug use: Never   • Sexual activity: None   Other Topics Concern   • None   Social History Narrative   • None     Social Determinants of Health     Financial Resource Strain: Low Risk    • Difficulty of Paying Living Expenses: Not hard at all   Food Insecurity: No Food Insecurity   • Worried About Running Out of Food in the Last Year: Never true   • Ran Out of Food in the Last Year: Never true   Transportation Needs: No Transportation Needs   • Lack of Transportation (Medical): No   • Lack of Transportation (Non-Medical): No   Physical Activity: Not on file   Stress: Not on file   Social Connections: Not on file   Intimate Partner Violence: Not on file   Housing Stability: Low Risk    • Unable to Pay for Housing in the Last Year: No   • Number of Places Lived in the Last Year: 1   • Unstable Housing in the Last Year: No       Allergies: Allergies   Allergen Reactions   • Aspirin Other (See Comments)     Hx stomach ulcer   • Bactrim [Sulfamethoxazole-Trimethoprim] GI Intolerance       Review of Systems     Review of Systems   Constitutional: Negative for activity change, appetite change, chills, diaphoresis, fatigue and unexpected weight change  HENT: Negative for congestion, ear discharge, ear pain, hearing loss, nosebleeds and rhinorrhea  Eyes: Negative for pain, redness, itching and visual disturbance  Respiratory: Positive for cough and shortness of breath  Negative for choking and chest tightness  Cardiovascular: Negative for chest pain and leg swelling  Gastrointestinal: Negative for abdominal pain, blood in stool, constipation, diarrhea and nausea  Endocrine: Negative for cold intolerance, polydipsia and polyphagia  Genitourinary: Negative for dysuria, frequency, hematuria and urgency  Musculoskeletal: Positive for gait problem   Negative for arthralgias, back pain, joint swelling, neck pain and neck stiffness  Skin: Negative for color change and rash  Allergic/Immunologic: Negative for environmental allergies and food allergies  Neurological: Positive for tremors and weakness  Negative for dizziness, seizures, speech difficulty, numbness and headaches  Hematological: Negative for adenopathy  Does not bruise/bleed easily  Psychiatric/Behavioral: Negative for behavioral problems, dysphoric mood, hallucinations and self-injury  Medications and orders     All medications reviewed and updated in Nursing Home EMR  Objective     Vitals: per nursing home record    Physical Exam  Constitutional:       General: He is not in acute distress  Appearance: He is well-developed  He is not diaphoretic  HENT:      Head: Normocephalic and atraumatic  Right Ear: External ear normal       Left Ear: External ear normal       Nose: Nose normal       Mouth/Throat:      Pharynx: No oropharyngeal exudate  Eyes:      General: No scleral icterus  Right eye: No discharge  Left eye: No discharge  Conjunctiva/sclera: Conjunctivae normal       Pupils: Pupils are equal, round, and reactive to light  Neck:      Thyroid: No thyromegaly  Cardiovascular:      Rate and Rhythm: Normal rate and regular rhythm  Heart sounds: Normal heart sounds  No murmur heard  Pulmonary:      Effort: Pulmonary effort is normal       Breath sounds: Wheezing present  No rales  Chest:      Chest wall: Tenderness present  Abdominal:      General: Bowel sounds are normal       Palpations: Abdomen is soft  There is no mass  Tenderness: There is no abdominal tenderness  There is no guarding  Comments: Colostomy   Musculoskeletal:         General: No tenderness  Normal range of motion  Cervical back: Normal range of motion and neck supple  Lymphadenopathy:      Cervical: No cervical adenopathy  Skin:     General: Skin is warm and dry     Neurological: Mental Status: He is alert and oriented to person, place, and time  Motor: Weakness present  Coordination: Coordination abnormal       Deep Tendon Reflexes: Reflexes are normal and symmetric  Psychiatric:         Thought Content: Thought content normal          Judgment: Judgment normal          Pertinent Laboratory/Diagnostic Studies: The following labs/studies were reviewed please see chart or hospital paperwork for details  Diagnostic studies from the hospital were reviewed    - Admit for PT OT and medical therapy  He will benefit from gait training and muscular reconditioning  We will continue to give him nebulizer treatments and inhalation therapy to maximize his lung function  He will complete his course of steroids      Joselito Goodrich DO  1/27/2023 1:33 PM

## 2023-01-27 NOTE — RESTORATIVE TECHNICIAN NOTE
Restorative Technician Note      Patient Name: Davida Michele     Restorative Tech Visit Date: 01/27/23  Note Type: Mobility  Patient Position Upon Consult: Supine  Activity Performed: Ambulated  Assistive Device: Roller walker  Patient Position at End of Consult: Bedside chair;  All needs within [facility]

## 2023-01-27 NOTE — CASE MANAGEMENT
Case Management Discharge Planning Note    Patient name Amador Cuello  Location [facility] 914/[facility] 526-03 MRN [id number]  : 1940 Date 2023       Current Admission Date: 2023  Current Admission Diagnosis:Hilar mass   Patient Active Problem List    Diagnosis Date Noted   • Leukocytosis 2023   • Abnormal involuntary movements 2023   • Protein calorie malnutrition ([address] ) 2023   • Ambulatory dysfunction 2023   • S/P colostomy ([address] ) 2023   • Hilar mass 2023   • History of creation of ostomy ([facility] [address] ) 2023   • Paroxysmal atrial fibrillation ([address] ) 2023   • Stage 3a chronic kidney disease ([address] ) 2023   • Chronic kidney disease 2022   • Preop pulmonary/respiratory exam 2022   • Primary localized osteoarthritis of right knee 2021   • Primary localized osteoarthritis of left knee 2021   • Aftercare following surgery of the musculoskeletal system 2021   • Hyponatremia 12/10/2020   • Chronic respiratory failure with hypoxia ([address] ) 2020   • History of pneumonia 2020   • Chronic bilateral low back pain 2020   • Accident due to mechanical fall without injury 2020   • Acute on chronic respiratory failure with hypoxia ([address] ) 01/10/2019   • Neck pain 2019   • Anxiety 08/10/2018   • Fall 08/15/2017   • Pneumonia 08/15/2017   • UTI (urinary tract infection) 08/15/2017   • COPD, moderate (HCC)    • Acid reflux disease 2016   • Enlarged prostate with lower urinary tract symptoms (LUTS) 2015      LOS (days): 5  Geometric Mean LOS (GMLOS) (days): 7 40  Days to GMLOS:2 7     OBJECTIVE:  Risk of Unplanned Readmission Score: 17 47         Current admission status: Inpatient   Preferred Pharmacy:   [facility], [address] [facility] [address] [facility]  [address] [facility]  [address] [place] [address]  Phone: [phone] Fax: [phone]    Primary Care Provider: [facility] MD    Primary Insurance: MEDICARE  Secondary Insurance: Wolf Neat    DISCHARGE DETAILS:    Treatment Team Recommendation: Short Term Rehab  Discharge Destination Plan[de-identified] Short Term Rehab  Transport at Discharge : [facility]  Dispatcher Contacted: Yes  Number/Name of Dispatcher: [id number]  Transported by [facility] and Unit #): [address] (Date): 01/27/23  ETA of Transport (Time): 1230     Transfer Mode: Stretcher  Accompanied by: EMS personnel       Additional Comments: CM was given 12:30  time for pt  CM notified accepting facility, provider and nurse of d/c time  CM called pt's dtr to update her on d/c time and review IMM, Cm left a voicemail requesting a retrun call      Accepting Facility Name, [facility] : [facility]  Receiving Facility/Agency Phone Number: [phone]  Facility/Agency Fax Number: [phone]

## 2023-01-27 NOTE — DISCHARGE SUMMARY
[address] [facility]  Discharge- Karen Row 1940, 79 y o  male MRN: [id number]  Unit/Bed#: [facility] 914-01 Encounter: [id number]  Primary Care Provider: Roxy Kraus MD   Date and time admitted to hospital: 1/22/2023  5:36 PM    Leukocytosis  Assessment & Plan  · Review of chart notes that patient has had a mild to moderate leukocytosis with neutrophilic predominance on most lab checks dating back to 2016  · Current increase could be related to steroids  · Continue to monitor, outpatient follow-up and further work-up if needed    Abnormal involuntary movements  Assessment & Plan  · Patient noted to have abnormal involuntary repetitive movements  · Abnormal movements and twitching/tremors have been documented since at least 2016  · Per patient, this has been evaluated in the past and diagnoses such as Parkinson's or seizures have been ruled out  He has been told it is due to anxiety  · No further inpatient management    S/P colostomy [facility])  Assessment & Plan  History of colonic mass requiring colostomy  Colostomy care    Ambulatory dysfunction  Assessment & Plan  · Multifactorial  · Safe ambulation  · Fall precautions  · Physical therapy -recommending rehab    Protein calorie malnutrition [facility])  Assessment & Plan  Malnutrition Findings: Body mass index is 24 94 kg/m²     Encourage adequate protein and calorie intake    Paroxysmal atrial fibrillation ([address] )  Assessment & Plan  · Outpatient PCP notes reviewed not on anticoagulation  · Would likely benefit from anticoagulation actually in the setting of likely malignancy with hypercoagulability and A  fib, however will defer to outpatient provider after patient completes course of rehab given recent falls  · Monitor    Chronic kidney disease  Assessment & Plan    Monitor kidney function closely  Avoid nephrotoxins  Monitor postvoid residuals    Acute on chronic respiratory failure with hypoxia ([address] )  Assessment & Plan  · At baseline on 4 L supplemental oxygen  · Continue supplemental oxygen to keep O2 sats more than 88%  · Encourage incentive spirometry    Anxiety  Assessment & Plan  · Continue citalopram  · Patient was reassured    UTI (urinary tract infection)  Assessment & Plan  · Patient reported dysuria prior to hospitalization  · UA done in ED grossly positive, initially patient on ceftriaxone with reasonable presumption this would cover both pneumonia and UTI  · UA resulted with Enterococcus, given age and GFR, treat with amoxicillin x5d     Pneumonia  Assessment & Plan  · Acute postobstructive pneumonia  · IV ceftriaxone -completed 5-day course on 1/25  · Currently stable on 4 L nasal cannula    COPD, moderate ([address] )  Assessment & Plan  · Continue respiratory treatments  · Pulmonary inputs noted patient is planned for prednisone 40 mg for 7 days, patient will complete prednisone at rehab  · Supportive care  · Follow-up with pulmonary outpatient    * Hilar mass  Assessment & Plan  · CT chest reveals right hilar mass with obstructing right middle lobe bronchus  · Concerning for malignancy  · Transferred for pulmonary evaluation for bronchoscopy/EBUS for tissue diagnosis  · 1/25-underwent bronchoscopy which showed purulent sputum  · Completed course of ceftriaxone as discussed elsewhere  · Follow-up final studies, pulmonology will call patient with final results and will schedule outpatient follow-up appointment    Medical Problems     Resolved Problems  Date Reviewed: 1/25/2023          Resolved    Hyperkalemia 1/25/2023     Resolved by  Chantel Parry MD        Discharging Physician / Practitioner: Chantel Parry MD  PCP: Alf Trammell MD  Admission Date:   Admission Orders (From admission, onward)     Ordered        01/22/23 1751  Inpatient Admission  Once                      Discharge Date: 01/27/23    Consultations During Hospital Stay:  · Pulmonology    Procedures Performed:   · Bronchoscopy and EBUS 1/25    Significant Findings / Test Results:   · CXR 1/21/23   IMPRESSION:  Emphysematous changes  Complete collapse of the right middle lobe  Right middle lobe collapse appears on subsequent CT to be the result of a right hilar mass  · CTA chest 1/21/23  IMPRESSION:     No pulmonary embolus      Right hilar mass obstructing right middle lobe bronchus resulting in complete post obstructive right middle lobe collapse  Mediastinal and right hilar lymphadenopathy      Emphysema        Incidental Findings:   · None    Test Results Pending at Discharge (will require follow up): · Final results from bronchoscopy and EBUS including cultures and FNA pathology     Outpatient Tests Requested:  · Per pulmonology    Complications: None    Reason for Admission: Shortness of breath    Hospital Course:   Kimberly Verduzco is a 79 y o  male patient who originally presented to the hospital on 1/22/2023 due to transfer for management of right hilar mass  He presented to [facility] emergency department with shortness of breath, work-up revealing right hilar mass with likely postobstructive pneumonia  He was transferred to [facility] for bronchoscopy and EBUS, which he underwent on 1/25 which preliminarily showed purulence  Final studies including microbiology and fine-needle aspiration still pending at time of discharge  Pulmonology will call him with results and will schedule follow-up appointment  He was treated for pneumonia with ceftriaxone and was treated for COPD exacerbation with steroids  He is being treated for Enterococcus UTI as well and will complete a 7-day course of amoxicillin at rehab  Being discharged to rehab  Please see above list of diagnoses and related plan for additional information  Condition at Discharge: good    Discharge Day Visit / Exam:   Subjective: No acute concerns  Feels well  Looking forward to rehab    Vitals: Blood Pressure: 91/61 (01/27/23 0719)  Pulse: 81 (01/27/23 0719)  Temperature: 97 7 °F (36 5 °C) (01/27/23 0719)  Temp Source: Temporal (01/25/23 2214)  Respirations: (!) 28 (01/26/23 1956)  Height: 5' 8" (172 7 cm) (01/22/23 1749)  Weight - Scale: 74 4 kg (164 lb) (01/22/23 1749)  SpO2: 96 % (01/27/23 0719)  Exam:   Physical Exam  Vitals reviewed  Constitutional:       General: He is not in acute distress  Appearance: He is not ill-appearing or toxic-appearing  HENT:      Mouth/Throat:      Mouth: Mucous membranes are moist    Eyes:      General: No scleral icterus  Pulmonary:      Effort: Pulmonary effort is normal  No respiratory distress  Comments: Decreased breath sounds  Abdominal:      General: Bowel sounds are normal       Palpations: Abdomen is soft  Tenderness: There is no abdominal tenderness  Musculoskeletal:      Right lower leg: No edema  Left lower leg: No edema  Skin:     General: Skin is warm and dry  Neurological:      Mental Status: He is alert and oriented to person, place, and time  Comments: Abnormal movements noted   Psychiatric:         Mood and Affect: Mood normal          Behavior: Behavior normal             Discussion with Family: Updated daughter yesterday with plan to send to rehab this morning  Discharge instructions/Information to patient and family:   See after visit summary for information provided to patient and family  Provisions for Follow-Up Care:  See after visit summary for information related to follow-up care and any pertinent home health orders  Disposition:   Acute Rehab at      Planned Readmission: None     Discharge Statement:  I spent 35 minutes discharging the patient  This time was spent on the day of discharge  I had direct contact with the patient on the day of discharge  Greater than 50% of the total time was spent examining patient, answering all patient questions, arranging and discussing plan of care with patient as well as directly providing post-discharge instructions  Additional time then spent on discharge activities  Discharge Medications:  See after visit summary for reconciled discharge medications provided to patient and/or family        **Please Note: This note may have been constructed using a voice recognition system**

## 2023-01-27 NOTE — ASSESSMENT & PLAN NOTE
· Continue respiratory treatments  · Pulmonary inputs noted patient is planned for prednisone 40 mg for 7 days, patient will complete prednisone at rehab  · Supportive care  · Follow-up with pulmonary outpatient

## 2023-01-30 ENCOUNTER — TELEPHONE (OUTPATIENT)
Dept: PULMONOLOGY | Facility: CLINIC | Age: 83
End: 2023-01-30

## 2023-01-30 ENCOUNTER — NURSING HOME VISIT (OUTPATIENT)
Dept: FAMILY MEDICINE CLINIC | Facility: CLINIC | Age: 83
End: 2023-01-30

## 2023-01-30 DIAGNOSIS — J18.9 PNEUMONIA OF RIGHT LUNG DUE TO INFECTIOUS ORGANISM, UNSPECIFIED PART OF LUNG: ICD-10-CM

## 2023-01-30 DIAGNOSIS — R25.9 ABNORMAL INVOLUNTARY MOVEMENTS: ICD-10-CM

## 2023-01-30 DIAGNOSIS — R91.8 HILAR MASS: Primary | ICD-10-CM

## 2023-01-30 DIAGNOSIS — J44.9 COPD, MODERATE (HCC): ICD-10-CM

## 2023-01-30 DIAGNOSIS — J96.11 CHRONIC RESPIRATORY FAILURE WITH HYPOXIA (HCC): ICD-10-CM

## 2023-01-30 DIAGNOSIS — I48.0 PAROXYSMAL ATRIAL FIBRILLATION (HCC): ICD-10-CM

## 2023-01-30 NOTE — PROGRESS NOTES
[address]  [facility]: [facility]    NAME: Karla Ragsdale  AGE: 79 y o  SEX: male    DATE OF ENCOUNTER: 1/30/2023    Code status:  Full Code    Assessment and Plan     1  Hilar mass    2  Pneumonia of right lung due to infectious organism, unspecified part of lung    3  Paroxysmal atrial fibrillation (HCC)    4  Abnormal involuntary movements    5  Chronic respiratory failure with hypoxia (HCC)    6  COPD, moderate ([address] )        All medications and routine orders were reviewed and updated as needed  Plan discussed with: Patient    Chief Complaint     Interim evaluation    History of Present Illness     The patient is here post hospital and is finishing antibiotics for postobstructive pneumonia  Biopsy results from his bronchoscopy are not available  The patient is denying  He has some dyspnea worsening he is chronically on oxygen supplementation  His bowel habits are stable  He reports his appetite is at baseline    The following portions of the patient's history were reviewed and updated as appropriate: current medications, past family history, past medical history, past social history, past surgical history and problem list     Allergies: Allergies   Allergen Reactions   • Aspirin Other (See Comments)     Hx stomach ulcer   • Bactrim [Sulfamethoxazole-Trimethoprim] GI Intolerance       Review of Systems     Review of Systems   Constitutional: Negative for activity change, appetite change, chills, diaphoresis, fatigue and unexpected weight change  HENT: Negative for congestion, ear discharge, ear pain, hearing loss, nosebleeds and rhinorrhea  Eyes: Negative for pain, redness, itching and visual disturbance  Respiratory: Positive for cough and shortness of breath  Negative for choking and chest tightness  Cardiovascular: Negative for chest pain and leg swelling     Gastrointestinal: Negative for abdominal pain, blood in stool, constipation, diarrhea and nausea  Endocrine: Negative for cold intolerance, polydipsia and polyphagia  Genitourinary: Negative for dysuria, frequency, hematuria and urgency  Musculoskeletal: Negative for arthralgias, back pain, gait problem, joint swelling, neck pain and neck stiffness  Skin: Negative for color change and rash  Allergic/Immunologic: Negative for environmental allergies and food allergies  Neurological: Positive for tremors and weakness  Negative for dizziness, seizures, speech difficulty, numbness and headaches  Hematological: Negative for adenopathy  Does not bruise/bleed easily  Psychiatric/Behavioral: Negative for behavioral problems, dysphoric mood, hallucinations and self-injury  Medications and orders     All medications reviewed and updated in Nursing Home EMR  Objective     Vitals: per nursing home records    Physical Exam  Constitutional:       General: He is not in acute distress  Appearance: He is well-developed  He is not diaphoretic  HENT:      Head: Normocephalic and atraumatic  Right Ear: External ear normal       Left Ear: External ear normal       Nose: Nose normal       Mouth/Throat:      Pharynx: No oropharyngeal exudate  Eyes:      General: No scleral icterus  Right eye: No discharge  Left eye: No discharge  Conjunctiva/sclera: Conjunctivae normal       Pupils: Pupils are equal, round, and reactive to light  Neck:      Thyroid: No thyromegaly  Cardiovascular:      Rate and Rhythm: Normal rate  Rhythm irregular  Heart sounds: Normal heart sounds  No murmur heard  Pulmonary:      Effort: Pulmonary effort is normal       Breath sounds: Wheezing present  No rales  Abdominal:      General: Bowel sounds are normal       Palpations: Abdomen is soft  There is no mass  Tenderness: There is no abdominal tenderness  There is no guarding  Musculoskeletal:         General: No tenderness  Normal range of motion  Cervical back: Normal range of motion and neck supple  Lymphadenopathy:      Cervical: No cervical adenopathy  Skin:     General: Skin is warm and dry  Neurological:      Mental Status: He is alert and oriented to person, place, and time  Motor: Weakness present  Coordination: Coordination abnormal       Gait: Gait abnormal       Deep Tendon Reflexes: Reflexes are normal and symmetric  Psychiatric:         Thought Content: Thought content normal          Judgment: Judgment normal          Pertinent Laboratory/Diagnostic Studies: The following studies were reviewed please see chart or hospital paperwork for details  Space for lab dictation no new diagnostic testing    - Await biopsy results      Leora Mitchell DO  1/30/2023 11:23 AM

## 2023-01-30 NOTE — TELEPHONE ENCOUNTER
Spoke with daughter told her FNA from EBUS negative for malignancy  Has appt with Dr Sung Da Silva in February- told her to expect repeat imaging and decision on whether repeat procedure needed in procedure  Questions answered  Attempted to call patient at SNF with no answer  Daughter said she will update her father and call back with questions

## 2023-01-31 LAB
MYCOBACTERIUM SPEC CULT: NORMAL
RHODAMINE-AURAMINE STN SPEC: NORMAL

## 2023-02-01 LAB
FUNGUS SPEC CULT: ABNORMAL

## 2023-02-02 ENCOUNTER — NURSING HOME VISIT (OUTPATIENT)
Dept: FAMILY MEDICINE CLINIC | Facility: CLINIC | Age: 83
End: 2023-02-02

## 2023-02-02 DIAGNOSIS — J96.11 CHRONIC RESPIRATORY FAILURE WITH HYPOXIA (HCC): ICD-10-CM

## 2023-02-02 DIAGNOSIS — R91.8 HILAR MASS: Primary | ICD-10-CM

## 2023-02-02 DIAGNOSIS — I48.0 PAROXYSMAL ATRIAL FIBRILLATION (HCC): ICD-10-CM

## 2023-02-02 DIAGNOSIS — J44.9 COPD, MODERATE (HCC): ICD-10-CM

## 2023-02-02 DIAGNOSIS — R26.2 AMBULATORY DYSFUNCTION: ICD-10-CM

## 2023-02-02 DIAGNOSIS — J18.9 PNEUMONIA OF RIGHT LUNG DUE TO INFECTIOUS ORGANISM, UNSPECIFIED PART OF LUNG: ICD-10-CM

## 2023-02-02 DIAGNOSIS — N18.31 STAGE 3A CHRONIC KIDNEY DISEASE (HCC): ICD-10-CM

## 2023-02-02 NOTE — PROGRESS NOTES
[address]  Fac[facility]: [facility]    NAME: Sander Stevenson  AGE: 79 y o  SEX: male    DATE OF ENCOUNTER: 2/2/2023    Code status:  DNR w/ Hospitalization    Assessment and Plan     1  Hilar mass    2  Chronic respiratory failure with hypoxia (HCC)    3  Pneumonia of right lung due to infectious organism, unspecified part of lung    4  COPD, moderate ([facility] )    5  Paroxysmal atrial fibrillation (HCC)    6  Stage 3a chronic kidney disease ([facility] )    7  Ambulatory dysfunction        All medications and routine orders were reviewed and updated as needed  Plan discussed with: Family member    Chief Complaint     Interim evaluation    History of Present Illness     The patient continued to progress with his therapy  He feels stronger ambulating with walker  The pain  He gets short of breath with any exertion  He remains O2 dependent  Bowels are stable and has no dysuria    The following portions of the patient's history were reviewed and updated as appropriate: current medications, past family history, past medical history, past social history, past surgical history and problem list     Allergies: Allergies   Allergen Reactions   • Aspirin Other (See Comments)     Hx stomach ulcer   • Bactrim [Sulfamethoxazole-Trimethoprim] GI Intolerance       Review of Systems     Review of Systems   Constitutional: Negative for activity change, appetite change, chills, diaphoresis, fatigue and unexpected weight change  HENT: Negative for congestion, ear discharge, ear pain, hearing loss, nosebleeds and rhinorrhea  Eyes: Negative for pain, redness, itching and visual disturbance  Respiratory: Positive for cough and shortness of breath  Negative for choking and chest tightness  Cardiovascular: Negative for chest pain and leg swelling  Gastrointestinal: Negative for abdominal pain, blood in stool, constipation, diarrhea and nausea     Endocrine: Negative for cold intolerance, polydipsia and polyphagia  Genitourinary: Negative for dysuria, frequency, hematuria and urgency  Musculoskeletal: Negative for arthralgias, back pain, gait problem, joint swelling, neck pain and neck stiffness  Skin: Negative for color change and rash  Allergic/Immunologic: Negative for environmental allergies and food allergies  Neurological: Positive for weakness  Negative for dizziness, tremors, seizures, speech difficulty, numbness and headaches  Hematological: Negative for adenopathy  Does not bruise/bleed easily  Psychiatric/Behavioral: Negative for behavioral problems, dysphoric mood, hallucinations and self-injury  Medications and orders     All medications reviewed and updated in Nursing Home EMR  Objective     Vitals: per nursing home records    Physical Exam  Constitutional:       General: He is not in acute distress  Appearance: He is well-developed  He is not diaphoretic  HENT:      Head: Normocephalic and atraumatic  Right Ear: External ear normal       Left Ear: External ear normal       Nose: Nose normal       Mouth/Throat:      Pharynx: No oropharyngeal exudate  Eyes:      General: No scleral icterus  Right eye: No discharge  Left eye: No discharge  Conjunctiva/sclera: Conjunctivae normal       Pupils: Pupils are equal, round, and reactive to light  Neck:      Thyroid: No thyromegaly  Cardiovascular:      Rate and Rhythm: Normal rate  Rhythm irregular  Heart sounds: Normal heart sounds  No murmur heard  Pulmonary:      Effort: Pulmonary effort is normal       Breath sounds: Wheezing present  No rales  Abdominal:      General: Bowel sounds are normal       Palpations: Abdomen is soft  There is no mass  Tenderness: There is no abdominal tenderness  There is no guarding  Musculoskeletal:         General: No tenderness  Normal range of motion  Cervical back: Normal range of motion and neck supple  Lymphadenopathy:      Cervical: No cervical adenopathy  Skin:     General: Skin is warm and dry  Neurological:      Mental Status: He is alert and oriented to person, place, and time  Deep Tendon Reflexes: Reflexes are normal and symmetric  Psychiatric:         Thought Content: Thought content normal          Judgment: Judgment normal          Pertinent Laboratory/Diagnostic Studies: The following studies were reviewed please see chart or hospital paperwork for details      Space for lab dictation labs are normal    - Continue respite therapy for activity and the current medical regimen    Jannie Goodman DO  2/2/2023 10:45 AM

## 2023-02-06 ENCOUNTER — NURSING HOME VISIT (OUTPATIENT)
Dept: FAMILY MEDICINE CLINIC | Facility: CLINIC | Age: 83
End: 2023-02-06

## 2023-02-06 DIAGNOSIS — E44.0 MODERATE PROTEIN-CALORIE MALNUTRITION (HCC): ICD-10-CM

## 2023-02-06 DIAGNOSIS — R91.8 HILAR MASS: Primary | ICD-10-CM

## 2023-02-06 DIAGNOSIS — J18.9 PNEUMONIA OF RIGHT LUNG DUE TO INFECTIOUS ORGANISM, UNSPECIFIED PART OF LUNG: ICD-10-CM
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
## 2023-02-06 DIAGNOSIS — J44.9 COPD, MODERATE (HCC): ICD-10-CM

## 2023-02-06 DIAGNOSIS — J96.11 CHRONIC RESPIRATORY FAILURE WITH HYPOXIA (HCC): ICD-10-CM

## 2023-02-06 NOTE — PROGRESS NOTES
[address]  [facility]: [facility]    NAME: Salma Giron  AGE: 79 y o  SEX: male    DATE OF ENCOUNTER: 2/6/2023    Code status:  DNR/DNH    Assessment and Plan     1  Hilar mass    2  Pneumonia of right lung due to infectious organism, unspecified part of lung    3  COPD, moderate ([address] )    4  Chronic respiratory failure with hypoxia (HCC)    5  Fall, subsequent encounter    6  Moderate protein-calorie malnutrition ([address] )        All medications and routine orders were reviewed and updated as needed  Plan discussed with: Family member    Chief Complaint     Interim evaluation    History of Present Illness     The patient was seen resting comfortably in his chair  He notes some dyspnea with exertion feels as though he can progress with his therapy  His bowel habits are stable  He denies chest pain  He will need to follow-up with pulmonary regarding the results of his biopsy  There were concerns about a fungal infection in the lungs  The following portions of the patient's history were reviewed and updated as appropriate: current medications, past family history, past medical history, past social history, past surgical history and problem list     Allergies: Allergies   Allergen Reactions   • Aspirin Other (See Comments)     Hx stomach ulcer   • Bactrim [Sulfamethoxazole-Trimethoprim] GI Intolerance       Review of Systems     Review of Systems   Constitutional: Negative for activity change, appetite change, chills, diaphoresis, fatigue and unexpected weight change  HENT: Negative for congestion, ear discharge, ear pain, hearing loss, nosebleeds and rhinorrhea  Eyes: Negative for pain, redness, itching and visual disturbance  Respiratory: Positive for cough  Negative for choking, chest tightness and shortness of breath  Cardiovascular: Negative for chest pain and leg swelling     Gastrointestinal: Negative for abdominal pain, blood in stool, constipation, diarrhea and nausea  Endocrine: Negative for cold intolerance, polydipsia and polyphagia  Genitourinary: Negative for dysuria, frequency, hematuria and urgency  Musculoskeletal: Positive for gait problem  Negative for arthralgias, back pain, joint swelling, neck pain and neck stiffness  Skin: Negative for color change and rash  Allergic/Immunologic: Negative for environmental allergies and food allergies  Neurological: Positive for weakness  Negative for dizziness, tremors, seizures, speech difficulty, numbness and headaches  Hematological: Negative for adenopathy  Does not bruise/bleed easily  Psychiatric/Behavioral: Negative for behavioral problems, dysphoric mood, hallucinations and self-injury  Medications and orders     All medications reviewed and updated in Nursing Home EMR  Objective     Vitals: per nursing home records    Physical Exam  Constitutional:       General: He is not in acute distress  Appearance: He is well-developed  He is not diaphoretic  HENT:      Head: Normocephalic and atraumatic  Right Ear: External ear normal       Left Ear: External ear normal       Nose: Nose normal       Mouth/Throat:      Pharynx: No oropharyngeal exudate  Eyes:      General: No scleral icterus  Right eye: No discharge  Left eye: No discharge  Conjunctiva/sclera: Conjunctivae normal       Pupils: Pupils are equal, round, and reactive to light  Neck:      Thyroid: No thyromegaly  Cardiovascular:      Rate and Rhythm: Normal rate and regular rhythm  Heart sounds: Normal heart sounds  No murmur heard  Pulmonary:      Effort: Pulmonary effort is normal       Breath sounds: Wheezing present  No rales  Abdominal:      General: Bowel sounds are normal       Palpations: Abdomen is soft  There is no mass  Tenderness: There is no abdominal tenderness  There is no guarding  Musculoskeletal:         General: No tenderness   Normal range of motion  Cervical back: Normal range of motion and neck supple  Lymphadenopathy:      Cervical: No cervical adenopathy  Skin:     General: Skin is warm and dry  Neurological:      Mental Status: He is alert and oriented to person, place, and time  Deep Tendon Reflexes: Reflexes are normal and symmetric  Psychiatric:         Thought Content: Thought content normal          Judgment: Judgment normal          Pertinent Laboratory/Diagnostic Studies: The following studies were reviewed please see chart or hospital paperwork for details  Space for lab dictation no new diagnostic studies    - Continue current medical regimen    Follow-up with pulmonary    William Lozano DO  2/6/2023 12:59 PM

## 2023-02-07 ENCOUNTER — HOME HEALTH ADMISSION (OUTPATIENT)
Dept: HOME HEALTH SERVICES | Facility: HOME HEALTHCARE | Age: 83
End: 2023-02-07

## 2023-02-07 ENCOUNTER — TRANSCRIBE ORDERS (OUTPATIENT)
Dept: HOME HEALTH SERVICES | Facility: HOME HEALTHCARE | Age: 83
End: 2023-02-07

## 2023-02-07 DIAGNOSIS — R91.8 HILAR MASS: Primary | ICD-10-CM

## 2023-02-07 DIAGNOSIS — E44.0 MODERATE PROTEIN-CALORIE MALNUTRITION (HCC): ICD-10-CM
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
## 2023-02-07 DIAGNOSIS — J18.9 PNEUMONIA OF RIGHT LUNG DUE TO INFECTIOUS ORGANISM, UNSPECIFIED PART OF LUNG: ICD-10-CM

## 2023-02-07 DIAGNOSIS — J96.11 CHRONIC RESPIRATORY FAILURE WITH HYPOXIA (HCC): ICD-10-CM

## 2023-02-07 DIAGNOSIS — J44.9 COPD, MODERATE (HCC): ICD-10-CM

## 2023-02-07 LAB — MYCOBACTERIUM SPEC CULT: NORMAL

## 2023-02-08 ENCOUNTER — TELEPHONE (OUTPATIENT)
Dept: FAMILY MEDICINE CLINIC | Facility: CLINIC | Age: 83
End: 2023-02-08

## 2023-02-08 ENCOUNTER — HOME CARE VISIT (OUTPATIENT)
Dept: HOME HEALTH SERVICES | Facility: HOME HEALTHCARE | Age: 83
End: 2023-02-08

## 2023-02-08 VITALS
HEART RATE: 68 BPM | BODY MASS INDEX: 17.53 KG/M2 | SYSTOLIC BLOOD PRESSURE: 104 MMHG | HEIGHT: 78 IN | TEMPERATURE: 97.3 F | WEIGHT: 151.5 LBS | RESPIRATION RATE: 24 BRPM | OXYGEN SATURATION: 92 % | DIASTOLIC BLOOD PRESSURE: 60 MMHG

## 2023-02-08 DIAGNOSIS — J44.9 COPD, MODERATE (HCC): ICD-10-CM

## 2023-02-08 DIAGNOSIS — K21.9 GASTROESOPHAGEAL REFLUX DISEASE: ICD-10-CM

## 2023-02-08 RX ORDER — ALBUTEROL SULFATE 90 UG/1
2 AEROSOL, METERED RESPIRATORY (INHALATION) EVERY 6 HOURS PRN
Qty: 8.5 G | Refills: 3 | Status: SHIPPED | OUTPATIENT
Start: 2023-02-08

## 2023-02-08 RX ORDER — FAMOTIDINE 40 MG/1
40 TABLET, FILM COATED ORAL DAILY
Qty: 90 TABLET | Refills: 1 | Status: SHIPPED | OUTPATIENT
Start: 2023-02-08

## 2023-02-08 NOTE — TELEPHONE ENCOUNTER
Can you please move the Pt's Albuterol and famotidine prescriptions to     [facility] [phone]

## 2023-02-09 ENCOUNTER — HOME CARE VISIT (OUTPATIENT)
Dept: HOME HEALTH SERVICES | Facility: HOME HEALTHCARE | Age: 83
End: 2023-02-09

## 2023-02-09 VITALS — HEART RATE: 64 BPM | OXYGEN SATURATION: 94 % | SYSTOLIC BLOOD PRESSURE: 120 MMHG | DIASTOLIC BLOOD PRESSURE: 60 MMHG

## 2023-02-10 ENCOUNTER — HOME CARE VISIT (OUTPATIENT)
Dept: HOME HEALTH SERVICES | Facility: HOME HEALTHCARE | Age: 83
End: 2023-02-10

## 2023-02-10 VITALS
HEART RATE: 92 BPM | RESPIRATION RATE: 18 BRPM | SYSTOLIC BLOOD PRESSURE: 102 MMHG | TEMPERATURE: 97.1 F | DIASTOLIC BLOOD PRESSURE: 56 MMHG | OXYGEN SATURATION: 95 %

## 2023-02-13 ENCOUNTER — HOME CARE VISIT (OUTPATIENT)
Dept: HOME HEALTH SERVICES | Facility: HOME HEALTHCARE | Age: 83
End: 2023-02-13

## 2023-02-13 ENCOUNTER — TELEMEDICINE (OUTPATIENT)
Dept: FAMILY MEDICINE CLINIC | Facility: CLINIC | Age: 83
End: 2023-02-13

## 2023-02-13 VITALS
TEMPERATURE: 98.4 F | HEART RATE: 76 BPM | SYSTOLIC BLOOD PRESSURE: 112 MMHG | DIASTOLIC BLOOD PRESSURE: 67 MMHG | RESPIRATION RATE: 19 BRPM

## 2023-02-13 DIAGNOSIS — R91.8 MASS OF RIGHT LUNG: ICD-10-CM

## 2023-02-13 DIAGNOSIS — N39.0 URINARY TRACT INFECTION WITHOUT HEMATURIA, SITE UNSPECIFIED: ICD-10-CM

## 2023-02-13 DIAGNOSIS — J96.11 CHRONIC RESPIRATORY FAILURE WITH HYPOXIA (HCC): ICD-10-CM

## 2023-02-13 DIAGNOSIS — J44.9 COPD, MODERATE (HCC): Primary | ICD-10-CM

## 2023-02-13 NOTE — PROGRESS NOTES
Virtual TCM Visit:    Verification of patient location:    Patient is located in the following state in which I hold an active license [place]    Assessment/Plan:        Problem List Items Addressed This Visit        Respiratory    COPD, moderate ([address] ) - Primary    Chronic respiratory failure with hypoxia ([address] )       Genitourinary    UTI (urinary tract infection)   Other Visit Diagnoses     Mass of right lung               Reason for visit is TCM    Encounter provider Alf Trammell MD     Provider located at [address] [place], [place] [id number]    Recent Visits  Date Type Provider Dept   02/08/23 Telephone Sina Flores Pg [address] [facility] recent visits within past 7 days and meeting all other requirements  Today's Visits  Date Type Provider Dept   02/13/23 Telemedicine Alf Trammell MD  [address] [facility] today's visits and meeting all other requirements  Future Appointments  No visits were found meeting these conditions  Showing future appointments within next 150 days and meeting all other requirements       After connecting through [employer], the patient was identified by name and date of birth  Zuly Jacobs was informed that this is a telemedicine visit and that the visit is being conducted through the [address] Now platform  He agrees to proceed     My office door was closed  No one else was in the room  He acknowledged consent and understanding of privacy and security of the video platform  The patient has agreed to participate and understands they can discontinue the visit at any time  Patient is aware this is a billable service  Transitional Care Management Review:  Zuly Jacobs is a 79 y o  male here for TCM follow up       During the TCM phone call patient stated:    TCM Call     Date and time call was made  1/27/2023  3:22 PM    Hospital care reviewed  Records reviewed    Patient was hospitialized at  [facility]    Date of Admission  01/22/23    Date of discharge  01/27/23  Discharged from rehab 2/7/23    Diagnosis  [facility]; Home    Were the patients medications reviewed and updated  Yes    Current Symptoms  Cough    Cough Severity  Moderate      TCM Call     Post hospital issues  Reduced activity; Poor ADL (Activities of Daily Living) performance    Should patient be enrolled in antico monitoring? No    Scheduled for follow up? Yes    Did you obtain your prescribed medications  Yes    Do you need help managing your prescriptions or medications  No    Is transportation to your appointment needed  No    I have advised the patient to call PCP with any new or worsening symptoms  Criss Rodríguez MA    Living Arrangements  Spouse or Significiant other    Support System  Family    The type of support provided  Emotional; Physical    Do you have social support  Yes, as much as I need    Are you recieving any outpatient services  No    Are you recieving home care services  Yes    Types of home care services  Home PT    Are you using any community resources  No    Current waiver services  No    Have you fallen in the last 12 months  Yes    How many times  once    Interperter language line needed  No    Counseling  Family        Subjective:     Patient ID: Lizet Huerta is a 79 y o  male      TCM Call     Date and time call was made  1/27/2023  3:22 PM    Hospital care reviewed  Records reviewed    Patient was hospitialized at  [facility]    Date of Admission  01/22/23    Date of discharge  01/27/23  Discharged from rehab 2/7/23    Diagnosis  [facility]; Home    Were the patients medications reviewed and updated  Yes    Current Symptoms  Cough    Cough Severity  Moderate      TCM Call     Post hospital issues  Reduced activity; Poor ADL (Activities of Daily   Living) performance    Should patient be enrolled in anticoag monitoring? No    Scheduled for follow up? Yes    Did you obtain your prescribed medications  Yes    Do you need help managing your prescriptions or medications  No    Is transportation to your appointment needed  No    I have advised the patient to call PCP with any new or worsening symptoms    Tom Coyle MA    Living Arrangements  Spouse or Significiant other    Support System  Family    The type of support provided  Emotional; Physical    Do you have social support  Yes, as much as I need    Are you recieving any outpatient services  No    Are you recieving home care services  Yes    Types of home care services  Home PT    Are you using any community resources  No    Current waiver services  No    Have you fallen in the last 12 months  Yes    How many times  once    Interperter language line needed  No    Counseling  Family      Patient here for TCM  Hospital records are reviewed  Medications are reconciled  Patient diagnosed with UTI  Also found a right hilar mass with compression on the right lobe bronchus  Biopsy during hospital stay was inconclusive  Does have a follow-up with pulmonologist in 2 days  Generally patient feeling improved  Is having some trouble with swallowing  Tends to cough after swallowing  Review of Systems   Constitutional: Negative for activity change, appetite change, diaphoresis and fatigue  HENT: Negative for congestion, sinus pressure and sore throat  Respiratory: Positive for cough  Negative for chest tightness, shortness of breath and wheezing  Cardiovascular: Negative for chest pain, palpitations and leg swelling  Fast or slow heart rate   Gastrointestinal: Negative for abdominal pain, blood in stool, constipation, diarrhea, nausea and vomiting  Genitourinary: Negative for difficulty urinating, dysuria, frequency and hematuria     Musculoskeletal: Negative for arthralgias, gait problem, joint swelling and myalgias  Neurological: Negative for dizziness, light-headedness and headaches  Psychiatric/Behavioral: Negative for agitation, confusion, dysphoric mood and sleep disturbance  The patient is not nervous/anxious  Objective: There were no vitals filed for this visit  Physical Exam  Constitutional:       General: He is not in acute distress  Appearance: Normal appearance  He is not ill-appearing  HENT:      Head: Normocephalic and atraumatic  Pulmonary:      Effort: Pulmonary effort is normal  No respiratory distress  Neurological:      Mental Status: He is alert  Psychiatric:         Behavior: Behavior normal          Medications have been reviewed by provider in current encounter    I spent 20   minutes with the patient during this visit  Alf Trammell MD       Patient Instructions   Continue current medications  Try drinking using a straw  May need to consider thickened liquids or video swallowing test   Follow-up with pulmonology to further clarify right pulmonary mass  VIRTUAL VISIT DISCLAIMER    Zuly Jacobs verbally agrees to participate in [employer]  Pt is aware that [employer] could be limited without vital signs or the ability to perform a full hands-on physical Genieraysa [place]martha understands he or the provider may request at any time to terminate the video visit and request the patient to seek care or treatment in person

## 2023-02-13 NOTE — PATIENT INSTRUCTIONS
Continue current medications  Try drinking using a straw  May need to consider thickened liquids or video swallowing test   Follow-up with pulmonology to further clarify right pulmonary mass

## 2023-02-14 ENCOUNTER — HOME CARE VISIT (OUTPATIENT)
Dept: HOME HEALTH SERVICES | Facility: HOME HEALTHCARE | Age: 83
End: 2023-02-14

## 2023-02-14 VITALS
DIASTOLIC BLOOD PRESSURE: 60 MMHG | OXYGEN SATURATION: 96 % | TEMPERATURE: 97.6 F | RESPIRATION RATE: 22 BRPM | HEART RATE: 80 BPM | SYSTOLIC BLOOD PRESSURE: 100 MMHG

## 2023-02-14 LAB — MYCOBACTERIUM SPEC CULT: NORMAL

## 2023-02-15 ENCOUNTER — OFFICE VISIT (OUTPATIENT)
Dept: PULMONOLOGY | Facility: HOSPITAL | Age: 83
End: 2023-02-15

## 2023-02-15 VITALS
DIASTOLIC BLOOD PRESSURE: 60 MMHG | SYSTOLIC BLOOD PRESSURE: 106 MMHG | WEIGHT: 164 LBS | BODY MASS INDEX: 24.86 KG/M2 | HEIGHT: 68 IN | OXYGEN SATURATION: 94 % | HEART RATE: 78 BPM | RESPIRATION RATE: 18 BRPM | TEMPERATURE: 98 F

## 2023-02-15 DIAGNOSIS — R91.8 LUNG MASS: Primary | ICD-10-CM

## 2023-02-15 DIAGNOSIS — R91.8 HILAR MASS: ICD-10-CM

## 2023-02-15 DIAGNOSIS — J96.11 CHRONIC RESPIRATORY FAILURE WITH HYPOXIA (HCC): ICD-10-CM

## 2023-02-15 DIAGNOSIS — J18.9 PNEUMONIA OF RIGHT LUNG DUE TO INFECTIOUS ORGANISM, UNSPECIFIED PART OF LUNG: ICD-10-CM

## 2023-02-15 DIAGNOSIS — J44.9 COPD, MODERATE (HCC): ICD-10-CM

## 2023-02-16 ENCOUNTER — HOME CARE VISIT (OUTPATIENT)
Dept: HOME HEALTH SERVICES | Facility: HOME HEALTHCARE | Age: 83
End: 2023-02-16

## 2023-02-16 ENCOUNTER — TELEPHONE (OUTPATIENT)
Dept: FAMILY MEDICINE CLINIC | Facility: CLINIC | Age: 83
End: 2023-02-16

## 2023-02-16 VITALS
SYSTOLIC BLOOD PRESSURE: 102 MMHG | RESPIRATION RATE: 20 BRPM | TEMPERATURE: 98.4 F | HEART RATE: 80 BPM | OXYGEN SATURATION: 95 % | DIASTOLIC BLOOD PRESSURE: 60 MMHG

## 2023-02-16 VITALS
SYSTOLIC BLOOD PRESSURE: 120 MMHG | RESPIRATION RATE: 22 BRPM | OXYGEN SATURATION: 95 % | DIASTOLIC BLOOD PRESSURE: 78 MMHG | TEMPERATURE: 97.8 F | HEART RATE: 70 BPM

## 2023-02-16 DIAGNOSIS — R39.9 UTI SYMPTOMS: Primary | ICD-10-CM

## 2023-02-16 PROBLEM — J96.21 ACUTE ON CHRONIC RESPIRATORY FAILURE WITH HYPOXIA (HCC): Status: RESOLVED | Noted: 2019-01-10 | Resolved: 2023-02-16

## 2023-02-16 RX ORDER — CIPROFLOXACIN 250 MG/1
250 TABLET, FILM COATED ORAL EVERY 12 HOURS SCHEDULED
Qty: 14 TABLET | Refills: 0 | Status: SHIPPED | OUTPATIENT
Start: 2023-02-16 | End: 2023-02-23

## 2023-02-16 NOTE — PROGRESS NOTES
Pulmonary Follow Up Note   Daphne Ramos 79 y o  male MRN: [id number]  2/16/2023    Assessment/Plan:     Hilar mass  Right hilar mass/adenopathy with additional mediastinal lymph nodes  He underwent endobronchial ultrasound which was negative for malignancy  We need additional tissue, however before pursuing any further procedures, we will perform PET scan  Based on these findings, we will determine next steps  I will call his daughter to discuss  COPD, moderate ([address] )  No evidence of exacerbation  He will continue with DuoNeb and budesonide via nebulizer    Chronic respiratory failure with hypoxia (HCC)  Oxygenation is adequate on 4 L/min  Visit orders:    Diagnoses and all orders for this visit:    Lung mass  -     Cancel: NM PET CT skull base to mid thigh; Future    Chronic respiratory failure with hypoxia (HCC)    COPD, moderate (HCC)    Pneumonia of right lung due to infectious organism, unspecified part of lung    Hilar mass        No follow-ups on file  History of Present Illness   HPI:  Daphne Ramos is a 79 y o  male who is here today for hospital follow-up  As COPD with chronic hypoxic respite failure, recently hospitalized after having COVID and developing dizziness and confusion  He was diagnosed with urinary tract infection and chest imaging showed concern for pneumonia and right hilar mass  He underwent endobronchial ultrasound which showed no evidence Lightman  Patient has recovered nicely from the hospitalization and his pulmonary status appears to be back to baseline still has some shortness of breath with exertion  He is using oxygen at 4 L/min continuously  He has no significant cough or sputum production  He denies any rate he is compliant with nebulizer regimen of DuoNeb and Pulmicort  He has no fevers, chills or night sweats  He had a poor appetite or aspiration and lost a few pounds, but his appetite has since improved      Review of Systems   Constitutional: Negative for chills, fever and unexpected weight change  HENT: Negative for postnasal drip and sore throat  Eyes: Negative for visual disturbance  Respiratory:        As noted in HPI   Cardiovascular: Negative for chest pain  Gastrointestinal: Negative for abdominal pain, diarrhea and vomiting  Musculoskeletal: Negative for arthralgias  Skin: Negative for rash  Neurological: Negative for headaches  Hematological: Negative for adenopathy  Psychiatric/Behavioral: Negative  All other systems reviewed and are negative  Medical, Family and Social history reviewed and updated as appropriate    Historical Information   Past Medical History:   Diagnosis Date   • Acute blood loss anemia 12/6/2020   • Anxiety    • Bladder infection     current tx with cipro 5/29/16   • BPH (benign prostatic hyperplasia)    • Community acquired pneumonia     last assessed 8/28/17, resolved 9/25/17   • COPD (chronic obstructive pulmonary disease) [address] [address] [facility] )    • Dysphagia 6/8/2020   • Enlarged prostate    • GERD (gastroesophageal reflux disease)    • History of colonoscopy 03/19/2015    POLYP IN THE ASCENDING COLON-[facility]   • Hx of bladder infections    • Hyperkalemia 1/22/2023   • Inguinal hernia, right 05/10/2019   • Multiple rib fractures 6/3/2020   • Neck pain    • Psychiatric disorder     depression   • Pulmonary nodule 01/08/2019    STABLE 6MM LEFT APICAL NODULE   • Respiratory failure with hypoxia ([address] ) 01/08/2019   • Urinary retention      Past Surgical History:   Procedure Laterality Date   • BLADDER SURGERY  08/15/2019    UROLIFT   • COLOSTOMY  02/03/2011    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   • INGUINAL HERNIA REPAIR Right 05/10/2019    DONE AT [facility]   • LAPAROSCOPIC COLON RESECTION  02/03/2011    ABDOMIANOPERITONEAL RESECTION FOR RECTAL MASS   DONE BY RADHA MCDUFFIE   • WV OPTX FEM SHFT FX W/INSJ IMED IMPLT W/WO SCREW Left 12/7/2020    Procedure: Left Hip TFN;  Surgeon: Bernardino Persaud MD; Location: [facility] MAIN OR;  Service: Orthopedics   • KS RPR 1ST INGUN HRNA AGE 5 YRS/> REDUCIBLE Left 2022    Procedure: REPAIR HERNIA INGUINAL OPEN;  Surgeon: Dennise Serrato MD;  Location: [facility] MAIN OR;  Service: General     Family History   Problem Relation Age of Onset   • Lung cancer Mother    • Cancer Mother    • Cancer Father    • Alcohol abuse Father    • Mental illness Neg Hx        Social History     Tobacco Use   Smoking Status Former   • Packs/day: 0 50   • Years: 50 00   • Pack years: 25 00   • Types: Cigarettes   • Start date: 64   • Quit date: 2018   • Years since quittin 9   Smokeless Tobacco Former   Tobacco Comments    quit 2017 per [employer]          Meds/Allergies     Current Outpatient Medications:   •  acetaminophen (TYLENOL) 325 mg tablet, Take 2 tablets (650 mg total) by mouth every 4 (four) hours as needed for mild pain, Disp: 30 tablet, Rfl: 0  •  albuterol (2 5 mg/3 mL) 0 083 % nebulizer solution, Take 3 mL (2 5 mg total) by nebulization every 4 (four) hours as needed for wheezing or shortness of breath, Disp: 180 mL, Rfl: 3  •  albuterol (ProAir HFA) 90 mcg/act inhaler, Inhale 2 puffs every 6 (six) hours as needed for wheezing, Disp: 8 5 g, Rfl: 3  •  benzonatate (TESSALON) 200 MG capsule, Take 1 capsule (200 mg total) by mouth 3 (three) times a day, Disp: 20 capsule, Rfl: 0  •  budesonide (Pulmicort) 0 5 mg/2 mL nebulizer solution, Take 2 mL (0 5 mg total) by nebulization 4 (four) times a day Rinse mouth after use , Disp: 100 mL, Rfl: 2  •  escitalopram (LEXAPRO) 10 mg tablet, Take 1 tablet (10 mg total) by mouth daily at bedtime, Disp: 90 tablet, Rfl: 3  •  famotidine (PEPCID) 40 MG tablet, Take 1 tablet (40 mg total) by mouth daily, Disp: 90 tablet, Rfl: 1  •  gabapentin (NEURONTIN) 100 mg capsule, TAKE 1 CAPSULE BY MOUTH TWICE A DAY, Disp: 180 capsule, Rfl: 1  •  ipratropium-albuterol (DUO-NEB) 0 5-2 5 mg/3 mL nebulizer solution, Take 3 mL by nebulization 2 (two) times a day, Disp: 180 mL, Rfl: 1  •  NON FORMULARY, Take 1,000 mg by mouth 2 (two) times a day Med is MSM Pure, pt brought in from home, Disp: , Rfl:   •  oxygen gas, Inhale 4 L/min continuous  Indications: COPD exacerbation, Disp: , Rfl:   •  tamsulosin (FLOMAX) 0 4 mg, TAKE 1 CAPSULE BY MOUTH EVERY DAY WITH DINNER, Disp: 30 capsule, Rfl: 5  •  Catheters ([employer] Condom Catheter) MISC, Use daily at bedtime (Patient not taking: Reported on 2/13/2023), Disp: 100 each, Rfl: 5  •  Incontinence Supply Disposable (PadSORBer Bed Pan Liners) MISC, Use daily at bedtime (Patient not taking: Reported on 2/13/2023), Disp: 50 each, Rfl: 10  •  prednisoLONE acetate (PRED FORTE) 1 % ophthalmic suspension, , Disp: , Rfl:   Allergies   Allergen Reactions   • Aspirin Other (See Comments)     Hx stomach ulcer   • Bactrim [Sulfamethoxazole-Trimethoprim] GI Intolerance       Vitals: Blood pressure 106/60, pulse 78, temperature 98 °F (36 7 °C), temperature source Tympanic, resp  rate 18, height 5' 8" (1 727 m), weight 74 4 kg (164 lb), SpO2 94 %  Body mass index is 24 94 kg/m²  Oxygen Therapy  SpO2: 94 %  Oxygen Therapy: Supplemental oxygen  O2 Delivery Method: Nasal cannula  O2 Flow Rate (L/min): 4 L/min    Physical Exam   Physical Exam  Constitutional:       General: He is not in acute distress  HENT:      Head: Normocephalic  Mouth/Throat:      Pharynx: No oropharyngeal exudate  Eyes:      General: No scleral icterus  Pupils: Pupils are equal, round, and reactive to light  Neck:      Vascular: No JVD  Cardiovascular:      Rate and Rhythm: Normal rate and regular rhythm  Pulmonary:      Breath sounds: No wheezing, rhonchi or rales  Abdominal:      Palpations: Abdomen is soft  Tenderness: There is no abdominal tenderness  Musculoskeletal:      Cervical back: Neck supple  Lymphadenopathy:      Cervical: No cervical adenopathy  Skin:     General: Skin is warm and dry     Neurological:      Mental Status: He is alert and oriented to person, place, and time  Psychiatric:         Mood and Affect: Mood normal          Labs: I have personally reviewed pertinent lab results  Lab Results   Component Value Date    WBC 16 67 (H) 01/26/2023    HGB 11 3 (L) 01/26/2023    HCT 35 1 (L) 01/26/2023     (H) 01/26/2023     01/26/2023     Lab Results   Component Value Date    CALCIUM 8 7 01/26/2023     03/13/2017    K 4 2 01/26/2023    CO2 30 01/26/2023     01/26/2023    BUN 26 (H) 01/26/2023    CREATININE 0 95 01/26/2023     No results found for: IGE  Lab Results   Component Value Date    ALT 27 01/21/2023    AST 25 01/21/2023    ALKPHOS 70 01/21/2023    BILITOT 0 6 03/13/2017       Imaging and other studies: I have personally reviewed pertinent reports  and I have personally reviewed pertinent films in PACS CT of the chest 2123 shows right hilar mass/adenopathy with additional mediastinal adenopathy  There is compression of the right middle lobe bronchus    Pulmonary function testing:  Performed 5/8/19 and personally interpreted  FEV1/FVC ratio 49%   FEV1 75% predicted  % predicted    Moderate obstruction

## 2023-02-16 NOTE — ASSESSMENT & PLAN NOTE
Right hilar mass/adenopathy with additional mediastinal lymph nodes  He underwent endobronchial ultrasound which was negative for malignancy  We need additional tissue, however before pursuing any further procedures, we will perform PET scan  Based on these findings, we will determine next steps  I will call his daughter to discuss

## 2023-02-16 NOTE — TELEPHONE ENCOUNTER
Patient's wife called in wanting medication for his bladder infection called in  Patient is currently experiencing frequent urination and pain in the area  Began this morning 2/16/2023  [facility] [phone]

## 2023-02-20 ENCOUNTER — HOME CARE VISIT (OUTPATIENT)
Dept: HOME HEALTH SERVICES | Facility: HOME HEALTHCARE | Age: 83
End: 2023-02-20

## 2023-02-20 VITALS
OXYGEN SATURATION: 98 % | DIASTOLIC BLOOD PRESSURE: 69 MMHG | SYSTOLIC BLOOD PRESSURE: 108 MMHG | RESPIRATION RATE: 19 BRPM | HEART RATE: 69 BPM | TEMPERATURE: 96.8 F

## 2023-02-21 ENCOUNTER — HOME CARE VISIT (OUTPATIENT)
Dept: HOME HEALTH SERVICES | Facility: HOME HEALTHCARE | Age: 83
End: 2023-02-21

## 2023-02-21 VITALS
SYSTOLIC BLOOD PRESSURE: 128 MMHG | OXYGEN SATURATION: 99 % | RESPIRATION RATE: 24 BRPM | HEART RATE: 76 BPM | DIASTOLIC BLOOD PRESSURE: 60 MMHG

## 2023-02-21 LAB — MYCOBACTERIUM SPEC CULT: NORMAL

## 2023-02-23 ENCOUNTER — HOME CARE VISIT (OUTPATIENT)
Dept: HOME HEALTH SERVICES | Facility: HOME HEALTHCARE | Age: 83
End: 2023-02-23

## 2023-02-23 ENCOUNTER — APPOINTMENT (OUTPATIENT)
Dept: LAB | Facility: HOSPITAL | Age: 83
End: 2023-02-23

## 2023-02-23 VITALS
SYSTOLIC BLOOD PRESSURE: 132 MMHG | TEMPERATURE: 97.3 F | OXYGEN SATURATION: 90 % | RESPIRATION RATE: 22 BRPM | HEART RATE: 72 BPM | DIASTOLIC BLOOD PRESSURE: 80 MMHG

## 2023-02-23 DIAGNOSIS — R32 URINARY INCONTINENCE, UNSPECIFIED TYPE: ICD-10-CM

## 2023-02-24 ENCOUNTER — HOME CARE VISIT (OUTPATIENT)
Dept: HOME HEALTH SERVICES | Facility: HOME HEALTHCARE | Age: 83
End: 2023-02-24

## 2023-02-25 LAB — BACTERIA UR CULT: NORMAL

## 2023-02-26 VITALS — HEART RATE: 66 BPM | OXYGEN SATURATION: 97 % | DIASTOLIC BLOOD PRESSURE: 66 MMHG | SYSTOLIC BLOOD PRESSURE: 122 MMHG

## 2023-02-27 ENCOUNTER — TELEPHONE (OUTPATIENT)
Dept: FAMILY MEDICINE CLINIC | Facility: CLINIC | Age: 83
End: 2023-02-27

## 2023-02-27 NOTE — TELEPHONE ENCOUNTER
Patient aware of results  He is currently taking Ciprofloxacin HCl    His symptoms are improving     ----- Message from Ravi Fletcher MD sent at 2/27/2023  7:41 AM EST -----  Call patient with lab result-U/a is normal- no signs of UTI_ was he already on ABX

## 2023-02-28 ENCOUNTER — HOME CARE VISIT (OUTPATIENT)
Dept: HOME HEALTH SERVICES | Facility: HOME HEALTHCARE | Age: 83
End: 2023-02-28

## 2023-02-28 VITALS
SYSTOLIC BLOOD PRESSURE: 120 MMHG | DIASTOLIC BLOOD PRESSURE: 64 MMHG | OXYGEN SATURATION: 96 % | RESPIRATION RATE: 22 BRPM | HEART RATE: 76 BPM

## 2023-02-28 VITALS
HEART RATE: 61 BPM | RESPIRATION RATE: 19 BRPM | SYSTOLIC BLOOD PRESSURE: 118 MMHG | OXYGEN SATURATION: 94 % | DIASTOLIC BLOOD PRESSURE: 76 MMHG | TEMPERATURE: 97.3 F

## 2023-02-28 DIAGNOSIS — R39.9 UTI SYMPTOMS: Primary | ICD-10-CM

## 2023-02-28 LAB — MYCOBACTERIUM SPEC CULT: NORMAL

## 2023-02-28 RX ORDER — CIPROFLOXACIN 250 MG/1
250 TABLET, FILM COATED ORAL EVERY 12 HOURS SCHEDULED
Qty: 14 TABLET | Refills: 0 | Status: SHIPPED | OUTPATIENT
Start: 2023-02-28 | End: 2023-03-07

## 2023-02-28 RX ORDER — CIPROFLOXACIN 250 MG/1
250 TABLET, FILM COATED ORAL EVERY 12 HOURS SCHEDULED
Qty: 14 TABLET | Refills: 0 | Status: CANCELLED | OUTPATIENT
Start: 2023-02-28 | End: 2023-03-07

## 2023-03-02 ENCOUNTER — HOME CARE VISIT (OUTPATIENT)
Dept: HOME HEALTH SERVICES | Facility: HOME HEALTHCARE | Age: 83
End: 2023-03-02

## 2023-03-02 VITALS
OXYGEN SATURATION: 96 % | DIASTOLIC BLOOD PRESSURE: 60 MMHG | TEMPERATURE: 97.5 F | HEART RATE: 80 BPM | SYSTOLIC BLOOD PRESSURE: 126 MMHG | RESPIRATION RATE: 22 BRPM

## 2023-03-03 ENCOUNTER — HOSPITAL ENCOUNTER (OUTPATIENT)
Dept: RADIOLOGY | Age: 83
Discharge: HOME | End: 2023-03-03

## 2023-03-03 DIAGNOSIS — R91.8 LUNG MASS: ICD-10-CM

## 2023-03-03 DIAGNOSIS — D38.1 NEOPLASM OF UNCERTAIN BEHAVIOR OF TRACHEA, BRONCHUS, AND LUNG: ICD-10-CM

## 2023-03-03 LAB — GLUCOSE SERPL-MCNC: 94 MG/DL (ref 65–140)

## 2023-03-03 NOTE — NURSING NOTE
PET/CT pre and post instructions reviewed with patient, verbalizes understanding 
Moderate: Comprehensive teaching

## 2023-03-07 ENCOUNTER — HOME CARE VISIT (OUTPATIENT)
Dept: HOME HEALTH SERVICES | Facility: HOME HEALTHCARE | Age: 83
End: 2023-03-07

## 2023-03-07 VITALS
DIASTOLIC BLOOD PRESSURE: 76 MMHG | HEART RATE: 87 BPM | OXYGEN SATURATION: 97 % | SYSTOLIC BLOOD PRESSURE: 118 MMHG | TEMPERATURE: 97.1 F

## 2023-03-07 VITALS
RESPIRATION RATE: 24 BRPM | TEMPERATURE: 97.7 F | OXYGEN SATURATION: 94 % | DIASTOLIC BLOOD PRESSURE: 70 MMHG | HEART RATE: 78 BPM | SYSTOLIC BLOOD PRESSURE: 120 MMHG

## 2023-03-07 LAB — MYCOBACTERIUM SPEC CULT: NORMAL

## 2023-03-09 ENCOUNTER — HOME CARE VISIT (OUTPATIENT)
Dept: HOME HEALTH SERVICES | Facility: HOME HEALTHCARE | Age: 83
End: 2023-03-09

## 2023-03-09 VITALS
OXYGEN SATURATION: 93 % | TEMPERATURE: 97.4 F | RESPIRATION RATE: 18 BRPM | DIASTOLIC BLOOD PRESSURE: 70 MMHG | HEART RATE: 80 BPM | SYSTOLIC BLOOD PRESSURE: 110 MMHG

## 2023-03-09 VITALS — OXYGEN SATURATION: 91 % | HEART RATE: 92 BPM | DIASTOLIC BLOOD PRESSURE: 60 MMHG | SYSTOLIC BLOOD PRESSURE: 122 MMHG

## 2023-03-13 LAB — MYCOBACTERIUM SPEC CULT: NORMAL

## 2023-03-14 ENCOUNTER — HOME CARE VISIT (OUTPATIENT)
Dept: HOME HEALTH SERVICES | Facility: HOME HEALTHCARE | Age: 83
End: 2023-03-14

## 2023-03-14 VITALS
HEART RATE: 87 BPM | SYSTOLIC BLOOD PRESSURE: 118 MMHG | TEMPERATURE: 96.3 F | RESPIRATION RATE: 19 BRPM | OXYGEN SATURATION: 97 % | DIASTOLIC BLOOD PRESSURE: 73 MMHG

## 2023-03-16 ENCOUNTER — HOME CARE VISIT (OUTPATIENT)
Dept: HOME HEALTH SERVICES | Facility: HOME HEALTHCARE | Age: 83
End: 2023-03-16

## 2023-03-16 VITALS
TEMPERATURE: 97.6 F | SYSTOLIC BLOOD PRESSURE: 136 MMHG | HEART RATE: 64 BPM | OXYGEN SATURATION: 95 % | DIASTOLIC BLOOD PRESSURE: 64 MMHG | RESPIRATION RATE: 22 BRPM

## 2023-03-16 VITALS
OXYGEN SATURATION: 94 % | SYSTOLIC BLOOD PRESSURE: 112 MMHG | HEART RATE: 86 BPM | TEMPERATURE: 98.7 F | DIASTOLIC BLOOD PRESSURE: 68 MMHG | RESPIRATION RATE: 19 BRPM

## 2023-03-21 ENCOUNTER — HOME CARE VISIT (OUTPATIENT)
Dept: HOME HEALTH SERVICES | Facility: HOME HEALTHCARE | Age: 83
End: 2023-03-21

## 2023-03-21 LAB — MYCOBACTERIUM SPEC CULT: NORMAL

## 2023-03-22 ENCOUNTER — HOME CARE VISIT (OUTPATIENT)
Dept: HOME HEALTH SERVICES | Facility: HOME HEALTHCARE | Age: 83
End: 2023-03-22

## 2023-03-22 VITALS
SYSTOLIC BLOOD PRESSURE: 130 MMHG | TEMPERATURE: 97.5 F | OXYGEN SATURATION: 96 % | HEART RATE: 76 BPM | RESPIRATION RATE: 18 BRPM | DIASTOLIC BLOOD PRESSURE: 68 MMHG

## 2023-03-23 ENCOUNTER — HOME CARE VISIT (OUTPATIENT)
Dept: HOME HEALTH SERVICES | Facility: HOME HEALTHCARE | Age: 83
End: 2023-03-23

## 2023-03-23 VITALS — HEART RATE: 77 BPM | OXYGEN SATURATION: 92 %

## 2023-03-28 ENCOUNTER — HOME CARE VISIT (OUTPATIENT)
Dept: HOME HEALTH SERVICES | Facility: HOME HEALTHCARE | Age: 83
End: 2023-03-28

## 2023-03-28 VITALS
OXYGEN SATURATION: 95 % | DIASTOLIC BLOOD PRESSURE: 82 MMHG | HEART RATE: 77 BPM | SYSTOLIC BLOOD PRESSURE: 109 MMHG | TEMPERATURE: 97.1 F | RESPIRATION RATE: 19 BRPM

## 2023-03-28 PROBLEM — N39.0 UTI (URINARY TRACT INFECTION): Status: RESOLVED | Noted: 2017-08-15 | Resolved: 2023-03-28

## 2023-03-28 PROBLEM — J18.9 PNEUMONIA: Status: RESOLVED | Noted: 2017-08-15 | Resolved: 2023-03-28

## 2023-03-29 ENCOUNTER — HOME CARE VISIT (OUTPATIENT)
Dept: HOME HEALTH SERVICES | Facility: HOME HEALTHCARE | Age: 83
End: 2023-03-29

## 2023-03-30 ENCOUNTER — HOME CARE VISIT (OUTPATIENT)
Dept: HOME HEALTH SERVICES | Facility: HOME HEALTHCARE | Age: 83
End: 2023-03-30

## 2023-03-30 VITALS
TEMPERATURE: 97.7 F | SYSTOLIC BLOOD PRESSURE: 118 MMHG | RESPIRATION RATE: 19 BRPM | DIASTOLIC BLOOD PRESSURE: 72 MMHG | HEART RATE: 81 BPM | OXYGEN SATURATION: 96 %

## 2023-04-03 ENCOUNTER — NURSE TRIAGE (OUTPATIENT)
Dept: OTHER | Facility: OTHER | Age: 83
End: 2023-04-03

## 2023-04-03 DIAGNOSIS — R39.9 UTI SYMPTOMS: Primary | ICD-10-CM

## 2023-04-03 RX ORDER — CIPROFLOXACIN 250 MG/1
250 TABLET, FILM COATED ORAL EVERY 12 HOURS SCHEDULED
Qty: 14 TABLET | Refills: 0 | Status: SHIPPED | OUTPATIENT
Start: 2023-04-03 | End: 2023-04-10

## 2023-04-03 NOTE — TELEPHONE ENCOUNTER
Regarding: Fall and has a bladder infection unable to urinate  ----- Message from Robert Mao sent at 4/3/2023  8:47 AM EDT -----  My father has a bladder infection and he needs medication sent to the pharmacy  He cant urinate and his urine is dark yellow and he also fell this morning and hurt his lower back

## 2023-04-03 NOTE — TELEPHONE ENCOUNTER
"Patient's daughter is calling  Her father gets frequent UTIs and last night he began with increased urinary frequency and dark colored urine  He is only able to urinate a little at a time and feels some pelvic pressure  Pt's daughter Emiliana Davila states that UTIs make her father \"loopy\" and that without treatment he will end up in the hospital tonight  She states that because he was \"loopy\" he lost his balance and fell this morning  He did not strike his head and is c/o a sore back from the fall  He is not on blood thinners  Pt would like abx called in as soon as possible for her father  She would like a call back when this is done or a call if she needs to take any other action  Reason for Disposition  • Urinating more frequently than usual (i e , frequency)    Answer Assessment - Initial Assessment Questions  1  SYMPTOM: \"What's the main symptom you're concerned about? \" (e g , frequency, incontinence)      Dark colored urine and difficulty starting stream  2  ONSET: \"When did the  symptoms  start? \"      yesteray  3  PAIN: \"Is there any pain? \" If Yes, ask: \"How bad is it? \" (Scale: 1-10; mild, moderate, severe)      Mild  4  CAUSE: \"What do you think is causing the symptoms? \"      UTI   5  OTHER SYMPTOMS: \"Do you have any other symptoms? \" (e g , fever, flank pain, blood in urine, pain with urination)     Yes, pt is having increased confusion which happens with his UTIs    Protocols used: URINARY SYMPTOMS-ADULT-AH    "

## 2023-04-04 ENCOUNTER — HOME CARE VISIT (OUTPATIENT)
Dept: HOME HEALTH SERVICES | Facility: HOME HEALTHCARE | Age: 83
End: 2023-04-04

## 2023-04-05 ENCOUNTER — HOME CARE VISIT (OUTPATIENT)
Dept: HOME HEALTH SERVICES | Facility: HOME HEALTHCARE | Age: 83
End: 2023-04-05

## 2023-04-06 ENCOUNTER — TELEPHONE (OUTPATIENT)
Dept: FAMILY MEDICINE CLINIC | Facility: CLINIC | Age: 83
End: 2023-04-06

## 2023-04-06 VITALS
TEMPERATURE: 97.8 F | SYSTOLIC BLOOD PRESSURE: 124 MMHG | OXYGEN SATURATION: 95 % | DIASTOLIC BLOOD PRESSURE: 60 MMHG | HEART RATE: 86 BPM | RESPIRATION RATE: 22 BRPM

## 2023-04-06 DIAGNOSIS — J96.11: Primary | ICD-10-CM

## 2023-04-06 NOTE — TELEPHONE ENCOUNTER
Jennifer Menon physical therapist from [facility] called today and ask for a script/referral for PT to go to [facility] rehab     Jennifer Menon number is [phone]

## 2023-04-19 ENCOUNTER — LASER ONLY (OUTPATIENT)
Dept: URBAN - METROPOLITAN AREA CLINIC 79 | Facility: CLINIC | Age: 83
End: 2023-04-19

## 2023-04-19 DIAGNOSIS — H40.033: ICD-10-CM

## 2023-04-19 PROCEDURE — 66761 REVISION OF IRIS: CPT

## 2023-04-19 ASSESSMENT — TONOMETRY
OS_IOP_MMHG: 9
OD_IOP_MMHG: 12

## 2023-04-19 ASSESSMENT — VISUAL ACUITY
OS_CC: 20/30-1
OD_CC: 20/60-1

## 2023-04-29 DIAGNOSIS — J44.9 CHRONIC OBSTRUCTIVE PULMONARY DISEASE, UNSPECIFIED COPD TYPE (HCC): ICD-10-CM

## 2023-05-01 RX ORDER — BUDESONIDE 0.5 MG/2ML
INHALANT ORAL
Qty: 120 ML | Refills: 2 | Status: SHIPPED | OUTPATIENT
Start: 2023-05-01 | End: 2023-05-02

## 2023-05-04 ENCOUNTER — LASER ONLY (OUTPATIENT)
Dept: URBAN - METROPOLITAN AREA CLINIC 79 | Facility: CLINIC | Age: 83
End: 2023-05-04

## 2023-05-04 DIAGNOSIS — H40.032: ICD-10-CM

## 2023-05-04 PROCEDURE — 66761 REVISION OF IRIS: CPT

## 2023-05-04 ASSESSMENT — TONOMETRY
OS_IOP_MMHG: 9
OD_IOP_MMHG: 10

## 2023-05-04 ASSESSMENT — VISUAL ACUITY
OD_CC: 20/60-1
OS_CC: 20/25-2

## 2023-05-18 ENCOUNTER — IOP CHECK (OUTPATIENT)
Dept: URBAN - METROPOLITAN AREA CLINIC 79 | Facility: CLINIC | Age: 83
End: 2023-05-18

## 2023-05-18 DIAGNOSIS — H25.13: ICD-10-CM

## 2023-05-18 DIAGNOSIS — H40.033: ICD-10-CM

## 2023-05-18 PROCEDURE — 92020 GONIOSCOPY: CPT

## 2023-05-18 PROCEDURE — 92014 COMPRE OPH EXAM EST PT 1/>: CPT

## 2023-05-18 ASSESSMENT — TONOMETRY
OD_IOP_MMHG: 9
OS_IOP_MMHG: 15

## 2023-05-18 ASSESSMENT — VISUAL ACUITY
OD_CC: 20/50+2
OS_CC: 20/30

## 2023-06-01 DIAGNOSIS — J44.9 CHRONIC OBSTRUCTIVE PULMONARY DISEASE, UNSPECIFIED COPD TYPE (HCC): ICD-10-CM

## 2023-06-01 RX ORDER — BUDESONIDE 0.5 MG/2ML
INHALANT ORAL
Qty: 120 ML | Refills: 2 | Status: SHIPPED | OUTPATIENT
Start: 2023-06-01

## 2023-06-12 DIAGNOSIS — K21.9 GASTROESOPHAGEAL REFLUX DISEASE: ICD-10-CM

## 2023-06-12 RX ORDER — FAMOTIDINE 40 MG/1
TABLET, FILM COATED ORAL
Qty: 90 TABLET | Refills: 1 | Status: SHIPPED | OUTPATIENT
Start: 2023-06-12

## 2023-06-22 ENCOUNTER — CONSULT EP (OUTPATIENT)
Dept: URBAN - METROPOLITAN AREA CLINIC 79 | Facility: CLINIC | Age: 83
End: 2023-06-22

## 2023-06-22 DIAGNOSIS — H40.033: ICD-10-CM

## 2023-06-22 DIAGNOSIS — H35.052: ICD-10-CM

## 2023-06-22 DIAGNOSIS — H43.811: ICD-10-CM

## 2023-06-22 DIAGNOSIS — H25.13: ICD-10-CM

## 2023-06-22 PROCEDURE — 92134 CPTRZ OPH DX IMG PST SGM RTA: CPT

## 2023-06-22 PROCEDURE — 92014 COMPRE OPH EXAM EST PT 1/>: CPT | Mod: 25

## 2023-06-22 PROCEDURE — 67028 INJECTION EYE DRUG: CPT

## 2023-06-22 PROCEDURE — 92250 FUNDUS PHOTOGRAPHY W/I&R: CPT | Mod: NC

## 2023-06-22 ASSESSMENT — VISUAL ACUITY
OD_CC: 20/50
OS_CC: 20/30

## 2023-06-22 ASSESSMENT — TONOMETRY
OS_IOP_MMHG: 9
OD_IOP_MMHG: 11

## 2023-08-03 ENCOUNTER — FOLLOW UP (OUTPATIENT)
Dept: URBAN - METROPOLITAN AREA CLINIC 79 | Facility: CLINIC | Age: 83
End: 2023-08-03

## 2023-08-03 DIAGNOSIS — H40.033: ICD-10-CM

## 2023-08-03 DIAGNOSIS — H43.811: ICD-10-CM

## 2023-08-03 DIAGNOSIS — H35.052: ICD-10-CM

## 2023-08-03 DIAGNOSIS — H25.13: ICD-10-CM

## 2023-08-03 PROCEDURE — 92012 INTRM OPH EXAM EST PATIENT: CPT | Mod: 25

## 2023-08-03 PROCEDURE — 67028 INJECTION EYE DRUG: CPT

## 2023-08-03 PROCEDURE — 92134 CPTRZ OPH DX IMG PST SGM RTA: CPT

## 2023-08-03 ASSESSMENT — VISUAL ACUITY
OD_CC: 20/50
OS_CC: 20/30+

## 2023-08-03 ASSESSMENT — TONOMETRY
OD_IOP_MMHG: 10
OS_IOP_MMHG: 10

## 2023-08-18 ENCOUNTER — OFFICE VISIT (OUTPATIENT)
Dept: FAMILY MEDICINE CLINIC | Facility: CLINIC | Age: 83
End: 2023-08-18
Payer: MEDICARE

## 2023-08-18 VITALS
TEMPERATURE: 97.7 F | HEART RATE: 82 BPM | BODY MASS INDEX: 25.85 KG/M2 | SYSTOLIC BLOOD PRESSURE: 136 MMHG | OXYGEN SATURATION: 93 % | DIASTOLIC BLOOD PRESSURE: 76 MMHG | WEIGHT: 170 LBS

## 2023-08-18 DIAGNOSIS — S89.92XA INJURY OF LEFT KNEE, INITIAL ENCOUNTER: Primary | ICD-10-CM

## 2023-08-18 PROCEDURE — 99213 OFFICE O/P EST LOW 20 MIN: CPT | Performed by: NURSE PRACTITIONER

## 2023-08-18 NOTE — PROGRESS NOTES
[facility]        NAME: Gume Duron is a 79 y.o. male  : 1940    MRN: [id number]  DATE: 2023  TIME: 12:15 PM    Assessment and Plan   Injury of left knee, initial encounter [S89.92XA]  1. Injury of left knee, initial encounter  XR knee 4+ vw left injury            Patient Instructions     Patient Instructions   Rest/Ice/Compression/Elevate  Continue Tylenol  Xray left knee ordered          Chief Complaint     Chief Complaint   Patient presents with   • Knee Injury         History of Present Illness       Patient tripped over oxygen tubing and fell on left knee. Knee is swollen and painful. He is taking Tylenol. Review of Systems   Review of Systems   Constitutional: Positive for activity change. Respiratory: Positive for shortness of breath. Negative for chest tightness. Hx of copd uses oxygen at home   Cardiovascular: Negative for chest pain. Musculoskeletal: Positive for arthralgias and joint swelling.          Current Medications       Current Outpatient Medications:   •  acetaminophen (TYLENOL) 325 mg tablet, Take 2 tablets (650 mg total) by mouth every 4 (four) hours as needed for mild pain, Disp: 30 tablet, Rfl: 0  •  albuterol (2.5 mg/3 mL) 0.083 % nebulizer solution, Take 3 mL (2.5 mg total) by nebulization every 4 (four) hours as needed for wheezing or shortness of breath, Disp: 180 mL, Rfl: 3  •  albuterol (ProAir HFA) 90 mcg/act inhaler, Inhale 2 puffs every 6 (six) hours as needed for wheezing, Disp: 8.5 g, Rfl: 3  •  budesonide (PULMICORT) 0.5 mg/2 mL nebulizer solution, TAKE 2 ML (0.5 MG TOTAL) BY NEBULIZATION TWICE A DAY, Disp: 120 mL, Rfl: 2  •  Catheters (Gizmo Condom Catheter) MISC, Use daily at bedtime, Disp: 100 each, Rfl: 5  •  escitalopram (LEXAPRO) 10 mg tablet, Take 1 tablet (10 mg total) by mouth daily at bedtime, Disp: 90 tablet, Rfl: 3  •  famotidine (PEPCID) 40 MG tablet, TAKE 1 TABLET BY MOUTH EVERY DAY, Disp: 90 tablet, Rfl: 1  •  gabapentin (NEURONTIN) 100 mg capsule, TAKE 1 CAPSULE BY MOUTH TWICE A DAY, Disp: 180 capsule, Rfl: 1  •  Incontinence Supply Disposable (PadSORBer Bed Pan Liners) MISC, Use daily at bedtime, Disp: 50 each, Rfl: 10  •  ipratropium-albuterol (DUO-NEB) 0.5-2.5 mg/3 mL nebulizer solution, Take 3 mL by nebulization 2 (two) times a day, Disp: 180 mL, Rfl: 1  •  NON FORMULARY, Take 1,000 mg by mouth 2 (two) times a day Med is MSM Pure, pt brought in from home, Disp: , Rfl:   •  oxygen gas, Inhale 4 L/min continuous.  Indications: COPD exacerbation, Disp: , Rfl:   •  tamsulosin (FLOMAX) 0.4 mg, TAKE 1 CAPSULE BY MOUTH EVERY DAY WITH DINNER, Disp: 30 capsule, Rfl: 5  •  prednisoLONE acetate (PRED FORTE) 1 % ophthalmic suspension, , Disp: , Rfl:     Current Allergies     Allergies as of 08/18/2023 - Reviewed 08/18/2023   Allergen Reaction Noted   • Aspirin Other (See Comments) 05/29/2016   • Bactrim [sulfamethoxazole-trimethoprim] GI Intolerance 04/08/2021            The following portions of the patient's history were reviewed and updated as appropriate: allergies, current medications, past family history, past medical history, past social history, past surgical history and problem list.     Past Medical History:   Diagnosis Date   • Acute blood loss anemia 12/6/2020   • Anxiety    • Bladder infection     current tx with cipro 5/29/16   • BPH (benign prostatic hyperplasia)    • Community acquired pneumonia     last assessed 8/28/17, resolved 9/25/17   • COPD (chronic obstructive pulmonary disease) ([address])    • Dysphagia 6/8/2020   • Enlarged prostate    • GERD (gastroesophageal reflux disease)    • History of colonoscopy 03/19/2015    POLYP IN THE ASCENDING COLON-[facility]   • Hx of bladder infections    • Hyperkalemia 1/22/2023   • Inguinal hernia, right 05/10/2019   • Multiple rib fractures 6/3/2020   • Neck pain    • Psychiatric disorder     depression   • Pulmonary nodule 01/08/2019    STABLE 6MM LEFT APICAL NODULE   • Respiratory failure with hypoxia ([address]) 01/08/2019   • Urinary retention        Past Surgical History:   Procedure Laterality Date   • BLADDER SURGERY  08/15/2019    UROLIFT   • COLOSTOMY  02/03/2011    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   • INGUINAL HERNIA REPAIR Right 05/10/2019    DONE AT [facility]   • LAPAROSCOPIC COLON RESECTION  02/03/2011    ABDOMIANOPERITONEAL RESECTION FOR RECTAL MASS. DONE BY RADHA MCDUFFIE   • TX OPTX FEM SHFT FX W/INSJ IMED IMPLT W/WO SCREW Left 12/7/2020    Procedure: Left Hip TFN;  Surgeon: Bobby Madison MD;  Location:  MAIN OR;  Service: Orthopedics   • TX RPR 1ST INGUN HRNA AGE 4 YRS/> REDUCIBLE Left 4/5/2022    Procedure: REPAIR HERNIA INGUINAL OPEN;  Surgeon: Ileana Erwin MD;  Location:  MAIN OR;  Service: General       Family History   Problem Relation Age of Onset   • Lung cancer Mother    • Cancer Mother    • Cancer Father    • Alcohol abuse Father    • Mental illness Neg Hx          Medications have been verified. Objective   /76 (BP Location: Left arm, Patient Position: Sitting, Cuff Size: Large)   Pulse 82   Temp 97.7 °F (36.5 °C) (Tympanic)   Wt 77.1 kg (170 lb)   SpO2 93%   BMI 25.85 kg/m²        Physical Exam     Physical Exam  Vitals and nursing note reviewed. Constitutional:       General: He is not in acute distress. Appearance: Normal appearance. He is not ill-appearing. HENT:      Head: Normocephalic. Pulmonary:      Effort: Pulmonary effort is normal. No respiratory distress. Musculoskeletal:         General: Swelling and tenderness present. Comments: Moderate swelling left knee, tenderness with palpation and ROM    Skin:     General: Skin is warm and dry. Findings: No bruising or erythema. Neurological:      Mental Status: He is alert and oriented to person, place, and time.    Psychiatric:         Mood and Affect: Mood normal.         Behavior: Behavior normal.

## 2023-09-07 ENCOUNTER — FOLLOW UP (OUTPATIENT)
Dept: URBAN - METROPOLITAN AREA CLINIC 79 | Facility: CLINIC | Age: 83
End: 2023-09-07

## 2023-09-07 DIAGNOSIS — H25.13: ICD-10-CM

## 2023-09-07 DIAGNOSIS — H35.052: ICD-10-CM

## 2023-09-07 DIAGNOSIS — H40.033: ICD-10-CM

## 2023-09-07 DIAGNOSIS — H43.811: ICD-10-CM

## 2023-09-07 PROCEDURE — 67028 INJECTION EYE DRUG: CPT

## 2023-09-07 PROCEDURE — 92134 CPTRZ OPH DX IMG PST SGM RTA: CPT

## 2023-09-07 PROCEDURE — 92012 INTRM OPH EXAM EST PATIENT: CPT | Mod: 25

## 2023-09-07 ASSESSMENT — TONOMETRY
OD_IOP_MMHG: 10
OS_IOP_MMHG: 11

## 2023-09-07 ASSESSMENT — VISUAL ACUITY
OD_CC: 20/60
OS_CC: 20/30+1

## 2023-09-30 DIAGNOSIS — R33.8 BENIGN PROSTATIC HYPERPLASIA WITH URINARY RETENTION: ICD-10-CM

## 2023-09-30 DIAGNOSIS — N40.1 BENIGN PROSTATIC HYPERPLASIA WITH URINARY RETENTION: ICD-10-CM

## 2023-09-30 RX ORDER — TAMSULOSIN HYDROCHLORIDE 0.4 MG/1
CAPSULE ORAL
Qty: 30 CAPSULE | Refills: 5 | Status: SHIPPED | OUTPATIENT
Start: 2023-09-30

## 2023-10-10 ENCOUNTER — TELEPHONE (OUTPATIENT)
Dept: FAMILY MEDICINE CLINIC | Facility: CLINIC | Age: 83
End: 2023-10-10

## 2023-10-10 NOTE — TELEPHONE ENCOUNTER
Pt have back pain need medication muscle  relaxer and pain medication sent to     [facility]

## 2023-10-10 NOTE — TELEPHONE ENCOUNTER
Patient can take OTC ibuprofen or Tylenol for pain, he can apply ice/heat. He would need to schedule appointment to evaluate his back for any prescription medications.

## 2023-10-10 NOTE — TELEPHONE ENCOUNTER
Patient wife is aware of Tea's response, patient wife did not want a lyft, or vv. Patient wife and patient are aware of Tea's response.

## 2023-10-12 DIAGNOSIS — M54.50 CHRONIC BILATERAL LOW BACK PAIN WITHOUT SCIATICA: ICD-10-CM

## 2023-10-12 DIAGNOSIS — G89.29 CHRONIC BILATERAL LOW BACK PAIN WITHOUT SCIATICA: ICD-10-CM

## 2023-10-12 DIAGNOSIS — F41.9 ANXIETY: ICD-10-CM

## 2023-10-12 DIAGNOSIS — J44.9 CHRONIC OBSTRUCTIVE PULMONARY DISEASE, UNSPECIFIED COPD TYPE (HCC): ICD-10-CM

## 2023-10-12 RX ORDER — GABAPENTIN 100 MG/1
CAPSULE ORAL
Qty: 180 CAPSULE | Refills: 1 | Status: SHIPPED | OUTPATIENT
Start: 2023-10-12

## 2023-10-12 RX ORDER — BUDESONIDE 0.5 MG/2ML
INHALANT ORAL
Qty: 120 ML | Refills: 2 | Status: SHIPPED | OUTPATIENT
Start: 2023-10-12

## 2023-10-12 NOTE — TELEPHONE ENCOUNTER
Pt would like to get this medication refilled if possible, Cyclobenzapri 10 mg  Muscle relaxer. Would like this medication sent to [facility] in [facility] [phone].

## 2023-10-19 ENCOUNTER — TELEPHONE (OUTPATIENT)
Dept: FAMILY MEDICINE CLINIC | Facility: CLINIC | Age: 83
End: 2023-10-19

## 2023-10-19 DIAGNOSIS — M62.838 MUSCLE SPASM: Primary | ICD-10-CM

## 2023-10-19 RX ORDER — METHOCARBAMOL 500 MG/1
500 TABLET, FILM COATED ORAL 4 TIMES DAILY
Qty: 60 TABLET | Refills: 5 | Status: SHIPPED | OUTPATIENT
Start: 2023-10-19

## 2023-10-19 NOTE — TELEPHONE ENCOUNTER
Pts back has been out for over a week last Monday in right shoulder blade and down back as well. Can barely walk. Would like a prescription for a muscle relaxer if possible. Would like sent to the [facility] in [facility] [place] [phone].

## 2023-10-26 ENCOUNTER — FOLLOW UP (OUTPATIENT)
Dept: URBAN - METROPOLITAN AREA CLINIC 79 | Facility: CLINIC | Age: 83
End: 2023-10-26

## 2023-10-26 DIAGNOSIS — H43.811: ICD-10-CM

## 2023-10-26 DIAGNOSIS — H25.13: ICD-10-CM

## 2023-10-26 DIAGNOSIS — H35.052: ICD-10-CM

## 2023-10-26 DIAGNOSIS — H40.033: ICD-10-CM

## 2023-10-26 PROCEDURE — 92012 INTRM OPH EXAM EST PATIENT: CPT

## 2023-10-26 PROCEDURE — 92134 CPTRZ OPH DX IMG PST SGM RTA: CPT

## 2023-10-26 ASSESSMENT — VISUAL ACUITY
OS_CC: 20/25-1
OD_CC: 20/60+1

## 2023-10-26 ASSESSMENT — TONOMETRY
OD_IOP_MMHG: 10
OS_IOP_MMHG: 10

## 2023-10-28 ENCOUNTER — NURSE TRIAGE (OUTPATIENT)
Dept: OTHER | Facility: OTHER | Age: 83
End: 2023-10-28

## 2023-10-28 DIAGNOSIS — N39.0 URINARY TRACT INFECTION WITHOUT HEMATURIA, SITE UNSPECIFIED: Primary | ICD-10-CM

## 2023-10-28 RX ORDER — CIPROFLOXACIN 250 MG/1
250 TABLET, FILM COATED ORAL EVERY 12 HOURS SCHEDULED
Qty: 14 TABLET | Refills: 0 | Status: SHIPPED | OUTPATIENT
Start: 2023-10-28 | End: 2023-11-04

## 2023-10-28 NOTE — TELEPHONE ENCOUNTER
Reason for Disposition   Urinating more frequently than usual (i.e., frequency)    Answer Assessment - Initial Assessment Questions  1. SYMPTOM: "What's the main symptom you're concerned about?" (e.g., frequency, incontinence)      Frequency, burning pain on urination    2. ONSET: "When did the  symptoms  start?"      Two or three days ago    3. PAIN: "Is there any pain?" If Yes, ask: "How bad is it?" (Scale: 1-10; mild, moderate, severe)      Right now, 7/10    4. CAUSE: "What do you think is causing the symptoms?"      Wife thinks possible infection    5. OTHER SYMPTOMS: "Do you have any other symptoms?" (e.g., fever, flank pain, blood in urine, pain with urination)      No other symptoms    Protocols used: Urinary Symptoms-ADULT-AH    Patient's wife is asking if an antibiotic could be called in to pharmacy for patient's symptoms. Explained that most providers will not send in medications, unless patient is seen. Patient's wife became upset and said their former family doctor who retired always sent in for antibiotics for UTIs. Paged on call provider- per [facility], will send in to pharmacy as a courtesy, but moving forward, patient must be seen for evaluation. Patient's wife verbalized understanding.

## 2023-10-28 NOTE — TELEPHONE ENCOUNTER
Regarding: Pain and stinging when urinates for 2-3 days  ----- Message from Juanito Clark sent at 10/28/2023  9:29 AM EDT -----  My  may have a bladder infection with pain and stinging when he urinates started 2-3 days ago.

## 2023-10-29 DIAGNOSIS — J44.9 COPD, MODERATE (HCC): ICD-10-CM

## 2023-10-29 RX ORDER — ALBUTEROL SULFATE 2.5 MG/3ML
SOLUTION RESPIRATORY (INHALATION)
Qty: 150 ML | Refills: 3 | Status: SHIPPED | OUTPATIENT
Start: 2023-10-29

## 2024-01-04 ENCOUNTER — FOLLOW UP (OUTPATIENT)
Dept: URBAN - METROPOLITAN AREA CLINIC 79 | Facility: CLINIC | Age: 84
End: 2024-01-04

## 2024-01-04 DIAGNOSIS — H43.811: ICD-10-CM

## 2024-01-04 DIAGNOSIS — H25.13: ICD-10-CM

## 2024-01-04 DIAGNOSIS — H40.033: ICD-10-CM

## 2024-01-04 DIAGNOSIS — H35.052: ICD-10-CM

## 2024-01-04 PROCEDURE — 92134 CPTRZ OPH DX IMG PST SGM RTA: CPT

## 2024-01-04 PROCEDURE — 67028 INJECTION EYE DRUG: CPT

## 2024-01-04 PROCEDURE — 92014 COMPRE OPH EXAM EST PT 1/>: CPT | Mod: 25

## 2024-01-04 ASSESSMENT — VISUAL ACUITY
OS_CC: 20/25-1
OD_CC: 20/60+1

## 2024-01-04 ASSESSMENT — TONOMETRY
OD_IOP_MMHG: 9
OS_IOP_MMHG: 8

## 2024-03-21 ENCOUNTER — FOLLOW UP (OUTPATIENT)
Dept: URBAN - METROPOLITAN AREA CLINIC 79 | Facility: CLINIC | Age: 84
End: 2024-03-21

## 2024-03-21 DIAGNOSIS — H40.033: ICD-10-CM

## 2024-03-21 DIAGNOSIS — H43.811: ICD-10-CM

## 2024-03-21 DIAGNOSIS — H35.052: ICD-10-CM

## 2024-03-21 DIAGNOSIS — H25.13: ICD-10-CM

## 2024-03-21 PROCEDURE — 92012 INTRM OPH EXAM EST PATIENT: CPT

## 2024-03-21 PROCEDURE — 92134 CPTRZ OPH DX IMG PST SGM RTA: CPT

## 2024-03-21 ASSESSMENT — VISUAL ACUITY
OD_CC: 20/60-2
OS_CC: 20/20-1

## 2024-03-21 ASSESSMENT — TONOMETRY
OD_IOP_MMHG: 9
OS_IOP_MMHG: 9

## 2024-04-25 ENCOUNTER — FOLLOW UP (OUTPATIENT)
Dept: URBAN - METROPOLITAN AREA CLINIC 79 | Facility: CLINIC | Age: 84
End: 2024-04-25

## 2024-04-25 DIAGNOSIS — H35.052: ICD-10-CM

## 2024-04-25 DIAGNOSIS — H43.811: ICD-10-CM

## 2024-04-25 PROCEDURE — 92014 COMPRE OPH EXAM EST PT 1/>: CPT | Mod: 25

## 2024-04-25 PROCEDURE — PFS EYLEA PFS: Mod: JZ

## 2024-04-25 PROCEDURE — 92202 OPSCPY EXTND ON/MAC DRAW: CPT | Mod: NC

## 2024-04-25 PROCEDURE — 92134 CPTRZ OPH DX IMG PST SGM RTA: CPT

## 2024-04-25 PROCEDURE — 67028 INJECTION EYE DRUG: CPT

## 2024-04-25 ASSESSMENT — TONOMETRY
OS_IOP_MMHG: 8
OD_IOP_MMHG: 9

## 2024-04-25 ASSESSMENT — VISUAL ACUITY
OD_CC: 20/50
OS_CC: 20/25

## 2024-05-28 NOTE — ANESTHESIOLOGIST PRE-PROCEDURE ATTESTATION
05/28/24 1200   Recommended Level of Care    Recommended Level of Care PHP   Type of Treatment Recommended AODA   Comments Consulted with ALEXANDRA Meza for triaging to red php due to decompensation of symptoms: continued use of non-prescribed substances (alcohol, benzodiazepines, delta-8) anxiety, mood swings. Pt reported willingness to go to higher level of care but does endorse some ambivilance. Pt denies current withdrawl symptoms. Pt accepted into higher level of care upon consultation.       Sarita Lorenzo, RANJEET-IT   Pre-Procedure Patient Identification:  I am the Primary Anesthesiologist and have identified the patient on 11/19/20 at 7:56 AM.   I have confirmed the following procedure(s) Right Inguinal Hernia Repair w/Mesh (R) will be performed by the following surgeon/proceduralist Cordell Navarrete DO.

## (undated) DEVICE — SLING ARM ECONO LARGE

## (undated) DEVICE — GAUZE SPONGES,16 PLY: Brand: CURITY

## (undated) DEVICE — SPONGE LAP 18 X 18 IN STRL RFD

## (undated) DEVICE — SYRINGE DISP LUER-LOK 10 CC

## (undated) DEVICE — DRESSING SPONGE GAUZE 4X4 STER

## (undated) DEVICE — SUTURE MONOCRYL 4-0 Y426H PS-2 27IN

## (undated) DEVICE — 6617 IOBAN II PATIENT ISOLATION DRAPE 5/BX,4BX/CS: Brand: STERI-DRAPE™ IOBAN™ 2

## (undated) DEVICE — CHLORAPREP HI-LITE 26ML ORANGE

## (undated) DEVICE — DRESSING MEPILEX AG 3X3

## (undated) DEVICE — NEEDLE DISP HYPO 22GX1-1/2IN

## (undated) DEVICE — DRAPE 3/4 REINFORCED

## (undated) DEVICE — MEDI-VAC YANKAUER SUCTION HANDLE W/BULBOUS AND CONTROL VENT: Brand: CARDINAL HEALTH

## (undated) DEVICE — SOLN IRRIG .9%SOD 500ML

## (undated) DEVICE — PACK RFID MLHS MINOR PROC STDZ

## (undated) DEVICE — SOLN IRRIG STER WATER 1000ML

## (undated) DEVICE — TRAY ORIF HIP

## (undated) DEVICE — Device

## (undated) DEVICE — DRESSING ABD STER LGE 8X10

## (undated) DEVICE — INTENDED FOR TISSUE SEPARATION, AND OTHER PROCEDURES THAT REQUIRE A SHARP SURGICAL BLADE TO PUNCTURE OR CUT.: Brand: BARD-PARKER SAFETY BLADES SIZE 15, STERILE

## (undated) DEVICE — PIN GUIDE

## (undated) DEVICE — ASTOUND STANDARD SURGICAL GOWN, XXL: Brand: CONVERTORS

## (undated) DEVICE — CAST PADDING 4 IN STERILE

## (undated) DEVICE — BETHLEHEM UNIVERSAL MINOR GEN: Brand: CARDINAL HEALTH

## (undated) DEVICE — VIAL DECANTER

## (undated) DEVICE — SKIN MARKER SURGICAL

## (undated) DEVICE — PAD GROUND ELECTROSURGICAL W/CORD

## (undated) DEVICE — BULB SYRINGE,IRRIGATION WITH PROTECTIVE CAP: Brand: DOVER

## (undated) DEVICE — ***USE 138652*** SUTURE VICRYL 1 J569H CPX 27IN UNDYED

## (undated) DEVICE — PROXIMATE PLUS MD MULTI-DIRECTIONAL RELEASE SKIN STAPLERS CONTAINS 35 STAINLESS STEEL STAPLES APPROXIMATE CLOSED DIMENSIONS: 6.9MM X 3.9MM WIDE: Brand: PROXIMATE

## (undated) DEVICE — DRAPE EQUIPMENT RF WAND

## (undated) DEVICE — APPLICATOR CHLORAPREP 26ML ORANGE TINT

## (undated) DEVICE — GLOVE SURG PROTEXIS PF 8

## (undated) DEVICE — ***USE 57017*** SUTURE ETHIBOND 0       X412H

## (undated) DEVICE — PENCIL ELECTROSURG E-Z CLEAN -0035H

## (undated) DEVICE — SUT VICRYL 3-0 SH 27 IN J416H

## (undated) DEVICE — SKIN MARKER DUAL TIP WITH RULER CAP, FLEXIBLE RULER AND LABELS: Brand: DEVON

## (undated) DEVICE — SUTURE VICRYL 3-0 J416H SH UNDYED

## (undated) DEVICE — GLOVE SZ 8.5 LINER PROTEXIS PI BL

## (undated) DEVICE — GLOVE INDICATOR PI UNDERGLOVE SZ 7 BLUE

## (undated) DEVICE — NEEDLE HYPO 22G X 1-1/2 IN

## (undated) DEVICE — TOWEL SET X-RAY

## (undated) DEVICE — GLOVE INDICATOR PI UNDERGLOVE SZ 8 BLUE

## (undated) DEVICE — SUT VICRYL 2-0 CT-2 27 IN J269H

## (undated) DEVICE — SWABSTICK BETADINE

## (undated) DEVICE — 1820 FOAM BLOCK NEEDLE COUNTER: Brand: DEVON

## (undated) DEVICE — GLOVE PROTEXIS PI ORTHO 8.5

## (undated) DEVICE — GLOVE SRG BIOGEL ECLIPSE 8

## (undated) DEVICE — STANDARD SURGICAL GOWN, L: Brand: CONVERTORS

## (undated) DEVICE — SUT VICRYL 0 REEL 54 IN J287G

## (undated) DEVICE — TUBING SUCTION 5MM X 12 FT

## (undated) DEVICE — DRESSING MEPILEX AG BORDER 4 X 4 IN

## (undated) DEVICE — SUTURE VICRYL 0 J270H CT-2 27IN UNDYED

## (undated) DEVICE — GLOVE SRG BIOGEL 7.5

## (undated) DEVICE — SUT MONOCRYL 4-0 PS-2 27 IN Y426H

## (undated) DEVICE — ADHESIVE SKIN HIGH VISCOSITY EXOFIN 1ML

## (undated) DEVICE — STAPLER SKIN

## (undated) DEVICE — ELECTRODE BLADE MOD E-Z CLEAN  2.75IN 7CM -0012AM

## (undated) DEVICE — SUT VICRYL 0 CT-1 27 IN J260H

## (undated) DEVICE — GLOVE INDICATOR PI UNDERGLOVE SZ 6.5 BLUE

## (undated) DEVICE — SOLN IRRIG .9%SOD 1000ML

## (undated) DEVICE — HEAVY DUTY TABLE COVER: Brand: CONVERTORS

## (undated) DEVICE — BIT 4.2MM THREE-FLUTED DRILL QC/330MM/100MM

## (undated) DEVICE — DRESSING MEPILEX AG BORDER 4 X 8 IN

## (undated) DEVICE — GOWN SURGICAL REINFORCED XX-LARGE

## (undated) DEVICE — MANIFOLD SINGLE PORT NEPTUNE

## (undated) DEVICE — ***USE 57698*** SLEEVE FLOWTRON DVT CALF SINGLE USE

## (undated) DEVICE — ***USE 143206***SYRINGE BULB

## (undated) DEVICE — GLOVE SRG BIOGEL ORTHOPEDIC 7.5

## (undated) DEVICE — ***USE 138530*** SUTURE VICRYL 2-0 J259H CT-1 UNDYED

## (undated) DEVICE — GLOVE SRG BIOGEL 6.5

## (undated) DEVICE — SUT PDS II 2-0 SH 27 IN Z317H

## (undated) DEVICE — SUTURE VICRYL 2-0 J417H SH UNDYED

## (undated) DEVICE — GLOVE SRG BIOGEL 7

## (undated) DEVICE — DRESSING MEPILEX 4X8 BORDER

## (undated) DEVICE — SUT PROLENE 2-0 CT-2 30 IN 8411H

## (undated) DEVICE — BLANKET LOWER BODY

## (undated) DEVICE — ***USE 59070*** SUTURE VICRYL 0 J260H CT-1 27IN UNDYED

## (undated) DEVICE — STRL PENROSE DRAIN 18" X 1/4": Brand: CARDINAL HEALTH

## (undated) DEVICE — PENEVAC1 NONSTICK SMOKE EVAC

## (undated) RX ORDER — PREDNISOLONE ACETATE 10 MG/ML: 1 SUSPENSION/ DROPS OPHTHALMIC